# Patient Record
Sex: MALE | Race: ASIAN | NOT HISPANIC OR LATINO | Employment: UNEMPLOYED | URBAN - METROPOLITAN AREA
[De-identification: names, ages, dates, MRNs, and addresses within clinical notes are randomized per-mention and may not be internally consistent; named-entity substitution may affect disease eponyms.]

---

## 2017-01-03 ENCOUNTER — COMMUNICATION - HEALTHEAST (OUTPATIENT)
Dept: PHYSICAL MEDICINE AND REHAB | Facility: CLINIC | Age: 32
End: 2017-01-03

## 2017-01-09 ENCOUNTER — HOSPITAL ENCOUNTER (OUTPATIENT)
Dept: PHYSICAL MEDICINE AND REHAB | Facility: CLINIC | Age: 32
Discharge: HOME | End: 2017-01-09
Attending: SOCIAL WORKER

## 2017-01-09 DIAGNOSIS — F32.1 MAJOR DEPRESSIVE DISORDER, SINGLE EPISODE, MODERATE (H): ICD-10-CM

## 2017-01-09 DIAGNOSIS — F41.1 GENERALIZED ANXIETY DISORDER: ICD-10-CM

## 2017-01-19 ENCOUNTER — COMMUNICATION - HEALTHEAST (OUTPATIENT)
Dept: INTERNAL MEDICINE | Facility: CLINIC | Age: 32
End: 2017-01-19

## 2017-01-20 ENCOUNTER — AMBULATORY - HEALTHEAST (OUTPATIENT)
Dept: LAB | Facility: CLINIC | Age: 32
End: 2017-01-20

## 2017-01-20 DIAGNOSIS — R25.1 TREMOR: ICD-10-CM

## 2017-01-20 DIAGNOSIS — M79.606 LEG PAIN: ICD-10-CM

## 2017-01-20 DIAGNOSIS — M54.16 LUMBAR RADICULOPATHY: ICD-10-CM

## 2017-01-20 LAB — HBA1C MFR BLD: 5.5 % (ref 3.5–6)

## 2017-01-23 ENCOUNTER — OFFICE VISIT - HEALTHEAST (OUTPATIENT)
Dept: INTERNAL MEDICINE | Facility: CLINIC | Age: 32
End: 2017-01-23

## 2017-01-23 DIAGNOSIS — E55.9 HYPOVITAMINOSIS D: ICD-10-CM

## 2017-01-23 DIAGNOSIS — R53.1 WEAKNESS: ICD-10-CM

## 2017-01-23 DIAGNOSIS — F45.9 SOMATOFORM DISORDER: ICD-10-CM

## 2017-01-23 DIAGNOSIS — M62.81 MUSCLE WEAKNESS (GENERALIZED): ICD-10-CM

## 2017-01-23 LAB
C-ANCA - HISTORICAL: NEGATIVE
P-ANCA - HISTORICAL: NEGATIVE

## 2017-01-24 LAB
ANA SER QL: 1.1 U
JO-1 AUTOANTIBODIES - HISTORICAL: 0 EU
PATH ICD:: NORMAL
PROT PATTERN SERPL IFE-IMP: NORMAL
REVIEWING PATHOLOGIST: NORMAL

## 2017-01-25 LAB
MISCELLANEOUS TEST DEPT. - HE HISTORICAL: NORMAL
PERFORMING LAB: NORMAL
SPECIMEN STATUS: NORMAL
TEST NAME: NORMAL

## 2017-01-29 ENCOUNTER — COMMUNICATION - HEALTHEAST (OUTPATIENT)
Dept: INTERNAL MEDICINE | Facility: CLINIC | Age: 32
End: 2017-01-29

## 2017-01-31 ENCOUNTER — HOSPITAL ENCOUNTER (OUTPATIENT)
Dept: PHYSICAL MEDICINE AND REHAB | Facility: CLINIC | Age: 32
Discharge: HOME | End: 2017-01-31
Attending: SOCIAL WORKER

## 2017-01-31 DIAGNOSIS — F41.1 GENERALIZED ANXIETY DISORDER: ICD-10-CM

## 2017-01-31 DIAGNOSIS — F32.1 MAJOR DEPRESSIVE DISORDER, SINGLE EPISODE, MODERATE (H): ICD-10-CM

## 2017-03-10 ENCOUNTER — COMMUNICATION - HEALTHEAST (OUTPATIENT)
Dept: PHYSICAL MEDICINE AND REHAB | Facility: CLINIC | Age: 32
End: 2017-03-10

## 2017-03-19 ENCOUNTER — COMMUNICATION - HEALTHEAST (OUTPATIENT)
Dept: INTERNAL MEDICINE | Facility: CLINIC | Age: 32
End: 2017-03-19

## 2017-03-19 DIAGNOSIS — Z13.220 SCREENING FOR HYPERCHOLESTEROLEMIA: ICD-10-CM

## 2017-05-25 ENCOUNTER — OFFICE VISIT - HEALTHEAST (OUTPATIENT)
Dept: INTERNAL MEDICINE | Facility: CLINIC | Age: 32
End: 2017-05-25

## 2017-05-25 DIAGNOSIS — M62.81 MUSCLE WEAKNESS-GENERAL: ICD-10-CM

## 2017-05-25 DIAGNOSIS — M54.50 LOW BACK PAIN: ICD-10-CM

## 2017-05-25 LAB
CHOLEST SERPL-MCNC: 234 MG/DL
FASTING STATUS PATIENT QL REPORTED: NO
HDLC SERPL-MCNC: 53 MG/DL
LDLC SERPL CALC-MCNC: 137 MG/DL
TRIGL SERPL-MCNC: 222 MG/DL

## 2017-05-26 ENCOUNTER — COMMUNICATION - HEALTHEAST (OUTPATIENT)
Dept: INTERNAL MEDICINE | Facility: CLINIC | Age: 32
End: 2017-05-26

## 2017-06-22 ENCOUNTER — COMMUNICATION - HEALTHEAST (OUTPATIENT)
Dept: INTERNAL MEDICINE | Facility: CLINIC | Age: 32
End: 2017-06-22

## 2017-06-29 ENCOUNTER — COMMUNICATION - HEALTHEAST (OUTPATIENT)
Dept: INTERNAL MEDICINE | Facility: CLINIC | Age: 32
End: 2017-06-29

## 2017-07-12 ENCOUNTER — AMBULATORY - HEALTHEAST (OUTPATIENT)
Dept: INTERNAL MEDICINE | Facility: CLINIC | Age: 32
End: 2017-07-12

## 2017-07-12 DIAGNOSIS — G83.10: ICD-10-CM

## 2017-07-21 ENCOUNTER — AMBULATORY - HEALTHEAST (OUTPATIENT)
Dept: PHYSICAL MEDICINE AND REHAB | Facility: CLINIC | Age: 32
End: 2017-07-21

## 2017-07-24 ENCOUNTER — COMMUNICATION - HEALTHEAST (OUTPATIENT)
Dept: PHYSICAL MEDICINE AND REHAB | Facility: CLINIC | Age: 32
End: 2017-07-24

## 2017-08-03 ENCOUNTER — RECORDS - HEALTHEAST (OUTPATIENT)
Dept: ADMINISTRATIVE | Facility: OTHER | Age: 32
End: 2017-08-03

## 2017-08-04 ENCOUNTER — OFFICE VISIT - HEALTHEAST (OUTPATIENT)
Dept: INTERNAL MEDICINE | Facility: CLINIC | Age: 32
End: 2017-08-04

## 2017-08-04 DIAGNOSIS — M62.838 MUSCLE SPASM OF RIGHT LOWER EXTREMITY: ICD-10-CM

## 2017-08-04 DIAGNOSIS — M54.50 LOW BACK PAIN: ICD-10-CM

## 2017-08-15 ENCOUNTER — COMMUNICATION - HEALTHEAST (OUTPATIENT)
Dept: INTERNAL MEDICINE | Facility: CLINIC | Age: 32
End: 2017-08-15

## 2017-08-17 ENCOUNTER — COMMUNICATION - HEALTHEAST (OUTPATIENT)
Dept: INTERNAL MEDICINE | Facility: CLINIC | Age: 32
End: 2017-08-17

## 2017-08-18 ENCOUNTER — AMBULATORY - HEALTHEAST (OUTPATIENT)
Dept: INTERNAL MEDICINE | Facility: CLINIC | Age: 32
End: 2017-08-18

## 2017-08-18 DIAGNOSIS — M62.838 MUSCLE SPASM OF RIGHT LOWER EXTREMITY: ICD-10-CM

## 2017-09-12 ENCOUNTER — RECORDS - HEALTHEAST (OUTPATIENT)
Dept: ADMINISTRATIVE | Facility: OTHER | Age: 32
End: 2017-09-12

## 2017-09-25 ENCOUNTER — AMBULATORY - HEALTHEAST (OUTPATIENT)
Dept: INTERNAL MEDICINE | Facility: CLINIC | Age: 32
End: 2017-09-25

## 2017-09-25 ENCOUNTER — COMMUNICATION - HEALTHEAST (OUTPATIENT)
Dept: INTERNAL MEDICINE | Facility: CLINIC | Age: 32
End: 2017-09-25

## 2017-09-25 DIAGNOSIS — M54.50 LOW BACK PAIN: ICD-10-CM

## 2017-09-25 DIAGNOSIS — M62.838 MUSCLE SPASM OF RIGHT LOWER EXTREMITY: ICD-10-CM

## 2017-10-13 ENCOUNTER — COMMUNICATION - HEALTHEAST (OUTPATIENT)
Dept: INTERNAL MEDICINE | Facility: CLINIC | Age: 32
End: 2017-10-13

## 2017-10-19 ENCOUNTER — RECORDS - HEALTHEAST (OUTPATIENT)
Dept: ADMINISTRATIVE | Facility: OTHER | Age: 32
End: 2017-10-19

## 2017-10-20 ENCOUNTER — OFFICE VISIT - HEALTHEAST (OUTPATIENT)
Dept: INTERNAL MEDICINE | Facility: CLINIC | Age: 32
End: 2017-10-20

## 2017-10-20 DIAGNOSIS — M21.371 RIGHT FOOT DROP: ICD-10-CM

## 2017-10-20 DIAGNOSIS — E83.52 HYPERCALCEMIA: ICD-10-CM

## 2017-10-20 DIAGNOSIS — I26.99 BILATERAL PULMONARY EMBOLISM (H): ICD-10-CM

## 2017-10-20 DIAGNOSIS — Z09 HOSPITAL DISCHARGE FOLLOW-UP: ICD-10-CM

## 2017-10-23 ENCOUNTER — COMMUNICATION - HEALTHEAST (OUTPATIENT)
Dept: ADMINISTRATIVE | Facility: HOSPITAL | Age: 32
End: 2017-10-23

## 2017-10-25 ENCOUNTER — COMMUNICATION - HEALTHEAST (OUTPATIENT)
Dept: INTERNAL MEDICINE | Facility: CLINIC | Age: 32
End: 2017-10-25

## 2017-11-01 ENCOUNTER — COMMUNICATION - HEALTHEAST (OUTPATIENT)
Dept: INTERNAL MEDICINE | Facility: CLINIC | Age: 32
End: 2017-11-01

## 2017-11-01 DIAGNOSIS — M54.50 LOW BACK PAIN: ICD-10-CM

## 2017-11-01 DIAGNOSIS — M62.838 MUSCLE SPASM OF RIGHT LOWER EXTREMITY: ICD-10-CM

## 2017-11-06 ENCOUNTER — COMMUNICATION - HEALTHEAST (OUTPATIENT)
Dept: INTERNAL MEDICINE | Facility: CLINIC | Age: 32
End: 2017-11-06

## 2017-11-06 DIAGNOSIS — Z23 ENCOUNTER FOR VACCINATION: ICD-10-CM

## 2017-11-08 ENCOUNTER — AMBULATORY - HEALTHEAST (OUTPATIENT)
Dept: NURSING | Facility: CLINIC | Age: 32
End: 2017-11-08

## 2017-11-08 DIAGNOSIS — Z23 ENCOUNTER FOR VACCINATION: ICD-10-CM

## 2017-11-20 ENCOUNTER — COMMUNICATION - HEALTHEAST (OUTPATIENT)
Dept: INTERNAL MEDICINE | Facility: CLINIC | Age: 32
End: 2017-11-20

## 2017-11-25 ENCOUNTER — COMMUNICATION - HEALTHEAST (OUTPATIENT)
Dept: INTERNAL MEDICINE | Facility: CLINIC | Age: 32
End: 2017-11-25

## 2017-11-27 ENCOUNTER — AMBULATORY - HEALTHEAST (OUTPATIENT)
Dept: INTERNAL MEDICINE | Facility: CLINIC | Age: 32
End: 2017-11-27

## 2017-11-27 DIAGNOSIS — G89.29 CHRONIC RIGHT-SIDED LOW BACK PAIN WITH RIGHT-SIDED SCIATICA: ICD-10-CM

## 2017-11-27 DIAGNOSIS — I26.99 BILATERAL PULMONARY EMBOLISM (H): ICD-10-CM

## 2017-11-27 DIAGNOSIS — M54.41 CHRONIC RIGHT-SIDED LOW BACK PAIN WITH RIGHT-SIDED SCIATICA: ICD-10-CM

## 2017-12-04 ENCOUNTER — RECORDS - HEALTHEAST (OUTPATIENT)
Dept: ADMINISTRATIVE | Facility: OTHER | Age: 32
End: 2017-12-04

## 2017-12-05 ENCOUNTER — COMMUNICATION - HEALTHEAST (OUTPATIENT)
Dept: INTERNAL MEDICINE | Facility: CLINIC | Age: 32
End: 2017-12-05

## 2017-12-06 ENCOUNTER — AMBULATORY - HEALTHEAST (OUTPATIENT)
Dept: INTERNAL MEDICINE | Facility: CLINIC | Age: 32
End: 2017-12-06

## 2017-12-06 DIAGNOSIS — M62.838 MUSCLE SPASM OF RIGHT LOWER EXTREMITY: ICD-10-CM

## 2017-12-06 DIAGNOSIS — G89.29 CHRONIC RIGHT-SIDED LOW BACK PAIN WITH RIGHT-SIDED SCIATICA: ICD-10-CM

## 2017-12-06 DIAGNOSIS — M54.41 CHRONIC RIGHT-SIDED LOW BACK PAIN WITH RIGHT-SIDED SCIATICA: ICD-10-CM

## 2017-12-07 ENCOUNTER — AMBULATORY - HEALTHEAST (OUTPATIENT)
Dept: INTERNAL MEDICINE | Facility: CLINIC | Age: 32
End: 2017-12-07

## 2017-12-07 DIAGNOSIS — G89.29 CHRONIC RIGHT-SIDED LOW BACK PAIN WITH RIGHT-SIDED SCIATICA: ICD-10-CM

## 2017-12-07 DIAGNOSIS — M54.41 CHRONIC RIGHT-SIDED LOW BACK PAIN WITH RIGHT-SIDED SCIATICA: ICD-10-CM

## 2017-12-22 ENCOUNTER — COMMUNICATION - HEALTHEAST (OUTPATIENT)
Dept: INTERNAL MEDICINE | Facility: CLINIC | Age: 32
End: 2017-12-22

## 2017-12-27 ENCOUNTER — OFFICE VISIT - HEALTHEAST (OUTPATIENT)
Dept: INTERNAL MEDICINE | Facility: CLINIC | Age: 32
End: 2017-12-27

## 2017-12-27 DIAGNOSIS — R04.2 COUGH WITH HEMOPTYSIS: ICD-10-CM

## 2017-12-27 DIAGNOSIS — J01.00 MAXILLARY SINUSITIS, ACUTE: ICD-10-CM

## 2017-12-31 ENCOUNTER — COMMUNICATION - HEALTHEAST (OUTPATIENT)
Dept: INTERNAL MEDICINE | Facility: CLINIC | Age: 32
End: 2017-12-31

## 2018-01-02 ENCOUNTER — HOSPITAL ENCOUNTER (OUTPATIENT)
Dept: PALLIATIVE MEDICINE | Facility: OTHER | Age: 33
Discharge: HOME | End: 2018-01-02
Attending: PSYCHIATRY & NEUROLOGY

## 2018-01-02 ENCOUNTER — OFFICE VISIT - HEALTHEAST (OUTPATIENT)
Dept: INTERNAL MEDICINE | Facility: CLINIC | Age: 33
End: 2018-01-02

## 2018-01-02 DIAGNOSIS — M62.81 MUSCLE WEAKNESS (GENERALIZED): ICD-10-CM

## 2018-01-02 DIAGNOSIS — J01.90 ACUTE SINUSITIS: ICD-10-CM

## 2018-01-02 DIAGNOSIS — R04.0 FREQUENT EPISTAXIS: ICD-10-CM

## 2018-01-02 DIAGNOSIS — Z09 FOLLOW UP: ICD-10-CM

## 2018-01-02 DIAGNOSIS — M62.838 MUSCLE SPASM: ICD-10-CM

## 2018-01-02 ASSESSMENT — MIFFLIN-ST. JEOR: SCORE: 1487.84

## 2018-01-03 ENCOUNTER — RECORDS - HEALTHEAST (OUTPATIENT)
Dept: ADMINISTRATIVE | Facility: OTHER | Age: 33
End: 2018-01-03

## 2018-01-03 ENCOUNTER — COMMUNICATION - HEALTHEAST (OUTPATIENT)
Dept: INTERNAL MEDICINE | Facility: CLINIC | Age: 33
End: 2018-01-03

## 2018-01-05 ENCOUNTER — AMBULATORY - HEALTHEAST (OUTPATIENT)
Dept: FAMILY MEDICINE | Facility: CLINIC | Age: 33
End: 2018-01-05

## 2018-01-05 ENCOUNTER — COMMUNICATION - HEALTHEAST (OUTPATIENT)
Dept: NURSING | Facility: CLINIC | Age: 33
End: 2018-01-05

## 2018-01-05 DIAGNOSIS — Z63.79 STRESSFUL LIFE EVENTS AFFECTING FAMILY AND HOUSEHOLD: ICD-10-CM

## 2018-01-06 ENCOUNTER — COMMUNICATION - HEALTHEAST (OUTPATIENT)
Dept: FAMILY MEDICINE | Facility: CLINIC | Age: 33
End: 2018-01-06

## 2018-01-06 DIAGNOSIS — G89.29 CHRONIC RIGHT-SIDED LOW BACK PAIN WITH RIGHT-SIDED SCIATICA: ICD-10-CM

## 2018-01-06 DIAGNOSIS — M54.41 CHRONIC RIGHT-SIDED LOW BACK PAIN WITH RIGHT-SIDED SCIATICA: ICD-10-CM

## 2018-01-08 ENCOUNTER — COMMUNICATION - HEALTHEAST (OUTPATIENT)
Dept: INTERNAL MEDICINE | Facility: CLINIC | Age: 33
End: 2018-01-08

## 2018-01-09 ENCOUNTER — AMBULATORY - HEALTHEAST (OUTPATIENT)
Dept: CARE COORDINATION | Facility: CLINIC | Age: 33
End: 2018-01-09

## 2018-01-09 ENCOUNTER — COMMUNICATION - HEALTHEAST (OUTPATIENT)
Dept: NURSING | Facility: CLINIC | Age: 33
End: 2018-01-09

## 2018-01-09 ENCOUNTER — HOSPITAL ENCOUNTER (OUTPATIENT)
Dept: PALLIATIVE MEDICINE | Facility: OTHER | Age: 33
Discharge: HOME | End: 2018-01-09
Attending: COUNSELOR

## 2018-01-09 DIAGNOSIS — F41.1 GENERALIZED ANXIETY DISORDER: ICD-10-CM

## 2018-01-09 DIAGNOSIS — J32.2 CHRONIC ETHMOIDAL SINUSITIS: ICD-10-CM

## 2018-01-09 DIAGNOSIS — F33.2 SEVERE EPISODE OF RECURRENT MAJOR DEPRESSIVE DISORDER, WITHOUT PSYCHOTIC FEATURES (H): ICD-10-CM

## 2018-01-09 DIAGNOSIS — G89.4 CHRONIC PAIN SYNDROME: ICD-10-CM

## 2018-01-12 ENCOUNTER — OFFICE VISIT - HEALTHEAST (OUTPATIENT)
Dept: FAMILY MEDICINE | Facility: CLINIC | Age: 33
End: 2018-01-12

## 2018-01-12 DIAGNOSIS — G89.4 CHRONIC PAIN SYNDROME: ICD-10-CM

## 2018-01-12 DIAGNOSIS — E55.9 VITAMIN D DEFICIENCY: ICD-10-CM

## 2018-01-12 DIAGNOSIS — I26.99 BILATERAL PULMONARY EMBOLISM (H): ICD-10-CM

## 2018-01-12 DIAGNOSIS — J01.90 ACUTE SINUSITIS, RECURRENCE NOT SPECIFIED, UNSPECIFIED LOCATION: ICD-10-CM

## 2018-01-12 DIAGNOSIS — J01.90 ACUTE SINUSITIS: ICD-10-CM

## 2018-01-12 DIAGNOSIS — G83.10 MONOPARESIS OF LEG (H): ICD-10-CM

## 2018-01-12 DIAGNOSIS — M21.372 FOOT DROP, BILATERAL: ICD-10-CM

## 2018-01-12 DIAGNOSIS — J33.9 NASAL POLYPOSIS: ICD-10-CM

## 2018-01-12 DIAGNOSIS — J32.2 CHRONIC ETHMOIDAL SINUSITIS: ICD-10-CM

## 2018-01-12 DIAGNOSIS — M21.371 FOOT DROP, BILATERAL: ICD-10-CM

## 2018-01-12 DIAGNOSIS — R53.1 GENERAL WEAKNESS: ICD-10-CM

## 2018-01-12 DIAGNOSIS — D64.9 NORMOCHROMIC NORMOCYTIC ANEMIA: ICD-10-CM

## 2018-01-12 LAB
ALBUMIN SERPL-MCNC: 3.8 G/DL (ref 3.5–5)
ALP SERPL-CCNC: 70 U/L (ref 45–120)
ALT SERPL W P-5'-P-CCNC: 13 U/L (ref 0–45)
ANION GAP SERPL CALCULATED.3IONS-SCNC: 11 MMOL/L (ref 5–18)
AST SERPL W P-5'-P-CCNC: 16 U/L (ref 0–40)
BILIRUB SERPL-MCNC: 0.6 MG/DL (ref 0–1)
BUN SERPL-MCNC: 15 MG/DL (ref 8–22)
CALCIUM SERPL-MCNC: 9.4 MG/DL (ref 8.5–10.5)
CHLORIDE BLD-SCNC: 102 MMOL/L (ref 98–107)
CO2 SERPL-SCNC: 27 MMOL/L (ref 22–31)
CREAT SERPL-MCNC: 0.82 MG/DL (ref 0.7–1.3)
ERYTHROCYTE [DISTWIDTH] IN BLOOD BY AUTOMATED COUNT: 12.6 % (ref 11–14.5)
GFR SERPL CREATININE-BSD FRML MDRD: >60 ML/MIN/1.73M2
GLUCOSE BLD-MCNC: 85 MG/DL (ref 70–125)
HCT VFR BLD AUTO: 49.1 % (ref 40–54)
HGB BLD-MCNC: 16.2 G/DL (ref 14–18)
MCH RBC QN AUTO: 28.7 PG (ref 27–34)
MCHC RBC AUTO-ENTMCNC: 32.9 G/DL (ref 32–36)
MCV RBC AUTO: 87 FL (ref 80–100)
PLATELET # BLD AUTO: 245 THOU/UL (ref 140–440)
PMV BLD AUTO: 6.8 FL (ref 7–10)
POTASSIUM BLD-SCNC: 3.9 MMOL/L (ref 3.5–5)
PROT SERPL-MCNC: 7.8 G/DL (ref 6–8)
RBC # BLD AUTO: 5.65 MILL/UL (ref 4.4–6.2)
SODIUM SERPL-SCNC: 140 MMOL/L (ref 136–145)
TSH SERPL DL<=0.005 MIU/L-ACNC: 3.1 UIU/ML (ref 0.3–5)
WBC: 6 THOU/UL (ref 4–11)

## 2018-01-15 ENCOUNTER — COMMUNICATION - HEALTHEAST (OUTPATIENT)
Dept: FAMILY MEDICINE | Facility: CLINIC | Age: 33
End: 2018-01-15

## 2018-01-15 LAB
25(OH)D3 SERPL-MCNC: 32.1 NG/ML (ref 30–80)
25(OH)D3 SERPL-MCNC: 32.1 NG/ML (ref 30–80)

## 2018-01-16 ENCOUNTER — COMMUNICATION - HEALTHEAST (OUTPATIENT)
Dept: FAMILY MEDICINE | Facility: CLINIC | Age: 33
End: 2018-01-16

## 2018-01-19 ENCOUNTER — OFFICE VISIT - HEALTHEAST (OUTPATIENT)
Dept: FAMILY MEDICINE | Facility: CLINIC | Age: 33
End: 2018-01-19

## 2018-01-19 ENCOUNTER — COMMUNICATION - HEALTHEAST (OUTPATIENT)
Dept: NURSING | Facility: CLINIC | Age: 33
End: 2018-01-19

## 2018-01-19 DIAGNOSIS — R53.1 GENERAL WEAKNESS: ICD-10-CM

## 2018-01-19 DIAGNOSIS — I26.99 BILATERAL PULMONARY EMBOLISM (H): ICD-10-CM

## 2018-01-19 DIAGNOSIS — R06.09 DYSPNEA ON EXERTION: ICD-10-CM

## 2018-01-19 DIAGNOSIS — M21.372 FOOT DROP, BILATERAL: ICD-10-CM

## 2018-01-19 DIAGNOSIS — M21.371 FOOT DROP, BILATERAL: ICD-10-CM

## 2018-01-19 DIAGNOSIS — J32.2 CHRONIC ETHMOIDAL SINUSITIS: ICD-10-CM

## 2018-01-19 DIAGNOSIS — G89.4 CHRONIC PAIN SYNDROME: ICD-10-CM

## 2018-01-19 DIAGNOSIS — G83.10 MONOPARESIS OF LEG (H): ICD-10-CM

## 2018-01-22 ENCOUNTER — HOSPITAL ENCOUNTER (OUTPATIENT)
Dept: PALLIATIVE MEDICINE | Facility: OTHER | Age: 33
Discharge: HOME | End: 2018-01-22
Attending: COUNSELOR

## 2018-01-22 ENCOUNTER — AMBULATORY - HEALTHEAST (OUTPATIENT)
Dept: PALLIATIVE MEDICINE | Facility: OTHER | Age: 33
End: 2018-01-22

## 2018-01-22 DIAGNOSIS — F41.1 GENERALIZED ANXIETY DISORDER: ICD-10-CM

## 2018-01-22 DIAGNOSIS — F33.2 SEVERE EPISODE OF RECURRENT MAJOR DEPRESSIVE DISORDER, WITHOUT PSYCHOTIC FEATURES (H): ICD-10-CM

## 2018-01-22 DIAGNOSIS — G89.4 CHRONIC PAIN SYNDROME: ICD-10-CM

## 2018-01-23 ENCOUNTER — HOSPITAL ENCOUNTER (OUTPATIENT)
Dept: PALLIATIVE MEDICINE | Facility: OTHER | Age: 33
Discharge: HOME | End: 2018-01-23
Attending: PSYCHIATRY & NEUROLOGY

## 2018-01-23 DIAGNOSIS — M62.838 MUSCLE SPASM: ICD-10-CM

## 2018-01-23 DIAGNOSIS — M62.81 MUSCLE WEAKNESS (GENERALIZED): ICD-10-CM

## 2018-01-23 ASSESSMENT — MIFFLIN-ST. JEOR: SCORE: 1474.24

## 2018-01-24 ENCOUNTER — COMMUNICATION - HEALTHEAST (OUTPATIENT)
Dept: PALLIATIVE MEDICINE | Facility: OTHER | Age: 33
End: 2018-01-24

## 2018-01-24 ENCOUNTER — AMBULATORY - HEALTHEAST (OUTPATIENT)
Dept: PULMONOLOGY | Facility: OTHER | Age: 33
End: 2018-01-24

## 2018-01-24 DIAGNOSIS — R06.09 DYSPNEA ON EXERTION: ICD-10-CM

## 2018-01-26 ENCOUNTER — COMMUNICATION - HEALTHEAST (OUTPATIENT)
Dept: PULMONOLOGY | Facility: OTHER | Age: 33
End: 2018-01-26

## 2018-01-26 ENCOUNTER — COMMUNICATION - HEALTHEAST (OUTPATIENT)
Dept: PALLIATIVE MEDICINE | Facility: OTHER | Age: 33
End: 2018-01-26

## 2018-01-31 ENCOUNTER — COMMUNICATION - HEALTHEAST (OUTPATIENT)
Dept: PALLIATIVE MEDICINE | Facility: OTHER | Age: 33
End: 2018-01-31

## 2018-01-31 ENCOUNTER — COMMUNICATION - HEALTHEAST (OUTPATIENT)
Dept: FAMILY MEDICINE | Facility: CLINIC | Age: 33
End: 2018-01-31

## 2018-02-01 ENCOUNTER — HOSPITAL ENCOUNTER (OUTPATIENT)
Dept: RESPIRATORY THERAPY | Facility: HOSPITAL | Age: 33
Discharge: HOME | End: 2018-02-01
Attending: INTERNAL MEDICINE

## 2018-02-01 DIAGNOSIS — R06.09 DYSPNEA ON EXERTION: ICD-10-CM

## 2018-02-01 DIAGNOSIS — R06.09 OTHER FORMS OF DYSPNEA: ICD-10-CM

## 2018-02-06 ENCOUNTER — OFFICE VISIT - HEALTHEAST (OUTPATIENT)
Dept: FAMILY MEDICINE | Facility: CLINIC | Age: 33
End: 2018-02-06

## 2018-02-06 DIAGNOSIS — K21.9 GERD (GASTROESOPHAGEAL REFLUX DISEASE): ICD-10-CM

## 2018-02-06 DIAGNOSIS — R33.9 URINARY RETENTION: ICD-10-CM

## 2018-02-06 DIAGNOSIS — G89.4 CHRONIC PAIN SYNDROME: ICD-10-CM

## 2018-02-06 DIAGNOSIS — J32.2 CHRONIC ETHMOIDAL SINUSITIS: ICD-10-CM

## 2018-02-07 ENCOUNTER — HOSPITAL ENCOUNTER (OUTPATIENT)
Dept: PALLIATIVE MEDICINE | Facility: OTHER | Age: 33
Discharge: HOME | End: 2018-02-07
Attending: COUNSELOR

## 2018-02-07 DIAGNOSIS — G89.4 CHRONIC PAIN SYNDROME: ICD-10-CM

## 2018-02-07 DIAGNOSIS — F41.1 GENERALIZED ANXIETY DISORDER: ICD-10-CM

## 2018-02-07 DIAGNOSIS — F33.2 SEVERE EPISODE OF RECURRENT MAJOR DEPRESSIVE DISORDER, WITHOUT PSYCHOTIC FEATURES (H): ICD-10-CM

## 2018-02-13 ENCOUNTER — OFFICE VISIT - HEALTHEAST (OUTPATIENT)
Dept: RHEUMATOLOGY | Facility: CLINIC | Age: 33
End: 2018-02-13

## 2018-02-13 DIAGNOSIS — M79.10 MYALGIA: ICD-10-CM

## 2018-02-13 DIAGNOSIS — M25.50 PAIN IN JOINT, MULTIPLE SITES: ICD-10-CM

## 2018-02-13 ASSESSMENT — MIFFLIN-ST. JEOR: SCORE: 1496.92

## 2018-02-19 ENCOUNTER — COMMUNICATION - HEALTHEAST (OUTPATIENT)
Dept: PALLIATIVE MEDICINE | Facility: OTHER | Age: 33
End: 2018-02-19

## 2018-02-19 ENCOUNTER — OFFICE VISIT - HEALTHEAST (OUTPATIENT)
Dept: OTOLARYNGOLOGY | Facility: CLINIC | Age: 33
End: 2018-02-19

## 2018-02-19 ENCOUNTER — COMMUNICATION - HEALTHEAST (OUTPATIENT)
Dept: FAMILY MEDICINE | Facility: CLINIC | Age: 33
End: 2018-02-19

## 2018-02-19 DIAGNOSIS — J33.9 RIGHT NASAL POLYPS: ICD-10-CM

## 2018-02-22 ENCOUNTER — COMMUNICATION - HEALTHEAST (OUTPATIENT)
Dept: NURSING | Facility: CLINIC | Age: 33
End: 2018-02-22

## 2018-02-23 ENCOUNTER — COMMUNICATION - HEALTHEAST (OUTPATIENT)
Dept: PALLIATIVE MEDICINE | Facility: OTHER | Age: 33
End: 2018-02-23

## 2018-02-23 ENCOUNTER — HOSPITAL ENCOUNTER (OUTPATIENT)
Dept: PALLIATIVE MEDICINE | Facility: OTHER | Age: 33
Discharge: HOME | End: 2018-02-23
Attending: COUNSELOR

## 2018-02-23 ENCOUNTER — OFFICE VISIT - HEALTHEAST (OUTPATIENT)
Dept: ONCOLOGY | Facility: HOSPITAL | Age: 33
End: 2018-02-23

## 2018-02-23 DIAGNOSIS — M62.81 MUSCLE WEAKNESS (GENERALIZED): ICD-10-CM

## 2018-02-23 DIAGNOSIS — M62.838 MUSCLE SPASM: ICD-10-CM

## 2018-02-23 DIAGNOSIS — I26.99 BILATERAL PULMONARY EMBOLISM (H): ICD-10-CM

## 2018-02-23 DIAGNOSIS — G89.4 CHRONIC PAIN SYNDROME: ICD-10-CM

## 2018-02-23 DIAGNOSIS — F33.2 SEVERE EPISODE OF RECURRENT MAJOR DEPRESSIVE DISORDER, WITHOUT PSYCHOTIC FEATURES (H): ICD-10-CM

## 2018-02-23 DIAGNOSIS — F41.1 GENERALIZED ANXIETY DISORDER: ICD-10-CM

## 2018-02-26 ENCOUNTER — HOSPITAL ENCOUNTER (OUTPATIENT)
Dept: PALLIATIVE MEDICINE | Facility: OTHER | Age: 33
Discharge: HOME | End: 2018-02-26
Attending: PSYCHIATRY & NEUROLOGY

## 2018-02-26 DIAGNOSIS — M62.81 MUSCLE WEAKNESS (GENERALIZED): ICD-10-CM

## 2018-02-26 DIAGNOSIS — G89.29 CHRONIC RIGHT-SIDED LOW BACK PAIN WITH RIGHT-SIDED SCIATICA: ICD-10-CM

## 2018-02-26 DIAGNOSIS — M54.41 CHRONIC RIGHT-SIDED LOW BACK PAIN WITH RIGHT-SIDED SCIATICA: ICD-10-CM

## 2018-02-26 DIAGNOSIS — M62.838 MUSCLE SPASM: ICD-10-CM

## 2018-02-26 DIAGNOSIS — G89.29 CHRONIC PAIN: ICD-10-CM

## 2018-02-26 ASSESSMENT — MIFFLIN-ST. JEOR: SCORE: 1487.84

## 2018-03-08 ENCOUNTER — OFFICE VISIT - HEALTHEAST (OUTPATIENT)
Dept: PULMONOLOGY | Facility: OTHER | Age: 33
End: 2018-03-08

## 2018-03-08 ENCOUNTER — HOSPITAL ENCOUNTER (OUTPATIENT)
Dept: PALLIATIVE MEDICINE | Facility: OTHER | Age: 33
Discharge: HOME | End: 2018-03-08
Attending: COUNSELOR

## 2018-03-08 DIAGNOSIS — R06.02 SHORTNESS OF BREATH: ICD-10-CM

## 2018-03-08 DIAGNOSIS — G89.4 CHRONIC PAIN SYNDROME: ICD-10-CM

## 2018-03-08 DIAGNOSIS — F41.1 GENERALIZED ANXIETY DISORDER: ICD-10-CM

## 2018-03-08 DIAGNOSIS — J98.4 RESTRICTIVE LUNG DISEASE: ICD-10-CM

## 2018-03-08 DIAGNOSIS — I27.20 PULMONARY HYPERTENSION (H): ICD-10-CM

## 2018-03-08 DIAGNOSIS — F33.2 SEVERE EPISODE OF RECURRENT MAJOR DEPRESSIVE DISORDER, WITHOUT PSYCHOTIC FEATURES (H): ICD-10-CM

## 2018-03-08 ASSESSMENT — MIFFLIN-ST. JEOR: SCORE: 1491.47

## 2018-03-12 ENCOUNTER — AMBULATORY - HEALTHEAST (OUTPATIENT)
Dept: NURSING | Facility: CLINIC | Age: 33
End: 2018-03-12

## 2018-03-15 ENCOUNTER — COMMUNICATION - HEALTHEAST (OUTPATIENT)
Dept: CARE COORDINATION | Facility: CLINIC | Age: 33
End: 2018-03-15

## 2018-03-16 ENCOUNTER — HOSPITAL ENCOUNTER (OUTPATIENT)
Dept: CARDIOLOGY | Facility: HOSPITAL | Age: 33
Discharge: HOME | End: 2018-03-16
Attending: INTERNAL MEDICINE

## 2018-03-16 DIAGNOSIS — I27.20 PULMONARY HYPERTENSION (H): ICD-10-CM

## 2018-03-16 LAB
AORTIC ROOT: 2.8 CM
AORTIC VALVE MEAN VELOCITY: 95.6 CM/S
AV DIMENSIONLESS INDEX VTI: 0.7
AV MEAN GRADIENT: 4 MMHG
AV PEAK GRADIENT: 6 MMHG
AV VALVE AREA: 1.9 CM2
AV VELOCITY RATIO: 0.7
BSA FOR ECHO PROCEDURE: 1.69 M2
CV BLOOD PRESSURE: NORMAL MMHG
CV ECHO HEIGHT: 65 IN
CV ECHO WEIGHT: 138 LBS
DOP CALC AO PEAK VEL: 122 CM/S
DOP CALC AO VTI: 17.8 CM
DOP CALC LVOT AREA: 2.83 CM2
DOP CALC LVOT DIAMETER: 1.9 CM
DOP CALC LVOT PEAK VEL: 80.5 CM/S
DOP CALC LVOT STROKE VOLUME: 34.6 CM3
DOP CALCLVOT PEAK VEL VTI: 12.2 CM
EJECTION FRACTION: 63 % (ref 55–75)
FRACTIONAL SHORTENING: 35.7 % (ref 28–44)
INTERVENTRICULAR SEPTUM IN END DIASTOLE: 0.97 CM (ref 0.6–1)
IVS/PW RATIO: 0.9
LA AREA 2: 10 CM2
LEFT ATRIUM LENGTH: 3.7 CM
LEFT ATRIUM SIZE: 2.7 CM
LEFT VENTRICLE CARDIAC INDEX: 2.6 L/MIN/M2
LEFT VENTRICLE CARDIAC OUTPUT: 4.3 L/MIN
LEFT VENTRICLE DIASTOLIC VOLUME INDEX: 25.6 CM3/M2 (ref 34–74)
LEFT VENTRICLE DIASTOLIC VOLUME: 43.3 CM3 (ref 62–150)
LEFT VENTRICLE HEART RATE: 125 BPM
LEFT VENTRICLE MASS INDEX: 60.8 G/M2
LEFT VENTRICLE SYSTOLIC VOLUME INDEX: 9.5 CM3/M2 (ref 11–31)
LEFT VENTRICLE SYSTOLIC VOLUME: 16.1 CM3 (ref 21–61)
LEFT VENTRICULAR INTERNAL DIMENSION IN DIASTOLE: 3.47 CM (ref 4.2–5.8)
LEFT VENTRICULAR INTERNAL DIMENSION IN SYSTOLE: 2.23 CM (ref 2.5–4)
LEFT VENTRICULAR MASS: 102.7 G
LEFT VENTRICULAR OUTFLOW TRACT MEAN GRADIENT: 2 MMHG
LEFT VENTRICULAR OUTFLOW TRACT MEAN VELOCITY: 66.1 CM/S
LEFT VENTRICULAR OUTFLOW TRACT PEAK GRADIENT: 3 MMHG
LEFT VENTRICULAR POSTERIOR WALL IN END DIASTOLE: 1.04 CM (ref 0.6–1)
LV STROKE VOLUME INDEX: 20.5 ML/M2
MV AVERAGE E/E' RATIO: 0.9 CM/S
MV E'TISSUE VEL-LAT: 12 CM/S
MV E'TISSUE VEL-MED: 9.77 CM/S
MV LATERAL E/E' RATIO: 0.8
MV MEDIAL E/E' RATIO: 1
MV PEAK E VELOCITY: 9.38 CM/S
NUC REST DIASTOLIC VOLUME INDEX: 2208 LBS
NUC REST SYSTOLIC VOLUME INDEX: 65 IN
TRICUSPID VALVE ANULAR PLANE SYSTOLIC EXCURSION: 2.2 CM

## 2018-03-16 ASSESSMENT — MIFFLIN-ST. JEOR: SCORE: 1487.84

## 2018-03-19 ENCOUNTER — HOSPITAL ENCOUNTER (OUTPATIENT)
Dept: RESPIRATORY THERAPY | Facility: HOSPITAL | Age: 33
Discharge: HOME | End: 2018-03-19
Attending: INTERNAL MEDICINE

## 2018-03-19 ENCOUNTER — HOSPITAL ENCOUNTER (OUTPATIENT)
Dept: NUCLEAR MEDICINE | Facility: HOSPITAL | Age: 33
Discharge: HOME | End: 2018-03-19
Attending: INTERNAL MEDICINE

## 2018-03-19 ENCOUNTER — HOSPITAL ENCOUNTER (OUTPATIENT)
Dept: RADIOLOGY | Facility: HOSPITAL | Age: 33
Discharge: HOME | End: 2018-03-19
Attending: INTERNAL MEDICINE

## 2018-03-19 DIAGNOSIS — J98.4 RESTRICTIVE LUNG DISEASE: ICD-10-CM

## 2018-03-19 DIAGNOSIS — I27.20 PULMONARY HYPERTENSION (H): ICD-10-CM

## 2018-03-19 DIAGNOSIS — R06.02 SHORTNESS OF BREATH: ICD-10-CM

## 2018-03-20 ENCOUNTER — AMBULATORY - HEALTHEAST (OUTPATIENT)
Dept: CARE COORDINATION | Facility: CLINIC | Age: 33
End: 2018-03-20

## 2018-03-20 ENCOUNTER — OFFICE VISIT - HEALTHEAST (OUTPATIENT)
Dept: FAMILY MEDICINE | Facility: CLINIC | Age: 33
End: 2018-03-20

## 2018-03-20 DIAGNOSIS — I26.99 BILATERAL PULMONARY EMBOLISM (H): ICD-10-CM

## 2018-03-20 DIAGNOSIS — E78.5 HYPERLIPIDEMIA: ICD-10-CM

## 2018-03-20 DIAGNOSIS — G89.4 CHRONIC PAIN SYNDROME: ICD-10-CM

## 2018-03-20 DIAGNOSIS — K21.9 GASTROESOPHAGEAL REFLUX DISEASE WITHOUT ESOPHAGITIS: ICD-10-CM

## 2018-03-20 DIAGNOSIS — R00.0 TACHYCARDIA: ICD-10-CM

## 2018-03-20 DIAGNOSIS — R33.9 URINARY RETENTION: ICD-10-CM

## 2018-03-22 ENCOUNTER — HOSPITAL ENCOUNTER (OUTPATIENT)
Dept: PALLIATIVE MEDICINE | Facility: OTHER | Age: 33
Discharge: HOME | End: 2018-03-22
Attending: COUNSELOR

## 2018-03-22 ENCOUNTER — AMBULATORY - HEALTHEAST (OUTPATIENT)
Dept: CARE COORDINATION | Facility: CLINIC | Age: 33
End: 2018-03-22

## 2018-03-22 DIAGNOSIS — I26.99 BILATERAL PULMONARY EMBOLISM (H): ICD-10-CM

## 2018-03-22 DIAGNOSIS — F33.2 SEVERE EPISODE OF RECURRENT MAJOR DEPRESSIVE DISORDER, WITHOUT PSYCHOTIC FEATURES (H): ICD-10-CM

## 2018-03-22 DIAGNOSIS — F41.1 GENERALIZED ANXIETY DISORDER: ICD-10-CM

## 2018-03-22 DIAGNOSIS — G89.4 CHRONIC PAIN SYNDROME: ICD-10-CM

## 2018-03-23 ENCOUNTER — COMMUNICATION - HEALTHEAST (OUTPATIENT)
Dept: NURSING | Facility: CLINIC | Age: 33
End: 2018-03-23

## 2018-03-27 ENCOUNTER — COMMUNICATION - HEALTHEAST (OUTPATIENT)
Dept: PALLIATIVE MEDICINE | Facility: OTHER | Age: 33
End: 2018-03-27

## 2018-03-27 DIAGNOSIS — M54.41 CHRONIC RIGHT-SIDED LOW BACK PAIN WITH RIGHT-SIDED SCIATICA: ICD-10-CM

## 2018-03-27 DIAGNOSIS — G89.29 CHRONIC RIGHT-SIDED LOW BACK PAIN WITH RIGHT-SIDED SCIATICA: ICD-10-CM

## 2018-03-27 DIAGNOSIS — M62.838 MUSCLE SPASM: ICD-10-CM

## 2018-03-27 DIAGNOSIS — M62.81 MUSCLE WEAKNESS (GENERALIZED): ICD-10-CM

## 2018-04-24 ENCOUNTER — COMMUNICATION - HEALTHEAST (OUTPATIENT)
Dept: FAMILY MEDICINE | Facility: CLINIC | Age: 33
End: 2018-04-24

## 2018-04-26 ENCOUNTER — COMMUNICATION - HEALTHEAST (OUTPATIENT)
Dept: NURSING | Facility: CLINIC | Age: 33
End: 2018-04-26

## 2018-05-07 ENCOUNTER — AMBULATORY - HEALTHEAST (OUTPATIENT)
Dept: NURSING | Facility: CLINIC | Age: 33
End: 2018-05-07

## 2018-05-08 ENCOUNTER — COMMUNICATION - HEALTHEAST (OUTPATIENT)
Dept: FAMILY MEDICINE | Facility: CLINIC | Age: 33
End: 2018-05-08

## 2018-05-08 ENCOUNTER — RECORDS - HEALTHEAST (OUTPATIENT)
Dept: ADMINISTRATIVE | Facility: OTHER | Age: 33
End: 2018-05-08

## 2018-05-08 ENCOUNTER — OFFICE VISIT - HEALTHEAST (OUTPATIENT)
Dept: PULMONOLOGY | Facility: OTHER | Age: 33
End: 2018-05-08

## 2018-05-08 DIAGNOSIS — R06.02 SHORTNESS OF BREATH: ICD-10-CM

## 2018-05-09 ENCOUNTER — COMMUNICATION - HEALTHEAST (OUTPATIENT)
Dept: PALLIATIVE MEDICINE | Facility: OTHER | Age: 33
End: 2018-05-09

## 2018-05-09 ENCOUNTER — HOSPITAL ENCOUNTER (OUTPATIENT)
Dept: PALLIATIVE MEDICINE | Facility: OTHER | Age: 33
Discharge: HOME | End: 2018-05-09
Attending: PSYCHIATRY & NEUROLOGY

## 2018-05-09 DIAGNOSIS — M62.81 MUSCLE WEAKNESS (GENERALIZED): ICD-10-CM

## 2018-05-09 DIAGNOSIS — G89.29 CHRONIC PAIN: ICD-10-CM

## 2018-05-09 DIAGNOSIS — M62.838 MUSCLE SPASM: ICD-10-CM

## 2018-05-09 DIAGNOSIS — G89.29 CHRONIC RIGHT-SIDED LOW BACK PAIN WITH RIGHT-SIDED SCIATICA: ICD-10-CM

## 2018-05-09 DIAGNOSIS — G89.4 CHRONIC PAIN SYNDROME: ICD-10-CM

## 2018-05-09 DIAGNOSIS — M54.41 CHRONIC RIGHT-SIDED LOW BACK PAIN WITH RIGHT-SIDED SCIATICA: ICD-10-CM

## 2018-05-09 ASSESSMENT — MIFFLIN-ST. JEOR: SCORE: 1492.38

## 2018-05-10 ENCOUNTER — COMMUNICATION - HEALTHEAST (OUTPATIENT)
Dept: FAMILY MEDICINE | Facility: CLINIC | Age: 33
End: 2018-05-10

## 2018-05-15 ENCOUNTER — AMBULATORY - HEALTHEAST (OUTPATIENT)
Dept: PALLIATIVE MEDICINE | Facility: OTHER | Age: 33
End: 2018-05-15

## 2018-05-15 ENCOUNTER — HOSPITAL ENCOUNTER (OUTPATIENT)
Dept: PALLIATIVE MEDICINE | Facility: OTHER | Age: 33
Discharge: HOME | End: 2018-05-15
Attending: COUNSELOR

## 2018-05-15 DIAGNOSIS — G89.4 CHRONIC PAIN SYNDROME: ICD-10-CM

## 2018-05-15 DIAGNOSIS — F41.1 GENERALIZED ANXIETY DISORDER: ICD-10-CM

## 2018-05-15 DIAGNOSIS — F33.2 SEVERE EPISODE OF RECURRENT MAJOR DEPRESSIVE DISORDER, WITHOUT PSYCHOTIC FEATURES (H): ICD-10-CM

## 2018-05-17 ENCOUNTER — OFFICE VISIT - HEALTHEAST (OUTPATIENT)
Dept: CARDIOLOGY | Facility: CLINIC | Age: 33
End: 2018-05-17

## 2018-05-17 ENCOUNTER — COMMUNICATION - HEALTHEAST (OUTPATIENT)
Dept: CARDIOLOGY | Facility: CLINIC | Age: 33
End: 2018-05-17

## 2018-05-17 ENCOUNTER — COMMUNICATION - HEALTHEAST (OUTPATIENT)
Dept: ONCOLOGY | Facility: HOSPITAL | Age: 33
End: 2018-05-17

## 2018-05-17 DIAGNOSIS — R00.0 SINUS TACHYCARDIA: ICD-10-CM

## 2018-05-17 DIAGNOSIS — R07.9 CHEST PAIN: ICD-10-CM

## 2018-05-17 DIAGNOSIS — R06.09 DYSPNEA ON EXERTION: ICD-10-CM

## 2018-05-17 LAB
ATRIAL RATE - MUSE: 133 BPM
DIASTOLIC BLOOD PRESSURE - MUSE: NORMAL MMHG
INTERPRETATION ECG - MUSE: NORMAL
P AXIS - MUSE: 119 DEGREES
PR INTERVAL - MUSE: 146 MS
QRS DURATION - MUSE: 78 MS
QT - MUSE: 272 MS
QTC - MUSE: 404 MS
R AXIS - MUSE: 23 DEGREES
SYSTOLIC BLOOD PRESSURE - MUSE: NORMAL MMHG
T AXIS - MUSE: 117 DEGREES
VENTRICULAR RATE- MUSE: 133 BPM

## 2018-05-17 ASSESSMENT — MIFFLIN-ST. JEOR: SCORE: 1537.74

## 2018-05-21 ENCOUNTER — COMMUNICATION - HEALTHEAST (OUTPATIENT)
Dept: FAMILY MEDICINE | Facility: CLINIC | Age: 33
End: 2018-05-21

## 2018-05-22 ENCOUNTER — COMMUNICATION - HEALTHEAST (OUTPATIENT)
Dept: TELEHEALTH | Facility: CLINIC | Age: 33
End: 2018-05-22

## 2018-05-22 ENCOUNTER — AMBULATORY - HEALTHEAST (OUTPATIENT)
Dept: CARDIOLOGY | Facility: CLINIC | Age: 33
End: 2018-05-22

## 2018-05-22 ENCOUNTER — HOSPITAL ENCOUNTER (OUTPATIENT)
Dept: CARDIOLOGY | Facility: HOSPITAL | Age: 33
Discharge: HOME | End: 2018-05-22
Attending: INTERNAL MEDICINE

## 2018-05-22 DIAGNOSIS — R00.0 SINUS TACHYCARDIA: ICD-10-CM

## 2018-05-22 LAB
ATRIAL RATE - MUSE: 112 BPM
DIASTOLIC BLOOD PRESSURE - MUSE: NORMAL MMHG
INTERPRETATION ECG - MUSE: NORMAL
P AXIS - MUSE: 39 DEGREES
PR INTERVAL - MUSE: 144 MS
QRS DURATION - MUSE: 86 MS
QT - MUSE: 308 MS
QTC - MUSE: 420 MS
R AXIS - MUSE: 120 DEGREES
SYSTOLIC BLOOD PRESSURE - MUSE: NORMAL MMHG
T AXIS - MUSE: 63 DEGREES
VENTRICULAR RATE- MUSE: 112 BPM

## 2018-05-22 ASSESSMENT — MIFFLIN-ST. JEOR: SCORE: 1555.88

## 2018-05-25 ENCOUNTER — COMMUNICATION - HEALTHEAST (OUTPATIENT)
Dept: NURSING | Facility: CLINIC | Age: 33
End: 2018-05-25

## 2018-05-29 ENCOUNTER — COMMUNICATION - HEALTHEAST (OUTPATIENT)
Dept: ANTICOAGULATION | Facility: CLINIC | Age: 33
End: 2018-05-29

## 2018-05-29 ENCOUNTER — OFFICE VISIT - HEALTHEAST (OUTPATIENT)
Dept: FAMILY MEDICINE | Facility: CLINIC | Age: 33
End: 2018-05-29

## 2018-05-29 DIAGNOSIS — I26.99 BILATERAL PULMONARY EMBOLISM (H): ICD-10-CM

## 2018-05-29 DIAGNOSIS — M62.81 MUSCLE WEAKNESS: ICD-10-CM

## 2018-05-29 DIAGNOSIS — G89.4 CHRONIC PAIN SYNDROME: ICD-10-CM

## 2018-05-29 DIAGNOSIS — R00.0 SINUS TACHYCARDIA: ICD-10-CM

## 2018-05-29 LAB
ANION GAP SERPL CALCULATED.3IONS-SCNC: 13 MMOL/L (ref 5–18)
BUN SERPL-MCNC: 18 MG/DL (ref 8–22)
CALCIUM SERPL-MCNC: 9.2 MG/DL (ref 8.5–10.5)
CHLORIDE BLD-SCNC: 104 MMOL/L (ref 98–107)
CO2 SERPL-SCNC: 24 MMOL/L (ref 22–31)
CREAT SERPL-MCNC: 0.89 MG/DL (ref 0.7–1.3)
ERYTHROCYTE [DISTWIDTH] IN BLOOD BY AUTOMATED COUNT: 12.5 % (ref 11–14.5)
GFR SERPL CREATININE-BSD FRML MDRD: >60 ML/MIN/1.73M2
GLUCOSE BLD-MCNC: 98 MG/DL (ref 70–125)
HCT VFR BLD AUTO: 44.5 % (ref 40–54)
HGB BLD-MCNC: 14.9 G/DL (ref 14–18)
INR PPP: 1 (ref 0.9–1.1)
MCH RBC QN AUTO: 27.8 PG (ref 27–34)
MCHC RBC AUTO-ENTMCNC: 33.5 G/DL (ref 32–36)
MCV RBC AUTO: 83 FL (ref 80–100)
PLATELET # BLD AUTO: 250 THOU/UL (ref 140–440)
PMV BLD AUTO: 6.5 FL (ref 7–10)
POTASSIUM BLD-SCNC: 4.1 MMOL/L (ref 3.5–5)
RBC # BLD AUTO: 5.36 MILL/UL (ref 4.4–6.2)
SODIUM SERPL-SCNC: 141 MMOL/L (ref 136–145)
WBC: 6.2 THOU/UL (ref 4–11)

## 2018-05-31 ENCOUNTER — HOSPITAL ENCOUNTER (OUTPATIENT)
Dept: PALLIATIVE MEDICINE | Facility: OTHER | Age: 33
Discharge: HOME | End: 2018-05-31
Attending: COUNSELOR

## 2018-05-31 DIAGNOSIS — F41.1 GENERALIZED ANXIETY DISORDER: ICD-10-CM

## 2018-05-31 DIAGNOSIS — F33.2 SEVERE EPISODE OF RECURRENT MAJOR DEPRESSIVE DISORDER, WITHOUT PSYCHOTIC FEATURES (H): ICD-10-CM

## 2018-05-31 DIAGNOSIS — G89.4 CHRONIC PAIN SYNDROME: ICD-10-CM

## 2018-06-01 ENCOUNTER — AMBULATORY - HEALTHEAST (OUTPATIENT)
Dept: LAB | Facility: CLINIC | Age: 33
End: 2018-06-01

## 2018-06-01 ENCOUNTER — COMMUNICATION - HEALTHEAST (OUTPATIENT)
Dept: ANTICOAGULATION | Facility: CLINIC | Age: 33
End: 2018-06-01

## 2018-06-01 DIAGNOSIS — I26.99 BILATERAL PULMONARY EMBOLISM (H): ICD-10-CM

## 2018-06-01 LAB — INR PPP: 2.3 (ref 0.9–1.1)

## 2018-06-04 ENCOUNTER — COMMUNICATION - HEALTHEAST (OUTPATIENT)
Dept: ANTICOAGULATION | Facility: CLINIC | Age: 33
End: 2018-06-04

## 2018-06-04 ENCOUNTER — AMBULATORY - HEALTHEAST (OUTPATIENT)
Dept: LAB | Facility: CLINIC | Age: 33
End: 2018-06-04

## 2018-06-04 ENCOUNTER — OFFICE VISIT - HEALTHEAST (OUTPATIENT)
Dept: PHYSICAL THERAPY | Facility: REHABILITATION | Age: 33
End: 2018-06-04

## 2018-06-04 DIAGNOSIS — Z74.09 IMPAIRED FUNCTIONAL MOBILITY, BALANCE, GAIT, AND ENDURANCE: ICD-10-CM

## 2018-06-04 DIAGNOSIS — I26.99 BILATERAL PULMONARY EMBOLISM (H): ICD-10-CM

## 2018-06-04 DIAGNOSIS — M62.81 MUSCLE WEAKNESS (GENERALIZED): ICD-10-CM

## 2018-06-04 DIAGNOSIS — F45.9 SOMATOFORM DISORDER: ICD-10-CM

## 2018-06-04 LAB — INR PPP: 2.6 (ref 0.9–1.1)

## 2018-06-05 ENCOUNTER — COMMUNICATION - HEALTHEAST (OUTPATIENT)
Dept: FAMILY MEDICINE | Facility: CLINIC | Age: 33
End: 2018-06-05

## 2018-06-05 ENCOUNTER — COMMUNICATION - HEALTHEAST (OUTPATIENT)
Dept: TELEHEALTH | Facility: CLINIC | Age: 33
End: 2018-06-05

## 2018-06-07 ENCOUNTER — COMMUNICATION - HEALTHEAST (OUTPATIENT)
Dept: ANTICOAGULATION | Facility: CLINIC | Age: 33
End: 2018-06-07

## 2018-06-07 ENCOUNTER — COMMUNICATION - HEALTHEAST (OUTPATIENT)
Dept: FAMILY MEDICINE | Facility: CLINIC | Age: 33
End: 2018-06-07

## 2018-06-07 ENCOUNTER — AMBULATORY - HEALTHEAST (OUTPATIENT)
Dept: LAB | Facility: CLINIC | Age: 33
End: 2018-06-07

## 2018-06-07 ENCOUNTER — HOSPITAL ENCOUNTER (OUTPATIENT)
Dept: PALLIATIVE MEDICINE | Facility: OTHER | Age: 33
Discharge: HOME | End: 2018-06-07
Attending: PSYCHIATRY & NEUROLOGY

## 2018-06-07 DIAGNOSIS — M62.81 MUSCLE WEAKNESS (GENERALIZED): ICD-10-CM

## 2018-06-07 DIAGNOSIS — M79.10 MYALGIA: ICD-10-CM

## 2018-06-07 DIAGNOSIS — M62.838 MUSCLE SPASM: ICD-10-CM

## 2018-06-07 DIAGNOSIS — I26.99 BILATERAL PULMONARY EMBOLISM (H): ICD-10-CM

## 2018-06-07 DIAGNOSIS — G89.29 CHRONIC PAIN: ICD-10-CM

## 2018-06-07 LAB — INR PPP: 2.9 (ref 0.9–1.1)

## 2018-06-07 ASSESSMENT — MIFFLIN-ST. JEOR: SCORE: 1551.35

## 2018-06-08 ENCOUNTER — OFFICE VISIT - HEALTHEAST (OUTPATIENT)
Dept: PHYSICAL THERAPY | Facility: REHABILITATION | Age: 33
End: 2018-06-08

## 2018-06-08 DIAGNOSIS — Z74.09 IMPAIRED FUNCTIONAL MOBILITY, BALANCE, GAIT, AND ENDURANCE: ICD-10-CM

## 2018-06-08 DIAGNOSIS — M62.81 MUSCLE WEAKNESS (GENERALIZED): ICD-10-CM

## 2018-06-08 DIAGNOSIS — F45.9 SOMATOFORM DISORDER: ICD-10-CM

## 2018-06-08 DIAGNOSIS — I26.99 BILATERAL PULMONARY EMBOLISM (H): ICD-10-CM

## 2018-06-11 ENCOUNTER — AMBULATORY - HEALTHEAST (OUTPATIENT)
Dept: LAB | Facility: CLINIC | Age: 33
End: 2018-06-11

## 2018-06-11 ENCOUNTER — AMBULATORY - HEALTHEAST (OUTPATIENT)
Dept: NURSING | Facility: CLINIC | Age: 33
End: 2018-06-11

## 2018-06-11 ENCOUNTER — COMMUNICATION - HEALTHEAST (OUTPATIENT)
Dept: ANTICOAGULATION | Facility: CLINIC | Age: 33
End: 2018-06-11

## 2018-06-11 ENCOUNTER — OFFICE VISIT - HEALTHEAST (OUTPATIENT)
Dept: PHYSICAL THERAPY | Facility: REHABILITATION | Age: 33
End: 2018-06-11

## 2018-06-11 DIAGNOSIS — I26.99 BILATERAL PULMONARY EMBOLISM (H): ICD-10-CM

## 2018-06-11 DIAGNOSIS — Z74.09 IMPAIRED FUNCTIONAL MOBILITY, BALANCE, GAIT, AND ENDURANCE: ICD-10-CM

## 2018-06-11 DIAGNOSIS — F45.9 SOMATOFORM DISORDER: ICD-10-CM

## 2018-06-11 DIAGNOSIS — M62.81 MUSCLE WEAKNESS (GENERALIZED): ICD-10-CM

## 2018-06-11 LAB — INR PPP: 2.7 (ref 0.9–1.1)

## 2018-06-14 ENCOUNTER — HOSPITAL ENCOUNTER (OUTPATIENT)
Dept: PALLIATIVE MEDICINE | Facility: OTHER | Age: 33
Discharge: HOME | End: 2018-06-14
Attending: COUNSELOR

## 2018-06-14 DIAGNOSIS — G89.4 CHRONIC PAIN SYNDROME: ICD-10-CM

## 2018-06-14 DIAGNOSIS — F33.2 SEVERE EPISODE OF RECURRENT MAJOR DEPRESSIVE DISORDER, WITHOUT PSYCHOTIC FEATURES (H): ICD-10-CM

## 2018-06-14 DIAGNOSIS — F41.1 GENERALIZED ANXIETY DISORDER: ICD-10-CM

## 2018-06-15 ENCOUNTER — OFFICE VISIT - HEALTHEAST (OUTPATIENT)
Dept: PHYSICAL THERAPY | Facility: REHABILITATION | Age: 33
End: 2018-06-15

## 2018-06-15 DIAGNOSIS — Z74.09 IMPAIRED FUNCTIONAL MOBILITY, BALANCE, GAIT, AND ENDURANCE: ICD-10-CM

## 2018-06-15 DIAGNOSIS — F45.9 SOMATOFORM DISORDER: ICD-10-CM

## 2018-06-15 DIAGNOSIS — M62.81 MUSCLE WEAKNESS (GENERALIZED): ICD-10-CM

## 2018-06-18 ENCOUNTER — OFFICE VISIT - HEALTHEAST (OUTPATIENT)
Dept: PHYSICAL THERAPY | Facility: REHABILITATION | Age: 33
End: 2018-06-18

## 2018-06-18 ENCOUNTER — AMBULATORY - HEALTHEAST (OUTPATIENT)
Dept: LAB | Facility: CLINIC | Age: 33
End: 2018-06-18

## 2018-06-18 ENCOUNTER — COMMUNICATION - HEALTHEAST (OUTPATIENT)
Dept: ANTICOAGULATION | Facility: CLINIC | Age: 33
End: 2018-06-18

## 2018-06-18 DIAGNOSIS — I26.99 BILATERAL PULMONARY EMBOLISM (H): ICD-10-CM

## 2018-06-18 DIAGNOSIS — Z74.09 IMPAIRED FUNCTIONAL MOBILITY, BALANCE, GAIT, AND ENDURANCE: ICD-10-CM

## 2018-06-18 DIAGNOSIS — M62.81 MUSCLE WEAKNESS (GENERALIZED): ICD-10-CM

## 2018-06-18 DIAGNOSIS — F45.9 SOMATOFORM DISORDER: ICD-10-CM

## 2018-06-18 LAB — INR PPP: 2.2 (ref 0.9–1.1)

## 2018-06-21 ENCOUNTER — COMMUNICATION - HEALTHEAST (OUTPATIENT)
Dept: FAMILY MEDICINE | Facility: CLINIC | Age: 33
End: 2018-06-21

## 2018-06-22 ENCOUNTER — OFFICE VISIT - HEALTHEAST (OUTPATIENT)
Dept: PHYSICAL THERAPY | Facility: REHABILITATION | Age: 33
End: 2018-06-22

## 2018-06-22 DIAGNOSIS — Z74.09 IMPAIRED FUNCTIONAL MOBILITY, BALANCE, GAIT, AND ENDURANCE: ICD-10-CM

## 2018-06-22 DIAGNOSIS — F45.9 SOMATOFORM DISORDER: ICD-10-CM

## 2018-06-22 DIAGNOSIS — M62.81 MUSCLE WEAKNESS (GENERALIZED): ICD-10-CM

## 2018-06-25 ENCOUNTER — OFFICE VISIT - HEALTHEAST (OUTPATIENT)
Dept: PHYSICAL THERAPY | Facility: REHABILITATION | Age: 33
End: 2018-06-25

## 2018-06-25 DIAGNOSIS — F45.9 SOMATOFORM DISORDER: ICD-10-CM

## 2018-06-25 DIAGNOSIS — M62.81 MUSCLE WEAKNESS (GENERALIZED): ICD-10-CM

## 2018-06-25 DIAGNOSIS — Z74.09 IMPAIRED FUNCTIONAL MOBILITY, BALANCE, GAIT, AND ENDURANCE: ICD-10-CM

## 2018-06-26 ENCOUNTER — COMMUNICATION - HEALTHEAST (OUTPATIENT)
Dept: FAMILY MEDICINE | Facility: CLINIC | Age: 33
End: 2018-06-26

## 2018-06-27 ENCOUNTER — COMMUNICATION - HEALTHEAST (OUTPATIENT)
Dept: NURSING | Facility: CLINIC | Age: 33
End: 2018-06-27

## 2018-06-28 ENCOUNTER — HOSPITAL ENCOUNTER (OUTPATIENT)
Dept: PALLIATIVE MEDICINE | Facility: OTHER | Age: 33
Discharge: HOME | End: 2018-06-28
Attending: COUNSELOR

## 2018-06-28 ENCOUNTER — OFFICE VISIT - HEALTHEAST (OUTPATIENT)
Dept: PHYSICAL THERAPY | Facility: REHABILITATION | Age: 33
End: 2018-06-28

## 2018-06-28 DIAGNOSIS — G89.4 CHRONIC PAIN SYNDROME: ICD-10-CM

## 2018-06-28 DIAGNOSIS — F33.2 SEVERE EPISODE OF RECURRENT MAJOR DEPRESSIVE DISORDER, WITHOUT PSYCHOTIC FEATURES (H): ICD-10-CM

## 2018-06-28 DIAGNOSIS — F41.1 GAD (GENERALIZED ANXIETY DISORDER): ICD-10-CM

## 2018-06-28 DIAGNOSIS — I26.99 BILATERAL PULMONARY EMBOLISM (H): ICD-10-CM

## 2018-06-29 ENCOUNTER — COMMUNICATION - HEALTHEAST (OUTPATIENT)
Dept: CARE COORDINATION | Facility: CLINIC | Age: 33
End: 2018-06-29

## 2018-07-02 ENCOUNTER — OFFICE VISIT - HEALTHEAST (OUTPATIENT)
Dept: PHYSICAL THERAPY | Facility: REHABILITATION | Age: 33
End: 2018-07-02

## 2018-07-02 DIAGNOSIS — Z74.09 IMPAIRED FUNCTIONAL MOBILITY, BALANCE, GAIT, AND ENDURANCE: ICD-10-CM

## 2018-07-02 DIAGNOSIS — F45.9 SOMATOFORM DISORDER: ICD-10-CM

## 2018-07-02 DIAGNOSIS — G83.10 MONOPARESIS OF LEG (H): ICD-10-CM

## 2018-07-02 DIAGNOSIS — M62.81 MUSCLE WEAKNESS (GENERALIZED): ICD-10-CM

## 2018-07-03 ENCOUNTER — COMMUNICATION - HEALTHEAST (OUTPATIENT)
Dept: ANTICOAGULATION | Facility: CLINIC | Age: 33
End: 2018-07-03

## 2018-07-03 ENCOUNTER — AMBULATORY - HEALTHEAST (OUTPATIENT)
Dept: LAB | Facility: CLINIC | Age: 33
End: 2018-07-03

## 2018-07-03 DIAGNOSIS — I26.99 BILATERAL PULMONARY EMBOLISM (H): ICD-10-CM

## 2018-07-03 LAB — INR PPP: 1.8 (ref 0.9–1.1)

## 2018-07-05 ENCOUNTER — OFFICE VISIT - HEALTHEAST (OUTPATIENT)
Dept: PHYSICAL THERAPY | Facility: REHABILITATION | Age: 33
End: 2018-07-05

## 2018-07-05 DIAGNOSIS — M62.81 MUSCLE WEAKNESS (GENERALIZED): ICD-10-CM

## 2018-07-05 DIAGNOSIS — Z74.09 IMPAIRED FUNCTIONAL MOBILITY, BALANCE, GAIT, AND ENDURANCE: ICD-10-CM

## 2018-07-05 DIAGNOSIS — I26.99 BILATERAL PULMONARY EMBOLISM (H): ICD-10-CM

## 2018-07-05 DIAGNOSIS — G83.10 MONOPARESIS OF LEG (H): ICD-10-CM

## 2018-07-05 DIAGNOSIS — F45.9 SOMATOFORM DISORDER: ICD-10-CM

## 2018-07-09 ENCOUNTER — COMMUNICATION - HEALTHEAST (OUTPATIENT)
Dept: PALLIATIVE MEDICINE | Facility: OTHER | Age: 33
End: 2018-07-09

## 2018-07-09 DIAGNOSIS — M62.81 MUSCLE WEAKNESS (GENERALIZED): ICD-10-CM

## 2018-07-09 DIAGNOSIS — M62.838 MUSCLE SPASM: ICD-10-CM

## 2018-07-10 ENCOUNTER — COMMUNICATION - HEALTHEAST (OUTPATIENT)
Dept: PALLIATIVE MEDICINE | Facility: CLINIC | Age: 33
End: 2018-07-10

## 2018-07-10 DIAGNOSIS — G89.29 CHRONIC PAIN: ICD-10-CM

## 2018-07-11 ENCOUNTER — HOSPITAL ENCOUNTER (OUTPATIENT)
Dept: PALLIATIVE MEDICINE | Facility: OTHER | Age: 33
Discharge: HOME | End: 2018-07-11
Attending: COUNSELOR

## 2018-07-11 ENCOUNTER — AMBULATORY - HEALTHEAST (OUTPATIENT)
Dept: NURSING | Facility: CLINIC | Age: 33
End: 2018-07-11

## 2018-07-11 DIAGNOSIS — F41.1 GAD (GENERALIZED ANXIETY DISORDER): ICD-10-CM

## 2018-07-11 DIAGNOSIS — G89.4 CHRONIC PAIN SYNDROME: ICD-10-CM

## 2018-07-11 DIAGNOSIS — F33.2 SEVERE EPISODE OF RECURRENT MAJOR DEPRESSIVE DISORDER, WITHOUT PSYCHOTIC FEATURES (H): ICD-10-CM

## 2018-07-12 ENCOUNTER — OFFICE VISIT - HEALTHEAST (OUTPATIENT)
Dept: PHYSICAL THERAPY | Facility: REHABILITATION | Age: 33
End: 2018-07-12

## 2018-07-12 DIAGNOSIS — G83.10 MONOPARESIS OF LEG (H): ICD-10-CM

## 2018-07-12 DIAGNOSIS — F45.9 SOMATOFORM DISORDER: ICD-10-CM

## 2018-07-12 DIAGNOSIS — I26.99 BILATERAL PULMONARY EMBOLISM (H): ICD-10-CM

## 2018-07-12 DIAGNOSIS — M62.81 MUSCLE WEAKNESS (GENERALIZED): ICD-10-CM

## 2018-07-12 DIAGNOSIS — Z74.09 IMPAIRED FUNCTIONAL MOBILITY, BALANCE, GAIT, AND ENDURANCE: ICD-10-CM

## 2018-07-16 ENCOUNTER — AMBULATORY - HEALTHEAST (OUTPATIENT)
Dept: NURSING | Facility: CLINIC | Age: 33
End: 2018-07-16

## 2018-07-16 ENCOUNTER — COMMUNICATION - HEALTHEAST (OUTPATIENT)
Dept: PALLIATIVE MEDICINE | Facility: OTHER | Age: 33
End: 2018-07-16

## 2018-07-16 DIAGNOSIS — G89.29 CHRONIC PAIN: ICD-10-CM

## 2018-07-16 DIAGNOSIS — M62.81 MUSCLE WEAKNESS (GENERALIZED): ICD-10-CM

## 2018-07-16 DIAGNOSIS — M79.10 MYALGIA: ICD-10-CM

## 2018-07-17 ENCOUNTER — AMBULATORY - HEALTHEAST (OUTPATIENT)
Dept: LAB | Facility: CLINIC | Age: 33
End: 2018-07-17

## 2018-07-17 ENCOUNTER — COMMUNICATION - HEALTHEAST (OUTPATIENT)
Dept: ANTICOAGULATION | Facility: CLINIC | Age: 33
End: 2018-07-17

## 2018-07-17 ENCOUNTER — OFFICE VISIT - HEALTHEAST (OUTPATIENT)
Dept: PHYSICAL THERAPY | Facility: REHABILITATION | Age: 33
End: 2018-07-17

## 2018-07-17 DIAGNOSIS — Z74.09 IMPAIRED FUNCTIONAL MOBILITY, BALANCE, GAIT, AND ENDURANCE: ICD-10-CM

## 2018-07-17 DIAGNOSIS — F45.9 SOMATOFORM DISORDER: ICD-10-CM

## 2018-07-17 DIAGNOSIS — I26.99 BILATERAL PULMONARY EMBOLISM (H): ICD-10-CM

## 2018-07-17 DIAGNOSIS — M62.81 MUSCLE WEAKNESS (GENERALIZED): ICD-10-CM

## 2018-07-17 LAB — INR PPP: 1.7 (ref 0.9–1.1)

## 2018-07-27 ENCOUNTER — COMMUNICATION - HEALTHEAST (OUTPATIENT)
Dept: NURSING | Facility: CLINIC | Age: 33
End: 2018-07-27

## 2018-07-31 ENCOUNTER — OFFICE VISIT - HEALTHEAST (OUTPATIENT)
Dept: FAMILY MEDICINE | Facility: CLINIC | Age: 33
End: 2018-07-31

## 2018-07-31 ENCOUNTER — COMMUNICATION - HEALTHEAST (OUTPATIENT)
Dept: ANTICOAGULATION | Facility: CLINIC | Age: 33
End: 2018-07-31

## 2018-07-31 DIAGNOSIS — J32.2 CHRONIC ETHMOIDAL SINUSITIS: ICD-10-CM

## 2018-07-31 DIAGNOSIS — R00.0 SINUS TACHYCARDIA: ICD-10-CM

## 2018-07-31 DIAGNOSIS — M62.81 MUSCLE WEAKNESS: ICD-10-CM

## 2018-07-31 DIAGNOSIS — I26.99 BILATERAL PULMONARY EMBOLISM (H): ICD-10-CM

## 2018-07-31 DIAGNOSIS — J33.9 NASAL POLYPOSIS: ICD-10-CM

## 2018-07-31 DIAGNOSIS — G89.4 CHRONIC PAIN SYNDROME: ICD-10-CM

## 2018-07-31 LAB — INR PPP: 3.7 (ref 0.9–1.1)

## 2018-08-01 ENCOUNTER — COMMUNICATION - HEALTHEAST (OUTPATIENT)
Dept: NURSING | Facility: CLINIC | Age: 33
End: 2018-08-01

## 2018-08-02 ENCOUNTER — HOSPITAL ENCOUNTER (OUTPATIENT)
Dept: PALLIATIVE MEDICINE | Facility: OTHER | Age: 33
Discharge: HOME | End: 2018-08-02
Attending: PSYCHIATRY & NEUROLOGY

## 2018-08-02 ENCOUNTER — OFFICE VISIT - HEALTHEAST (OUTPATIENT)
Dept: PHYSICAL THERAPY | Facility: REHABILITATION | Age: 33
End: 2018-08-02

## 2018-08-02 DIAGNOSIS — M62.81 MUSCLE WEAKNESS (GENERALIZED): ICD-10-CM

## 2018-08-02 DIAGNOSIS — G89.4 CHRONIC PAIN SYNDROME: ICD-10-CM

## 2018-08-02 DIAGNOSIS — M79.10 MYALGIA: ICD-10-CM

## 2018-08-02 DIAGNOSIS — F45.9 SOMATOFORM DISORDER: ICD-10-CM

## 2018-08-02 DIAGNOSIS — Z74.09 IMPAIRED FUNCTIONAL MOBILITY, BALANCE, GAIT, AND ENDURANCE: ICD-10-CM

## 2018-08-02 DIAGNOSIS — G89.29 CHRONIC PAIN: ICD-10-CM

## 2018-08-02 ASSESSMENT — MIFFLIN-ST. JEOR: SCORE: 1569.49

## 2018-08-03 ENCOUNTER — COMMUNICATION - HEALTHEAST (OUTPATIENT)
Dept: FAMILY MEDICINE | Facility: CLINIC | Age: 33
End: 2018-08-03

## 2018-08-03 ENCOUNTER — COMMUNICATION - HEALTHEAST (OUTPATIENT)
Dept: PALLIATIVE MEDICINE | Facility: OTHER | Age: 33
End: 2018-08-03

## 2018-08-03 DIAGNOSIS — G89.29 CHRONIC PAIN: ICD-10-CM

## 2018-08-03 DIAGNOSIS — K21.9 GERD (GASTROESOPHAGEAL REFLUX DISEASE): ICD-10-CM

## 2018-08-06 ENCOUNTER — HOSPITAL ENCOUNTER (OUTPATIENT)
Dept: PALLIATIVE MEDICINE | Facility: OTHER | Age: 33
Discharge: HOME | End: 2018-08-06
Attending: COUNSELOR

## 2018-08-06 DIAGNOSIS — F33.2 SEVERE EPISODE OF RECURRENT MAJOR DEPRESSIVE DISORDER, WITHOUT PSYCHOTIC FEATURES (H): ICD-10-CM

## 2018-08-06 DIAGNOSIS — F41.1 GAD (GENERALIZED ANXIETY DISORDER): ICD-10-CM

## 2018-08-06 DIAGNOSIS — G89.4 CHRONIC PAIN SYNDROME: ICD-10-CM

## 2018-08-09 ENCOUNTER — OFFICE VISIT - HEALTHEAST (OUTPATIENT)
Dept: PHYSICAL THERAPY | Facility: REHABILITATION | Age: 33
End: 2018-08-09

## 2018-08-09 DIAGNOSIS — M62.81 MUSCLE WEAKNESS (GENERALIZED): ICD-10-CM

## 2018-08-09 DIAGNOSIS — F45.9 SOMATOFORM DISORDER: ICD-10-CM

## 2018-08-09 DIAGNOSIS — Z74.09 IMPAIRED FUNCTIONAL MOBILITY, BALANCE, GAIT, AND ENDURANCE: ICD-10-CM

## 2018-08-13 ENCOUNTER — OFFICE VISIT - HEALTHEAST (OUTPATIENT)
Dept: OTOLARYNGOLOGY | Facility: CLINIC | Age: 33
End: 2018-08-13

## 2018-08-13 ENCOUNTER — COMMUNICATION - HEALTHEAST (OUTPATIENT)
Dept: NURSING | Facility: CLINIC | Age: 33
End: 2018-08-13

## 2018-08-13 DIAGNOSIS — J33.9 NASAL POLYPOSIS: ICD-10-CM

## 2018-08-13 DIAGNOSIS — J01.41 ACUTE RECURRENT PANSINUSITIS: ICD-10-CM

## 2018-08-14 ENCOUNTER — COMMUNICATION - HEALTHEAST (OUTPATIENT)
Dept: ANTICOAGULATION | Facility: CLINIC | Age: 33
End: 2018-08-14

## 2018-08-14 ENCOUNTER — AMBULATORY - HEALTHEAST (OUTPATIENT)
Dept: LAB | Facility: CLINIC | Age: 33
End: 2018-08-14

## 2018-08-14 DIAGNOSIS — I26.99 BILATERAL PULMONARY EMBOLISM (H): ICD-10-CM

## 2018-08-14 LAB — INR PPP: 3.8 (ref 0.9–1.1)

## 2018-08-16 ENCOUNTER — OFFICE VISIT - HEALTHEAST (OUTPATIENT)
Dept: PHYSICAL THERAPY | Facility: REHABILITATION | Age: 33
End: 2018-08-16

## 2018-08-16 DIAGNOSIS — Z74.09 IMPAIRED FUNCTIONAL MOBILITY, BALANCE, GAIT, AND ENDURANCE: ICD-10-CM

## 2018-08-16 DIAGNOSIS — M62.81 MUSCLE WEAKNESS (GENERALIZED): ICD-10-CM

## 2018-08-16 DIAGNOSIS — F45.9 SOMATOFORM DISORDER: ICD-10-CM

## 2018-08-17 ENCOUNTER — AMBULATORY - HEALTHEAST (OUTPATIENT)
Dept: LAB | Facility: CLINIC | Age: 33
End: 2018-08-17

## 2018-08-17 ENCOUNTER — COMMUNICATION - HEALTHEAST (OUTPATIENT)
Dept: ANTICOAGULATION | Facility: CLINIC | Age: 33
End: 2018-08-17

## 2018-08-17 DIAGNOSIS — I26.99 BILATERAL PULMONARY EMBOLISM (H): ICD-10-CM

## 2018-08-17 LAB — INR PPP: 2.4 (ref 0.9–1.1)

## 2018-08-21 ENCOUNTER — COMMUNICATION - HEALTHEAST (OUTPATIENT)
Dept: ANTICOAGULATION | Facility: CLINIC | Age: 33
End: 2018-08-21

## 2018-08-21 DIAGNOSIS — I26.99 BILATERAL PULMONARY EMBOLISM (H): ICD-10-CM

## 2018-08-23 ENCOUNTER — OFFICE VISIT - HEALTHEAST (OUTPATIENT)
Dept: PHYSICAL THERAPY | Facility: REHABILITATION | Age: 33
End: 2018-08-23

## 2018-08-23 DIAGNOSIS — M62.81 MUSCLE WEAKNESS (GENERALIZED): ICD-10-CM

## 2018-08-23 DIAGNOSIS — Z74.09 IMPAIRED FUNCTIONAL MOBILITY, BALANCE, GAIT, AND ENDURANCE: ICD-10-CM

## 2018-08-23 DIAGNOSIS — F45.9 SOMATOFORM DISORDER: ICD-10-CM

## 2018-08-24 ENCOUNTER — HOSPITAL ENCOUNTER (OUTPATIENT)
Dept: PALLIATIVE MEDICINE | Facility: OTHER | Age: 33
Discharge: HOME | End: 2018-08-24
Attending: COUNSELOR

## 2018-08-24 DIAGNOSIS — G89.4 CHRONIC PAIN SYNDROME: ICD-10-CM

## 2018-08-24 DIAGNOSIS — F33.2 SEVERE EPISODE OF RECURRENT MAJOR DEPRESSIVE DISORDER, WITHOUT PSYCHOTIC FEATURES (H): ICD-10-CM

## 2018-08-24 DIAGNOSIS — F41.1 GAD (GENERALIZED ANXIETY DISORDER): ICD-10-CM

## 2018-08-25 ENCOUNTER — COMMUNICATION - HEALTHEAST (OUTPATIENT)
Dept: PALLIATIVE MEDICINE | Facility: CLINIC | Age: 33
End: 2018-08-25

## 2018-08-25 DIAGNOSIS — G89.29 CHRONIC PAIN: ICD-10-CM

## 2018-08-27 ENCOUNTER — COMMUNICATION - HEALTHEAST (OUTPATIENT)
Dept: ANTICOAGULATION | Facility: CLINIC | Age: 33
End: 2018-08-27

## 2018-08-27 ENCOUNTER — OFFICE VISIT - HEALTHEAST (OUTPATIENT)
Dept: CARDIOLOGY | Facility: CLINIC | Age: 33
End: 2018-08-27

## 2018-08-27 DIAGNOSIS — R00.0 SINUS TACHYCARDIA: ICD-10-CM

## 2018-08-27 DIAGNOSIS — I26.99 BILATERAL PULMONARY EMBOLISM (H): ICD-10-CM

## 2018-08-27 DIAGNOSIS — I26.99 OTHER PULMONARY EMBOLISM WITHOUT ACUTE COR PULMONALE, UNSPECIFIED CHRONICITY (H): ICD-10-CM

## 2018-08-27 LAB — POC INR - HE - HISTORICAL: 3 (ref 0.9–1.1)

## 2018-08-27 ASSESSMENT — MIFFLIN-ST. JEOR: SCORE: 1555.88

## 2018-08-29 ENCOUNTER — AMBULATORY - HEALTHEAST (OUTPATIENT)
Dept: NURSING | Facility: CLINIC | Age: 33
End: 2018-08-29

## 2018-08-29 ENCOUNTER — COMMUNICATION - HEALTHEAST (OUTPATIENT)
Dept: NURSING | Facility: CLINIC | Age: 33
End: 2018-08-29

## 2018-08-30 ENCOUNTER — HOSPITAL ENCOUNTER (OUTPATIENT)
Dept: PALLIATIVE MEDICINE | Facility: OTHER | Age: 33
Discharge: HOME | End: 2018-08-30
Attending: PSYCHIATRY & NEUROLOGY

## 2018-08-30 ENCOUNTER — OFFICE VISIT - HEALTHEAST (OUTPATIENT)
Dept: PHYSICAL THERAPY | Facility: REHABILITATION | Age: 33
End: 2018-08-30

## 2018-08-30 ENCOUNTER — AMBULATORY - HEALTHEAST (OUTPATIENT)
Dept: NURSING | Facility: CLINIC | Age: 33
End: 2018-08-30

## 2018-08-30 DIAGNOSIS — M25.50 PAIN IN JOINT, MULTIPLE SITES: ICD-10-CM

## 2018-08-30 DIAGNOSIS — G89.4 CHRONIC PAIN SYNDROME: ICD-10-CM

## 2018-08-30 DIAGNOSIS — M79.10 MYALGIA: ICD-10-CM

## 2018-08-30 DIAGNOSIS — M62.81 MUSCLE WEAKNESS (GENERALIZED): ICD-10-CM

## 2018-08-30 DIAGNOSIS — I26.99 BILATERAL PULMONARY EMBOLISM (H): ICD-10-CM

## 2018-08-30 DIAGNOSIS — G89.29 CHRONIC PAIN: ICD-10-CM

## 2018-08-30 ASSESSMENT — MIFFLIN-ST. JEOR: SCORE: 1555.88

## 2018-09-06 ENCOUNTER — OFFICE VISIT - HEALTHEAST (OUTPATIENT)
Dept: PHYSICAL THERAPY | Facility: REHABILITATION | Age: 33
End: 2018-09-06

## 2018-09-06 DIAGNOSIS — M62.81 MUSCLE WEAKNESS (GENERALIZED): ICD-10-CM

## 2018-09-06 DIAGNOSIS — Z74.09 IMPAIRED FUNCTIONAL MOBILITY, BALANCE, GAIT, AND ENDURANCE: ICD-10-CM

## 2018-09-06 DIAGNOSIS — I26.99 BILATERAL PULMONARY EMBOLISM (H): ICD-10-CM

## 2018-09-06 DIAGNOSIS — G83.10 MONOPARESIS OF LEG (H): ICD-10-CM

## 2018-09-06 DIAGNOSIS — F45.9 SOMATOFORM DISORDER: ICD-10-CM

## 2018-09-10 ENCOUNTER — AMBULATORY - HEALTHEAST (OUTPATIENT)
Dept: LAB | Facility: CLINIC | Age: 33
End: 2018-09-10

## 2018-09-10 ENCOUNTER — COMMUNICATION - HEALTHEAST (OUTPATIENT)
Dept: ANTICOAGULATION | Facility: CLINIC | Age: 33
End: 2018-09-10

## 2018-09-10 DIAGNOSIS — I26.99 BILATERAL PULMONARY EMBOLISM (H): ICD-10-CM

## 2018-09-10 LAB — INR PPP: 4 (ref 0.9–1.1)

## 2018-09-11 ENCOUNTER — HOSPITAL ENCOUNTER (OUTPATIENT)
Dept: PALLIATIVE MEDICINE | Facility: OTHER | Age: 33
Discharge: HOME | End: 2018-09-11
Attending: COUNSELOR

## 2018-09-11 ENCOUNTER — OFFICE VISIT - HEALTHEAST (OUTPATIENT)
Dept: PULMONOLOGY | Facility: OTHER | Age: 33
End: 2018-09-11

## 2018-09-11 DIAGNOSIS — F33.2 SEVERE EPISODE OF RECURRENT MAJOR DEPRESSIVE DISORDER, WITHOUT PSYCHOTIC FEATURES (H): ICD-10-CM

## 2018-09-11 DIAGNOSIS — F41.1 GAD (GENERALIZED ANXIETY DISORDER): ICD-10-CM

## 2018-09-11 DIAGNOSIS — R06.89 HYPOVENTILATION: ICD-10-CM

## 2018-09-11 DIAGNOSIS — G89.4 CHRONIC PAIN SYNDROME: ICD-10-CM

## 2018-09-12 ENCOUNTER — AMBULATORY - HEALTHEAST (OUTPATIENT)
Dept: SLEEP MEDICINE | Facility: CLINIC | Age: 33
End: 2018-09-12

## 2018-09-12 ENCOUNTER — COMMUNICATION - HEALTHEAST (OUTPATIENT)
Dept: PALLIATIVE MEDICINE | Facility: OTHER | Age: 33
End: 2018-09-12

## 2018-09-13 ENCOUNTER — OFFICE VISIT - HEALTHEAST (OUTPATIENT)
Dept: PHYSICAL THERAPY | Facility: REHABILITATION | Age: 33
End: 2018-09-13

## 2018-09-13 DIAGNOSIS — G83.10 MONOPARESIS OF LEG (H): ICD-10-CM

## 2018-09-13 DIAGNOSIS — F45.9 SOMATOFORM DISORDER: ICD-10-CM

## 2018-09-13 DIAGNOSIS — Z74.09 IMPAIRED FUNCTIONAL MOBILITY, BALANCE, GAIT, AND ENDURANCE: ICD-10-CM

## 2018-09-13 DIAGNOSIS — I26.99 BILATERAL PULMONARY EMBOLISM (H): ICD-10-CM

## 2018-09-13 DIAGNOSIS — M62.81 MUSCLE WEAKNESS (GENERALIZED): ICD-10-CM

## 2018-09-20 ENCOUNTER — OFFICE VISIT - HEALTHEAST (OUTPATIENT)
Dept: PHYSICAL THERAPY | Facility: REHABILITATION | Age: 33
End: 2018-09-20

## 2018-09-20 ENCOUNTER — COMMUNICATION - HEALTHEAST (OUTPATIENT)
Dept: ANTICOAGULATION | Facility: CLINIC | Age: 33
End: 2018-09-20

## 2018-09-20 ENCOUNTER — AMBULATORY - HEALTHEAST (OUTPATIENT)
Dept: LAB | Facility: CLINIC | Age: 33
End: 2018-09-20

## 2018-09-20 DIAGNOSIS — R00.0 SINUS TACHYCARDIA: ICD-10-CM

## 2018-09-20 DIAGNOSIS — Z74.09 IMPAIRED FUNCTIONAL MOBILITY, BALANCE, GAIT, AND ENDURANCE: ICD-10-CM

## 2018-09-20 DIAGNOSIS — M62.81 MUSCLE WEAKNESS (GENERALIZED): ICD-10-CM

## 2018-09-20 DIAGNOSIS — I26.99 BILATERAL PULMONARY EMBOLISM (H): ICD-10-CM

## 2018-09-20 DIAGNOSIS — F45.9 SOMATOFORM DISORDER: ICD-10-CM

## 2018-09-20 LAB — INR PPP: 1.8 (ref 0.9–1.1)

## 2018-09-23 ENCOUNTER — COMMUNICATION - HEALTHEAST (OUTPATIENT)
Dept: PALLIATIVE MEDICINE | Facility: OTHER | Age: 33
End: 2018-09-23

## 2018-09-23 DIAGNOSIS — G89.29 CHRONIC PAIN: ICD-10-CM

## 2018-09-24 ENCOUNTER — COMMUNICATION - HEALTHEAST (OUTPATIENT)
Dept: FAMILY MEDICINE | Facility: CLINIC | Age: 33
End: 2018-09-24

## 2018-09-24 ENCOUNTER — COMMUNICATION - HEALTHEAST (OUTPATIENT)
Dept: PALLIATIVE MEDICINE | Facility: OTHER | Age: 33
End: 2018-09-24

## 2018-09-25 ENCOUNTER — COMMUNICATION - HEALTHEAST (OUTPATIENT)
Dept: PALLIATIVE MEDICINE | Facility: OTHER | Age: 33
End: 2018-09-25

## 2018-09-25 ENCOUNTER — HOSPITAL ENCOUNTER (OUTPATIENT)
Dept: PALLIATIVE MEDICINE | Facility: OTHER | Age: 33
Discharge: HOME | End: 2018-09-25
Attending: COUNSELOR

## 2018-09-25 DIAGNOSIS — F33.2 SEVERE EPISODE OF RECURRENT MAJOR DEPRESSIVE DISORDER, WITHOUT PSYCHOTIC FEATURES (H): ICD-10-CM

## 2018-09-25 DIAGNOSIS — G89.4 CHRONIC PAIN SYNDROME: ICD-10-CM

## 2018-09-25 DIAGNOSIS — F41.1 GAD (GENERALIZED ANXIETY DISORDER): ICD-10-CM

## 2018-10-08 ENCOUNTER — AMBULATORY - HEALTHEAST (OUTPATIENT)
Dept: LAB | Facility: CLINIC | Age: 33
End: 2018-10-08

## 2018-10-08 ENCOUNTER — COMMUNICATION - HEALTHEAST (OUTPATIENT)
Dept: ANTICOAGULATION | Facility: CLINIC | Age: 33
End: 2018-10-08

## 2018-10-08 DIAGNOSIS — I26.99 BILATERAL PULMONARY EMBOLISM (H): ICD-10-CM

## 2018-10-08 LAB — INR PPP: 3.1 (ref 0.9–1.1)

## 2018-10-10 ENCOUNTER — HOSPITAL ENCOUNTER (OUTPATIENT)
Dept: PALLIATIVE MEDICINE | Facility: OTHER | Age: 33
Discharge: HOME | End: 2018-10-10
Attending: COUNSELOR

## 2018-10-10 ENCOUNTER — AMBULATORY - HEALTHEAST (OUTPATIENT)
Dept: PALLIATIVE MEDICINE | Facility: OTHER | Age: 33
End: 2018-10-10

## 2018-10-10 DIAGNOSIS — F41.1 GAD (GENERALIZED ANXIETY DISORDER): ICD-10-CM

## 2018-10-10 DIAGNOSIS — F32.1 MODERATE MAJOR DEPRESSION (H): ICD-10-CM

## 2018-10-10 DIAGNOSIS — G89.4 CHRONIC PAIN SYNDROME: ICD-10-CM

## 2018-10-12 ENCOUNTER — COMMUNICATION - HEALTHEAST (OUTPATIENT)
Dept: NURSING | Facility: CLINIC | Age: 33
End: 2018-10-12

## 2018-10-15 ENCOUNTER — HOSPITAL ENCOUNTER (OUTPATIENT)
Dept: PALLIATIVE MEDICINE | Facility: OTHER | Age: 33
Discharge: HOME | End: 2018-10-15
Attending: NURSE PRACTITIONER

## 2018-10-15 ENCOUNTER — HOSPITAL ENCOUNTER (OUTPATIENT)
Dept: RADIOLOGY | Facility: HOSPITAL | Age: 33
Discharge: HOME | End: 2018-10-15

## 2018-10-15 DIAGNOSIS — G89.29 CHRONIC PAIN: ICD-10-CM

## 2018-10-15 DIAGNOSIS — M54.50 LUMBAR PAIN DETERMINED BY PALPATION: ICD-10-CM

## 2018-10-15 DIAGNOSIS — M62.81 MUSCLE WEAKNESS (GENERALIZED): ICD-10-CM

## 2018-10-15 DIAGNOSIS — G89.4 CHRONIC PAIN SYNDROME: ICD-10-CM

## 2018-10-15 DIAGNOSIS — M79.10 MYALGIA: ICD-10-CM

## 2018-10-15 DIAGNOSIS — M62.838 MUSCLE SPASM: ICD-10-CM

## 2018-10-15 ASSESSMENT — MIFFLIN-ST. JEOR: SCORE: 1578.56

## 2018-10-18 ENCOUNTER — COMMUNICATION - HEALTHEAST (OUTPATIENT)
Dept: CARE COORDINATION | Facility: CLINIC | Age: 33
End: 2018-10-18

## 2018-10-19 ENCOUNTER — RECORDS - HEALTHEAST (OUTPATIENT)
Dept: ADMINISTRATIVE | Facility: OTHER | Age: 33
End: 2018-10-19

## 2018-10-21 ENCOUNTER — COMMUNICATION - HEALTHEAST (OUTPATIENT)
Dept: CARDIOLOGY | Facility: CLINIC | Age: 33
End: 2018-10-21

## 2018-10-21 DIAGNOSIS — R00.0 SINUS TACHYCARDIA: ICD-10-CM

## 2018-10-21 DIAGNOSIS — R07.9 CHEST PAIN: ICD-10-CM

## 2018-10-22 ENCOUNTER — COMMUNICATION - HEALTHEAST (OUTPATIENT)
Dept: ANTICOAGULATION | Facility: CLINIC | Age: 33
End: 2018-10-22

## 2018-10-22 ENCOUNTER — AMBULATORY - HEALTHEAST (OUTPATIENT)
Dept: LAB | Facility: CLINIC | Age: 33
End: 2018-10-22

## 2018-10-22 DIAGNOSIS — I26.99 BILATERAL PULMONARY EMBOLISM (H): ICD-10-CM

## 2018-10-22 LAB — INR PPP: 2.9 (ref 0.9–1.1)

## 2018-10-26 ENCOUNTER — HOSPITAL ENCOUNTER (OUTPATIENT)
Dept: PALLIATIVE MEDICINE | Facility: OTHER | Age: 33
Discharge: HOME | End: 2018-10-26
Attending: COUNSELOR

## 2018-10-26 ENCOUNTER — COMMUNICATION - HEALTHEAST (OUTPATIENT)
Dept: FAMILY MEDICINE | Facility: CLINIC | Age: 33
End: 2018-10-26

## 2018-10-26 DIAGNOSIS — G89.4 CHRONIC PAIN SYNDROME: ICD-10-CM

## 2018-10-26 DIAGNOSIS — F41.1 GAD (GENERALIZED ANXIETY DISORDER): ICD-10-CM

## 2018-10-26 DIAGNOSIS — J33.9 NASAL POLYPOSIS: ICD-10-CM

## 2018-10-26 DIAGNOSIS — F32.1 MODERATE MAJOR DEPRESSION (H): ICD-10-CM

## 2018-11-08 ENCOUNTER — COMMUNICATION - HEALTHEAST (OUTPATIENT)
Dept: ANTICOAGULATION | Facility: CLINIC | Age: 33
End: 2018-11-08

## 2018-11-09 ENCOUNTER — OFFICE VISIT - HEALTHEAST (OUTPATIENT)
Dept: PULMONOLOGY | Facility: OTHER | Age: 33
End: 2018-11-09

## 2018-11-09 ENCOUNTER — OFFICE VISIT - HEALTHEAST (OUTPATIENT)
Dept: SLEEP MEDICINE | Facility: CLINIC | Age: 33
End: 2018-11-09

## 2018-11-09 DIAGNOSIS — G47.21 CIRCADIAN RHYTHM SLEEP DISORDER, DELAYED SLEEP PHASE TYPE: ICD-10-CM

## 2018-11-09 DIAGNOSIS — R06.83 SNORING: ICD-10-CM

## 2018-11-09 DIAGNOSIS — R06.89 HYPOVENTILATION: ICD-10-CM

## 2018-11-09 DIAGNOSIS — G47.8 SLEEP DYSFUNCTION WITH SLEEP STAGE DISTURBANCE: ICD-10-CM

## 2018-11-09 DIAGNOSIS — R06.02 SHORTNESS OF BREATH: ICD-10-CM

## 2018-11-09 ASSESSMENT — MIFFLIN-ST. JEOR
SCORE: 1589
SCORE: 1587.64

## 2018-11-12 ENCOUNTER — AMBULATORY - HEALTHEAST (OUTPATIENT)
Dept: LAB | Facility: CLINIC | Age: 33
End: 2018-11-12

## 2018-11-12 ENCOUNTER — COMMUNICATION - HEALTHEAST (OUTPATIENT)
Dept: ANTICOAGULATION | Facility: CLINIC | Age: 33
End: 2018-11-12

## 2018-11-12 ENCOUNTER — HOSPITAL ENCOUNTER (OUTPATIENT)
Dept: PALLIATIVE MEDICINE | Facility: OTHER | Age: 33
Discharge: HOME | End: 2018-11-12
Attending: COUNSELOR

## 2018-11-12 ENCOUNTER — HOSPITAL ENCOUNTER (OUTPATIENT)
Dept: PALLIATIVE MEDICINE | Facility: OTHER | Age: 33
Discharge: HOME | End: 2018-11-12
Attending: NURSE PRACTITIONER

## 2018-11-12 DIAGNOSIS — M47.816 FACET HYPERTROPHY OF LUMBAR REGION: ICD-10-CM

## 2018-11-12 DIAGNOSIS — F32.1 MODERATE MAJOR DEPRESSION (H): ICD-10-CM

## 2018-11-12 DIAGNOSIS — M25.50 PAIN IN JOINT, MULTIPLE SITES: ICD-10-CM

## 2018-11-12 DIAGNOSIS — M54.50 LUMBAR PAIN DETERMINED BY PALPATION: ICD-10-CM

## 2018-11-12 DIAGNOSIS — G89.4 CHRONIC PAIN SYNDROME: ICD-10-CM

## 2018-11-12 DIAGNOSIS — G89.29 CHRONIC PAIN: ICD-10-CM

## 2018-11-12 DIAGNOSIS — M79.10 MYALGIA: ICD-10-CM

## 2018-11-12 DIAGNOSIS — F41.1 GENERALIZED ANXIETY DISORDER: ICD-10-CM

## 2018-11-12 DIAGNOSIS — M62.81 MUSCLE WEAKNESS (GENERALIZED): ICD-10-CM

## 2018-11-12 DIAGNOSIS — I26.99 BILATERAL PULMONARY EMBOLISM (H): ICD-10-CM

## 2018-11-12 LAB — INR PPP: 2.8 (ref 0.9–1.1)

## 2018-11-12 ASSESSMENT — MIFFLIN-ST. JEOR: SCORE: 1587.64

## 2018-11-13 ENCOUNTER — COMMUNICATION - HEALTHEAST (OUTPATIENT)
Dept: NURSING | Facility: CLINIC | Age: 33
End: 2018-11-13

## 2018-11-18 ENCOUNTER — COMMUNICATION - HEALTHEAST (OUTPATIENT)
Dept: TELEHEALTH | Facility: CLINIC | Age: 33
End: 2018-11-18

## 2018-11-18 DIAGNOSIS — M62.838 MUSCLE SPASM: ICD-10-CM

## 2018-11-18 DIAGNOSIS — M62.81 MUSCLE WEAKNESS (GENERALIZED): ICD-10-CM

## 2018-11-18 DIAGNOSIS — M54.50 LUMBAR PAIN DETERMINED BY PALPATION: ICD-10-CM

## 2018-11-27 ENCOUNTER — COMMUNICATION - HEALTHEAST (OUTPATIENT)
Dept: PALLIATIVE MEDICINE | Facility: OTHER | Age: 33
End: 2018-11-27

## 2018-11-28 ENCOUNTER — HOSPITAL ENCOUNTER (OUTPATIENT)
Dept: PALLIATIVE MEDICINE | Facility: OTHER | Age: 33
Discharge: HOME | End: 2018-11-28
Attending: PAIN MEDICINE | Admitting: PAIN MEDICINE

## 2018-11-28 DIAGNOSIS — M54.50 LUMBAR PAIN DETERMINED BY PALPATION: ICD-10-CM

## 2018-11-28 DIAGNOSIS — M47.896 OTHER SPONDYLOSIS, LUMBAR REGION: ICD-10-CM

## 2018-11-28 DIAGNOSIS — M47.816 FACET HYPERTROPHY OF LUMBAR REGION: ICD-10-CM

## 2018-11-28 ASSESSMENT — MIFFLIN-ST. JEOR: SCORE: 1587.64

## 2018-11-29 ENCOUNTER — COMMUNICATION - HEALTHEAST (OUTPATIENT)
Dept: PALLIATIVE MEDICINE | Facility: OTHER | Age: 33
End: 2018-11-29

## 2018-11-30 ENCOUNTER — COMMUNICATION - HEALTHEAST (OUTPATIENT)
Dept: ANTICOAGULATION | Facility: CLINIC | Age: 33
End: 2018-11-30

## 2018-11-30 ENCOUNTER — OFFICE VISIT - HEALTHEAST (OUTPATIENT)
Dept: FAMILY MEDICINE | Facility: CLINIC | Age: 33
End: 2018-11-30

## 2018-11-30 ENCOUNTER — HOSPITAL ENCOUNTER (OUTPATIENT)
Dept: PALLIATIVE MEDICINE | Facility: OTHER | Age: 33
Discharge: HOME | End: 2018-11-30
Attending: COUNSELOR

## 2018-11-30 DIAGNOSIS — G89.4 CHRONIC PAIN SYNDROME: ICD-10-CM

## 2018-11-30 DIAGNOSIS — J33.9 NASAL POLYPOSIS: ICD-10-CM

## 2018-11-30 DIAGNOSIS — R00.0 SINUS TACHYCARDIA: ICD-10-CM

## 2018-11-30 DIAGNOSIS — E55.9 VITAMIN D DEFICIENCY: ICD-10-CM

## 2018-11-30 DIAGNOSIS — M62.81 MUSCLE WEAKNESS: ICD-10-CM

## 2018-11-30 DIAGNOSIS — I26.99 BILATERAL PULMONARY EMBOLISM (H): ICD-10-CM

## 2018-11-30 DIAGNOSIS — M21.372 FOOT DROP, BILATERAL: ICD-10-CM

## 2018-11-30 DIAGNOSIS — E66.3 OVERWEIGHT (BMI 25.0-29.9): ICD-10-CM

## 2018-11-30 DIAGNOSIS — E78.5 HYPERLIPIDEMIA, UNSPECIFIED HYPERLIPIDEMIA TYPE: ICD-10-CM

## 2018-11-30 DIAGNOSIS — M21.371 FOOT DROP, BILATERAL: ICD-10-CM

## 2018-11-30 DIAGNOSIS — F41.1 GENERALIZED ANXIETY DISORDER: ICD-10-CM

## 2018-11-30 DIAGNOSIS — K21.9 GASTROESOPHAGEAL REFLUX DISEASE WITHOUT ESOPHAGITIS: ICD-10-CM

## 2018-11-30 DIAGNOSIS — Z00.00 ROUTINE GENERAL MEDICAL EXAMINATION AT A HEALTH CARE FACILITY: ICD-10-CM

## 2018-11-30 DIAGNOSIS — F32.1 MODERATE MAJOR DEPRESSION (H): ICD-10-CM

## 2018-11-30 LAB
ANION GAP SERPL CALCULATED.3IONS-SCNC: 10 MMOL/L (ref 5–18)
BUN SERPL-MCNC: 17 MG/DL (ref 8–22)
CALCIUM SERPL-MCNC: 9.8 MG/DL (ref 8.5–10.5)
CHLORIDE BLD-SCNC: 106 MMOL/L (ref 98–107)
CHOLEST SERPL-MCNC: 210 MG/DL
CO2 SERPL-SCNC: 24 MMOL/L (ref 22–31)
CREAT SERPL-MCNC: 0.76 MG/DL (ref 0.7–1.3)
FASTING STATUS PATIENT QL REPORTED: YES
GFR SERPL CREATININE-BSD FRML MDRD: >60 ML/MIN/1.73M2
GLUCOSE BLD-MCNC: 94 MG/DL (ref 70–125)
HDLC SERPL-MCNC: 52 MG/DL
INR PPP: 2.5 (ref 0.9–1.1)
LDLC SERPL CALC-MCNC: 127 MG/DL
POTASSIUM BLD-SCNC: 4 MMOL/L (ref 3.5–5)
SODIUM SERPL-SCNC: 140 MMOL/L (ref 136–145)
TRIGL SERPL-MCNC: 155 MG/DL

## 2018-11-30 ASSESSMENT — MIFFLIN-ST. JEOR: SCORE: 1596.71

## 2018-12-03 LAB
25(OH)D3 SERPL-MCNC: 43 NG/ML (ref 30–80)
25(OH)D3 SERPL-MCNC: 43 NG/ML (ref 30–80)

## 2018-12-10 ENCOUNTER — HOSPITAL ENCOUNTER (OUTPATIENT)
Dept: PALLIATIVE MEDICINE | Facility: OTHER | Age: 33
Discharge: HOME | End: 2018-12-10
Attending: COUNSELOR

## 2018-12-10 ENCOUNTER — HOSPITAL ENCOUNTER (OUTPATIENT)
Dept: PALLIATIVE MEDICINE | Facility: OTHER | Age: 33
Discharge: HOME | End: 2018-12-10
Attending: NURSE PRACTITIONER

## 2018-12-10 DIAGNOSIS — M62.81 MUSCLE WEAKNESS (GENERALIZED): ICD-10-CM

## 2018-12-10 DIAGNOSIS — G89.4 CHRONIC PAIN SYNDROME: ICD-10-CM

## 2018-12-10 DIAGNOSIS — F41.1 GENERALIZED ANXIETY DISORDER: ICD-10-CM

## 2018-12-10 DIAGNOSIS — M25.50 PAIN IN JOINT, MULTIPLE SITES: ICD-10-CM

## 2018-12-10 DIAGNOSIS — M54.50 LUMBAR PAIN DETERMINED BY PALPATION: ICD-10-CM

## 2018-12-10 DIAGNOSIS — G89.29 CHRONIC PAIN: ICD-10-CM

## 2018-12-10 DIAGNOSIS — F32.1 MODERATE MAJOR DEPRESSION (H): ICD-10-CM

## 2018-12-10 ASSESSMENT — MIFFLIN-ST. JEOR: SCORE: 1596.71

## 2018-12-11 ENCOUNTER — COMMUNICATION - HEALTHEAST (OUTPATIENT)
Dept: PALLIATIVE MEDICINE | Facility: OTHER | Age: 33
End: 2018-12-11

## 2018-12-12 ENCOUNTER — COMMUNICATION - HEALTHEAST (OUTPATIENT)
Dept: PALLIATIVE MEDICINE | Facility: OTHER | Age: 33
End: 2018-12-12

## 2018-12-12 ENCOUNTER — HOSPITAL ENCOUNTER (OUTPATIENT)
Dept: PALLIATIVE MEDICINE | Facility: OTHER | Age: 33
Discharge: HOME | End: 2018-12-12
Attending: PAIN MEDICINE | Admitting: PAIN MEDICINE

## 2018-12-12 DIAGNOSIS — M47.816 FACET HYPERTROPHY OF LUMBAR REGION: ICD-10-CM

## 2018-12-12 DIAGNOSIS — M47.816 LUMBAR FACET ARTHROPATHY: ICD-10-CM

## 2018-12-12 DIAGNOSIS — M47.896 OTHER SPONDYLOSIS, LUMBAR REGION: ICD-10-CM

## 2018-12-12 ASSESSMENT — MIFFLIN-ST. JEOR: SCORE: 1596.71

## 2018-12-13 ENCOUNTER — COMMUNICATION - HEALTHEAST (OUTPATIENT)
Dept: NURSING | Facility: CLINIC | Age: 33
End: 2018-12-13

## 2018-12-13 ENCOUNTER — COMMUNICATION - HEALTHEAST (OUTPATIENT)
Dept: PALLIATIVE MEDICINE | Facility: OTHER | Age: 33
End: 2018-12-13

## 2018-12-17 ENCOUNTER — COMMUNICATION - HEALTHEAST (OUTPATIENT)
Dept: TELEHEALTH | Facility: CLINIC | Age: 33
End: 2018-12-17

## 2018-12-17 DIAGNOSIS — M62.838 MUSCLE SPASM: ICD-10-CM

## 2018-12-17 DIAGNOSIS — M62.81 MUSCLE WEAKNESS (GENERALIZED): ICD-10-CM

## 2018-12-17 DIAGNOSIS — M54.50 LUMBAR PAIN DETERMINED BY PALPATION: ICD-10-CM

## 2018-12-28 ENCOUNTER — AMBULATORY - HEALTHEAST (OUTPATIENT)
Dept: LAB | Facility: CLINIC | Age: 33
End: 2018-12-28

## 2018-12-28 ENCOUNTER — COMMUNICATION - HEALTHEAST (OUTPATIENT)
Dept: ANTICOAGULATION | Facility: CLINIC | Age: 33
End: 2018-12-28

## 2018-12-28 DIAGNOSIS — I26.99 BILATERAL PULMONARY EMBOLISM (H): ICD-10-CM

## 2018-12-28 LAB — INR PPP: 2.5 (ref 0.9–1.1)

## 2019-01-02 ENCOUNTER — COMMUNICATION - HEALTHEAST (OUTPATIENT)
Dept: PALLIATIVE MEDICINE | Facility: OTHER | Age: 34
End: 2019-01-02

## 2019-01-02 ENCOUNTER — HOSPITAL ENCOUNTER (OUTPATIENT)
Dept: PALLIATIVE MEDICINE | Facility: OTHER | Age: 34
Discharge: HOME | End: 2019-01-02
Attending: COUNSELOR

## 2019-01-02 DIAGNOSIS — G89.4 CHRONIC PAIN SYNDROME: ICD-10-CM

## 2019-01-02 DIAGNOSIS — F41.1 GENERALIZED ANXIETY DISORDER: ICD-10-CM

## 2019-01-02 DIAGNOSIS — F32.1 MODERATE MAJOR DEPRESSION (H): ICD-10-CM

## 2019-01-02 DIAGNOSIS — M47.816 LUMBAR FACET ARTHROPATHY: ICD-10-CM

## 2019-01-05 ENCOUNTER — COMMUNICATION - HEALTHEAST (OUTPATIENT)
Dept: TELEHEALTH | Facility: CLINIC | Age: 34
End: 2019-01-05

## 2019-01-05 DIAGNOSIS — G89.29 CHRONIC PAIN: ICD-10-CM

## 2019-01-05 DIAGNOSIS — M54.50 LUMBAR PAIN DETERMINED BY PALPATION: ICD-10-CM

## 2019-01-05 DIAGNOSIS — M62.81 MUSCLE WEAKNESS (GENERALIZED): ICD-10-CM

## 2019-01-11 ENCOUNTER — COMMUNICATION - HEALTHEAST (OUTPATIENT)
Dept: NURSING | Facility: CLINIC | Age: 34
End: 2019-01-11

## 2019-01-14 ENCOUNTER — HOSPITAL ENCOUNTER (OUTPATIENT)
Dept: PALLIATIVE MEDICINE | Facility: OTHER | Age: 34
Discharge: HOME | End: 2019-01-14
Attending: COUNSELOR

## 2019-01-14 DIAGNOSIS — F41.1 GENERALIZED ANXIETY DISORDER: ICD-10-CM

## 2019-01-14 DIAGNOSIS — G89.4 CHRONIC PAIN SYNDROME: ICD-10-CM

## 2019-01-14 DIAGNOSIS — F32.1 MODERATE MAJOR DEPRESSION (H): ICD-10-CM

## 2019-01-15 ENCOUNTER — COMMUNICATION - HEALTHEAST (OUTPATIENT)
Dept: PALLIATIVE MEDICINE | Facility: OTHER | Age: 34
End: 2019-01-15

## 2019-01-15 DIAGNOSIS — G89.4 CHRONIC PAIN SYNDROME: ICD-10-CM

## 2019-01-16 ENCOUNTER — HOSPITAL ENCOUNTER (OUTPATIENT)
Dept: PALLIATIVE MEDICINE | Facility: OTHER | Age: 34
Discharge: HOME | End: 2019-01-16
Attending: PAIN MEDICINE | Admitting: PAIN MEDICINE

## 2019-01-16 DIAGNOSIS — M47.816 LUMBAR FACET ARTHROPATHY: ICD-10-CM

## 2019-01-16 ASSESSMENT — MIFFLIN-ST. JEOR: SCORE: 1596.71

## 2019-01-17 ENCOUNTER — COMMUNICATION - HEALTHEAST (OUTPATIENT)
Dept: PALLIATIVE MEDICINE | Facility: OTHER | Age: 34
End: 2019-01-17

## 2019-01-20 ENCOUNTER — COMMUNICATION - HEALTHEAST (OUTPATIENT)
Dept: TELEHEALTH | Facility: CLINIC | Age: 34
End: 2019-01-20

## 2019-01-20 DIAGNOSIS — G89.4 CHRONIC PAIN SYNDROME: ICD-10-CM

## 2019-01-20 DIAGNOSIS — M54.50 LUMBAR PAIN DETERMINED BY PALPATION: ICD-10-CM

## 2019-01-20 DIAGNOSIS — M62.838 MUSCLE SPASM: ICD-10-CM

## 2019-01-20 DIAGNOSIS — M25.50 PAIN IN JOINT, MULTIPLE SITES: ICD-10-CM

## 2019-01-20 DIAGNOSIS — M62.81 MUSCLE WEAKNESS (GENERALIZED): ICD-10-CM

## 2019-01-21 ENCOUNTER — COMMUNICATION - HEALTHEAST (OUTPATIENT)
Dept: NURSING | Facility: CLINIC | Age: 34
End: 2019-01-21

## 2019-01-23 ENCOUNTER — COMMUNICATION - HEALTHEAST (OUTPATIENT)
Dept: ANTICOAGULATION | Facility: CLINIC | Age: 34
End: 2019-01-23

## 2019-01-23 ENCOUNTER — AMBULATORY - HEALTHEAST (OUTPATIENT)
Dept: LAB | Facility: CLINIC | Age: 34
End: 2019-01-23

## 2019-01-23 DIAGNOSIS — I26.99 BILATERAL PULMONARY EMBOLISM (H): ICD-10-CM

## 2019-01-23 LAB — INR PPP: 2.5 (ref 0.9–1.1)

## 2019-01-24 ENCOUNTER — COMMUNICATION - HEALTHEAST (OUTPATIENT)
Dept: NURSING | Facility: CLINIC | Age: 34
End: 2019-01-24

## 2019-01-24 ENCOUNTER — COMMUNICATION - HEALTHEAST (OUTPATIENT)
Dept: CARE COORDINATION | Facility: CLINIC | Age: 34
End: 2019-01-24

## 2019-02-04 ENCOUNTER — HOSPITAL ENCOUNTER (OUTPATIENT)
Dept: PALLIATIVE MEDICINE | Facility: OTHER | Age: 34
Discharge: HOME | End: 2019-02-04
Attending: NURSE PRACTITIONER

## 2019-02-04 ENCOUNTER — AMBULATORY - HEALTHEAST (OUTPATIENT)
Dept: CARE COORDINATION | Facility: CLINIC | Age: 34
End: 2019-02-04

## 2019-02-04 ENCOUNTER — COMMUNICATION - HEALTHEAST (OUTPATIENT)
Dept: CARE COORDINATION | Facility: CLINIC | Age: 34
End: 2019-02-04

## 2019-02-04 DIAGNOSIS — M62.838 MUSCLE SPASM: ICD-10-CM

## 2019-02-04 DIAGNOSIS — G89.4 CHRONIC PAIN SYNDROME: ICD-10-CM

## 2019-02-04 DIAGNOSIS — M62.81 MUSCLE WEAKNESS (GENERALIZED): ICD-10-CM

## 2019-02-04 DIAGNOSIS — M25.50 PAIN IN JOINT, MULTIPLE SITES: ICD-10-CM

## 2019-02-04 DIAGNOSIS — G89.29 CHRONIC PAIN: ICD-10-CM

## 2019-02-04 DIAGNOSIS — M54.50 LUMBAR PAIN DETERMINED BY PALPATION: ICD-10-CM

## 2019-02-04 DIAGNOSIS — Z71.89 COUNSELING AND COORDINATION OF CARE: ICD-10-CM

## 2019-02-04 ASSESSMENT — MIFFLIN-ST. JEOR: SCORE: 1596.71

## 2019-02-06 ENCOUNTER — AMBULATORY - HEALTHEAST (OUTPATIENT)
Dept: CARE COORDINATION | Facility: CLINIC | Age: 34
End: 2019-02-06

## 2019-02-06 DIAGNOSIS — Z71.89 COUNSELING AND COORDINATION OF CARE: ICD-10-CM

## 2019-02-07 LAB
6MAM UR QL: NOT DETECTED
7AMINOCLONAZEPAM UR QL: NOT DETECTED
A-OH ALPRAZ UR QL: NOT DETECTED
ALPRAZ UR QL: NOT DETECTED
AMPHET UR QL SCN: NOT DETECTED
BARBITURATES UR QL: NOT DETECTED
BUPRENORPHINE UR QL: NOT DETECTED
BZE UR QL: NOT DETECTED
CARBOXYTHC UR QL: PRESENT
CARISOPRODOL UR QL: NOT DETECTED
CLONAZEPAM UR QL: NOT DETECTED
CODEINE UR QL: NOT DETECTED
CREAT UR-MCNC: 193 MG/DL (ref 20–400)
DIAZEPAM UR QL: NOT DETECTED
ETHYL GLUCURONIDE UR QL: NOT DETECTED
FENTANYL UR QL: NOT DETECTED
HYDROCODONE UR QL: NOT DETECTED
HYDROMORPHONE UR QL: PRESENT
LORAZEPAM UR QL: NOT DETECTED
MDA UR QL: NOT DETECTED
MDEA UR QL: NOT DETECTED
MDMA UR QL: NOT DETECTED
ME-PHENIDATE UR QL: NOT DETECTED
MEPERIDINE UR QL: NOT DETECTED
METHADONE UR QL: NOT DETECTED
METHAMPHET UR QL: NOT DETECTED
MIDAZOLAM UR QL SCN: NOT DETECTED
MORPHINE UR QL: PRESENT
NORBUPRENORPHINE UR QL CFM: NOT DETECTED
NORDIAZEPAM UR QL: NOT DETECTED
NORFENTANYL UR QL: NOT DETECTED
NORHYDROCODONE UR QL CFM: NOT DETECTED
NOROXYCODONE UR QL CFM: NOT DETECTED
NOROXYMORPHONE UR QL SCN: NOT DETECTED
OXAZEPAM UR QL: PRESENT
OXYCODONE UR QL: NOT DETECTED
OXYMORPHONE UR QL: NOT DETECTED
PATHOLOGY STUDY: NORMAL
PCP UR QL: NOT DETECTED
PHENTERMINE UR QL: NOT DETECTED
PROPOXYPH UR QL: NOT DETECTED
SERVICE CMNT-IMP: NORMAL
TAPENTADOL UR QL SCN: NOT DETECTED
TAPENTADOL UR QL SCN: NOT DETECTED
TEMAZEPAM UR QL: PRESENT
TRAMADOL UR QL: NOT DETECTED
ZOLPIDEM UR QL: NOT DETECTED

## 2019-02-08 ENCOUNTER — HOSPITAL ENCOUNTER (OUTPATIENT)
Dept: PALLIATIVE MEDICINE | Facility: OTHER | Age: 34
Discharge: HOME | End: 2019-02-08
Attending: COUNSELOR

## 2019-02-08 ENCOUNTER — AMBULATORY - HEALTHEAST (OUTPATIENT)
Dept: PALLIATIVE MEDICINE | Facility: OTHER | Age: 34
End: 2019-02-08

## 2019-02-08 DIAGNOSIS — F32.1 MODERATE MAJOR DEPRESSION (H): ICD-10-CM

## 2019-02-08 DIAGNOSIS — G89.4 CHRONIC PAIN SYNDROME: ICD-10-CM

## 2019-02-08 DIAGNOSIS — F41.1 GENERALIZED ANXIETY DISORDER: ICD-10-CM

## 2019-02-16 ENCOUNTER — COMMUNICATION - HEALTHEAST (OUTPATIENT)
Dept: TELEHEALTH | Facility: CLINIC | Age: 34
End: 2019-02-16

## 2019-02-18 ENCOUNTER — COMMUNICATION - HEALTHEAST (OUTPATIENT)
Dept: PALLIATIVE MEDICINE | Facility: OTHER | Age: 34
End: 2019-02-18

## 2019-02-18 DIAGNOSIS — F11.20 OPIOID TYPE DEPENDENCE, CONTINUOUS (H): ICD-10-CM

## 2019-02-25 ENCOUNTER — COMMUNICATION - HEALTHEAST (OUTPATIENT)
Dept: NURSING | Facility: CLINIC | Age: 34
End: 2019-02-25

## 2019-02-25 ENCOUNTER — COMMUNICATION - HEALTHEAST (OUTPATIENT)
Dept: PALLIATIVE MEDICINE | Facility: OTHER | Age: 34
End: 2019-02-25

## 2019-02-25 DIAGNOSIS — M62.81 MUSCLE WEAKNESS (GENERALIZED): ICD-10-CM

## 2019-02-25 DIAGNOSIS — G89.4 CHRONIC PAIN SYNDROME: ICD-10-CM

## 2019-02-25 DIAGNOSIS — M25.50 PAIN IN JOINT, MULTIPLE SITES: ICD-10-CM

## 2019-02-25 DIAGNOSIS — M54.50 LUMBAR PAIN DETERMINED BY PALPATION: ICD-10-CM

## 2019-02-25 DIAGNOSIS — M62.838 MUSCLE SPASM: ICD-10-CM

## 2019-02-27 ENCOUNTER — HOSPITAL ENCOUNTER (OUTPATIENT)
Dept: PALLIATIVE MEDICINE | Facility: OTHER | Age: 34
Discharge: HOME | End: 2019-02-27
Attending: COUNSELOR

## 2019-02-27 DIAGNOSIS — G89.4 CHRONIC PAIN SYNDROME: ICD-10-CM

## 2019-02-27 DIAGNOSIS — F41.1 GENERALIZED ANXIETY DISORDER: ICD-10-CM

## 2019-02-27 DIAGNOSIS — F32.1 MODERATE MAJOR DEPRESSION (H): ICD-10-CM

## 2019-03-01 ENCOUNTER — AMBULATORY - HEALTHEAST (OUTPATIENT)
Dept: NURSING | Facility: CLINIC | Age: 34
End: 2019-03-01

## 2019-03-04 ENCOUNTER — COMMUNICATION - HEALTHEAST (OUTPATIENT)
Dept: ANTICOAGULATION | Facility: CLINIC | Age: 34
End: 2019-03-04

## 2019-03-04 ENCOUNTER — AMBULATORY - HEALTHEAST (OUTPATIENT)
Dept: LAB | Facility: CLINIC | Age: 34
End: 2019-03-04

## 2019-03-04 ENCOUNTER — HOSPITAL ENCOUNTER (OUTPATIENT)
Dept: PALLIATIVE MEDICINE | Facility: OTHER | Age: 34
Discharge: HOME | End: 2019-03-04
Attending: NURSE PRACTITIONER

## 2019-03-04 DIAGNOSIS — M62.838 MUSCLE SPASM: ICD-10-CM

## 2019-03-04 DIAGNOSIS — F11.20 OPIOID TYPE DEPENDENCE, CONTINUOUS (H): ICD-10-CM

## 2019-03-04 DIAGNOSIS — I26.99 BILATERAL PULMONARY EMBOLISM (H): ICD-10-CM

## 2019-03-04 DIAGNOSIS — G89.4 CHRONIC PAIN SYNDROME: ICD-10-CM

## 2019-03-04 DIAGNOSIS — M54.50 LUMBAR PAIN DETERMINED BY PALPATION: ICD-10-CM

## 2019-03-04 DIAGNOSIS — M25.50 PAIN IN JOINT, MULTIPLE SITES: ICD-10-CM

## 2019-03-04 DIAGNOSIS — M62.81 MUSCLE WEAKNESS (GENERALIZED): ICD-10-CM

## 2019-03-04 LAB — INR PPP: 3.3 (ref 0.9–1.1)

## 2019-03-04 ASSESSMENT — MIFFLIN-ST. JEOR: SCORE: 1596.71

## 2019-03-13 ENCOUNTER — COMMUNICATION - HEALTHEAST (OUTPATIENT)
Dept: OTOLARYNGOLOGY | Facility: CLINIC | Age: 34
End: 2019-03-13

## 2019-03-13 ENCOUNTER — HOSPITAL ENCOUNTER (OUTPATIENT)
Dept: PALLIATIVE MEDICINE | Facility: OTHER | Age: 34
Discharge: HOME | End: 2019-03-13
Attending: COUNSELOR

## 2019-03-13 DIAGNOSIS — G89.4 CHRONIC PAIN SYNDROME: ICD-10-CM

## 2019-03-13 DIAGNOSIS — F33.1 MODERATE RECURRENT MAJOR DEPRESSION (H): ICD-10-CM

## 2019-03-13 DIAGNOSIS — F41.1 GENERALIZED ANXIETY DISORDER: ICD-10-CM

## 2019-03-14 ENCOUNTER — AMBULATORY - HEALTHEAST (OUTPATIENT)
Dept: NURSING | Facility: CLINIC | Age: 34
End: 2019-03-14

## 2019-03-15 ENCOUNTER — COMMUNICATION - HEALTHEAST (OUTPATIENT)
Dept: PALLIATIVE MEDICINE | Facility: OTHER | Age: 34
End: 2019-03-15

## 2019-03-15 ENCOUNTER — COMMUNICATION - HEALTHEAST (OUTPATIENT)
Dept: NURSING | Facility: CLINIC | Age: 34
End: 2019-03-15

## 2019-03-15 DIAGNOSIS — G89.4 CHRONIC PAIN SYNDROME: ICD-10-CM

## 2019-03-15 DIAGNOSIS — F11.20 OPIOID TYPE DEPENDENCE, CONTINUOUS (H): ICD-10-CM

## 2019-03-18 ENCOUNTER — COMMUNICATION - HEALTHEAST (OUTPATIENT)
Dept: OTOLARYNGOLOGY | Facility: CLINIC | Age: 34
End: 2019-03-18

## 2019-03-18 ENCOUNTER — AMBULATORY - HEALTHEAST (OUTPATIENT)
Dept: OTOLARYNGOLOGY | Facility: CLINIC | Age: 34
End: 2019-03-18

## 2019-03-18 ENCOUNTER — COMMUNICATION - HEALTHEAST (OUTPATIENT)
Dept: PALLIATIVE MEDICINE | Facility: OTHER | Age: 34
End: 2019-03-18

## 2019-03-18 ENCOUNTER — AMBULATORY - HEALTHEAST (OUTPATIENT)
Dept: LAB | Facility: CLINIC | Age: 34
End: 2019-03-18

## 2019-03-18 ENCOUNTER — COMMUNICATION - HEALTHEAST (OUTPATIENT)
Dept: ANTICOAGULATION | Facility: CLINIC | Age: 34
End: 2019-03-18

## 2019-03-18 DIAGNOSIS — J32.9 CHRONIC SINUSITIS, UNSPECIFIED LOCATION: ICD-10-CM

## 2019-03-18 DIAGNOSIS — G89.4 CHRONIC PAIN SYNDROME: ICD-10-CM

## 2019-03-18 DIAGNOSIS — I26.99 BILATERAL PULMONARY EMBOLISM (H): ICD-10-CM

## 2019-03-18 LAB — INR PPP: 3.1 (ref 0.9–1.1)

## 2019-03-24 ENCOUNTER — COMMUNICATION - HEALTHEAST (OUTPATIENT)
Dept: PALLIATIVE MEDICINE | Facility: OTHER | Age: 34
End: 2019-03-24

## 2019-03-24 ENCOUNTER — COMMUNICATION - HEALTHEAST (OUTPATIENT)
Dept: FAMILY MEDICINE | Facility: CLINIC | Age: 34
End: 2019-03-24

## 2019-03-24 DIAGNOSIS — I26.99 BILATERAL PULMONARY EMBOLISM (H): ICD-10-CM

## 2019-03-24 DIAGNOSIS — M62.838 MUSCLE SPASM: ICD-10-CM

## 2019-03-24 DIAGNOSIS — M62.81 MUSCLE WEAKNESS (GENERALIZED): ICD-10-CM

## 2019-03-24 DIAGNOSIS — M54.50 LUMBAR PAIN DETERMINED BY PALPATION: ICD-10-CM

## 2019-03-27 ENCOUNTER — HOSPITAL ENCOUNTER (OUTPATIENT)
Dept: PALLIATIVE MEDICINE | Facility: OTHER | Age: 34
Discharge: HOME | End: 2019-03-27
Attending: COUNSELOR

## 2019-03-27 ENCOUNTER — COMMUNICATION - HEALTHEAST (OUTPATIENT)
Dept: PALLIATIVE MEDICINE | Facility: OTHER | Age: 34
End: 2019-03-27

## 2019-03-27 DIAGNOSIS — G89.4 CHRONIC PAIN SYNDROME: ICD-10-CM

## 2019-03-27 DIAGNOSIS — F41.1 GENERALIZED ANXIETY DISORDER: ICD-10-CM

## 2019-03-27 DIAGNOSIS — F33.1 MODERATE RECURRENT MAJOR DEPRESSION (H): ICD-10-CM

## 2019-03-28 ENCOUNTER — COMMUNICATION - HEALTHEAST (OUTPATIENT)
Dept: NURSING | Facility: CLINIC | Age: 34
End: 2019-03-28

## 2019-03-29 ENCOUNTER — COMMUNICATION - HEALTHEAST (OUTPATIENT)
Dept: PALLIATIVE MEDICINE | Facility: OTHER | Age: 34
End: 2019-03-29

## 2019-03-29 DIAGNOSIS — G89.4 CHRONIC PAIN SYNDROME: ICD-10-CM

## 2019-04-04 ENCOUNTER — COMMUNICATION - HEALTHEAST (OUTPATIENT)
Dept: NURSING | Facility: CLINIC | Age: 34
End: 2019-04-04

## 2019-04-05 ENCOUNTER — COMMUNICATION - HEALTHEAST (OUTPATIENT)
Dept: OTOLARYNGOLOGY | Facility: CLINIC | Age: 34
End: 2019-04-05

## 2019-04-08 ENCOUNTER — HOSPITAL ENCOUNTER (OUTPATIENT)
Dept: PALLIATIVE MEDICINE | Facility: OTHER | Age: 34
Discharge: HOME | End: 2019-04-08
Attending: COUNSELOR

## 2019-04-08 ENCOUNTER — OFFICE VISIT - HEALTHEAST (OUTPATIENT)
Dept: OTOLARYNGOLOGY | Facility: CLINIC | Age: 34
End: 2019-04-08

## 2019-04-08 ENCOUNTER — COMMUNICATION - HEALTHEAST (OUTPATIENT)
Dept: ANTICOAGULATION | Facility: CLINIC | Age: 34
End: 2019-04-08

## 2019-04-08 ENCOUNTER — COMMUNICATION - HEALTHEAST (OUTPATIENT)
Dept: PALLIATIVE MEDICINE | Facility: OTHER | Age: 34
End: 2019-04-08

## 2019-04-08 DIAGNOSIS — G89.4 CHRONIC PAIN SYNDROME: ICD-10-CM

## 2019-04-08 DIAGNOSIS — F41.1 GENERALIZED ANXIETY DISORDER: ICD-10-CM

## 2019-04-08 DIAGNOSIS — J01.40 ACUTE NON-RECURRENT PANSINUSITIS: ICD-10-CM

## 2019-04-08 DIAGNOSIS — F33.1 MODERATE RECURRENT MAJOR DEPRESSION (H): ICD-10-CM

## 2019-04-08 DIAGNOSIS — J33.0 ANTROCHOANAL POLYP: ICD-10-CM

## 2019-04-09 ENCOUNTER — COMMUNICATION - HEALTHEAST (OUTPATIENT)
Dept: PALLIATIVE MEDICINE | Facility: OTHER | Age: 34
End: 2019-04-09

## 2019-04-12 ENCOUNTER — AMBULATORY - HEALTHEAST (OUTPATIENT)
Dept: LAB | Facility: CLINIC | Age: 34
End: 2019-04-12

## 2019-04-12 ENCOUNTER — COMMUNICATION - HEALTHEAST (OUTPATIENT)
Dept: ANTICOAGULATION | Facility: CLINIC | Age: 34
End: 2019-04-12

## 2019-04-12 DIAGNOSIS — I26.99 BILATERAL PULMONARY EMBOLISM (H): ICD-10-CM

## 2019-04-12 LAB — INR PPP: 2.2 (ref 0.9–1.1)

## 2019-04-16 ENCOUNTER — COMMUNICATION - HEALTHEAST (OUTPATIENT)
Dept: NURSING | Facility: CLINIC | Age: 34
End: 2019-04-16

## 2019-04-22 ENCOUNTER — HOSPITAL ENCOUNTER (OUTPATIENT)
Dept: PALLIATIVE MEDICINE | Facility: OTHER | Age: 34
Discharge: HOME | End: 2019-04-22
Attending: COUNSELOR

## 2019-04-22 DIAGNOSIS — F41.1 GENERALIZED ANXIETY DISORDER: ICD-10-CM

## 2019-04-22 DIAGNOSIS — G89.4 CHRONIC PAIN SYNDROME: ICD-10-CM

## 2019-04-22 DIAGNOSIS — F33.1 MODERATE RECURRENT MAJOR DEPRESSION (H): ICD-10-CM

## 2019-04-29 ENCOUNTER — HOSPITAL ENCOUNTER (OUTPATIENT)
Dept: PALLIATIVE MEDICINE | Facility: OTHER | Age: 34
Discharge: HOME | End: 2019-04-29
Attending: NURSE PRACTITIONER

## 2019-04-29 DIAGNOSIS — M25.50 PAIN IN JOINT, MULTIPLE SITES: ICD-10-CM

## 2019-04-29 DIAGNOSIS — G89.4 CHRONIC PAIN SYNDROME: ICD-10-CM

## 2019-04-29 DIAGNOSIS — M79.10 MYALGIA: ICD-10-CM

## 2019-04-29 ASSESSMENT — MIFFLIN-ST. JEOR: SCORE: 1596.71

## 2019-05-01 ENCOUNTER — COMMUNICATION - HEALTHEAST (OUTPATIENT)
Dept: OTOLARYNGOLOGY | Facility: CLINIC | Age: 34
End: 2019-05-01

## 2019-05-01 ENCOUNTER — COMMUNICATION - HEALTHEAST (OUTPATIENT)
Dept: ANTICOAGULATION | Facility: CLINIC | Age: 34
End: 2019-05-01

## 2019-05-01 ENCOUNTER — OFFICE VISIT - HEALTHEAST (OUTPATIENT)
Dept: FAMILY MEDICINE | Facility: CLINIC | Age: 34
End: 2019-05-01

## 2019-05-01 DIAGNOSIS — I26.99 BILATERAL PULMONARY EMBOLISM (H): ICD-10-CM

## 2019-05-01 DIAGNOSIS — E55.9 VITAMIN D DEFICIENCY: ICD-10-CM

## 2019-05-01 DIAGNOSIS — M21.371 FOOT DROP, BILATERAL: ICD-10-CM

## 2019-05-01 DIAGNOSIS — J32.2 CHRONIC ETHMOIDAL SINUSITIS: ICD-10-CM

## 2019-05-01 DIAGNOSIS — E78.5 HYPERLIPIDEMIA, UNSPECIFIED HYPERLIPIDEMIA TYPE: ICD-10-CM

## 2019-05-01 DIAGNOSIS — K21.9 GASTROESOPHAGEAL REFLUX DISEASE WITHOUT ESOPHAGITIS: ICD-10-CM

## 2019-05-01 DIAGNOSIS — R00.0 SINUS TACHYCARDIA: ICD-10-CM

## 2019-05-01 DIAGNOSIS — D64.9 NORMOCHROMIC NORMOCYTIC ANEMIA: ICD-10-CM

## 2019-05-01 DIAGNOSIS — G89.4 CHRONIC PAIN SYNDROME: ICD-10-CM

## 2019-05-01 DIAGNOSIS — M21.372 FOOT DROP, BILATERAL: ICD-10-CM

## 2019-05-01 DIAGNOSIS — Z01.810 PRE-OPERATIVE CARDIOVASCULAR EXAMINATION: ICD-10-CM

## 2019-05-01 DIAGNOSIS — M62.81 MUSCLE WEAKNESS: ICD-10-CM

## 2019-05-01 DIAGNOSIS — J33.9 NASAL POLYPOSIS: ICD-10-CM

## 2019-05-01 LAB
ANION GAP SERPL CALCULATED.3IONS-SCNC: 12 MMOL/L (ref 5–18)
BUN SERPL-MCNC: 14 MG/DL (ref 8–22)
CALCIUM SERPL-MCNC: 9.8 MG/DL (ref 8.5–10.5)
CHLORIDE BLD-SCNC: 106 MMOL/L (ref 98–107)
CO2 SERPL-SCNC: 23 MMOL/L (ref 22–31)
CREAT SERPL-MCNC: 0.82 MG/DL (ref 0.7–1.3)
ERYTHROCYTE [DISTWIDTH] IN BLOOD BY AUTOMATED COUNT: 13.2 % (ref 11–14.5)
GFR SERPL CREATININE-BSD FRML MDRD: >60 ML/MIN/1.73M2
GLUCOSE BLD-MCNC: 101 MG/DL (ref 70–125)
HCT VFR BLD AUTO: 45.8 % (ref 40–54)
HGB BLD-MCNC: 14.6 G/DL (ref 14–18)
INR PPP: 2 (ref 0.9–1.1)
MCH RBC QN AUTO: 26.8 PG (ref 27–34)
MCHC RBC AUTO-ENTMCNC: 32 G/DL (ref 32–36)
MCV RBC AUTO: 84 FL (ref 80–100)
PLATELET # BLD AUTO: 265 THOU/UL (ref 140–440)
PMV BLD AUTO: 6.5 FL (ref 7–10)
POTASSIUM BLD-SCNC: 4.3 MMOL/L (ref 3.5–5)
RBC # BLD AUTO: 5.46 MILL/UL (ref 4.4–6.2)
SODIUM SERPL-SCNC: 141 MMOL/L (ref 136–145)
TSH SERPL DL<=0.005 MIU/L-ACNC: 2.4 UIU/ML (ref 0.3–5)
WBC: 7 THOU/UL (ref 4–11)

## 2019-05-02 LAB
ATRIAL RATE - MUSE: 97 BPM
DIASTOLIC BLOOD PRESSURE - MUSE: NORMAL MMHG
INTERPRETATION ECG - MUSE: NORMAL
P AXIS - MUSE: 29 DEGREES
PR INTERVAL - MUSE: 150 MS
QRS DURATION - MUSE: 76 MS
QT - MUSE: 338 MS
QTC - MUSE: 429 MS
R AXIS - MUSE: 78 DEGREES
SYSTOLIC BLOOD PRESSURE - MUSE: NORMAL MMHG
T AXIS - MUSE: 50 DEGREES
VENTRICULAR RATE- MUSE: 97 BPM

## 2019-05-06 ENCOUNTER — HOSPITAL ENCOUNTER (OUTPATIENT)
Dept: PALLIATIVE MEDICINE | Facility: OTHER | Age: 34
Discharge: HOME | End: 2019-05-06
Attending: COUNSELOR

## 2019-05-06 DIAGNOSIS — F33.1 MODERATE RECURRENT MAJOR DEPRESSION (H): ICD-10-CM

## 2019-05-06 DIAGNOSIS — G89.4 CHRONIC PAIN SYNDROME: ICD-10-CM

## 2019-05-06 DIAGNOSIS — F41.1 GAD (GENERALIZED ANXIETY DISORDER): ICD-10-CM

## 2019-05-13 ENCOUNTER — AMBULATORY - HEALTHEAST (OUTPATIENT)
Dept: ANTICOAGULATION | Facility: CLINIC | Age: 34
End: 2019-05-13

## 2019-05-13 ENCOUNTER — HOSPITAL ENCOUNTER (OUTPATIENT)
Dept: NUCLEAR MEDICINE | Facility: HOSPITAL | Age: 34
Discharge: HOME | End: 2019-05-13

## 2019-05-13 ENCOUNTER — OFFICE VISIT - HEALTHEAST (OUTPATIENT)
Dept: FAMILY MEDICINE | Facility: CLINIC | Age: 34
End: 2019-05-13

## 2019-05-13 DIAGNOSIS — R10.11 RUQ ABDOMINAL PAIN: ICD-10-CM

## 2019-05-13 LAB
ALBUMIN SERPL-MCNC: 3.8 G/DL (ref 3.5–5)
ALBUMIN UR-MCNC: NEGATIVE MG/DL
ALP SERPL-CCNC: 69 U/L (ref 45–120)
ALT SERPL W P-5'-P-CCNC: 19 U/L (ref 0–45)
AMYLASE SERPL-CCNC: 82 U/L (ref 5–120)
ANION GAP SERPL CALCULATED.3IONS-SCNC: 13 MMOL/L (ref 5–18)
APPEARANCE UR: CLEAR
AST SERPL W P-5'-P-CCNC: 22 U/L (ref 0–40)
BACTERIA #/AREA URNS HPF: ABNORMAL HPF
BASOPHILS # BLD AUTO: 0.1 THOU/UL (ref 0–0.2)
BASOPHILS NFR BLD AUTO: 1 % (ref 0–2)
BILIRUB SERPL-MCNC: 0.3 MG/DL (ref 0–1)
BILIRUB UR QL STRIP: NEGATIVE
BUN SERPL-MCNC: 10 MG/DL (ref 8–22)
CALCIUM SERPL-MCNC: 9.6 MG/DL (ref 8.5–10.5)
CHLORIDE BLD-SCNC: 108 MMOL/L (ref 98–107)
CO2 SERPL-SCNC: 19 MMOL/L (ref 22–31)
COLOR UR AUTO: YELLOW
CREAT SERPL-MCNC: 0.82 MG/DL (ref 0.7–1.3)
EOSINOPHIL # BLD AUTO: 0.2 THOU/UL (ref 0–0.4)
EOSINOPHIL NFR BLD AUTO: 2 % (ref 0–6)
ERYTHROCYTE [DISTWIDTH] IN BLOOD BY AUTOMATED COUNT: 13.2 % (ref 11–14.5)
GFR SERPL CREATININE-BSD FRML MDRD: >60 ML/MIN/1.73M2
GLUCOSE BLD-MCNC: 101 MG/DL (ref 70–125)
GLUCOSE UR STRIP-MCNC: NEGATIVE MG/DL
HCT VFR BLD AUTO: 45.2 % (ref 40–54)
HGB BLD-MCNC: 14.9 G/DL (ref 14–18)
HGB UR QL STRIP: ABNORMAL
KETONES UR STRIP-MCNC: NEGATIVE MG/DL
LEUKOCYTE ESTERASE UR QL STRIP: NEGATIVE
LIPASE SERPL-CCNC: 50 U/L (ref 0–52)
LYMPHOCYTES # BLD AUTO: 2.6 THOU/UL (ref 0.8–4.4)
LYMPHOCYTES NFR BLD AUTO: 33 % (ref 20–40)
MCH RBC QN AUTO: 27.3 PG (ref 27–34)
MCHC RBC AUTO-ENTMCNC: 33 G/DL (ref 32–36)
MCV RBC AUTO: 83 FL (ref 80–100)
MONOCYTES # BLD AUTO: 0.5 THOU/UL (ref 0–0.9)
MONOCYTES NFR BLD AUTO: 7 % (ref 2–10)
NEUTROPHILS # BLD AUTO: 4.5 THOU/UL (ref 2–7.7)
NEUTROPHILS NFR BLD AUTO: 58 % (ref 50–70)
NITRATE UR QL: NEGATIVE
PH UR STRIP: 5.5 [PH] (ref 5–8)
PLATELET # BLD AUTO: 327 THOU/UL (ref 140–440)
PMV BLD AUTO: 6.8 FL (ref 7–10)
POTASSIUM BLD-SCNC: 3.9 MMOL/L (ref 3.5–5)
PROT SERPL-MCNC: 7.4 G/DL (ref 6–8)
RBC # BLD AUTO: 5.47 MILL/UL (ref 4.4–6.2)
RBC #/AREA URNS AUTO: ABNORMAL HPF
SODIUM SERPL-SCNC: 140 MMOL/L (ref 136–145)
SP GR UR STRIP: 1.01 (ref 1–1.03)
SQUAMOUS #/AREA URNS AUTO: ABNORMAL LPF
UROBILINOGEN UR STRIP-ACNC: ABNORMAL
WBC #/AREA URNS AUTO: ABNORMAL HPF
WBC: 7.9 THOU/UL (ref 4–11)

## 2019-05-14 ENCOUNTER — COMMUNICATION - HEALTHEAST (OUTPATIENT)
Dept: FAMILY MEDICINE | Facility: CLINIC | Age: 34
End: 2019-05-14

## 2019-05-14 ENCOUNTER — COMMUNICATION - HEALTHEAST (OUTPATIENT)
Dept: CARDIOLOGY | Facility: CLINIC | Age: 34
End: 2019-05-14

## 2019-05-14 DIAGNOSIS — K21.9 GERD (GASTROESOPHAGEAL REFLUX DISEASE): ICD-10-CM

## 2019-05-14 DIAGNOSIS — R00.0 SINUS TACHYCARDIA: ICD-10-CM

## 2019-05-14 DIAGNOSIS — R07.9 CHEST PAIN: ICD-10-CM

## 2019-05-15 ENCOUNTER — COMMUNICATION - HEALTHEAST (OUTPATIENT)
Dept: FAMILY MEDICINE | Facility: CLINIC | Age: 34
End: 2019-05-15

## 2019-05-15 ENCOUNTER — RECORDS - HEALTHEAST (OUTPATIENT)
Dept: ADMINISTRATIVE | Facility: OTHER | Age: 34
End: 2019-05-15

## 2019-05-16 ENCOUNTER — COMMUNICATION - HEALTHEAST (OUTPATIENT)
Dept: FAMILY MEDICINE | Facility: CLINIC | Age: 34
End: 2019-05-16

## 2019-05-17 ENCOUNTER — ANESTHESIA - HEALTHEAST (OUTPATIENT)
Dept: SURGERY | Facility: AMBULATORY SURGERY CENTER | Age: 34
End: 2019-05-17

## 2019-05-17 ENCOUNTER — COMMUNICATION - HEALTHEAST (OUTPATIENT)
Dept: OTOLARYNGOLOGY | Facility: CLINIC | Age: 34
End: 2019-05-17

## 2019-05-17 ASSESSMENT — MIFFLIN-ST. JEOR: SCORE: 1564.96

## 2019-05-20 ENCOUNTER — HOSPITAL ENCOUNTER (OUTPATIENT)
Dept: PALLIATIVE MEDICINE | Facility: OTHER | Age: 34
Discharge: HOME | End: 2019-05-20
Attending: COUNSELOR

## 2019-05-20 ENCOUNTER — AMBULATORY - HEALTHEAST (OUTPATIENT)
Dept: ANTICOAGULATION | Facility: CLINIC | Age: 34
End: 2019-05-20

## 2019-05-20 DIAGNOSIS — G89.4 CHRONIC PAIN SYNDROME: ICD-10-CM

## 2019-05-20 DIAGNOSIS — F33.1 MODERATE RECURRENT MAJOR DEPRESSION (H): ICD-10-CM

## 2019-05-20 DIAGNOSIS — F41.1 GENERALIZED ANXIETY DISORDER: ICD-10-CM

## 2019-05-22 ENCOUNTER — SURGERY - HEALTHEAST (OUTPATIENT)
Dept: SURGERY | Facility: AMBULATORY SURGERY CENTER | Age: 34
End: 2019-05-22

## 2019-05-22 ASSESSMENT — MIFFLIN-ST. JEOR: SCORE: 1564.96

## 2019-05-24 ENCOUNTER — OFFICE VISIT - HEALTHEAST (OUTPATIENT)
Dept: FAMILY MEDICINE | Facility: CLINIC | Age: 34
End: 2019-05-24

## 2019-05-24 DIAGNOSIS — G89.4 CHRONIC PAIN SYNDROME: ICD-10-CM

## 2019-05-24 DIAGNOSIS — R10.11 ABDOMINAL PAIN, RIGHT UPPER QUADRANT: ICD-10-CM

## 2019-05-24 DIAGNOSIS — J33.9 NASAL POLYPOSIS: ICD-10-CM

## 2019-05-24 DIAGNOSIS — R51.9 ACUTE NONINTRACTABLE HEADACHE, UNSPECIFIED HEADACHE TYPE: ICD-10-CM

## 2019-05-24 DIAGNOSIS — K21.9 GASTROESOPHAGEAL REFLUX DISEASE WITHOUT ESOPHAGITIS: ICD-10-CM

## 2019-05-31 ENCOUNTER — COMMUNICATION - HEALTHEAST (OUTPATIENT)
Dept: NURSING | Facility: CLINIC | Age: 34
End: 2019-05-31

## 2019-06-03 ENCOUNTER — COMMUNICATION - HEALTHEAST (OUTPATIENT)
Dept: PALLIATIVE MEDICINE | Facility: OTHER | Age: 34
End: 2019-06-03

## 2019-06-03 DIAGNOSIS — G89.4 CHRONIC PAIN SYNDROME: ICD-10-CM

## 2019-06-03 DIAGNOSIS — M25.50 PAIN IN JOINT, MULTIPLE SITES: ICD-10-CM

## 2019-06-03 DIAGNOSIS — M79.10 MYALGIA: ICD-10-CM

## 2019-06-04 ENCOUNTER — COMMUNICATION - HEALTHEAST (OUTPATIENT)
Dept: PALLIATIVE MEDICINE | Facility: OTHER | Age: 34
End: 2019-06-04

## 2019-06-05 ENCOUNTER — COMMUNICATION - HEALTHEAST (OUTPATIENT)
Dept: CARE COORDINATION | Facility: CLINIC | Age: 34
End: 2019-06-05

## 2019-06-05 ENCOUNTER — COMMUNICATION - HEALTHEAST (OUTPATIENT)
Dept: ANTICOAGULATION | Facility: CLINIC | Age: 34
End: 2019-06-05

## 2019-06-05 ENCOUNTER — OFFICE VISIT - HEALTHEAST (OUTPATIENT)
Dept: FAMILY MEDICINE | Facility: CLINIC | Age: 34
End: 2019-06-05

## 2019-06-05 ENCOUNTER — AMBULATORY - HEALTHEAST (OUTPATIENT)
Dept: LAB | Facility: CLINIC | Age: 34
End: 2019-06-05

## 2019-06-05 DIAGNOSIS — G89.4 CHRONIC PAIN SYNDROME: ICD-10-CM

## 2019-06-05 DIAGNOSIS — I26.99 BILATERAL PULMONARY EMBOLISM (H): ICD-10-CM

## 2019-06-05 DIAGNOSIS — R51.9 NONINTRACTABLE EPISODIC HEADACHE, UNSPECIFIED HEADACHE TYPE: ICD-10-CM

## 2019-06-05 LAB — INR PPP: 3.1 (ref 0.9–1.1)

## 2019-06-14 ENCOUNTER — HOSPITAL ENCOUNTER (OUTPATIENT)
Dept: PALLIATIVE MEDICINE | Facility: OTHER | Age: 34
Discharge: HOME | End: 2019-06-14
Attending: NURSE PRACTITIONER

## 2019-06-14 DIAGNOSIS — M25.50 PAIN IN JOINT, MULTIPLE SITES: ICD-10-CM

## 2019-06-14 DIAGNOSIS — M79.10 MYALGIA: ICD-10-CM

## 2019-06-14 DIAGNOSIS — J33.9 NASAL POLYPOSIS: ICD-10-CM

## 2019-06-14 DIAGNOSIS — M54.50 LUMBAR PAIN DETERMINED BY PALPATION: ICD-10-CM

## 2019-06-14 DIAGNOSIS — G89.4 CHRONIC PAIN SYNDROME: ICD-10-CM

## 2019-06-14 ASSESSMENT — MIFFLIN-ST. JEOR: SCORE: 1551.35

## 2019-06-18 ENCOUNTER — COMMUNICATION - HEALTHEAST (OUTPATIENT)
Dept: NURSING | Facility: CLINIC | Age: 34
End: 2019-06-18

## 2019-06-19 ENCOUNTER — AMBULATORY - HEALTHEAST (OUTPATIENT)
Dept: LAB | Facility: CLINIC | Age: 34
End: 2019-06-19

## 2019-06-19 ENCOUNTER — COMMUNICATION - HEALTHEAST (OUTPATIENT)
Dept: ANTICOAGULATION | Facility: CLINIC | Age: 34
End: 2019-06-19

## 2019-06-19 DIAGNOSIS — I26.99 BILATERAL PULMONARY EMBOLISM (H): ICD-10-CM

## 2019-06-19 LAB — INR PPP: 3.8 (ref 0.9–1.1)

## 2019-06-25 ENCOUNTER — COMMUNICATION - HEALTHEAST (OUTPATIENT)
Dept: NURSING | Facility: CLINIC | Age: 34
End: 2019-06-25

## 2019-06-26 ENCOUNTER — HOSPITAL ENCOUNTER (OUTPATIENT)
Dept: PALLIATIVE MEDICINE | Facility: OTHER | Age: 34
Discharge: HOME | End: 2019-06-26
Attending: PAIN MEDICINE | Admitting: PAIN MEDICINE

## 2019-06-26 DIAGNOSIS — M79.18 MYOFASCIAL PAIN: ICD-10-CM

## 2019-06-26 ASSESSMENT — MIFFLIN-ST. JEOR: SCORE: 1551.35

## 2019-06-27 ENCOUNTER — COMMUNICATION - HEALTHEAST (OUTPATIENT)
Dept: FAMILY MEDICINE | Facility: CLINIC | Age: 34
End: 2019-06-27

## 2019-06-27 ENCOUNTER — COMMUNICATION - HEALTHEAST (OUTPATIENT)
Dept: PALLIATIVE MEDICINE | Facility: OTHER | Age: 34
End: 2019-06-27

## 2019-06-27 DIAGNOSIS — R51.9 NONINTRACTABLE EPISODIC HEADACHE, UNSPECIFIED HEADACHE TYPE: ICD-10-CM

## 2019-07-01 ENCOUNTER — COMMUNICATION - HEALTHEAST (OUTPATIENT)
Dept: NURSING | Facility: CLINIC | Age: 34
End: 2019-07-01

## 2019-07-03 ENCOUNTER — AMBULATORY - HEALTHEAST (OUTPATIENT)
Dept: LAB | Facility: CLINIC | Age: 34
End: 2019-07-03

## 2019-07-03 ENCOUNTER — COMMUNICATION - HEALTHEAST (OUTPATIENT)
Dept: ANTICOAGULATION | Facility: CLINIC | Age: 34
End: 2019-07-03

## 2019-07-03 DIAGNOSIS — I26.99 BILATERAL PULMONARY EMBOLISM (H): ICD-10-CM

## 2019-07-03 LAB — INR PPP: 3.1 (ref 0.9–1.1)

## 2019-07-06 ENCOUNTER — COMMUNICATION - HEALTHEAST (OUTPATIENT)
Dept: PALLIATIVE MEDICINE | Facility: OTHER | Age: 34
End: 2019-07-06

## 2019-07-06 DIAGNOSIS — M25.50 PAIN IN JOINT, MULTIPLE SITES: ICD-10-CM

## 2019-07-06 DIAGNOSIS — G89.4 CHRONIC PAIN SYNDROME: ICD-10-CM

## 2019-07-06 DIAGNOSIS — M79.10 MYALGIA: ICD-10-CM

## 2019-07-08 ENCOUNTER — AMBULATORY - HEALTHEAST (OUTPATIENT)
Dept: CARE COORDINATION | Facility: CLINIC | Age: 34
End: 2019-07-08

## 2019-07-12 ENCOUNTER — COMMUNICATION - HEALTHEAST (OUTPATIENT)
Dept: NURSING | Facility: CLINIC | Age: 34
End: 2019-07-12

## 2019-07-12 ENCOUNTER — HOSPITAL ENCOUNTER (OUTPATIENT)
Dept: PALLIATIVE MEDICINE | Facility: OTHER | Age: 34
Discharge: HOME | End: 2019-07-12
Attending: NURSE PRACTITIONER

## 2019-07-12 DIAGNOSIS — G89.4 CHRONIC PAIN SYNDROME: ICD-10-CM

## 2019-07-12 DIAGNOSIS — G89.29 CHRONIC LOW BACK PAIN WITHOUT SCIATICA, UNSPECIFIED BACK PAIN LATERALITY: ICD-10-CM

## 2019-07-12 DIAGNOSIS — M25.50 PAIN IN JOINT, MULTIPLE SITES: ICD-10-CM

## 2019-07-12 DIAGNOSIS — M54.50 CHRONIC LOW BACK PAIN WITHOUT SCIATICA, UNSPECIFIED BACK PAIN LATERALITY: ICD-10-CM

## 2019-07-12 DIAGNOSIS — M54.2 CERVICALGIA: ICD-10-CM

## 2019-07-12 DIAGNOSIS — M79.10 MYALGIA: ICD-10-CM

## 2019-07-12 ASSESSMENT — MIFFLIN-ST. JEOR: SCORE: 1551.35

## 2019-07-15 ENCOUNTER — COMMUNICATION - HEALTHEAST (OUTPATIENT)
Dept: NURSING | Facility: CLINIC | Age: 34
End: 2019-07-15

## 2019-07-15 ENCOUNTER — AMBULATORY - HEALTHEAST (OUTPATIENT)
Dept: PALLIATIVE MEDICINE | Facility: OTHER | Age: 34
End: 2019-07-15

## 2019-07-15 ENCOUNTER — HOSPITAL ENCOUNTER (OUTPATIENT)
Dept: PALLIATIVE MEDICINE | Facility: OTHER | Age: 34
Discharge: HOME | End: 2019-07-15
Attending: COUNSELOR

## 2019-07-15 DIAGNOSIS — F41.1 GENERALIZED ANXIETY DISORDER: ICD-10-CM

## 2019-07-15 DIAGNOSIS — G89.4 CHRONIC PAIN SYNDROME: ICD-10-CM

## 2019-07-15 DIAGNOSIS — F33.2 SEVERE EPISODE OF RECURRENT MAJOR DEPRESSIVE DISORDER, WITHOUT PSYCHOTIC FEATURES (H): ICD-10-CM

## 2019-07-17 ENCOUNTER — COMMUNICATION - HEALTHEAST (OUTPATIENT)
Dept: ANTICOAGULATION | Facility: CLINIC | Age: 34
End: 2019-07-17

## 2019-07-17 ENCOUNTER — AMBULATORY - HEALTHEAST (OUTPATIENT)
Dept: LAB | Facility: CLINIC | Age: 34
End: 2019-07-17

## 2019-07-17 DIAGNOSIS — I26.99 BILATERAL PULMONARY EMBOLISM (H): ICD-10-CM

## 2019-07-17 LAB — INR PPP: 2.1 (ref 0.9–1.1)

## 2019-07-25 ENCOUNTER — COMMUNICATION - HEALTHEAST (OUTPATIENT)
Dept: ANTICOAGULATION | Facility: CLINIC | Age: 34
End: 2019-07-25

## 2019-07-25 DIAGNOSIS — I26.99 BILATERAL PULMONARY EMBOLISM (H): ICD-10-CM

## 2019-08-07 ENCOUNTER — COMMUNICATION - HEALTHEAST (OUTPATIENT)
Dept: ANTICOAGULATION | Facility: CLINIC | Age: 34
End: 2019-08-07

## 2019-08-07 ENCOUNTER — AMBULATORY - HEALTHEAST (OUTPATIENT)
Dept: LAB | Facility: CLINIC | Age: 34
End: 2019-08-07

## 2019-08-07 DIAGNOSIS — I26.99 BILATERAL PULMONARY EMBOLISM (H): ICD-10-CM

## 2019-08-07 LAB — INR PPP: 2.7 (ref 0.9–1.1)

## 2019-08-17 ENCOUNTER — COMMUNICATION - HEALTHEAST (OUTPATIENT)
Dept: PALLIATIVE MEDICINE | Facility: OTHER | Age: 34
End: 2019-08-17

## 2019-08-17 ENCOUNTER — COMMUNICATION - HEALTHEAST (OUTPATIENT)
Dept: FAMILY MEDICINE | Facility: CLINIC | Age: 34
End: 2019-08-17

## 2019-08-17 DIAGNOSIS — M25.50 PAIN IN JOINT, MULTIPLE SITES: ICD-10-CM

## 2019-08-17 DIAGNOSIS — M54.2 CERVICALGIA: ICD-10-CM

## 2019-08-17 DIAGNOSIS — M79.10 MYALGIA: ICD-10-CM

## 2019-08-17 DIAGNOSIS — J33.9 NASAL POLYPOSIS: ICD-10-CM

## 2019-08-17 DIAGNOSIS — G89.4 CHRONIC PAIN SYNDROME: ICD-10-CM

## 2019-08-19 ENCOUNTER — HOSPITAL ENCOUNTER (OUTPATIENT)
Dept: PALLIATIVE MEDICINE | Facility: OTHER | Age: 34
Discharge: HOME | End: 2019-08-19
Attending: COUNSELOR

## 2019-08-19 DIAGNOSIS — F33.2 SEVERE EPISODE OF RECURRENT MAJOR DEPRESSIVE DISORDER, WITHOUT PSYCHOTIC FEATURES (H): ICD-10-CM

## 2019-08-19 DIAGNOSIS — G89.4 CHRONIC PAIN SYNDROME: ICD-10-CM

## 2019-08-19 DIAGNOSIS — F41.1 GENERALIZED ANXIETY DISORDER: ICD-10-CM

## 2019-08-26 ENCOUNTER — HOSPITAL ENCOUNTER (OUTPATIENT)
Dept: PALLIATIVE MEDICINE | Facility: OTHER | Age: 34
Discharge: HOME | End: 2019-08-26
Attending: COUNSELOR

## 2019-08-26 DIAGNOSIS — G89.4 CHRONIC PAIN SYNDROME: ICD-10-CM

## 2019-08-26 DIAGNOSIS — F41.1 GENERALIZED ANXIETY DISORDER: ICD-10-CM

## 2019-08-26 DIAGNOSIS — F33.2 SEVERE EPISODE OF RECURRENT MAJOR DEPRESSIVE DISORDER, WITHOUT PSYCHOTIC FEATURES (H): ICD-10-CM

## 2019-08-29 ENCOUNTER — COMMUNICATION - HEALTHEAST (OUTPATIENT)
Dept: PALLIATIVE MEDICINE | Facility: OTHER | Age: 34
End: 2019-08-29

## 2019-08-29 DIAGNOSIS — G89.4 CHRONIC PAIN SYNDROME: ICD-10-CM

## 2019-08-29 DIAGNOSIS — M25.50 PAIN IN JOINT, MULTIPLE SITES: ICD-10-CM

## 2019-08-29 DIAGNOSIS — M54.2 CERVICALGIA: ICD-10-CM

## 2019-09-04 ENCOUNTER — COMMUNICATION - HEALTHEAST (OUTPATIENT)
Dept: ANTICOAGULATION | Facility: CLINIC | Age: 34
End: 2019-09-04

## 2019-09-04 ENCOUNTER — HOSPITAL ENCOUNTER (OUTPATIENT)
Dept: PALLIATIVE MEDICINE | Facility: OTHER | Age: 34
Discharge: HOME | End: 2019-09-04
Attending: COUNSELOR

## 2019-09-04 ENCOUNTER — COMMUNICATION - HEALTHEAST (OUTPATIENT)
Dept: PALLIATIVE MEDICINE | Facility: OTHER | Age: 34
End: 2019-09-04

## 2019-09-04 ENCOUNTER — OFFICE VISIT - HEALTHEAST (OUTPATIENT)
Dept: FAMILY MEDICINE | Facility: CLINIC | Age: 34
End: 2019-09-04

## 2019-09-04 DIAGNOSIS — M62.81 MUSCLE WEAKNESS: ICD-10-CM

## 2019-09-04 DIAGNOSIS — M79.10 MYALGIA: ICD-10-CM

## 2019-09-04 DIAGNOSIS — I26.99 BILATERAL PULMONARY EMBOLISM (H): ICD-10-CM

## 2019-09-04 DIAGNOSIS — G89.4 CHRONIC PAIN SYNDROME: ICD-10-CM

## 2019-09-04 DIAGNOSIS — F41.1 GENERALIZED ANXIETY DISORDER: ICD-10-CM

## 2019-09-04 DIAGNOSIS — F33.2 SEVERE EPISODE OF RECURRENT MAJOR DEPRESSIVE DISORDER, WITHOUT PSYCHOTIC FEATURES (H): ICD-10-CM

## 2019-09-04 DIAGNOSIS — M25.50 PAIN IN JOINT, MULTIPLE SITES: ICD-10-CM

## 2019-09-04 DIAGNOSIS — R51.9 NONINTRACTABLE EPISODIC HEADACHE, UNSPECIFIED HEADACHE TYPE: ICD-10-CM

## 2019-09-04 DIAGNOSIS — M54.2 CERVICALGIA: ICD-10-CM

## 2019-09-04 DIAGNOSIS — R10.11 ABDOMINAL PAIN, RIGHT UPPER QUADRANT: ICD-10-CM

## 2019-09-04 LAB — INR PPP: 2.2 (ref 0.9–1.1)

## 2019-09-06 ENCOUNTER — HOSPITAL ENCOUNTER (OUTPATIENT)
Dept: PALLIATIVE MEDICINE | Facility: OTHER | Age: 34
Discharge: HOME | End: 2019-09-06
Attending: NURSE PRACTITIONER

## 2019-09-06 DIAGNOSIS — M79.10 MYALGIA: ICD-10-CM

## 2019-09-06 DIAGNOSIS — M25.50 PAIN IN JOINT, MULTIPLE SITES: ICD-10-CM

## 2019-09-06 DIAGNOSIS — I26.99 BILATERAL PULMONARY EMBOLISM (H): ICD-10-CM

## 2019-09-06 DIAGNOSIS — M54.2 CERVICALGIA: ICD-10-CM

## 2019-09-06 DIAGNOSIS — G89.4 CHRONIC PAIN SYNDROME: ICD-10-CM

## 2019-09-06 ASSESSMENT — MIFFLIN-ST. JEOR: SCORE: 1555.88

## 2019-09-13 ENCOUNTER — HOSPITAL ENCOUNTER (OUTPATIENT)
Dept: PALLIATIVE MEDICINE | Facility: OTHER | Age: 34
Discharge: HOME | End: 2019-09-13
Attending: COUNSELOR

## 2019-09-13 DIAGNOSIS — F33.2 SEVERE EPISODE OF RECURRENT MAJOR DEPRESSIVE DISORDER, WITHOUT PSYCHOTIC FEATURES (H): ICD-10-CM

## 2019-09-13 DIAGNOSIS — G89.4 CHRONIC PAIN SYNDROME: ICD-10-CM

## 2019-09-13 DIAGNOSIS — F41.1 GENERALIZED ANXIETY DISORDER: ICD-10-CM

## 2019-09-14 ENCOUNTER — COMMUNICATION - HEALTHEAST (OUTPATIENT)
Dept: FAMILY MEDICINE | Facility: CLINIC | Age: 34
End: 2019-09-14

## 2019-09-17 ENCOUNTER — COMMUNICATION - HEALTHEAST (OUTPATIENT)
Dept: NURSING | Facility: CLINIC | Age: 34
End: 2019-09-17

## 2019-09-18 ENCOUNTER — HOSPITAL ENCOUNTER (OUTPATIENT)
Dept: PALLIATIVE MEDICINE | Facility: OTHER | Age: 34
Discharge: HOME | End: 2019-09-18

## 2019-09-18 DIAGNOSIS — G89.4 CHRONIC PAIN SYNDROME: ICD-10-CM

## 2019-09-23 ENCOUNTER — HOSPITAL ENCOUNTER (OUTPATIENT)
Dept: PALLIATIVE MEDICINE | Facility: OTHER | Age: 34
Discharge: HOME | End: 2019-09-23
Attending: COUNSELOR

## 2019-09-23 DIAGNOSIS — F33.2 SEVERE EPISODE OF RECURRENT MAJOR DEPRESSIVE DISORDER, WITHOUT PSYCHOTIC FEATURES (H): ICD-10-CM

## 2019-09-23 DIAGNOSIS — F41.1 GENERALIZED ANXIETY DISORDER: ICD-10-CM

## 2019-09-23 DIAGNOSIS — G89.4 CHRONIC PAIN SYNDROME: ICD-10-CM

## 2019-09-25 ENCOUNTER — HOSPITAL ENCOUNTER (OUTPATIENT)
Dept: PALLIATIVE MEDICINE | Facility: OTHER | Age: 34
Discharge: HOME | End: 2019-09-25

## 2019-09-25 DIAGNOSIS — G89.4 CHRONIC PAIN SYNDROME: ICD-10-CM

## 2019-09-30 ENCOUNTER — COMMUNICATION - HEALTHEAST (OUTPATIENT)
Dept: PALLIATIVE MEDICINE | Facility: OTHER | Age: 34
End: 2019-09-30

## 2019-09-30 DIAGNOSIS — G89.4 CHRONIC PAIN SYNDROME: ICD-10-CM

## 2019-09-30 DIAGNOSIS — M54.2 CERVICALGIA: ICD-10-CM

## 2019-09-30 DIAGNOSIS — M25.50 PAIN IN JOINT, MULTIPLE SITES: ICD-10-CM

## 2019-09-30 DIAGNOSIS — M79.10 MYALGIA: ICD-10-CM

## 2019-10-01 ENCOUNTER — HOSPITAL ENCOUNTER (OUTPATIENT)
Dept: PALLIATIVE MEDICINE | Facility: OTHER | Age: 34
Discharge: HOME | End: 2019-10-01

## 2019-10-01 ENCOUNTER — COMMUNICATION - HEALTHEAST (OUTPATIENT)
Dept: NURSING | Facility: CLINIC | Age: 34
End: 2019-10-01

## 2019-10-01 DIAGNOSIS — G89.4 CHRONIC PAIN SYNDROME: ICD-10-CM

## 2019-10-02 ENCOUNTER — HOSPITAL ENCOUNTER (OUTPATIENT)
Dept: PALLIATIVE MEDICINE | Facility: OTHER | Age: 34
Discharge: HOME | End: 2019-10-02
Attending: COUNSELOR

## 2019-10-02 ENCOUNTER — COMMUNICATION - HEALTHEAST (OUTPATIENT)
Dept: NURSING | Facility: CLINIC | Age: 34
End: 2019-10-02

## 2019-10-02 ENCOUNTER — COMMUNICATION - HEALTHEAST (OUTPATIENT)
Dept: CARE COORDINATION | Facility: CLINIC | Age: 34
End: 2019-10-02

## 2019-10-02 DIAGNOSIS — F33.2 SEVERE EPISODE OF RECURRENT MAJOR DEPRESSIVE DISORDER, WITHOUT PSYCHOTIC FEATURES (H): ICD-10-CM

## 2019-10-02 DIAGNOSIS — G89.4 CHRONIC PAIN SYNDROME: ICD-10-CM

## 2019-10-02 DIAGNOSIS — F41.1 GENERALIZED ANXIETY DISORDER: ICD-10-CM

## 2019-10-07 ENCOUNTER — COMMUNICATION - HEALTHEAST (OUTPATIENT)
Dept: NURSING | Facility: CLINIC | Age: 34
End: 2019-10-07

## 2019-10-07 ENCOUNTER — COMMUNICATION - HEALTHEAST (OUTPATIENT)
Dept: FAMILY MEDICINE | Facility: CLINIC | Age: 34
End: 2019-10-07

## 2019-10-07 DIAGNOSIS — R51.9 NONINTRACTABLE EPISODIC HEADACHE, UNSPECIFIED HEADACHE TYPE: ICD-10-CM

## 2019-10-09 ENCOUNTER — COMMUNICATION - HEALTHEAST (OUTPATIENT)
Dept: ANTICOAGULATION | Facility: CLINIC | Age: 34
End: 2019-10-09

## 2019-10-11 ENCOUNTER — HOSPITAL ENCOUNTER (OUTPATIENT)
Dept: PALLIATIVE MEDICINE | Facility: OTHER | Age: 34
Discharge: HOME | End: 2019-10-11
Attending: COUNSELOR

## 2019-10-11 DIAGNOSIS — F41.1 GENERALIZED ANXIETY DISORDER: ICD-10-CM

## 2019-10-11 DIAGNOSIS — G89.4 CHRONIC PAIN SYNDROME: ICD-10-CM

## 2019-10-11 DIAGNOSIS — F33.2 SEVERE EPISODE OF RECURRENT MAJOR DEPRESSIVE DISORDER, WITHOUT PSYCHOTIC FEATURES (H): ICD-10-CM

## 2019-10-16 ENCOUNTER — HOSPITAL ENCOUNTER (OUTPATIENT)
Dept: PALLIATIVE MEDICINE | Facility: OTHER | Age: 34
Discharge: HOME | End: 2019-10-16

## 2019-10-16 DIAGNOSIS — M54.41 CHRONIC BILATERAL LOW BACK PAIN WITH RIGHT-SIDED SCIATICA: ICD-10-CM

## 2019-10-16 DIAGNOSIS — G89.4 CHRONIC PAIN SYNDROME: ICD-10-CM

## 2019-10-16 DIAGNOSIS — G89.29 CHRONIC BILATERAL LOW BACK PAIN WITH RIGHT-SIDED SCIATICA: ICD-10-CM

## 2019-10-18 ENCOUNTER — AMBULATORY - HEALTHEAST (OUTPATIENT)
Dept: LAB | Facility: CLINIC | Age: 34
End: 2019-10-18

## 2019-10-18 ENCOUNTER — COMMUNICATION - HEALTHEAST (OUTPATIENT)
Dept: ANTICOAGULATION | Facility: CLINIC | Age: 34
End: 2019-10-18

## 2019-10-18 DIAGNOSIS — I26.99 BILATERAL PULMONARY EMBOLISM (H): ICD-10-CM

## 2019-10-18 LAB — INR PPP: 1.9 (ref 0.9–1.1)

## 2019-10-23 ENCOUNTER — COMMUNICATION - HEALTHEAST (OUTPATIENT)
Dept: CARDIOLOGY | Facility: CLINIC | Age: 34
End: 2019-10-23

## 2019-10-23 DIAGNOSIS — R07.9 CHEST PAIN: ICD-10-CM

## 2019-10-23 DIAGNOSIS — R00.0 SINUS TACHYCARDIA: ICD-10-CM

## 2019-10-24 ENCOUNTER — COMMUNICATION - HEALTHEAST (OUTPATIENT)
Dept: PALLIATIVE MEDICINE | Facility: OTHER | Age: 34
End: 2019-10-24

## 2019-10-24 DIAGNOSIS — M25.50 PAIN IN JOINT, MULTIPLE SITES: ICD-10-CM

## 2019-10-24 DIAGNOSIS — M79.10 MYALGIA: ICD-10-CM

## 2019-10-24 DIAGNOSIS — G89.4 CHRONIC PAIN SYNDROME: ICD-10-CM

## 2019-10-24 DIAGNOSIS — M54.2 CERVICALGIA: ICD-10-CM

## 2019-10-28 ENCOUNTER — HOSPITAL ENCOUNTER (OUTPATIENT)
Dept: PALLIATIVE MEDICINE | Facility: OTHER | Age: 34
Discharge: HOME | End: 2019-10-28
Attending: NURSE PRACTITIONER

## 2019-10-28 DIAGNOSIS — G44.029 CHRONIC CLUSTER HEADACHE, NOT INTRACTABLE: ICD-10-CM

## 2019-10-28 DIAGNOSIS — G89.4 CHRONIC PAIN SYNDROME: ICD-10-CM

## 2019-10-28 DIAGNOSIS — M25.50 PAIN IN JOINT, MULTIPLE SITES: ICD-10-CM

## 2019-10-28 DIAGNOSIS — M54.2 CERVICALGIA: ICD-10-CM

## 2019-10-28 DIAGNOSIS — M79.10 MYALGIA: ICD-10-CM

## 2019-10-28 ASSESSMENT — MIFFLIN-ST. JEOR: SCORE: 1574.03

## 2019-10-30 ENCOUNTER — HOSPITAL ENCOUNTER (OUTPATIENT)
Dept: PALLIATIVE MEDICINE | Facility: OTHER | Age: 34
Discharge: HOME | End: 2019-10-30
Attending: COUNSELOR

## 2019-10-30 ENCOUNTER — COMMUNICATION - HEALTHEAST (OUTPATIENT)
Dept: FAMILY MEDICINE | Facility: CLINIC | Age: 34
End: 2019-10-30

## 2019-10-30 DIAGNOSIS — G89.4 CHRONIC PAIN SYNDROME: ICD-10-CM

## 2019-10-30 DIAGNOSIS — F41.1 GENERALIZED ANXIETY DISORDER: ICD-10-CM

## 2019-10-30 DIAGNOSIS — F33.2 SEVERE EPISODE OF RECURRENT MAJOR DEPRESSIVE DISORDER, WITHOUT PSYCHOTIC FEATURES (H): ICD-10-CM

## 2019-10-30 DIAGNOSIS — R51.9 NONINTRACTABLE EPISODIC HEADACHE, UNSPECIFIED HEADACHE TYPE: ICD-10-CM

## 2019-10-31 ENCOUNTER — HOSPITAL ENCOUNTER (OUTPATIENT)
Dept: PALLIATIVE MEDICINE | Facility: OTHER | Age: 34
Discharge: HOME | End: 2019-10-31

## 2019-10-31 DIAGNOSIS — M54.41 CHRONIC BILATERAL LOW BACK PAIN WITH RIGHT-SIDED SCIATICA: ICD-10-CM

## 2019-10-31 DIAGNOSIS — G89.4 CHRONIC PAIN SYNDROME: ICD-10-CM

## 2019-10-31 DIAGNOSIS — G89.29 CHRONIC BILATERAL LOW BACK PAIN WITH RIGHT-SIDED SCIATICA: ICD-10-CM

## 2019-11-01 ENCOUNTER — COMMUNICATION - HEALTHEAST (OUTPATIENT)
Dept: ANTICOAGULATION | Facility: CLINIC | Age: 34
End: 2019-11-01

## 2019-11-01 ENCOUNTER — AMBULATORY - HEALTHEAST (OUTPATIENT)
Dept: LAB | Facility: CLINIC | Age: 34
End: 2019-11-01

## 2019-11-01 DIAGNOSIS — I26.99 BILATERAL PULMONARY EMBOLISM (H): ICD-10-CM

## 2019-11-01 LAB — INR PPP: 2.3 (ref 0.9–1.1)

## 2019-11-06 ENCOUNTER — HOSPITAL ENCOUNTER (OUTPATIENT)
Dept: PALLIATIVE MEDICINE | Facility: OTHER | Age: 34
Discharge: HOME | End: 2019-11-06

## 2019-11-06 ENCOUNTER — COMMUNICATION - HEALTHEAST (OUTPATIENT)
Dept: PALLIATIVE MEDICINE | Facility: OTHER | Age: 34
End: 2019-11-06

## 2019-11-06 DIAGNOSIS — G89.4 CHRONIC PAIN SYNDROME: ICD-10-CM

## 2019-11-06 DIAGNOSIS — M54.2 CERVICALGIA: ICD-10-CM

## 2019-11-06 DIAGNOSIS — M54.41 CHRONIC BILATERAL LOW BACK PAIN WITH RIGHT-SIDED SCIATICA: ICD-10-CM

## 2019-11-06 DIAGNOSIS — M79.10 MYALGIA: ICD-10-CM

## 2019-11-06 DIAGNOSIS — M25.50 PAIN IN JOINT, MULTIPLE SITES: ICD-10-CM

## 2019-11-06 DIAGNOSIS — G89.29 CHRONIC BILATERAL LOW BACK PAIN WITH RIGHT-SIDED SCIATICA: ICD-10-CM

## 2019-11-13 ENCOUNTER — HOSPITAL ENCOUNTER (OUTPATIENT)
Dept: PALLIATIVE MEDICINE | Facility: OTHER | Age: 34
Discharge: HOME | End: 2019-11-13
Attending: PAIN MEDICINE | Admitting: PAIN MEDICINE

## 2019-11-13 DIAGNOSIS — G43.019 INTRACTABLE MIGRAINE WITHOUT AURA AND WITHOUT STATUS MIGRAINOSUS: ICD-10-CM

## 2019-11-13 ASSESSMENT — MIFFLIN-ST. JEOR: SCORE: 1574.03

## 2019-11-14 ENCOUNTER — COMMUNICATION - HEALTHEAST (OUTPATIENT)
Dept: PALLIATIVE MEDICINE | Facility: OTHER | Age: 34
End: 2019-11-14

## 2019-11-15 ENCOUNTER — AMBULATORY - HEALTHEAST (OUTPATIENT)
Dept: LAB | Facility: CLINIC | Age: 34
End: 2019-11-15

## 2019-11-15 ENCOUNTER — COMMUNICATION - HEALTHEAST (OUTPATIENT)
Dept: ANTICOAGULATION | Facility: CLINIC | Age: 34
End: 2019-11-15

## 2019-11-15 ENCOUNTER — HOSPITAL ENCOUNTER (OUTPATIENT)
Dept: PALLIATIVE MEDICINE | Facility: OTHER | Age: 34
Discharge: HOME | End: 2019-11-15
Attending: COUNSELOR

## 2019-11-15 DIAGNOSIS — G89.4 CHRONIC PAIN SYNDROME: ICD-10-CM

## 2019-11-15 DIAGNOSIS — F33.2 SEVERE EPISODE OF RECURRENT MAJOR DEPRESSIVE DISORDER, WITHOUT PSYCHOTIC FEATURES (H): ICD-10-CM

## 2019-11-15 DIAGNOSIS — I26.99 BILATERAL PULMONARY EMBOLISM (H): ICD-10-CM

## 2019-11-15 DIAGNOSIS — F41.1 GENERALIZED ANXIETY DISORDER: ICD-10-CM

## 2019-11-15 LAB — INR PPP: 1.9 (ref 0.9–1.1)

## 2019-11-18 ENCOUNTER — OFFICE VISIT - HEALTHEAST (OUTPATIENT)
Dept: PULMONOLOGY | Facility: OTHER | Age: 34
End: 2019-11-18

## 2019-11-18 ENCOUNTER — COMMUNICATION - HEALTHEAST (OUTPATIENT)
Dept: PULMONOLOGY | Facility: OTHER | Age: 34
End: 2019-11-18

## 2019-11-18 DIAGNOSIS — I26.92 SADDLE EMBOLUS OF PULMONARY ARTERY, UNSPECIFIED CHRONICITY, UNSPECIFIED WHETHER ACUTE COR PULMONALE PRESENT (H): ICD-10-CM

## 2019-11-18 ASSESSMENT — MIFFLIN-ST. JEOR: SCORE: 1574.03

## 2019-11-20 ENCOUNTER — AMBULATORY - HEALTHEAST (OUTPATIENT)
Dept: PULMONOLOGY | Facility: OTHER | Age: 34
End: 2019-11-20

## 2019-11-20 ENCOUNTER — COMMUNICATION - HEALTHEAST (OUTPATIENT)
Dept: FAMILY MEDICINE | Facility: CLINIC | Age: 34
End: 2019-11-20

## 2019-11-20 ENCOUNTER — AMBULATORY - HEALTHEAST (OUTPATIENT)
Dept: FAMILY MEDICINE | Facility: CLINIC | Age: 34
End: 2019-11-20

## 2019-11-20 DIAGNOSIS — I26.99 PULMONARY EMBOLISM WITHOUT ACUTE COR PULMONALE, UNSPECIFIED CHRONICITY, UNSPECIFIED PULMONARY EMBOLISM TYPE (H): ICD-10-CM

## 2019-11-20 DIAGNOSIS — I26.99 BILATERAL PULMONARY EMBOLISM (H): ICD-10-CM

## 2019-11-26 ENCOUNTER — OFFICE VISIT - HEALTHEAST (OUTPATIENT)
Dept: FAMILY MEDICINE | Facility: CLINIC | Age: 34
End: 2019-11-26

## 2019-11-26 ENCOUNTER — HOSPITAL ENCOUNTER (OUTPATIENT)
Dept: MRI IMAGING | Facility: HOSPITAL | Age: 34
Discharge: HOME | End: 2019-11-26
Attending: FAMILY MEDICINE

## 2019-11-26 DIAGNOSIS — R51.9 ACUTE INTRACTABLE HEADACHE, UNSPECIFIED HEADACHE TYPE: ICD-10-CM

## 2019-11-26 DIAGNOSIS — G89.4 CHRONIC PAIN SYNDROME: ICD-10-CM

## 2019-11-26 DIAGNOSIS — M62.81 GENERALIZED MUSCLE WEAKNESS: ICD-10-CM

## 2019-11-26 DIAGNOSIS — I26.99 BILATERAL PULMONARY EMBOLISM (H): ICD-10-CM

## 2019-12-02 ENCOUNTER — HOSPITAL ENCOUNTER (OUTPATIENT)
Dept: PALLIATIVE MEDICINE | Facility: OTHER | Age: 34
Discharge: HOME | End: 2019-12-02
Attending: NURSE PRACTITIONER

## 2019-12-02 DIAGNOSIS — G89.4 CHRONIC PAIN SYNDROME: ICD-10-CM

## 2019-12-02 DIAGNOSIS — M79.10 MYALGIA: ICD-10-CM

## 2019-12-02 DIAGNOSIS — M54.2 CERVICALGIA: ICD-10-CM

## 2019-12-02 DIAGNOSIS — M25.50 PAIN IN JOINT, MULTIPLE SITES: ICD-10-CM

## 2019-12-02 ASSESSMENT — MIFFLIN-ST. JEOR: SCORE: 1553.62

## 2019-12-04 ENCOUNTER — OFFICE VISIT - HEALTHEAST (OUTPATIENT)
Dept: FAMILY MEDICINE | Facility: CLINIC | Age: 34
End: 2019-12-04

## 2019-12-04 ENCOUNTER — HOSPITAL ENCOUNTER (OUTPATIENT)
Dept: PALLIATIVE MEDICINE | Facility: OTHER | Age: 34
Discharge: HOME | End: 2019-12-04

## 2019-12-04 DIAGNOSIS — M54.41 CHRONIC RIGHT-SIDED LOW BACK PAIN WITH RIGHT-SIDED SCIATICA: ICD-10-CM

## 2019-12-04 DIAGNOSIS — M62.81 GENERALIZED MUSCLE WEAKNESS: ICD-10-CM

## 2019-12-04 DIAGNOSIS — G89.29 CHRONIC RIGHT-SIDED LOW BACK PAIN WITH RIGHT-SIDED SCIATICA: ICD-10-CM

## 2019-12-04 DIAGNOSIS — R61 DIAPHORESIS: ICD-10-CM

## 2019-12-04 DIAGNOSIS — R51.9 NONINTRACTABLE EPISODIC HEADACHE, UNSPECIFIED HEADACHE TYPE: ICD-10-CM

## 2019-12-04 DIAGNOSIS — G89.4 CHRONIC PAIN SYNDROME: ICD-10-CM

## 2019-12-04 DIAGNOSIS — J34.1 MUCOCELE OF MAXILLARY SINUS: ICD-10-CM

## 2019-12-04 DIAGNOSIS — R00.0 TACHYCARDIA: ICD-10-CM

## 2019-12-05 ENCOUNTER — RECORDS - HEALTHEAST (OUTPATIENT)
Dept: ADMINISTRATIVE | Facility: OTHER | Age: 34
End: 2019-12-05

## 2019-12-06 ENCOUNTER — HOSPITAL ENCOUNTER (OUTPATIENT)
Dept: PALLIATIVE MEDICINE | Facility: OTHER | Age: 34
Discharge: HOME | End: 2019-12-06
Attending: COUNSELOR

## 2019-12-06 ENCOUNTER — AMBULATORY - HEALTHEAST (OUTPATIENT)
Dept: PALLIATIVE MEDICINE | Facility: OTHER | Age: 34
End: 2019-12-06

## 2019-12-06 DIAGNOSIS — F33.2 SEVERE EPISODE OF RECURRENT MAJOR DEPRESSIVE DISORDER, WITHOUT PSYCHOTIC FEATURES (H): ICD-10-CM

## 2019-12-06 DIAGNOSIS — F41.1 GENERALIZED ANXIETY DISORDER: ICD-10-CM

## 2019-12-06 DIAGNOSIS — G89.4 CHRONIC PAIN SYNDROME: ICD-10-CM

## 2019-12-07 LAB
SHBG SERPL-SCNC: 29 NMOL/L (ref 11–80)
TESTOST FREE SERPL-MCNC: 8.23 NG/DL (ref 4.7–24.4)
TESTOST SERPL-MCNC: 379 NG/DL (ref 240–950)

## 2019-12-11 ENCOUNTER — OFFICE VISIT - HEALTHEAST (OUTPATIENT)
Dept: OTOLARYNGOLOGY | Facility: CLINIC | Age: 34
End: 2019-12-11

## 2019-12-11 DIAGNOSIS — J34.1 MUCOCELE OF FRONTAL SINUS: ICD-10-CM

## 2019-12-19 ENCOUNTER — COMMUNICATION - HEALTHEAST (OUTPATIENT)
Dept: OTOLARYNGOLOGY | Facility: CLINIC | Age: 34
End: 2019-12-19

## 2020-01-02 ENCOUNTER — COMMUNICATION - HEALTHEAST (OUTPATIENT)
Dept: PALLIATIVE MEDICINE | Facility: OTHER | Age: 35
End: 2020-01-02

## 2020-01-02 DIAGNOSIS — G89.4 CHRONIC PAIN SYNDROME: ICD-10-CM

## 2020-01-02 DIAGNOSIS — M79.10 MYALGIA: ICD-10-CM

## 2020-01-02 DIAGNOSIS — M54.2 CERVICALGIA: ICD-10-CM

## 2020-01-02 DIAGNOSIS — M25.50 PAIN IN JOINT, MULTIPLE SITES: ICD-10-CM

## 2020-01-08 ENCOUNTER — HOSPITAL ENCOUNTER (OUTPATIENT)
Dept: PALLIATIVE MEDICINE | Facility: OTHER | Age: 35
Discharge: HOME | End: 2020-01-08

## 2020-01-08 DIAGNOSIS — G89.29 CHRONIC RIGHT-SIDED LOW BACK PAIN WITH RIGHT-SIDED SCIATICA: ICD-10-CM

## 2020-01-08 DIAGNOSIS — M54.41 CHRONIC RIGHT-SIDED LOW BACK PAIN WITH RIGHT-SIDED SCIATICA: ICD-10-CM

## 2020-01-08 DIAGNOSIS — G89.4 CHRONIC PAIN SYNDROME: ICD-10-CM

## 2020-01-13 ENCOUNTER — HOSPITAL ENCOUNTER (OUTPATIENT)
Dept: PALLIATIVE MEDICINE | Facility: OTHER | Age: 35
Discharge: HOME | End: 2020-01-13
Attending: COUNSELOR

## 2020-01-13 DIAGNOSIS — F33.2 SEVERE EPISODE OF RECURRENT MAJOR DEPRESSIVE DISORDER, WITHOUT PSYCHOTIC FEATURES (H): ICD-10-CM

## 2020-01-13 DIAGNOSIS — G89.4 CHRONIC PAIN SYNDROME: ICD-10-CM

## 2020-01-13 DIAGNOSIS — F41.1 GENERALIZED ANXIETY DISORDER: ICD-10-CM

## 2020-01-15 ENCOUNTER — HOSPITAL ENCOUNTER (OUTPATIENT)
Dept: PALLIATIVE MEDICINE | Facility: OTHER | Age: 35
Discharge: HOME | End: 2020-01-15

## 2020-01-15 DIAGNOSIS — M54.41 CHRONIC RIGHT-SIDED LOW BACK PAIN WITH RIGHT-SIDED SCIATICA: ICD-10-CM

## 2020-01-15 DIAGNOSIS — G89.4 CHRONIC PAIN SYNDROME: ICD-10-CM

## 2020-01-15 DIAGNOSIS — G89.29 CHRONIC RIGHT-SIDED LOW BACK PAIN WITH RIGHT-SIDED SCIATICA: ICD-10-CM

## 2020-01-20 ENCOUNTER — COMMUNICATION - HEALTHEAST (OUTPATIENT)
Dept: PALLIATIVE MEDICINE | Facility: OTHER | Age: 35
End: 2020-01-20

## 2020-01-20 DIAGNOSIS — M25.50 PAIN IN JOINT, MULTIPLE SITES: ICD-10-CM

## 2020-01-20 DIAGNOSIS — M79.10 MYALGIA: ICD-10-CM

## 2020-01-20 DIAGNOSIS — M54.2 CERVICALGIA: ICD-10-CM

## 2020-01-20 DIAGNOSIS — G89.4 CHRONIC PAIN SYNDROME: ICD-10-CM

## 2020-01-27 ENCOUNTER — HOSPITAL ENCOUNTER (OUTPATIENT)
Dept: PALLIATIVE MEDICINE | Facility: OTHER | Age: 35
Discharge: HOME | End: 2020-01-27
Attending: NURSE PRACTITIONER

## 2020-01-27 DIAGNOSIS — G89.4 CHRONIC PAIN SYNDROME: ICD-10-CM

## 2020-01-27 DIAGNOSIS — M79.10 MYALGIA: ICD-10-CM

## 2020-01-27 DIAGNOSIS — M25.50 PAIN IN JOINT, MULTIPLE SITES: ICD-10-CM

## 2020-01-27 DIAGNOSIS — M54.2 CERVICALGIA: ICD-10-CM

## 2020-01-27 ASSESSMENT — MIFFLIN-ST. JEOR: SCORE: 1574.48

## 2020-01-29 ENCOUNTER — HOSPITAL ENCOUNTER (OUTPATIENT)
Dept: PALLIATIVE MEDICINE | Facility: OTHER | Age: 35
Discharge: HOME | End: 2020-01-29

## 2020-01-29 DIAGNOSIS — G89.29 CHRONIC RIGHT-SIDED LOW BACK PAIN WITH RIGHT-SIDED SCIATICA: ICD-10-CM

## 2020-01-29 DIAGNOSIS — G89.4 CHRONIC PAIN SYNDROME: ICD-10-CM

## 2020-01-29 DIAGNOSIS — M54.41 CHRONIC RIGHT-SIDED LOW BACK PAIN WITH RIGHT-SIDED SCIATICA: ICD-10-CM

## 2020-01-29 LAB
6MAM UR QL: NOT DETECTED
7AMINOCLONAZEPAM UR QL: NOT DETECTED
A-OH ALPRAZ UR QL: NOT DETECTED
ALPRAZ UR QL: NOT DETECTED
AMPHET UR QL SCN: NOT DETECTED
BARBITURATES UR QL: NOT DETECTED
BUPRENORPHINE UR QL: NOT DETECTED
BZE UR QL: NOT DETECTED
CARBOXYTHC UR QL: PRESENT
CARISOPRODOL UR QL: NOT DETECTED
CLONAZEPAM UR QL: NOT DETECTED
CODEINE UR QL: NOT DETECTED
CREAT UR-MCNC: 225.1 MG/DL (ref 20–400)
DIAZEPAM UR QL: NOT DETECTED
ETHYL GLUCURONIDE UR QL: NOT DETECTED
FENTANYL UR QL: NOT DETECTED
HYDROCODONE UR QL: NOT DETECTED
HYDROMORPHONE UR QL: NOT DETECTED
LORAZEPAM UR QL: NOT DETECTED
MDA UR QL: NOT DETECTED
MDEA UR QL: NOT DETECTED
MDMA UR QL: NOT DETECTED
ME-PHENIDATE UR QL: NOT DETECTED
MEPERIDINE UR QL: NOT DETECTED
METHADONE UR QL: NOT DETECTED
METHAMPHET UR QL: NOT DETECTED
MIDAZOLAM UR QL SCN: NOT DETECTED
MORPHINE UR QL: NOT DETECTED
NORBUPRENORPHINE UR QL CFM: NOT DETECTED
NORDIAZEPAM UR QL: NOT DETECTED
NORFENTANYL UR QL: NOT DETECTED
NORHYDROCODONE UR QL CFM: NOT DETECTED
NOROXYCODONE UR QL CFM: PRESENT
NOROXYMORPHONE UR QL SCN: NOT DETECTED
OXAZEPAM UR QL: NOT DETECTED
OXYCODONE UR QL: PRESENT
OXYMORPHONE UR QL: NOT DETECTED
PATHOLOGY STUDY: NORMAL
PCP UR QL: NOT DETECTED
PHENTERMINE UR QL: NOT DETECTED
PROPOXYPH UR QL: NOT DETECTED
SERVICE CMNT-IMP: NORMAL
TAPENTADOL UR QL SCN: NOT DETECTED
TAPENTADOL UR QL SCN: NOT DETECTED
TEMAZEPAM UR QL: NOT DETECTED
TRAMADOL UR QL: NOT DETECTED
ZOLPIDEM UR QL: NOT DETECTED

## 2020-02-10 ENCOUNTER — COMMUNICATION - HEALTHEAST (OUTPATIENT)
Dept: PALLIATIVE MEDICINE | Facility: OTHER | Age: 35
End: 2020-02-10

## 2020-02-10 ENCOUNTER — HOSPITAL ENCOUNTER (OUTPATIENT)
Dept: PALLIATIVE MEDICINE | Facility: OTHER | Age: 35
Discharge: HOME | End: 2020-02-10
Attending: COUNSELOR

## 2020-02-10 DIAGNOSIS — G89.4 CHRONIC PAIN SYNDROME: ICD-10-CM

## 2020-02-10 DIAGNOSIS — M79.10 MYALGIA: ICD-10-CM

## 2020-02-10 DIAGNOSIS — F33.2 SEVERE EPISODE OF RECURRENT MAJOR DEPRESSIVE DISORDER, WITHOUT PSYCHOTIC FEATURES (H): ICD-10-CM

## 2020-02-10 DIAGNOSIS — M54.2 CERVICALGIA: ICD-10-CM

## 2020-02-10 DIAGNOSIS — M25.50 PAIN IN JOINT, MULTIPLE SITES: ICD-10-CM

## 2020-02-10 DIAGNOSIS — F41.1 GENERALIZED ANXIETY DISORDER: ICD-10-CM

## 2020-02-16 ENCOUNTER — COMMUNICATION - HEALTHEAST (OUTPATIENT)
Dept: FAMILY MEDICINE | Facility: CLINIC | Age: 35
End: 2020-02-16

## 2020-02-16 DIAGNOSIS — J33.9 NASAL POLYPOSIS: ICD-10-CM

## 2020-02-19 ENCOUNTER — HOSPITAL ENCOUNTER (OUTPATIENT)
Dept: PALLIATIVE MEDICINE | Facility: OTHER | Age: 35
Discharge: HOME | End: 2020-02-19

## 2020-02-19 DIAGNOSIS — G89.4 CHRONIC PAIN SYNDROME: ICD-10-CM

## 2020-02-24 ENCOUNTER — HOSPITAL ENCOUNTER (OUTPATIENT)
Dept: PALLIATIVE MEDICINE | Facility: OTHER | Age: 35
Discharge: HOME | End: 2020-02-24
Attending: COUNSELOR

## 2020-02-24 DIAGNOSIS — F33.2 SEVERE EPISODE OF RECURRENT MAJOR DEPRESSIVE DISORDER, WITHOUT PSYCHOTIC FEATURES (H): ICD-10-CM

## 2020-02-24 DIAGNOSIS — F41.1 GENERALIZED ANXIETY DISORDER: ICD-10-CM

## 2020-02-24 DIAGNOSIS — G89.4 CHRONIC PAIN SYNDROME: ICD-10-CM

## 2020-02-27 ENCOUNTER — COMMUNICATION - HEALTHEAST (OUTPATIENT)
Dept: OTOLARYNGOLOGY | Facility: CLINIC | Age: 35
End: 2020-02-27

## 2020-03-04 ENCOUNTER — OFFICE VISIT - HEALTHEAST (OUTPATIENT)
Dept: FAMILY MEDICINE | Facility: CLINIC | Age: 35
End: 2020-03-04

## 2020-03-04 DIAGNOSIS — J34.1 MUCOUS RETENTION CYST OF MAXILLARY SINUS: ICD-10-CM

## 2020-03-04 DIAGNOSIS — F51.04 CHRONIC INSOMNIA: ICD-10-CM

## 2020-03-04 DIAGNOSIS — J32.1 CHRONIC FRONTAL SINUSITIS: ICD-10-CM

## 2020-03-04 DIAGNOSIS — R51.9 NONINTRACTABLE EPISODIC HEADACHE, UNSPECIFIED HEADACHE TYPE: ICD-10-CM

## 2020-03-13 ENCOUNTER — COMMUNICATION - HEALTHEAST (OUTPATIENT)
Dept: PALLIATIVE MEDICINE | Facility: OTHER | Age: 35
End: 2020-03-13

## 2020-03-13 DIAGNOSIS — M25.50 PAIN IN JOINT, MULTIPLE SITES: ICD-10-CM

## 2020-03-13 DIAGNOSIS — G89.4 CHRONIC PAIN SYNDROME: ICD-10-CM

## 2020-03-13 DIAGNOSIS — M54.2 CERVICALGIA: ICD-10-CM

## 2020-03-13 DIAGNOSIS — M79.10 MYALGIA: ICD-10-CM

## 2020-03-16 ENCOUNTER — COMMUNICATION - HEALTHEAST (OUTPATIENT)
Dept: PALLIATIVE MEDICINE | Facility: OTHER | Age: 35
End: 2020-03-16

## 2020-03-16 DIAGNOSIS — M54.2 CERVICALGIA: ICD-10-CM

## 2020-03-16 DIAGNOSIS — M25.50 PAIN IN JOINT, MULTIPLE SITES: ICD-10-CM

## 2020-03-16 DIAGNOSIS — M79.10 MYALGIA: ICD-10-CM

## 2020-03-16 DIAGNOSIS — G89.4 CHRONIC PAIN SYNDROME: ICD-10-CM

## 2020-03-18 ENCOUNTER — COMMUNICATION - HEALTHEAST (OUTPATIENT)
Dept: PALLIATIVE MEDICINE | Facility: OTHER | Age: 35
End: 2020-03-18

## 2020-03-18 DIAGNOSIS — M79.10 MYALGIA: ICD-10-CM

## 2020-03-18 DIAGNOSIS — M54.2 CERVICALGIA: ICD-10-CM

## 2020-03-18 DIAGNOSIS — M25.50 PAIN IN JOINT, MULTIPLE SITES: ICD-10-CM

## 2020-03-18 DIAGNOSIS — G89.4 CHRONIC PAIN SYNDROME: ICD-10-CM

## 2020-03-19 ENCOUNTER — COMMUNICATION - HEALTHEAST (OUTPATIENT)
Dept: PALLIATIVE MEDICINE | Facility: OTHER | Age: 35
End: 2020-03-19

## 2020-03-20 ENCOUNTER — HOSPITAL ENCOUNTER (OUTPATIENT)
Dept: PALLIATIVE MEDICINE | Facility: OTHER | Age: 35
Discharge: HOME | End: 2020-03-20
Attending: COUNSELOR

## 2020-03-20 DIAGNOSIS — G89.4 CHRONIC PAIN SYNDROME: ICD-10-CM

## 2020-03-20 DIAGNOSIS — F33.2 SEVERE EPISODE OF RECURRENT MAJOR DEPRESSIVE DISORDER, WITHOUT PSYCHOTIC FEATURES (H): ICD-10-CM

## 2020-03-20 DIAGNOSIS — F41.1 GENERALIZED ANXIETY DISORDER: ICD-10-CM

## 2020-03-27 ENCOUNTER — HOSPITAL ENCOUNTER (OUTPATIENT)
Dept: PALLIATIVE MEDICINE | Facility: OTHER | Age: 35
Discharge: HOME | End: 2020-03-27
Attending: NURSE PRACTITIONER

## 2020-03-27 DIAGNOSIS — M25.50 PAIN IN JOINT, MULTIPLE SITES: ICD-10-CM

## 2020-03-27 DIAGNOSIS — G89.4 CHRONIC PAIN SYNDROME: ICD-10-CM

## 2020-03-27 DIAGNOSIS — M79.10 MYALGIA: ICD-10-CM

## 2020-03-27 DIAGNOSIS — M54.2 CERVICALGIA: ICD-10-CM

## 2020-03-30 ENCOUNTER — COMMUNICATION - HEALTHEAST (OUTPATIENT)
Dept: OTOLARYNGOLOGY | Facility: CLINIC | Age: 35
End: 2020-03-30

## 2020-04-03 ENCOUNTER — HOSPITAL ENCOUNTER (OUTPATIENT)
Dept: PALLIATIVE MEDICINE | Facility: OTHER | Age: 35
Discharge: HOME | End: 2020-04-03
Attending: COUNSELOR

## 2020-04-03 DIAGNOSIS — G89.4 CHRONIC PAIN SYNDROME: ICD-10-CM

## 2020-04-03 DIAGNOSIS — F41.1 GENERALIZED ANXIETY DISORDER: ICD-10-CM

## 2020-04-03 DIAGNOSIS — F33.2 SEVERE EPISODE OF RECURRENT MAJOR DEPRESSIVE DISORDER, WITHOUT PSYCHOTIC FEATURES (H): ICD-10-CM

## 2020-04-05 ENCOUNTER — COMMUNICATION - HEALTHEAST (OUTPATIENT)
Dept: PALLIATIVE MEDICINE | Facility: OTHER | Age: 35
End: 2020-04-05

## 2020-04-05 ENCOUNTER — COMMUNICATION - HEALTHEAST (OUTPATIENT)
Dept: FAMILY MEDICINE | Facility: CLINIC | Age: 35
End: 2020-04-05

## 2020-04-05 DIAGNOSIS — M79.10 MYALGIA: ICD-10-CM

## 2020-04-05 DIAGNOSIS — G89.4 CHRONIC PAIN SYNDROME: ICD-10-CM

## 2020-04-05 DIAGNOSIS — M54.2 CERVICALGIA: ICD-10-CM

## 2020-04-05 DIAGNOSIS — M25.50 PAIN IN JOINT, MULTIPLE SITES: ICD-10-CM

## 2020-04-05 DIAGNOSIS — R51.9 NONINTRACTABLE EPISODIC HEADACHE, UNSPECIFIED HEADACHE TYPE: ICD-10-CM

## 2020-05-02 ENCOUNTER — COMMUNICATION - HEALTHEAST (OUTPATIENT)
Dept: FAMILY MEDICINE | Facility: CLINIC | Age: 35
End: 2020-05-02

## 2020-05-02 DIAGNOSIS — K21.9 GERD (GASTROESOPHAGEAL REFLUX DISEASE): ICD-10-CM

## 2020-05-12 ENCOUNTER — COMMUNICATION - HEALTHEAST (OUTPATIENT)
Dept: PALLIATIVE MEDICINE | Facility: OTHER | Age: 35
End: 2020-05-12

## 2020-05-12 DIAGNOSIS — M54.2 CERVICALGIA: ICD-10-CM

## 2020-05-12 DIAGNOSIS — M79.10 MYALGIA: ICD-10-CM

## 2020-05-12 DIAGNOSIS — G89.4 CHRONIC PAIN SYNDROME: ICD-10-CM

## 2020-05-12 DIAGNOSIS — M25.50 PAIN IN JOINT, MULTIPLE SITES: ICD-10-CM

## 2020-05-18 ENCOUNTER — HOSPITAL ENCOUNTER (OUTPATIENT)
Dept: PALLIATIVE MEDICINE | Facility: OTHER | Age: 35
Discharge: HOME | End: 2020-05-18
Attending: NURSE PRACTITIONER

## 2020-05-18 DIAGNOSIS — M79.10 MYALGIA: ICD-10-CM

## 2020-05-18 DIAGNOSIS — G89.4 CHRONIC PAIN SYNDROME: ICD-10-CM

## 2020-05-18 DIAGNOSIS — M25.50 PAIN IN JOINT, MULTIPLE SITES: ICD-10-CM

## 2020-05-18 DIAGNOSIS — M54.2 CERVICALGIA: ICD-10-CM

## 2020-05-27 ENCOUNTER — COMMUNICATION - HEALTHEAST (OUTPATIENT)
Dept: PALLIATIVE MEDICINE | Facility: OTHER | Age: 35
End: 2020-05-27

## 2020-05-27 DIAGNOSIS — M79.10 MYALGIA: ICD-10-CM

## 2020-05-27 DIAGNOSIS — M25.50 PAIN IN JOINT, MULTIPLE SITES: ICD-10-CM

## 2020-05-27 DIAGNOSIS — G89.4 CHRONIC PAIN SYNDROME: ICD-10-CM

## 2020-05-27 DIAGNOSIS — M54.2 CERVICALGIA: ICD-10-CM

## 2020-06-05 ENCOUNTER — COMMUNICATION - HEALTHEAST (OUTPATIENT)
Dept: FAMILY MEDICINE | Facility: CLINIC | Age: 35
End: 2020-06-05

## 2020-06-05 DIAGNOSIS — F51.04 CHRONIC INSOMNIA: ICD-10-CM

## 2020-06-26 ENCOUNTER — COMMUNICATION - HEALTHEAST (OUTPATIENT)
Dept: PALLIATIVE MEDICINE | Facility: OTHER | Age: 35
End: 2020-06-26

## 2020-06-26 DIAGNOSIS — G89.4 CHRONIC PAIN SYNDROME: ICD-10-CM

## 2020-06-26 DIAGNOSIS — M25.50 PAIN IN JOINT, MULTIPLE SITES: ICD-10-CM

## 2020-06-26 DIAGNOSIS — M54.2 CERVICALGIA: ICD-10-CM

## 2020-06-26 DIAGNOSIS — M79.10 MYALGIA: ICD-10-CM

## 2020-07-13 ENCOUNTER — HOSPITAL ENCOUNTER (OUTPATIENT)
Dept: PALLIATIVE MEDICINE | Facility: OTHER | Age: 35
Discharge: HOME | End: 2020-07-13
Attending: NURSE PRACTITIONER

## 2020-07-13 DIAGNOSIS — G89.4 CHRONIC PAIN SYNDROME: ICD-10-CM

## 2020-07-13 DIAGNOSIS — M54.2 CERVICALGIA: ICD-10-CM

## 2020-07-13 DIAGNOSIS — M25.50 PAIN IN JOINT, MULTIPLE SITES: ICD-10-CM

## 2020-07-13 DIAGNOSIS — M79.10 MYALGIA: ICD-10-CM

## 2020-08-28 ENCOUNTER — COMMUNICATION - HEALTHEAST (OUTPATIENT)
Dept: PALLIATIVE MEDICINE | Facility: OTHER | Age: 35
End: 2020-08-28

## 2020-08-28 DIAGNOSIS — M25.50 PAIN IN JOINT, MULTIPLE SITES: ICD-10-CM

## 2020-08-28 DIAGNOSIS — M54.2 CERVICALGIA: ICD-10-CM

## 2020-08-28 DIAGNOSIS — M79.10 MYALGIA: ICD-10-CM

## 2020-08-28 DIAGNOSIS — G89.4 CHRONIC PAIN SYNDROME: ICD-10-CM

## 2020-09-10 ENCOUNTER — COMMUNICATION - HEALTHEAST (OUTPATIENT)
Dept: PALLIATIVE MEDICINE | Facility: OTHER | Age: 35
End: 2020-09-10

## 2020-09-10 DIAGNOSIS — M79.10 MYALGIA: ICD-10-CM

## 2020-09-10 DIAGNOSIS — M25.50 PAIN IN JOINT, MULTIPLE SITES: ICD-10-CM

## 2020-09-10 DIAGNOSIS — G89.4 CHRONIC PAIN SYNDROME: ICD-10-CM

## 2020-09-13 ENCOUNTER — COMMUNICATION - HEALTHEAST (OUTPATIENT)
Dept: FAMILY MEDICINE | Facility: CLINIC | Age: 35
End: 2020-09-13

## 2020-09-13 DIAGNOSIS — F51.04 CHRONIC INSOMNIA: ICD-10-CM

## 2020-11-04 ENCOUNTER — AMBULATORY - HEALTHEAST (OUTPATIENT)
Dept: SURGERY | Facility: AMBULATORY SURGERY CENTER | Age: 35
End: 2020-11-04

## 2020-11-04 ENCOUNTER — HOSPITAL ENCOUNTER (OUTPATIENT)
Dept: PALLIATIVE MEDICINE | Facility: OTHER | Age: 35
Discharge: HOME | End: 2020-11-04
Attending: NURSE PRACTITIONER

## 2020-11-04 ENCOUNTER — COMMUNICATION - HEALTHEAST (OUTPATIENT)
Dept: FAMILY MEDICINE | Facility: CLINIC | Age: 35
End: 2020-11-04

## 2020-11-04 ENCOUNTER — COMMUNICATION - HEALTHEAST (OUTPATIENT)
Dept: OTOLARYNGOLOGY | Facility: CLINIC | Age: 35
End: 2020-11-04

## 2020-11-04 DIAGNOSIS — M25.50 PAIN IN JOINT, MULTIPLE SITES: ICD-10-CM

## 2020-11-04 DIAGNOSIS — M54.2 CERVICALGIA: ICD-10-CM

## 2020-11-04 DIAGNOSIS — Z11.59 ENCOUNTER FOR SCREENING FOR OTHER VIRAL DISEASES: ICD-10-CM

## 2020-11-04 DIAGNOSIS — M79.10 MYALGIA: ICD-10-CM

## 2020-11-04 DIAGNOSIS — G89.4 CHRONIC PAIN SYNDROME: ICD-10-CM

## 2020-11-16 ENCOUNTER — AMBULATORY - HEALTHEAST (OUTPATIENT)
Dept: LAB | Facility: CLINIC | Age: 35
End: 2020-11-16

## 2020-11-16 ENCOUNTER — HOSPITAL ENCOUNTER (OUTPATIENT)
Dept: PALLIATIVE MEDICINE | Facility: OTHER | Age: 35
Discharge: HOME | End: 2020-11-16
Attending: COUNSELOR

## 2020-11-16 DIAGNOSIS — G89.4 CHRONIC PAIN SYNDROME: ICD-10-CM

## 2020-11-16 DIAGNOSIS — F33.2 SEVERE EPISODE OF RECURRENT MAJOR DEPRESSIVE DISORDER, WITHOUT PSYCHOTIC FEATURES (H): ICD-10-CM

## 2020-11-16 DIAGNOSIS — F41.1 GAD (GENERALIZED ANXIETY DISORDER): ICD-10-CM

## 2020-11-16 DIAGNOSIS — Z11.59 ENCOUNTER FOR SCREENING FOR OTHER VIRAL DISEASES: ICD-10-CM

## 2020-11-18 ENCOUNTER — COMMUNICATION - HEALTHEAST (OUTPATIENT)
Dept: SCHEDULING | Facility: CLINIC | Age: 35
End: 2020-11-18

## 2020-11-18 ENCOUNTER — OFFICE VISIT - HEALTHEAST (OUTPATIENT)
Dept: FAMILY MEDICINE | Facility: CLINIC | Age: 35
End: 2020-11-18

## 2020-11-18 DIAGNOSIS — Z01.818 PREOPERATIVE EXAMINATION: ICD-10-CM

## 2020-11-18 DIAGNOSIS — M62.81 MUSCLE WEAKNESS (GENERALIZED): ICD-10-CM

## 2020-11-18 DIAGNOSIS — J33.9 NASAL POLYPOSIS: ICD-10-CM

## 2020-11-18 DIAGNOSIS — J34.1 MUCOCELE OF FRONTAL SINUS: ICD-10-CM

## 2020-11-18 DIAGNOSIS — M25.50 PAIN IN JOINT, MULTIPLE SITES: ICD-10-CM

## 2020-11-18 DIAGNOSIS — G44.209 TENSION HEADACHE: ICD-10-CM

## 2020-11-18 DIAGNOSIS — J32.2 CHRONIC ETHMOIDAL SINUSITIS: ICD-10-CM

## 2020-11-18 LAB
ANION GAP SERPL CALCULATED.3IONS-SCNC: 14 MMOL/L (ref 5–18)
BUN SERPL-MCNC: 19 MG/DL (ref 8–22)
CALCIUM SERPL-MCNC: 10.8 MG/DL (ref 8.5–10.5)
CHLORIDE BLD-SCNC: 104 MMOL/L (ref 98–107)
CO2 SERPL-SCNC: 25 MMOL/L (ref 22–31)
CREAT SERPL-MCNC: 1.12 MG/DL (ref 0.7–1.3)
ERYTHROCYTE [DISTWIDTH] IN BLOOD BY AUTOMATED COUNT: 12.4 % (ref 11–14.5)
GFR SERPL CREATININE-BSD FRML MDRD: >60 ML/MIN/1.73M2
GLUCOSE BLD-MCNC: 120 MG/DL (ref 70–125)
HCT VFR BLD AUTO: 50.8 % (ref 40–54)
HGB BLD-MCNC: 16.6 G/DL (ref 14–18)
MCH RBC QN AUTO: 29.1 PG (ref 27–34)
MCHC RBC AUTO-ENTMCNC: 32.7 G/DL (ref 32–36)
MCV RBC AUTO: 89 FL (ref 80–100)
PLATELET # BLD AUTO: 325 THOU/UL (ref 140–440)
PMV BLD AUTO: 6.8 FL (ref 7–10)
POTASSIUM BLD-SCNC: 4.2 MMOL/L (ref 3.5–5)
RBC # BLD AUTO: 5.71 MILL/UL (ref 4.4–6.2)
SODIUM SERPL-SCNC: 143 MMOL/L (ref 136–145)
WBC: 7.7 THOU/UL (ref 4–11)

## 2020-11-18 ASSESSMENT — MIFFLIN-ST. JEOR: SCORE: 1574.93

## 2020-11-19 ENCOUNTER — ANESTHESIA - HEALTHEAST (OUTPATIENT)
Dept: SURGERY | Facility: AMBULATORY SURGERY CENTER | Age: 35
End: 2020-11-19

## 2020-11-19 ASSESSMENT — MIFFLIN-ST. JEOR: SCORE: 1569.49

## 2020-11-20 ENCOUNTER — SURGERY - HEALTHEAST (OUTPATIENT)
Dept: SURGERY | Facility: AMBULATORY SURGERY CENTER | Age: 35
End: 2020-11-20

## 2020-11-20 ASSESSMENT — MIFFLIN-ST. JEOR: SCORE: 1569.49

## 2020-11-30 ENCOUNTER — HOSPITAL ENCOUNTER (OUTPATIENT)
Dept: PALLIATIVE MEDICINE | Facility: OTHER | Age: 35
Discharge: HOME | End: 2020-11-30
Attending: COUNSELOR

## 2020-11-30 ENCOUNTER — AMBULATORY - HEALTHEAST (OUTPATIENT)
Dept: PALLIATIVE MEDICINE | Facility: OTHER | Age: 35
End: 2020-11-30

## 2020-11-30 DIAGNOSIS — F33.2 SEVERE EPISODE OF RECURRENT MAJOR DEPRESSIVE DISORDER, WITHOUT PSYCHOTIC FEATURES (H): ICD-10-CM

## 2020-11-30 DIAGNOSIS — F41.1 GAD (GENERALIZED ANXIETY DISORDER): ICD-10-CM

## 2020-11-30 DIAGNOSIS — G89.4 CHRONIC PAIN SYNDROME: ICD-10-CM

## 2020-12-07 ENCOUNTER — OFFICE VISIT - HEALTHEAST (OUTPATIENT)
Dept: PULMONOLOGY | Facility: OTHER | Age: 35
End: 2020-12-07

## 2020-12-07 DIAGNOSIS — Z23 ENCOUNTER FOR IMMUNIZATION: ICD-10-CM

## 2020-12-07 ASSESSMENT — MIFFLIN-ST. JEOR: SCORE: 1555.88

## 2020-12-14 ENCOUNTER — OFFICE VISIT - HEALTHEAST (OUTPATIENT)
Dept: OTOLARYNGOLOGY | Facility: CLINIC | Age: 35
End: 2020-12-14

## 2020-12-14 DIAGNOSIS — J34.1 MUCOCELE OF FRONTAL SINUS: ICD-10-CM

## 2020-12-17 ENCOUNTER — HOSPITAL ENCOUNTER (OUTPATIENT)
Dept: PALLIATIVE MEDICINE | Facility: OTHER | Age: 35
Discharge: HOME | End: 2020-12-17
Attending: COUNSELOR

## 2020-12-17 DIAGNOSIS — F33.2 SEVERE EPISODE OF RECURRENT MAJOR DEPRESSIVE DISORDER, WITHOUT PSYCHOTIC FEATURES (H): ICD-10-CM

## 2020-12-17 DIAGNOSIS — G89.4 CHRONIC PAIN SYNDROME: ICD-10-CM

## 2020-12-17 DIAGNOSIS — F41.1 GAD (GENERALIZED ANXIETY DISORDER): ICD-10-CM

## 2020-12-21 ENCOUNTER — COMMUNICATION - HEALTHEAST (OUTPATIENT)
Dept: PALLIATIVE MEDICINE | Facility: OTHER | Age: 35
End: 2020-12-21

## 2020-12-21 ENCOUNTER — COMMUNICATION - HEALTHEAST (OUTPATIENT)
Dept: PULMONOLOGY | Facility: OTHER | Age: 35
End: 2020-12-21

## 2020-12-21 ENCOUNTER — COMMUNICATION - HEALTHEAST (OUTPATIENT)
Dept: FAMILY MEDICINE | Facility: CLINIC | Age: 35
End: 2020-12-21

## 2020-12-21 ENCOUNTER — AMBULATORY - HEALTHEAST (OUTPATIENT)
Dept: PULMONOLOGY | Facility: OTHER | Age: 35
End: 2020-12-21

## 2020-12-21 DIAGNOSIS — M54.2 CERVICALGIA: ICD-10-CM

## 2020-12-21 DIAGNOSIS — M25.50 PAIN IN JOINT, MULTIPLE SITES: ICD-10-CM

## 2020-12-21 DIAGNOSIS — I26.99 PULMONARY EMBOLISM WITHOUT ACUTE COR PULMONALE, UNSPECIFIED CHRONICITY, UNSPECIFIED PULMONARY EMBOLISM TYPE (H): ICD-10-CM

## 2020-12-21 DIAGNOSIS — J33.9 NASAL POLYPOSIS: ICD-10-CM

## 2020-12-21 DIAGNOSIS — G47.00 INSOMNIA, UNSPECIFIED TYPE: ICD-10-CM

## 2020-12-21 DIAGNOSIS — G89.4 CHRONIC PAIN SYNDROME: ICD-10-CM

## 2020-12-21 DIAGNOSIS — M79.10 MYALGIA: ICD-10-CM

## 2020-12-22 ENCOUNTER — HOSPITAL ENCOUNTER (OUTPATIENT)
Dept: PALLIATIVE MEDICINE | Facility: OTHER | Age: 35
Discharge: HOME | End: 2020-12-22
Attending: COUNSELOR

## 2020-12-22 DIAGNOSIS — G89.4 CHRONIC PAIN SYNDROME: ICD-10-CM

## 2020-12-22 DIAGNOSIS — F33.2 SEVERE EPISODE OF RECURRENT MAJOR DEPRESSIVE DISORDER, WITHOUT PSYCHOTIC FEATURES (H): ICD-10-CM

## 2020-12-22 DIAGNOSIS — F41.1 GAD (GENERALIZED ANXIETY DISORDER): ICD-10-CM

## 2020-12-28 ENCOUNTER — HOSPITAL ENCOUNTER (OUTPATIENT)
Dept: PALLIATIVE MEDICINE | Facility: OTHER | Age: 35
Discharge: HOME | End: 2020-12-28
Attending: COUNSELOR

## 2020-12-28 DIAGNOSIS — F41.1 GAD (GENERALIZED ANXIETY DISORDER): ICD-10-CM

## 2020-12-28 DIAGNOSIS — F33.2 SEVERE EPISODE OF RECURRENT MAJOR DEPRESSIVE DISORDER, WITHOUT PSYCHOTIC FEATURES (H): ICD-10-CM

## 2020-12-28 DIAGNOSIS — G89.4 CHRONIC PAIN SYNDROME: ICD-10-CM

## 2021-01-07 ENCOUNTER — HOSPITAL ENCOUNTER (OUTPATIENT)
Dept: PALLIATIVE MEDICINE | Facility: OTHER | Age: 36
Discharge: HOME | End: 2021-01-07
Attending: COUNSELOR

## 2021-01-07 DIAGNOSIS — F33.2 SEVERE EPISODE OF RECURRENT MAJOR DEPRESSIVE DISORDER, WITHOUT PSYCHOTIC FEATURES (H): ICD-10-CM

## 2021-01-07 DIAGNOSIS — G89.4 CHRONIC PAIN SYNDROME: ICD-10-CM

## 2021-01-07 DIAGNOSIS — F41.1 GAD (GENERALIZED ANXIETY DISORDER): ICD-10-CM

## 2021-01-08 ENCOUNTER — HOSPITAL ENCOUNTER (OUTPATIENT)
Dept: PALLIATIVE MEDICINE | Facility: OTHER | Age: 36
Discharge: HOME | End: 2021-01-08
Attending: NURSE PRACTITIONER

## 2021-01-08 DIAGNOSIS — M25.50 PAIN IN JOINT, MULTIPLE SITES: ICD-10-CM

## 2021-01-08 DIAGNOSIS — M79.10 MYALGIA: ICD-10-CM

## 2021-01-08 DIAGNOSIS — M54.2 CERVICALGIA: ICD-10-CM

## 2021-01-08 DIAGNOSIS — G89.4 CHRONIC PAIN SYNDROME: ICD-10-CM

## 2021-01-14 ENCOUNTER — HOSPITAL ENCOUNTER (OUTPATIENT)
Dept: PALLIATIVE MEDICINE | Facility: OTHER | Age: 36
Discharge: HOME | End: 2021-01-14
Attending: COUNSELOR

## 2021-01-14 DIAGNOSIS — F33.2 SEVERE EPISODE OF RECURRENT MAJOR DEPRESSIVE DISORDER, WITHOUT PSYCHOTIC FEATURES (H): ICD-10-CM

## 2021-01-14 DIAGNOSIS — F41.1 GAD (GENERALIZED ANXIETY DISORDER): ICD-10-CM

## 2021-01-14 DIAGNOSIS — G89.4 CHRONIC PAIN SYNDROME: ICD-10-CM

## 2021-01-21 ENCOUNTER — HOSPITAL ENCOUNTER (OUTPATIENT)
Dept: PALLIATIVE MEDICINE | Facility: OTHER | Age: 36
Discharge: HOME | End: 2021-01-21
Attending: COUNSELOR

## 2021-01-21 DIAGNOSIS — F41.1 GAD (GENERALIZED ANXIETY DISORDER): ICD-10-CM

## 2021-01-21 DIAGNOSIS — F33.2 SEVERE EPISODE OF RECURRENT MAJOR DEPRESSIVE DISORDER, WITHOUT PSYCHOTIC FEATURES (H): ICD-10-CM

## 2021-01-21 DIAGNOSIS — G89.4 CHRONIC PAIN SYNDROME: ICD-10-CM

## 2021-01-26 ENCOUNTER — HOSPITAL ENCOUNTER (OUTPATIENT)
Dept: PALLIATIVE MEDICINE | Facility: OTHER | Age: 36
Discharge: HOME | End: 2021-01-26
Attending: COUNSELOR

## 2021-01-26 DIAGNOSIS — F33.2 SEVERE EPISODE OF RECURRENT MAJOR DEPRESSIVE DISORDER, WITHOUT PSYCHOTIC FEATURES (H): ICD-10-CM

## 2021-01-26 DIAGNOSIS — F41.1 GAD (GENERALIZED ANXIETY DISORDER): ICD-10-CM

## 2021-01-26 DIAGNOSIS — G89.4 CHRONIC PAIN SYNDROME: ICD-10-CM

## 2021-02-04 ENCOUNTER — HOSPITAL ENCOUNTER (OUTPATIENT)
Dept: PALLIATIVE MEDICINE | Facility: OTHER | Age: 36
Discharge: HOME | End: 2021-02-04
Attending: COUNSELOR

## 2021-02-04 DIAGNOSIS — F41.1 GAD (GENERALIZED ANXIETY DISORDER): ICD-10-CM

## 2021-02-04 DIAGNOSIS — F33.2 SEVERE EPISODE OF RECURRENT MAJOR DEPRESSIVE DISORDER, WITHOUT PSYCHOTIC FEATURES (H): ICD-10-CM

## 2021-02-04 DIAGNOSIS — G89.4 CHRONIC PAIN SYNDROME: ICD-10-CM

## 2021-02-18 ENCOUNTER — HOSPITAL ENCOUNTER (OUTPATIENT)
Dept: PALLIATIVE MEDICINE | Facility: OTHER | Age: 36
Discharge: HOME | End: 2021-02-18
Attending: COUNSELOR

## 2021-02-18 DIAGNOSIS — F33.2 SEVERE EPISODE OF RECURRENT MAJOR DEPRESSIVE DISORDER, WITHOUT PSYCHOTIC FEATURES (H): ICD-10-CM

## 2021-02-18 DIAGNOSIS — F41.1 GAD (GENERALIZED ANXIETY DISORDER): ICD-10-CM

## 2021-02-18 DIAGNOSIS — G89.4 CHRONIC PAIN SYNDROME: ICD-10-CM

## 2021-02-22 ENCOUNTER — COMMUNICATION - HEALTHEAST (OUTPATIENT)
Dept: PALLIATIVE MEDICINE | Facility: OTHER | Age: 36
End: 2021-02-22

## 2021-02-22 DIAGNOSIS — G89.4 CHRONIC PAIN SYNDROME: ICD-10-CM

## 2021-02-22 DIAGNOSIS — M25.50 PAIN IN JOINT, MULTIPLE SITES: ICD-10-CM

## 2021-02-22 DIAGNOSIS — M79.10 MYALGIA: ICD-10-CM

## 2021-02-25 ENCOUNTER — HOSPITAL ENCOUNTER (OUTPATIENT)
Dept: PALLIATIVE MEDICINE | Facility: OTHER | Age: 36
Discharge: HOME | End: 2021-02-25
Attending: COUNSELOR

## 2021-02-25 DIAGNOSIS — F33.2 SEVERE EPISODE OF RECURRENT MAJOR DEPRESSIVE DISORDER, WITHOUT PSYCHOTIC FEATURES (H): ICD-10-CM

## 2021-02-25 DIAGNOSIS — F41.1 GAD (GENERALIZED ANXIETY DISORDER): ICD-10-CM

## 2021-02-25 DIAGNOSIS — G89.4 CHRONIC PAIN SYNDROME: ICD-10-CM

## 2021-03-08 ENCOUNTER — HOSPITAL ENCOUNTER (OUTPATIENT)
Dept: PALLIATIVE MEDICINE | Facility: OTHER | Age: 36
Discharge: HOME | End: 2021-03-08
Attending: NURSE PRACTITIONER

## 2021-03-08 ENCOUNTER — COMMUNICATION - HEALTHEAST (OUTPATIENT)
Dept: FAMILY MEDICINE | Facility: CLINIC | Age: 36
End: 2021-03-08

## 2021-03-08 DIAGNOSIS — M79.10 MYALGIA: ICD-10-CM

## 2021-03-08 DIAGNOSIS — G89.4 CHRONIC PAIN SYNDROME: ICD-10-CM

## 2021-03-08 DIAGNOSIS — M54.2 CERVICALGIA: ICD-10-CM

## 2021-03-08 DIAGNOSIS — M25.50 PAIN IN JOINT, MULTIPLE SITES: ICD-10-CM

## 2021-03-08 DIAGNOSIS — R51.9 NONINTRACTABLE EPISODIC HEADACHE, UNSPECIFIED HEADACHE TYPE: ICD-10-CM

## 2021-03-09 ENCOUNTER — HOSPITAL ENCOUNTER (OUTPATIENT)
Dept: PALLIATIVE MEDICINE | Facility: OTHER | Age: 36
Discharge: HOME | End: 2021-03-09
Attending: COUNSELOR

## 2021-03-09 DIAGNOSIS — F41.1 GAD (GENERALIZED ANXIETY DISORDER): ICD-10-CM

## 2021-03-09 DIAGNOSIS — F33.2 SEVERE EPISODE OF RECURRENT MAJOR DEPRESSIVE DISORDER, WITHOUT PSYCHOTIC FEATURES (H): ICD-10-CM

## 2021-03-09 DIAGNOSIS — G89.4 CHRONIC PAIN SYNDROME: ICD-10-CM

## 2021-03-16 ENCOUNTER — HOSPITAL ENCOUNTER (OUTPATIENT)
Dept: ULTRASOUND IMAGING | Facility: HOSPITAL | Age: 36
Discharge: HOME | End: 2021-03-16

## 2021-03-16 ENCOUNTER — OFFICE VISIT - HEALTHEAST (OUTPATIENT)
Dept: FAMILY MEDICINE | Facility: CLINIC | Age: 36
End: 2021-03-16

## 2021-03-16 DIAGNOSIS — R10.11 RUQ ABDOMINAL PAIN: ICD-10-CM

## 2021-03-16 DIAGNOSIS — G89.4 CHRONIC PAIN SYNDROME: ICD-10-CM

## 2021-03-16 DIAGNOSIS — R51.9 NONINTRACTABLE EPISODIC HEADACHE, UNSPECIFIED HEADACHE TYPE: ICD-10-CM

## 2021-03-16 LAB
ALBUMIN SERPL-MCNC: 4.4 G/DL (ref 3.5–5)
ALP SERPL-CCNC: 72 U/L (ref 45–120)
ALT SERPL W P-5'-P-CCNC: 18 U/L (ref 0–45)
ANION GAP SERPL CALCULATED.3IONS-SCNC: 12 MMOL/L (ref 5–18)
AST SERPL W P-5'-P-CCNC: 18 U/L (ref 0–40)
BILIRUB SERPL-MCNC: 0.6 MG/DL (ref 0–1)
BUN SERPL-MCNC: 18 MG/DL (ref 8–22)
CALCIUM SERPL-MCNC: 9.8 MG/DL (ref 8.5–10.5)
CHLORIDE BLD-SCNC: 105 MMOL/L (ref 98–107)
CO2 SERPL-SCNC: 26 MMOL/L (ref 22–31)
CREAT SERPL-MCNC: 0.91 MG/DL (ref 0.7–1.3)
ERYTHROCYTE [DISTWIDTH] IN BLOOD BY AUTOMATED COUNT: 12.6 % (ref 11–14.5)
GFR SERPL CREATININE-BSD FRML MDRD: >60 ML/MIN/1.73M2
GLUCOSE BLD-MCNC: 102 MG/DL (ref 70–125)
HCT VFR BLD AUTO: 47.5 % (ref 40–54)
HGB BLD-MCNC: 15.7 G/DL (ref 14–18)
LIPASE SERPL-CCNC: 55 U/L (ref 0–52)
MCH RBC QN AUTO: 28.4 PG (ref 27–34)
MCHC RBC AUTO-ENTMCNC: 33.1 G/DL (ref 32–36)
MCV RBC AUTO: 86 FL (ref 80–100)
PLATELET # BLD AUTO: 280 THOU/UL (ref 140–440)
PMV BLD AUTO: 8.6 FL (ref 7–10)
POTASSIUM BLD-SCNC: 3.9 MMOL/L (ref 3.5–5)
PROT SERPL-MCNC: 8 G/DL (ref 6–8)
RBC # BLD AUTO: 5.53 MILL/UL (ref 4.4–6.2)
SODIUM SERPL-SCNC: 143 MMOL/L (ref 136–145)
WBC: 8 THOU/UL (ref 4–11)

## 2021-03-16 ASSESSMENT — PATIENT HEALTH QUESTIONNAIRE - PHQ9: SUM OF ALL RESPONSES TO PHQ QUESTIONS 1-9: 10

## 2021-03-17 ENCOUNTER — COMMUNICATION - HEALTHEAST (OUTPATIENT)
Dept: FAMILY MEDICINE | Facility: CLINIC | Age: 36
End: 2021-03-17

## 2021-03-18 ENCOUNTER — HOSPITAL ENCOUNTER (OUTPATIENT)
Dept: PALLIATIVE MEDICINE | Facility: OTHER | Age: 36
Discharge: HOME | End: 2021-03-18
Attending: COUNSELOR

## 2021-03-18 DIAGNOSIS — F41.1 GAD (GENERALIZED ANXIETY DISORDER): ICD-10-CM

## 2021-03-18 DIAGNOSIS — G89.4 CHRONIC PAIN SYNDROME: ICD-10-CM

## 2021-03-18 DIAGNOSIS — F33.2 SEVERE EPISODE OF RECURRENT MAJOR DEPRESSIVE DISORDER, WITHOUT PSYCHOTIC FEATURES (H): ICD-10-CM

## 2021-03-19 ENCOUNTER — OFFICE VISIT - HEALTHEAST (OUTPATIENT)
Dept: FAMILY MEDICINE | Facility: CLINIC | Age: 36
End: 2021-03-19

## 2021-03-19 DIAGNOSIS — G89.29 OTHER CHRONIC PAIN: ICD-10-CM

## 2021-03-19 DIAGNOSIS — F33.1 MODERATE EPISODE OF RECURRENT MAJOR DEPRESSIVE DISORDER (H): ICD-10-CM

## 2021-03-19 ASSESSMENT — ANXIETY QUESTIONNAIRES
1. FEELING NERVOUS, ANXIOUS, OR ON EDGE: SEVERAL DAYS
7. FEELING AFRAID AS IF SOMETHING AWFUL MIGHT HAPPEN: SEVERAL DAYS
4. TROUBLE RELAXING: MORE THAN HALF THE DAYS
IF YOU CHECKED OFF ANY PROBLEMS ON THIS QUESTIONNAIRE, HOW DIFFICULT HAVE THESE PROBLEMS MADE IT FOR YOU TO DO YOUR WORK, TAKE CARE OF THINGS AT HOME, OR GET ALONG WITH OTHER PEOPLE: SOMEWHAT DIFFICULT
5. BEING SO RESTLESS THAT IT IS HARD TO SIT STILL: MORE THAN HALF THE DAYS
2. NOT BEING ABLE TO STOP OR CONTROL WORRYING: SEVERAL DAYS
3. WORRYING TOO MUCH ABOUT DIFFERENT THINGS: SEVERAL DAYS
6. BECOMING EASILY ANNOYED OR IRRITABLE: SEVERAL DAYS
GAD7 TOTAL SCORE: 9

## 2021-03-19 ASSESSMENT — PATIENT HEALTH QUESTIONNAIRE - PHQ9: SUM OF ALL RESPONSES TO PHQ QUESTIONS 1-9: 12

## 2021-03-25 ENCOUNTER — COMMUNICATION - HEALTHEAST (OUTPATIENT)
Dept: FAMILY MEDICINE | Facility: CLINIC | Age: 36
End: 2021-03-25

## 2021-03-25 ENCOUNTER — HOSPITAL ENCOUNTER (OUTPATIENT)
Dept: PALLIATIVE MEDICINE | Facility: OTHER | Age: 36
Discharge: HOME | End: 2021-03-25
Attending: COUNSELOR

## 2021-03-25 ENCOUNTER — AMBULATORY - HEALTHEAST (OUTPATIENT)
Dept: PALLIATIVE MEDICINE | Facility: OTHER | Age: 36
End: 2021-03-25

## 2021-03-25 DIAGNOSIS — F33.2 SEVERE EPISODE OF RECURRENT MAJOR DEPRESSIVE DISORDER, WITHOUT PSYCHOTIC FEATURES (H): ICD-10-CM

## 2021-03-25 DIAGNOSIS — G89.4 CHRONIC PAIN SYNDROME: ICD-10-CM

## 2021-03-25 DIAGNOSIS — K21.9 GERD (GASTROESOPHAGEAL REFLUX DISEASE): ICD-10-CM

## 2021-03-25 DIAGNOSIS — F41.1 GAD (GENERALIZED ANXIETY DISORDER): ICD-10-CM

## 2021-03-25 DIAGNOSIS — G47.00 INSOMNIA, UNSPECIFIED TYPE: ICD-10-CM

## 2021-04-06 ENCOUNTER — HOSPITAL ENCOUNTER (OUTPATIENT)
Dept: PALLIATIVE MEDICINE | Facility: OTHER | Age: 36
Discharge: HOME | End: 2021-04-06
Attending: COUNSELOR

## 2021-04-06 DIAGNOSIS — G89.4 CHRONIC PAIN SYNDROME: ICD-10-CM

## 2021-04-06 DIAGNOSIS — F41.1 GAD (GENERALIZED ANXIETY DISORDER): ICD-10-CM

## 2021-04-06 DIAGNOSIS — F33.2 SEVERE EPISODE OF RECURRENT MAJOR DEPRESSIVE DISORDER, WITHOUT PSYCHOTIC FEATURES (H): ICD-10-CM

## 2021-04-13 ENCOUNTER — HOSPITAL ENCOUNTER (OUTPATIENT)
Dept: PALLIATIVE MEDICINE | Facility: OTHER | Age: 36
Discharge: HOME | End: 2021-04-13
Attending: COUNSELOR

## 2021-04-13 DIAGNOSIS — F41.1 GAD (GENERALIZED ANXIETY DISORDER): ICD-10-CM

## 2021-04-13 DIAGNOSIS — G89.4 CHRONIC PAIN SYNDROME: ICD-10-CM

## 2021-04-13 DIAGNOSIS — F33.2 SEVERE EPISODE OF RECURRENT MAJOR DEPRESSIVE DISORDER, WITHOUT PSYCHOTIC FEATURES (H): ICD-10-CM

## 2021-04-20 ENCOUNTER — HOSPITAL ENCOUNTER (OUTPATIENT)
Dept: PALLIATIVE MEDICINE | Facility: OTHER | Age: 36
Discharge: HOME | End: 2021-04-20
Attending: COUNSELOR

## 2021-04-20 DIAGNOSIS — G89.4 CHRONIC PAIN SYNDROME: ICD-10-CM

## 2021-04-20 DIAGNOSIS — F33.2 SEVERE EPISODE OF RECURRENT MAJOR DEPRESSIVE DISORDER, WITHOUT PSYCHOTIC FEATURES (H): ICD-10-CM

## 2021-04-20 DIAGNOSIS — F41.1 GAD (GENERALIZED ANXIETY DISORDER): ICD-10-CM

## 2021-04-23 ENCOUNTER — OFFICE VISIT - HEALTHEAST (OUTPATIENT)
Dept: FAMILY MEDICINE | Facility: CLINIC | Age: 36
End: 2021-04-23

## 2021-04-23 DIAGNOSIS — F33.1 MODERATE EPISODE OF RECURRENT MAJOR DEPRESSIVE DISORDER (H): ICD-10-CM

## 2021-04-23 DIAGNOSIS — G89.29 OTHER CHRONIC PAIN: ICD-10-CM

## 2021-04-23 ASSESSMENT — PATIENT HEALTH QUESTIONNAIRE - PHQ9: SUM OF ALL RESPONSES TO PHQ QUESTIONS 1-9: 9

## 2021-04-30 ENCOUNTER — HOSPITAL ENCOUNTER (OUTPATIENT)
Dept: PALLIATIVE MEDICINE | Facility: OTHER | Age: 36
Discharge: HOME | End: 2021-04-30
Attending: COUNSELOR
Payer: COMMERCIAL

## 2021-05-04 ENCOUNTER — HOSPITAL ENCOUNTER (OUTPATIENT)
Dept: PALLIATIVE MEDICINE | Facility: OTHER | Age: 36
Discharge: HOME | End: 2021-05-04
Attending: COUNSELOR
Payer: COMMERCIAL

## 2021-05-11 ENCOUNTER — HOSPITAL ENCOUNTER (OUTPATIENT)
Dept: PALLIATIVE MEDICINE | Facility: OTHER | Age: 36
Discharge: HOME | End: 2021-05-11
Attending: COUNSELOR
Payer: COMMERCIAL

## 2021-05-11 DIAGNOSIS — F33.2 SEVERE EPISODE OF RECURRENT MAJOR DEPRESSIVE DISORDER, WITHOUT PSYCHOTIC FEATURES (H): ICD-10-CM

## 2021-05-11 DIAGNOSIS — F41.1 GAD (GENERALIZED ANXIETY DISORDER): ICD-10-CM

## 2021-05-11 DIAGNOSIS — G89.4 CHRONIC PAIN SYNDROME: ICD-10-CM

## 2021-05-18 ENCOUNTER — HOSPITAL ENCOUNTER (OUTPATIENT)
Dept: PALLIATIVE MEDICINE | Facility: OTHER | Age: 36
Discharge: HOME | End: 2021-05-18
Attending: COUNSELOR
Payer: COMMERCIAL

## 2021-05-22 ENCOUNTER — COMMUNICATION - HEALTHEAST (OUTPATIENT)
Dept: FAMILY MEDICINE | Facility: CLINIC | Age: 36
End: 2021-05-22

## 2021-05-22 DIAGNOSIS — K21.9 GERD (GASTROESOPHAGEAL REFLUX DISEASE): ICD-10-CM

## 2021-05-24 ENCOUNTER — RECORDS - HEALTHEAST (OUTPATIENT)
Dept: ADMINISTRATIVE | Facility: CLINIC | Age: 36
End: 2021-05-24

## 2021-05-25 ENCOUNTER — HOSPITAL ENCOUNTER (OUTPATIENT)
Dept: PALLIATIVE MEDICINE | Facility: OTHER | Age: 36
Discharge: HOME | End: 2021-05-25
Attending: COUNSELOR
Payer: COMMERCIAL

## 2021-05-25 DIAGNOSIS — G89.4 CHRONIC PAIN SYNDROME: ICD-10-CM

## 2021-05-25 DIAGNOSIS — F41.1 GAD (GENERALIZED ANXIETY DISORDER): ICD-10-CM

## 2021-05-25 DIAGNOSIS — F33.2 SEVERE EPISODE OF RECURRENT MAJOR DEPRESSIVE DISORDER, WITHOUT PSYCHOTIC FEATURES (H): ICD-10-CM

## 2021-05-26 ENCOUNTER — RECORDS - HEALTHEAST (OUTPATIENT)
Dept: ADMINISTRATIVE | Facility: CLINIC | Age: 36
End: 2021-05-26

## 2021-05-27 ASSESSMENT — PATIENT HEALTH QUESTIONNAIRE - PHQ9
SUM OF ALL RESPONSES TO PHQ QUESTIONS 1-9: 9
SUM OF ALL RESPONSES TO PHQ QUESTIONS 1-9: 10
SUM OF ALL RESPONSES TO PHQ QUESTIONS 1-9: 12

## 2021-05-27 NOTE — TELEPHONE ENCOUNTER
Patient has a new health plan through the state as of 4/1/2019. Re-submitting PA through [facility]  Please see new encounter created for tracking and reporting purposes.

## 2021-05-27 NOTE — TELEPHONE ENCOUNTER
PA was done for a certain pharmacy( [facility])  This pharmacy does not have the medication in stocked and we sent to [facility]. They are stating a PA is needed. Please advise if we are able to change to new pharmacy

## 2021-05-27 NOTE — TELEPHONE ENCOUNTER
Patients insurance company does not allow for patient to fill more than a 7 day supply with first fill. Patient currently getting medication at [facility] and they will not break the box for the patient to get one patch. I called [facility] and they are willing to give a 7 day fill to the patient. Please sign prescription to go over there. We may have to do another PA for patient since it is a new pharmacy

## 2021-05-27 NOTE — PROGRESS NOTES
HISTORY OF PRESENT ILLNESS  Patient returns to discuss removal of right nasal polyp. He reports excessive drainage lately that is new. He is obstructed on the right. In the past steroids have helped but he recognizes that the improvement is temporary.    REVIEW OF SYSTEMS  Review of Systems: a 10-system review was performed. Pertinent positives are noted in the HPI and on a separate scanned document in the chart.    PMH, PSH, FH and SH has documented in the EHR.      EXAM    CONSTITUTIONAL  General Appearance:  Normal, well developed, well nourished, no obvious distress  Ability to Communicate:  communicates appropriately.    HEAD AND FACE  Appearance and Symmetry:  Normal, no scalp or facial scarring or suspicious lesions.    NOSE (speculum or scope)  Architecture:  Normal, Grossly normal external nasal architecture with no masses or lesions.  Mucosa:  Right nasal polyp with yellow purulent secretions.  Septum:  Normal, Septum non-obstructing.  Turbinates:  Normal, No turbinate abnormalities    NEUROLOGICAL  Speech pattern:  Normal, Proasaic    RESPIRATORY  Symmetry and Respiratory effort:  Normal, Symmetric chest movement and expansion with no increased intercostal retractions or use of accessory muscles.     CT SINUS  Images reviewed and consistent with clinical findings.     IMPRESSION  Right nasal polyposis with evidence of acute suppurative pansinusitis.     RECOMMENDATION  I discussed medical management of the acute infection. I also discussed surgical management of the polyp. I discussed endoscopic sinus surgery, procedure, goals and risks. I answered the patient's questions. He would like to proceed. We will set this up in the near future.    Leo Wright MD

## 2021-05-27 NOTE — PROGRESS NOTES
Email: [email or web address]  Marital Status: -Miguel Felix  Children: No  Born and Raised:   Occupation: On Disability  Living Situation: Lives in [place] with his wife.  Smoking, Alcohol, other:  Services receiving: SNAP, [place] (Case # [id number]), SMRT (Case #[id number])  SMRT worker: Leo Avendaño: Phone: [phone]  [facility] Financial Worker:  Jose Roberto Green: Phone: [phone]; Fax: [phone]  CCC/[facility] Follow up s: Every six weeks  CCC/HCH Enrollment: January 19th 2018  Next CCC Follow up: 5/20/19

## 2021-05-27 NOTE — TELEPHONE ENCOUNTER
Prior Authorization Request  Who s requesting: Dayna Lundberg CNP/ Tiffanie Alejandro CMA  Pharmacy Name and Location: [facility]  Medication Name: [facility]  Insurance Plan: Medicaid  Insurance Member ID Number:  [id number]  Informed patient that prior authorizations can take up to 10 business days for response:   Yes  Okay to leave a detailed message: Yes

## 2021-05-27 NOTE — TELEPHONE ENCOUNTER
Appeal has been denied? Would you like to proceed with a second level appeal? Its looks they want him to try and fail Butrans first

## 2021-05-27 NOTE — TELEPHONE ENCOUNTER
Central PA team  [phone]  Pool: [facility] PA MED ([id number])          PA has been initiated.       PA form completed and faxed insurance via Cover My Meds     Key:  [id number]      Medication:  Butrans 5MCG/HR TD PTWK    Insurance:  [place] Medicaid         Response will be received via fax and may take up to 5-10 business days depending on plan

## 2021-05-27 NOTE — TELEPHONE ENCOUNTER
Patient's insurance coverage [employer] initially for the patient patient has responded well to an increase was ordered however insurance refuses to cover would prefer the patient try Butrans.  Will order Butrans patch at the same MME equivalent to [employer] and sent to the patient's pharmacy.     Patient will be updated via telephone    Dayna Lundberg CNP

## 2021-05-27 NOTE — PROGRESS NOTES
Email: [email or web address]  Marital Status: -Miguel Felix  Children: No  Born and Raised:   Occupation: On Disability  Living Situation: Lives in [place] with his wife.  Smoking, Alcohol, other:  Services receiving: SNAP, [place] (Case # [id number]), SMRT (Case #[id number])  SMRT worker: Leo Avendaño: Phone: [phone]  [facility] Financial Worker:  Jose Roberto Green: Phone: [phone]; Fax: [phone]  CCC/HCH Follow up s: Every six weeks  CCC/HCH Enrollment: January 19th 2018  Next [facility] Follow up: 5/28/19

## 2021-05-27 NOTE — PROGRESS NOTES
Scheduled F/U Call:  Attempt 1    Attempt 1: Care Guide called patient.  If this patient is returning my call please transfer to Sylvia Conley at ext [phone].     Email: [email or web address]  Marital Status: -Miguel Felix  Children: No  Born and Raised:   Occupation: On Disability  Living Situation: Lives in [place] with his wife.  Smoking, Alcohol, other:  Services receiving: SNAP, [place] (Case # [id number]), [facility] ([facility] #[id number])  [facility] worker: Leo Avendaño: Phone: [phone]  [facility] Financial Worker:  Jose Roberto Green: Phone: [phone]; Fax: [phone]  CCC/HCH Follow up s: Monthly  CCC/HCH Enrollment: January 19th 2018  Next CCC Follow up: 4/4/19

## 2021-05-27 NOTE — TELEPHONE ENCOUNTER
ANTICOAGULATION  MANAGEMENT    Assessment     Today's INR result of 2.2 is Therapeutic (goal INR of 2.0-3.0)        Warfarin taken as previously instructed    No new diet changes affecting INR    Interaction between Prednisone and Ceftin and warfarin may be affecting INR- for sinus infection.     Prednisone started 4/8-4/14.     Ceftin started 4/8-4/17.     Continues to tolerate warfarin with no reported s/s of bleeding or thromboembolism     Previous INR was Supratherapeutic    Plan:     Spoke with Anand regarding INR result and instructed:     Warfarin Dosing Instructions:  Continue current warfarin dose 5 mg daily on Fri; and 2.5 mg daily rest of week  (0 % change)    Instructed patient to follow up no later than: 2 weeks.    Education provided: importance of taking warfarin as instructed    Anand verbalizes understanding and agrees to warfarin dosing plan.    Instructed to call the [facility] for any changes, questions or concerns. (#[phone])   ?   Gus Byrne RN    Subjective/Objective:      Anand Felix, a 34 y.o. male is on warfarin.     Anand reports:     Home warfarin dose: verbally confirmed home dose with pt and updated on anticoagulation calendar     Missed doses: No     Medication changes:  Yes: Ceftin and Prednisone     S/S of bleeding or thromboembolism:  No     New Injury or illness:  Yes: sinus infection     Changes in diet or alcohol consumption:  No     Upcoming surgery, procedure or cardioversion:  Yes: nasal surgery 5/22.     Anticoagulation Episode Summary     Current INR goal:   2.0-3.0   TTR:   65.4 % (10.3 mo)   Next INR check:   4/26/2019   INR from last check:   2.20 (4/12/2019)   Weekly max warfarin dose:      Target end date:   11/29/2018   INR check location:      Preferred lab:      Send INR reminders to:   ANTICOAGULATION POOL B (MPW,HUG,STW,RVL,OAK,RLN)    Indications    Bilateral pulmonary embolism (H) [I26.99]           Comments:            Anticoagulation Care Providers      Provider Role Specialty Phone number    Malachi Choudhary MD Referring Family Medicine [phone]

## 2021-05-27 NOTE — TELEPHONE ENCOUNTER
ANTICOAGULATION  MANAGEMENT PROGRAM    Anand Felix is overdue for INR check.  Reminder call made.    Spoke with pt and scheduled INR appointment on 4/12 .    Gus Byrne RN

## 2021-05-27 NOTE — TELEPHONE ENCOUNTER
Medication being requested: flexeril 10mg  Last visit date: 3/4/19 with Crystal  Next visit date: 4/29/19 with Crystal  Expected follow up: 8 weeks  MTM visit (Pain Center) date: no  Red Flags/Comments identified on call: no  Pertinent between visit information about requested medication (telephone, mychart, prior authorization): per pharmacy med last filled 2/25#60 30 day supply. Med due to be filled 3/27  Script being sent to provider by nurse- dates and quantity:   Requested Prescriptions     Pending Prescriptions Disp Refills     cyclobenzaprine (FLEXERIL) 10 MG tablet [Pharmacy Med Name: CYCLOBENZAPRINE 10 MG TABLET] 90 tablet 0     Sig: TAKE 1 TABLET BY MOUTH THREE TIMES A DAY AS NEEDED FOR MUSCLE SPASMS     Pharmacy cued: [facility] [place]  Standing orders for withdrawal protocol implemented: vinay

## 2021-05-27 NOTE — TELEPHONE ENCOUNTER
Left message for patient with instructions below. Advised patient to call us with any questions or concerns

## 2021-05-27 NOTE — PROGRESS NOTES
4/8/2019    Start time: 1:10 PM    Stop Time: 2:00 PM   Session # 7    Anand Felix is a 34 y.o. male is being seen today for    Chief Complaint   Patient presents with     Mental Health Note   . Major depression, moderate   Generalized Anxiety Disorder  Chronic pain syndrome           New symptoms or complaints: None    Functional Impairment:   Personal: 4  Family: 4  Work: 4  Social:4    Clinical assessment of mental status: Anand Felix presented on time.  He was open and cooperative, and dressed appropriately for this session and weather. His memory was in tact .  His  speech was appropriate.  Language was appropriate.  Concentration and focus is on task. Psychosis is not noted or reported. He reports his mood is depressed .  Affect is congruent with speech.  Fund of knowledge is adequate. Insight is adequate for therapy.     Suicidal/Homicidal Ideation present: Patient reports passive SI that he is noticing with more frequency lately. He denies plan or means. Patient reported protective factors, including family and spiritual beliefs.      Patient's impression of their current status: Patient reported he continues to be working on addressing symptoms of depression, anger and anxiety. He reports pain/numbness/weakness symptoms continue, but he has had some relief with his current plan of care. He reports he will have surgery in the next month due to remove a polyp. He is struggling with some relationship/social changes due to chronic pain/medical conditions. He reports SI with more frequency lately, triggered by stress.         Therapist impression of patients current state: Patient seems to be working on addressing depression, anger, anxiety and chronic medical symptoms. He reports pain symptoms have been more manageable with his current plan of care. He continues the medical cannabis program and is finding this to be helpful with pain symptom reduction. However, he reports increased tiredness with his current  "medication plan. He reports he will have surgery in the next month to remove a polyp. He reports concerns about the recovery time. Patient processed thoughts and feelings about his history of pain symptoms starting at age 11-12. He states in 2015 the symptoms became \"bad\" again. Continued to discuss changes in relationships/social life that have occurred since he has been struggling with chronic pain/medical issues.  He discussed social needs at this time and how he would like to proceed in the future with social interactions. Encouraged patient to continue to increase efforts to reach out to social/family support to connect socially/emotionally. Patient reports he is looking forward to his sister coming to town from [place] this month. He continued to discuss some family history this session. Patient continues to work with case management and is working closely with a . He reports [employer] attendance as he is able. He continues to work on addressing acceptance of his chronic pain and medical concerns, with some progress. He continues to work on sleep improvement, which continues to be challenging.  Patient continues to take walks and/or exercise at home as he is able. Patient reports suicidal ideation that he is noticing with more frequency, but denies plan or means at this time. He states increased stressors are a trigger. Discussed triggers for negative thinking and focusing on his capabilities. Discussed safety plan and ways to cope if symptoms should worsen or he is feeling unsafe. Will continue reviewing suicide prevention worksheet in session next time as time did not permit today. He reports he is consistent with PT exercises in home practice and noticing ongoing positive improvement. Continued to discuss benefits of Mindfulness meditation with patient. He reports regular meditation practice and grounding. Encouraged continued affirmation practice. He preferred to process target/NC another time as " he reported pain symptoms were low today. Presented meditation with focus on the sense of taste exercise today and patient appeared to benefit.         Type of psychotherapeutic technique provided: Client centered, CBT and Mindfulness    Progress toward short term goals: Patient continues to work on increasing coping skills to manage chronic pain and mental health. Patient continues medical care as recommended by his medical providers. He engages in physical activity and social activity as he is able. He is engaging in meditation/grounding in daily living. He is working with a county .         Review of long term goals: Not done at today's visit. Last review: 2/27/19.     Diagnosis: Major depression, moderate   Generalized Anxiety Disorder  Chronic pain syndrome         Plan and Follow up: Follow pain center plan of care. Patient to follow all medical recommendations. Patient to schedule every 2 weeks for psychotherapy to further address depression, anxiety and chronic pain concerns. Continue meditation and other healthy coping strategies. Practice meditations as presented in session. Further address adjustment to role/identity due to chronic pain and health concerns. Continue to work on healthy communication skills/relationship building. Continue EMDR using pain protocol, as desired. Work on identifying things he finds enjoyable in daily living. Continue work on suicide prevention worksheet in session.           Discharge Criteria/Planning: Patient will continue with follow-up until therapy can be discontinued without return of signs and symptoms.    Alize Livingston 4/8/2019

## 2021-05-27 NOTE — TELEPHONE ENCOUNTER
Phone Call: Left message on answering service regarding the findings on the CT sinus. Advised follow up to discuss surgery if he would like to have the polyp removed. Left contact information.

## 2021-05-27 NOTE — PROGRESS NOTES
3/27/2019    Start time: 1:05 PM    Stop Time: 1:55 PM   Session # 6    Anand Felix is a 34 y.o. male is being seen today for    Chief Complaint   Patient presents with     Mental Health Note   . Major depression, moderate   Generalized Anxiety Disorder  Chronic pain syndrome           New symptoms or complaints: None    Functional Impairment:   Personal: 4  Family: 4  Work: 4  Social:4    Clinical assessment of mental status: Anand Felix presented on time.  He was open and cooperative, and dressed appropriately for this session and weather. His memory was in tact .  His  speech was appropriate.  Language was appropriate.  Concentration and focus is on task. Psychosis is not noted or reported. He reports his mood is depressed .  Affect is congruent with speech.  Fund of knowledge is adequate. Insight is adequate for therapy.     Suicidal/Homicidal Ideation present: Patient reports passive SI that is fleeting. He denies plan or means. Patient reported protective factors, including family and spiritual beliefs.         Patient's impression of their current status: Patient reported he continues to be working on addressing symptoms of depression, anger and anxiety. He reports pain/numbness/weakness symptoms continue, but he has had some relief with his current plan of care. Patient feels more tired than usual. He is struggling with some relationship/social changes due to chronic pain/medical conditions. He reports SI comes and goes.         Therapist impression of patients current state: Patient seems to be working on addressing depression, anger, anxiety and chronic medical symptoms. He reports pain symptoms have been more manageable with his current plan of care. He continues the medical cannabis program and is finding this to be helpful with pain symptom reduction. However, he reports increased tiredness with his current medication plan. Continued to discuss changes in relationships/social life that have occurred since  he has been struggling with chronic pain/medical issues.  He discussed social needs at this time and how he would like to proceed in the future with social interactions. Encouraged patient to continue to increase efforts to reach out to social/family support to connect socially/emotionally. He continued to discuss some family history this session, especially focusing on his father. Patient continues to work with case management and is working closely with a . He reports R[employer] attendance as he is able. He continues to work on addressing acceptance of his chronic pain and medical concerns, with some progress. He continues to work on sleep improvement, which continues to be challenging.  Patient continues to take walks and/or exercise at home as he is able. Patient reports suicidal ideation, but denies plan or means at this time. He reported SI comes and goes. Discussed triggers for negative thinking and focusing on his capabilities. Discussed safety plan and ways to cope if symptoms should worsen or he is feeling unsafe. Will continue reviewing suicide prevention worksheet in session next time as time did not permit today. He reports he is consistent with PT exercises in home practice and noticing ongoing positive improvement. Continued to discuss potential benefits of Mindfulness meditation with patient. He reports regular meditation practice and grounding. Encouraged continued affirmation practice. He preferred to process target/NC another time as he reported pain symptoms were low today. Presented meditation with focus on the sense of sight exercise today and patient appeared to benefit.         Type of psychotherapeutic technique provided: Client centered, CBT and Mindfulness     Progress toward short term goals: Patient continues to work on increasing coping skills to manage chronic pain and mental health. Patient continues medical care as recommended by his medical providers. He engages in  physical activity and social activity as he is able. He is engaging in meditation/grounding in daily living. He is working with a county .            Review of long term goals: Not done at today's visit. Last review: 2/27/19.        Diagnosis: Major depression, moderate   Generalized Anxiety Disorder  Chronic pain syndrome         Plan and Follow up: Follow pain center plan of care. Patient to follow all medical recommendations. Patient to schedule every 2 weeks for psychotherapy to further address depression, anxiety and chronic pain concerns. Continue meditation and other healthy coping strategies. Practice meditations as presented in session. Further address adjustment to role/identity due to chronic pain and health concerns. Continue to work on healthy communication skills/relationship building. Continue EMDR using pain protocol, as desired. Will go over homework assignment in session. Work on identifying things he finds enjoyable in daily living. Continue work on suicide prevention worksheet in session.           Discharge Criteria/Planning: Patient will continue with follow-up until therapy can be discontinued without return of signs and symptoms.    Alize Livingston 3/27/2019

## 2021-05-28 ASSESSMENT — ANXIETY QUESTIONNAIRES: GAD7 TOTAL SCORE: 9

## 2021-05-28 NOTE — PROGRESS NOTES
Preoperative Exam    Scheduled Procedure: right endoscopic sinus surgery  Surgery Date:  5/22/19  Surgery Location: [facility], fax [phone]    Surgeon:  Dr. Wright    Assessment/Plan:     1. Pre-operative cardiovascular examination  Preoperative cardiovascular examination completed.  Scheduled right endoscopic sinus surgery for nasal polyposis and history of chronic ethmoid sinusitis noted.  No contraindication to schedule procedure identified.  Patient to clarify with Dr. Wright re: ability to complete procedure while on warfarin perioperatively, otherwise would require bridging with Lovenox with bilateral pulmonary emboli noted (to be managed by anticoagulation nurse).  - Electrocardiogram Perform and Read    2. Nasal polyposis  Nasal polyposis present right nasopharynx.  Scheduled right endoscopic sinus surgery identified.    3. Chronic ethmoidal sinusitis  As above.    4. Chronic pain syndrome  Patient is on cannabis registry.  Using Butrans 7.5 mcg patch weekly.  Patient followed by Dr. Bettie Lundberg who states able to continue patch as noted and may remove at time of procedure if necessary.    5. Muscle weakness  History of muscle weakness with significant evaluation previously.  Continues AFO bracing bilateral lower extremity for right foot drop deformity.    6. Foot drop, bilateral  As above, continue AFO bracing bilateral lower extremity.    7. Hyperlipidemia, unspecified hyperlipidemia type  History of mild hyperlipidemia.  Diet controlled.  Weight goal less than 150 pounds ideally.    8. Sinus tachycardia  Check CBC, basic metabolic panel and TSH today.  Electrocardiogram with ventricular rate 97 bpm otherwise normal.  - Basic Metabolic Panel  - Thyroid Stimulating Hormone (TSH)    9. Gastroesophageal reflux disease without esophagitis  History of reflux continues omeprazole 20 mg daily.  Ensure stable renal function on chronic PPI.    10. Bilateral pulmonary embolism (H)  History  of bilateral pulmonary emboli.  Using warfarin 5 mg on Friday otherwise 2.5 mg daily.  CBC for med monitoring.  - HM2(CBC w/o Differential)  - INR    11. Vitamin D deficiency  Vitamin D deficiency.  Vitamin D replacement 2000 units daily.    12. Normochromic normocytic anemia  History of prior mild normochromic normocytic anemia noted.  Check CBC to ensure stable or resolved.  - HM2(CBC w/o Differential)        Surgical Procedure Risk: Low (reported cardiac risk generally < 1%)  Have you had prior anesthesia?: Yes  Have you or any family members had a previous anesthesia reaction:  No  Do you or any family members have a history of a clotting or bleeding disorder?: Yes:    Cardiac Risk Assessment: no increased risk for major cardiac complications    Patient approved for surgery with general or local anesthesia.    Please Note:  Patient is taking medications for Chronic Pain.    Functional Status: Independent  Patient plans to recover at home with family.     Subjective:      Anand Felix is a 34 y.o. male who presents for a preoperative consultation.  History of nasal polyposis and chronic ethmoid sinusitis.  Followed by otolaryngology.  Was to consider polypectomy however awaiting response to current treatment for chronic pain syndrome with recent bilateral medial branch blocks last Wednesday which have helped a lot.  Repeat treatment in 2 weeks anticipated.  Continues chronic pain management with pain clinic specialist Dr. Bettie Lundberg.  Noted cannabis registry approval.    Using Butrans 7.5 mcg patch weekly.  Atenolol 25 mg for sinus tachycardia.  Omeprazole 20 mg daily for reflux without breakthrough symptoms.  History of bilateral pulmonary emboli.    Continues chronic warfarin anticoagulation 5 mg on Friday otherwise 2.5 mg daily.  Using warfarin anticoagulation and chronic basis.  Comprehensive review of systems as above otherwise all negative.    All other systems reviewed and are negative, other than  those listed in the HPI.     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 Bernal follows  Children - none  Employed - working on disability - monoparesis  Surgeries - nasal surgery; low back surgery (Dr. Jeannine Hernandez, 2015) with residual right leg weakness  Tobacco use - none  ETOH - none  Family hx   Mother - living - high chol. Strokes. MI.   Father - living - unknown  Siblings - living - 4 sisters, 2 brother -    Bilateral AFO braces worn due to muscle weakness and foot drop    Pertinent History  Do you have difficulty breathing or chest pain after walking up a flight of stairs: Yes:   History of obstructive sleep apnea: No  Steroid use in the last 6 months: No  Frequent Aspirin/NSAID use: No  Prior Blood Transfusion: No  Prior Blood Transfusion Reaction: No  If for some reason prior to, during or after the procedure, if it is medically indicated, would you be willing to have a blood transfusion?:  There is no transfusion refusal.    Current Outpatient Medications   Medication Sig Dispense Refill     acetaminophen (TYLENOL) 500 MG tablet Take 1 tablet (500 mg total) by mouth every 6 (six) hours as needed for pain.  0     atenolol (TENORMIN) 25 MG tablet TAKE 1 TABLET BY MOUTH EVERY DAY 90 tablet 1     baclofen (LIORESAL) 10 MG tablet Take 1 tablet (10 mg total) by mouth 3 (three) times a day. 90 tablet 0     buprenorphine (BUTRANS) 7.5 mcg/hour PTWK patch Place 1 patch on the skin every 7 days. 4 patch 0     cholecalciferol, vitamin D3, 2,000 unit cap Take 2,000 Units by mouth daily.       fluticasone (FLONASE) 50 mcg/actuation nasal spray INSTILL 2 SPRAYS INTO EACH NOSTRIL DAILY. 48 g 2     gabapentin (NEURONTIN) 100 MG capsule Take 100 mg by mouth 3 (three) times a day. Ok to reduce to two times a day if too drowsy 90 capsule 0     omeprazole (PRILOSEC) 20 MG capsule TAKE 1 CAPSULE (20 MG TOTAL) BY MOUTH DAILY. 90 capsule 3     oxyCODONE 5 mg TbOr Take 5 mg by mouth 2 (two) times a  "day.       warfarin (COUMADIN/JANTOVEN) 5 MG tablet Take 1/2 to 1 tablet (2.5 to 5 mg) by mouth daily. Adjust dose per INR results as instructed. 60 tablet 1     No current facility-administered medications for this visit.         No Known Allergies    Patient Active Problem List   Diagnosis     Muscle Weakness Generalized - \"Neuromuscular Condition\" with normal EMG.     Tremor     Arthralgias In Multiple Sites     Muscle Aches, Generalized (Myalgias)     Chronic Ethmoidal Sinusitis     Hypovitaminosis D - [place]     Monoparesis of leg (H)     Weakness - without demonstrable CNS or peripheral cause - local neurologist at [facility], and neurologist at [facility], and [facility] consultant.     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     Nasal polyposis - right naris on MRI (2016)     Pulmonary embolism (H)     Bilateral pulmonary embolism (H)     Myalgia     Sinus tachycardia       Past Medical History:   Diagnosis Date     Bulging lumbar disc      Lumbar herniated disc 10/23/2015     Lumbar radiculopathy 11/25/2015       Past Surgical History:   Procedure Laterality Date     MINIMALLY INVASIVE MICRODISCECTOMY LUMBAR SPINE Right 10/2015    L5-S1; Dr. Bettie Hernandez     SINUS SURGERY  2014    Dr. Wright       Social History     Socioeconomic History     Marital status:      Spouse name: Miguel     Number of children: 0     Years of education: GED and some collese     Highest education level: Not on file   Occupational History     Occupation: unemployed   Social Needs     Financial resource strain: Not on file     Food insecurity:     Worry: Not on file     Inability: Not on file     Transportation needs:     Medical: Not on file     Non-medical: Not on file   Tobacco Use     Smoking status: Never Smoker     Smokeless tobacco: Never Used   Substance and Sexual Activity     " Alcohol use: Yes     Comment: Monthly or less often. 1-2 drinks at a time.     Drug use: No     Sexual activity: Yes     Partners: Female   Lifestyle     Physical activity:     Days per week: Not on file     Minutes per session: Not on file     Stress: Not on file   Relationships     Social connections:     Talks on phone: Not on file     Gets together: Not on file     Attends Gnosticism service: Not on file     Active member of club or organization: Not on file     Attends meetings of clubs or organizations: Not on file     Relationship status: Not on file     Intimate partner violence:     Fear of current or ex partner: Not on file     Emotionally abused: Not on file     Physically abused: Not on file     Forced sexual activity: Not on file   Other Topics Concern     Not on file   Social History Narrative     Not on file       Patient Care Team:  Malachi Choudhary MD as PCP - General (Family Medicine)  Estefany Groves MD as Physician (Hematology and Oncology)  Fran Mcgovern MBBS as Physician (Rheumatology)  Ham Churchill MD as Physician (Pain Medicine)  Alize Livingston LPCC as LPCC (Psychology)  Sylvia Conley as Clinic Care Coordination Care Guide (Primary Care - CC)  Bre Jara as  (Primary Care - CC)          Objective:     Vitals:    05/01/19 1259   BP: 100/70   Pulse: (!) 102   SpO2: 96%   Weight: 160 lb (72.6 kg)       Physical    General Appearance:    Alert, cooperative, no distress, appears stated age.     Head:    Normocephalic, without obvious abnormality, atraumatic   Eyes:    PERRL, conjunctiva/corneas clear, EOM's intact, fundi     benign, both eyes        Ears:    Normal TM's and external ear canals, both ears   Nose:   Nares normal, septum midline, mucosa with significant congestion right greater than left nares nasopharynx with near complete obstruction right nasopharynx and right nasal polyposis present, mild purulent drainage.  Mild sinus tenderness   Throat:    Lips, mucosa, and tongue normal; teeth and gums normal   Neck:   Supple, symmetrical, trachea midline, no adenopathy;        thyroid:  No enlargement/tenderness/nodules; no carotid    bruit or JVD   Back:     Symmetric, no curvature, ROM normal, no CVA tenderness   Lungs:     Clear to auscultation bilaterally, respirations unlabored   Chest wall:    No tenderness or deformity   Heart:    Regular rate and rhythm, S1 and S2 normal, no murmur, rub   or gallop   Abdomen:     Soft, non-tender, bowel sounds active all four quadrants,     no masses, no organomegaly.     Genitalia:    deferred   Rectal:    deferred   Extremities:   Extremities normal, atraumatic, no cyanosis or edema   Pulses:   2+ and symmetric all extremities   Skin:   Skin color, texture, turgor normal, no rashes or lesions   Lymph nodes:   Cervical, supraclavicular, and axillary nodes normal   Neurologic:   CNII-XII intact. Normal strength, sensation and reflexes       throughout                This note has been dictated using voice recognition software and as a result may contain minor grammatical errors and unintended word substitutions.     There are no Patient Instructions on file for this visit.    EKG:  NSR.  Normal EKG.    Labs:  Recent Results (from the past 24 hour(s))   Electrocardiogram Perform and Read    Collection Time: 05/01/19  1:07 PM   Result Value Ref Range    SYSTOLIC BLOOD PRESSURE  mmHg    DIASTOLIC BLOOD PRESSURE  mmHg    VENTRICULAR RATE 97 BPM    ATRIAL RATE 97 BPM    P-R INTERVAL 150 ms    QRS DURATION 76 ms    Q-T INTERVAL 338 ms    QTC CALCULATION (BEZET) 429 ms    P Axis 29 degrees    R AXIS 78 degrees    T AXIS 50 degrees    MUSE DIAGNOSIS       Normal sinus rhythm  Normal ECG  No previous ECGs available     INR    Collection Time: 05/01/19  1:29 PM   Result Value Ref Range    INR 2.00 (H) 0.90 - 1.10       Immunization History   Administered Date(s) Administered     Influenza, seasonal,quad inj 36+ mos 11/30/2018      Influenza,seasonal quad, PF, 36+MOS 11/08/2017     Pneumo Polysac 23-V 11/30/2018     Tdap 11/08/2017           Electronically signed by Malachi Choudhary MD 05/01/19 1:00 PM

## 2021-05-28 NOTE — TELEPHONE ENCOUNTER
Patient called stating he was in for his pre-op physical and his PCP is wondering if he needs to be off Warfarin prior to his sinus surgery on 5/22. Per Dr. Wright, patient needs to be off his Warfarin prior to surgery. Patient will contact his PCP for Lovenox instructions and when to stop his Warfarin.    Gianna Mejia RN  [facility]  [phone]

## 2021-05-28 NOTE — PROGRESS NOTES
4/22/2019    Start time: 2:05 PM    Stop Time: 2:55 PM   Session # 8    Anand Felix is a 34 y.o. male is being seen today for    Chief Complaint   Patient presents with     Mental Health Note   . Major depression, moderate   Generalized Anxiety Disorder  Chronic pain syndrome        New symptoms or complaints: None    Functional Impairment:   Personal: 4  Family: 4  Work: 4  Social:4    Clinical assessment of mental status: Anand Felix presented on time.  He was open and cooperative, and dressed appropriately for this session and weather. His memory was in tact .  His  speech was appropriate.  Language was appropriate.  Concentration and focus is on task. Psychosis is not noted or reported. He reports his mood is depressed .  Affect is congruent with speech.  Fund of knowledge is adequate. Insight is adequate for therapy.     Suicidal/Homicidal Ideation present: Patient reports passive suicidal ideation. He denies plan or means. Patient agrees to follow safety plan: Call 911, go to the ER for evaluation and/or call the crisis line if symptoms worsen or if feeling unsafe.     Patient's impression of their current status:  Patient reported he continues to be working on addressing symptoms of depression, anger and anxiety. He reports pain/numbness/weakness symptoms continue, but he has had some relief with his current plan of care. He reports increasing discomfort from a polyp. He is struggling with some relationship/social changes due to chronic pain/medical conditions. He reports continued SI.        Therapist impression of patients current state: Patient seems to be working on addressing depression, anger, anxiety and chronic medical symptoms. He reports pain symptoms have been more manageable with his current plan of care. He continues the medical cannabis program and is finding this to be helpful with pain symptom reduction. However, he reports increased tiredness with his current medication plan. He reports he will  have surgery in the next month to remove a polyp. Continued to discuss changes in relationships/social life that have occurred since he has been struggling with chronic pain/medical issues.  He discussed social needs at this time and how he would like to proceed in the future with social interactions. Patient processed thoughts and feelings related to a recent visit from his sister. Encouraged patient to continue to increase efforts to reach out to social/family support to connect socially/emotionally. He continued to discuss some family history this session. Patient continues to work with case management and is working closely with a . He reports Buddhism attendance as he is able. He continues to work on addressing acceptance of his chronic pain and medical concerns, with some progress. He continues to work on sleep improvement, which continues to be challenging.  Patient continues to take walks and/or exercise at home as he is able. Patient reports suicidal ideation persists,  but denies plan or means at this time. He states increased stressors/medical concerns are a trigger. Discussed triggers for negative thinking and focusing on his capabilities. Discussed safety plan and ways to cope if symptoms should worsen or he is feeling unsafe. Will continue reviewing suicide prevention worksheet in session next time as time did not permit today. He reports he is consistent with PT exercises in home practice and noticing ongoing positive improvement. Continued to discuss benefits of Mindfulness meditation with patient. He reports regular meditation practice and grounding. Encouraged continued affirmation practice. He preferred to process target/NC another time as he reported pain symptoms were low today. Presented grounding exercise today and patient appeared to benefit.            Type of psychotherapeutic technique provided: Client centered, CBT and Mindfulness    Progress toward short term goals:  Patient continues to work on increasing coping skills to manage chronic pain and mental health. Patient continues medical care as recommended by his medical providers. He engages in physical activity and social activity as he is able. He is engaging in meditation/grounding in daily living. He is working with a county .            Review of long term goals: Not done at today's visit. Last review: 2/27/19.    Diagnosis: Major depression, moderate   Generalized Anxiety Disorder  Chronic pain syndrome      Plan and Follow up: Follow pain center plan of care. Patient to follow all medical recommendations. Patient to schedule every 2 weeks for psychotherapy to further address depression, anxiety and chronic pain concerns. Continue meditation and other healthy coping strategies. Practice meditations as presented in session. Further address adjustment to role/identity due to chronic pain and health concerns. Continue to work on healthy communication skills/relationship building. Continue EMDR using pain protocol, as desired. Work on identifying things he finds enjoyable in daily living. Continue work on suicide prevention worksheet in session.              Discharge Criteria/Planning: Patient will continue with follow-up until therapy can be discontinued without return of signs and symptoms.    Alize Livingston 4/22/2019

## 2021-05-28 NOTE — PROGRESS NOTES
5/6/2019    Start time: 1:05 PM    Stop Time: 1:55 PM   Session # 9    Anand Felix is a 34 y.o. male is being seen today for    Chief Complaint   Patient presents with     Mental Health Note   . Major depression, moderate   Generalized Anxiety Disorder  Chronic pain syndrome           New symptoms or complaints: None    Functional Impairment:   Personal: 4  Family: 4  Work: 4  Social:4    Clinical assessment of mental status: Anand Felix presented on time.  He was open and cooperative, and dressed appropriately for this session and weather. His memory was in tact .  His  speech was appropriate.  Language was appropriate.  Concentration and focus is on task. Psychosis is not noted or reported. He reports his mood is depressed .  Affect is congruent with speech.  Fund of knowledge is adequate. Insight is adequate for therapy.     Suicidal/Homicidal Ideation present: Patient reports passive suicidal ideation. He denies plan or means. Patient agrees to follow safety plan: Call 911, go to the ER for evaluation and/or call the crisis line if symptoms worsen or if feeling unsafe.       Patient's impression of their current status: Patient reported he continues to be working on addressing symptoms of depression, anger and anxiety. He reports continued difficulty with physical weakness, fatigue and lack of stamina. He reports increasing discomfort from a sinus polyp and he will have surgery on 5/22/19. He is struggling with some relationship/social changes due to chronic pain/medical conditions. He reports continued SI. He is working on housing needs.     Therapist impression of patients current state: Patient seems to be working on addressing depression, anger, anxiety and chronic medical symptoms. He reports pain symptoms have been manageable with his current plan of care. He continues the medical cannabis program and is finding this to be helpful with pain symptom reduction. However, he reports increased tiredness with his  "current medication plan. He reports he will have surgery this month remove a polyp. Continued to discuss changes in relationships/social life that have occurred since he has been struggling with chronic pain/medical issues.  He discussed social needs at this time and how he feels less engaged and more isolated than in the past. Encouraged patient to continue to increase efforts to reach out to social/family support to connect socially/emotionally as he is able. He continued to discuss some family history this session. Patient continues to work with case management and is working closely with a . He and his wife are working on future housing needs. He reports Caodaism attendance on a regular basis. He continues to work on addressing acceptance of his chronic pain and medical concerns, with some progress. He continues to work on sleep improvement, which continues to be challenging.  Patient continues to take walks and/or exercise at home as he is able. Patient reports suicidal ideation persists,  but denies plan or means at this time. He states increased stressors/medical concerns are a trigger. Discussed triggers for negative thinking and focusing on his capabilities. Will continue to work on decreasing \"shoulds\" that seem to negatively impact his thought process. Discussed safety plan and ways to cope if symptoms should worsen or he is feeling unsafe. Will continue reviewing suicide prevention worksheet in session next time as time did not permit today. He reports he is consistent with PT exercises in home practice and noticing ongoing positive improvement. Continued to discuss benefits of Mindfulness meditation with patient. He reports regular meditation practice and grounding. Encouraged patient to use the container exercise on a regular basis and will review next session. He preferred to process target/NC another time as he reported pain symptoms were low today. Presented grounding exercise today " and patient appeared to benefit.         Type of psychotherapeutic technique provided: Client centered, CBT and Mindfulness    Progress toward short term goals: Patient continues to work on increasing coping skills to manage chronic pain and mental health. Patient continues medical care as recommended by his medical providers. He engages in physical activity and social activity as he is able. He is engaging in meditation/grounding in daily living. He is working with a county .            Review of long term goals: Not done at today's visit. Last review: 2/27/19.     Diagnosis: Major depression, moderate   Generalized Anxiety Disorder  Chronic pain syndrome         Plan and Follow up: Follow pain center plan of care. Patient to follow all medical recommendations. Patient to schedule every 2 weeks for psychotherapy to further address depression, anxiety and chronic pain concerns. Continue meditation and other healthy coping strategies. Practice meditations as presented in session. Further address adjustment to role/identity due to chronic pain and health concerns. Continue to work on healthy communication skills/relationship building. Continue EMDR using pain protocol, as desired. Work on identifying things he finds enjoyable in daily living. Continue work on suicide prevention worksheet in session. Review container exercise next time.                 Discharge Criteria/Planning: Patient will continue with follow-up until therapy can be discontinued without return of signs and symptoms.    Alize Livingston 5/6/2019

## 2021-05-28 NOTE — TELEPHONE ENCOUNTER
INR 2.0 today (goal range 2-3).     Pt verbalized understanding he'll continue current warfarin dose 5 mg every Fri; 2.5 mg all other days.     Pt is having sinus surgery 5/22. Pt is waiting for call back from surgeon regarding whether he can continue warfarin perioperatively. If holding is required then he'll have to bridge with Lovenox per Pre-op notes today from PCP.

## 2021-05-28 NOTE — TELEPHONE ENCOUNTER
Refill Approved    Rx renewed per Medication Renewal Policy. Medication was last renewed on 8/4/18.    Genia Covington, [facility] [place] Connection Triage/Med Refill 5/15/2019     Requested Prescriptions   Pending Prescriptions Disp Refills     omeprazole (PRILOSEC) 20 MG capsule [Pharmacy Med Name: OMEPRAZOLE DR 20 MG CAPSULE] 90 capsule 0     Sig: TAKE 1 CAPSULE (20 MG TOTAL) BY MOUTH DAILY.       GI Medications Refill Protocol Passed - 5/14/2019 12:08 PM        Passed - PCP or prescribing provider visit in last 12 or next 3 months.     Last office visit with prescriber/PCP: 7/31/2018 Malachi Choudhary MD OR same dept: 7/31/2018 Malachi Choudhary MD OR same specialty: 5/13/2019 Bakari Chandler MD  Last physical: 5/1/2019 Last MTM visit: Visit date not found   Next visit within 3 mo: Visit date not found  Next physical within 3 mo: Visit date not found  Prescriber OR PCP: Malachi Choudhary MD  Last diagnosis associated with med order: 1. GERD (gastroesophageal reflux disease)  - omeprazole (PRILOSEC) 20 MG capsule [Pharmacy Med Name: OMEPRAZOLE DR 20 MG CAPSULE]; TAKE 1 CAPSULE (20 MG TOTAL) BY MOUTH DAILY.  Dispense: 90 capsule; Refill: 0    If protocol passes may refill for 12 months if within 3 months of last provider visit (or a total of 15 months).

## 2021-05-28 NOTE — TELEPHONE ENCOUNTER
Patient Result Comments     Viewed by Anand Felix on 5/14/2019 10:47 AM   Written by Bakari Chandler MD on 5/13/2019 11:33 PM   Normal study   Recommend further evaluation and management by Gastroenterology.  You'll be contacted by the schedulers.       Called to pt

## 2021-05-28 NOTE — PROGRESS NOTES
PAIN CLINIC FOLLOW UP PROGRESS NOTE    CC:  Chief Complaint   Patient presents with     Follow-up     back and rt leg pain       HPI  Anand Felix is a 34 y.o. male who presents for evaluation   Chief Complaint   Patient presents with     Follow-up     back and rt leg pain    that is causing continued pain. Since the last visit the patient denies any trips to the urgent care or ED specifically for their pain. The patient denies any new medications, diagnoses since the last visit. Specific questions indicated the patient wanted addressed today include: to follow up on his ongoing pain as well as discuss his refills.     Major issues:  1. Chronic pain syndrome    2. Arthralgias In Multiple Sites    3. Myalgia      Pain level: On a scale of 1-10, the patient rates their pain today 3 previous assessment was a 1/10   Pain is described: Sharp, deep, burning, tearing when active  Aggravating factors: Any activity  Alleviating factors: Salt baths, hot and cold packs, medications, laying down  New pain:  None  Since last visit, pain has improved  Associated Symptoms: Numbness in the right leg and arm, weakness all over his body  Pain interferes with: Sleep, walking, activities of daily living        Previous Medical History  Social History     Substance and Sexual Activity   Alcohol Use Yes    Comment: Monthly or less often. 1-2 drinks at a time.     Social History     Substance and Sexual Activity   Drug Use No     Social History     Tobacco Use   Smoking Status Never Smoker   Smokeless Tobacco Never Used       Pertinent Pain Medications/interventions:  He  currently is taking Belbuca 75 mcg, gabapentin 100 mg three times a day, oxycodone 5 mg up to two times a day as needed, medical cannabis    RFA did provide him with 80 % relief.     Review of Systems:  12 point systems were reviewed with pt as documented on pt health form and the patient denies any new diagnosis or changes in 12 point system review since the last visit.  "    Physical Exam  Vitals:    04/29/19 1329   BP: 124/61   Patient Site: Left Arm   Patient Position: Sitting   Cuff Size: Adult Regular   Pulse: 95   Weight: 162 lb (73.5 kg)   Height: 5' 5\" (1.651 m)        General- patient is alert and oriented, in NAD, well-groomed, well-nourished  Psych- Judgment and insight normal, AOx4, recent and remote memory normal, mood and affect normal  Eyes- pupils are equal and reactive, conjunctiva is clear bilaterally, no ptosis is noted.   Respiratory- breathing is non-labored  Cardiovascular- extremities warm and well perfused, no peripheral edema or varicosities.  Musculoskeletal- gait is normal, extremities with no joint swelling, erythema, or warmth. Low back pain and multi site joint pain throughout.  Neuro- normal strength, no gait abnormalities, normal sensation to pain, temperature, light touch.  Integumentary- no rashes, dermatitis or discolorations noted throughout, no open wounds noted.        Medications    Current Outpatient Medications:      gabapentin (NEURONTIN) 100 MG capsule, Take 100 mg by mouth 3 (three) times a day. Ok to reduce to two times a day if too drowsy, Disp: 90 capsule, Rfl: 0     oxyCODONE 5 mg TbOr, Take 5 mg by mouth 2 (two) times a day., Disp: , Rfl:      acetaminophen (TYLENOL) 500 MG tablet, Take 1 tablet (500 mg total) by mouth every 6 (six) hours as needed for pain., Disp: , Rfl: 0     atenolol (TENORMIN) 25 MG tablet, TAKE 1 TABLET BY MOUTH EVERY DAY, Disp: 90 tablet, Rfl: 1     baclofen (LIORESAL) 10 MG tablet, Take 1 tablet (10 mg total) by mouth 3 (three) times a day., Disp: 90 tablet, Rfl: 0     buprenorphine (BUTRANS) 7.5 mcg/hour PTWK patch, Place 1 patch on the skin every 7 days., Disp: 4 patch, Rfl: 0     cholecalciferol, vitamin D3, 2,000 unit cap, Take 2,000 Units by mouth daily., Disp: , Rfl:      fluticasone (FLONASE) 50 mcg/actuation nasal spray, INSTILL 2 SPRAYS INTO EACH NOSTRIL DAILY., Disp: 48 g, Rfl: 2     omeprazole " (PRILOSEC) 20 MG capsule, TAKE 1 CAPSULE (20 MG TOTAL) BY MOUTH DAILY., Disp: 90 capsule, Rfl: 3     warfarin (COUMADIN/JANTOVEN) 5 MG tablet, Take 1/2 to 1 tablet (2.5 to 5 mg) by mouth daily. Adjust dose per INR results as instructed., Disp: 60 tablet, Rfl: 1    Lab:  Last UDS on 2/19 had expected results. Any abnormal results have been discussed with the patient and may change the plan of care. Please see the scanned document from the outside lab under the media tab. This was reviewed with the patient.      Imaging:  No new imaging.      Recent   Dated 4/29/2019 was reviewed with the patient today.     Paper copy reviewed    Assessment:   Anand Felix is a 34 y.o. male seen in clinic today for   Chief Complaint   Patient presents with     Follow-up     back and rt leg pain   They are here for follow up and continued medical management of their pain. Today we reviewed the lab work of the UDT test and the results are expected because of the prescribed narcotics found in the urine drug screen.     I have also reviewed the information obtained from the last visit encounter after the PATSY was signed and have asked any pertintent questions needed from other healthcare providers/family/patient to continue care and formulate a treatment plan.     Patient reports that he has some local itching but no rashes are noted, discussed placement and rotation as well as application of topical flonase to reduce any itching. Patient also notes that he has more good days then bad days now. Will increase the dose of butrans today for him and continue the short acting for any breakthrough. No abberant behaviors noted, refills sent to pharmacy     Patients current MME is 28.5  Patient to call clinic for next month's prescription 5 days in advance. Based on the patients response to their previous narcotic therapy and quality of life, which includes their participation in ADLs, I recommend that the narcotics therapy continue, however the  changes listed below will be incorporated into their plan of care.     Patient set goals to   1. To reduce his pain so his function can increase     Plan:   Interventions-    Follow up in 4 weeks to evaluate the effectiveness of the treatment interventions ordered today. Scheduling your appointment prior to leaving assists in reduction of running out of medication prior to the next appointment.     If you are receiving medications from the pain clinic, it is your responsibility to call 3-5 days in advance for a refill. The clinic has up to five days to provide a refill for you.     Ok to continue the medical cannabis as you are      Ok to reduce the gabapentin to 100 mg two times a day if you are too tired.     Ok to call when you need oxycodone, you do not need one today     Will stop the flexeril and start the baclofen for muscle spasms     Will increase the Butrans patch- up to 7.5 mcg/ hr apply flonase topically prior to applying the patch. Sent to [facility] pharmacy today      Ok to reduce the use of the belbuca to once per day- if this does nothing ok to stop taking it, if you pain increases ok to continue use and then call for a refill. No refill was sent in for this today. We did talk about an increase to 150 mcg if needed.      Ok to repeat the RFA when needed.     Prescription Drug Management will be continued by the [facility]  A narcotic contract was signed by the patient and  Patient is unable to get narcotics from other providers. They will be subject to random UAs.     As a reminder- chronic pain is generally stable, if you experience a new injury or new different pain it is expected that you present to the ED or urgent care for evaluation. DO NOT use your chronic pain medications to treat any new or different pain other then what it is intended for. We do not replace any lost or stolen prescriptions or provide early refills for overtaking your medications.            Orders placed  today  Medications that were ordered today   Requested Prescriptions     Signed Prescriptions Disp Refills     gabapentin (NEURONTIN) 100 MG capsule 90 capsule 0     Sig: Take 100 mg by mouth 3 (three) times a day. Ok to reduce to two times a day if too drowsy     baclofen (LIORESAL) 10 MG tablet 90 tablet 0     Sig: Take 1 tablet (10 mg total) by mouth 3 (three) times a day.     buprenorphine (BUTRANS) 7.5 mcg/hour PTWK patch 4 patch 0     Sig: Place 1 patch on the skin every 7 days.     No orders of the defined types were placed in this encounter.      UDT/SWAB:  Patient required a random Urine Drug Testing, due to the need to comply with [place] Model Policy Guidelines and CDC Guideline for the use of any controlled substances. This is to ensure that patient is compliant with treatment, and monitor for risks such as diversion, abuse, or any other aberrant behaviors. Patient is either being considered for or taking a controlled substance. Unexpected findings will be discussed and treatment decision may be adjusted. Testing is being implemented across the board randomly w/o bias related to age, race, gender, socioeconomic status or Roman [employer] affiliation.    The patient understand todays plan and has their questions answered in regards to expectations and current treatment plan.     SAFETY REMINDERS  No alcohol while taking controlled substances. Alcohol is not an illegal substance, it is unsafe to use in combination. It is a build up of substances in the body that can be extremely hazardous and may cause respirations to slow to a dangerous rate resulting in hospitalization, brain damage, or death.    Opioid medications have been associated with sharp rise in unintentional overdose and death.  Overdose is a condition characterized by the consumption in excess of a particular drug causing adverse effects. This can happen b/c you are sick, accidentally or intentionally took an extra dose, are on multiple medication  that can interact. Someone took your medication and they are not use to the medication.  Symptoms of overdose include:   !breathing slow and shallow, erratic or not at all  !pinpoint pupils, hallucinations  !confusion  !muscle jerks, slack muscles   !extreme sleepiness or loss of alertness   !awake but not able to talk   !face pale or clammy, vomiting, for lighter skinned people, the skin tone turns bluish purple, for darker skinned people, it turns grayish or ashen   If in a situation where overdose is a concern engage the emergency response system (dial 911).    In one study it was noted that 80% of unintentional overdoses occurred in people who were taking a combination of opioids and benzodiazepines.    Do not sell, loan, borrow or share your opioid medication with anyone. Deaths have occurred as a result of this practice. It is illegal and patients are being prosecuted.     Prevent unexpected access/loss of medication: Keep medication locked. Only carry what you need with you.    Dayna Lundberg, [facility]  [address]  [place] [address]  Ph: [phone]  Fax: [phone]

## 2021-05-28 NOTE — TELEPHONE ENCOUNTER
[facility]-PROCEDURAL ANTICOAGULATION  MANAGEMENT    Assessment/Plan     Warfarin interruption plan for right endoscopic sinus surgery on 5/22/19.    Hx of Pulmonary Embolism      Risk stratification for thromboembolism: low. (2018 YANI/2012 Chest guidelines)    VTE > 12 months ago (PE: 10/2017)      2018 American Society of Hematology guidelines recommend that patient's with low to moderate thromboembolism risk are not bridged.      Note that patient tolerated extended anticoagulation interruption without complication, April 2018 to early June 2018, when Xarelto was taken off patient's formulary and had to be switched to warfarin.      Recommendation:      Check INR 1-2 weeks prior to procedure      Okay to hold warfarin 4 days prior to procedure starting Saturday 5/18, until after procedure.      Resume home warfarin dose if okay with provider doing procedure on night of procedure, Wednesday, 5/22.    May consider a 1.5-2x booster for first 1-2 days      Adina Medeiros, Pharmacy Student      Patient reviewed with pharmacy student and I agree with assessment and plan    Bettie Carey, PharmD, preceptor  [facility]          Subjective/Objective:      Anand Felix, a 34 y.o. male    Reason for Anticoagulation: PE Treatment    Goal INR Range: 2-3    Patient bridged in past: No    Pertinent History: bilateral pulmonary embolism (10/2017), Muscle weakness-generalized    Wt Readings from Last 3 Encounters:   05/01/19 72.6 kg (160 lb)   04/29/19 73.5 kg (162 lb)   03/04/19 73.5 kg (162 lb)      BMI: 26.63 kg/m2  Lab Results   Component Value Date    INR 2.00 (H) 05/01/2019    INR 2.20 (H) 04/12/2019    INR 3.10 (H) 03/18/2019     Lab Results   Component Value Date    HGB 14.6 05/01/2019    HCT 45.8 05/01/2019     05/01/2019     Lab Results   Component Value Date    CREATININE 0.82 05/01/2019    CREATININE 0.76 11/30/2018    CREATININE 0.89 05/29/2018      Estimated Creatinine Clearance: 131.6 mL/min (by  C-G formula based on SCr of 0.82 mg/dL).

## 2021-05-28 NOTE — PATIENT INSTRUCTIONS - HE
Plan:   Interventions-    Follow up in 4 weeks to evaluate the effectiveness of the treatment interventions ordered today. Scheduling your appointment prior to leaving assists in reduction of running out of medication prior to the next appointment.     If you are receiving medications from the pain clinic, it is your responsibility to call 3-5 days in advance for a refill. The clinic has up to five days to provide a refill for you.     Ok to continue the medical cannabis as you are      Ok to reduce the gabapentin to 100 mg two times a day if you are too tired.     Ok to call when you need oxycodone, you do not need one today     Will stop the flexeril and start the baclofen for muscle spasms     Will increase the Butrans patch- up to 7.5 mcg/ hr apply flonase topically prior to applying the patch. Sent to [facility] pharmacy today      Ok to reduce the use of the belbuca to once per day- if this does nothing ok to stop taking it, if you pain increases ok to continue use and then call for a refill. No refill was sent in for this today. We did talk about an increase to 150 mcg if needed.      Ok to repeat the RFA when needed.     Prescription Drug Management will be continued by the [facility]  A narcotic contract was signed by the patient and  Patient is unable to get narcotics from other providers. They will be subject to random UAs.     As a reminder- chronic pain is generally stable, if you experience a new injury or new different pain it is expected that you present to the ED or urgent care for evaluation. DO NOT use your chronic pain medications to treat any new or different pain other then what it is intended for. We do not replace any lost or stolen prescriptions or provide early refills for overtaking your medications.            Orders placed today  Medications that were ordered today   Requested Prescriptions     Signed Prescriptions Disp Refills     gabapentin (NEURONTIN) 100 MG capsule 90  capsule 0     Sig: Take 100 mg by mouth 3 (three) times a day. Ok to reduce to two times a day if too drowsy     baclofen (LIORESAL) 10 MG tablet 90 tablet 0     Sig: Take 1 tablet (10 mg total) by mouth 3 (three) times a day.     buprenorphine (BUTRANS) 7.5 mcg/hour PTWK patch 4 patch 0     Sig: Place 1 patch on the skin every 7 days.     No orders of the defined types were placed in this encounter.      UDT/SWAB:  Patient required a random Urine Drug Testing, due to the need to comply with [place] Model Policy Guidelines and CDC Guideline for the use of any controlled substances. This is to ensure that patient is compliant with treatment, and monitor for risks such as diversion, abuse, or any other aberrant behaviors. Patient is either being considered for or taking a controlled substance. Unexpected findings will be discussed and treatment decision may be adjusted. Testing is being implemented across the board randomly w/o bias related to age, race, gender, socioeconomic status or Evangelical affiliation.    The patient understand todays plan and has their questions answered in regards to expectations and current treatment plan.     SAFETY REMINDERS  No alcohol while taking controlled substances. Alcohol is not an illegal substance, it is unsafe to use in combination. It is a build up of substances in the body that can be extremely hazardous and may cause respirations to slow to a dangerous rate resulting in hospitalization, brain damage, or death.    Opioid medications have been associated with sharp rise in unintentional overdose and death.  Overdose is a condition characterized by the consumption in excess of a particular drug causing adverse effects. This can happen b/c you are sick, accidentally or intentionally took an extra dose, are on multiple medication that can interact. Someone took your medication and they are not use to the medication.  Symptoms of overdose include:   !breathing slow and shallow,  erratic or not at all  !pinpoint pupils, hallucinations  !confusion  !muscle jerks, slack muscles   !extreme sleepiness or loss of alertness   !awake but not able to talk   !face pale or clammy, vomiting, for lighter skinned people, the skin tone turns bluish purple, for darker skinned people, it turns grayish or ashen   If in a situation where overdose is a concern engage the emergency response system (dial 911).    In one study it was noted that 80% of unintentional overdoses occurred in people who were taking a combination of opioids and benzodiazepines.    Do not sell, loan, borrow or share your opioid medication with anyone. Deaths have occurred as a result of this practice. It is illegal and patients are being prosecuted.     Prevent unexpected access/loss of medication: Keep medication locked. Only carry what you need with you.    Dayna Lundberg, [facility]  [address]  [place] [address]  Ph: [phone]  Fax: [phone]

## 2021-05-28 NOTE — TELEPHONE ENCOUNTER
I called patient to discuss his mychart message. He states he is still having intermittent severe abdominal pain. He has been to the ER and [facility] and all tests are negative. He is asking if he should cancel his sinus surgery that is scheduled for next Wednesday. I told him his sinus surgery is not urgent and he can cancel it if he wishes or see if his symptoms have resolved by Monday and decide. I gave him Teresa, surgery schedulers' number to call her on Monday if he is going to cancel. He understands.    Gianna Mejia RN  [facility]  [phone]

## 2021-05-28 NOTE — TELEPHONE ENCOUNTER
Surgeon Dr. Wright would like pt to hold prior to sinus surgery 5/22.     Dr. Choudhary, how many days should pt hold warfarin prior?     (Then our AC Pharmacist Bettie can assist with Lovenox bridge as you recommended)

## 2021-05-28 NOTE — TELEPHONE ENCOUNTER
ACN spoke with patient. Instructed him to hold warfarin 4 days prior to surgery. Surgery is 5/22, so pt will start holding 5/18-5/21. Resume warfarin the mayra of procedure if ok with surgeon. Recheck INR 1-2 weeks after resuming warfarin.

## 2021-05-28 NOTE — PROGRESS NOTES
ANTICOAGULATION  MANAGEMENT: Discharge Continuity of Care Review    Emergency room visit on  5/12 for abdominal pain.    Discharge disposition: Home    INR Results:     No results for input(s): INR in the last 168 hours.    Warfarin inpatient management: Home regimen continued    Warfarin discharge instructions: home regimen continued     Medication Changes Affecting Anticoagulation: No    Additional Factors Affecting Anticoagulation: No    Plan     No adjustment to anticoagulation plan needed    Gus Byrne RN

## 2021-05-28 NOTE — PROGRESS NOTES
Patient presents to the clinic today for a follow up with Dayna Lundberg CNP regarding back and rt leg. Patient describes their pain as 3-constant, burning, tight, sharp, throbbing. Her/His function score is 6.

## 2021-05-28 NOTE — PROGRESS NOTES
5/20/2019    Start time: 2:00 PM    Stop Time: 2:50 PM   Session # 10    Anand Felix is a 34 y.o. male is being seen today for    Chief Complaint   Patient presents with     Mental Health Note   . Major depression, moderate   Generalized Anxiety Disorder  Chronic pain syndrome           New symptoms or complaints: None    Functional Impairment:   Personal: 4  Family: 4  Work: 4  Social:4    Clinical assessment of mental status: Anand Felix presented on time.  He was open and cooperative, and dressed appropriately for this session and weather. His memory was in tact .  His  speech was appropriate.  Language was appropriate.  Concentration and focus is on task. Psychosis is not noted or reported. He reports his mood is depressed .  Affect is congruent with speech.  Fund of knowledge is adequate. Insight is adequate for therapy.     Suicidal/Homicidal Ideation present: Patient reports passive suicidal ideation. He denies plan or means. Patient agrees to follow safety plan: Call 911, go to the ER for evaluation and/or call the crisis line if symptoms worsen or if feeling unsafe.      Patient's impression of their current status: Patient reported he continues to be working on addressing symptoms of depression, anger and anxiety. He reports continued difficulty with physical weakness, fatigue and lack of stamina. He reports increasing discomfort from a sinus polyp and he will have surgery on 5/22/19. He is struggling with some relationship/social changes due to chronic pain/medical conditions. He would like to be more active in his social life this summer. He reports continued SI. He is working on housing needs. Patient reports he has been dealing with gastrointestinal issues the past several days and was not feeling well today.            Therapist impression of patients current state: Patient seems to be working on addressing depression, anger, anxiety and chronic medical symptoms. He reports pain symptoms have been  "manageable with his current plan of care. He continues the medical cannabis program and is finding this to be helpful with pain symptom reduction. Weakness and fatigue continue to be an issue and this has increased with his current illness. He reports he will have surgery this month remove a polyp. Patient was not feeling well today, but was able to participate in the session. Due to surgery/recovery period, he will return when he is feeling able. He is aware that he can reach out before his next session, if needed. Continued to discuss changes in relationships/social life that have occurred since he has been struggling with chronic pain/medical issues.  He discussed social needs at this time and how he feels less engaged and more isolated than in the past. Encouraged patient to continue to increase efforts to reach out to social/family support to connect socially/emotionally as he is able. He would like to be more active this summer, per patient report. He continued to discuss some family history this session. Patient continues to work with case management and is working closely with a . He and his wife are working on future housing needs. He reports Jew attendance on a regular basis. He continues to work on addressing acceptance of his chronic pain and medical concerns, with some progress. He continues to work on sleep improvement, which continues to be challenging.  Patient continues to take walks and/or exercise at home as he is able. Patient reports suicidal ideation persists,  but denies plan or means at this time. He states increased stressors/medical concerns are a trigger. Discussed triggers for negative thinking and focusing on his capabilities. Patient is continuing  to work on decreasing \"shoulds\" that seem to negatively impact his thought process. Discussed safety plan and ways to cope if symptoms should worsen or he is feeling unsafe. Will continue reviewing suicide prevention " worksheet in session next time as time did not permit today. He reports he is consistent with PT exercises in home practice and noticing ongoing positive improvement. Continued to discuss benefits of Mindfulness meditation with patient. He reports regular meditation practice and grounding. Encouraged patient to use the container exercise on a regular basis and will review next session. He preferred to process target/NC another time as he reported pain symptoms were low today. Presented spiral technique exercise today and patient appeared to benefit.         Type of psychotherapeutic technique provided: Client centered, CBT and Mindfulness    Progress toward short term goals: Patient continues to work on increasing coping skills to manage chronic pain and mental health. Patient continues medical care as recommended by his medical providers. He engages in physical activity and social activity as he is able. Increase activity this summer, as he is able. He is engaging in meditation/grounding in daily living. He is working with a county .         Review of long term goals: Not done at today's visit. Last review: 2/27/19.    Diagnosis: Major depression, moderate   Generalized Anxiety Disorder  Chronic pain syndrome         Plan and Follow up: Follow pain center plan of care. Patient to follow all medical recommendations. Patient to schedule every 2 weeks for psychotherapy to further address depression, anxiety and chronic pain concerns. It is understood that patient will be having surgery and will schedule after his recovery period. Continue meditation and other healthy coping strategies. Practice meditations as presented in session. Further address adjustment to role/identity due to chronic pain and health concerns. Continue to work on healthy communication skills/relationship building. Continue EMDR using pain protocol, as desired. Work on identifying things he finds enjoyable in daily living. Continue work  on suicide prevention worksheet in session. Review container exercise next time.              Discharge Criteria/Planning: Patient will continue with follow-up until therapy can be discontinued without return of signs and symptoms.    Alize Livingston 5/20/2019

## 2021-05-28 NOTE — PROGRESS NOTES
ANTICOAGULATION  MANAGEMENT: Discharge Continuity of Care Review    Emergency room visit on  5/19 for abdominal pain.    Discharge disposition: Home    INR Results:       Recent labs: (last 7 days)     05/19/19  1258   INR 3.07*       Warfarin inpatient management: Warfarin held    Warfarin discharge instructions: holding warfarin for sinus surgery 5/22     Medication Changes Affecting Anticoagulation: No    Additional Factors Affecting Anticoagulation: Yes, abdominal pain.     Plan     No adjustment to anticoagulation plan needed      uGs Byrne RN

## 2021-05-28 NOTE — TELEPHONE ENCOUNTER
Who is calling:  Patient  Reason for Call:  Returning phone call from ACN .  Date of last appointment with primary care: none  Okay to leave a detailed message: Yes

## 2021-05-28 NOTE — TELEPHONE ENCOUNTER
Test Results  Who is calling?:   patient  Who ordered the test:   Kenrick  Type of test: Imaging  Date of test:   5/13/2019  Where was the test performed:   [facility]  What are your questions/concerns?:  Patient would like to know the results of the CT scan from 5/13/2019  Okay to leave a detailed message?:  Yes

## 2021-05-28 NOTE — PROGRESS NOTES
Assessment/Plan:        1. RUQ abdominal pain  Exam and recent ER evaluation / notes were reviewed.     Plan:   Recheck :   - HM1(CBC and Differential)  - Lipase  - Amylase  - Comprehensive Metabolic Panel  - Urinalysis-UC if Indicated  - HM1 (CBC with Diff)      Order:     - NM Hepatobiliary W EF or Pharm; Future  We will follow-up to the results of the study and manage accordingly.       At the conclusion of the encounter the plan of care, disposition and all questions were answered and reviewed, and the patient acknowledged understanding and was involved in the decision making regarding the overall care plan.           Subjective:    Patient ID:   Anand Felix is a 34 y.o. male with a history of PE, comes in follow-up to right-sided abdominal pain seen initially by the emergency department on 5/12/2019 with a negative CT for appendicitis, bowel obstruction, mesentery ischemia, volvulus, diverticulitis or pancreatitis.  The ultrasound showed no evidence of cholecystitis or pancreatitis.  Lab work including CMP, lipase, UA, and CBC were all within normal limits.  He was discharged on Zofran for nausea to follow up in primary.     He reports to having intermittent pain, and sometimes unbearable pain at times,  Starting out 3 days ago, and progressively worsening.  He states that today is not as bad as it has been, but it's worse first thing in the morning waking up; with the pain radiating up into the neck and back of his neck.  He has no chest pain or pain radiating into the left arm.  Denies Constipation or diarrhea or having bloody stools.         Review of Systems  Allergy: reviewed  General : negative  Ophthalmic : negative  ENT : negative  Respiratory : no cough, shortness of breath, or wheezing  Cardiovascular : no chest pain or dyspnea on exertion  Gastrointestinal : See HPI,   Genito-Urinary :  no dysuria, trouble voiding, or hematuria  Dermatological : negative    Musculoskeletal : negative  Neurological :  negative  Hematological and Lymphatic : negative  Endocrine : negative     The following patient's history were reviewed and updated as appropriate:   He  has a past medical history of Bulging lumbar disc, Lumbar herniated disc (10/23/2015), and Lumbar radiculopathy (11/25/2015).  He  has a past surgical history that includes Sinus surgery (2014) and Minimally invasive microdiscectomy lumbar spine (Right, 10/2015).  His family history includes Cancer in his father; Depression in his mother; Hyperlipidemia in his mother; No Medical Problems in his brother, brother, sister, sister, sister, and sister; Other in his mother; Thyroid disease in his cousin and maternal grandmother.  He  reports that he has never smoked. He has never used smokeless tobacco. He reports that he drinks alcohol. He reports that he does not use drugs..      Outpatient Encounter Medications as of 5/13/2019   Medication Sig Dispense Refill     acetaminophen (TYLENOL) 500 MG tablet Take 1 tablet (500 mg total) by mouth every 6 (six) hours as needed for pain.  0     atenolol (TENORMIN) 25 MG tablet TAKE 1 TABLET BY MOUTH EVERY DAY 90 tablet 1     baclofen (LIORESAL) 10 MG tablet Take 1 tablet (10 mg total) by mouth 3 (three) times a day. 90 tablet 0     buprenorphine (BUTRANS) 7.5 mcg/hour PTWK patch Place 1 patch on the skin every 7 days. 4 patch 0     cholecalciferol, vitamin D3, 2,000 unit cap Take 2,000 Units by mouth daily.       fluticasone (FLONASE) 50 mcg/actuation nasal spray INSTILL 2 SPRAYS INTO EACH NOSTRIL DAILY. 48 g 2     gabapentin (NEURONTIN) 100 MG capsule Take 100 mg by mouth 3 (three) times a day. Ok to reduce to two times a day if too drowsy 90 capsule 0     HYDROcodone-acetaminophen 5-325 mg per tablet Take 1 tablet by mouth every 4 (four) hours as needed for pain. 5 tablet 0     omeprazole (PRILOSEC) 20 MG capsule TAKE 1 CAPSULE (20 MG TOTAL) BY MOUTH DAILY. 90 capsule 3     ondansetron (ZOFRAN ODT) 4 MG disintegrating tablet  Take 1 tablet (4 mg total) by mouth every 8 (eight) hours as needed for nausea. 15 tablet 0     oxyCODONE 5 mg TbOr Take 5 mg by mouth 2 (two) times a day.       warfarin (COUMADIN/JANTOVEN) 5 MG tablet Take 1/2 to 1 tablet (2.5 to 5 mg) by mouth daily. Adjust dose per INR results as instructed. 60 tablet 1     Facility-Administered Encounter Medications as of 5/13/2019   Medication Dose Route Frequency Provider Last Rate Last Dose     [COMPLETED] acetaminophen tablet 1,000 mg (TYLENOL)  1,000 mg Oral Once Edwardo Evans DO   1,000 mg at 05/12/19 2042     [COMPLETED] dicyclomine injection 10 mg (BENTYL)  10 mg Intramuscular Once Edwardo Evans DO   10 mg at 05/12/19 2243     [COMPLETED] iohexol 350 mg iodine/mL injection 100 mL (OMNIPAQUE)  100 mL Intravenous Once in imaging Edwardo Evans DO   100 mL at 05/12/19 1949     [COMPLETED] ondansetron injection 4 mg (ZOFRAN)  4 mg Intravenous Once Edwardo Evans DO   4 mg at 05/12/19 2042     [COMPLETED] promethazine 12.5 mg in dextrose 5% 50 mL (PHENERGAN)  12.5 mg Intravenous Once Edwardo Evans DO   Stopped at 05/12/19 1914     [COMPLETED] sodium chloride 0.9% 1,000 mL  1,000 mL Intravenous Once Edwardo Evans DO   Stopped at 05/12/19 1909     [COMPLETED] sodium chloride 0.9% 1,000 mL  1,000 mL Intravenous Once Edwardo Evans DO   Stopped at 05/12/19 2242         Objective:   /80 (Patient Site: Right Arm, Patient Position: Sitting, Cuff Size: Adult Regular)   Pulse 96   Wt 156 lb (70.8 kg)   SpO2 97%   BMI 25.96 kg/m        Physical Exam  General Appearance:    Alert,  no acute distress, well hydrated    Eyes:    PERRL, conjunctiva/corneas clear, non icterus    Throat:   Lips, mucosa, and tongue normal;  gums normal   Neck:   Supple, symmetrical, trachea midline, no adenopathy;        thyroid:  No enlargement/tenderness/nodules; no carotid    bruit or JVD   Lungs:     Clear to auscultation bilaterally, respirations  unlabored   Heart:    Regular rate and rhythm, S1 and S2 normal, no murmur, rub   or gallop   Abdomen:      Soft, non distended, palpable tenderness to right upper quadrant  , normal bowel sounds, no rebound or guarding, no masses, no organomegaly   Extremities:   Extremities normal, atraumatic, no cyanosis or edema   Skin:   Skin color, texture, turgor normal, no rashes or lesions

## 2021-05-29 NOTE — TELEPHONE ENCOUNTER
Prior Authorization Request  Who s requesting: Dayna Lundberg CNP/ Tiffanie Alejandro CMA  Pharmacy Name and Location: [facility]  Medication Name: [facility] [place]  Ins[place] Plan: [employer]  Insurance Member ID Number:  [id number]  Informed patient that prior authorizations can take up to 10 business days for response:   Yes  Okay to leave a detailed message: Yes        Received a letter from [facility] stating that starting July 1st this medication will need a formulary exception as it is no longer on preferred list

## 2021-05-29 NOTE — PROGRESS NOTES
Patient presents to the clinic today for a follow up with Dayna Lundberg CNP regarding back rt leg and rt arm pain. Patient describes their pain as 5-constant, sharp, burning. Her/His function score is 7.

## 2021-05-29 NOTE — PROGRESS NOTES
Scheduled F/U Call:  Attempt 1    Attempt 1: Care Guide called patient.  If this patient is returning my call please transfer to Sylvia Conley at ext [phone].    Email: [email or web address]  Marital Status: -Miguel Felix  Children: No  Born and Raised:   Occupation: On Disability  Living Situation: Lives in [place] with his wife.  Smoking, Alcohol, other:  Services receiving: SNAP, MA (Case # [id number]), SMRT (Case #[id number])  [facility] worker: Leo Avendaño: Phone: [phone]  [facility] Financial Worker:  Jose Roberto Green: Phone: [phone]; Fax: [phone]  CCC/HCH Follow up s: Every six weeks  CCC/HCH Enrollment: January 19th 2018  Next CCC Follow up: 6/27/19    Astrid Molina, DYAN          [facility] SW received voicemail message from Anand with [facility].  This program reserves a number of public housing units for people with complex medical diagnoses and provided support/services.  Anand states he spoke to pt and spouse and they declined housing as they are looking for subsidized rent but no services.  Anand canceled housing application.

## 2021-05-29 NOTE — ANESTHESIA POSTPROCEDURE EVALUATION
Patient: Anand Felix  RIGHT ENDOSCOPIC SINUS SURGERY  Anesthesia type: general    Patient location: Phase II Recovery  Last vitals:   Vitals Value Taken Time   /76 5/22/2019 12:30 PM   Temp 36.1  C (97  F) 5/22/2019 12:20 PM   Pulse 61 5/22/2019 12:43 PM   Resp 16 5/22/2019 12:20 PM   SpO2 94 % 5/22/2019 12:43 PM   Vitals shown include unvalidated device data.  Post vital signs: stable  Level of consciousness: awake and responds to simple questions  Post-anesthesia pain: pain controlled  Post-anesthesia nausea and vomiting: no  Pulmonary: unassisted, return to baseline  Cardiovascular: stable and blood pressure at baseline  Hydration: adequate  Anesthetic events: no    QCDR Measures:  ASA# 11 - Mayuri-op Cardiac Arrest: ASA11B - Patient did NOT experience unanticipated cardiac arrest  ASA# 12 - Mayuri-op Mortality Rate: ASA12B - Patient did NOT die  ASA# 13 - PACU Re-Intubation Rate: ASA13B - Patient did NOT require a new airway mgmt  ASA# 10 - Composite Anes Safety: ASA10A - No serious adverse event    Additional Notes:

## 2021-05-29 NOTE — TELEPHONE ENCOUNTER
Central PA team  [phone]  Pool: HE PA MED ([id number])          PA has been initiated.       PA form completed and faxed insurance via Cover My Meds     Key:  Manually faxed     Medication:  Butrans    Insurance:  [employer]        Response will be received via fax and may take up to 5-10 business days depending on plan

## 2021-05-29 NOTE — PROGRESS NOTES
Email: [email or web address]  Marital Status: -Miguel Felix  Children: No  Born and Raised:   Occupation: On Disability  Living Situation: Lives in [place] with his wife.  Smoking, Alcohol, other:  Services receiving: SNAP, MA (Case # [id number]), SMRT (Case #[id number])  SMRT worker: Leo Avendaño: Phone: [phone]  [facility] Financial Worker:  Jose Roberto Green: Phone: [phone]; Fax: [phone]  CCC/HCH Follow up s: Every six weeks  CCC/HCH Enrollment: January 19th 2018  Next CCC Follow up: 7/12/19

## 2021-05-29 NOTE — TELEPHONE ENCOUNTER
ANTICOAGULATION  MANAGEMENT    Assessment     Today's INR result of 3.8 is Supratherapeutic (goal INR of 2.0-3.0)        Warfarin taken as previously instructed    No new diet changes affecting INR    No new medication/supplements affecting INR    Continues to tolerate warfarin with no reported s/s of bleeding or thromboembolism     Previous INR was Supratherapeutic     Steroid injection in the shoulder on 6/26. No need to hold warfarin.     Plan:     Spoke with Anand regarding INR result and instructed:     Warfarin Dosing Instructions:  Hold today then change warfarin dose to 2.5 mg daily  (12.5 % change)    Instructed patient to follow up no later than: 2 weeks. Appt is made for 7/3.     Education provided: importance of taking warfarin as instructed    Anand verbalizes understanding and agrees to warfarin dosing plan.    Instructed to call the [facility] for any changes, questions or concerns. (#[phone])   ?   Gus Byrne RN    Subjective/Objective:      Anand Felix, a 34 y.o. male is on warfarin.     Anand reports:     Home warfarin dose: as updated on anticoagulation calendar per template     Missed doses: No     Medication changes:  No     S/S of bleeding or thromboembolism:  No     New Injury or illness:  No     Changes in diet or alcohol consumption:  No     Upcoming surgery, procedure or cardioversion:  Yes: steroid shoulder injection 6/26.     Anticoagulation Episode Summary     Current INR goal:   2.0-3.0   TTR:   65.9 % (1 y)   Next INR check:   7/3/2019   INR from last check:   3.80! (6/19/2019)   Weekly max warfarin dose:      Target end date:   11/29/2018   INR check location:      Preferred lab:      Send INR reminders to:   CHARLENE ART    Indications    Bilateral pulmonary embolism (H) [I26.99]           Comments:            Anticoagulation Care Providers     Provider Role Specialty Phone number    Malachi Choudhary MD Referring Family Medicine [phone]

## 2021-05-29 NOTE — TELEPHONE ENCOUNTER
ANTICOAGULATION  MANAGEMENT PROGRAM    Anand Felix is overdue for INR check.  Reminder call made.    Spoke with pt and scheduled INR appointment on 6/5 .    Gus Byrne RN

## 2021-05-29 NOTE — PATIENT INSTRUCTIONS - HE
Plan:   Interventions-    Follow up in 4 weeks to evaluate the effectiveness of the treatment interventions ordered today. Scheduling your appointment prior to leaving assists in reduction of running out of medication prior to the next appointment.     If you are receiving medications from the pain clinic, it is your responsibility to call 3-5 days in advance for a refill. The clinic has up to five days to provide a refill for you.     Please bring any opioids or controlled substances in that are prescribed by the clinic for pill counts for every visit.     Bee pollen granules for any type of seasonal allergies or zyrtec( generic Cetazine)    Behavioral Health continue this as you need    Acupuncture- you will need to check with your insurance for coverage, get an intake packet from the  to schedule an appointment.    Injections- Dr. Welsh schedule trigger point injections for the right occiput and right trapezius     Stop the baclofen today Ok to start tizanidine     Call for any of your other refills as you need them, you still have approx. 3 weeks left.     Due to insurance changes in July for the next prescription will have to transition to belbuca from the butrans, will send in the script today so it can start July 1st will order a week early in the event there needs to be a PA.     Prescription Drug Management will be continued by the [facility]  A narcotic contract was signed by the patient and  Patient is unable to get narcotics from other providers. They will be subject to random UAs.     As a reminder- chronic pain is generally stable, if you experience a new injury or new different pain it is expected that you present to the ED or urgent care for evaluation. DO NOT use your chronic pain medications to treat any new or different pain other then what it is intended for. We do not replace any lost or stolen prescriptions or provide early refills for overtaking your medications.         Orders placed today  Medications that were ordered today   Requested Prescriptions     Signed Prescriptions Disp Refills     tiZANidine (ZANAFLEX) 2 MG tablet 90 tablet 0     Sig: Take 1 tablet (2 mg total) by mouth every 6 (six) hours as needed.     Orders Placed This Encounter   Procedures     Ambulatory referral to Acupuncture     Referral Priority:   Routine     Referral Type:   Acupuncture     Referral Reason:   Evaluation and Treatment     Number of Visits Requested:   1       UDT/SWAB:  Patient required a random Urine Drug Testing, due to the need to comply with [employer] Policy Guidelines and CDC Guideline for the use of any controlled substances. This is to ensure that patient is compliant with treatment, and monitor for risks such as diversion, abuse, or any other aberrant behaviors. Patient is either being considered for or taking a controlled substance. Unexpected findings will be discussed and treatment decision may be adjusted. Testing is being implemented across the board randomly w/o bias related to age, race, gender, socioeconomic status or [employer] affiliation.    The patient understand todays plan and has their questions answered in regards to expectations and current treatment plan.     SAFETY REMINDERS  No alcohol while taking controlled substances. Alcohol is not an illegal substance, it is unsafe to use in combination. It is a build up of substances in the body that can be extremely hazardous and may cause respirations to slow to a dangerous rate resulting in hospitalization, brain damage, or death.    Opioid medications have been associated with sharp rise in unintentional overdose and death.  Overdose is a condition characterized by the consumption in excess of a particular drug causing adverse effects. This can happen b/c you are sick, accidentally or intentionally took an extra dose, are on multiple medication that can interact. Someone took your medication and they are not  use to the medication.  Symptoms of overdose include:   !breathing slow and shallow, erratic or not at all  !pinpoint pupils, hallucinations  !confusion  !muscle jerks, slack muscles   !extreme sleepiness or loss of alertness   !awake but not able to talk   !face pale or clammy, vomiting, for lighter skinned people, the skin tone turns bluish purple, for darker skinned people, it turns grayish or ashen   If in a situation where overdose is a concern engage the emergency response system (dial 911).    In one study it was noted that 80% of unintentional overdoses occurred in people who were taking a combination of opioids and benzodiazepines.    Do not sell, loan, borrow or share your opioid medication with anyone. Deaths have occurred as a result of this practice. It is illegal and patients are being prosecuted.     Prevent unexpected access/loss of medication: Keep medication locked. Only carry what you need with you.    Dayna Lundberg, [facility]  [address]  [place] [address]  Ph: [phone]  Fax: [phone]

## 2021-05-29 NOTE — PROGRESS NOTES
Assessment/Plan:    1. Nonintractable episodic headache, unspecified headache type  Episodic headaches reviewed.  More right temporal and right posterior head.  No trauma.  Previously took Aleve and Excedrin however no longer utilizing with complicated past history noted.  Continues warfarin anticoagulation for history of pulmonary emboli.  Stomach discomfort nonspecific often worse in a.m. upon awakening.  Magnesium citrate is helped bowel movements without significant change however and stomach discomfort.  Describes history of migraine headaches following prior nasal surgery.  Uses Butrans 7.5 mcg/h patch once weekly.  Gabapentin as well.  No longer utilizing hydrocodone acetaminophen.  Prescribed Maxalt MLT 10 mg at onset may repeat in 2 hours as needed.  Patient will keep headache diary and reassess at follow-up in 3 months.  - rizatriptan (MAXALT-MLT) 10 MG disintegrating tablet; Take 1 tablet (10 mg total) by mouth as needed for migraine. May repeat in 2 hours if needed  Dispense: 12 tablet; Refill: 2    2. Chronic pain syndrome  Uses Butrans 7.5 mcg/h patch once weekly.  Gabapentin as well.  No longer utilizing hydrocodone acetaminophen.         Subjective:    Anand Felix is seen today for headache.  Significant discomfort recently.  No trauma.  Has had intermittent headaches like this in the past.  Describe migraine headaches following prior nasal surgery.  Historically had used Aleve or Excedrin however no longer utilizing while on warfarin anticoagulation with history of pulmonary emboli.  Stomach discomfort with described history of constipation.  Magnesium citrate utilized OTC with benefits and stool regularity otherwise still has some discomfort in stomach worse in the morning, nonspecific.  No vision change.  Chronic medical concerns remains stable.  No nausea or vomiting however does get somewhat queasy and decreased appetite with headaches.  Comprehensive review of systems as above otherwise all  "negative.     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 Bernal follows  Children - none  Employed - working on disability - monoparesis  Surgeries - nasal surgery; low back surgery (Dr. Jeannine Hernandez, 2015) with residual right leg weakness  Tobacco use - none  ETOH - none  Family hx   Mother - living - high chol. Strokes. MI.   Father - living - unknown  Siblings - living - 4 sisters, 2 brother -    Bilateral AFO braces worn due to muscle weakness and foot drop    Past Surgical History:   Procedure Laterality Date     BACK SURGERY       MINIMALLY INVASIVE MICRODISCECTOMY LUMBAR SPINE Right 10/2015    L5-S1; Dr. Bettie Hernandez     NE NASAL/SINUS ENDOSCOPY,BX/RMV POLYP/DEBRID Right 5/22/2019    Procedure: RIGHT ENDOSCOPIC SINUS SURGERY;  Surgeon: Leo Wright MD;  Location: [facility];  Service: ENT     SINUS SURGERY  2014    Dr. Wright        Family History   Problem Relation Age of Onset     Depression Mother      Hyperlipidemia Mother      Other Mother         \"Ulcers\" - Aches and pains throughout her body.     Cancer Father      No Medical Problems Sister      No Medical Problems Brother      No Medical Problems Sister      No Medical Problems Sister      No Medical Problems Sister      No Medical Problems Brother      Thyroid disease Maternal Grandmother      Thyroid disease Cousin         Past Medical History:   Diagnosis Date     Anxiety      Bulging lumbar disc      Depression      History of anesthesia complications     Was hospitalized in October 2015 for weakness     History of blood clots     PE in 2017     Lumbar herniated disc 10/23/2015     Lumbar radiculopathy 11/25/2015     Shortness of breath      Tachycardia     After PE in         Social History     Tobacco Use     Smoking status: Never Smoker     Smokeless tobacco: Never Used   Substance Use Topics     Alcohol use: Yes     Comment: Monthly or less often. 1-2 drinks at a time.     Drug use: Yes     " Types: Marijuana        Current Outpatient Medications   Medication Sig Dispense Refill     acetaminophen (TYLENOL) 500 MG tablet Take 1 tablet (500 mg total) by mouth every 6 (six) hours as needed for pain.  0     atenolol (TENORMIN) 25 MG tablet TAKE 1 TABLET BY MOUTH EVERY DAY 90 tablet 1     buprenorphine (BUTRANS) 7.5 mcg/hour PTWK patch Place 1 patch on the skin every 7 days. 4 patch 0     cholecalciferol, vitamin D3, 2,000 unit cap Take 2,000 Units by mouth daily.       fluticasone (FLONASE) 50 mcg/actuation nasal spray INSTILL 2 SPRAYS INTO EACH NOSTRIL DAILY. 48 g 2     gabapentin (NEURONTIN) 100 MG capsule Take 100 mg by mouth 3 (three) times a day. Ok to reduce to two times a day if too drowsy 90 capsule 0     lidocaine (LIDODERM) 5 % Remove & Discard patch within 12 hours or as directed by MD 15 patch 0     omeprazole (PRILOSEC) 20 MG capsule TAKE 1 CAPSULE (20 MG TOTAL) BY MOUTH DAILY. 90 capsule 3     warfarin (COUMADIN/JANTOVEN) 5 MG tablet Take 1/2 to 1 tablet (2.5 to 5 mg) by mouth daily. Adjust dose per INR results as instructed. 60 tablet 1     baclofen (LIORESAL) 10 MG tablet Take 1 tablet (10 mg total) by mouth 3 (three) times a day. 90 tablet 0     HYDROcodone-acetaminophen 5-325 mg per tablet Take 1 tablet by mouth every 4 (four) hours as needed for pain. 8 tablet 0     HYDROcodone-acetaminophen 5-325 mg per tablet Take 1-2 tablets by mouth every 6 (six) hours as needed for pain. 18 tablet 0     ondansetron (ZOFRAN ODT) 4 MG disintegrating tablet Take 1 tablet (4 mg total) by mouth every 8 (eight) hours as needed for nausea. 15 tablet 0     rizatriptan (MAXALT-MLT) 10 MG disintegrating tablet Take 1 tablet (10 mg total) by mouth as needed for migraine. May repeat in 2 hours if needed 12 tablet 2     No current facility-administered medications for this visit.           Objective:    Vitals:    06/05/19 1058   BP: 120/80   Pulse: 89   SpO2: 98%   Weight: 152 lb (68.9 kg)      Body mass index is  25.29 kg/m .    Alert.  No apparent distress.  HEENT exam with conjunctiva clear.  Pupils equal round and reactive to light.  Cranial nerve exam intact.  Neck supple.  Tremors warm and dry.  AFO braces bilateral lower extremities.  Ambulates with assistance of cane.      HEAD MRI WITHOUT IV CONTRAST  9/9/2016 6:51 PM     INDICATION: Right monoparesis  TECHNIQUE: Head MRI without intravenous contrast.  COMPARISON: CT 09/04/2016     FINDINGS: On the diffusion-weighted images there is no evidence of acute ischemia. The ventricular system, basal cisterns and the cortical sulci are fairly symmetric and within normal limits for the patient's age. There is no discrete mass lesion or   midline shift. There is no extra-axial fluid collection or intracranial hemorrhage noted. Appropriate flow voids are seen within the cavernous portions of the internal carotid arteries and the basilar artery. There is no abnormal signal noted within the   brain parenchyma. There is no evidence of cerebellar tonsillar ectopia. Corpus callosum and the sella region appropriate configuration and signal intensity for the patient's age. The orbit regions are unremarkable. The mastoid air cells and the middle   ear regions are clear. There is almost complete opacification of the frontal sinuses and the maxillary sinuses bilaterally along with almost complete opacification of the right sphenoid sinus. There appears to be lobulated soft tissue extending from the   right nasal cavity into the right nasopharynx which may represent a polyp.     IMPRESSION:   CONCLUSION:  1.  Ventricular system within normal limits for age.     2.  No discrete mass lesion, hemorrhage or focal area of acute ischemia.     3.  No abnormal signal noted within the brain parenchyma.     4.  Prominent sinus disease as described above along with possible polyp involving the posterior right nasal cavity and the right nasopharynx. Recommend direct visualization.      This note has  been dictated using voice recognition software and as a result may contain minor grammatical errors and unintended word substitutions.

## 2021-05-29 NOTE — ANESTHESIA CARE TRANSFER NOTE
Last vitals:   Vitals:    05/22/19 1147   BP: 129/86   Pulse: 87   Resp: 14   Temp: 36.2  C (97.1  F)   SpO2: 100%     Patient's level of consciousness is drowsy  Spontaneous respirations: yes  Maintains airway independently: yes  Dentition unchanged: yes  Oropharynx: oropharynx clear of all foreign objects    QCDR Measures:  ASA# 20 - Surgical Safety Checklist: WHO surgical safety checklist completed prior to induction    PQRS# 430 - Adult PONV Prevention: 4558F - Pt received => 2 anti-emetic agents (different classes) preop & intraop  ASA# 8 - Peds PONV Prevention: NA - Not pediatric patient, not GA or 2 or more risk factors NOT present  PQRS# 424 - Mayuri-op Temp Management: 4559F - At least one body temp DOCUMENTED => 35.5C or 95.9F within required timeframe  PQRS# 426 - PACU Transfer Protocol: - Transfer of care checklist used  ASA# 14 - Acute Post-op Pain: ASA14B - Patient did NOT experience pain >= 7 out of 10

## 2021-05-29 NOTE — TELEPHONE ENCOUNTER
ANIVAL ZAIDI received voicemail message from Anand with [facility].  This program reserves a number of public housing units for people with complex medical diagnoses and provided support/services.  Anand states he spoke to pt and spouse and they declined housing as they are looking for subsidized rent but no services.  Anand canceled housing application.

## 2021-05-29 NOTE — PROGRESS NOTES
PAIN CLINIC FOLLOW UP PROGRESS NOTE    CC:  Chief Complaint   Patient presents with     Follow-up     back, rt leg and rt arm pain       HPI  Anand Felix is a 34 y.o. male who presents for evaluation   Chief Complaint   Patient presents with     Follow-up     back, rt leg and rt arm pain    that is causing continued pain.The patient denies any new medications, diagnoses since the last visit. 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       Major issues:  1. Chronic pain syndrome    2. Arthralgias In Multiple Sites    3. Myalgia    4. Nasal polyposis    5. Lumbar pain determined by palpation and/or percussion at L1 (5/4/16)        Pain level: On a scale of 1-10, the patient rates their pain today 5 previous assessment was a 1/10, he reports that he does feel that it is because he has not felt well and is not taking his breakthrough medicine for this.   Pain is described: Sharp, deep, burning, tearing when active  Aggravating factors: Any activity  Alleviating factors: Salt baths, hot and cold packs, medications, laying down  New pain:  None  Since last visit, pain has improved  Associated Symptoms: Numbness in the right leg and arm, weakness all over his body  Pain interferes with: Sleep, walking, activities of daily living        Previous Medical History  Social History     Substance and Sexual Activity   Alcohol Use Yes    Comment: Monthly or less often. 1-2 drinks at a time.     Social History     Substance and Sexual Activity   Drug Use Yes     Types: Marijuana     Social History     Tobacco Use   Smoking Status Never Smoker   Smokeless Tobacco Never Used       Pertinent Pain Medications/interventions:  He  currently is taking Belbuca 75 mcg, gabapentin 100 mg  "three times a day, oxycodone 5 mg up to two times a day as needed, medical cannabis     RFA did provide him with 80 % relief.     Medical cannabis     Review of Systems:  12 point systems were reviewed with pt as documented on pt health form and the patient denies any new diagnosis or changes in 12 point system review since the last visit. Headache and nausea now for the past 1 month    Physical Exam  Vitals:    06/14/19 1404   BP: 122/76   Patient Site: Left Arm   Patient Position: Sitting   Cuff Size: Adult Regular   Pulse: 82   Weight: 152 lb (68.9 kg)   Height: 5' 5\" (1.651 m)     General- patient is alert and oriented, in NAD, well-groomed, well-nourished  Psych- Judgment and insight normal, AOx4, recent and remote memory normal, mood and affect normal  Eyes- pupils are equal and reactive, conjunctiva is clear bilaterally, no ptosis is noted.   Respiratory- breathing is non-labored  Cardiovascular- extremities warm and well perfused, no peripheral edema or varicosities.  Musculoskeletal- gait is normal, extremities with no joint swelling, erythema, or warmth. Low back pain and multi site joint pain throughout.  Neuro- normal strength, no gait abnormalities, normal sensation to pain, temperature, light touch. Occiput headache  Integumentary- no rashes, dermatitis or discolorations noted throughout, no open wounds noted.        Medications    Current Outpatient Medications:      acetaminophen (TYLENOL) 500 MG tablet, Take 1 tablet (500 mg total) by mouth every 6 (six) hours as needed for pain., Disp: , Rfl: 0     atenolol (TENORMIN) 25 MG tablet, TAKE 1 TABLET BY MOUTH EVERY DAY, Disp: 90 tablet, Rfl: 1     buprenorphine (BUTRANS) 7.5 mcg/hour PTWK patch, Place 1 patch on the skin every 7 days., Disp: 4 patch, Rfl: 0     cholecalciferol, vitamin D3, 2,000 unit cap, Take 2,000 Units by mouth daily., Disp: , Rfl:      fluticasone (FLONASE) 50 mcg/actuation nasal spray, INSTILL 2 SPRAYS INTO EACH NOSTRIL DAILY., " Disp: 48 g, Rfl: 2     gabapentin (NEURONTIN) 100 MG capsule, Take 100 mg by mouth 3 (three) times a day. Ok to reduce to two times a day if too drowsy, Disp: 90 capsule, Rfl: 0     NON FORMULARY,    , Disp: , Rfl:      omeprazole (PRILOSEC) 20 MG capsule, TAKE 1 CAPSULE (20 MG TOTAL) BY MOUTH DAILY., Disp: 90 capsule, Rfl: 3     oxyCODONE (ROXICODONE) 5 MG immediate release tablet, Take 5 mg by mouth 2 (two) times a day., Disp: , Rfl:      rizatriptan (MAXALT-MLT) 10 MG disintegrating tablet, Take 1 tablet (10 mg total) by mouth as needed for migraine. May repeat in 2 hours if needed, Disp: 12 tablet, Rfl: 2     warfarin (COUMADIN/JANTOVEN) 5 MG tablet, Take 1/2 to 1 tablet (2.5 to 5 mg) by mouth daily. Adjust dose per INR results as instructed., Disp: 60 tablet, Rfl: 1     buprenorphine HCl (BELBUCA) 300 mcg Film, Apply 300 mcg to cheek 2 (two) times a day., Disp: 60 each, Rfl: 0     lidocaine (LIDODERM) 5 %, Remove & Discard patch within 12 hours or as directed by MD, Disp: 15 patch, Rfl: 0     tiZANidine (ZANAFLEX) 2 MG tablet, Take 1 tablet (2 mg total) by mouth every 6 (six) hours as needed., Disp: 90 tablet, Rfl: 0    Lab:  Last UDS on 2/19 had expected results. Any abnormal results have been discussed with the patient and may change the plan of care. Please see the scanned document from the outside lab under the media tab. This was reviewed with the patient.      Imaging:  No new imaging.      Recent   Dated 6/14/2019 was reviewed with the patient today.   Paper copy reviewed    Assessment:   Anand Felix is a 34 y.o. male seen in clinic today for   Chief Complaint   Patient presents with     Follow-up     back, rt leg and rt arm pain   . They are here for follow up and continued medical management of their pain. Today we reviewed the lab work of the UDT test and the results are expected because of the prescribed narcotics found in the urine drug screen.     I have also reviewed the information obtained from  the last visit encounter after the PATSY was signed and have asked any pertintent questions needed from other healthcare providers/family/patient to continue care and formulate a treatment plan.     Patient follows up today with his spouse and notes that he has not felt good for the past 1 month and notes that he has a terrible headaches and a stomach act. He has presented to the ED for which they diagnosed him with costocondritis. Patient reports his chest wall no longer hurts but his stomach does. Discussed seasonal allergies with him today, he notes that he is not able to take his medication because of the nausea that comes with it. From a pain perspective will not change the plan of care in regards to chronic pain treatment.     Patients insurance is changing their coverage and is going to cover the belbuca film but not the patch on 7/1- changes made on the prescription. Patient notes that he does not need any other refills for about 3 weeks. He knows to call for refills or mychart.     He notes the baclofen does not really seem to help will transition him to tizanidine for now. Trigger points may help his occipital headaches, ok to schedule with Dr. Welsh    Patients current MME is 46  Patient to call clinic for next month's prescription 5 days in advance. Based on the patients response to their previous narcotic therapy and quality of life, which includes their participation in ADLs, I recommend that the narcotics therapy continue, however the changes listed below will be incorporated into their plan of care.     Patient set goals to   1. To reduce his ongoing pain so that he can continue to function     Plan:   Interventions-    Follow up in 4 weeks to evaluate the effectiveness of the treatment interventions ordered today. Scheduling your appointment prior to leaving assists in reduction of running out of medication prior to the next appointment.     If you are receiving medications from the pain clinic, it  is your responsibility to call 3-5 days in advance for a refill. The clinic has up to five days to provide a refill for you.     Please bring any opioids or controlled substances in that are prescribed by the clinic for pill counts for every visit.     Bee pollen granules for any type of seasonal allergies or zyrtec( generic Cetazine)    Behavioral Health continue this as you need    Acupuncture- you will need to check with your insurance for coverage, get an intake packet from the  to schedule an appointment.    Injections- Dr. Welsh schedule trigger point injections for the right occiput and right trapezius     Stop the baclofen today Ok to start tizanidine     Call for any of your other refills as you need them, you still have approx. 3 weeks left.     Due to insurance changes in July for the next prescription will have to transition to belbuca from the butrans, will send in the script today so it can start July 1st will order a week early in the event there needs to be a PA.     Prescription Drug Management will be continued by the [facility] center  A narcotic contract was signed by the patient and  Patient is unable to get narcotics from other providers. They will be subject to random UAs.     As a reminder- chronic pain is generally stable, if you experience a new injury or new different pain it is expected that you present to the ED or urgent care for evaluation. DO NOT use your chronic pain medications to treat any new or different pain other then what it is intended for. We do not replace any lost or stolen prescriptions or provide early refills for overtaking your medications.        Orders placed today  Medications that were ordered today   Requested Prescriptions     Signed Prescriptions Disp Refills     tiZANidine (ZANAFLEX) 2 MG tablet 90 tablet 0     Sig: Take 1 tablet (2 mg total) by mouth every 6 (six) hours as needed.     buprenorphine HCl (BELBUCA) 300 mcg Film 60 each 0      Sig: Apply 300 mcg to cheek 2 (two) times a day.     Orders Placed This Encounter   Procedures     Ambulatory referral to Acupuncture     Referral Priority:   Routine     Referral Type:   Acupuncture     Referral Reason:   Evaluation and Treatment     Number of Visits Requested:   1       UDT/SWAB:  Patient required a random Urine Drug Testing, due to the need to comply with [place] [employer] Policy Guidelines and CDC Guideline for the use of any controlled substances. This is to ensure that patient is compliant with treatment, and monitor for risks such as diversion, abuse, or any other aberrant behaviors. Patient is either being considered for or taking a controlled substance. Unexpected findings will be discussed and treatment decision may be adjusted. Testing is being implemented across the board randomly w/o bias related to age, race, gender, socioeconomic status or [employer] affiliation.    The patient understand todays plan and has their questions answered in regards to expectations and current treatment plan.     SAFETY REMINDERS  No alcohol while taking controlled substances. Alcohol is not an illegal substance, it is unsafe to use in combination. It is a build up of substances in the body that can be extremely hazardous and may cause respirations to slow to a dangerous rate resulting in hospitalization, brain damage, or death.    Opioid medications have been associated with sharp rise in unintentional overdose and death.  Overdose is a condition characterized by the consumption in excess of a particular drug causing adverse effects. This can happen b/c you are sick, accidentally or intentionally took an extra dose, are on multiple medication that can interact. Someone took your medication and they are not use to the medication.  Symptoms of overdose include:   !breathing slow and shallow, erratic or not at all  !pinpoint pupils, hallucinations  !confusion  !muscle jerks, slack muscles   !extreme sleepiness  or loss of alertness   !awake but not able to talk   !face pale or clammy, vomiting, for lighter skinned people, the skin tone turns bluish purple, for darker skinned people, it turns grayish or ashen   If in a situation where overdose is a concern engage the emergency response system (dial 911).    In one study it was noted that 80% of unintentional overdoses occurred in people who were taking a combination of opioids and benzodiazepines.    Do not sell, loan, borrow or share your opioid medication with anyone. Deaths have occurred as a result of this practice. It is illegal and patients are being prosecuted.     Prevent unexpected access/loss of medication: Keep medication locked. Only carry what you need with you.    Dayna Lundberg, [facility], [facility]  [address]  [place] [address]  Ph: [phone]  Fax: [phone]

## 2021-05-29 NOTE — PROGRESS NOTES
"Assessment/Plan:    1. Abdominal pain, right upper quadrant  Right-sided abdominal pain more upper quadrant perhaps.  Lidocaine patch with some benefit.  Seen through ER x2 as well as recent gastroenterology referral.  Told to use ibuprofen while increasing omeprazole to twice daily dosing.  Patient may have constipation associated with chronic pain management.  Magnesium citrate half bottle to 1 bottle once may repeat in 24 hours if no significant benefit.  Will notify if persistent concerns or if worsening.  Continued use of MiraLAX plus Mayuri-Colace.    2. Acute nonintractable headache, unspecified headache type  Headache, improved following recent sinus surgery with atrochoanal polyp and inferior meatal antrostomy.  Some benefit described and will continue to monitor for ongoing improvement.    3. Nasal polyposis  Nasal polyposis status post polypectomy as noted above May 22, 2019.    4. Chronic pain syndrome  Chronic pain syndrome.  Use of lidocaine patch recently prescribed with some benefit for current abdominal complaints.    5. Gastroesophageal reflux disease without esophagitis  Patient on PPI and may increase to twice daily dosing if NSAID use.  Avoid significant NSAID duration.          Subjective:    Anand Felix is seen today for abdominal pain.  More right-sided.  2 ER visits most recently [facility], May 19, 2019.  Has had significant work-up done as noted below.  CT angiogram negative.  Nuclear medicine hepatobiliary scan completed.  CT abdomen and pelvis as well as abdominal ultrasound without obvious etiology.  Is on chronic pain management.  Has had some issues with constipation in the past.  Did have several episodes of vomiting last week and was not certain if he was exposed to a \"bug\" otherwise his significant other has not had any issues with GI distress.  Was seen by gastroenterology who said that he could use ibuprofen for abdominal complaints while increasing PPI from once daily up to twice daily. " " Underwent nasal polypectomy procedure May 22, 2019 with inferior meatal antrostomy procedure.  Some improvement in headache.  No vision change.  Comprehensive review of systems as above otherwise all negative.     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 Bernal follows  Children - none  Employed - working on disability - monoparesis  Surgeries - nasal surgery; low back surgery (Dr. Jeannine Hernandez, 2015) with residual right leg weakness  Tobacco use - none  ETOH - none  Family hx   Mother - living - high chol. Strokes. MI.   Father - living - unknown  Siblings - living - 4 sisters, 2 brother -    Bilateral AFO braces worn due to muscle weakness and foot drop    Past Surgical History:   Procedure Laterality Date     BACK SURGERY       MINIMALLY INVASIVE MICRODISCECTOMY LUMBAR SPINE Right 10/2015    L5-S1; Dr. Bettie Hernandez     AK NASAL/SINUS ENDOSCOPY,BX/RMV POLYP/DEBRID Right 5/22/2019    Procedure: RIGHT ENDOSCOPIC SINUS SURGERY;  Surgeon: Leo Wright MD;  Location: [facility];  Service: ENT     SINUS SURGERY  2014    Dr. Wright        Family History   Problem Relation Age of Onset     Depression Mother      Hyperlipidemia Mother      Other Mother         \"Ulcers\" - Aches and pains throughout her body.     Cancer Father      No Medical Problems Sister      No Medical Problems Brother      No Medical Problems Sister      No Medical Problems Sister      No Medical Problems Sister      No Medical Problems Brother      Thyroid disease Maternal Grandmother      Thyroid disease Cousin         Past Medical History:   Diagnosis Date     Anxiety      Bulging lumbar disc      Depression      History of anesthesia complications     Was hospitalized in October 2015 for weakness     History of blood clots     PE in 2017     Lumbar herniated disc 10/23/2015     Lumbar radiculopathy 11/25/2015     Shortness of breath      Tachycardia     After PE in         Social History "     Tobacco Use     Smoking status: Never Smoker     Smokeless tobacco: Never Used   Substance Use Topics     Alcohol use: Yes     Comment: Monthly or less often. 1-2 drinks at a time.     Drug use: Yes     Types: Marijuana        Current Outpatient Medications   Medication Sig Dispense Refill     acetaminophen (TYLENOL) 500 MG tablet Take 1 tablet (500 mg total) by mouth every 6 (six) hours as needed for pain.  0     atenolol (TENORMIN) 25 MG tablet TAKE 1 TABLET BY MOUTH EVERY DAY 90 tablet 1     baclofen (LIORESAL) 10 MG tablet Take 1 tablet (10 mg total) by mouth 3 (three) times a day. 90 tablet 0     buprenorphine (BUTRANS) 7.5 mcg/hour PTWK patch Place 1 patch on the skin every 7 days. 4 patch 0     cholecalciferol, vitamin D3, 2,000 unit cap Take 2,000 Units by mouth daily.       fluticasone (FLONASE) 50 mcg/actuation nasal spray INSTILL 2 SPRAYS INTO EACH NOSTRIL DAILY. 48 g 2     gabapentin (NEURONTIN) 100 MG capsule Take 100 mg by mouth 3 (three) times a day. Ok to reduce to two times a day if too drowsy 90 capsule 0     HYDROcodone-acetaminophen 5-325 mg per tablet Take 1 tablet by mouth every 4 (four) hours as needed for pain. 8 tablet 0     HYDROcodone-acetaminophen 5-325 mg per tablet Take 1-2 tablets by mouth every 6 (six) hours as needed for pain. 18 tablet 0     lidocaine (LIDODERM) 5 % Remove & Discard patch within 12 hours or as directed by MD 15 patch 0     omeprazole (PRILOSEC) 20 MG capsule TAKE 1 CAPSULE (20 MG TOTAL) BY MOUTH DAILY. 90 capsule 3     ondansetron (ZOFRAN ODT) 4 MG disintegrating tablet Take 1 tablet (4 mg total) by mouth every 8 (eight) hours as needed for nausea. 15 tablet 0     warfarin (COUMADIN/JANTOVEN) 5 MG tablet Take 1/2 to 1 tablet (2.5 to 5 mg) by mouth daily. Adjust dose per INR results as instructed. 60 tablet 1     No current facility-administered medications for this visit.           Objective:    Vitals:    05/24/19 1438   BP: 124/84   Patient Site: Left Arm    Patient Position: Sitting   Cuff Size: Adult Regular   Pulse: 70   SpO2: 94%   Weight: 157 lb 9.6 oz (71.5 kg)      Body mass index is 26.23 kg/m .    Alert.  Transfers slowly.  Uses cane to assist with ambulation.  General weakness.  Chest clear however diminished breath sounds.  Cardiac exam regular.  Abdomen without guarding or rebound.  Lidocaine patch right upper quadrant noted.  Positive bowel sounds however somewhat reduced however not high-pitched.  No bloating.  Extremities warm and dry.        EXAM: CT ABDOMEN PELVIS WO ORAL W IV CONTRAST  LOCATION: [facility]  DATE/TIME: 5/12/2019 6:49 PM     INDICATION: RLQ abdominal pain. Concern for appendicitis  COMPARISON: None.  TECHNIQUE: Helical enhanced thin-section CT scan of the abdomen and pelvis was performed following injection of IV contrast. Multiplanar reformats were obtained. Dose reduction techniques were used.  CONTRAST: Iohexol (Omni) 100 mL     FINDINGS:   LUNG BASES: Negative.     ABDOMEN: Mild fatty infiltration of the liver. Spleen, pancreas, adrenal glands, gallbladder, and kidneys are normal. No adenopathy.     PELVIS: Normal appendix. No evidence for bowel obstruction. No ascites or adenopathy.     MUSCULOSKELETAL: Negative.     IMPRESSION:   CONCLUSION:   1.  Normal appendix. No findings to account for the patient's right lower quadrant pain.        EXAM: US ABDOMEN LIMITED  LOCATION: [facility]  DATE/TIME: 5/12/2019 10:22 PM     INDICATION: abdominal pain  COMPARISON: None.     FINDINGS:  GALLBLADDER: No visible cholelithiasis. Wall thickness upper normal. Sonographic Ricci's sign negative.  BILE DUCTS: No visible bile duct dilation. Common duct obscured by bowel gas.  LIVER: Grossly within normal limits where seen but partially obscured.  RIGHT KIDNEY: No hydronephrosis.  PANCREAS: Obscured by bowel gas.     No visible ascites in the right upper quadrant.     IMPRESSION:   CONCLUSION:  1.  No visible cholelithiasis or  biliary dilatation. Sonographic Ricci's sign negative.  2.  Gallbladder wall thickness upper normal.        EXAM: NM HEPATOBILIARY W EF OR PHARM  LOCATION: [facility]  DATE/TIME: 5/13/2019 3:50 PM     INDICATION: ruq pain  COMPARISON: CT 05/12/2019 and ultrasound 05/12/2019  TECHNIQUE: Following the administration of 7.83 mCi of Tc-99m mebrofenin intravenously, anterior planar imaging of the abdomen was obtained for 60 minutes. 1.4 mcg of cholecystokinin analogue were then administered intravenously, and the gallbladder was   imaged for an additional 30 minutes to assess ejection fraction.     FINDINGS: Prompt uptake of agent the liver and prompt excretion into the biliary system. Gallbladder normally fills. Excretion into the small bowel identified. Ejection fraction 89%.     IMPRESSION:   CONCLUSION:  Normal hepatobiliary study.        EXAM: CTA CHEST PE RUN  LOCATION: [facility]  DATE/TIME: 5/19/2019 1:52 PM     INDICATION: R lower chest pain, h/o PE   COMPARISON: Abdomen CT 05/12/2019  TECHNIQUE: Helical acquisition through the chest was performed during the arterial phase of contrast enhancement using IV contrast. 2D and 3D reconstructions were performed by the CT technologist. Dose reduction techniques were used.   IV CONTRAST: Iohexol (Omni) 100 mL     FINDINGS:  ANGIOGRAM CHEST: Excellent opacification of pulmonary arteries and branches. No evidence of pulmonary emboli.     RV/LV RATIO: N/A     LUNGS AND PLEURA: Unremarkable.     MEDIASTINUM: No adenopathy. No coronary artery calcifications.     LIMITED UPPER ABDOMEN: Unremarkable.     MUSCULOSKELETAL: Unremarkable.     IMPRESSION:   CONCLUSION:  1.  No pulmonary emboli.        This note has been dictated using voice recognition software and as a result may contain minor grammatical errors and unintended word substitutions.

## 2021-05-29 NOTE — TELEPHONE ENCOUNTER
ANTICOAGULATION  MANAGEMENT    Assessment     Today's INR result of 3.1 is Supratherapeutic (goal INR of 2.0-3.0)        Warfarin taken as previously instructed    Decreased greens/vitamin K intake may be affecting INR     Has been having stomach ache and headache. Saw PCP at  today.    Interaction between Ibuprofen and warfarin may be affecting INR. Pt is discontinuing Ibuprofen today.    Continues to tolerate warfarin with no reported s/s of bleeding or thromboembolism     Previous INR was Subtherapeutic    Plan:     Spoke with Anand regarding INR result and instructed:     Warfarin Dosing Instructions:  Continue current warfarin dose 5 mg daily on Fri; and 2.5 mg daily rest of week  (0 % change)    Pt will try to eat a little more greens.   Instructed patient to follow up no later than: 2 weeks.     Education provided: importance of taking warfarin as instructed and no interaction anticipated between warfarin and rizatriptan    Anand verbalizes understanding and agrees to warfarin dosing plan.    Instructed to call the [facility] for any changes, questions or concerns. (#[phone])   ?   Gus Byrne RN    Subjective/Objective:      Anand Felix, a 34 y.o. male is on warfarin.     Anand reports:     Home warfarin dose: verbally confirmed home dose with pt and updated on anticoagulation calendar     Missed doses: No     Medication changes:  Yes: has been taking Ibuprofen, but stopping today. Starting PRN Rizatriptan.      S/S of bleeding or thromboembolism:  No     New Injury or illness:  Yes: headache and stomach ache     Changes in diet or alcohol consumption:  Yes: decreased greens.      Upcoming surgery, procedure or cardioversion:  No    Anticoagulation Episode Summary     Current INR goal:   2.0-3.0   TTR:   68.5 % (1 y)   Next INR check:   6/19/2019   INR from last check:   3.10! (6/5/2019)   Weekly max warfarin dose:      Target end date:   11/29/2018   INR check location:      Preferred lab:       Send INR reminders to:   CHARLENE ART    Indications    Bilateral pulmonary embolism (H) [I26.99]           Comments:            Anticoagulation Care Providers     Provider Role Specialty Phone number    Malachi Choudhary MD Referring Family Medicine [phone]

## 2021-05-30 VITALS — WEIGHT: 156.4 LBS | BODY MASS INDEX: 26.03 KG/M2

## 2021-05-30 NOTE — PROGRESS NOTES
Injection - Other  Date/Time: 6/26/2019 1:25 PM  Performed by: Edwardo Welsh MD  Authorized by: Edwardo Welsh MD   Comments:     TRIGGER POINT INJECTION  Performed on: 6/26/2019    Mr. Felix is a 34-year-old man with neck and shoulder pain.  He recently had a lumbar radiofrequency ablation procedure and felt this has given him some relief.  Today the neck and shoulders are bothering him.  He has some tenderness and this is felt to be myofascial type pain.  He is here for trigger point injections.    Pre Procedure Diagnosis:  Myofascial pain  Vital Signs:  As per intra-procedure documentation    The procedure was discussed with Anand Felix in detail along with the attendant risks, including but not limited to: nerve injury, infection, bleeding, allergic reaction, or worsening of pain.  Informed consent was obtained and patient elected to proceed.    After informed consent was obtained the patient was seated in the examination chair.  The neck and shoulder areas were prepped sterilely.  A one 1/4 inch number 27-gauge needle was used.  There were injections made in the mid and lower cervical paraspinal muscles bilaterally.  Injections made in the mid and upper portion of the upper trapezius muscles on both sides.  There also was a trigger point injected in the right levator scapula muscle.  A total of 10 mL of 0.25% Marcaine with 10 mg of Decadron was used in divided doses.    The patient tolerated the procedure well.  There were no complications.      Pre Procedure Pain Scale: 7  Pain Score:   6    If Anand Felix has any questions or concerns after discharge, he was instructed to call us.

## 2021-05-30 NOTE — PROGRESS NOTES
"    S= Situation- Received a request from the [facility] for patient to transition to a Wellness Plan.  B= Background- Patient was able to apply for Disability,  Applied for housing however, \"declined housing as they are looking for subsidized rent but no services.\"   PHM: bilat PE, chronic pain syndrome, non-intractable headaches, weakness, sinus tachy  A= Action Step- Chart Review completed.  Will notify PCP  R= Recommendation- All goals completed, no new goals identified.  Patient will transition to a Wellness Plan.    Pulmonary Embolism (PE)  A pulmonary embolus is most often due to a blood clot that develops in a deep vein of the leg (deep vein thrombosis). If that clot, breaks loose and travels to the lung, it is called a pulmonary embolism (PE). This can cut off the flow of blood in the lungs.  A blood clot in the lungs is a medical emergency and may cause death.   Healthcare providers use the term venous thromboembolism (VTE) to describe these 2 conditions: deep vein thrombosis and pulmonary embolism. They use the term VTE because the 2 conditions are very closely related. And, because their prevention and treatment are also closely related.    How is pulmonary embolism diagnosed?  Your healthcare provider examines you and asks about your symptoms and health history. You may also have one or more of the following:    Blood tests to check for blood clotting or other problems    Imaging tests to look for clots in the veins or lung    Electrocardiography (ECG) to test how well the heart is working  How is pulmonary embolism treated?    Blood-thinning medicines (anticoagulants). These medicines thin the blood. They may be given as a pill, as an injection, or through a tube into a vein (intravenous or IV). Blood thinners help prevent more blood clots from forming. They also help to prevent an existing clot from getting larger.    Thrombolysis. Thrombolytic medicines are used to quickly dissolve a blood clot. A long, " narrow tube (catheter) is used to deliver medicine directly to the clot. Thrombolytic medicines increase the risk of bleeding so they are used very carefully.    Inferior vena cava (IVC) filter surgery. The vena cava is the body s largest vein. It carries blood from the body to the heart. A small filter traps blood clots in the lower body and prevents them from traveling to the lungs. The filter is inserted into the vein through a catheter. The filter may be used if blood thinners cannot be taken or if they don't work.     Pulmonary embolectomy. This is a procedure to remove a blood clot in the lungs. It may be done with surgery or with a catheter inserted in the body. It may be done when other treatments aren't safe or don't work.  What are the long-term concerns?  With treatment, blood clots are usually dissolved or removed. Some treatments can even help prevent future clots. But having a PE can put you at risk for another life-threatening blood clot. So, you will likely need to take anticoagulants to help keep blood clots from forming again. You may need to take this medicine for months or years.  You may also need to make lifestyle changes. This may include getting more active and eating healthier. You may need to wear elastic (compression) stockings and and take breaks on long trips.     Call 911  Call 911 or get emergency help if you have symptoms of a blood clot that has traveled to the lungs. The symptoms include:    Chest pain    Trouble breathing    Coughing (may cough up blood)    Fainting    Fast heartbeat    Sweating  Call 911 if you have heavy or uncontrolled bleeding.  When to call your healthcare provider  Call your healthcare provider if you have swelling or pain in your leg, arm, or other area. These are symptoms of a blood clot.  You may have bleeding if you take medicine to help prevent blood clots.  Call your healthcare provider if you have signs or symptoms of bleeding. This includes:    Blood  in the urine    Bleeding with bowel movements    Bleeding from the nose, gums, a cut, or vagina      Depression  Everyone feels down at times. The blues are a natural part of life. But an unhappy period that s intense or lasts for more than a couple of weeks can be a sign of depression. Depression is a serious illness. It can disrupt the lives of family and friends. If you know someone you think may be depressed, find out what you can do to help.    Know the serious signals  Warning signals for suicide include:    Threats or talk of suicide    Statements such as  I won t be a problem much longer  or  Nothing matters     Giving away possessions or making a will or  arrangements    Buying a gun or other weapon    Sudden, unexplained cheerfulness or calm after a period of depression  If you notice any of these signs, get help right away. Call a health care professional, mental health clinic, or suicide hotline and ask what action to take. In an emergency, don t hesitate to call the police.    [facility] Mental Health Crisis Lines:  [facility] [phone]  [facility] [phone]  [facility] [phone]  [facility] [phone]  [facility], Adults [phone]  [facility], Children [phone]  [facility], Adults [phone]  [facility], Children [phone]  Managing Chronic Pain: Medicines  Medicines can help you live better with chronic pain. You may use over-the-counter or prescription medicines. It can take some time and trial and error to work out the best treatment plan for you. Work with your health care provider to find the best medicines for you, and to use them safely and effectively.  Tell your health care provider about all medicines you`re taking, including herbs and vitamins.   A part of your treatment plan  Depending on your situation and the type of pain, you may take medicines:    At times when pain is more intense than usual.    For pain relief  throughout the day.    Before activities that tend to trigger pain, like going shopping or doing physical therapy.     To decrease sensitivity to pain and help you sleep.  There are 4 major groupings of medicines for the treatment of chronic pain:  Non-opioids. These include the commonly used medicine acetaminophen as well as the non-steroidal anti-inflammatory drugs (NSAIDs), like aspirin and ibuprofen, naproxen sodium, and ketoprofen. These all help control pain but NSAIDs also help relieve inflammation. These drugs are available over-the-counter. Some NSAIDS are available by prescription only.  Acetaminophen can cause liver damage if taken above the recommended dose. NSAIDs may cause stomach problems like bleeding ulcers. Using them over the long-term can cause heart problems and stroke in a very small number of people. None of these drugs is addictive.  Opioids. This includes drugs, like morphine, oxycodone, codeine, fentanyl, and methadone. Opioids may be used to treat breakthrough pain or severe chronic pain. Opioids are available only by prescription. These medicines may be effective for managing chronic pain. But they are controversial because of their side effects and because they can be addictive.    Adjuvants. This group includes medicines that were originally made to treat other conditions, but were also found to relieve pain. Examples of adjuvant drugs include antidepressants and anticonvulsants.  Antidepressants. These help pain by working on the same brain chemicals that play a role in depression. They also help improve sleep. Tricyclic antidepressants are 1 group of antidepressants used for treating chronic pain caused by nerve injury (neuropathic pain). Examples include amitriptyline, nortriptyline, and desipramine. Serotonin-norepinephrine reuptake inhibitors (SNRIs), like duloxetine and milnacipran, are also used.  Some types of antidepressants are used in low doses for sleep problems. They may  also be prescribed if you are very sensitive to pain or some kinds of nerve pain.  Anticonvulsants, developed to prevent seizures, can help certain pain conditions, particularly nerve (neuropathic) pain. Examples include gabapentin and pregabalin.  Other pain medicines    Topical. These medicines, like lidocaine or capsaicin, are applied to the skin to treat pain in one location.    Muscle relaxants. These may be used to stop painful muscle spasms. Drugs like cyclobenzaprine can be sedating.  Taking medicine safely    Take your medicine on time and in the right dose as prescribed.    Tell your health care professional if your medicine doesn't relieve your pain or work for a long enough time, or if you have side effects.    Don't take other people's medicines. They may not be safe for you.  Avoid alcohol, tobacco, and illegal drugs. These may interact with your medicines causing you harm.

## 2021-05-30 NOTE — PROGRESS NOTES
Scheduled F/U Call:  Attempt 2    Attempt 1: Care Guide called patient.  If this patient is returning my call please transfer to Sylvia Conley at ext [phone].    Email: [email or web address]  Marital Status: -Miguel Felix  Children: No  Born and Raised:   Occupation: On Disability  Living Situation: Lives in [place] with his wife.  Smoking, Alcohol, other:  Services receiving: SNAP, [place] (Case # [id number]), SMRT (Case #[id number])  [facility] worker: Leo Avendaño: Phone: [phone]  [facility] Worker:  Jose Roberto Green: Phone: [phone]; Fax: [phone]  [facility] Follow up s: Every six weeks  CCC/[facility] Enrollment: January 19th 2018  Next [facility] Follow up: 7/2/19    Astrid Molina, DYAN          [facility] SW received voicemail message from Anand with [facility].  This program reserves a number of public housing units for people with complex medical diagnoses and provided support/services.  Anand states he spoke to pt and spouse and they declined housing as they are looking for subsidized rent but no services.  Anand canceled housing application.

## 2021-05-30 NOTE — PROGRESS NOTES
PAIN CLINIC FOLLOW UP PROGRESS NOTE    CC:  Chief Complaint   Patient presents with     Neck Pain     Shoulder Pain       HPI  Anand Felix is a 34 y.o. male who presents for evaluation   Chief Complaint   Patient presents with     Neck Pain     Shoulder Pain    that is causing continued pain. Since the last visit the patient denies any trips to the urgent care or ED specifically for their pain. The patient denies any new medications, diagnoses since the last visit. Specific questions indicated the patient wanted addressed today include: to follow up on his ongoing chronic pain as well as discuss his response to the belbuca he has noted headaches with the use of the belbuca     Major issues:  1. Cervicalgia    2. Chronic pain syndrome    3. Myalgia    4. Arthralgias In Multiple Sites    5. Chronic low back pain without sciatica, unspecified back pain laterality      Today the pain is located in their neck and right shoulder, leg and back and is described as sharp burning achy and tight when it is aggravated it lasts for constant, and is rated at a 5 on a scale of 1-10  Associated symptoms: Denies any loss of bladder control, fevers/chills, unintentional weight loss, weakness, numbness or pain that interferes with sleep.   Aggravating factors include: currently transitioning from butrans patch to oral film. Patient reports this Insurance required change has worsened his symptoms.   Alleviating factors: laying down, massage  Adverse effects of medications: NONE   Functional symptoms: affects his ability to care for himself as well as socialize and perform duties around the house  Current subjective treatment efficacy: Poor      Previous Medical History  Social History     Substance and Sexual Activity   Alcohol Use Yes    Comment: Monthly or less often. 1-2 drinks at a time.     Social History     Substance and Sexual Activity   Drug Use Yes     Types: Marijuana     Social History     Tobacco Use   Smoking Status Never  "Smoker   Smokeless Tobacco Current User       Pertinent Pain Medications/interventions:  He currently belbuca 300 mcg two times a day, gabapentin 100 mg three times a day, oxycodone 5 mg two times a day prn and medical cannabis, tizanidine    Previously taking Belbuca 75 mcg, gabapentin 100 mg three times a day, oxycodone 5 mg up to two times a day as needed, medical cannabis, baclofen and flexeril      RFA did provide him with 80 % relief.      Medical cannabis    Review of Systems:  12 point systems were reviewed with pt as documented on pt health form and the patient denies any new diagnosis or changes in 12 point system review since the last visit.     Physical Exam  Vitals:    07/12/19 1320   BP: 119/82   Patient Site: Right Arm   Patient Position: Sitting   Pulse: 85   Resp: 16   Weight: 152 lb (68.9 kg)   Height: 5' 5\" (1.651 m)     General- patient is alert and oriented, in NAD, well-groomed, well-nourished  Psych- Judgment and insight normal, AOx4, recent and remote memory normal, mood and affect normal  Eyes- pupils are equal and reactive, conjunctiva is clear bilaterally, no ptosis is noted.   Respiratory- breathing is non-labored  Cardiovascular- extremities warm and well perfused, no peripheral edema or varicosities.  Musculoskeletal- gait is normal, extremities with no joint swelling, erythema, or warmth. Low back pain and multi site joint pain throughout. Neck pain and right leg and arm pain   Neuro- normal strength, no gait abnormalities, normal sensation to pain, temperature, light touch. Occiput headache still   Integumentary- no rashes, dermatitis or discolorations noted throughout, no open wounds noted.  Medications    Current Outpatient Medications:      acetaminophen (TYLENOL) 500 MG tablet, Take 1 tablet (500 mg total) by mouth every 6 (six) hours as needed for pain., Disp: , Rfl: 0     atenolol (TENORMIN) 25 MG tablet, TAKE 1 TABLET BY MOUTH EVERY DAY, Disp: 90 tablet, Rfl: 1     " buprenorphine (BUTRANS) 7.5 mcg/hour PTWK patch, Place 1 patch on the skin every 7 days., Disp: 4 patch, Rfl: 0     [START ON 7/17/2019] buprenorphine HCl (BELBUCA) 300 mcg Film, Apply 300 mcg to cheek 2 (two) times a day., Disp: 60 each, Rfl: 0     cholecalciferol, vitamin D3, 2,000 unit cap, Take 2,000 Units by mouth daily., Disp: , Rfl:      fluticasone (FLONASE) 50 mcg/actuation nasal spray, INSTILL 2 SPRAYS INTO EACH NOSTRIL DAILY., Disp: 48 g, Rfl: 2     [START ON 8/5/2019] gabapentin (NEURONTIN) 100 MG capsule, TAKE 1 CAPSULE BY MOUTH THREE TIMES DAILY. OKAY TO REDUCE TO 2 TIMES A DAY IF TOO DROWSY, Disp: 84 capsule, Rfl: 0     lidocaine (LIDODERM) 5 %, Remove & Discard patch within 12 hours or as directed by MD, Disp: 15 patch, Rfl: 0     NON FORMULARY,    , Disp: , Rfl:      omeprazole (PRILOSEC) 20 MG capsule, TAKE 1 CAPSULE (20 MG TOTAL) BY MOUTH DAILY., Disp: 90 capsule, Rfl: 3     oxyCODONE (ROXICODONE) 5 MG immediate release tablet, Take 1 tablet (5 mg total) by mouth 2 (two) times a day., Disp: 56 tablet, Rfl: 0     rizatriptan (MAXALT-MLT) 10 MG disintegrating tablet, TAKE 1 TABLET BY MOUTH AS NEEDED FOR MIGRAINE. MAY REPEAT IN 2HRS IF NEEDED, Disp: 12 tablet, Rfl: 10     tiZANidine (ZANAFLEX) 2 MG tablet, Take 2 tablets (4 mg total) by mouth every 6 (six) hours as needed., Disp: 90 tablet, Rfl: 0     warfarin (COUMADIN/JANTOVEN) 5 MG tablet, Take 1/2 to 1 tablet (2.5 to 5 mg) by mouth daily. Adjust dose per INR results as instructed., Disp: 60 tablet, Rfl: 1    Lab:  Last UDS on 2/19 had expected results. Any abnormal results have been discussed with the patient and may change the plan of care. Please see the scanned document from the outside lab under the media tab. This was reviewed with the patient.      Imaging:  No new imaging.      Recent   Dated 7/12/2019 was reviewed with the patient today.   Paper copy reviewed    Assessment:   Anand Felix is a 34 y.o. male seen in clinic today for   Chief  Complaint   Patient presents with     Neck Pain     Shoulder Pain     They are here for follow up and continued medical management of their pain. Today we reviewed the lab work of the UDT test and the results are expected because of the prescribed narcotics found in the urine drug screen.     I have also reviewed the information obtained from the last visit encounter after the PATSY was signed and have asked any pertintent questions needed from other healthcare providers/family/patient to continue care and formulate a treatment plan.     Patient notes that he has had a headache with changing to the belbuca from the butrans patch. He just started this higher dose and knows it may take a week for the transition to occur. Patient is using short acting oxycodone 1-2 times per day for pain. He did have family provide a massage for him which helped some of his discomfort. Will order PT with myofascial release and cranial sacral therapy .     Patient is taking tizanidine and finds this beneficial. Medical cannabis is available for him based on finances when able.     Will continue to trial the belbuca and allow for an adjustment period. Legislative changes discussed. Trigger point injections did provide temporary relief.     Patients current MME is 33  Patient to call clinic for next month's prescription 5 days in advance. Based on the patients response to their previous narcotic therapy and quality of life, which includes their participation in ADLs, I recommend that the narcotics therapy continue, however the changes listed below will be incorporated into their plan of care.     Patient set goals to   1.treat his ongoing chronic pain     Plan:   Interventions-    Follow up in 8 weeks to evaluate the effectiveness of the treatment interventions ordered today. Scheduling your appointment prior to leaving assists in reduction of running out of medication prior to the next appointment.     If you are receiving medications from  the pain clinic, it is your responsibility to call 3-5 days in advance for a refill. The clinic has up to five days to provide a refill for you.     Please call the [facility] for refills.    Please bring any opioids or controlled substances in that are prescribed by the clinic for pill counts for every visit.     Therapy- PT/OT will order myofascial release as well as cranial sacral release at [facility] they will call you.     Trigger points, ok to repeat if needed     Will continue the Belbuca as insurance is preferring this, you not a headache with this change. Will trial 1-2 months longer for acclimation to the medications. If it is not tolerable can try to convert back to Butrans.     Will refill the short acting oxycodone 5 mg tabs today     Ok to continue the use of the tizanidine and the gabapentin.     Prescription Drug Management will be continued by the [facility]  A narcotic contract was signed by the patient and  Patient is unable to get narcotics from other providers. They will be subject to random UAs.     As a reminder- chronic pain is generally stable, if you experience a new injury or new different pain it is expected that you present to the ED or urgent care for evaluation. DO NOT use your chronic pain medications to treat any new or different pain other then what it is intended for. We do not replace any lost or stolen prescriptions or provide early refills for overtaking your medications.        Orders placed today  Medications that were ordered today   Requested Prescriptions     Signed Prescriptions Disp Refills     oxyCODONE (ROXICODONE) 5 MG immediate release tablet 56 tablet 0     Sig: Take 1 tablet (5 mg total) by mouth 2 (two) times a day.     buprenorphine HCl (BELBUCA) 300 mcg Film 60 each 0     Sig: Apply 300 mcg to cheek 2 (two) times a day.     tiZANidine (ZANAFLEX) 2 MG tablet 90 tablet 0     Sig: Take 2 tablets (4 mg total) by mouth every 6 (six) hours as  needed.     gabapentin (NEURONTIN) 100 MG capsule 84 capsule 0     Sig: TAKE 1 CAPSULE BY MOUTH THREE TIMES DAILY. OKAY TO REDUCE TO 2 TIMES A DAY IF TOO DROWSY     Orders Placed This Encounter   Procedures     Ambulatory referral to Physical Therapy     Referral Priority:   Routine     Referral Type:   Physical Therapy     Referral Reason:   Evaluation and Treatment     Requested Specialty:   Physical Therapy     Number of Visits Requested:   1       UDT/SWAB:  Patient required a random Urine Drug Testing, due to the need to comply with [facility] Model Policy Guidelines and CDC Guideline for the use of any controlled substances. This is to ensure that patient is compliant with treatment, and monitor for risks such as diversion, abuse, or any other aberrant behaviors. Patient is either being considered for or taking a controlled substance. Unexpected findings will be discussed and treatment decision may be adjusted. Testing is being implemented across the board randomly w/o bias related to age, race, gender, socioeconomic status or [facility] affiliation.    The patient understand todays plan and has their questions answered in regards to expectations and current treatment plan.     SAFETY REMINDERS  No alcohol while taking controlled substances. Alcohol is not an illegal substance, it is unsafe to use in combination. It is a build up of substances in the body that can be extremely hazardous and may cause respirations to slow to a dangerous rate resulting in hospitalization, brain damage, or death.    Opioid medications have been associated with sharp rise in unintentional overdose and death.  Overdose is a condition characterized by the consumption in excess of a particular drug causing adverse effects. This can happen b/c you are sick, accidentally or intentionally took an extra dose, are on multiple medication that can interact. Someone took your medication and they are not use to the medication.  Symptoms of  overdose include:   !breathing slow and shallow, erratic or not at all  !pinpoint pupils, hallucinations  !confusion  !muscle jerks, slack muscles   !extreme sleepiness or loss of alertness   !awake but not able to talk   !face pale or clammy, vomiting, for lighter skinned people, the skin tone turns bluish purple, for darker skinned people, it turns grayish or ashen   If in a situation where overdose is a concern engage the emergency response system (dial 911).    In one study it was noted that 80% of unintentional overdoses occurred in people who were taking a combination of opioids and benzodiazepines.    Do not sell, loan, borrow or share your opioid medication with anyone. Deaths have occurred as a result of this practice. It is illegal and patients are being prosecuted.     Prevent unexpected access/loss of medication: Keep medication locked. Only carry what you need with you.    Dayna Lundberg, [facility]  [address]  [place] [address]  Ph: [phone]  Fax: [phone]

## 2021-05-30 NOTE — PROGRESS NOTES
My Clinic Care Coordination Wellness Plan  This Maintenance Wellness Plan provides private information in regard to the work I have done with my Care Team at my Primary Care Clinic.  This document provides insight on the goals I have worked hard to achieve.  My Care Team congratulates me on my journey to become well.  With the assistance of the Clinic Care Coordination Team and my Primary Care Provider, I have succeeded in improving areas of my health that I identified as barriers to becoming well.  I will continue to seek wellness and use the skills I have obtained to further my journey.  My Community Health Worker will follow up with me in 2 months.  In the meantime, if I should have any questions or concerns I will contact my Community Health Worker.    [facility]  [address], [address] [place] [address]  [phone]    My Preferred Method of Contact:  Phone: [phone]    My Primary/Preferred Language:  English    Preferred Learning Style:  Reading information, Face to face discussion, Pictures/Diagrams and Hands on teaching    Emergency Contact: Extended Emergency Contact Information  Primary Emergency Contact: Miguel Felix   [facility]  Home Phone: [phone]  Relation: Spouse     PCP:  Malachi Choudhary MD  Specialists:    Care Team            Malachi Choudhary MD   [phone] PCP - General, Family Medicine    Estefany Groves MD   [phone] Physician, Hematology and Oncology    Fran Mcgovern MBBS   [phone] Physician, Rheumatology    Ham Churchill MD Physician, Pain Medicine    Alize Livingston, [facility]   [phone] [facility], Psychology    Sylvia Conley [facility]   [phone] Community Health Worker, Primary Care - [facility]. Phone: [phone]; Fax: [phone]    Bre Jara   [phone] , Primary Care -     Jazmin Hazel RN   [phone] Lead Care Coordinator, Primary Care -            Accomplishments:  Goals        Patient Stated      COMPLETED: I have accomlished applying for [employer] DIsability and finding a  to go through the process with. 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        COMPLETED: I have accomplished applying for and obtaining Medical Assistance. (pt-stated)      Personal Plan  If I am having trouble with my Medical Assistance I will call [employer] @ [phone].        COMPLETED: I have accomplished being approved for [employer]. (pt-stated)      Personal Plan  If I am having trouble with [employer] I will call them at [phone]        COMPLETED: I have accomplished finding assistance for my medical bills with the help of the [facility] and my Care Guide. (pt-stated)      Personal Plan  If I am having trouble with  Medical Assistance I will call [employer] @ [phone].        COMPLETED: I have accomplished getting established with doctors to help me get my pain to be at a manageable level. (pt-stated)      Personal Plan  If I am struggling with increased pain I will talk with the Pain clinic and my PCP.        COMPLETED: I have accomplished getting PCA services in my home.  (pt-stated)      Personal Plan  If I start to have trouble with my PCA I will contact the PCA agency and my waiver worker.             Advanced Directive/Living Will: The patient was given information regarding Adanced Directives/Living Will    Clinical Emergency Plan    Pulmonary Embolism (PE)  A pulmonary embolus is most often due to a blood clot that develops in a deep vein of the leg (deep vein thrombosis). If that clot, breaks loose and travels to the lung, it is called a pulmonary embolism (PE). This can cut off the flow of blood in the lungs.  A blood clot in the lungs is a medical emergency and may cause death.   Healthcare providers use the term venous thromboembolism  (VTE) to describe these 2 conditions: deep vein thrombosis and pulmonary embolism. They use the term VTE because the 2 conditions are very closely related. And, because their prevention and treatment are also closely related.    How is pulmonary embolism diagnosed?  Your healthcare provider examines you and asks about your symptoms and health history. You may also have one or more of the following:    Blood tests to check for blood clotting or other problems    Imaging tests to look for clots in the veins or lung    Electrocardiography (ECG) to test how well the heart is working  How is pulmonary embolism treated?    Blood-thinning medicines (anticoagulants). These medicines thin the blood. They may be given as a pill, as an injection, or through a tube into a vein (intravenous or IV). Blood thinners help prevent more blood clots from forming. They also help to prevent an existing clot from getting larger.    Thrombolysis. Thrombolytic medicines are used to quickly dissolve a blood clot. A long, narrow tube (catheter) is used to deliver medicine directly to the clot. Thrombolytic medicines increase the risk of bleeding so they are used very carefully.    Inferior vena cava (IVC) filter surgery. The vena cava is the body s largest vein. It carries blood from the body to the heart. A small filter traps blood clots in the lower body and prevents them from traveling to the lungs. The filter is inserted into the vein through a catheter. The filter may be used if blood thinners cannot be taken or if they don't work.     Pulmonary embolectomy. This is a procedure to remove a blood clot in the lungs. It may be done with surgery or with a catheter inserted in the body. It may be done when other treatments aren't safe or don't work.  What are the long-term concerns?  With treatment, blood clots are usually dissolved or removed. Some treatments can even help prevent future clots. But having a PE can put you at risk for another  life-threatening blood clot. So, you will likely need to take anticoagulants to help keep blood clots from forming again. You may need to take this medicine for months or years.  You may also need to make lifestyle changes. This may include getting more active and eating healthier. You may need to wear elastic (compression) stockings and and take breaks on long trips.      Call 911  Call 911 or get emergency help if you have symptoms of a blood clot that has traveled to the lungs. The symptoms include:    Chest pain    Trouble breathing    Coughing (may cough up blood)    Fainting    Fast heartbeat    Sweating  Call 911 if you have heavy or uncontrolled bleeding.  When to call your healthcare provider  Call your healthcare provider if you have swelling or pain in your leg, arm, or other area. These are symptoms of a blood clot.  You may have bleeding if you take medicine to help prevent blood clots.  Call your healthcare provider if you have signs or symptoms of bleeding. This includes:    Blood in the urine    Bleeding with bowel movements    Bleeding from the nose, gums, a cut, or vagina      Depression  Everyone feels down at times. The blues are a natural part of life. But an unhappy period that s intense or lasts for more than a couple of weeks can be a sign of depression. Depression is a serious illness. It can disrupt the lives of family and friends. If you know someone you think may be depressed, find out what you can do to help.     Know the serious signals  Warning signals for suicide include:    Threats or talk of suicide    Statements such as  I won t be a problem much longer  or  Nothing matters     Giving away possessions or making a will or  arrangements    Buying a gun or other weapon    Sudden, unexplained cheerfulness or calm after a period of depression  If you notice any of these signs, get help right away. Call a health care professional, mental health clinic, or suicide hotline and ask  what action to take. In an emergency, don t hesitate to call the police.     [facility] Mental Health Crisis Lines:  [facility] [phone]  [facility] [phone]  [facility] [phone]  [facility] [phone]  [facility], Adults [phone]  [facility], Children [phone]  [facility], Adults [phone]  [facility], Children [phone]  Managing Chronic Pain: Medicines  Medicines can help you live better with chronic pain. You may use over-the-counter or prescription medicines. It can take some time and trial and error to work out the best treatment plan for you. Work with your health care provider to find the best medicines for you, and to use them safely and effectively.  Tell your health care provider about all medicines you`re taking, including herbs and vitamins.   A part of your treatment plan  Depending on your situation and the type of pain, you may take medicines:    At times when pain is more intense than usual.    For pain relief throughout the day.    Before activities that tend to trigger pain, like going shopping or doing physical therapy.     To decrease sensitivity to pain and help you sleep.  There are 4 major groupings of medicines for the treatment of chronic pain:  Non-opioids. These include the commonly used medicine acetaminophen as well as the non-steroidal anti-inflammatory drugs (NSAIDs), like aspirin and ibuprofen, naproxen sodium, and ketoprofen. These all help control pain but NSAIDs also help relieve inflammation. These drugs are available over-the-counter. Some NSAIDS are available by prescription only.  Acetaminophen can cause liver damage if taken above the recommended dose. NSAIDs may cause stomach problems like bleeding ulcers. Using them over the long-term can cause heart problems and stroke in a very small number of people. None of these drugs is addictive.  Opioids. This includes drugs, like morphine, oxycodone,  codeine, fentanyl, and methadone. Opioids may be used to treat breakthrough pain or severe chronic pain. Opioids are available only by prescription. These medicines may be effective for managing chronic pain. But they are controversial because of their side effects and because they can be addictive.    Adjuvants. This group includes medicines that were originally made to treat other conditions, but were also found to relieve pain. Examples of adjuvant drugs include antidepressants and anticonvulsants.  Antidepressants. These help pain by working on the same brain chemicals that play a role in depression. They also help improve sleep. Tricyclic antidepressants are 1 group of antidepressants used for treating chronic pain caused by nerve injury (neuropathic pain). Examples include amitriptyline, nortriptyline, and desipramine. Serotonin-norepinephrine reuptake inhibitors (SNRIs), like duloxetine and milnacipran, are also used.  Some types of antidepressants are used in low doses for sleep problems. They may also be prescribed if you are very sensitive to pain or some kinds of nerve pain.  Anticonvulsants, developed to prevent seizures, can help certain pain conditions, particularly nerve (neuropathic) pain. Examples include gabapentin and pregabalin.  Other pain medicines    Topical. These medicines, like lidocaine or capsaicin, are applied to the skin to treat pain in one location.    Muscle relaxants. These may be used to stop painful muscle spasms. Drugs like cyclobenzaprine can be sedating.  Taking medicine safely    Take your medicine on time and in the right dose as prescribed.    Tell your health care professional if your medicine doesn't relieve your pain or work for a long enough time, or if you have side effects.    Don't take other people's medicines. They may not be safe for you.  Avoid alcohol, tobacco, and illegal drugs. These may interact with your medicines causing you harm.      All [facility] clinic  patients have access to a Nurse 24 hours a day, 7 days a week.  If you have questions or want advice from a Nurse, please know [facility] is here for you.  You can call your clinic and they will connect you or you can call [facility] at [phone].  [facility] also has Walk In Care clinics in multiple locations.  Call the number listed above for more information about our Walk In Care clinics or visit the [facility] website at [email or web address].

## 2021-05-30 NOTE — PATIENT INSTRUCTIONS - HE
Plan:   Interventions-    Follow up in 8 weeks to evaluate the effectiveness of the treatment interventions ordered today. Scheduling your appointment prior to leaving assists in reduction of running out of medication prior to the next appointment.     If you are receiving medications from the pain clinic, it is your responsibility to call 3-5 days in advance for a refill. The clinic has up to five days to provide a refill for you.     Please call the [facility] for refills.    Please bring any opioids or controlled substances in that are prescribed by the clinic for pill counts for every visit.     Therapy- PT/OT will order myofascial release as well as cranial sacral release at [facility] they will call you.     Trigger points, ok to repeat if needed     Will continue the Belbuca as insurance is preferring this, you not a headache with this change. Will trial 1-2 months longer for acclimation to the medications. If it is not tolerable can try to convert back to Butrans.     Will refill the short acting oxycodone 5 mg tabs today     Ok to continue the use of the tizanidine and the gabapentin.     Prescription Drug Management will be continued by the [facility]  A narcotic contract was signed by the patient and  Patient is unable to get narcotics from other providers. They will be subject to random UAs.     As a reminder- chronic pain is generally stable, if you experience a new injury or new different pain it is expected that you present to the ED or urgent care for evaluation. DO NOT use your chronic pain medications to treat any new or different pain other then what it is intended for. We do not replace any lost or stolen prescriptions or provide early refills for overtaking your medications.        Orders placed today  Medications that were ordered today   Requested Prescriptions     Signed Prescriptions Disp Refills     oxyCODONE (ROXICODONE) 5 MG immediate release tablet 56 tablet 0      Sig: Take 1 tablet (5 mg total) by mouth 2 (two) times a day.     buprenorphine HCl (BELBUCA) 300 mcg Film 60 each 0     Sig: Apply 300 mcg to cheek 2 (two) times a day.     tiZANidine (ZANAFLEX) 2 MG tablet 90 tablet 0     Sig: Take 2 tablets (4 mg total) by mouth every 6 (six) hours as needed.     gabapentin (NEURONTIN) 100 MG capsule 84 capsule 0     Sig: TAKE 1 CAPSULE BY MOUTH THREE TIMES DAILY. OKAY TO REDUCE TO 2 TIMES A DAY IF TOO DROWSY     Orders Placed This Encounter   Procedures     Ambulatory referral to Physical Therapy     Referral Priority:   Routine     Referral Type:   Physical Therapy     Referral Reason:   Evaluation and Treatment     Requested Specialty:   Physical Therapy     Number of Visits Requested:   1       UDT/SWAB:  Patient required a random Urine Drug Testing, due to the need to comply with [place] Model Policy Guidelines and CDC Guideline for the use of any controlled substances. This is to ensure that patient is compliant with treatment, and monitor for risks such as diversion, abuse, or any other aberrant behaviors. Patient is either being considered for or taking a controlled substance. Unexpected findings will be discussed and treatment decision may be adjusted. Testing is being implemented across the board randomly w/o bias related to age, race, gender, socioeconomic status or Hoahaoism affiliation.    The patient understand todays plan and has their questions answered in regards to expectations and current treatment plan.     SAFETY REMINDERS  No alcohol while taking controlled substances. Alcohol is not an illegal substance, it is unsafe to use in combination. It is a build up of substances in the body that can be extremely hazardous and may cause respirations to slow to a dangerous rate resulting in hospitalization, brain damage, or death.    Opioid medications have been associated with sharp rise in unintentional overdose and death.  Overdose is a condition characterized  by the consumption in excess of a particular drug causing adverse effects. This can happen b/c you are sick, accidentally or intentionally took an extra dose, are on multiple medication that can interact. Someone took your medication and they are not use to the medication.  Symptoms of overdose include:   !breathing slow and shallow, erratic or not at all  !pinpoint pupils, hallucinations  !confusion  !muscle jerks, slack muscles   !extreme sleepiness or loss of alertness   !awake but not able to talk   !face pale or clammy, vomiting, for lighter skinned people, the skin tone turns bluish purple, for darker skinned people, it turns grayish or ashen   If in a situation where overdose is a concern engage the emergency response system (dial 911).    In one study it was noted that 80% of unintentional overdoses occurred in people who were taking a combination of opioids and benzodiazepines.    Do not sell, loan, borrow or share your opioid medication with anyone. Deaths have occurred as a result of this practice. It is illegal and patients are being prosecuted.     Prevent unexpected access/loss of medication: Keep medication locked. Only carry what you need with you.    Dayna Lundberg, [facility]  [address]  [place] [address]  Ph: [phone]  Fax: [phone]

## 2021-05-30 NOTE — TELEPHONE ENCOUNTER
Anticoagulation Annual Referral Renewal Review    Anand Felix's chart reviewed for annual renewal of referral to anticoagulation monitoring.        Criteria for anticoagulation nurse and/or pharmacist renewal met   Warfarin indication: PE Yes , DVT/PE with previous provider documentation patient to be on extended anticoagulation   Current with INR monitoring/compliant Yes Yes   Date of last office visit 6/5/19 Yes, had office visit within last year   Time in Therapeutic Range (TTR) 81.31 % Yes, TTR > 60%       Anand Felix met all criteria for anticoagulation management program initiated renewal.  New INR standing orders and anticoagulation referral renewal placed.      Amanda Hamm RN  1:39 PM

## 2021-05-30 NOTE — TELEPHONE ENCOUNTER
ANTICOAGULATION  MANAGEMENT    Assessment     Today's INR result of 2.1 is Therapeutic (goal INR of 2.0-3.0)        Warfarin taken as previously instructed    No new diet changes affecting INR    No new medication/supplements affecting INR    Continues to tolerate warfarin with no reported s/s of bleeding or thromboembolism     Previous INR was Supratherapeutic    Plan:     Left a detailed message for Anand regarding INR result and instructed:     Warfarin Dosing Instructions:  Continue current warfarin dose 2.5 mg daily.  Instructed patient to follow up no later than: 2 weeks.    Education provided: importance of taking warfarin as instructed      Instructed to call the [facility] Clinic for any changes, questions or concerns. (#[phone])   ?   Gus Byrne RN    Subjective/Objective:      Anand Felix, a 34 y.o. male is on warfarin.     Anand reports:     Home warfarin dose: as updated on anticoagulation calendar per template     Missed doses: No     Medication changes:  Yes: buprenorphine (no interaction with warfarin noted)     S/S of bleeding or thromboembolism:  No     New Injury or illness:  No     Changes in diet or alcohol consumption:  No     Upcoming surgery, procedure or cardioversion:  No    Anticoagulation Episode Summary     Current INR goal:   2.0-3.0   TTR:   64.5 % (1.1 y)   Next INR check:   7/31/2019   INR from last check:   2.10 (7/17/2019)   Weekly max warfarin dose:      Target end date:   11/29/2018   INR check location:      Preferred lab:      Send INR reminders to:   CHARLENE ART    Indications    Bilateral pulmonary embolism (H) [I26.99]           Comments:            Anticoagulation Care Providers     Provider Role Specialty Phone number    Malachi Choudhary MD Referring Family Medicine [phone]

## 2021-05-30 NOTE — PATIENT INSTRUCTIONS - HE
POST PROCEDURE INSTRUCTIONS  PROCEDURE DONE TODAY:  Trigger Point Injections    Rest today. Resume activity tomorrow as able.  Patient demonstrates the ability to ambulate independently with a steady gait at the time of discharge.      It is not unusual to have a temporary increase in your pain after a procedure. You can ice the area 24-48 hrs. 15 min at a time.      You received a steroid injection. This medication can take 2-7 days to take effect. Some temporary side effects include:    Heartburn    Flushed-red face    Insomnia-trouble sleeping-jitters    Diabetics may see a rise in blood sugar for a few days    Low back or SI joint (sacroiliac) injection: you may experience numbness in one or both legs. Please walk with help. This is temporary and will wear off in a few hours. Do not drive if you are tingling, numbness or weakness in your hands/arms or legs/feet.    Headache:  If you experience a headache after a lumbar epidural injection, lie down and drink fluids (especially caffeine). The headache will go away gradually. If it persists notify our clinic.    Fever: call if fever 100 or over, redness/warmth/swelling over the injection site.    No public hot tubs/pools for a couple days    Physical therapy: check with pain center provider regarding resuming therapy.    Monitor your response to the injection and report this at your next visit.    If you have been referred for a procedure only, please call your referring provider for a follow up.    IF YOU PLAN TO GET A FLU SHOT YOU MUST WAIT 2 WEEKS AFTER THIS INJECTION.      CALL IF ANY QUESTIONS [phone]

## 2021-05-30 NOTE — TELEPHONE ENCOUNTER
Refill Approved    Rx renewed per Medication Renewal Policy. Medication was last renewed on 6/5/19.    Genia Covington, [facility] Triage/Med Refill 6/27/2019     Requested Prescriptions   Pending Prescriptions Disp Refills     rizatriptan (MAXALT-MLT) 10 MG disintegrating tablet [Pharmacy Med Name: RIZATRIPTAN 10 MG ODT] 12 tablet 2     Sig: TAKE 1 TABLET BY MOUTH AS NEEDED FOR MIGRAINE. MAY REPEAT IN 2HRS IF NEEDED       Triptans Refill Protocol Passed - 6/27/2019  9:22 AM        Passed - PCP or prescribing provider visit in past 12 months       Last office visit with prescriber/PCP: 6/5/2019 Malachi Choudhary MD OR same dept: 6/5/2019 Malachi Choudhary MD OR same specialty: 6/5/2019 Malachi Choudhary MD  Last physical: 5/1/2019 Last MTM visit: Visit date not found   Next visit within 3 mo: Visit date not found  Next physical within 3 mo: Visit date not found  Prescriber OR PCP: Malachi Choudhary MD  Last diagnosis associated with med order: 1. Nonintractable episodic headache, unspecified headache type  - rizatriptan (MAXALT-MLT) 10 MG disintegrating tablet [Pharmacy Med Name: RIZATRIPTAN 10 MG ODT]; TAKE 1 TABLET BY MOUTH AS NEEDED FOR MIGRAINE. MAY REPEAT IN 2HRS IF NEEDED  Dispense: 12 tablet; Refill: 2    If protocol passes may refill for 12 months if within 3 months of last provider visit (or a total of 15 months).

## 2021-05-30 NOTE — PROGRESS NOTES
7/15/2019    Start time: 4:05 PM    Stop Time: 4:55 PM   Session # 11    Anand Felix is a 34 y.o. male is being seen today for    Chief Complaint   Patient presents with     Mental Health Note   . Major depression, severe   Generalized Anxiety Disorder  Chronic pain syndrome    New symptoms or complaints: None    Functional Impairment:   Personal: 4  Family: 4  Work: 4  Social:4    Clinical assessment of mental status: Anand Felix presented on time.  He was open and cooperative, and dressed appropriately for this session and weather. His memory was in tact .  His  speech was appropriate. Language was appropriate.  Concentration and focus is on task. Psychosis is not noted or reported. He reports his mood is  depressed.  Affect is congruent with speech.  Fund of knowledge is adequate. Insight is adequate for therapy.     Patient updated paperwork this session: PHQ-9 depression score is 22 and indicates severe symptoms of depression, CAROLYN-7 anxiety score is 13 and  indicates moderately severe symptoms of anxiety, WHODAS score is 66.67%, H1= 24, H2=18, H3=12, PANSI score indicates risk for suicide.     Suicidal/Homicidal Ideation present: Patient reports suicidal ideation. He denies immediate plan or means. Discussed safety plan to call 911, go to the ER for evaluation and/or call the crisis connection if symptoms worsen or he is feeling unsafe. He was able to identify protective factors.      Patient's impression of their current status: Patient reported he continues to be working on addressing symptoms of depression, anger and anxiety. He reports continued difficulty with physical weakness, fatigue and lack of stamina. He reports he had surgery in May and had a long recovery period. He reports he is struggling with a headache today and that he has increased tension. He is struggling with some relationship/social changes due to chronic pain/medical conditions. He reports continued SI. He is working on housing needs.  "    Therapist impression of patients current state: Patient seems to be working on addressing depression, anger, anxiety and chronic medical symptoms. He reports pain symptoms have been difficult to manage, especially neck, shoulder and head pain. He continues the medical cannabis program and is finding this to be helpful with pain symptom reduction. Weakness and fatigue continue to be an issue. He reports he had sinus surgery in May and had a long recovery. He reported he had to cancel some sessions due to medical issues. Patient was not feeling well today, but was able to participate in the session. Continued to discuss changes in relationships/social life that have occurred since he has been struggling with chronic pain/medical issues.  He discussed social needs at this time and how it has been difficult to get engaged more recently. Encouraged patient to continue to increase efforts to reach out to social/family support to connect socially/emotionally as he is able. Patient continues to work with case management and is working closely with a . He and his wife are working on future housing needs. He reports Jew attendance as he is able. He continues to work on addressing acceptance of his chronic pain and medical concerns, with some progress. He continues to work on sleep improvement, which continues to be challenging.  He reports he is less active at this time, due to medical concerns. Patient reports suicidal ideation persists,  but denies immediate plan or means at this time. He states increased stressors/medical concerns are a trigger. Discussed triggers for negative thinking and focusing on his capabilities. Patient is continuing  to work on decreasing \"shoulds\" that seem to negatively impact his thought process. Discussed safety plan and ways to cope if symptoms should worsen or he is feeling unsafe. Will continue reviewing suicide prevention worksheet in session next time as time did " not permit today. He reports he is consistent with PT exercises in home practice and noticing ongoing positive improvement. Continued to discuss benefits of Mindfulness meditation with patient. He reports regular meditation practice and grounding. Encouraged patient to use the container exercise on a regular basis and will review next session. Presented grounding exercise and patient appeared to benefit.     Type of psychotherapeutic technique provided: Client centered, CBT and Mindfulness    Progress toward short term goals: Patient continues to work on increasing coping skills to manage chronic pain and mental health. Patient continues medical care as recommended by his medical providers. He engages in physical activity and social activity as he is able. He is engaging in meditation/grounding in daily living. He is working with a county . Discussed patient's goals and treatment plan will be developed.        Review of long term goals: Discussed patient's goals and treatment plan will be developed.     Diagnosis:Major depression, severe   Generalized Anxiety Disorder  Chronic pain syndrome     Plan and Follow up: Follow pain center plan of care. Patient to follow all medical recommendations. Patient to schedule every 2 weeks for psychotherapy to further address depression, anxiety and chronic pain concerns. Continue meditation and other healthy coping strategies. Practice meditations as presented in session. Further address adjustment to role/identity due to chronic pain and health concerns. Continue to work on healthy communication skills/relationship building. Continue EMDR using pain protocol, as desired. Work on identifying things he finds enjoyable in daily living. Continue work on suicide prevention worksheet in session. Review container exercise next time.          Discharge Criteria/Planning: Patient will continue with follow-up until therapy can be discontinued without return of signs and  symptoms.    Alize Livingston 7/15/2019

## 2021-05-30 NOTE — TELEPHONE ENCOUNTER
ANTICOAGULATION  MANAGEMENT    Assessment     Today's INR result of 3.1 is Supratherapeutic (goal INR of 2.0-3.0)        Warfarin taken as previously instructed    No new diet changes affecting INR    No new medication/supplements affecting INR    Continues to tolerate warfarin with no reported s/s of bleeding or thromboembolism     Previous INR was Supratherapeutic    Plan:     Spoke with wife Miguel (she will relay message to pt) regarding INR result and instructed:     Warfarin Dosing Instructions:  Continue current warfarin dose 2.5 mg daily.    Instructed patient to follow up no later than: 2 weeks.    Education provided: importance of taking warfarin as instructed    Miguel verbalizes understanding and agrees to warfarin dosing plan.    Instructed to call the [facility] for any changes, questions or concerns. (#[phone])   ?   Gus Byrne RN    Subjective/Objective:      Anand Felix, a 34 y.o. male is on warfarin.     Anand reports:     Home warfarin dose: as updated on anticoagulation calendar per template     Missed doses: No     Medication changes:  No     S/S of bleeding or thromboembolism:  No     New Injury or illness:  No     Changes in diet or alcohol consumption:  No     Upcoming surgery, procedure or cardioversion:  No    Anticoagulation Episode Summary     Current INR goal:   2.0-3.0   TTR:   63.6 % (1.1 y)   Next INR check:   7/17/2019   INR from last check:   3.10! (7/3/2019)   Weekly max warfarin dose:      Target end date:   11/29/2018   INR check location:      Preferred lab:      Send INR reminders to:   CHARLENE ART    Indications    Bilateral pulmonary embolism (H) [I26.99]           Comments:            Anticoagulation Care Providers     Provider Role Specialty Phone number    Malachi Choudhary MD Referring Family Medicine [phone]

## 2021-05-30 NOTE — PROGRESS NOTES
Patient has accomplished goals and has no other goals that this patient would like to work with Clinic Care Coordination/Health Care Home. Care Guide sent request to [facility] RN pool to review for Maintenance and update emergency plan.

## 2021-05-31 VITALS — WEIGHT: 138 LBS | HEIGHT: 65 IN | WEIGHT: 139.8 LBS | BODY MASS INDEX: 22.99 KG/M2 | BODY MASS INDEX: 23.26 KG/M2

## 2021-05-31 VITALS — WEIGHT: 157.6 LBS | BODY MASS INDEX: 26.23 KG/M2

## 2021-05-31 VITALS — BODY MASS INDEX: 23.3 KG/M2 | WEIGHT: 140 LBS

## 2021-05-31 VITALS — BODY MASS INDEX: 22.49 KG/M2 | HEIGHT: 65 IN | WEIGHT: 135 LBS

## 2021-05-31 VITALS — WEIGHT: 141 LBS | BODY MASS INDEX: 23.46 KG/M2

## 2021-05-31 VITALS — BODY MASS INDEX: 23 KG/M2 | WEIGHT: 142.5 LBS

## 2021-05-31 VITALS — WEIGHT: 138 LBS | BODY MASS INDEX: 22.27 KG/M2

## 2021-05-31 VITALS — WEIGHT: 134 LBS | BODY MASS INDEX: 22.3 KG/M2

## 2021-05-31 VITALS — BODY MASS INDEX: 25.09 KG/M2 | WEIGHT: 150.8 LBS

## 2021-05-31 NOTE — TELEPHONE ENCOUNTER
ANTICOAGULATION  MANAGEMENT    Assessment     Today's INR result of 2.7 is Therapeutic (goal INR of 2.0-3.0)        Warfarin taken as previously instructed    No new diet changes affecting INR    No new medication/supplements affecting INR    Continues to tolerate warfarin with no reported s/s of bleeding or thromboembolism     Previous INR was Therapeutic    Plan:     Left a detailed message for Anand regarding INR result and instructed:     Warfarin Dosing Instructions:  Continue current warfarin dose 2.5 mg daily.    Instructed patient to follow up no later than: 4 weeks (OV 9/4).     Education provided: importance of taking warfarin as instructed      Instructed to call the [facility] for any changes, questions or concerns. (#[phone])   ?   Gus Byrne RN    Subjective/Objective:      Anand Felix, a 34 y.o. male is on warfarin.     Anand reports:     Home warfarin dose: as updated on anticoagulation calendar per template     Missed doses: No     Medication changes:  No     S/S of bleeding or thromboembolism:  No     New Injury or illness:  No     Changes in diet or alcohol consumption:  No     Upcoming surgery, procedure or cardioversion:  No    Anticoagulation Episode Summary     Current INR goal:   2.0-3.0   TTR:   66.2 % (1.2 y)   Next INR check:   9/4/2019   INR from last check:   2.70 (8/7/2019)   Weekly max warfarin dose:      Target end date:   11/29/2018   INR check location:      Preferred lab:      Send INR reminders to:   CHARLENE ART    Indications    Bilateral pulmonary embolism (H) [I26.99]           Comments:            Anticoagulation Care Providers     Provider Role Specialty Phone number    Malachi Choudhary MD Referring Family Medicine [phone]

## 2021-05-31 NOTE — PROGRESS NOTES
8/26/2019    Start time: 3:00 PM    Stop Time: 3:50 PM   Session # 13    Anand Felix is a 34 y.o. male is being seen today for    Chief Complaint   Patient presents with     Mental Health Note   . Major depression, severe   Generalized Anxiety Disorder  Chronic pain syndrome       New symptoms or complaints: None    Functional Impairment:   Personal: 4  Family: 4  Work: 4  Social:4    Clinical assessment of mental status: Anand Felix presented on time.  He was open and cooperative, and dressed appropriately for this session and weather. His memory was in tact .  His  speech was appropriate.  Language was appropriate.  Concentration and focus is on task. Psychosis is not noted or reported. He reports his mood is depressed .  Affect is congruent with speech.  Fund of knowledge is adequate. Insight is adequate for therapy.     Suicidal/Homicidal Ideation present: Patient reports suicidal ideation. He denies immediate plan or means. Patient agrees to follow safety plan to call 911, go to the ER for evaluation and/or call the crisis line if symptoms worsen or he is feeling unsafe. He was able to identify protective factors.      Patient's impression of their current status: Patient reported he continues to be working on addressing symptoms of depression and anxiety. He reports continued difficulty with physical weakness, fatigue and lack of stamina. Further, he reports having difficulty with headaches as well as a sore back.  He reports continued SI. He reports he is adjusting to living at his sister's house.     Therapist impression of patients current state: Patient seems to be working on addressing depression, anger, anxiety and chronic medical symptoms. He reports pain symptoms and weakness have been difficult to manage. He seems to struggle with headache pain and back soreness at this time. He continues the medical cannabis program and is finding this to be helpful with pain symptom reduction. Continued to  discuss changes in relationships/social life that have occurred since he has been struggling with chronic pain/medical issues.  He discussed social needs at this time and how it has been difficult to get engaged more recently. Encouraged patient to continue to increase efforts to reach out to social/family support to connect socially/emotionally as he is able. Patient continues to work with case management and is working closely with a . He and his wife are working on future housing needs, but are staying with his sister at this time. He reports [employer] attendance as he is able. He continues to work on addressing acceptance of his chronic pain and medical concerns, with some progress and challenges. He continues to work on sleep improvement, which continues to be challenging.  Patient reports suicidal ideation persists,  but denies immediate plan or means at this time. Discussed triggers for negative thinking and focusing on his capabilities. Discussed safety plan and ways to cope if symptoms should worsen or he is feeling unsafe. Created a written safety plan as well in session. Copy given to patient at the end of session. He agrees to follow safety plan. Reviewed suicide prevention worksheet in session and will continue next time. Patient seemed to gain insight. Continued to discuss benefits of Mindfulness meditation with patient. He reports regular meditation practice and grounding. Presented container exercise in session and patient appeared to benefit.      Type of psychotherapeutic technique provided: Client centered, CBT and Mindfulness    Progress toward short term goals: Patient continues to work on increasing coping skills to manage chronic pain and mental health. Patient continues medical care as recommended by his medical providers. He engages in physical activity and social activity as he is able. He is engaging in meditation/grounding in daily living. [facility].     Review of long term goals: Not done at today's visit. Last review: 8/19/19    Diagnosis: Major depression, severe   Generalized Anxiety Disorder  Chronic pain syndrome     Plan and Follow up: Follow pain center plan of care. Patient to follow all medical recommendations. Patient to schedule weekly for psychotherapy to further address depression, anxiety and chronic pain concerns. Continue meditation and other healthy coping strategies. Practice meditations as presented in session. Further address adjustment to role/identity due to chronic pain and health concerns. Continue to work on healthy communication skills/relationship building. Continue EMDR  as desired. Work on identifying things he finds enjoyable in daily living. Continue work on suicide prevention worksheet in session. Follow safety plan.          Discharge Criteria/Planning: Patient will continue with follow-up until therapy can be discontinued without return of signs and symptoms.    Alize Livingston 8/26/2019

## 2021-05-31 NOTE — PROGRESS NOTES
Outpatient Mental Health Treatment Plan    Name:  Anand Felix  :  1985  MRN:  [id number]    Treatment Plan:  Updated Treatment Plan  Intake/initial treatment plan date:  18  Benefit and risks and alternatives have been discussed: Yes  Is this treatment appropriate with minimal intrusion/restrictions: Yes  Estimated duration of treatment:  Approximately 20 sessions.  Anticipated frequency of services:  Every 2 weeks  Necessity for frequency: This frequency is needed to establish therapeutic goals and for continuity of care in order to monitor progress.  Necessity for treatment: To address cognitive, behavioral, and/or emotional barriers in order to work toward goals and to improve quality of life.    Session Type: Patient is presenting for an Individual session.    Plan:      ? Depression    Goal:  Decrease average depression level from 8 to 5.   Strategies:    ?[x] Decrease social isolation     [x] Increase involvement in meaningful activities     ?[x] Discuss sleep hygiene     ?[x] Explore thoughts and expectations about self and others     ?[x] Process grief (loss of significant person, independence, role,        etc.)     ?[x] Assess for suicide risk     ?[x] Implement physical activity routine (with physician approval)     [x] Consider introduction of bibliotherapy and/or videos     [x] Continue compliance with medical treatment plan (or explore         barriers)       ?  ?Degree to which this is a problem: 4  Degree to which goal is met: 1  Date of Review: 7/15/19          ?   ? Anxiety    Goal:  Decrease average anxiety level from 6 to 4.   Strategies: ? [x]Learn and practice relaxation techniques and other coping        strategies (e.g., thought stopping, reframing, meditation)     ? [x] Increase involvement in meaningful activities     ? [x] Discuss sleep hygiene     ? [x] Explore thoughts and expectations about self and others     ? [x] Identify and monitor triggers for panic/anxiety symptoms     ?  [x] Implement physical activity routine (with physician approval)     ? [x] Consider introduction of bibliotherapy and/or videos     ? [x] Continue compliance with medical treatment plan (or explore          barriers)                                       [x]     Degree to which this is a problem: 4  Degree to which goal is met: 1  Date of Review: 7/15/19  Chronic pain  Goal:  ? Decrease average pain level from 8 to 5.   ? Increase % acceptance of pain from 50 to 80.   Strategies: ? [x] Explore thoughts and expectations about self and others    [x] Explore emotional reactions to illness/injury      ? [x] Learn and practice relaxation techniques and other coping          strategies            [x] Implement physical activity routine (with physician approval)     ? [x] Engage in values clarification and goal-setting     ? [x] Consider introduction of bibliotherapy and/or videos     ? [x] Increase involvement in meaningful activities     ? [x] Discuss sleep hygiene     ? [x] Process grief (loss of significant person, independence, role,          etc.)     ? [x] Assess for suicide risk     ?  Degree to which this is a problem: 4  Degree to which goal is met: 1  Date of Review: 7/15/19       Functional Impairment:  1=Not at all/Rarely  2=Some days  3=Most Days  4=Every Day    Personal : 4  Family : 4  Social : 4   Work/school : 4    Diagnosis:  Major depression, severe   Generalized Anxiety Disorder  Chronic pain syndrome       WHODAS 2.0 12-item version 66.67%      Scores presented in qualifiers to represent level of disability.  SEVERE Problem (high, extreme, ...) 50-95%    H1= 24  H2= 18  H3= 12        Clinical assessments and measures completed:. CAROLYN-7, PHQ-9 and PANSI     Strengths:Patience, fast learner, hard working  Limitations:  lacks assertiveness, wants to improve his health, health seems to be getting worse in the last year.   Cultural Considerations: Patient is a 34 year old,  American,  male who  has a history of depression, anxiety and chronic pain.     Persons responsible for this plan: Patient and Provider            Psychotherapist Signature           Patient Signature:              Guardian Signature             Provider: Performed and documented by Alize Livingston [facility]   Date:  7/15/19

## 2021-05-31 NOTE — TELEPHONE ENCOUNTER
Medication being requested: tizanidine 2mg prn #90  Last visit date: 7/12/19 with Crystal  Next visit date: 9/6/19 with Crystal  Expected follow up: 8 weeks  Pertinent between visit information about requested medication (telephone, mychart, prior authorization, concerns, comments): vinay  Script being sent to provider by nurse- dates and quantity:   Requested Prescriptions     Pending Prescriptions Disp Refills     tiZANidine (ZANAFLEX) 2 MG tablet [Pharmacy Med Name: TIZANIDINE HCL 2 MG TABLET] 90 tablet 0     Sig: TAKE 2 TABLETS (4 MG TOTAL) BY MOUTH EVERY 6 (SIX) HOURS AS NEEDED.     Pharm[facility] cued: [facility]  Standing orders for withdrawal protocol implemented: vinay

## 2021-05-31 NOTE — PROGRESS NOTES
"8/19/2019    Start time: 2:10 PM    Stop Time: 3:00 PM   Session # 12    Anand Felix is a 34 y.o. male is being seen today for    Chief Complaint   Patient presents with     Mental Health Note   .  Major depression, severe   Generalized Anxiety Disorder  Chronic pain syndrome       New symptoms or complaints: None    Functional Impairment:   Personal: 4  Family: 4  Work: 4  Social:4    Clinical assessment of mental status: Anand Felix presented on time.  He was open and cooperative, and dressed appropriately for this session and weather. His memory was in tact .  His  speech was appropriate.  Language was appropriate.  Concentration and focus is on task. Psychosis is not noted or reported. He reports his mood is depressed .  Affect is congruent with speech.  Fund of knowledge is adequate. Insight is adequate for therapy.     Suicidal/Homicidal Ideation present: Patient reports suicidal ideation. He denies immediate plan or means. Patient agrees to follow safety plan to call 911, go to the ER for evaluation and/or call the crisis line if symptoms worsen or he is feeling unsafe. He was able to identify protective factors.      Patient's impression of their current status: Patient reported he continues to be working on addressing symptoms of depression, irritability, anger and anxiety. He reports continued difficulty with physical weakness, fatigue and lack of stamina. He states \"my body doesn't keep up with my mind\".  He reports continued SI. He reports he recently moved to his sister's house.     Therapist impression of patients current state:  Patient seems to be working on addressing depression, anger, anxiety and chronic medical symptoms. He reports pain symptoms and weakness have been difficult to manage. He seems to be struggling with not being able to physically carry out what he would like. This was especially triggered during a recent move to his sister's house. He continues the medical cannabis program and is " "finding this to be helpful with pain symptom reduction. Continued to discuss changes in relationships/social life that have occurred since he has been struggling with chronic pain/medical issues.  He discussed social needs at this time and how it has been difficult to get engaged more recently. Encouraged patient to continue to increase efforts to reach out to social/family support to connect socially/emotionally as he is able. Patient has a social security meeting on 9/11/19. Patient continues to work with case management and is working closely with a . He and his wife are working on future housing needs, but are staying with his sister at this time. He reports [employer] attendance as he is able. He continues to work on addressing acceptance of his chronic pain and medical concerns, with some progress and challenges. He continues to work on sleep improvement, which continues to be challenging.  Patient reports suicidal ideation persists,  but denies immediate plan or means at this time. He states increased stressors/medical concerns are a trigger. Discussed triggers for negative thinking and focusing on his capabilities. Patient is continuing  to work on decreasing \"shoulds\" that seem to negatively impact his thought process. Discussed safety plan and ways to cope if symptoms should worsen or he is feeling unsafe. Will continue reviewing suicide prevention worksheet in session next time as time did not permit today. Continued to discuss benefits of Mindfulness meditation with patient. He reports regular meditation practice and grounding. Encouraged patient to use the container exercise on a regular basis and will review next session. Presented grounding exercise and patient appeared to benefit. Discussed resuming EMDR and areas to target. Will discuss this further next time.      Type of psychotherapeutic technique provided: Client centered, CBT and Mindfulness    Progress toward short term goals: " Patient continues to work on increasing coping skills to manage chronic pain and mental health. Patient continues medical care as recommended by his medical providers. He engages in physical activity and social activity as he is able. He is engaging in meditation/grounding in daily living. He is working with a county . Patient signed/agreed to treatment plan this date.     Review of long term goals: Patient signed/agreed to treatment plan this date.     Diagnosis:  Major depression, severe   Generalized Anxiety Disorder  Chronic pain syndrome     Plan and Follow up: Follow pain center plan of care. Patient to follow all medical recommendations. Patient to schedule weekly for psychotherapy to further address depression, anxiety and chronic pain concerns. Continue meditation and other healthy coping strategies. Practice meditations as presented in session. Further address adjustment to role/identity due to chronic pain and health concerns. Continue to work on healthy communication skills/relationship building. Continue EMDR using pain protocol, as desired. Work on identifying things he finds enjoyable in daily living. Continue work on suicide prevention worksheet in session. Review container exercise next time.       Discharge Criteria/Planning: Patient will continue with follow-up until therapy can be discontinued without return of signs and symptoms.    Alize Livingston 8/19/2019

## 2021-05-31 NOTE — TELEPHONE ENCOUNTER
Refill Approved    Rx renewed per Medication Renewal Policy. Medication was last renewed on 10/28/18    Mariana Etienne, [facility] Triage/Med Refill 8/17/2019 sent to pharmacy on record    Requested Prescriptions   Pending Prescriptions Disp Refills     fluticasone propionate (FLONASE) 50 mcg/actuation nasal spray [Pharmacy Med Name: FLUTICASONE PROP 50 MCG SPRAY]  5     Sig: SPRAY 2 SPRAYS INTO EACH NOSTRIL EVERY DAY       Nasal Steroid Refill Protocol Passed - 8/17/2019 12:29 PM        Passed - Patient has had office visit/physical in last 2 years     Last office visit with prescriber/PCP: 6/5/2019 OR same dept: 6/5/2019 Malachi Choudhary MD OR same specialty: 6/5/2019 Malachi Choudhary MD Last physical: 5/1/2019 Last MTM visit: Visit date not found    Next appt within 3 mo: Visit date not found  Next physical within 3 mo: Visit date not found  Prescriber OR PCP: Malachi Choudhary MD  Last diagnosis associated with med order: 1. Nasal polyposis  - fluticasone propionate (FLONASE) 50 mcg/actuation nasal spray [Pharmacy Med Name: FLUTICASONE PROP 50 MCG SPRAY]; SPRAY 2 SPRAYS INTO EACH NOSTRIL EVERY DAY; Refill: 5     If protocol passes may refill for 12 months if within 3 months of last provider visit (or a total of 15 months).

## 2021-06-01 VITALS — BODY MASS INDEX: 23.32 KG/M2 | WEIGHT: 140 LBS | HEIGHT: 65 IN

## 2021-06-01 VITALS — BODY MASS INDEX: 24.71 KG/M2 | WEIGHT: 148.5 LBS

## 2021-06-01 VITALS — HEIGHT: 65 IN | BODY MASS INDEX: 25.49 KG/M2 | WEIGHT: 153 LBS

## 2021-06-01 VITALS — BODY MASS INDEX: 25.33 KG/M2 | WEIGHT: 152 LBS | HEIGHT: 65 IN

## 2021-06-01 VITALS — WEIGHT: 142 LBS | BODY MASS INDEX: 23.63 KG/M2

## 2021-06-01 VITALS — WEIGHT: 156 LBS | BODY MASS INDEX: 25.96 KG/M2

## 2021-06-01 VITALS — WEIGHT: 153 LBS | HEIGHT: 65 IN | BODY MASS INDEX: 25.49 KG/M2

## 2021-06-01 VITALS — WEIGHT: 138 LBS | HEIGHT: 65 IN | BODY MASS INDEX: 22.99 KG/M2

## 2021-06-01 VITALS — HEIGHT: 65 IN | WEIGHT: 138.8 LBS | BODY MASS INDEX: 23.13 KG/M2

## 2021-06-01 VITALS — BODY MASS INDEX: 24.83 KG/M2 | WEIGHT: 149 LBS | HEIGHT: 65 IN

## 2021-06-01 VITALS — HEIGHT: 65 IN | BODY MASS INDEX: 22.99 KG/M2 | WEIGHT: 138 LBS

## 2021-06-01 VITALS — WEIGHT: 139 LBS | BODY MASS INDEX: 23.16 KG/M2 | HEIGHT: 65 IN

## 2021-06-01 VITALS — BODY MASS INDEX: 25.49 KG/M2 | HEIGHT: 65 IN | WEIGHT: 153 LBS

## 2021-06-01 VITALS — BODY MASS INDEX: 25.99 KG/M2 | WEIGHT: 156 LBS | HEIGHT: 65 IN

## 2021-06-01 VITALS — BODY MASS INDEX: 23.13 KG/M2 | WEIGHT: 139 LBS

## 2021-06-01 VITALS — WEIGHT: 152 LBS | BODY MASS INDEX: 25.29 KG/M2

## 2021-06-01 NOTE — PATIENT INSTRUCTIONS - HE
Plan:   Interventions-    Follow up in 8 weeks to evaluate the effectiveness of the treatment interventions ordered today. Scheduling your appointment prior to leaving assists in reduction of running out of medication prior to the next appointment.     If you are receiving medications from the pain clinic, it is your responsibility to call 3-5 days in advance for a refill. The clinic has up to five days to provide a refill for you.     Please call the [facility] for refills.    Please bring any opioids or controlled substances in that are prescribed by the clinic for pill counts for every visit.     D/c the belbuca as this was contributing to his migraines. continue with plans for acupuncture. May need botox.     Ok to increase the use of the oxycodone up to 7.5 mg 9/13 ext fill will last until 10/11/2019 call for a refill in between     Continue to work with the dispensary and the medical cannabis     Discuss sweating episodes with PCP, unable to correlate with opioid use    Ok to continue use of tizanidine and gabapentin as you are. Call for refills.     As a reminder- chronic pain is generally stable, if you experience a new injury or new different pain it is expected that you present to the ED or urgent care for evaluation. DO NOT use your chronic pain medications to treat any new or different pain other then what it is intended for. We do not replace any lost or stolen prescriptions or provide early refills for overtaking your medications.     Orders placed today  Medications that were ordered today   Requested Prescriptions     Signed Prescriptions Disp Refills     tiZANidine (ZANAFLEX) 2 MG tablet 90 tablet 0     Sig: Take 2 tablets (4 mg total) by mouth every 6 (six) hours as needed.     gabapentin (NEURONTIN) 100 MG capsule 84 capsule 0     Sig: TAKE 1 CAPSULE BY MOUTH THREE TIMES DAILY. OKAY TO REDUCE TO 2 TIMES A DAY IF TOO DROWSY     oxyCODONE-acetaminophen (PERCOCET/ENDOCET) 7.5-325 mg per  tablet 84 tablet 0     Sig: Take 1 tablet by mouth 3 (three) times a day as needed for pain.     No orders of the defined types were placed in this encounter.      UDT/SWAB:  Patient required a random Urine Drug Testing, due to the need to comply with [place] Model Policy Guidelines and CDC Guideline for the use of any controlled substances. This is to ensure that patient is compliant with treatment, and monitor for risks such as diversion, abuse, or any other aberrant behaviors. Patient is either being considered for or taking a controlled substance. Unexpected findings will be discussed and treatment decision may be adjusted. Testing is being implemented across the board randomly w/o bias related to age, race, gender, socioeconomic status or Quaker affiliation.    The patient understand todays plan and has their questions answered in regards to expectations and current treatment plan.     SAFETY REMINDERS  No alcohol while taking controlled substances. Alcohol is not an illegal substance, it is unsafe to use in combination. It is a build up of substances in the body that can be extremely hazardous and may cause respirations to slow to a dangerous rate resulting in hospitalization, brain damage, or death.    Opioid medications have been associated with sharp rise in unintentional overdose and death.  Overdose is a condition characterized by the consumption in excess of a particular drug causing adverse effects. This can happen b/c you are sick, accidentally or intentionally took an extra dose, are on multiple medication that can interact. Someone took your medication and they are not use to the medication.  Symptoms of overdose include:   !breathing slow and shallow, erratic or not at all  !pinpoint pupils, hallucinations  !confusion  !muscle jerks, slack muscles   !extreme sleepiness or loss of alertness   !awake but not able to talk   !face pale or clammy, vomiting, for lighter skinned people, the skin tone  turns bluish purple, for darker skinned people, it turns grayish or ashen   If in a situation where overdose is a concern engage the emergency response system (dial 911).    In one study it was noted that 80% of unintentional overdoses occurred in people who were taking a combination of opioids and benzodiazepines.    Do not sell, loan, borrow or share your opioid medication with anyone. Deaths have occurred as a result of this practice. It is illegal and patients are being prosecuted.     Prevent unexpected access/loss of medication: Keep medication locked. Only carry what you need with you.    Dayna Lundberg, [facility]  [address]  [place] [address]  Ph: [phone]  Fax: [phone]

## 2021-06-01 NOTE — PROGRESS NOTES
10/2/2019    Start time: 2:05 PM    Stop Time: 2:55 PM   Session # 17    Anand Felix is a 34 y.o. male is being seen today for    Chief Complaint   Patient presents with     Mental Health Note   . Major depression, severe   Generalized Anxiety Disorder  Chronic pain syndrome    New symptoms or complaints: None    Functional Impairment:   Personal: 4  Family: 4  Work: 4  Social:4    Clinical assessment of mental status: Anand Felix presented on time.  He was open and cooperative, and dressed appropriately for this session and weather. His memory was in tact .  His  speech was appropriate.  Language was appropriate.  Concentration and focus is on task. Psychosis is not noted or reported. He reports his mood is depressed .  Affect is congruent with speech.  Fund of knowledge is adequate. Insight is adequate for therapy.     Suicidal/Homicidal Ideation present: Patient reports SI. He denies plan or means.  He agrees to follow safety plan to call 911, go to the ER for evaluation and call the crisis connection if he is feeling unsafe or symptoms worsen. He identifies protective factors.     Patient's impression of their current status: Patient reported he continues to be working on addressing symptoms of depression and anxiety. He did report a small improvement in mood and motivation this week. He reports continued difficulty with physical weakness, fatigue and lack of stamina. He reports continued SI.      Therapist impression of patients current state: Patient seems to be working on addressing depression, anger, anxiety and chronic medical symptoms. Continued to educate on depression symptoms. He reports pain symptoms and weakness have been difficult to manage. Continued to discuss changes in relationships/social life that have occurred since he has been struggling with chronic pain/medical issues.  He discussed social needs at this time and how he has been making some changes to be more engaged recently. He reports it is  difficult to enjoy activities he used to, but that he has some increased motivation this week. Encouraged patient to continue to increase efforts to reach out to social/family support to connect socially/emotionally as he is able. He struggles with identity since he has had significant health changes in recent years. Engaged in values clarification. He and his wife are working on future housing needs, but are staying with his sister at this time. He reports [employer] attendance as he is able. He continues to work on addressing acceptance of his chronic pain and medical concerns, with some progress and challenges. Discussed negative cognitions related to physical abilities and will further address with EMDR in a future session. Further, discussed positives and abilities he has at this time. Further discussed family history and dynamics. He continues to work on sleep improvement, which continues to be challenging.  Patient reports suicidal ideation persists,  but denies immediate plan or means at this time. He reported ideation is more fleeting this week. Discussed ways to cope. Reviewed safety plan and ways to cope if symptoms should worsen or he is feeling unsafe. Continued to discuss benefits of Mindfulness meditation with patient. He reports regular meditation practice and grounding. Presented meditation exercise focused on the sense of smell in session and patient appeared to benefit.      Type of psychotherapeutic technique provided: Client centered, CBT and Mindfulness    Progress toward short term goals: Patient continues to work on increasing coping skills to manage chronic pain and mental health. Patient continues medical care as recommended by his medical providers. He engages in physical activity and social activity as he is able. He is working on values clarification. He is engaging in meditation/grounding in daily living. He is working with a county .        Review of long term goals: Not done at  today's visit. Last review: 8/19/19    Diagnosis: Major depression, severe   Generalized Anxiety Disorder  Chronic pain syndrome    Plan and Follow up: Follow pain center plan of care. Patient to follow all medical recommendations. Patient to schedule weekly for psychotherapy to further address depression, anxiety and chronic pain concerns. Continue meditation and other healthy coping strategies. Practice meditations as presented in session. Further address adjustment to role/identity due to chronic pain and health concerns. Continue to work on healthy communication skills/relationship building. Continue EMDR as discussed. Work on identifying things he finds enjoyable in daily living. Continue work on suicide prevention worksheet in session. Follow safety plan.       Discharge Criteria/Planning: Patient will continue with follow-up until therapy can be discontinued without return of signs and symptoms.    Alize Livingston 10/2/2019

## 2021-06-01 NOTE — PROGRESS NOTES
"Assessment/Plan:    1. Nonintractable episodic headache, unspecified headache type  History of chronic headaches described.  Currently utilizing Maxalt 10 mg up to 4 times daily.  Max 30 mg/day reviewed with patient.  Quality of life improvement described.  No side effects described.  Declines further intervention at this time.    2. Chronic pain syndrome  Patient follows up with Dr. Lundberg on Friday, September 6, 2019 otherwise switched from Butrans to Belbuca recently and will review with Dr. Lundberg regarding potential side effects.    3. Abdominal pain, right upper quadrant  Improvement noted.  Continues omeprazole 20 mg daily.    4. Muscle weakness  Describes benefits of gabapentin 100 mg 3 times daily with tizanidine 2 mg using 2 tablets between 2 and 3 times daily to help \"relax muscles\".    5. Bilateral pulmonary embolism (H)  Check INR today with history of chronic warfarin anticoagulation with history of bilateral pulmonary emboli.  - INR    Anticipate recheck in office in 3 months, sooner if concerns.        Subjective:    Anand Felix is seen today for follow-up evaluation.  Headache history.  Question migraine component.  History of right temporal or right posterior head.  Utilizing Maxalt MLT 10 mg up to 4 times in the day (despite prior recommendation for 30 mg/day max).  Also potential benefits with gabapentin 100 mg 3 times daily.  Follows with Dr. Lundberg regarding chronic pain management.  Switch from Butrans to Belbuca 300 mcg to cheek twice daily.  Potential side effects and will review with Dr. Lundberg on Friday.  See acupuncturist September 18, 2019 as well for chronic headaches.  Atenolol 25 mg daily due to tachycardia history.  Omeprazole 20 mg daily with decreased GI upset.  Needs INR checked today on warfarin 2.5 mg daily.  Comprehensive review of systems as above otherwise all negative.     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 Bernal " "follows  Children - none  Employed - working on disability - monoparesis  Surgeries - nasal surgery; low back surgery (Dr. Jeannine Hernandez, 2015) with residual right leg weakness  Tobacco use - none  ETOH - none  Family hx   Mother - living - high chol. Strokes. MI.   Father - living - unknown  Siblings - living - 4 sisters, 2 brother -    Bilateral AFO braces worn due to muscle weakness and foot drop    Past Surgical History:   Procedure Laterality Date     BACK SURGERY       MINIMALLY INVASIVE MICRODISCECTOMY LUMBAR SPINE Right 10/2015    L5-S1; Dr. Bettie Hernandez     ND NASAL/SINUS ENDOSCOPY,BX/RMV POLYP/DEBRID Right 5/22/2019    Procedure: RIGHT ENDOSCOPIC SINUS SURGERY;  Surgeon: Leo Wright MD;  [facility]: [facility];  Service: ENT     SINUS SURGERY  2014    Dr. Wright        Family History   Problem Relation Age of Onset     Depression Mother      Hyperlipidemia Mother      Other Mother         \"Ulcers\" - Aches and pains throughout her body.     Cancer Father      No Medical Problems Sister      No Medical Problems Brother      No Medical Problems Sister      No Medical Problems Sister      No Medical Problems Sister      No Medical Problems Brother      Thyroid disease Maternal Grandmother      Thyroid disease Cousin         Past Medical History:   Diagnosis Date     Anxiety      Bulging lumbar disc      Depression      History of anesthesia complications     Was hospitalized in October 2015 for weakness     History of blood clots     PE in 2017     Lumbar herniated disc 10/23/2015     Lumbar radiculopathy 11/25/2015     Shortness of breath      Tachycardia     After PE in         Social History     Tobacco Use     Smoking status: Never Smoker     Smokeless tobacco: Current User   Substance Use Topics     Alcohol use: Yes     Comment: Monthly or less often. 1-2 drinks at a time.     Drug use: Yes     Types: Marijuana        Current Outpatient Medications   Medication Sig Dispense Refill     " acetaminophen (TYLENOL) 500 MG tablet Take 1 tablet (500 mg total) by mouth every 6 (six) hours as needed for pain.  0     atenolol (TENORMIN) 25 MG tablet TAKE 1 TABLET BY MOUTH EVERY DAY 90 tablet 1     buprenorphine (BUTRANS) 7.5 mcg/hour PTWK patch Place 1 patch on the skin every 7 days. 4 patch 0     buprenorphine HCl (BELBUCA) 300 mcg Film Apply 300 mcg to cheek 2 (two) times a day. 60 each 0     cholecalciferol, vitamin D3, 2,000 unit cap Take 2,000 Units by mouth daily.       fluticasone propionate (FLONASE) 50 mcg/actuation nasal spray SPRAY 2 SPRAYS INTO EACH NOSTRIL EVERY DAY 16 g 5     gabapentin (NEURONTIN) 100 MG capsule TAKE 1 CAPSULE BY MOUTH THREE TIMES DAILY. OKAY TO REDUCE TO 2 TIMES A DAY IF TOO DROWSY 84 capsule 0     lidocaine (LIDODERM) 5 % Remove & Discard patch within 12 hours or as directed by MD 15 patch 0     NON FORMULARY              omeprazole (PRILOSEC) 20 MG capsule TAKE 1 CAPSULE (20 MG TOTAL) BY MOUTH DAILY. 90 capsule 3     oxyCODONE (ROXICODONE) 5 MG immediate release tablet Take 1 tablet (5 mg total) by mouth 2 (two) times a day. 56 tablet 0     rizatriptan (MAXALT-MLT) 10 MG disintegrating tablet TAKE 1 TABLET BY MOUTH AS NEEDED FOR MIGRAINE. MAY REPEAT IN 2HRS IF NEEDED 12 tablet 10     tiZANidine (ZANAFLEX) 2 MG tablet TAKE 2 TABLETS (4 MG TOTAL) BY MOUTH EVERY 6 (SIX) HOURS AS NEEDED. 90 tablet 0     warfarin (COUMADIN/JANTOVEN) 5 MG tablet Take 1/2 to 1 tablet (2.5 to 5 mg) by mouth daily. Adjust dose per INR results as instructed. 60 tablet 1     No current facility-administered medications for this visit.           Objective:    Vitals:    09/04/19 1259   BP: 120/70   Pulse: (!) 109   SpO2: 97%   Weight: 153 lb (69.4 kg)      Body mass index is 25.46 kg/m .    Alert.  Transfers slowly.  Uses cane to assist with ambulation.  Chest clear.  Cardiac exam regular.  No significant tachycardia at rest.      This note has been dictated using voice recognition software and as a  result may contain minor grammatical errors and unintended word substitutions.

## 2021-06-01 NOTE — TELEPHONE ENCOUNTER
ANTICOAGULATION  MANAGEMENT    Assessment     Today's INR result of 2.2 is Therapeutic (goal INR of 2.0-3.0)        Warfarin taken as previously instructed    No new diet changes affecting INR    No new medication/supplements affecting INR    Continues to tolerate warfarin with no reported s/s of bleeding or thromboembolism     Previous INR was Therapeutic    Plan:     Left a detailed message for Anand regarding INR result and instructed:     Warfarin Dosing Instructions:  Continue current warfarin dose 2.5 mg daily.    Instructed patient to follow up no later than: 4 weeks.    Education provided: importance of taking warfarin as instructed      Instructed to call the [facility] for any changes, questions or concerns. (#[phone])   ?   Gus Byrne RN    Subjective/Objective:      Anand Felix, a 34 y.o. male is on warfarin.     Anand reports:     Home warfarin dose: as updated on anticoagulation calendar per template     Missed doses: No     Medication changes:  No     S/S of bleeding or thromboembolism:  No     New Injury or illness:  No     Changes in diet or alcohol consumption:  No     Upcoming surgery, procedure or cardioversion:  No    Anticoagulation Episode Summary     Current INR goal:   2.0-3.0   TTR:   74.9 %   Next INR check:   10/2/2019   INR from last check:   2.20 (9/4/2019)   Weekly max warfarin dose:      Target end date:   11/29/2018   INR check location:      Preferred lab:      Send INR reminders to:   CHARLENE ART    Indications    Bilateral pulmonary embolism (H) [I26.99]           Comments:            Anticoagulation Care Providers     Provider Role Specialty Phone number    Malachi Choudhary MD Referring Family Medicine [phone]

## 2021-06-01 NOTE — PROGRESS NOTES
Scheduled Follow Up Call: Attempt 1   Community Health Worker called and left a message for the patient. If the patient is returning my call, please transfer the patient to Sylvia Conley at ext. [phone].  Next follow up: 10/16/19

## 2021-06-01 NOTE — PROGRESS NOTES
"9/4/2019    Start time: 4:05 PM    Stop Time: 4:55 PM   Session # 14    Anand Felix is a 34 y.o. male is being seen today for    Chief Complaint   Patient presents with     Mental Health Note   . Major depression, severe   Generalized Anxiety Disorder  Chronic pain syndrome    New symptoms or complaints: None    Functional Impairment:   Personal: 4  Family: 4  Work: 4  Social:4    Clinical assessment of mental status: Anand Felix presented on time.  He was open and cooperative, and dressed appropriately for this session and weather. His memory was in tact .  His  speech was appropriate.  Language was appropriate.  Concentration and focus is on task. Psychosis is not noted or reported. He reports his mood is depressed .  Affect is congruent with speech.  Fund of knowledge is adequate. Insight is adequate for therapy.     Suicidal/Homicidal Ideation present: Patient reports SI. He denies plan or means. He reports SI is \"less strong\" this week. He agrees to follow safety plan to call 911, go to the ER for evaluation and call the crisis connection if he is feeling unsafe or symptoms worsen. He identifies protective factors.     Patient's impression of their current status:  Patient reported he continues to be working on addressing symptoms of depression and anxiety. He reports continued difficulty with physical weakness, fatigue and lack of stamina.  He reports continued SI. He reports he is adjusting to living at his sister's house.     Therapist impression of patients current state: Patient seems to be working on addressing depression, anger, anxiety and chronic medical symptoms. He reports pain symptoms and weakness have been difficult to manage. He continues the medical cannabis program and is finding this to be helpful with pain symptom reduction. Continued to discuss changes in relationships/social life that have occurred since he has been struggling with chronic pain/medical issues.  He discussed social needs at this " time and how it has been difficult to get engaged more recently. Encouraged patient to continue to increase efforts to reach out to social/family support to connect socially/emotionally as he is able. Patient continues to work with case management and is working closely with a . He and his wife are working on future housing needs, but are staying with his sister at this time. He processed thoughts and feelings related to moving now and in his past. He reports [employer] attendance as he is able. He continues to work on addressing acceptance of his chronic pain and medical concerns, with some progress and challenges. Discussed negative cognitions related to physical abilities and suggested re-visiting EMDR work in a future session. He continues to work on sleep improvement, which continues to be challenging.  Patient reports suicidal ideation persists,  but denies immediate plan or means at this time. Discussed ways to cope. Reviewed safety plan and ways to cope if symptoms should worsen or he is feeling unsafe. Continued to discuss benefits of Mindfulness meditation with patient. He reports regular meditation practice and grounding. Presented grounding exercise in session and patient appeared to benefit.      Type of psychotherapeutic technique provided: Client centered, CBT and Mindfulness    Progress toward short term goals:  Patient continues to work on increasing coping skills to manage chronic pain and mental health. Patient continues medical care as recommended by his medical providers. He engages in physical activity and social activity as he is able. He is engaging in meditation/grounding in daily living. He is working with a county .     Review of long term goals: Not done at today's visit. Last review: 8/19/19    Diagnosis: Major depression, severe   Generalized Anxiety Disorder  Chronic pain syndrome    Plan and Follow up: Follow pain center plan of care. Patient to follow all  medical recommendations. Patient to schedule weekly for psychotherapy to further address depression, anxiety and chronic pain concerns. Continue meditation and other healthy coping strategies. Practice meditations as presented in session. Further address adjustment to role/identity due to chronic pain and health concerns. Continue to work on healthy communication skills/relationship building. Continue EMDR as discussed. Work on identifying things he finds enjoyable in daily living. Continue work on suicide prevention worksheet in session. Follow safety plan.       Discharge Criteria/Planning: Patient will continue with follow-up until therapy can be discontinued without return of signs and symptoms.    Alize Livingston 9/4/2019

## 2021-06-01 NOTE — PROGRESS NOTES
"9/13/2019    Start time: 1:00 Pm    Stop Time: 1:50 PM   Session # 15    Anand Felix is a 34 y.o. male is being seen today for    Chief Complaint   Patient presents with     Mental Health Note   . Major depression, severe   Generalized Anxiety Disorder  Chronic pain syndrome    New symptoms or complaints: None    Functional Impairment:   Personal: 4  Family: 4  Work: 4  Social:4    Clinical assessment of mental status: Anand Felix presented on time.  He was open and cooperative, and dressed appropriately for this session and weather. His memory was in tact .  His  speech was appropriate.  Language was appropriate.  Concentration and focus is on task. Psychosis is not noted or reported. He reports his mood is depressed .  Affect is congruent with speech.  Fund of knowledge is adequate. Insight is adequate for therapy.     Suicidal/Homicidal Ideation present: Patient reports SI. He denies plan or means. He reports SI is \"less strong\" this week. He agrees to follow safety plan to call 911, go to the ER for evaluation and call the crisis connection if he is feeling unsafe or symptoms worsen. He identifies protective factors.     Patient's impression of their current status:  Patient reported he continues to be working on addressing symptoms of depression and anxiety. He reports continued difficulty with physical weakness, fatigue and lack of stamina. He states \"I feel like an old man\".  He reports continued SI.      Therapist impression of patients current state: Patient seems to be working on addressing depression, anger, anxiety and chronic medical symptoms. He reports pain symptoms and weakness have been difficult to manage. He continues the medical cannabis program and is finding this to be helpful with pain symptom reduction. Continued to discuss changes in relationships/social life that have occurred since he has been struggling with chronic pain/medical issues.  He discussed social needs at this time and how it has " "been difficult to get engaged more recently. Encouraged patient to continue to increase efforts to reach out to social/family support to connect socially/emotionally as he is able. Patient continues to work with case management and is working closely with a . He and his wife are working on future housing needs, but are staying with his sister at this time. He reports [employer] attendance as he is able. He continues to work on addressing acceptance of his chronic pain and medical concerns, with some progress and challenges. He reports \"I feel like an old man\" and he reports he does not like who he is at this time, due to physical limitations. Discussed negative cognitions related to physical abilities and further discussed re-visiting EMDR work in a future session. Further, discussed positives and abilities he has at this time. Further discussed family history and dynamics. He continues to work on sleep improvement, which continues to be challenging.  Patient reports suicidal ideation persists,  but denies immediate plan or means at this time. Discussed ways to cope. Reviewed safety plan and ways to cope if symptoms should worsen or he is feeling unsafe. Continued to discuss benefits of Mindfulness meditation with patient. He reports regular meditation practice and grounding. Presented grounding exercise in session and patient appeared to benefit.      Type of psychotherapeutic technique provided: Client centered, CBT and Mindfulness    Progress toward short term goals: Patient continues to work on increasing coping skills to manage chronic pain and mental health. Patient continues medical care as recommended by his medical providers. He engages in physical activity and social activity as he is able. He is engaging in meditation/grounding in daily living. He is working with a county .     Review of long term goals: Not done at today's visit. Last review: 8/19/19    Diagnosis: Major " depression, severe   Generalized Anxiety Disorder  Chronic pain syndrome    Plan and Follow up: Follow pain center plan of care. Patient to follow all medical recommendations. Patient to schedule weekly for psychotherapy to further address depression, anxiety and chronic pain concerns. Continue meditation and other healthy coping strategies. Practice meditations as presented in session. Further address adjustment to role/identity due to chronic pain and health concerns. Continue to work on healthy communication skills/relationship building. Continue EMDR as discussed. Work on identifying things he finds enjoyable in daily living. Continue work on suicide prevention worksheet in session. Follow safety plan.             Discharge Criteria/Planning: Patient will continue with follow-up until therapy can be discontinued without return of signs and symptoms.    Alize Livingston 9/13/2019

## 2021-06-01 NOTE — PROGRESS NOTES
"ACUPUNCTURIST TREATMENT NOTE    Name: Anand Felix  :  1985  MRN:  [id number]      Acupuncture Treatment  Patient Type: JN Pain  Intervention Reason: Pain;Headache  Pre-session Pain Ratin  Post-session Pain Ratin  Pre-session Headache Ratin  Post-session Headache Rating: 3  Patient complaint:: Patient is seen for third visit today. He reports after last treatment he felt better and did not have as much of an increase in tingling in RLE. Pain and headache both rated 5/10 today.  Acupuncture (Points):: Liv3, Ub62, Ki7, Sp6, Gb34, Ub57, Ub40, Si3, Ht6, [place], [place], Gb8, Gb20, Ub10, Anmian, HTJJ L2-L5, Ub23, Du4, [place]oy[place], Shiqizhuixia. 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 Infrared heat lamp over low back. Cupping to low back and RLE, tuina with posumon.  Checklist: Progress Note Completed;Consent Reveiwed  Follow up comments: Patient relaxed well during treatment.    \"Risks and benefits of acupuncture were discussed with patient. Consent for treatment was given. We thank you for the referral.\"     Nu Nieves L.Ac.    Date:  10/1/2019  Time:  11:34 AM    "

## 2021-06-01 NOTE — TELEPHONE ENCOUNTER
Left vm msg asking pt if he can wait until his appt for Gabapentin and Tizanidine, otherwise a bridge will be sent.

## 2021-06-01 NOTE — PROGRESS NOTES
Patient presents to the clinic today for a follow up with Dayna Lundberg CNP regarding back, leg, neck and arm pain. Patient describes their pain as burning, throbbing, numb, tight, sharp and ripping. Her/His function score is 8.

## 2021-06-01 NOTE — PROGRESS NOTES
Scheduled Follow Up Call: Attempt 1   Community Health Worker called and left a message for the patient. If the patient is returning my call, please transfer the patient to Sylvia Conley at ext. [phone].  Next Outreach Date: 10/1/19  Planned Outreach Frequency: Pt on Maintenance   Preferred Number: [phone]     RN Assessment Date: December 12th 2018  Pt put on Maintenance 7/15/19     RN Only Goals: None   SW Only Goals: None

## 2021-06-01 NOTE — PROGRESS NOTES
Patient has been on [facility] Maintenance since 7/15/19 and has no other goals that this patient would like to work with Clinic Care Coordination/Health Care Home. Care Guide sent request to [facility] RN pool to review for Graduation and update emergency plan.

## 2021-06-01 NOTE — PROGRESS NOTES
9/23/2019    Start time: 2:10 PM    Stop Time: 3:00 PM   Session # 16    Anand Felix is a 34 y.o. male is being seen today for    Chief Complaint   Patient presents with     Mental Health Note   .  Major depression, severe   Generalized Anxiety Disorder  Chronic pain syndrome       New symptoms or complaints: None    Functional Impairment:   Personal: 4  Family: 4  Work: 4  Social:4    Clinical assessment of mental status: Anand Felix presented on time.  He was open and cooperative, and dressed appropriately for this session and weather. His memory was in tact .  His  speech was appropriate.  Language was appropriate.  Concentration and focus is on task. Psychosis is not noted or reported. He reports his mood is depressed .  Affect is congruent with speech.  Fund of knowledge is adequate. Insight is adequate for therapy.     Suicidal/Homicidal Ideation present: Patient reports SI. He denies plan or means.  He agrees to follow safety plan to call 911, go to the ER for evaluation and call the crisis connection if he is feeling unsafe or symptoms worsen. He identifies protective factors.        Patient's impression of their current status: Patient reported he continues to be working on addressing symptoms of depression and anxiety. He reports continued difficulty with physical weakness, fatigue and lack of stamina. Patient reports lack of interest in activities he used to enjoy. He reports continued SI.      Therapist impression of patients current state: Patient seems to be working on addressing depression, anger, anxiety and chronic medical symptoms. He reports pain symptoms and weakness have been difficult to manage. He continues the medical cannabis program and is finding this to be helpful with pain symptom reduction. Continued to discuss changes in relationships/social life that have occurred since he has been struggling with chronic pain/medical issues.  He discussed social needs at this time and how it has been  difficult to get engaged more recently. He reports it is difficult to enjoy activities he used to. Encouraged patient to continue to increase efforts to reach out to social/family support to connect socially/emotionally as he is able. He struggles with identity since he has had significant health changes in recent years. Patient continues to work with case management and is working closely with a . He and his wife are working on future housing needs, but are staying with his sister at this time. He reports [employer] attendance as he is able. He continues to work on addressing acceptance of his chronic pain and medical concerns, with some progress and challenges. Discussed negative cognitions related to physical abilities and further discussed re-visiting EMDR work in a future session. Further, discussed positives and abilities he has at this time. Further discussed family history and dynamics. He continues to work on sleep improvement, which continues to be challenging.  Patient reports suicidal ideation persists,  but denies immediate plan or means at this time. Discussed ways to cope. Reviewed safety plan and ways to cope if symptoms should worsen or he is feeling unsafe. Continued to discuss benefits of Mindfulness meditation with patient. He reports regular meditation practice and grounding. Presented grounding exercise in session and patient appeared to benefit.      Type of psychotherapeutic technique provided: Client centered, CBT and Mindfulness    Progress toward short term goals: Patient continues to work on increasing coping skills to manage chronic pain and mental health. Patient continues medical care as recommended by his medical providers. He engages in physical activity and social activity as he is able. He is engaging in meditation/grounding in daily living. He is working with a county .        Review of long term goals: Not done at today's visit. Last review:  8/19/19    Diagnosis:  Major depression, severe   Generalized Anxiety Disorder  Chronic pain syndrome     Plan and Follow up:  Follow pain center plan of care. Patient to follow all medical recommendations. Patient to schedule weekly for psychotherapy to further address depression, anxiety and chronic pain concerns. Continue meditation and other healthy coping strategies. Practice meditations as presented in session. Further address adjustment to role/identity due to chronic pain and health concerns. Continue to work on healthy communication skills/relationship building. Continue EMDR as discussed. Work on identifying things he finds enjoyable in daily living. Continue work on suicide prevention worksheet in session. Follow safety plan.       Discharge Criteria/Planning: Patient will continue with follow-up until therapy can be discontinued without return of signs and symptoms.    Alize Livingston 9/23/2019

## 2021-06-01 NOTE — PROGRESS NOTES
PAIN CLINIC FOLLOW UP PROGRESS NOTE    CC:  Chief Complaint   Patient presents with     Neck Pain     Back Pain     Leg Pain     Arm Pain     Headache       HPI  Anand Felix is a 34 y.o. male who presents for evaluation   Chief Complaint   Patient presents with     Neck Pain     Back Pain     Leg Pain     Arm Pain     Headache    that is causing continued pain. Since the last visit the patient denies any trips to the urgent care or ED specifically for their pain. The patient denies any new medications, diagnoses since the last visit. Specific questions indicated the patient wanted addressed today include: to treat his ongoing chronic pain as well as discuss recent right thigh pain. He has an increase in activity due to moving.     Major issues:  1. Chronic pain syndrome    2. Myalgia    3. Arthralgias In Multiple Sites    4. Cervicalgia    5. Bilateral pulmonary embolism (H)      Today the pain is located in their neck and right shoulder, leg and back and is described as sharp burning achy and tight when it is aggravated it lasts for constant, and is rated at a 5 on a scale of 1-10  Associated symptoms: Denies any loss of bladder control, fevers/chills, unintentional weight loss, weakness, numbness or pain that interferes with sleep.   Aggravating factors include: currently transitioning from butrans patch to oral film. Patient reports this Insurance required change has worsened his symptoms.   Alleviating factors: laying down, massage  Adverse effects of medications: NONE   Functional symptoms: affects his ability to care for himself as well as socialize and perform duties around the house  Current subjective treatment efficacy: Poor      Previous Medical History  Social History     Substance and Sexual Activity   Alcohol Use Yes    Comment: Monthly or less often. 1-2 drinks at a time.     Social History     Substance and Sexual Activity   Drug Use Yes     Types: Marijuana     Social History     Tobacco Use   Smoking  "Status Never Smoker   Smokeless Tobacco Current User       Pertinent Pain Medications/interventions:  He currently belbuca 300 mcg two times a day, gabapentin 100 mg three times a day, oxycodone 5 mg two times a day prn and medical cannabis, tizanidine    Belbuca- headaches are associated so he stopped taking this. 9/6     Previously taking Belbuca 75 mcg, gabapentin 100 mg three times a day, oxycodone 5 mg up to two times a day as needed, medical cannabis, baclofen and flexeril      RFA did provide him with 80 % relief.      Medical cannabis    Review of Systems:  12 point systems were reviewed with pt as documented on pt health form and the patient denies any new diagnosis or changes in 12 point system review since the last visit.     Physical Exam  Vitals:    09/06/19 1334   BP: 107/74   Patient Site: Right Arm   Patient Position: Sitting   Pulse: 78   Resp: 16   Weight: 153 lb (69.4 kg)   Height: 5' 5\" (1.651 m)     General- patient is alert and oriented, in NAD, well-groomed, well-nourished  Psych- Judgment and insight normal, AOx4, recent and remote memory normal, mood and affect normal  Eyes- pupils are equal and reactive, conjunctiva is clear bilaterally, no ptosis is noted.   Respiratory- breathing is non-labored  Cardiovascular- extremities warm and well perfused, no peripheral edema or varicosities.  Musculoskeletal- gait is normal, extremities with no joint swelling, erythema, or warmth. Low back pain and multi site joint pain throughout. Neck pain and right leg and arm pain   Neuro- normal strength, no gait abnormalities, normal sensation to pain, temperature, light touch. Occiput headache still present  Integumentary- no rashes, dermatitis or discolorations noted throughout, no open wounds noted.    Medications    Current Outpatient Medications:      acetaminophen (TYLENOL) 500 MG tablet, Take 1 tablet (500 mg total) by mouth every 6 (six) hours as needed for pain., Disp: , Rfl: 0     atenolol " (TENORMIN) 25 MG tablet, TAKE 1 TABLET BY MOUTH EVERY DAY, Disp: 90 tablet, Rfl: 1     cholecalciferol, vitamin D3, 2,000 unit cap, Take 2,000 Units by mouth daily., Disp: , Rfl:      fluticasone propionate (FLONASE) 50 mcg/actuation nasal spray, SPRAY 2 SPRAYS INTO EACH NOSTRIL EVERY DAY, Disp: 16 g, Rfl: 5     gabapentin (NEURONTIN) 100 MG capsule, TAKE 1 CAPSULE BY MOUTH THREE TIMES DAILY. OKAY TO REDUCE TO 2 TIMES A DAY IF TOO DROWSY, Disp: 84 capsule, Rfl: 0     lidocaine (LIDODERM) 5 %, Remove & Discard patch within 12 hours or as directed by MD, Disp: 15 patch, Rfl: 0     NON FORMULARY,    , Disp: , Rfl:      omeprazole (PRILOSEC) 20 MG capsule, TAKE 1 CAPSULE (20 MG TOTAL) BY MOUTH DAILY., Disp: 90 capsule, Rfl: 3     rizatriptan (MAXALT-MLT) 10 MG disintegrating tablet, TAKE 1 TABLET BY MOUTH AS NEEDED FOR MIGRAINE. MAY REPEAT IN 2HRS IF NEEDED, Disp: 12 tablet, Rfl: 10     tiZANidine (ZANAFLEX) 2 MG tablet, Take 2 tablets (4 mg total) by mouth every 6 (six) hours as needed., Disp: 90 tablet, Rfl: 0     warfarin (COUMADIN/JANTOVEN) 5 MG tablet, Take 1/2 to 1 tablet (2.5 to 5 mg) by mouth daily. Adjust dose per INR results as instructed., Disp: 60 tablet, Rfl: 1     [START ON 9/13/2019] oxyCODONE-acetaminophen (PERCOCET/ENDOCET) 7.5-325 mg per tablet, Take 1 tablet by mouth 3 (three) times a day as needed for pain., Disp: 84 tablet, Rfl: 0    Lab:  Last UDS on 2/2019 had expected results. Any abnormal results have been discussed with the patient and may change the plan of care. Please see the scanned document from the outside lab under the media tab. This was reviewed with the patient.      Imaging:  No new imaging.      Recent   Dated 9/6/2019 was reviewed with the patient today.   Paper copy reviewed     Assessment:   Anand Felix is a 34 y.o. male seen in clinic today for   Chief Complaint   Patient presents with     Neck Pain     Back Pain     Leg Pain     Arm Pain     Headache     They are here for  follow up and continued medical management of their pain. Today we reviewed the lab work of the UDT test and the results are expected because of the prescribed narcotics found in the urine drug screen.     I have also reviewed the information obtained from the last visit encounter after the PATSY was signed and have asked any pertintent questions needed from other healthcare providers/family/patient to continue care and formulate a treatment plan.     Ongoing chronic pain in the back, leg , head and neck, left arm- this has been an ongoing issues and he is now noticing sweating throughout the day which was present prior to stopping the belbuca which was giving him a headache.  Patient also reports that for his ongoing migraine type headaches he is been taking Maxalt which seems to help some he still has breakthrough headache pain at times.  Patient is going to try acupuncture which is scheduled for next week.    Patient also notes the sweating episodes he has experienced started prior to his pulmonary embolus diagnosis this still continues despite initiation and then later discontinuation of long-acting opioid.  Patient continues to take the short acting opioid as needed we will increase the dose today as patient feels that he is not getting enough relief when he does take this.  Patient is going to continue to work with a medical dispensary in regards to the use of his cannabis for better control as well.    Patient to follow-up his primary care provider and for ongoing sweating episodes which may be hormone related.  Patient also continues to take gabapentin as well as tizanidine he finds these helpful.  Patient is also taking atenolol which does not decrease his headache pain, or he is taking this for tachycardia.  His response to the acupuncture patient may be candidate for Botox.     Patients current MME is 33.75  Patient to call clinic for next month's prescription 5 days in advance. Based on the patients  response to their previous narcotic therapy and quality of life, which includes their participation in ADLs, I recommend that the narcotics therapy continue, however the changes listed below will be incorporated into their plan of care.     Patient set goals to   1. To treat his ongoing chronic pain     Plan:   Interventions-    Follow up in 8 weeks to evaluate the effectiveness of the treatment interventions ordered today. Scheduling your appointment prior to leaving assists in reduction of running out of medication prior to the next appointment.     If you are receiving medications from the pain clinic, it is your responsibility to call 3-5 days in advance for a refill. The clinic has up to five days to provide a refill for you.     Please call the [facility] for refills.    Please bring any opioids or controlled substances in that are prescribed by the clinic for pill counts for every visit.     D/c the belbuca as this was contributing to his migraines. continue with plans for acupuncture. May need botox.     Ok to increase the use of the oxycodone up to 7.5 mg 9/13 ext fill will last until 10/11/2019 call for a refill in between     Continue to work with the dispensary and the medical cannabis     Discuss sweating episodes with PCP, unable to correlate with opioid use    Ok to continue use of tizanidine and gabapentin as you are. Call for refills.     As a reminder- chronic pain is generally stable, if you experience a new injury or new different pain it is expected that you present to the ED or urgent care for evaluation. DO NOT use your chronic pain medications to treat any new or different pain other then what it is intended for. We do not replace any lost or stolen prescriptions or provide early refills for overtaking your medications.     Orders placed today  Medications that were ordered today   Requested Prescriptions     Signed Prescriptions Disp Refills     tiZANidine (ZANAFLEX) 2 MG tablet  90 tablet 0     Sig: Take 2 tablets (4 mg total) by mouth every 6 (six) hours as needed.     gabapentin (NEURONTIN) 100 MG capsule 84 capsule 0     Sig: TAKE 1 CAPSULE BY MOUTH THREE TIMES DAILY. OKAY TO REDUCE TO 2 TIMES A DAY IF TOO DROWSY     oxyCODONE-acetaminophen (PERCOCET/ENDOCET) 7.5-325 mg per tablet 84 tablet 0     Sig: Take 1 tablet by mouth 3 (three) times a day as needed for pain.     No orders of the defined types were placed in this encounter.      UDT/SWAB:  Patient required a random Urine Drug Testing, due to the need to comply with [place] [employer] Policy Guidelines and CDC Guideline for the use of any controlled substances. This is to ensure that patient is compliant with treatment, and monitor for risks such as diversion, abuse, or any other aberrant behaviors. Patient is either being considered for or taking a controlled substance. Unexpected findings will be discussed and treatment decision may be adjusted. Testing is being implemented across the board randomly w/o bias related to age, race, gender, socioeconomic status or Jewish affiliation.    The patient understand todays plan and has their questions answered in regards to expectations and current treatment plan.     SAFETY REMINDERS  No alcohol while taking controlled substances. Alcohol is not an illegal substance, it is unsafe to use in combination. It is a build up of substances in the body that can be extremely hazardous and may cause respirations to slow to a dangerous rate resulting in hospitalization, brain damage, or death.    Opioid medications have been associated with sharp rise in unintentional overdose and death.  Overdose is a condition characterized by the consumption in excess of a particular drug causing adverse effects. This can happen b/c you are sick, accidentally or intentionally took an extra dose, are on multiple medication that can interact. Someone took your medication and they are not use to the  medication.  Symptoms of overdose include:   !breathing slow and shallow, erratic or not at all  !pinpoint pupils, hallucinations  !confusion  !muscle jerks, slack muscles   !extreme sleepiness or loss of alertness   !awake but not able to talk   !face pale or clammy, vomiting, for lighter skinned people, the skin tone turns bluish purple, for darker skinned people, it turns grayish or ashen   If in a situation where overdose is a concern engage the emergency response system (dial 911).    In one study it was noted that 80% of unintentional overdoses occurred in people who were taking a combination of opioids and benzodiazepines.    Do not sell, loan, borrow or share your opioid medication with anyone. Deaths have occurred as a result of this practice. It is illegal and patients are being prosecuted.     Prevent unexpected access/loss of medication: Keep medication locked. Only carry what you need with you.    Dayna Lundberg, [facility]  [address]  [place] [address]  Ph: [phone]  Fax: [phone]

## 2021-06-01 NOTE — PROGRESS NOTES
"ACUPUNCTURIST TREATMENT NOTE    Name: Anand Felix  :  1985  MRN:  [id number]      Acupuncture Treatment  Patient Type: JN Pain  Intervention Reason: Pain;Headache  Pre-session Pain Ratin  Post-session Pain Ratin  Pre-session Headache Ratin  Post-session Headache Ratin  Patient complaint:: Patient is seen for 2nd visit today. He reports after first treatment he felt an increase in tingling in is RLE for about 2 days. Today he rates pain levels 6/10, and headache is occipital/left sided temporal 6/10.   Acupuncture (Points):: Liv3, Ub62, Ki7, Sp6, Gb34, Ub57, Ub40, Si3, Ht6, [place], [place], Gb8, Gb20, Ub10, Anmian, HTJJ L2-L5, Ub23, Du4, [place]oyan, Sh[place]izhuixia. 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 Infrared heat lamp over low back. Cupping to low back and (R) calf, tuina with posumon to neck/back.  Checklist: Progress Note Completed;Consent Reveiwed  Follow up comments: Patient fell asleep and relaxed well during treatment.     \"Risks and benefits of acupuncture were discussed with patient. Consent for treatment was given. We thank you for the referral.\"     Nu Nieves L.Ac.    Date:  2019  Time:  2:13 PM    "

## 2021-06-01 NOTE — PROGRESS NOTES
"Clinic Care Coordination Contact    Assessment: Care Coordinator CHW contacted patient for 2 month follow up.  Patient has continued to follow the plan of care and assessment is negative for any new needs or concerns.    Enrollment status: Graduated.      Plan: No further outreaches at this time.  Patient will continue to follow the plan of care.  If new needs arise a new Care Coordination referral may be placed.  FYI to PCP      Self-Care for Headaches (.HCHEPheadaches)  Most headaches aren't serious and can be relieved with self-care. But some headaches may be a sign of another health problem like eye trouble or high blood pressure. To find the best treatment, learn what kind of headaches you get. For tension headaches, self-care will usually help. To treat migraines, ask your healthcare provider for advice. It is also possible to get both tension and migraine headaches. Self-care involves relieving the pain and avoiding headache  triggers  if you can.    Ways to reduce pain and tension  Try these steps:    Apply a cold compress or ice pack to the pain site.    Drink fluids. If nausea makes it hard to drink, try sucking on ice.    Rest. Protect yourself from bright light and loud noises.    Calm your emotions by imagining a peaceful scene.    Massage tight neck, shoulder, and head muscles.    To relax muscles, soak in a hot bath or use a hot shower.  Use medicines  Aspirin or aspirin substitutes, such as ibuprofen and acetaminophen, can relieve headache. Remember: Never give aspirin to anyone 18 years old or younger because of the risk of developing Reye syndrome. Use pain medicines only when necessary.  Track your headaches  Keeping a headache diary can help you and your healthcare provider identify what's causing your headaches:    Note when each headache happens.    Identify your activities and the foods you've eaten 6 to 8 hours before the headache began.    Look for any trends or \"triggers.\"  Signs of tension " "headache  Any of the following can be signs:    Dull pain or feeling of pressure in a tight band around your head    Pain in your neck or shoulders    Headache without a definite beginning or end    Headache after an activity such as driving or working on a computer  Signs of migraine  Any of the following can be signs:    Throbbing pain on one or both sides of your head    Nausea or vomiting    Extreme sensitivity to light, sound, and smells    Bright spots, flashes, or other visual changes    Pain or nausea so severe that you can't continue your daily activities  Call your healthcare provider    If you have any of the following symptoms, contact your healthcare provider:    A headache that lingers after a recent injury or bump to the head.    A fever with a stiff neck or pain when you bend your head toward your chest.    A headache along with slurred speech, changes in your vision, or numbness or weakness in your arms or legs.    A headache for longer than 3 days.    Frequent headaches, especially in the morning.    Headaches with seizures     Seek immediate medical attention if you have a headache that you would call \"the worst headache you have ever had.\"         Pulmonary Embolism (PE)  A pulmonary embolus is most often due to a blood clot that develops in a deep vein of the leg (deep vein thrombosis). If that clot, breaks loose and travels to the lung, it is called a pulmonary embolism (PE). This can cut off the flow of blood in the lungs.  A blood clot in the lungs is a medical emergency and may cause death.   Healthcare providers use the term venous thromboembolism (VTE) to describe these 2 conditions: deep vein thrombosis and pulmonary embolism. They use the term VTE because the 2 conditions are very closely related. And, because their prevention and treatment are also closely related.    How is pulmonary embolism diagnosed?  Your healthcare provider examines you and asks about your symptoms and health " history. You may also have one or more of the following:    Blood tests to check for blood clotting or other problems    Imaging tests to look for clots in the veins or lung    Electrocardiography (ECG) to test how well the heart is working  How is pulmonary embolism treated?    Blood-thinning medicines (anticoagulants). These medicines thin the blood. They may be given as a pill, as an injection, or through a tube into a vein (intravenous or IV). Blood thinners help prevent more blood clots from forming. They also help to prevent an existing clot from getting larger.    Thrombolysis. Thrombolytic medicines are used to quickly dissolve a blood clot. A long, narrow tube (catheter) is used to deliver medicine directly to the clot. Thrombolytic medicines increase the risk of bleeding so they are used very carefully.    Inferior vena cava (IVC) filter surgery. The vena cava is the body s largest vein. It carries blood from the body to the heart. A small filter traps blood clots in the lower body and prevents them from traveling to the lungs. The filter is inserted into the vein through a catheter. The filter may be used if blood thinners cannot be taken or if they don't work.     Pulmonary embolectomy. This is a procedure to remove a blood clot in the lungs. It may be done with surgery or with a catheter inserted in the body. It may be done when other treatments aren't safe or don't work.  What are the long-term concerns?  With treatment, blood clots are usually dissolved or removed. Some treatments can even help prevent future clots. But having a PE can put you at risk for another life-threatening blood clot. So, you will likely need to take anticoagulants to help keep blood clots from forming again. You may need to take this medicine for months or years.  You may also need to make lifestyle changes. This may include getting more active and eating healthier. You may need to wear elastic (compression) stockings and and  take breaks on long trips.     Call 911  Call 911 or get emergency help if you have symptoms of a blood clot that has traveled to the lungs. The symptoms include:    Chest pain    Trouble breathing    Coughing (may cough up blood)    Fainting    Fast heartbeat    Sweating  Call 911 if you have heavy or uncontrolled bleeding.  When to call your healthcare provider  Call your healthcare provider if you have swelling or pain in your leg, arm, or other area. These are symptoms of a blood clot.  You may have bleeding if you take medicine to help prevent blood clots.  Call your healthcare provider if you have signs or symptoms of bleeding. This includes:    Blood in the urine    Bleeding with bowel movements    Bleeding from the nose, gums, a cut, or vagina      What to Know When Taking Warfarin  Warfarin is a medicine that controls how your blood clots. It is used to help prevent blood clots that may cause serious health problems. These problems include heart attack (myocardial infarction), stroke, a blockage in an artery or vein (thrombus), or a blood clot that travels to the lungs (pulmonary embolism  Before you start taking this medicine, tell your healthcare provider if you have any of these conditions:  Stomach ulcer now or in the past  Vomited blood or had bloody stools (black or red color)  Aneurysm, pericarditis, or pericardial effusion  Blood disorder  Recent surgery, stroke, mini-stroke, or spinal puncture  Kidney or liver disease, uncontrolled high blood pressure, diabetes, vasculitis, heart failure, lupus or other collagen-vascular disease, or high cholesterol  You are pregnant or breastfeeding  You are younger than 18 years old  Recent or planned dental procedure  Although warfarin reduces the risk for blood clots, it increases your risk of bleeding. Because of this, you must take the medicine exactly as you are told by your healthcare provider.   You will have blood tests often to check the level of  warfarin in your blood. It is important to have just the right amount to balance preventing blood clots and causing excessive bleeding. If the level is too low, you are at risk for developing a blood clot. If it's too high, you are at risk for bleeding. Make sure you keep all scheduled appointments for blood testing. If you don t, you will be at risk for bleeding problems. You also need to protect yourself from injury that may cause bleeding such as a fall or hitting your head.  Follow these tips  Take this medicine at the same time each day. Take it with a full glass of water, with or without food.  Make sure you know what to do if you miss a dose. If you are not sure what to do, call your healthcare provider or pharmacist. Don't take more warfarin than you were told to.  Tell all of your providers including your dentist that you take warfarin. If you will be taking warfarin for quite a while, carry an ID card or get a Medic-Alert bracelet. This will alert medical staff in case you aren t able to do so yourself. Also carry with you the name and number of the person to contact in case of an emergency.  Keep your appointments for regular blood tests to measure the effects of warfarin. Your healthcare provider may need to change your dose based on the results of your blood test. Follow your provider s advice exactly about how to take this medicine.  Don't stop taking the medicine without talking with your provider.  Keep your diet steady  Keep your diet pretty much the same each day. That s because many foods contain vitamin K. Vitamin K helps your blood clot. So eating overly high amounts of foods that contain vitamin K can affect the way warfarin works. You don t need to stay away from foods that have vitamin K. But keep the amount of them you eat steady (about the same day to day). If you change your diet for any reason, such as for illness or to lose weight, tell your healthcare provider.  Foods that have vitamin  K include asparagus, avocado, broccoli, cabbage, kale, spinach, and some other leafy green vegetables. Oils such as soybean, canola, and olive oils also contain vitamin K.  Other food products can affect the way warfarin works in your body:  Food products that may affect blood clotting include cranberries and cranberry juice, fish oil supplements, garlic, nery, licorice, and turmeric.  Herbs used in herbal teas or supplements can also affect blood clotting. Keep the amount of herbal teas and supplements you use steady.  Alcohol can increase the effect of warfarin in your body.  Talk with your healthcare provider if you have concerns about these or other food products and their effects on warfarin.  When to call your healthcare provider  Warfarin increases your risk of bleeding. Call your healthcare provider right away before you take your next dose of warfarin if you have any of these problems:  Bleeding that doesn t stop in 10 minutes. This might be from a bloody nose or a cut, for example.  A heavier-than-normal period or bleeding between periods  Coughing or throwing up blood or something that looks like coffee grounds  Nausea, bloating, or diarrhea  Bleeding hemorrhoids  Dark red or brown urine  Red or black tarry stools  Red or black-and-blue marks on the skin that get larger  A fever or an illness that gets worse  Dizziness, headache, weakness, or fatigue  Chest pain or trouble breathing  A serious fall or a blow to the head  Swelling or pain after an injury or at an injection site  Bleeding gums after brushing your teeth  What to watch for  If you have any of the following allergic reactions, call your doctor right away or go to the hospital.  Rash  Itching  Swelling  Trouble swallowing or breathing

## 2021-06-01 NOTE — PROGRESS NOTES
"ACUPUNCTURIST TREATMENT NOTE    Name: Anand Felix  :  1985  MRN:  [id number]      Acupuncture Treatment  Patient Type: JN Pain  Intervention Reason: Headache;Pain;Wellness  Pre-session Pain Ratin  Post-session Pain Rating: 3  Pre-session Headache Ratin  Post-session Headache Ratin  Patient complaint:: Patient is seen for first acupuncture treatment today. He is accompanied by his wife. Patient would like to address chronic pain that has been present since . Low back pain began to worsen around this time with sciatica symptoms, and patient had surgery to correct this. He states since that time, his symptoms have gotten much worse throughout his entire body. He began with weakness in his right leg as well as arm. He has joint and muscular pain that travels around his body. Numbness and tingling that comes and goes in hands and feet. Pain worsens in cold weather. Patient also began having headaches \"about one year ago\" and these are daily with individual flares throughout each day. Headaches tend to be occipital and temporal. In general the patient feels cooler, but sweats both daytime and night time. Dry mouth and often thirsty. He also tells me he has a long history of stomach problems, which are now also exacerbated after spine surgery. He has poor appetite, and notices abdominal pain after eating. He has acid refulx that is well controlled with  medications. Tends to constipation since starting pain medications, manages fairly well with magnesium. He has poor sleep due to pain and low energy. Today he rates body pain 5/10 and headache 7/10. Worst pain is in neck, low back, and down right leg.  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 Infrared heat lamp over low back. Tuina with zhenggushui to neck/back.  Treatment Plan/Follow up: Patient to schedule weekly follow ups for 4-6 weeks as schedule allows, then re-evaluate.  Checklist: Progress Note Completed;Consent Reveiwed  Follow up comments: Patient relaxed well during treatment.    \"Risks and benefits of acupuncture were discussed with patient. Consent for treatment was given. We thank you for the referral.\"     Nu Nieves L.Ac.    Date:  9/18/2019  Time:  2:09 PM    "

## 2021-06-02 VITALS — WEIGHT: 160.3 LBS | BODY MASS INDEX: 26.71 KG/M2 | HEIGHT: 65 IN

## 2021-06-02 VITALS — WEIGHT: 158 LBS | HEIGHT: 65 IN | BODY MASS INDEX: 26.33 KG/M2

## 2021-06-02 VITALS — BODY MASS INDEX: 26.66 KG/M2 | WEIGHT: 160 LBS | HEIGHT: 65 IN

## 2021-06-02 VITALS — BODY MASS INDEX: 26.66 KG/M2 | HEIGHT: 65 IN | WEIGHT: 160 LBS

## 2021-06-02 VITALS — HEIGHT: 65 IN | BODY MASS INDEX: 26.66 KG/M2 | WEIGHT: 160 LBS

## 2021-06-02 VITALS — HEIGHT: 65 IN | BODY MASS INDEX: 26.99 KG/M2 | WEIGHT: 162 LBS

## 2021-06-02 VITALS — HEIGHT: 65 IN | WEIGHT: 162 LBS | BODY MASS INDEX: 26.99 KG/M2

## 2021-06-02 VITALS — BODY MASS INDEX: 26.99 KG/M2 | HEIGHT: 65 IN | WEIGHT: 162 LBS

## 2021-06-02 VITALS — WEIGHT: 158.2 LBS | BODY MASS INDEX: 26.33 KG/M2

## 2021-06-02 VITALS — BODY MASS INDEX: 26.99 KG/M2 | WEIGHT: 162 LBS | HEIGHT: 65 IN

## 2021-06-02 VITALS — WEIGHT: 162 LBS | BODY MASS INDEX: 26.99 KG/M2 | HEIGHT: 65 IN

## 2021-06-02 NOTE — TELEPHONE ENCOUNTER
Refill Approved    Rx renewed per Medication Renewal Policy. Medication was last renewed on 10/8/19.    Genia Covington, [facility] Triage/Med Refill 10/31/2019     Requested Prescriptions   Pending Prescriptions Disp Refills     rizatriptan (MAXALT-MLT) 10 MG disintegrating tablet [Pharmacy Med Name: RIZATRIPTAN 10 MG ODT] 12 tablet 2     Sig: DISSOLVE 1 TABLET ON TONGUE AS NEEDED FOR MIGRAINE. MAY REPEAT IN 2HRS IF NEEDED       Triptans Refill Protocol Passed - 10/30/2019  5:52 PM        Passed - PCP or prescribing provider visit in past 12 months       Last office visit with prescriber/PCP: 9/4/2019 Malachi Choudhary MD OR same dept: 9/4/2019 Malachi Choudhary MD OR same specialty: 9/4/2019 Malachi Choudhary MD  Last physical: 5/1/2019 Last MTM visit: Visit date not found   Next visit within 3 mo: Visit date not found  Next physical within 3 mo: Visit date not found  Prescriber OR PCP: Malachi Choudhary MD  Last diagnosis associated with med order: 1. Nonintractable episodic headache, unspecified headache type  - rizatriptan (MAXALT-MLT) 10 MG disintegrating tablet [Pharmacy Med Name: RIZATRIPTAN 10 MG ODT]; DISSOLVE 1 TABLET ON TONGUE AS NEEDED FOR MIGRAINE. MAY REPEAT IN 2HRS IF NEEDED  Dispense: 12 tablet; Refill: 2    If protocol passes may refill for 12 months if within 3 months of last provider visit (or a total of 15 months).

## 2021-06-02 NOTE — PROGRESS NOTES
Patient presents to the clinic today for a follow up with Dayna Lundberg CNP regarding back, neck and leg. Patient describes their pain as sharp, throbbing, burning and tingling. Her/His function score is 8.

## 2021-06-02 NOTE — TELEPHONE ENCOUNTER
ANTICOAGULATION  MANAGEMENT    Assessment     Today's INR result of 2.3 is Therapeutic (goal INR of 2.0-3.0)        Warfarin taken as previously instructed    No new diet changes affecting INR    No new medication/supplements affecting INR    Continues to tolerate warfarin with no reported s/s of bleeding or thromboembolism     Previous INR was Subtherapeutic    Plan:     Spoke with wife Miguel regarding INR result and instructed:     Warfarin Dosing Instructions:  Continue current warfarin dose 5 mg daily on fri; and 2.5 mg daily rest of week     Instructed patient to follow up no later than: two weeks      Miguel verbalizes understanding and agrees to warfarin dosing plan.    Instructed to call the [facility] for any changes, questions or concerns. (#[phone])   ?   Sharifa Mchugh RN    Subjective/Objective:      Anand Felix, a 34 y.o. male is on warfarin.     Anand reports:     Home warfarin dose: as updated on anticoagulation calendar per template     Missed doses: No     Medication changes:  No     S/S of bleeding or thromboembolism:  No     New Injury or illness:  No     Changes in diet or alcohol consumption:  No     Upcoming surgery, procedure or cardioversion:  No    Anticoagulation Episode Summary     Current INR goal:   2.0-3.0   TTR:   76.2 %   Next INR check:   11/15/2019   INR from last check:   2.30 (11/1/2019)   Weekly max warfarin dose:      Target end date:   11/29/2018   INR check location:      Preferred lab:      Send INR reminders to:   CHARLENE ART    Indications    Bilateral pulmonary embolism (H) [I26.99]           Comments:            Anticoagulation Care Providers     Provider Role Specialty Phone number    Malachi Choudhary MD Referring Family Medicine [phone]

## 2021-06-02 NOTE — PROGRESS NOTES
10/11/2019    Start time: 2:05 PM    Stop Time: 2:55 PM   Session # 18    Anand Felix is a 34 y.o. male is being seen today for    Chief Complaint   Patient presents with     Mental Health Note   . Major depression, severe   Generalized Anxiety Disorder  Chronic pain syndrome       New symptoms or complaints: None    Functional Impairment:   Personal: 4  Family: 4  Work: 4  Social:4    Clinical assessment of mental status: Anand Felix presented on time.  He was open and cooperative, and dressed appropriately for this session and weather. His memory was in tact .  His  speech was  appropriate.  Language was appropriate.  Concentration and focus is on task. Psychosis is not noted or reported. He reports his mood is depressed .  Affect is congruent with speech.  Fund of knowledge is adequate. Insight is adequate for therapy.     Suicidal/Homicidal Ideation present: Patient reports SI. He denies plan or means.  He agrees to follow safety plan to call 911, go to the ER for evaluation and call the crisis connection if he is feeling unsafe or symptoms worsen. He identifies protective factors.      Patient's impression of their current status: Patient reported he continues to be working on addressing symptoms of depression and anxiety. He did report some improvement with mood this week. He reports continued difficulty with physical weakness, fatigue and lack of stamina. He reports continued SI.      Therapist impression of patients current state: Patient seems to be working on addressing depression, anger, anxiety and chronic medical symptoms. Continued to educate on depression symptoms. He reports pain symptoms and weakness have been difficult to manage. Continued to discuss changes in relationships/social life that have occurred since he has been struggling with chronic pain/medical issues.  He discussed social needs at this time and how he has been making some changes to be more socially engaged. He reports it is difficult  to enjoy activities he used to, but that he has some continued motivation this week. Encouraged patient to continue to increase efforts to reach out to social/family support to connect socially/emotionally as he is able. He struggles with identity since he has had significant health changes in recent years. Engaged in values clarification. He and his wife are working on future housing needs, but are staying with his sister at this time. He reports [employer] attendance as he is able. He continues to work on addressing acceptance of his chronic pain and medical concerns, with some progress and challenges. Discussed negative cognitions related to physical abilities and will further address with EMDR in a future session. Further, discussed positives and abilities he has at this time. Further discussed family history and dynamics. He continues to work on sleep improvement, which continues to be challenging.  Patient reports suicidal ideation persists,  but denies immediate plan or means at this time. He reported SI has decreased this week as he had been making efforts to engage in activities. Discussed ways to cope. Reviewed safety plan and ways to cope if symptoms should worsen or he is feeling unsafe. Continued to work on identifying positives in session. Continued to discuss benefits of Mindfulness meditation with patient. He reports regular meditation practice and grounding. Presented meditation exercise in session and patient appeared to benefit.      Type of psychotherapeutic technique provided: Client centered, CBT and Mindfulness    Progress toward short term goals: Patient continues to work on increasing coping skills to manage chronic pain and mental health. Patient continues medical care as recommended by his medical providers. He engages in physical activity and social activity as he is able. He is working on values clarification. He is engaging in meditation/grounding in daily living. He is working with a  [facility] .        Review of long term goals: Not done at today's visit. Last review: 8/19/19    Diagnosis:Major depression, severe   Generalized Anxiety Disorder  Chronic pain syndrome      Plan and Follow up: Follow pain center plan of care. Patient to follow all medical recommendations. Patient to schedule weekly for psychotherapy to further address depression, anxiety and chronic pain concerns. Continue meditation and other healthy coping strategies. Practice meditations as presented in session. Further address adjustment to role/identity due to chronic pain and health concerns. Continue to work on healthy communication skills/relationship building. Continue EMDR as discussed. Work on identifying things he finds enjoyable in daily living. Continue work on suicide prevention worksheet in session. Follow safety plan.       Discharge Criteria/Planning: Patient will continue with follow-up until therapy can be discontinued without return of signs and symptoms.    Alize Livingston 10/11/2019

## 2021-06-02 NOTE — TELEPHONE ENCOUNTER
Refill Given    Refill given per Policy, patient informed they are overdue for Labs and Physical     Lori Ham, [facility] [place] [facility] Triage/Med Refill 10/8/2019    Requested Prescriptions   Pending Prescriptions Disp Refills     rizatriptan (MAXALT-MLT) 10 MG disintegrating tablet [Pharmacy Med Name: RIZATRIPTAN 10 MG ODT] 12 tablet 2     Sig: TAKE 1 TABLET BY MOUTH AS NEEDED FOR MIGRAINE. MAY REPEAT IN 2HRS IF NEEDED       Triptans Refill Protocol Passed - 10/7/2019  9:53 AM        Passed - PCP or prescribing provider visit in past 12 months       Last office visit with prescriber/PCP: 9/4/2019 Malachi Choudhary MD OR same dept: 9/4/2019 Malachi Choudhary MD OR same specialty: 9/4/2019 Malachi Choudhary MD  Last physical: 5/1/2019 Last MTM visit: Visit date not found   Next visit within 3 mo: Visit date not found  Next physical within 3 mo: Visit date not found  Prescriber OR PCP: Malachi Choudhray MD  Last diagnosis associated with med order: 1. Nonintractable episodic headache, unspecified headache type  - rizatriptan (MAXALT-MLT) 10 MG disintegrating tablet [Pharmacy Med Name: RIZATRIPTAN 10 MG ODT]; TAKE 1 TABLET BY MOUTH AS NEEDED FOR MIGRAINE. MAY REPEAT IN 2HRS IF NEEDED  Dispense: 12 tablet; Refill: 2    If protocol passes may refill for 12 months if within 3 months of last provider visit (or a total of 15 months).

## 2021-06-02 NOTE — TELEPHONE ENCOUNTER
ANTICOAGULATION  MANAGEMENT PROGRAM    Anand Felix is overdue for INR check.  Reminder call made.    Spoke with Anand and scheduled INR appointment on 10/18 .    Amanda Hamm RN

## 2021-06-02 NOTE — TELEPHONE ENCOUNTER
ANTICOAGULATION  MANAGEMENT PROGRAM    Anand Felix is overdue for INR check.  Reminder call made.    Left message for Anand. If returning call, please schedule INR check as soon as possible.    Gus Byrne RN

## 2021-06-02 NOTE — PROGRESS NOTES
"ACUPUNCTURIST TREATMENT NOTE    Name: Anand Felix  :  1985  MRN:  [id number]      Acupuncture Treatment  Patient Type: JN Pain  Intervention Reason: Pain;Headache  Pre-session Pain Ratin  Post-session Pain Ratin  Pre-session Headache Ratin  Post-session Headache Ratin  Patient complaint:: Patient is seen for 4th treatment today. He has not been in for two weeks. He reports he feels his symptoms have slightly improved over all, but that after not being in for two weeks he can tell things are starting to get worse again. He was feeling less severe headaches, but today right side occipital headache radiating to temporal and frontal 7/10. Low back pain radiating down LLE 5/10. Continues with night sweats and \"Cold sweats\" throughout the day. He says he felt he was sleeping better with regular acupuncture, although he also feels this is getting worse again. He reports his digestion has improved and he is having BM every two days.  Acupuncture (Points):: Liv3, Ub62, Ki7, Sp6, Gb34, Ub57, Ub40, Si3, Ht6, Baihui, Sishencong, Gb8, Gb20, Anmian, HTJJ L2-L5, Ub20, Ub23, Du4, Yaoyan, Shiqizhuixia. 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 Infrared heat lamp over low back. Tuina and cupping with zhenggushui to low back.  Checklist: Progress Note Completed;Consent Reveiwed  Follow up comments: Patient relaxed well during treatment.    \"Risks and benefits of acupuncture were discussed with patient. Consent for treatment was given. We thank you for the referral.\"     Nu Nieves L.Ac.    Date:  10/16/2019  Time:  1:46 PM    "

## 2021-06-02 NOTE — PROGRESS NOTES
"ACUPUNCTURIST TREATMENT NOTE    Name: Anand Felix  :  1985  MRN:  [id number]      Acupuncture Treatment  Patient Type: JN Pain  Intervention Reason: Pain;Headache  Pre-session Pain Ratin  Post-session Pain Ratin  Pre-session Headache Ratin  Post-session Headache Ratin  Patient complaint:: Patient is seen for 5th treatment today. He states pain levels have been up and down over the past two weeks, but he is feeling pretty good today with pain rating 4/10 and headache 4/10. He has recently started taking magnesium and Singulair. He is waking every 2 hours due to headache and neck pain, although feels the magnesium may be helping a little. He has noticed better BMs since starting magnesium as well.  Acupuncture (Points):: Liv3, Ub62, Ub60, Ki7, Sp6, Gb34, Ub57, Ub40, Si3, Ht6, Baihui, Sishencong, Gb8, Gb20, Anmian, HTJJ L1-L5, Ub23, Du4, Yaoyan, Shiqizhuixia. 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 Infrared heat lamp over low back. Tuina with posumon to neck, low back and RLE.  Checklist: Progress Note Completed;Consent Reveiwed  Follow up comments: Patient relaxed well during treatment.    \"Risks and benefits of acupuncture were discussed with patient. Consent for treatment was given. We thank you for the referral.\"     Nu Nieves L.Ac.    Date:  10/31/2019  Time:  2:45 PM    "

## 2021-06-02 NOTE — TELEPHONE ENCOUNTER
Medication being requested: tizanidine  Last visit date: 9/6 Provider: JOSUE  Next visit date: 10/28 Provider: JOSUE  Expected follow up: 8 weeks  MTM visit (Pain Center) date: no  Pertinent between visit information about requested medication (telephone, mychart, prior authorization, concerns): acupuncture and psych visits  Last date prescribed: 10/2  Provider responsible: JOSUE  Spoke with patient: attempted to reach  Script being sent to provider - dates and quantity:   Requested Prescriptions     Pending Prescriptions Disp Refills     tiZANidine (ZANAFLEX) 2 MG tablet [Pharmacy Med Name: TIZANIDINE HCL 2 MG TABLET] 90 tablet 0     Sig: TAKE 2 TABLETS (4 MG TOTAL) BY MOUTH EVERY 6 (SIX) HOURS AS NEEDED.     Pharmacy cued: [facility]

## 2021-06-02 NOTE — PROGRESS NOTES
PAIN CLINIC FOLLOW UP PROGRESS NOTE    CC:  Chief Complaint   Patient presents with     Back Pain     Neck Pain     Leg Pain       HPI  Anand Felix is a 34 y.o. male who presents for evaluation   Chief Complaint   Patient presents with     Back Pain     Neck Pain     Leg Pain    that is causing continued pain. Since the last visit the patient denies any trips to the urgent care or ED specifically for their pain. The patient denies any new medications, diagnoses since the last visit. Specific questions indicated the patient wanted addressed today include: to treat his ongoing chronic pain as well as discuss his medication management.     Major issues:  1. Chronic cluster headache, not intractable    2. Chronic pain syndrome    3. Arthralgias In Multiple Sites    4. Myalgia    5. Cervicalgia           Today the pain is located in their neck and right shoulder, leg and back and is described as sharp burning achy and tight when it is aggravated it lasts for constant, and is rated at a 5 on a scale of 1-10.    Associated symptoms: Denies any loss of bladder control, fevers/chills, unintentional weight loss, weakness, numbness or pain that interferes with sleep.   Aggravating factors include: currently transitioning from butrans patch to oral film. Patient reports this Insurance required change has worsened his symptoms.   Alleviating factors: laying down, massage  Adverse effects of medications: NONE   Functional symptoms: affects his ability to care for himself as well as socialize and perform duties around the house  Current subjective treatment efficacy: Poor    Previous Medical History  Social History     Substance and Sexual Activity   Alcohol Use Yes    Comment: Monthly or less often. 1-2 drinks at a time.     Social History     Substance and Sexual Activity   Drug Use Yes     Types: Marijuana     Social History     Tobacco Use   Smoking Status Never Smoker   Smokeless Tobacco Current User       Pertinent Pain  "Medications/interventions:  He currently belbuca 300 mcg two times a day, gabapentin 100 mg three times a day, oxycodone 5 mg two times a day prn and medical cannabis, tizanidine     Belbuca- headaches are associated so he stopped taking this. 9/6     Previously taking Belbuca 75 mcg, gabapentin 100 mg three times a day, oxycodone 5 mg up to two times a day as needed, medical cannabis, baclofen and flexeril      RFA did provide him with 80 % relief.      Medical cannabis    Review of Systems:  12 point systems were reviewed with pt as documented on pt health form and the patient denies any new diagnosis or changes in 12 point system review since the last visit.     Physical Exam  Vitals:    10/28/19 1417   BP: 107/71   Patient Site: Left Arm   Patient Position: Sitting   Pulse: 80   Resp: 16   Weight: 157 lb (71.2 kg)   Height: 5' 5\" (1.651 m)     General- patient is alert and oriented, in NAD, well-groomed, well-nourished  Psych- Judgment and insight normal, AOx4, recent and remote memory normal, mood and affect normal  Eyes- pupils are equal and reactive, conjunctiva is clear bilaterally, no ptosis is noted.   Respiratory- breathing is non-labored  Cardiovascular- extremities warm and well perfused, no peripheral edema or varicosities.  Musculoskeletal- gait is normal, extremities with no joint swelling, erythema, or warmth. Low back pain and multi site joint pain throughout. Neck pain and right leg and arm pain   Neuro- normal strength, no gait abnormalities, normal sensation to pain, temperature, light touch. Occiput headache still present  Integumentary- no rashes, dermatitis or discolorations noted throughout, no open wounds noted.    Medications    Current Outpatient Medications:      acetaminophen (TYLENOL) 500 MG tablet, Take 1 tablet (500 mg total) by mouth every 6 (six) hours as needed for pain., Disp: , Rfl: 0     atenolol (TENORMIN) 25 MG tablet, TAKE 1 TABLET BY MOUTH EVERY DAY, Disp: 30 tablet, Rfl: " 5     cholecalciferol, vitamin D3, 2,000 unit cap, Take 2,000 Units by mouth daily., Disp: , Rfl:      fluticasone propionate (FLONASE) 50 mcg/actuation nasal spray, SPRAY 2 SPRAYS INTO EACH NOSTRIL EVERY DAY, Disp: 16 g, Rfl: 5     lidocaine (LIDODERM) 5 %, Remove & Discard patch within 12 hours or as directed by MD, Disp: 15 patch, Rfl: 0     NON FORMULARY,    , Disp: , Rfl:      omeprazole (PRILOSEC) 20 MG capsule, TAKE 1 CAPSULE (20 MG TOTAL) BY MOUTH DAILY., Disp: 90 capsule, Rfl: 3     rizatriptan (MAXALT-MLT) 10 MG disintegrating tablet, TAKE 1 TABLET BY MOUTH AS NEEDED FOR MIGRAINE. MAY REPEAT IN 2HRS IF NEEDED, Disp: 12 tablet, Rfl: 2     [START ON 11/24/2019] tiZANidine (ZANAFLEX) 4 MG tablet, Take 1 tablet (4 mg total) by mouth every 6 (six) hours as needed., Disp: 90 tablet, Rfl: 0     warfarin (COUMADIN/JANTOVEN) 5 MG tablet, Take 1/2 to 1 tablet (2.5 to 5 mg) by mouth daily. Adjust dose per INR results as instructed., Disp: 60 tablet, Rfl: 1     [START ON 11/4/2019] gabapentin (NEURONTIN) 100 MG capsule, TAKE 1 CAPSULE BY MOUTH THREE TIMES DAILY. OKAY TO REDUCE TO 2 TIMES A DAY IF TOO DROWSY, Disp: 84 capsule, Rfl: 0     montelukast (SINGULAIR) 10 mg tablet, Take 1 tablet (10 mg total) by mouth at bedtime., Disp: 30 tablet, Rfl: 0     [START ON 11/8/2019] oxyCODONE-acetaminophen (PERCOCET/ENDOCET) 7.5-325 mg per tablet, Take 1 tablet by mouth 3 (three) times a day as needed for pain., Disp: 84 tablet, Rfl: 0    Lab:  Last UDS on 2/2019 had expected results. Any abnormal results have been discussed with the patient and may change the plan of care. Please see the scanned document from the outside lab under the media tab. This was reviewed with the patient.      Imaging:  No new imaging.      Recent   Dated 10/28/2019 was reviewed with the patient today.   Paper copy review    Assessment:   Anand Felix is a 34 y.o. male seen in clinic today for   Chief Complaint   Patient presents with     Back Pain      Neck Pain     Leg Pain   . They are here for follow up and continued medical management of their pain. Today we reviewed the lab work of the UDT test and the results are expected because of the prescribed narcotics found in the urine drug screen.     I have also reviewed the information obtained from the last visit encounter after the PATSY was signed and have asked any pertintent questions needed from other healthcare providers/family/patient to continue care and formulate a treatment plan.     Migraine pain- tried and failed- zyrtec, allegra, sinus surgery, maxalt- helps some but dose not take care of his headache pain, advil, Excedrin, aleve, acupuncture.     Will start singulair for the migraines and order botox to assist with this if insurance is unable to improve the Botox injections for his occipital type migraines okay to proceed with trigger points.    In regards to his ongoing pain throughout in regards to his joints which is currently getting worse with the cold weather change, continue the current plan of care which includes Percocet 3 times daily and gabapentin.  Did discuss avoidance of gluten which can increase her inflammatory cycle.     Patients current MME is 33.75  Patient to call clinic for next month's prescription 5 days in advance. Based on the patients response to their previous narcotic therapy and quality of life, which includes their participation in ADLs, I recommend that the narcotics therapy continue, however the changes listed below will be incorporated into their plan of care.     Patient set goals to   1.  To treat his ongoing chronic pain and assist with independence.    Plan:   Interventions-    Follow up in 4 weeks to evaluate the effectiveness of the treatment interventions ordered today. Scheduling your appointment prior to leaving assists in reduction of running out of medication prior to the next appointment.     Start Singulair 10 mg daily for a trial   Will order botox  injections with Dr. Welsh for the migraine pain, if insurance will not cover the botox will substitute trigger point injections.     Continue the use of the percocet 7.5/325 g up to 3 per day     Continue the gabapentin 100 mg two times a day- three times a day    Eddie diet provided- start by avoiding gluten    As a reminder- chronic pain is generally stable, if you experience a new injury or new different pain it is expected that you present to the ED or urgent care for evaluation. DO NOT use your chronic pain medications to treat any new or different pain other then what it is intended for. We do not replace any lost or stolen prescriptions or provide early refills for overtaking your medications.      Orders placed today  Medications that were ordered today   Requested Prescriptions     Signed Prescriptions Disp Refills     gabapentin (NEURONTIN) 100 MG capsule 84 capsule 0     Sig: TAKE 1 CAPSULE BY MOUTH THREE TIMES DAILY. OKAY TO REDUCE TO 2 TIMES A DAY IF TOO DROWSY     oxyCODONE-acetaminophen (PERCOCET/ENDOCET) 7.5-325 mg per tablet 84 tablet 0     Sig: Take 1 tablet by mouth 3 (three) times a day as needed for pain.     tiZANidine (ZANAFLEX) 4 MG tablet 90 tablet 0     Sig: Take 1 tablet (4 mg total) by mouth every 6 (six) hours as needed.     montelukast (SINGULAIR) 10 mg tablet 30 tablet 0     Sig: Take 1 tablet (10 mg total) by mouth at bedtime.     No orders of the defined types were placed in this encounter.      UDT/SWAB:  Patient required a random Urine Drug Testing, due to the need to comply with [place] [employer] Policy Guidelines and CDC Guideline for the use of any controlled substances. This is to ensure that patient is compliant with treatment, and monitor for risks such as diversion, abuse, or any other aberrant behaviors. Patient is either being considered for or taking a controlled substance. Unexpected findings will be discussed and treatment decision may be adjusted. Testing is being  implemented across the board randomly w/o bias related to age, race, gender, socioeconomic status or Oriental orthodox affiliation.    The patient understand todays plan and has their questions answered in regards to expectations and current treatment plan.     SAFETY REMINDERS  No alcohol while taking controlled substances. Alcohol is not an illegal substance, it is unsafe to use in combination. It is a build up of substances in the body that can be extremely hazardous and may cause respirations to slow to a dangerous rate resulting in hospitalization, brain damage, or death.    Opioid medications have been associated with sharp rise in unintentional overdose and death.  Overdose is a condition characterized by the consumption in excess of a particular drug causing adverse effects. This can happen b/c you are sick, accidentally or intentionally took an extra dose, are on multiple medication that can interact. Someone took your medication and they are not use to the medication.  Symptoms of overdose include:   !breathing slow and shallow, erratic or not at all  !pinpoint pupils, hallucinations  !confusion  !muscle jerks, slack muscles   !extreme sleepiness or loss of alertness   !awake but not able to talk   !face pale or clammy, vomiting, for lighter skinned people, the skin tone turns bluish purple, for darker skinned people, it turns grayish or ashen   If in a situation where overdose is a concern engage the emergency response system (dial 911).    In one study it was noted that 80% of unintentional overdoses occurred in people who were taking a combination of opioids and benzodiazepines.    Do not sell, loan, borrow or share your opioid medication with anyone. Deaths have occurred as a result of this practice. It is illegal and patients are being prosecuted.     Prevent unexpected access/loss of medication: Keep medication locked. Only carry what you need with you.    Dayna Lundberg, [facility]  [address]  [place] [address]  Ph: [phone]  Fax: [phone]

## 2021-06-02 NOTE — PROGRESS NOTES
My Clinic Care Coordination Wellness Plan  This Graduation Wellness Plan provides private information in regard to the work I have done with my Care Team at my Primary Care Clinic.  This document provides insight on the goals I have accomplished.  My Care Team congratulates me on my journey to maintain wellness.  This document will help guide me on my journey to maintain a healthy lifestyle.  I will use this to help me overcome any barriers I may encounter.  If I should have any questions or concerns, I will contact the members of my Care Team or contact my Primary Care Clinic for assistance.     [facility]  [address], [address] [place] [address]  [phone]    My Preferred Method of Contact:  Phone: [phone]    My Primary/Preferred Language:  English    Preferred Learning Style:  Reading information, Face to face discussion, Pictures/Diagrams and Hands on teaching    Emergency Contact: Extended Emergency Contact Information  Primary Emergency Contact: Miguel Felix   [facility]  Home Phone: [phone]  Relation: Spouse     PCP:  Malachi Choudhary MD  Specialists:    Care Team            Malachi Choudhary MD   [phone] PCP - General, Family Medicine    Estefany Groves MD   [phone] Physician, Hematology and Oncology    Fran Mcgovern MBBS   [phone] Physician, Rheumatology    Ham Churchill MD Physician, Pain Medicine    Alize Livingston, [facility]   [phone] [facility], Psychology    Malachi Choudhary MD   [phone] Assigned PCP          Accomplishments:  Goals        Patient Stated      COMPLETED: I have accomlished applying for [employer] DIsability and finding a  to go through the process with. 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        COMPLETED: I have accomplished applying for and obtaining Medical Assistance. (pt-stated)       Personal Plan  If I am having trouble with my Medical Assistance I will call [employer] @ [phone].        COMPLETED: I have accomplished being approved for [employer]. (pt-stated)      Personal Plan  If I am having trouble with [employer] I will call them at [phone]        COMPLETED: I have accomplished finding assistance for my medical bills with the help of the [facility] and my Care Guide. (pt-stated)      Personal Plan  If I am having trouble with  Medical Assistance I will call [employer] @ [phone].        COMPLETED: I have accomplished getting established with doctors to help me get my pain to be at a manageable level. (pt-stated)      Personal Plan  If I am struggling with increased pain I will talk with the Pain clinic and my PCP.        COMPLETED: I have accomplished getting PCA services in my home.  (pt-stated)      Personal Plan  If I start to have trouble with my PCA I will contact the PCA agency and my waiver worker.             Advanced Directive/Living Will: The patient was given information regarding Adanced Directives/Living Will    Clinical Emergency Plan    Self-Care for Headaches (.HCHEPheadaches)    Most headaches aren't serious and can be relieved with self-care. But some headaches may be a sign of another health problem like eye trouble or high blood pressure. To find the best treatment, learn what kind of headaches you get. For tension headaches, self-care will usually help. To treat migraines, ask your healthcare provider for advice. It is also possible to get both tension and migraine headaches. Self-care involves relieving the pain and avoiding headache  triggers  if you can.     Ways to reduce pain and tension  Try these steps:    Apply a cold compress or ice pack to the pain site.    Drink fluids. If nausea makes it hard to drink, try sucking on ice.    Rest. Protect yourself from bright light and loud noises.    Calm your emotions by imagining a peaceful  "scene.    Massage tight neck, shoulder, and head muscles.    To relax muscles, soak in a hot bath or use a hot shower.  Use medicines  Aspirin or aspirin substitutes, such as ibuprofen and acetaminophen, can relieve headache. Remember: Never give aspirin to anyone 18 years old or younger because of the risk of developing Reye syndrome. Use pain medicines only when necessary.  Track your headaches  Keeping a headache diary can help you and your healthcare provider identify what's causing your headaches:    Note when each headache happens.    Identify your activities and the foods you've eaten 6 to 8 hours before the headache began.    Look for any trends or \"triggers.\"  Signs of tension headache  Any of the following can be signs:    Dull pain or feeling of pressure in a tight band around your head    Pain in your neck or shoulders    Headache without a definite beginning or end    Headache after an activity such as driving or working on a computer  Signs of migraine  Any of the following can be signs:    Throbbing pain on one or both sides of your head    Nausea or vomiting    Extreme sensitivity to light, sound, and smells    Bright spots, flashes, or other visual changes    Pain or nausea so severe that you can't continue your daily activities  Call your healthcare provider    If you have any of the following symptoms, contact your healthcare provider:    A headache that lingers after a recent injury or bump to the head.    A fever with a stiff neck or pain when you bend your head toward your chest.    A headache along with slurred speech, changes in your vision, or numbness or weakness in your arms or legs.    A headache for longer than 3 days.    Frequent headaches, especially in the morning.    Headaches with seizures     Seek immediate medical attention if you have a headache that you would call \"the worst headache you have ever had.\"           Pulmonary Embolism (PE)  A pulmonary embolus is most often due to " a blood clot that develops in a deep vein of the leg (deep vein thrombosis). If that clot, breaks loose and travels to the lung, it is called a pulmonary embolism (PE). This can cut off the flow of blood in the lungs.  A blood clot in the lungs is a medical emergency and may cause death.   Healthcare providers use the term venous thromboembolism (VTE) to describe these 2 conditions: deep vein thrombosis and pulmonary embolism. They use the term VTE because the 2 conditions are very closely related. And, because their prevention and treatment are also closely related.    How is pulmonary embolism diagnosed?  Your healthcare provider examines you and asks about your symptoms and health history. You may also have one or more of the following:    Blood tests to check for blood clotting or other problems    Imaging tests to look for clots in the veins or lung    Electrocardiography (ECG) to test how well the heart is working  How is pulmonary embolism treated?    Blood-thinning medicines (anticoagulants). These medicines thin the blood. They may be given as a pill, as an injection, or through a tube into a vein (intravenous or IV). Blood thinners help prevent more blood clots from forming. They also help to prevent an existing clot from getting larger.    Thrombolysis. Thrombolytic medicines are used to quickly dissolve a blood clot. A long, narrow tube (catheter) is used to deliver medicine directly to the clot. Thrombolytic medicines increase the risk of bleeding so they are used very carefully.    Inferior vena cava (IVC) filter surgery. The vena cava is the body s largest vein. It carries blood from the body to the heart. A small filter traps blood clots in the lower body and prevents them from traveling to the lungs. The filter is inserted into the vein through a catheter. The filter may be used if blood thinners cannot be taken or if they don't work.     Pulmonary embolectomy. This is a procedure to remove a blood  clot in the lungs. It may be done with surgery or with a catheter inserted in the body. It may be done when other treatments aren't safe or don't work.  What are the long-term concerns?  With treatment, blood clots are usually dissolved or removed. Some treatments can even help prevent future clots. But having a PE can put you at risk for another life-threatening blood clot. So, you will likely need to take anticoagulants to help keep blood clots from forming again. You may need to take this medicine for months or years.  You may also need to make lifestyle changes. This may include getting more active and eating healthier. You may need to wear elastic (compression) stockings and and take breaks on long trips.      Call 911  Call 911 or get emergency help if you have symptoms of a blood clot that has traveled to the lungs. The symptoms include:    Chest pain    Trouble breathing    Coughing (may cough up blood)    Fainting    Fast heartbeat    Sweating  Call 911 if you have heavy or uncontrolled bleeding.  When to call your healthcare provider  Call your healthcare provider if you have swelling or pain in your leg, arm, or other area. These are symptoms of a blood clot.  You may have bleeding if you take medicine to help prevent blood clots.  Call your healthcare provider if you have signs or symptoms of bleeding. This includes:    Blood in the urine    Bleeding with bowel movements    Bleeding from the nose, gums, a cut, or vagina      What to Know When Taking Warfarin  Warfarin is a medicine that controls how your blood clots. It is used to help prevent blood clots that may cause serious health problems. These problems include heart attack (myocardial infarction), stroke, a blockage in an artery or vein (thrombus), or a blood clot that travels to the lungs (pulmonary embolism  Before you start taking this medicine, tell your healthcare provider if you have any of these conditions:    Stomach ulcer now or in the  past    Vomited blood or had bloody stools (black or red color)    Aneurysm, pericarditis, or pericardial effusion    Blood disorder    Recent surgery, stroke, mini-stroke, or spinal puncture    Kidney or liver disease, uncontrolled high blood pressure, diabetes, vasculitis, heart failure, lupus or other collagen-vascular disease, or high cholesterol    You are pregnant or breastfeeding    You are younger than 18 years old    Recent or planned dental procedure  Although warfarin reduces the risk for blood clots, it increases your risk of bleeding. Because of this, you must take the medicine exactly as you are told by your healthcare provider.   You will have blood tests often to check the level of warfarin in your blood. It is important to have just the right amount to balance preventing blood clots and causing excessive bleeding. If the level is too low, you are at risk for developing a blood clot. If it's too high, you are at risk for bleeding. Make sure you keep all scheduled appointments for blood testing. If you don t, you will be at risk for bleeding problems. You also need to protect yourself from injury that may cause bleeding such as a fall or hitting your head.  Follow these tips    Take this medicine at the same time each day. Take it with a full glass of water, with or without food.    Make sure you know what to do if you miss a dose. If you are not sure what to do, call your healthcare provider or pharmacist. Don't take more warfarin than you were told to.    Tell all of your providers including your dentist that you take warfarin. If you will be taking warfarin for quite a while, carry an ID card or get a Medic-Alert bracelet. This will alert medical staff in case you aren t able to do so yourself. Also carry with you the name and number of the person to contact in case of an emergency.    Keep your appointments for regular blood tests to measure the effects of warfarin. Your healthcare provider may  need to change your dose based on the results of your blood test. Follow your provider s advice exactly about how to take this medicine.    Don't stop taking the medicine without talking with your provider.  Keep your diet steady  Keep your diet pretty much the same each day. That s because many foods contain vitamin K. Vitamin K helps your blood clot. So eating overly high amounts of foods that contain vitamin K can affect the way warfarin works. You don t need to stay away from foods that have vitamin K. But keep the amount of them you eat steady (about the same day to day). If you change your diet for any reason, such as for illness or to lose weight, tell your healthcare provider.    Foods that have vitamin K include asparagus, avocado, broccoli, cabbage, kale, spinach, and some other leafy green vegetables. Oils such as soybean, canola, and olive oils also contain vitamin K.    Other food products can affect the way warfarin works in your body:    Food products that may affect blood clotting include cranberries and cranberry juice, fish oil supplements, garlic, nery, licorice, and turmeric.    Herbs used in herbal teas or supplements can also affect blood clotting. Keep the amount of herbal teas and supplements you use steady.    Alcohol can increase the effect of warfarin in your body.  Talk with your healthcare provider if you have concerns about these or other food products and their effects on warfarin.  When to call your healthcare provider  Warfarin increases your risk of bleeding. Call your healthcare provider right away before you take your next dose of warfarin if you have any of these problems:    Bleeding that doesn t stop in 10 minutes. This might be from a bloody nose or a cut, for example.    A heavier-than-normal period or bleeding between periods    Coughing or throwing up blood or something that looks like coffee grounds    Nausea, bloating, or diarrhea    Bleeding hemorrhoids    Dark red or  brown urine    Red or black tarry stools    Red or black-and-blue marks on the skin that get larger    A fever or an illness that gets worse    Dizziness, headache, weakness, or fatigue    Chest pain or trouble breathing    A serious fall or a blow to the head    Swelling or pain after an injury or at an injection site    Bleeding gums after brushing your teeth  What to watch for  If you have any of the following allergic reactions, call your doctor right away or go to the hospital.    Rash    Itching    Swelling    Trouble swallowing or breathing      All [facility] clinic patients have access to a Nurse 24 hours a day, 7 days a week.  If you have questions or want advice from a Nurse, please know [facility] is here for you.  You can call your clinic and they will connect you or you can call Care Connection at [phone].  [facility] also has Walk In Care clinics in multiple locations.  Call the number listed above for more information about our Walk In Care clinics or visit the [facility] website at [email or web address].

## 2021-06-02 NOTE — TELEPHONE ENCOUNTER
ANTICOAGULATION  MANAGEMENT    Assessment     Today's INR result of 1.9 is Subtherapeutic (goal INR of 2.0-3.0)        Warfarin taken as previously instructed    No new diet changes affecting INR    No new medication/supplements affecting INR    Continues to tolerate warfarin with no reported s/s of bleeding or thromboembolism     Previous INR was Therapeutic    Plan:     Spoke with wife Miguel regarding INR result and instructed:     Warfarin Dosing Instructions:  Change warfarin dose to 5 mg daily on Fri; and 2.5 mg daily rest of week  (14 % change)    Instructed patient to follow up no later than: 2 weeks.    Education provided: importance of taking warfarin as instructed    Miguel verbalizes understanding and agrees to warfarin dosing plan.    Instructed to call the [facility] for any changes, questions or concerns. (#[phone])   ?   Gus Byrne RN    Subjective/Objective:      Anand Felix, a 34 y.o. male is on warfarin.     Anand reports:     Home warfarin dose: as updated on anticoagulation calendar per template     Missed doses: No     Medication changes:  No     S/S of bleeding or thromboembolism:  No     New Injury or illness:  No     Changes in diet or alcohol consumption:  No     Upcoming surgery, procedure or cardioversion:  No    Anticoagulation Episode Summary     Current INR goal:   2.0-3.0   TTR:   77.2 %   Next INR check:   11/1/2019   INR from last check:   1.90! (10/18/2019)   Weekly max warfarin dose:      Target end date:   11/29/2018   INR check location:      Preferred lab:      Send INR reminders to:   CHARLENE ART    Indications    Bilateral pulmonary embolism (H) [I26.99]           Comments:            Anticoagulation Care Providers     Provider Role Specialty Phone number    Malachi Choudhary MD Referring Family Medicine [phone]

## 2021-06-03 VITALS — WEIGHT: 156 LBS | BODY MASS INDEX: 25.96 KG/M2

## 2021-06-03 VITALS
SYSTOLIC BLOOD PRESSURE: 112 MMHG | HEART RATE: 80 BPM | HEIGHT: 65 IN | OXYGEN SATURATION: 98 % | BODY MASS INDEX: 26.16 KG/M2 | DIASTOLIC BLOOD PRESSURE: 70 MMHG | WEIGHT: 157 LBS

## 2021-06-03 VITALS
DIASTOLIC BLOOD PRESSURE: 70 MMHG | SYSTOLIC BLOOD PRESSURE: 120 MMHG | OXYGEN SATURATION: 97 % | HEART RATE: 109 BPM | BODY MASS INDEX: 25.46 KG/M2 | WEIGHT: 153 LBS

## 2021-06-03 VITALS — BODY MASS INDEX: 25.29 KG/M2 | WEIGHT: 152 LBS

## 2021-06-03 VITALS — WEIGHT: 160 LBS | BODY MASS INDEX: 26.63 KG/M2

## 2021-06-03 VITALS — BODY MASS INDEX: 26.99 KG/M2 | HEIGHT: 65 IN | WEIGHT: 162 LBS

## 2021-06-03 VITALS — WEIGHT: 157 LBS | HEIGHT: 65 IN | BODY MASS INDEX: 26.16 KG/M2

## 2021-06-03 VITALS — WEIGHT: 152 LBS | BODY MASS INDEX: 25.33 KG/M2 | HEIGHT: 65 IN

## 2021-06-03 VITALS — BODY MASS INDEX: 26.16 KG/M2 | HEIGHT: 65 IN | WEIGHT: 157 LBS

## 2021-06-03 VITALS — BODY MASS INDEX: 26.23 KG/M2 | WEIGHT: 157.6 LBS

## 2021-06-03 VITALS — HEIGHT: 65 IN | WEIGHT: 152 LBS | BODY MASS INDEX: 25.33 KG/M2

## 2021-06-03 VITALS — BODY MASS INDEX: 25.49 KG/M2 | HEIGHT: 65 IN | WEIGHT: 153 LBS

## 2021-06-03 VITALS — WEIGHT: 155 LBS | BODY MASS INDEX: 25.83 KG/M2 | HEIGHT: 65 IN

## 2021-06-03 NOTE — PROGRESS NOTES
11/15/2019    Start time: 3:05 PM    Stop Time: 3:55 PM   Session # 20    Anand Felix is a 34 y.o. male is being seen today for    Chief Complaint   Patient presents with     Mental Health Note   . Major depression, severe   Generalized Anxiety Disorder  Chronic pain syndrome    New symptoms or complaints: None    Functional Impairment:   Personal: 4  Family: 4  Work: 4  Social:4    Clinical assessment of mental status: Anand Felix presented on time.  He was open and cooperative, and dressed appropriately for this session and weather. His memory was in tact .  His  speech was appropriate.  Language was appropriate.  Concentration and focus is on task. Psychosis is not noted or reported. He reports his mood is depressed .  Affect is congruent with speech.  Fund of knowledge is adequate. Insight is adequate for therapy.     Suicidal/Homicidal Ideation present: Patient reports SI. He denies plan or means. Patient reports symptoms are not as intrusive and pass quickly  He agrees to follow safety plan to call 911, go to the ER for evaluation and call the crisis connection if he is feeling unsafe or symptoms worsen. He identifies protective factors.        Patient's impression of their current status: Patient reported he continues to be working on addressing symptoms of depression and anxiety. He reports continued difficulty with physical weakness, fatigue and lack of stamina. He reports continued SI, and reports these thoughts are less intrusive at this time.     Therapist impression of patients current state: Patient seems to be working on addressing depression, anger, anxiety and chronic medical symptoms. Continued to educate on depression symptoms. He reports pain symptoms and weakness have been difficult to manage. Continued to discuss changes in relationships/social life that have occurred since he has been struggling with chronic pain/medical issues.  He discussed social needs at this time and how he has been making  some more effort to be engaged with family. He reports it is difficult to enjoy activities he used to, but that he continues to work on motivation. Encouraged patient to continue to increase efforts to reach out to social/family support to connect socially/emotionally as he is able. He struggles with identity since he has had significant health changes in recent years. Engaged in values clarification. He and his wife are working on future housing needs, but are staying with his sister at this time. He reports [employer] attendance as he is able. He continues to work on addressing acceptance of his chronic pain and medical concerns, with some progress and challenges. Discussed negative cognitions related to physical abilities and will further address with EMDR in a future session. Further, discussed positives and abilities he has at this time. Further discussed family history and dynamics. He continues to work on sleep improvement, which continues to be challenging.  Patient reports suicidal ideation persists,  but denies immediate plan or means at this time. He reported SI is less intrusive and passes quickly at this time. Discussed ways to cope. Continued to review worksheet on suicide prevention. He seemed to gain some insight. Reviewed safety plan and ways to cope if symptoms should worsen or he is feeling unsafe. Continued to work on identifying positives in session. Continued to discuss benefits of Mindfulness meditation with patient. He reports regular meditation practice and grounding. Presented meditation exercise focused on the senses in session and patient appeared to benefit.         Type of psychotherapeutic technique provided: Client centered, CBT and Mindfulness    Progress toward short term goals: Patient continues to work on increasing coping skills to manage chronic pain and mental health. Patient continues medical care as recommended by his medical providers. He engages in physical activity and social  activity as he is able. He is working on values clarification. He is engaging in meditation/grounding in daily living.        Review of long term goals: Not done at today's visit. Last review: 8/19/19    Diagnosis: Major depression, severe   Generalized Anxiety Disorder  Chronic pain syndrome    Plan and Follow up: Follow pain center plan of care. Patient to follow all medical recommendations. Patient to schedule every 1-2 weeks for psychotherapy to further address depression, anxiety and chronic pain concerns. Continue meditation and other healthy coping strategies. Practice meditations as presented in session. Further address adjustment to role/identity due to chronic pain and health concerns. Continue to work on healthy communication skills/relationship building. Continue EMDR as discussed. Work on identifying things he finds enjoyable in daily living. Continue work on suicide prevention worksheet in session. Follow safety plan.       Discharge Criteria/Planning: Patient will continue with follow-up until therapy can be discontinued without return of signs and symptoms.    Alize Livingston 11/15/2019

## 2021-06-03 NOTE — TELEPHONE ENCOUNTER
ANTICOAGULATION  MANAGEMENT    Assessment     Today's INR result of 1.9 is Subtherapeutic (goal INR of 2.0-3.0)        Warfarin taken as previously instructed    Increased greens/vitamin K intake may be affecting INR- will cut down to normal greens.    No new medication/supplements affecting INR    Continues to tolerate warfarin with no reported s/s of bleeding or thromboembolism     Previous INR was Therapeutic    Plan:     Spoke with Anand regarding INR result and instructed:     Warfarin Dosing Instructions:  Take booster dose of 7.5 mg today only then continue current warfarin dose 5 mg daily on Fri; and 2.5 mg daily rest of week  (0 % change)    Instructed patient to follow up no later than: 2 weeks.    Education provided: importance of consistent vitamin K intake and importance of taking warfarin as instructed    Anand verbalizes understanding and agrees to warfarin dosing plan.    Instructed to call the [facility] for any changes, questions or concerns. (#[phone])   ?   Gus Byrne RN    Subjective/Objective:      Anand Felix, a 34 y.o. male is on warfarin.     Anand reports:     Home warfarin dose: verbally confirmed home dose with pt and updated on anticoagulation calendar     Missed doses: No     Medication changes:  No     S/S of bleeding or thromboembolism:  No     New Injury or illness:  No     Changes in diet or alcohol consumption:  Yes: had more greens.     Upcoming surgery, procedure or cardioversion:  No    Anticoagulation Episode Summary     Current INR goal:   2.0-3.0   TTR:   75.3 %   Next INR check:   11/29/2019   INR from last check:   1.90! (11/15/2019)   Weekly max warfarin dose:      Target end date:   11/29/2018   INR check location:      Preferred lab:      Send INR reminders to:   CHARLENE ART    Indications    Bilateral pulmonary embolism (H) [I26.99]           Comments:            Anticoagulation Care Providers     Provider Role Specialty Phone number    Malachi Choudhary,  MD [facility] [phone]

## 2021-06-03 NOTE — TELEPHONE ENCOUNTER
Prior Authorization Request  Who s requesting:  Pharmacy  Pharmacy Name and Location: [facility] pharmacy [place], [place]  Medication Name: Xarelto  Insurance Plan: [employer]  Insurance Member ID Number:  [id number]  Informed patient that prior authorizations can take up to 10 business days for response:   No  Okay to leave a detailed message: No      34 year old patient, not well controlled on warfarin.  Asking to start Xarelto.    Rx for starter pack: take 1 tablet (15mg) by mouth twice daily for 21 days, then take 1 tablet (20mg) daily

## 2021-06-03 NOTE — TELEPHONE ENCOUNTER
Who is calling:  Patient  Reason for Call:  Patient was seen by his pulmonologist, Dr Winn, on 11/18/19. He instructed him to switch to Xarelto. Patient is in need of instructions.  Date of last appointment with primary care: 9/4/19  Okay to leave a detailed message: Yes

## 2021-06-03 NOTE — PROGRESS NOTES
Injection - Other  Date/Time: 11/13/2019 2:10 PM  Performed by: Edwrado Welsh MD  Authorized by: Edwardo Welsh MD   Comments:     BOTOX INJECTION FOR CHRONIC INTRACTABLE MIGRAINE HEADACHES  Performed on: 11/13/2019    Mr. Felix is a 34-year-old man with intractable migraine headaches.  He has tried numerous treatments and has failed conservative therapy.  He is referred here today to give a trial to Botox injections.    Pre Procedure Diagnosis:  Chronic Intractable Migraine Headaches  Vital Signs:  As per intra-procedure documentation    The procedure was discussed with Anand Felix in detail along with the attendant risks, including but not limited to: nerve injury, infection, bleeding, allergic reaction, or worsening of pain.  Informed consent was obtained and patient elected to proceed.    After informed consent was obtained, the patient was seated in the examination chair.  The forehead, temples occipital and cervical areas were prepped sterilely with alcohol.  A one inch #30 gauge needle was used.  Botox, 160 units, was diluted with 3 ml of normal saline.    Injections were made in:    Upper frontalis muscle at hairline - 30 units in 6 sites  Procerus muscle in midline - 10 units in 1 site  Occipitalis muscle bilateral - 20 units in 2 sites  Temporalis muscle bilateral - 60 units in 6 sites  Posterior auricular muscles bilateral - 20 units in 2 sites  Upper cervical paraspinal muscles - 10 units in 2 sites  Upper trapezius muscle bilaterally - 10 units in 2 asites    The 160 units total were injected in divided doses.     The patient tolerated the procedure well.  There were no complications.      If Anand Felix has any questions or concerns after discharge, he was instructed to call us.

## 2021-06-03 NOTE — PROGRESS NOTES
PAIN CLINIC FOLLOW UP PROGRESS NOTE    CC:  Chief Complaint   Patient presents with     Leg Pain     Back Pain     Neck Pain     Headache       HPI  Anand Felix is a 34 y.o. male who presents for evaluation   Chief Complaint   Patient presents with     Leg Pain     Back Pain     Neck Pain     Headache    that is causing continued pain. Since the last visit the patient denies any trips to the urgent care or ED specifically for their pain. The patient denies any new medications, diagnoses since the last visit. Specific questions indicated the patient wanted addressed today include: to follow up on his ongoing chronic pain       Major issues:  1. Chronic pain syndrome    2. Myalgia    3. Cervicalgia    4. Arthralgias In Multiple Sites        Today the pain is located in their neck and right shoulder, leg and back and is described as sharp burning achy and tight when it is aggravated it lasts for constant, and is rated at a 5 on a scale of 1-10.     Associated symptoms: Denies any loss of bladder control, fevers/chills, unintentional weight loss, weakness, numbness or pain that interferes with sleep.   Aggravating factors include: currently transitioning from butrans patch to oral film. Patient reports this Insurance required change has worsened his symptoms.   Alleviating factors: laying down, massage  Adverse effects of medications: NONE   Functional symptoms: affects his ability to care for himself as well as socialize and perform duties around the house  Current subjective treatment efficacy: Poor      Previous Medical History  Social History     Substance and Sexual Activity   Alcohol Use Yes    Comment: Monthly or less often. 1-2 drinks at a time.     Social History     Substance and Sexual Activity   Drug Use Yes     Types: Marijuana     Social History     Tobacco Use   Smoking Status Never Smoker   Smokeless Tobacco Current User       Pertinent Pain Medications/interventions:  He currently belbuca 300 mcg two  "times a day, gabapentin 100 mg three times a day, oxycodone 5 mg two times a day prn and medical cannabis, tizanidine and Norco.      Belbuca- headaches are associated so he stopped taking this. 9/6     Previously taking Belbuca 75 mcg, gabapentin 100 mg three times a day, oxycodone 5 mg up to two times a day as needed, medical cannabis, baclofen and flexeril      RFA did provide him with 80 % relief.      Medical cannabis    Review of Systems:  12 point systems were reviewed with pt as documented on pt health form and the patient denies any new diagnosis or changes in 12 point system review since the last visit.     Physical Exam  Vitals:    12/02/19 1510   BP: 113/74   Patient Site: Left Arm   Patient Position: Sitting   Pulse: 80   Resp: 18   Weight: 152 lb 8 oz (69.2 kg)   Height: 5' 5\" (1.651 m)     General- patient is alert and oriented, in NAD, well-groomed, well-nourished  Psych- Judgment and insight normal, AOx4, recent and remote memory normal, mood and affect normal  Eyes- pupils are equal and reactive, conjunctiva is clear bilaterally, no ptosis is noted.   Respiratory- breathing is non-labored  Cardiovascular- extremities warm and well perfused, no peripheral edema or varicosities.  Musculoskeletal- gait is normal, extremities with no joint swelling, erythema, or warmth. Low back pain and multi site joint pain throughout. Neck pain and right leg and arm pain   Neuro- normal strength, no gait abnormalities, normal sensation to pain, temperature, light touch. Occiput headache still present as well as total body pain   Integumentary- no rashes, dermatitis or discolorations noted throughout, no open wounds noted.    Medications    Current Outpatient Medications:      acetaminophen (TYLENOL) 500 MG tablet, Take 1 tablet (500 mg total) by mouth every 6 (six) hours as needed for pain., Disp: , Rfl: 0     atenolol (TENORMIN) 25 MG tablet, TAKE 1 TABLET BY MOUTH EVERY DAY, Disp: 30 tablet, Rfl: 5     " cholecalciferol, vitamin D3, 2,000 unit cap, Take 2,000 Units by mouth daily., Disp: , Rfl:      fluticasone propionate (FLONASE) 50 mcg/actuation nasal spray, SPRAY 2 SPRAYS INTO EACH NOSTRIL EVERY DAY, Disp: 16 g, Rfl: 5     lidocaine (LIDODERM) 5 %, Remove & Discard patch within 12 hours or as directed by MD, Disp: 15 patch, Rfl: 0     NON FORMULARY,    , Disp: , Rfl:      omeprazole (PRILOSEC) 20 MG capsule, TAKE 1 CAPSULE (20 MG TOTAL) BY MOUTH DAILY., Disp: 90 capsule, Rfl: 3     rivaroxaban (XARELTO) 15 mg tablet, Take 1 tablet (15 mg total) by mouth 2 (two) times a day with meals. For 21 days. Then switch to the 20 mg tablet daily., Disp: 42 tablet, Rfl: 0     rizatriptan (MAXALT-MLT) 10 MG disintegrating tablet, DISSOLVE 1 TABLET ON TONGUE AS NEEDED FOR MIGRAINE. MAY REPEAT IN 2HRS IF NEEDED, Disp: 12 tablet, Rfl: 6     gabapentin (NEURONTIN) 100 MG capsule, TAKE 1 CAPSULE BY MOUTH THREE TIMES DAILY. OKAY TO REDUCE TO 2 TIMES A DAY IF TOO DROWSY, Disp: 84 capsule, Rfl: 1     montelukast (SINGULAIR) 10 mg tablet, Take 1 tablet (10 mg total) by mouth at bedtime., Disp: 30 tablet, Rfl: 0     [START ON 12/16/2019] oxyCODONE-acetaminophen (PERCOCET/ENDOCET) 7.5-325 mg per tablet, Take 1 tablet by mouth 3 (three) times a day as needed for pain., Disp: 84 tablet, Rfl: 0     rivaroxaban (XARELTO) 20 mg tablet, Take 1 tablet (20 mg total) by mouth daily. Start 20 mg daily after the 15 mg daily for 21 days., Disp: 30 tablet, Rfl: 11     tiZANidine (ZANAFLEX) 4 MG tablet, Take 1 tablet (4 mg total) by mouth every 6 (six) hours as needed., Disp: 90 tablet, Rfl: 1    Lab:  Last UDS on 2/2019 had expected results. Any abnormal results have been discussed with the patient and may change the plan of care. Please see the scanned document from the outside lab under the media tab. This was reviewed with the patient.      Imaging:  Any imaging viewed today was discussed with the patient on 12/2/2019  IMPRESSION:   1.  Stable  appearing signal changes in the left parietal cortex. Comparison head CT demonstrates that these are cortically based calcifications. Brain MR dated 9/9/2016 demonstrates similar signal changes which are stable suggesting indolent,   nonaggressive process. Old trauma or infectious process may have created this appearance.     2.  Expansile change left frontal sinus extending into the left anterior ethmoid air cells, somewhat expansile and most consistent with frontoethmoidal mucocele as detailed above      Recent   Dated 12/2/2019 was reviewed with the patient today.     Paper copy reviewed       Assessment:   Anand Felix is a 34 y.o. male seen in clinic today for   Chief Complaint   Patient presents with     Leg Pain     Back Pain     Neck Pain     Headache   . They are here for follow up and continued medical management of their pain. Today we reviewed the lab work of the UDT test and the results are expected because of the prescribed narcotics found in the urine drug screen.     I have also reviewed the information obtained from the last visit encounter after the PATSY was signed and have asked any pertintent questions needed from other healthcare providers/family/patient to continue care and formulate a treatment plan.     Patient notes that he had to go to the ED due to a really bad headache they also did a MRI, this will be discussed with Dr. Choudhary this week. Patient is concerned about calcium in his brain. He is still on blood thinners, education provided vitamin K2. Patient also needs refills as well as medical cannabis re-certification, this was completed today. Patient notes that he is unsure if the Singulair was providing any relief.     Patients current MME is 33.75  Patient to call clinic for next month's prescription 5 days in advance. Based on the patients response to their previous narcotic therapy and quality of life, which includes their participation in ADLs, I recommend that the narcotics therapy  continue, however the changes listed below will be incorporated into their plan of care.     Patient set goals to   1. To follow up on his ongoing chronic pain as well as maintain independence.     Plan:   Interventions-    Follow up in 4-8 weeks to evaluate the effectiveness of the treatment interventions ordered today. Scheduling your appointment prior to leaving assists in reduction of running out of medication prior to the next appointment.     Will continue the use of the Singulair for now, Dr. Choudhary is welcome to change this, it was initially prescribed for mast cell activation syndrome concerns     Continue the medical cannabis at this time- re-certification has been sent in today to the state.     Ok to continue the use of the percocet for now- refill next is on 12/16-1/13    Injections- Dr. Welsh botox injections helped a lot at first but then a migraine came on that caused you to go in to the ED and have a MRI of the brain- currently following along with Dr. Choudhary.     Continue the use of the gabapentin     Prescription Drug Management will be continued by the [facility]  A narcotic contract was signed by the patient and  Patient is unable to get narcotics from other providers. They will be subject to random UAs.     Discuss with your doctor any concerns about the use of vitamin K 2 MKU 4,7 for calcium due to the xarelto and vitamin K1 avoidance.     As a reminder- chronic pain is generally stable, if you experience a new injury or new different pain it is expected that you present to the ED or urgent care for evaluation. DO NOT use your chronic pain medications to treat any new or different pain other then what it is intended for. We do not replace any lost or stolen prescriptions or provide early refills for overtaking your medications.      Orders placed today  Medications that were ordered today   Requested Prescriptions     Signed Prescriptions Disp Refills      oxyCODONE-acetaminophen (PERCOCET/ENDOCET) 7.5-325 mg per tablet 84 tablet 0     Sig: Take 1 tablet by mouth 3 (three) times a day as needed for pain.     montelukast (SINGULAIR) 10 mg tablet 30 tablet 0     Sig: Take 1 tablet (10 mg total) by mouth at bedtime.     gabapentin (NEURONTIN) 100 MG capsule 84 capsule 1     Sig: TAKE 1 CAPSULE BY MOUTH THREE TIMES DAILY. OKAY TO REDUCE TO 2 TIMES A DAY IF TOO DROWSY     tiZANidine (ZANAFLEX) 4 MG tablet 90 tablet 1     Sig: Take 1 tablet (4 mg total) by mouth every 6 (six) hours as needed.     No orders of the defined types were placed in this encounter.      UDT/SWAB:  Patient required a random Urine Drug Testing, due to the need to comply with [place] Model Policy Guidelines and CDC Guideline for the use of any controlled substances. This is to ensure that patient is compliant with treatment, and monitor for risks such as diversion, abuse, or any other aberrant behaviors. Patient is either being considered for or taking a controlled substance. Unexpected findings will be discussed and treatment decision may be adjusted. Testing is being implemented across the board randomly w/o bias related to age, race, gender, socioeconomic status or [employer] affiliation.    The patient understand todays plan and has their questions answered in regards to expectations and current treatment plan.     SAFETY REMINDERS  No alcohol while taking controlled substances. Alcohol is not an illegal substance, it is unsafe to use in combination. It is a build up of substances in the body that can be extremely hazardous and may cause respirations to slow to a dangerous rate resulting in hospitalization, brain damage, or death.    Opioid medications have been associated with sharp rise in unintentional overdose and death.  Overdose is a condition characterized by the consumption in excess of a particular drug causing adverse effects. This can happen b/c you are sick, accidentally or  intentionally took an extra dose, are on multiple medication that can interact. Someone took your medication and they are not use to the medication.  Symptoms of overdose include:   !breathing slow and shallow, erratic or not at all  !pinpoint pupils, hallucinations  !confusion  !muscle jerks, slack muscles   !extreme sleepiness or loss of alertness   !awake but not able to talk   !face pale or clammy, vomiting, for lighter skinned people, the skin tone turns bluish purple, for darker skinned people, it turns grayish or ashen   If in a situation where overdose is a concern engage the emergency response system (dial 911).    In one study it was noted that 80% of unintentional overdoses occurred in people who were taking a combination of opioids and benzodiazepines.    Do not sell, loan, borrow or share your opioid medication with anyone. Deaths have occurred as a result of this practice. It is illegal and patients are being prosecuted.     Prevent unexpected access/loss of medication: Keep medication locked. Only carry what you need with you.    Dayna Lundberg, [facility]  [address]  [place] [address]  Ph: [phone]  Fax: [phone]

## 2021-06-03 NOTE — PATIENT INSTRUCTIONS - HE
Plan:   Interventions-    Follow up in 4-8 weeks to evaluate the effectiveness of the treatment interventions ordered today. Scheduling your appointment prior to leaving assists in reduction of running out of medication prior to the next appointment.     Will continue the use of the Singulair for now, Dr. Choudhary is welcome to change this, it was initially prescribed for mast cell activation syndrome concerns     Continue the medical cannabis at this time- re-certification has been sent in today to the state.     Ok to continue the use of the percocet for now- refill next is on 12/16-1/13    Injections- Dr. Welsh botox injections helped a lot at first but then a migraine came on that caused you to go in to the ED and have a MRI of the brain- currently following along with Dr. Choudhary.     Continue the use of the gabapentin     Prescription Drug Management will be continued by the [facility]  A narcotic contract was signed by the patient and  Patient is unable to get narcotics from other providers. They will be subject to random UAs.     Discuss with your doctor any concerns about the use of vitamin K 2 MKU 4,7 for calcium due to the xarelto and vitamin K1 avoidance.     As a reminder- chronic pain is generally stable, if you experience a new injury or new different pain it is expected that you present to the ED or urgent care for evaluation. DO NOT use your chronic pain medications to treat any new or different pain other then what it is intended for. We do not replace any lost or stolen prescriptions or provide early refills for overtaking your medications.      Orders placed today  Medications that were ordered today   Requested Prescriptions     Signed Prescriptions Disp Refills     oxyCODONE-acetaminophen (PERCOCET/ENDOCET) 7.5-325 mg per tablet 84 tablet 0     Sig: Take 1 tablet by mouth 3 (three) times a day as needed for pain.     montelukast (SINGULAIR) 10 mg tablet 30 tablet 0     Sig: Take  1 tablet (10 mg total) by mouth at bedtime.     gabapentin (NEURONTIN) 100 MG capsule 84 capsule 1     Sig: TAKE 1 CAPSULE BY MOUTH THREE TIMES DAILY. OKAY TO REDUCE TO 2 TIMES A DAY IF TOO DROWSY     tiZANidine (ZANAFLEX) 4 MG tablet 90 tablet 1     Sig: Take 1 tablet (4 mg total) by mouth every 6 (six) hours as needed.     No orders of the defined types were placed in this encounter.      UDT/SWAB:  Patient required a random Urine Drug Testing, due to the need to comply with [place] Model Policy Guidelines and CDC Guideline for the use of any controlled substances. This is to ensure that patient is compliant with treatment, and monitor for risks such as diversion, abuse, or any other aberrant behaviors. Patient is either being considered for or taking a controlled substance. Unexpected findings will be discussed and treatment decision may be adjusted. Testing is being implemented across the board randomly w/o bias related to age, race, gender, socioeconomic status or Yazidism affiliation.    The patient understand todays plan and has their questions answered in regards to expectations and current treatment plan.     SAFETY REMINDERS  No alcohol while taking controlled substances. Alcohol is not an illegal substance, it is unsafe to use in combination. It is a build up of substances in the body that can be extremely hazardous and may cause respirations to slow to a dangerous rate resulting in hospitalization, brain damage, or death.    Opioid medications have been associated with sharp rise in unintentional overdose and death.  Overdose is a condition characterized by the consumption in excess of a particular drug causing adverse effects. This can happen b/c you are sick, accidentally or intentionally took an extra dose, are on multiple medication that can interact. Someone took your medication and they are not use to the medication.  Symptoms of overdose include:   !breathing slow and shallow, erratic or not  at all  !pinpoint pupils, hallucinations  !confusion  !muscle jerks, slack muscles   !extreme sleepiness or loss of alertness   !awake but not able to talk   !face pale or clammy, vomiting, for lighter skinned people, the skin tone turns bluish purple, for darker skinned people, it turns grayish or ashen   If in a situation where overdose is a concern engage the emergency response system (dial 911).    In one study it was noted that 80% of unintentional overdoses occurred in people who were taking a combination of opioids and benzodiazepines.    Do not sell, loan, borrow or share your opioid medication with anyone. Deaths have occurred as a result of this practice. It is illegal and patients are being prosecuted.     Prevent unexpected access/loss of medication: Keep medication locked. Only carry what you need with you.    Dayna Lundberg, [facility]  [address]  [place] [address]  Ph: [phone]  Fax: [phone]

## 2021-06-03 NOTE — PROGRESS NOTES
"Pulmonary Follow Up Note  11/18/2019      Reason for Follow Up: Shortness of Breath, ? neuromuscular disease, history of bilateral PE      Problem List:     Patient Active Problem List   Diagnosis     Muscle Weakness Generalized - \"Neuromuscular Condition\" with normal EMG.     Tremor     Arthralgias In Multiple Sites     Muscle Aches, Generalized (Myalgias)     Chronic Ethmoidal Sinusitis     Hypovitaminosis D - [place]     Monoparesis of leg (H)     Weakness - without demonstrable CNS or peripheral cause - local neurologist at [facility], and neurologist at [facility], and [facility] consultant.     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     Nasal polyposis - right naris on MRI (2016)     Pulmonary embolism (H)     Bilateral pulmonary embolism (H)     Myalgia     Sinus tachycardia       History:     Mr. Anand Felix is a 33 yo male with PMH significant for generalized weakness, PE on Xarelto previously, and chronic pain, who presented to pulmonary clinic for evaluation for shortness of breath.  This has been worse especially since his PE diagnosis last year.  He states that he has had shortness of breath really for some time.  He has a history of generalized weakness and has been evaluated by several neurologist who have not found an answer.  He has a history of herniated disc at L1 causing leg weakness.  Since then he has had some mild dyspnea.  It seems that Dr. Donis has proposed that some of his symptoms are due to a somatoform disorder.  He also notes that he can walk on a treadmill, but this is at a pace of 0.5 to 0.6 mph and never really faster.  He uses a cane to walk as well.  He did have a PE and has been on xarelto.  He states he does have early satiety, but no orthopnea or LE weakness.  No cough or sputum production.  No wheeze.      Of note, his wife is present as well " and had asked about KOJO as he is noted to stop breathing in his sleep.  His ESS is 14 and his STOP BANG is 4.    At follow-up on his additional pulmonary function tests which he was noted to have extreme fatigue after completing.  His MIP and MEP were both reduced.  He continued to have intermittent cough and shortness of breath.  He continues to have a lot of weakness, falls, myalgias, and chest discomfort.  He does complain of some pain under his right lower rib cage.  On examination he also continues to have significant weakness manifested through shaking just simply taking a large breath.  I did review with him results of previous neurology consultations.  It does sound as though there was some talk of a muscle biopsy at some time but it is it was not pursued.  There was some concern apparently of his anticoagulation.    He did have his PSG with ala nasi testing as well, and his AHI is 0.6.  He did not have any evidence of muscular dysfunction.    He is being seen in the chronic pain clinic and they are now using medical THC for chronic pain to minimize opiates.  He otherwise has no change in symptoms.    Today on follow up things have been pretty much the same.   His breathing is stable.  He does feel that he can take a bigger breath, and his abdominal muscles have less tremor.  He has been vaping medical THC and asks if this is safe, he has no symptoms from it.  He also notes that he has been switched to warfarin, and does not feel it works well for him, as his INR fluctuates    Past Medical History:   Diagnosis Date     Anxiety      Bulging lumbar disc      Depression      History of anesthesia complications     Was hospitalized in October 2015 for weakness     History of blood clots     PE in 2017     Lumbar herniated disc 10/23/2015     Lumbar radiculopathy 11/25/2015     Shortness of breath      Tachycardia     After PE in      Past Surgical History:   Procedure Laterality Date     BACK SURGERY    "    MINIMALLY INVASIVE MICRODISCECTOMY LUMBAR SPINE Right 10/2015    L5-S1; Dr. Bettie Hernandez     NE NASAL/SINUS ENDOSCOPY,BX/RMV POLYP/DEBRID Right 5/22/2019    Procedure: RIGHT ENDOSCOPIC SINUS SURGERY;  Surgeon: Leo Wright MD;  Location: [facility];  Service: ENT     SINUS SURGERY  2014    Dr. Wright     Social History     Socioeconomic History     Marital status:      Spouse name: Miguel     Number of children: 0     Years of education: GED and some collese     Highest education level: Not on file   Occupational History     Occupation: unemployed   Social Needs     Financial resource strain: Not on file     Food insecurity:     Worry: Not on file     Inability: Not on file     Transportation needs:     Medical: Not on file     Non-medical: Not on file   Tobacco Use     Smoking status: Never Smoker     Smokeless tobacco: Current User   Substance and Sexual Activity     Alcohol use: Yes     Comment: Monthly or less often. 1-2 drinks at a time.     Drug use: Yes     Types: Marijuana     Sexual activity: Yes     Partners: Female   Lifestyle     Physical activity:     Days per week: Not on file     Minutes per session: Not on file     Stress: Not on file   Relationships     Social connections:     Talks on phone: Not on file     Gets together: Not on file     Attends Restorationism service: Not on file     Active member of club or organization: Not on file     Attends meetings of clubs or organizations: Not on file     Relationship status: Not on file     Intimate partner violence:     Fear of current or ex partner: Not on file     Emotionally abused: Not on file     Physically abused: Not on file     Forced sexual activity: Not on file   Other Topics Concern     Not on file   Social History Narrative     Not on file     Family History   Problem Relation Age of Onset     Depression Mother      Hyperlipidemia Mother      Other Mother         \"Ulcers\" - Aches and pains throughout her body.     Cancer Father  "     No Medical Problems Sister      No Medical Problems Brother      No Medical Problems Sister      No Medical Problems Sister      No Medical Problems Sister      No Medical Problems Brother      Thyroid disease Maternal Grandmother      Thyroid disease Cousin      No Known Allergies    Review of Systems - 10 point review of systems negative except what is mentioned in the HPI.  Also with positive excessive sweating, fatigue, sinus congestion, sinus pressure, epistaxis, PND, tinnitus, abdominal pain, heartburn, difficulty urinating, heat intolerance, rash, arthralgias/myalgias, allergies, headaches, tremor and these are unchanged.        Medications:     Current Outpatient Medications on File Prior to Visit   Medication Sig Dispense Refill     acetaminophen (TYLENOL) 500 MG tablet Take 1 tablet (500 mg total) by mouth every 6 (six) hours as needed for pain.  0     atenolol (TENORMIN) 25 MG tablet TAKE 1 TABLET BY MOUTH EVERY DAY 30 tablet 5     cholecalciferol, vitamin D3, 2,000 unit cap Take 2,000 Units by mouth daily.       fluticasone propionate (FLONASE) 50 mcg/actuation nasal spray SPRAY 2 SPRAYS INTO EACH NOSTRIL EVERY DAY 16 g 5     gabapentin (NEURONTIN) 100 MG capsule TAKE 1 CAPSULE BY MOUTH THREE TIMES DAILY. OKAY TO REDUCE TO 2 TIMES A DAY IF TOO DROWSY 84 capsule 0     lidocaine (LIDODERM) 5 % Remove & Discard patch within 12 hours or as directed by MD 15 patch 0     montelukast (SINGULAIR) 10 mg tablet Take 1 tablet (10 mg total) by mouth at bedtime. 30 tablet 0     NON FORMULARY              omeprazole (PRILOSEC) 20 MG capsule TAKE 1 CAPSULE (20 MG TOTAL) BY MOUTH DAILY. 90 capsule 3     oxyCODONE-acetaminophen (PERCOCET/ENDOCET) 7.5-325 mg per tablet Take 1 tablet by mouth 3 (three) times a day as needed for pain. 84 tablet 0     rizatriptan (MAXALT-MLT) 10 MG disintegrating tablet DISSOLVE 1 TABLET ON TONGUE AS NEEDED FOR MIGRAINE. MAY REPEAT IN 2HRS IF NEEDED 12 tablet 6     [START ON 11/24/2019]  tiZANidine (ZANAFLEX) 4 MG tablet Take 1 tablet (4 mg total) by mouth every 6 (six) hours as needed. 90 tablet 0     warfarin (COUMADIN/JANTOVEN) 5 MG tablet Take 1/2 to 1 tablet (2.5 to 5 mg) by mouth daily. Adjust dose per INR results as instructed. 60 tablet 1     No current facility-administered medications on file prior to visit.        Exam/Data:   Vitals  Vitals:    11/18/19 1516   BP: 112/70   Pulse: 80   SpO2: 98%       EXAM:  GEN: Alert and oriented x3, NAD  HEENT: Nares patent, posterior oropharynx clear.   RESPIRATORY: CTAB.  No wheeze or rales, noted shaking with deep inspiration, but less so  CARDIOVASCULAR: tachycardic but regular, no m/r/g  GASTROINTESTINAL: Round, soft, NT/ND  HEM/ONC: no adenopathy, no ecchymosis  MSK: no clubbing.  Ambulates slowly with a cane  NEURO: cranial nerves appear grossly intact, muscle strength equal  SKIN: no rash or ulcerations      DATA    PFT DATA:  FEV1/FVC is 106 and is normal.  FEV1 is 96% predicted and is normal.  FVC is 95% predicted and normal.  There was no improvement in spirometry after a single inhaled dose of bronchodilator.  TLC is 76% predicted and is reduced.  RV is 68% predicted and is reduced.  DLCO is 88% predicted and is normal when it   is corrected for hemoglobin.     Impression:  Full Pulmonary Function Test is abnormal.    PFTs are consistent with mild restrictive disease with TLC of 76%  Spirometry is not consistent with reversibility.  There is no hyperinflation.  There is no air-trapping.  Diffusion capacity when corrected for hemoglobin is normal.    MIP and MEP were reviewed and are reduced.    IMAGING:   Personally reviewed CTA in 10/17.  Noted PE.  No parenchymal lung disease.  Small right pleural effusion.     Modified walk test with reduced exertional capacity without evidence of desaturation or hypoxia.    PSG 10/23/18  Primary Findings:     1. Respiratory monitoring showed overall no significant obstructive sleep apnea/hypopnea  sufficient to disturb sleep (AHI=0.6).  2. Intermittent snoring was recorded during the test and was related to a period of fully-supine sleep that was recorded between 3:00 AM and 4:30 AM.   3. There was no evidence of decreased respiratory effort during sleep. Respiratory rate was normal.     Assessment/Plan:   Anand Felix is a 34 y.o. male being seen for shortness of breath, likely multifactorial, with PMH significant for acute PE, chronic weakness that is unexplained, and chronic pain.    1. Shortness of Breath:  This is likely multifactorial.  On exam he shook when he took a deep breath that would indicate extremely weak core/abdominal musculature, and not true general muscle weakness which would produce shallow steady breaths.  With that, his TLC is mildly reduced with a normal diffusing capacity, suggesting neuromuscular weakness.  His MEP/MIP are also reduced.  He also has had a PE and been on therapy, but he is without evidence of chronic thromboembolic pulmonary hypertension/disease.  His PSG would suggest that there is no evidence of respiratory muscle weakness.      Finally, there was question about somatoform/conversion disorder.  This was mentioned by Dr. Donis.      2. Unprovoked bilateral PE: he does not like using warfarin, and feels he has been gaining weight as he cannot eat the same diet he was eating previously. We will try to get him a prior authorization for Xarelto again.    Recommend:  -PA for Xarelto and transition back to this from warfarin  -RTC in 1 year    I spent more than 15 minutes in review and assessment of the patient, as well as formulating plan of care, with > 50% time spent face-to-face.      Anand Winn,

## 2021-06-03 NOTE — TELEPHONE ENCOUNTER
ANTICOAGULATION  MANAGEMENT PROGRAM    Anand Felix is being discharged from the [facility] (AC).    Reason for discharge: warfarin replaced by alternate therapy, Xarelto    ACM referral closed, anticoagulation episode resolved and INR standing order discontinued.     If Anand needs warfarin management in the future, please send a new referral.    Gus Byrne RN

## 2021-06-03 NOTE — PROGRESS NOTES
Patient presents to the clinic today for a follow up with Dayna Lundberg CNP regarding back, leg, neck and head pain. Patient describes their pain as sharp, burning, twisting, and tight. Her/His function score is 8.

## 2021-06-03 NOTE — PROGRESS NOTES
Assessment/Plan:    1. Acute intractable headache, unspecified headache type  Acute intractable headache with slight improvement in the last 24 hours.  Seems different than baseline recurrent headaches.  Recent head CT with abnormal calcification identified with dystrophic appearance and left parietal region outlining cortex.  Recommendation for dedicated MRI with and without contrast of brain.  Order placed.  Was referred back to see neurologist as well regarding chronic headaches as well as current findings along with chronic weakness issues.  Maxalt MLT available for history of migraine headache.  - MR Brain With Without Contrast; Future  - Ambulatory referral to Neurology    2. Generalized muscle weakness  As above, referral placed to see [facility] in follow-up stating that it has been years since prior eval.  - Ambulatory referral to Neurology    3. Bilateral pulmonary embolism (H)  Recently switch back to Xarelto by pulmonologist and currently completing 15 mg twice a day x3 weeks then 20 mg daily following.    4. Chronic pain syndrome  Chronic pain syndrome reviewed.  Does utilize oxycodone acetaminophen on as-needed basis along with gabapentin 100 mg 3 times daily, lidocaine patch as directed, tizanidine as needed.  Etc.          Subjective:    Anand Felix is seen today for ER follow-up.  Was seen recently through [facility] emergency department.  Work-up for headache, atypical chest pain, abdominal pain, etc.  Chest CT angiogram negative for recurrent PE.  CT abdomen pelvis without obvious etiology of abdominal complaints.  Does have headaches.  CT showing nonspecific dystrophic appearing calcifications left parietal region outlining cortex with recommendation for dedicated MRI with and without contrast.  Has had some episodes of vomiting.  Feels that vision has changed some and not able to wears glasses currently.  Has chronic pain syndrome.  Pulmonologist recommended switching back from warfarin  "to Xarelto for PE prevention with history of bilateral pulmonary emboli noted.  General weakness persists.  No rash.  Comprehensive review of systems as above otherwise all negative.     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 Bernal follows  Children - none  Employed - working on disability - monoparesis  Surgeries - nasal surgery; low back surgery (Dr. Jeannine Hernandez, 2015) with residual right leg weakness  Tobacco use - none  ETOH - none  Family hx   Mother - living - high chol. Strokes. MI.   Father - living - unknown  Siblings - living - 4 sisters, 2 brother -    Bilateral AFO braces worn due to muscle weakness and foot drop    Past Surgical History:   Procedure Laterality Date     BACK SURGERY       MINIMALLY INVASIVE MICRODISCECTOMY LUMBAR SPINE Right 10/2015    L5-S1; Dr. Bettie Hernandez     DC NASAL/SINUS ENDOSCOPY,BX/RMV POLYP/DEBRID Right 5/22/2019    Procedure: RIGHT ENDOSCOPIC SINUS SURGERY;  Surgeon: Leo Wright MD;  Location: [facility];  Service: ENT     SINUS SURGERY  2014    Dr. Wright        Family History   Problem Relation Age of Onset     Depression Mother      Hyperlipidemia Mother      Other Mother         \"Ulcers\" - Aches and pains throughout her body.     Cancer Father      No Medical Problems Sister      No Medical Problems Brother      No Medical Problems Sister      No Medical Problems Sister      No Medical Problems Sister      No Medical Problems Brother      Thyroid disease Maternal Grandmother      Thyroid disease Cousin         Past Medical History:   Diagnosis Date     Anxiety      Bulging lumbar disc      Depression      History of anesthesia complications     Was hospitalized in October 2015 for weakness     History of blood clots     PE in 2017     Lumbar herniated disc 10/23/2015     Lumbar radiculopathy 11/25/2015     Shortness of breath      Tachycardia     After PE in         Social History     Tobacco Use     Smoking status: " Never Smoker     Smokeless tobacco: Current User   Substance Use Topics     Alcohol use: Yes     Comment: Monthly or less often. 1-2 drinks at a time.     Drug use: Yes     Types: Marijuana        Current Outpatient Medications   Medication Sig Dispense Refill     acetaminophen (TYLENOL) 500 MG tablet Take 1 tablet (500 mg total) by mouth every 6 (six) hours as needed for pain.  0     atenolol (TENORMIN) 25 MG tablet TAKE 1 TABLET BY MOUTH EVERY DAY 30 tablet 5     cholecalciferol, vitamin D3, 2,000 unit cap Take 2,000 Units by mouth daily.       fluticasone propionate (FLONASE) 50 mcg/actuation nasal spray SPRAY 2 SPRAYS INTO EACH NOSTRIL EVERY DAY 16 g 5     gabapentin (NEURONTIN) 100 MG capsule TAKE 1 CAPSULE BY MOUTH THREE TIMES DAILY. OKAY TO REDUCE TO 2 TIMES A DAY IF TOO DROWSY 84 capsule 0     lidocaine (LIDODERM) 5 % Remove & Discard patch within 12 hours or as directed by MD 15 patch 0     montelukast (SINGULAIR) 10 mg tablet Take 1 tablet (10 mg total) by mouth at bedtime. 30 tablet 0     NON FORMULARY              omeprazole (PRILOSEC) 20 MG capsule TAKE 1 CAPSULE (20 MG TOTAL) BY MOUTH DAILY. 90 capsule 3     oxyCODONE-acetaminophen (PERCOCET/ENDOCET) 7.5-325 mg per tablet Take 1 tablet by mouth 3 (three) times a day as needed for pain. 84 tablet 0     rivaroxaban (XARELTO) 15 mg tablet Take 1 tablet (15 mg total) by mouth 2 (two) times a day with meals. For 21 days. Then switch to the 20 mg tablet daily. 42 tablet 0     rivaroxaban (XARELTO) 20 mg tablet Take 1 tablet (20 mg total) by mouth daily. Start 20 mg daily after the 15 mg daily for 21 days. 30 tablet 11     rizatriptan (MAXALT-MLT) 10 MG disintegrating tablet DISSOLVE 1 TABLET ON TONGUE AS NEEDED FOR MIGRAINE. MAY REPEAT IN 2HRS IF NEEDED 12 tablet 6     tiZANidine (ZANAFLEX) 4 MG tablet Take 1 tablet (4 mg total) by mouth every 6 (six) hours as needed. 90 tablet 0     No current facility-administered medications for this visit.            Objective:    Vitals:    11/26/19 1059   BP: 90/60   Pulse: 76   SpO2: 95%   Weight: 149 lb (67.6 kg)      Body mass index is 24.79 kg/m .    Alert.  Sitting in wheelchair.  Quiet affect.  Was sleeping initially upon entering the room.  Chest appears clear.  Cardiac exam regular.  Abdomen benign.  Extremities warm and dry with general weakness described.  No rash.  No peripheral edema.        EXAM: ULTRASOUND ABDOMEN LIMITED - RIGHT UPPER QUADRANT  LOCATION: [facility]  DATE/TIME: 11/24/2019 2:42 AM     INDICATION: Abdominal pain.  COMPARISON: 05/19/2019 - CT chest.     TECHNIQUE: Limited abdominal ultrasound.     FINDINGS:     GALLBLADDER: Unremarkable without gallstones, wall thickening or pericholecystic fluid. No focal tenderness over the gallbladder.     BILE DUCTS: No intra- or extrahepatic biliary dilatation. The common duct measures 0.4 cm in diameter.     LIVER: Normal echogenicity. No masses.     OTHER: No free fluid in the upper right hemiabdomen.     IMPRESSION:   1. Unremarkable appearance of the gallbladder and liver.  2. No biliary dilatation.         EXAM: CT HEAD WO CONTRAST  LOCATION: [facility]  DATE/TIME: 11/24/2019 3:13 AM     INDICATION: Indication not listed - see reason for exam. Headache.  COMPARISON: None.  TECHNIQUE: Routine without IV contrast. Multiplanar reformats. Dose reduction techniques were used.     FINDINGS:  INTRACRANIAL CONTENTS: No intracranial hemorrhage, extra-axial collection, or mass effect.  No CT evidence of acute infarct. Normal parenchymal attenuation. Normal ventricles and sulci. There are chunky calcifications in the lateral left parietal lobe   which appear to outline the cortex. No obvious associated mass lesion.     VISUALIZED ORBITS/SINUSES/MASTOIDS: No intraorbital abnormality. Complete opacification of the left frontal sinus and anterior ethmoid air cells. No middle ear or mastoid effusion.     BONES/SOFT TISSUES: No acute  abnormality.     IMPRESSION:   1.  No CT evidence of acute intracranial hemorrhage, mass or recent infarct.  2.  There are nonspecific dystrophic-appearing calcifications in the left parietal region which appear to be outlining the cortex. Although no underlying mass lesion is identified, given relatively young patient age, would recommend a dedicated MRI of   the brain without and with contrast on a nonemergent basis.   3.  There is complete opacification of the left frontal sinus and anterior ethmoid air cells without aggressive features.     NOTE: ABNORMAL REPORT     THE DICTATION ABOVE DESCRIBES AN ABNORMALITY FOR WHICH FOLLOW-UP IS NEEDED.         EXAM: CT ABDOMEN PELVIS WO ORAL W IV CONTRAST  LOCATION: [facility]  DATE/TIME: 11/24/2019 3:19 AM     INDICATION: Leukocytosis. Chest pain. Abdominal pain. Headache.  COMPARISON: 05/12/2019.  TECHNIQUE: CT scan of the abdomen and pelvis was performed following injection of IV contrast. Multiplanar reformats were obtained. Dose reduction techniques were used.  CONTRAST: Iohexol (Omni) 100 mL     FINDINGS:   LOWER CHEST: Very minimal amount of bilateral lower lung dependent atelectasis. Minimal elevation right hemidiaphragm.     HEPATOBILIARY: No focal hepatic lesions. Gallbladder unremarkable.     PANCREAS: Unremarkable.     SPLEEN: Not enlarged.     ADRENAL GLANDS: No adrenal nodule.     KIDNEYS/BLADDER: No hydronephrosis. Bladder partially filled.     BOWEL: Stomach partially filled with gastric contents. The descending duodenum mildly distended with fluid. Duodenal diverticulum. Appendix normal in caliber.     LYMPH NODES: No adrenal nodule.     VASCULATURE: Unremarkable.     PELVIC ORGANS: Normal.     OTHER: None.     MUSCULOSKELETAL: Normal.     IMPRESSION:   1.  No CT evidence of appendicitis.  2.  No focal inflammatory change.  3.  No evidence of obstruction.        EXAM: CTA CHEST PE RUN  LOCATION: [facility]  DATE/TIME: 11/24/2019 3:16  AM     INDICATION: Indication Not Listed - See Reason for Exam pleurisy, hx of PE  COMPARISON: None.  TECHNIQUE: CT angiogram chest during arterial phase injection IV contrast. 2D and 3D MIP reconstructions were performed by the CT technologist. Dose reduction techniques were used.   CONTRAST: Iohexol (Omni) 100 mL     FINDINGS:  ANGIOGRAM CHEST: Pulmonary arteries are normal caliber and negative for pulmonary emboli. Thoracic aorta is negative for dissection. No CT evidence of right heart strain.     LUNGS AND PLEURA: Normal.     MEDIASTINUM/AXILLAE: Normal.     UPPER ABDOMEN: Normal.     MUSCULOSKELETAL: Normal.     IMPRESSION:   No pulmonary embolus, aortic dissection, or aneurysm.      This note has been dictated using voice recognition software and as a result may contain minor grammatical errors and unintended word substitutions.

## 2021-06-03 NOTE — TELEPHONE ENCOUNTER
ACN called and spoke with pt. Patient has picked up his Xarelto from pharmacy.     INR was 1.9 five days ago on 11/15. Discussed with PharmLA Alexandre that pt can discontinue warfarin and start Xarelto this evening.     Instructed pt to discontinue warfarin now. It's important to take Xarelto with food. Start first dose of 15 mg Xarelto this evening with dinner. Tomorrow he'll start taking Xarelto 15 mg two times a day (every 12 hours). After 21 days, he'll take 20 mg Xarelto daily (with largest meal of the day).     Instructed that it's important not to miss any Xarelto doses.     Pt verbalized understanding.

## 2021-06-03 NOTE — TELEPHONE ENCOUNTER
Refilled refused, recently ordered.  tiZANidine (ZANAFLEX  Please do not fill until 11/24/19 for use 11/14 through 12/24    Last visit 10/28

## 2021-06-04 VITALS
SYSTOLIC BLOOD PRESSURE: 100 MMHG | HEART RATE: 70 BPM | BODY MASS INDEX: 26.13 KG/M2 | OXYGEN SATURATION: 98 % | WEIGHT: 157 LBS | DIASTOLIC BLOOD PRESSURE: 70 MMHG

## 2021-06-04 VITALS — WEIGHT: 152.5 LBS | BODY MASS INDEX: 25.41 KG/M2 | HEIGHT: 65 IN

## 2021-06-04 VITALS
SYSTOLIC BLOOD PRESSURE: 110 MMHG | BODY MASS INDEX: 25.63 KG/M2 | OXYGEN SATURATION: 96 % | WEIGHT: 154 LBS | HEART RATE: 88 BPM | DIASTOLIC BLOOD PRESSURE: 78 MMHG

## 2021-06-04 VITALS
HEART RATE: 76 BPM | OXYGEN SATURATION: 95 % | SYSTOLIC BLOOD PRESSURE: 90 MMHG | BODY MASS INDEX: 24.79 KG/M2 | WEIGHT: 149 LBS | DIASTOLIC BLOOD PRESSURE: 60 MMHG

## 2021-06-04 VITALS — WEIGHT: 157.1 LBS | HEIGHT: 65 IN | BODY MASS INDEX: 26.17 KG/M2

## 2021-06-04 NOTE — PROGRESS NOTES
12/6/2019    Start time: 2:05 PM    Stop Time: 2:55 PM   Session # 21    Anand Felix is a 34 y.o. male is being seen today for    Chief Complaint   Patient presents with     Mental Health Note   . Major depression, severe   Generalized Anxiety Disorder  Chronic pain syndrome    New symptoms or complaints: None    Functional Impairment:   Personal: 4  Family: 4  Work: 4  Social:4    Clinical assessment of mental status: Anand Felix presented on time.  He was open and cooperative, and dressed appropriately for this session and weather. His memory was in tact .  His  speech was appropriate.  Language was appropriate.  Concentration and focus is on task. Psychosis is not noted or reported. He reports his mood is depressed .  Affect is congruent with speech.  Fund of knowledge is adequate. Insight is adequate for therapy.     Patient updated paperwork this session: PHQ-9 depression score is 15 and indicates severe symptoms of depression, CAROLYN-7 anxiety score is 7 and  indicates moderate symptoms of anxiety, WHODAS score is 33.33%, H1= 20, H2=6, H3=10, PANSI score indicates risk for suicide. Patient reports SI, but denies plan or means at this time.     Suicidal/Homicidal Ideation present: Patient reports SI. He denies plan or means. Patient reports symptoms are not as intrusive and pass quickly  He agrees to follow safety plan to call 911, go to the ER for evaluation and call the crisis connection if he is feeling unsafe or symptoms worsen. He identifies protective factors.     Patient's impression of their current status: Patient reported he continues to be working on addressing symptoms of depression and anxiety. He reports continued difficulty with physical weakness, fatigue and lack of stamina. He was recently in the ER due to severe head pain. He reports continued SI, and reports these thoughts are less intrusive at this time.     Therapist impression of patients current state: Patient seems to be working on addressing  depression, anger, anxiety and chronic medical symptoms. He laid down on the couch in session due to pain today. Continued to educate on depression symptoms. He reports pain symptoms and weakness have been difficult to manage. He processed thoughts and feelings about a recent ER visit due to extreme head pain. He is following up with medical providers to further address this. Continued to discuss changes in relationships/social life that have occurred since he has been struggling with chronic pain/medical issues.  He discussed social needs at this time and how he has been making some more effort to be engaged with family. He reports it is difficult to enjoy activities he used to, but that he continues to work on motivation. Encouraged patient to continue to increase efforts to reach out to social/family support to connect socially/emotionally as he is able. He struggles with identity since he has had significant health changes in recent years. Engaged in values clarification. Discussed desire to return to school, and he processed thoughts about fears related to ability at this time. He and his wife are working on future housing needs, but are staying with his sister at this time. He reports Christian attendance as he is able. He continues to work on addressing acceptance of his chronic pain and medical concerns, with some progress and challenges. Discussed negative cognitions related to physical abilities and will further address with EMDR in a future session. Further, discussed positives and abilities he has at this time. He continues to work on sleep improvement, which continues to be challenging.  Patient reports suicidal ideation persists,  but denies immediate plan or means at this time. He reported SI is less intrusive and passes quickly at this time. Discussed ways to cope. Continued to work on identifying positives in session. Continued to discuss benefits of Mindfulness meditation with patient. He reports  regular meditation practice and grounding.         Type of psychotherapeutic technique provided: Client centered, CBT and Mindfulness    Progress toward short term goals: Patient continues to work on increasing coping skills to manage chronic pain and mental health. Patient continues medical care as recommended by his medical providers. He engages in physical activity and social activity as he is able. He is working on values clarification. He is engaging in meditation/grounding in daily living.  Discussed patient's goals and treatment plan will be developed.     Review of long term goals: Discussed patient's goals and treatment plan will be developed.     Diagnosis: Major depression, severe   Generalized Anxiety Disorder  Chronic pain syndrome    Plan and Follow up: Follow pain center plan of care. Patient to follow all medical recommendations. Patient to schedule every 1-2 weeks for psychotherapy to further address depression, anxiety and chronic pain concerns. Continue meditation and other healthy coping strategies. Practice meditations as presented in session. Further address adjustment to role/identity due to chronic pain and health concerns. Continue to work on healthy communication skills/relationship building. Continue EMDR as discussed. Continue working on future goals. Continue work on suicide prevention worksheet in session. Follow safety plan.     Discharge Criteria/Planning: Patient will continue with follow-up until therapy can be discontinued without return of signs and symptoms.    Alize Livingston 12/6/2019

## 2021-06-04 NOTE — PROGRESS NOTES
Outpatient Mental Health Treatment Plan    Name:  Annad Felix  :  1985  MRN:  [id number]    Treatment Plan:  Updated Treatment Plan  Intake/initial treatment plan date:  18  Benefit and risks and alternatives have been discussed: Yes  Is this treatment appropriate with minimal intrusion/restrictions: Yes  Estimated duration of treatment:  Approximately 20 sessions.  Anticipated frequency of services:  Every 1-2 weeks  Necessity for frequency: This frequency is needed to establish therapeutic goals and for continuity of care in order to monitor progress.  Necessity for treatment: To address cognitive, behavioral, and/or emotional barriers in order to work toward goals and to improve quality of life.    Session Type: Patient is presenting for an Individual session.    Plan:      ? Depression    Goal:  Decrease average depression level from 8 to 5.   Strategies:    ?[x] Decrease social isolation     [x] Increase involvement in meaningful activities     ?[x] Discuss sleep hygiene     ?[x] Explore thoughts and expectations about self and others     ?[x] Process grief (loss of significant person, independence, role,        etc.)     ?[x] Assess for suicide risk     ?[x] Implement physical activity routine (with physician approval)     [x] Consider introduction of bibliotherapy and/or videos     [x] Continue compliance with medical treatment plan (or explore         barriers)       ?  ?Degree to which this is a problem: 4  Degree to which goal is met: 1  Date of Review: 19          ?   ? Anxiety    Goal:  Decrease average anxiety level from 6 to 4.   Strategies: ? [x]Learn and practice relaxation techniques and other coping        strategies (e.g., thought stopping, reframing, meditation)     ? [x] Increase involvement in meaningful activities     ? [x] Discuss sleep hygiene     ? [x] Explore thoughts and expectations about self and others     ? [x] Identify and monitor triggers for panic/anxiety  symptoms     ? [x] Implement physical activity routine (with physician approval)     ? [x] Consider introduction of bibliotherapy and/or videos     ? [x] Continue compliance with medical treatment plan (or explore          barriers)                                       [x]     Degree to which this is a problem: 4  Degree to which goal is met: 1  Date of Review: 12/6/19  Chronic pain  Goal:  ? Decrease average pain level from 8 to 5.   ? Increase % acceptance of pain from 50 to 80.   Strategies: ? [x] Explore thoughts and expectations about self and others    [x] Explore emotional reactions to illness/injury      ? [x] Learn and practice relaxation techniques and other coping          strategies            [x] Implement physical activity routine (with physician approval)     ? [x] Engage in values clarification and goal-setting     ? [x] Consider introduction of bibliotherapy and/or videos     ? [x] Increase involvement in meaningful activities     ? [x] Discuss sleep hygiene     ? [x] Process grief (loss of significant person, independence, role,          etc.)     ? [x] Assess for suicide risk     ?  Degree to which this is a problem: 4  Degree to which goal is met: 1  Date of Review: 12/6/19       Functional Impairment:  1=Not at all/Rarely  2=Some days  3=Most Days  4=Every Day    Personal : 4  Family : 4  Social : 4   Work/school : 4    Diagnosis:  Major depression, severe   Generalized Anxiety Disorder  Chronic pain syndrome    WHODAS 2.0 12-item version 33.33%      Scores presented in qualifiers to represent level of disability.  MODERATE Problem (medium, fair, ...) 25-49%    H1= 20  H2= 6  H3= 10        Clinical assessments and measures completed:. CAROLYN-7, PHQ-9 and PANSI     Strengths:Patience, fast learner, hard working  Limitations:  lacks assertiveness, wants to improve his health, health seems to be getting worse in the last year.   Cultural Considerations: Patient is a 34 year old,  American,   male who has a history of depression, anxiety and chronic pain.     Persons responsible for this plan: Patient and Provider            Psychotherapist Signature           Patient Signature:              Guardian Signature             Provider: Performed and documented by ROBERT Cope   Date:  12/6/2019

## 2021-06-04 NOTE — PROGRESS NOTES
"ACUPUNCTURIST TREATMENT NOTE    Name: Anand Felix  :  1985  MRN:  [id number]      Acupuncture Treatment  Patient Type: JN Pain  Intervention Reason: Pain;Headache  Pre-session Pain Ratin  Post-session Pain Rating: 3  Pre-session Headache Ratin  Post-session Headache Ratin  Patient complaint:: Patient is seen for 7th treatment today after one month. He reports about two weeks ago he started to have a \"really bad\" headache that is worse than usual. This headache lasted four days. His pain felt worse during this time, but he reports now he is feeling more back to his baseline. Reporting low back pain/upper back pain /10 with RLE tingling/burning. Occipital headache /10. Denies any complaints with digestion. He is having interrupted and difficult sleep still due to pain, but he reports he is getting a good amount of sleep, but not consecutively. He also continues to have night sweats every night.  Acupuncture (Points):: Liv3, Gb41, Ub62, Ub60, Ki7, Sp6, Gb34, Ub57, Ub40, Si3, Ht6, Baihui, [place], Gb8, Gb20, Anmian, Ub10, HTJJ L1-L5, Ub23, Shiqizhuixia, Du4, Yaoyan. Right: Ub24-26, Ub31-34, Gb29, Gb30, Ub53, Ub54, Jiankua  E-Stim: mirian current 2 x 25Hz (R) [id number], (R) [id number]  TCM Diagnosis: wind cold bi syndrome, spleen qi defiency, liver/kidney yin deficiency with liver yang rising  Practitioner Observed: 30 minute treatment. Infrared heat lamp over low back. Cupping and tuina with posumon to upper/lower back.  Treatment Plan/Follow up: Rx placed for moxa rolls. Written instructions provided to patient on at home use- Patient to moxa over Ren8 and Du4 5-10 minutes daily.  Checklist: Progress Note Completed;Consent Reveiwed  Follow up comments: Patient relaxed well during treatment.    \"Risks and benefits of acupuncture were discussed with patient. Consent for treatment was given. We thank you for the referral.\"     Nu Nieves L.Ac.    Date:  2019  Time:  11:25 AM    "

## 2021-06-04 NOTE — PROGRESS NOTES
"Assessment/Plan:    1. Nonintractable episodic headache, unspecified headache type  Non-intractable episodic headaches, chronic.  Trial of propranolol 80 mg use half tablet twice daily and will titrate each week up to goal 160 mg divided between 2-4 times daily.  Anticipate reassessment at follow-up visit in 3 months.  - propranolol (INDERAL) 80 MG tablet; Take 1 tablet (80 mg total) by mouth 3 (three) times a day.  Dispense: 60 tablet; Refill: 5    2. Tachycardia  Stop atenolol 25 mg daily.  Anticipate chronotropic benefit with propranolol use as noted above.    3. Generalized muscle weakness  Generalized muscle weakness, stable.    4. Diaphoresis  Diaphoresis described with night sweats and daytime sweating episodes.  Hands and feet often feel cold.  Request for testosterone levels to be obtained.  - Testosterone, Free and Total    5. Mucocele of maxillary sinus  Noted mucocele on recent head MRI.  Referred to ENT for further treatment options.  - Ambulatory referral to ENT          Subjective:    Anand Felix is seen today for several concerns.  Ongoing headaches.  Chronic.  Describes headaches as being \"little more tolerable\".  Maxalt 10 mg with recommendation to restricted no more than 30 mg daily however does admit to using up to 40 mg/day if intolerable.  Has medical cannabis etc. available for chronic pain management.  Followed by Dr. Lundberg with pain clinic.  Has received Botox injections for migraine with some short-term benefits x2 to 3 days then subsequent crushing headache described for which she had been seen through ER as previously noted.  Has seen an acupuncturist which seems to have helped however would like to be seen weekly however schedule does not allow.  Atenolol 25 mg daily for tachycardia.  Omeprazole 20 mg daily for reflux.  Is on Xarelto currently for history of bilateral pulmonary emboli.  Comprehensive review of systems as above otherwise all negative.     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 Bernal follows  Children - none  Employed - working on disability - monoparesis  Surgeries - nasal surgery; low back surgery (Dr. Jeannine Hernandez, 2015) with residual right leg weakness  Tobacco use - none  ETOH - none  Family hx   Mother - living - high chol. Strokes. MI.   Father - living - unknown  Siblings - living - 4 sisters, 2 brother -    Bilateral AFO braces worn due to muscle weakness and foot drop    Past Surgical History:   Procedure Laterality Date     BACK SURGERY       MINIMALLY INVASIVE MICRODISCECTOMY LUMBAR SPINE Right 10/2015    L5-S1; Dr. Bettie Hernandez     ID NASAL/SINUS ENDOSCOPY,BX/RMV POLYP/DEBRID Right 5/22/2019    Procedure: RIGHT ENDOSCOPIC SINUS SURGERY;  Surgeon: Leo Wright MD;  Location: [facility];  Service: ENT     SINUS SURGERY  2014    Dr. Wright        Family History   Problem Relation Age of Onset     Depression Mother      Hyperlipidemia Mother      Other Mother         \"Ulcers\" - Aches and pains throughout her body.     Cancer Father      No Medical Problems Sister      No Medical Problems Brother      No Medical Problems Sister      No Medical Problems Sister      No Medical Problems Sister      No Medical Problems Brother      Thyroid disease Maternal Grandmother      Thyroid disease Cousin         Past Medical History:   Diagnosis Date     Anxiety      Bulging lumbar disc      Depression      History of anesthesia complications     Was hospitalized in October 2015 for weakness     History of blood clots     PE in 2017     Lumbar herniated disc 10/23/2015     Lumbar radiculopathy 11/25/2015     Shortness of breath      Tachycardia     After PE in         Social History     Tobacco Use     Smoking status: Never Smoker     Smokeless tobacco: Current User   Substance Use Topics     Alcohol use: Yes     Comment: Monthly or less often. 1-2 drinks at a time.     Drug use: Yes     Types: Marijuana        Current Outpatient " Medications   Medication Sig Dispense Refill     acetaminophen (TYLENOL) 500 MG tablet Take 1 tablet (500 mg total) by mouth every 6 (six) hours as needed for pain.  0     cholecalciferol, vitamin D3, 2,000 unit cap Take 2,000 Units by mouth daily.       fluticasone propionate (FLONASE) 50 mcg/actuation nasal spray SPRAY 2 SPRAYS INTO EACH NOSTRIL EVERY DAY 16 g 5     gabapentin (NEURONTIN) 100 MG capsule TAKE 1 CAPSULE BY MOUTH THREE TIMES DAILY. OKAY TO REDUCE TO 2 TIMES A DAY IF TOO DROWSY 84 capsule 1     lidocaine (LIDODERM) 5 % Remove & Discard patch within 12 hours or as directed by MD 15 patch 0     montelukast (SINGULAIR) 10 mg tablet Take 1 tablet (10 mg total) by mouth at bedtime. 30 tablet 0     NON FORMULARY              omeprazole (PRILOSEC) 20 MG capsule TAKE 1 CAPSULE (20 MG TOTAL) BY MOUTH DAILY. 90 capsule 3     [START ON 12/16/2019] oxyCODONE-acetaminophen (PERCOCET/ENDOCET) 7.5-325 mg per tablet Take 1 tablet by mouth 3 (three) times a day as needed for pain. 84 tablet 0     rivaroxaban (XARELTO) 15 mg tablet Take 1 tablet (15 mg total) by mouth 2 (two) times a day with meals. For 21 days. Then switch to the 20 mg tablet daily. 42 tablet 0     rivaroxaban (XARELTO) 20 mg tablet Take 1 tablet (20 mg total) by mouth daily. Start 20 mg daily after the 15 mg daily for 21 days. 30 tablet 11     rizatriptan (MAXALT-MLT) 10 MG disintegrating tablet DISSOLVE 1 TABLET ON TONGUE AS NEEDED FOR MIGRAINE. MAY REPEAT IN 2HRS IF NEEDED 12 tablet 6     tiZANidine (ZANAFLEX) 4 MG tablet Take 1 tablet (4 mg total) by mouth every 6 (six) hours as needed. 90 tablet 1     propranolol (INDERAL) 80 MG tablet Take 1 tablet (80 mg total) by mouth 3 (three) times a day. 60 tablet 5     No current facility-administered medications for this visit.           Objective:    Vitals:    12/04/19 1245   BP: 100/70   Pulse: 70   SpO2: 98%   Weight: 157 lb (71.2 kg)      Body mass index is 26.13 kg/m .    Alert.  No apparent  distress.  Transfers slowly.  Chest clear.  Cardiac exam regular.  Pulse 70 bpm.      EXAM: MR BRAIN W WO CONTRAST  LOCATION: [facility]  DATE/TIME: 11/26/2019 8:26 PM     INDICATION: Headache, chronic, with new features.  COMPARISON: Head CT 11/24/2019. Brain MR 9/9/2016.  CONTRAST: Gadavist 7 mL.  TECHNIQUE: Routine multiplanar multisequence head MRI without and with intravenous contrast.     FINDINGS:  INTRACRANIAL CONTENTS: No acute or subacute infarct. No mass, acute hemorrhage, or extra-axial fluid collections. There is susceptibility from known cortically based calcification which is dystrophic in appearance involving the left parietal lobe. Normal   ventricles and sulci. Normal position of the cerebellar tonsils. No pathologic contrast enhancement.     SELLA: No abnormality accounting for technique.     OSSEOUS STRUCTURES/SOFT TISSUES: Normal marrow signal. The major intracranial vascular flow voids are maintained.      ORBITS: No abnormality accounting for technique.      SINUSES/MASTOIDS: There is expansile change involving the left frontal sinus with heterogeneous enhancement of the periphery and some internal septations. This process extends into several left anterior ethmoid air cells through the left frontal   ethmoidal recess and slightly expands these air cells. Process is most consistent with left frontoethmoidal mucocele. No abnormal calvarial invasion or parenchymal invasion. Left maxillary mucus retention cyst with a smaller right maxillary mucus   retention cyst. No middle ear or mastoid effusion.      IMPRESSION:   1.  Stable appearing signal changes in the left parietal cortex. Comparison head CT demonstrates that these are cortically based calcifications. Brain MR dated 9/9/2016 demonstrates similar signal changes which are stable suggesting indolent,   nonaggressive process. Old trauma or infectious process may have created this appearance.     2.  Expansile change left frontal  sinus extending into the left anterior ethmoid air cells, somewhat expansile and most consistent with frontoethmoidal mucocele as detailed above.        This note has been dictated using voice recognition software and as a result may contain minor grammatical errors and unintended word substitutions.

## 2021-06-04 NOTE — PROGRESS NOTES
HISTORY OF PRESENT ILLNESS  Patient with history of right antrochoanal polyp. Recently he has had frontal headaches and occipital headaches that have been quite severe. He was seen in the ED and MRI scan was ordered that showed progression of a left frontal mucocele. He reports that the pressure that he has been feeling in the frontal area is more left sided.    REVIEW OF SYSTEMS  Review of Systems: a 10-system review was performed. Pertinent positives are noted in the HPI and on a separate scanned document in the chart.    PMH, PSH, FH and SH has documented in the EHR.      EXAM    CONSTITUTIONAL  General Appearance:  Normal, well developed, well nourished, no obvious distress  Ability to Communicate:  communicates appropriately.    HEAD AND FACE  Appearance and Symmetry:  Normal, no scalp or facial scarring or suspicious lesions.  Paranasal sinuses tenderness:  Normal, Paranasal sinuses non tender    EARS  Clinical speech reception threshold:  Normal, able to hear normal speech.  Auricle:  Normal, Auricles without scars, lesions, masses.  External auditory canal:  Normal, External auditory canal normal.  Tympanic membrane:  Normal, Tympanic membranes normal without swelling or erythema.    NOSE (speculum or scope)  Architecture:  Normal, Grossly normal external nasal architecture with no masses or lesions.  Mucosa:  Normal mucosa, No polyps or masses.  Septum:  Normal, Septum non-obstructing.  Turbinates:  Normal, No turbinate abnormalities    ORAL CAVITY AND OROPHARYNX  Lips:  Normal.  Dental and gingiva:  Normal, No obvious dental or gingival disease.  Mucosa:  Normal, Moist mucous membranes.  Tongue:  Normal, Tongue mobile with no mucosal abnormalities  Hard and soft palate:  Normal, Hard and soft palate without cleft or mucosal lesions.  Oral pharynx:  Normal, Posterior pharynx without lesions or remarkable asymmetry.  Saliva:  Normal, Clear saliva.  Masses:  Normal, No palpable masses or pathologically  enlarged lymph nodes.    NECK  Masses/lymph nodes:  Normal, No worrisome neck masses or lymph nodes.  Salivary glands:  Normal, Parotid and submandibular glands.  Trachea and larynx position:  Normal, Trachea and larynx midline.  Thyroid:  Normal, No thyroid abnormality.  Tenderness:  Normal, No cervical tenderness.  Suppleness:  Normal, Neck supple    NEUROLOGICAL  Speech pattern:  Normal, Proasaic    RESPIRATORY  Symmetry and Respiratory effort:  Normal, Symmetric chest movement and expansion with no increased intercostal retractions or use of accessory muscles.     MRI HEAD  Images reviewed.     IMPRESSION  Left frontal mucocele. It appears to have progressed since it was first noted on the CT from 3/19.    RECOMMENDATION   I advised obtaining a CT sinus. I discussed the procedure, goals and risks of removing the mucocele endoscopically. He would like to proceed.       Leo Wright MD

## 2021-06-05 VITALS
SYSTOLIC BLOOD PRESSURE: 122 MMHG | OXYGEN SATURATION: 95 % | HEART RATE: 84 BPM | HEIGHT: 65 IN | DIASTOLIC BLOOD PRESSURE: 84 MMHG | WEIGHT: 153 LBS | BODY MASS INDEX: 25.49 KG/M2

## 2021-06-05 VITALS — BODY MASS INDEX: 25.99 KG/M2 | WEIGHT: 156 LBS | HEIGHT: 65 IN

## 2021-06-05 VITALS
DIASTOLIC BLOOD PRESSURE: 62 MMHG | HEIGHT: 65 IN | BODY MASS INDEX: 26.19 KG/M2 | WEIGHT: 157.2 LBS | TEMPERATURE: 97.7 F | HEART RATE: 60 BPM | SYSTOLIC BLOOD PRESSURE: 108 MMHG

## 2021-06-05 VITALS
DIASTOLIC BLOOD PRESSURE: 86 MMHG | HEART RATE: 68 BPM | WEIGHT: 143.8 LBS | BODY MASS INDEX: 23.93 KG/M2 | SYSTOLIC BLOOD PRESSURE: 138 MMHG | TEMPERATURE: 99.5 F

## 2021-06-05 NOTE — PROGRESS NOTES
"ACUPUNCTURIST TREATMENT NOTE    Name: Anand Felix  :  1985  MRN:  [id number]      Acupuncture Treatment  Patient Type: JN Pain  Intervention Reason: Pain;Headache;Wellness  Pre-session Pain Ratin  Post-session Pain Rating: 3  Pre-session Headache Rating: 3  Post-session Headache Rating: 3  Patient complaint:: Patient is seen after 5 weeks since last treatment. He has started propranolol for headache and feels this has helped. He continues to have daily headaches, however, he reports the intensity is much less than before. Headaches are more focused in frontal/sinus area as opposed to occipital area now. He continues to feel tight in neck and upper back. Over all back pain has improved as well, rating 4/10 in low back and down RLE. Continues with tingling down RLE with aching that is constant. He is having regular, daily or every other day BMs. No concerns with digestion. He reports he is not sweating as much daytime or night time, but he does feel very cold hands and feet. He also notes since starting new medication he has been more fatigued and is not sleeping as well through the night, despite pain levels being lower.  Acupuncture (Points):: Ub62, Ub60, Ki7, Sp6, Ub57, Gb34, Lu7, Si3, HTJJ L1-L5, Ub23, Shiqizhuixia, Baihui, Sishencong, Gb8, Gb20, Anmian. (L) Li4. Right: Liv3, Gb41, Ub40, Sp9, Ub22, Ub24-26, Ub31-34, Yaoyan, Ub53, Ub54, Gb29, Jiankua  TCM Pulse: soggy  E-Stim: micro current 2 x 100hz (R) Ev72-Fb27, (R) [id number]  TCM Diagnosis:  wind cold bi syndrome, spleen qi defiency w/damp, liver/kidney yin deficiency  Practitioner Observed: 30 minute treatment. Infrared heat lamp over low back. Cupping with posumon and tuina to low back and RLE.  Checklist: Progress Note Completed;Consent Reveiwed  Follow up comments: Patient relaxed well during treatment. Stated post-treatment \"I feel really good.\"    \"Risks and benefits of acupuncture were discussed with patient. Consent for treatment was given. We " "thank you for the referral.\"     Nu Nieves L.Ac.    Date:  1/8/2020  Time:  12:30 PM    "

## 2021-06-05 NOTE — PROGRESS NOTES
"ACUPUNCTURIST TREATMENT NOTE    Name: Anand Felix  :  1985  MRN:  [id number]      Acupuncture Treatment  Patient Type: JN Pain  Intervention Reason: Pain;Headache  Pre-session Pain Rating: 3  Pre-session Headache Rating: 3  Patient complaint:: Patient is seen for 9th treatment today. He reports lower pain levels in low back and RLE 3/10. Continues to have daily headaches, but they are less intense than in the past. Mostly in frontal and vertex area. Continues with some tightness in neck/shoulders. Tingling remains in RLE. He continues to have some sweating, both day and night time, although he reports this is improved. He is unable to sleep more than about 2 hours in a row, and he does not feel pain is waking him. He is restless and is unable to fall back to sleep after waking. He is taking naps through the day but feels more exhausted.   Acupuncture (Points):: Ub62, Ub60, Ki7, Sp6, Gb34, Si3, Ht6, Baihui, Sishen[place], Gb8, Gb20, Anmian, HTJJ L2-L5, Shiqizhuixia, Ub23. Right: Ub24-26, Yaoyan, Ub31-34, Ub53, Ub54, Gb29, Ub40, Ub57, Sp9, Liv3, Gb41  E-Stim: micro current 2 x 100hz (R) Ue23-Er08, (R) HTJJ L2-Ub34  TCM Diagnosis: wind cold bi syndrome, spleen qi defiency w/damp, liver/kidney yin deficiency  Practitioner Observed: 30 minute treatment. Infrared heat lamp over low back. Cupping and tuina with posumon to low back and RLE.  Treatment Plan/Follow up: Discussed a few things regarding sleep- less napping during day, bed time routine, meditation reina and breathing exercises.  Checklist: Progress Note Completed;Consent Reveiwed  Follow up comments: Patient relaxed well during treatment. Reported he felt \"really good\" post-treatment. No follow up scores provided.    \"Risks and benefits of acupuncture were discussed with patient. Consent for treatment was given. We thank you for the referral.\"     Nu Nieves L.Ac.    Date:  1/15/2020  Time:  1:52 PM    "

## 2021-06-05 NOTE — PROGRESS NOTES
1/13/2020    Start time: 11:10 AM    Stop Time: 12:00 PM   Session # 1    Anand Felix is a 34 y.o. male is being seen today for    Chief Complaint   Patient presents with     Mental Health Note   .  Major depression, severe   Generalized Anxiety Disorder  Chronic pain syndrome    New symptoms or complaints: None    Functional Impairment:   Personal: 4  Family: 4  Work: 4  Social:4    Clinical assessment of mental status: Anand Felix presented on time.  He was open and cooperative, and dressed appropriately for this session and weather. His memory was in tact .  His  speech was appropriate.  Language was appropriate.  Concentration and focus is on task. Psychosis is not noted or reported. He reports his mood is depressed .  Affect is congruent with speech.  Fund of knowledge is adequate. Insight is adequate for therapy.     Suicidal/Homicidal Ideation present: Patient reports SI. He denies plan or means. He agrees to follow safety plan to call 911, go to the ER for evaluation and call the crisis connection if he is feeling unsafe or symptoms worsen. He identifies protective factors.     Patient's impression of their current status: Patient reported he continues to be working on addressing symptoms of depression and anxiety. He reports continued difficulty with physical weakness and fatigue. He reports other medical issues he is addressing. He reports continued SI, and reports these thoughts are less intrusive at this time.     Therapist impression of patients current state: Patient seems to be working on addressing depression, anger, anxiety and chronic medical symptoms. He reports pain symptoms and weakness have been difficult to manage and interferes with being as active as he would like. He processed thoughts and feelings about additional medical concerns he is addressing. He will have surgery, but this has not been scheduled yet. He is following up with medical providers to further address this. Continued to  discuss changes in relationships/social life that have occurred since he has been struggling with chronic pain/medical issues.  He discussed social needs at this time and how he has been making some more effort to be engaged with family. He reports it is difficult to enjoy activities he used to, but that he continues to work on motivation. Encouraged patient to continue to increase efforts to reach out to social/family support to connect socially/emotionally as he is able. He struggles with identity since he has had significant health changes in recent years. Engaged in values clarification.  He and his wife are working on future housing needs, but are staying with his sister at this time. He reports Christian attendance as he is able. He continues to work on addressing acceptance of his chronic pain and medical concerns, with some progress and challenges. Discussed negative cognitions related to physical abilities and will further address with EMDR in a future session. Further, discussed positives and abilities he has at this time. He continues to work on sleep improvement, which continues to be challenging.  Patient reports suicidal ideation persists,  but denies immediate plan or means at this time. Discussed ways to cope. Continued to work on identifying positives in session. Continued to discuss benefits of Mindfulness meditation with patient. He reports regular meditation practice and grounding. Presented meditation focused on the sense of sound in session and he seemed to benefit.      Type of psychotherapeutic technique provided: Client centered, CBT and Mindfulness    Progress toward short term goals: Patient continues to work on increasing coping skills to manage chronic pain and mental health. Patient continues medical care as recommended by his medical providers. He engages in physical activity and social activity as he is able. He is working on values clarification. He is engaging in  meditation/grounding in daily living.     Review of long term goals: Not done at today's visit. Last review: 12/6/19. Time did not permit to review updated treatment plan. Will review next session.     Diagnosis:  Major depression, severe   Generalized Anxiety Disorder  Chronic pain syndrome    Plan and Follow up: Follow pain center plan of care. Patient to follow all medical recommendations. Patient to schedule every 1-2 weeks for psychotherapy to further address depression, anxiety and chronic pain concerns. Continue meditation and other healthy coping strategies. Practice meditations as presented in session. Further address adjustment to role/identity due to chronic pain and health concerns. Continue to work on healthy communication skills/relationship building. Continue EMDR as discussed. Continue working on future goals. Continue work on suicide prevention worksheet in session. Follow safety plan.          Discharge Criteria/Planning: Patient will continue with follow-up until therapy can be discontinued without return of signs and symptoms.    Alize Livingston 1/13/2020

## 2021-06-05 NOTE — PROGRESS NOTES
PAIN CLINIC FOLLOW UP PROGRESS NOTE    CC:  Chief Complaint   Patient presents with     Leg Pain     Back Pain     Neck Pain       HPI  Anand Felix is a 34 y.o. male who presents for evaluation   Chief Complaint   Patient presents with     Leg Pain     Back Pain     Neck Pain    that is causing continued pain. Since the last visit the patient denies any trips to the urgent care or ED specifically for their pain. The patient denies any new medications, diagnoses since the last visit. Specific questions indicated the patient wanted addressed today include: to follow up and treat his ongoing chronic pain and medication management       Major issues:  1. Arthralgias In Multiple Sites    2. Myalgia    3. Chronic pain syndrome    4. Cervicalgia      Today the pain is located in their neck and right shoulder, leg and back and is described as sharp burning achy and tight when it is aggravated it lasts for constant, and is rated at a 3 on a scale of 1-10.     Associated symptoms: Denies any loss of bladder control, fevers/chills, unintentional weight loss, weakness, numbness or pain that interferes with sleep.   Aggravating factors include: currently transitioning from butrans patch to oral film. Patient reports this Insurance required change has worsened his symptoms.   Alleviating factors: laying down, massage  Adverse effects of medications: NONE   Functional symptoms: affects his ability to care for himself as well as socialize and perform duties around the house  Current subjective treatment efficacy: Poor      Previous Medical History  Social History     Substance and Sexual Activity   Alcohol Use Yes    Comment: Monthly or less often. 1-2 drinks at a time.     Social History     Substance and Sexual Activity   Drug Use Yes     Types: Marijuana     Social History     Tobacco Use   Smoking Status Never Smoker   Smokeless Tobacco Never Used       Pertinent Pain Medications/interventions:  He currently belbuca 300 mcg two  "times a day, gabapentin 100 mg three times a day, oxycodone 5 mg two times a day prn and medical cannabis, tizanidine and Norco.      Belbuca- headaches are associated so he stopped taking this. 9/6     Previously taking Belbuca 75 mcg, gabapentin 100 mg three times a day, oxycodone 5 mg up to two times a day as needed, medical cannabis, baclofen and flexeril      RFA did provide him with 80 % relief.      Medical cannabis    Review of Systems:  12 point systems were reviewed with pt as documented on pt health form and the patient denies any new diagnosis or changes in 12 point system review since the last visit.     Physical Exam  Vitals:    01/27/20 1358   BP: 102/54   Patient Site: Right Arm   Patient Position: Sitting   Pulse: 87   Resp: 16   Weight: 157 lb 1.6 oz (71.3 kg)   Height: 5' 5\" (1.651 m)     General- patient is alert and oriented, in NAD, well-groomed, well-nourished  Psych- Judgment and insight normal, AOx4, recent and remote memory normal, mood and affect normal  Eyes- pupils are equal and reactive, conjunctiva is clear bilaterally, no ptosis is noted.   Respiratory- breathing is non-labored  Cardiovascular- extremities warm and well perfused, no peripheral edema or varicosities.  Musculoskeletal- gait is normal, extremities with no joint swelling, erythema, or warmth. Low back pain and multi site joint pain throughout. Neck pain and right leg and arm pain   Neuro- normal strength, no gait abnormalities, normal sensation to pain, temperature, light touch. Occiput headache still present as well as total body pain   Integumentary- no rashes, dermatitis or discolorations noted throughout, no open wounds noted.    Medications    Current Outpatient Medications:      acetaminophen (TYLENOL) 500 MG tablet, Take 1 tablet (500 mg total) by mouth every 6 (six) hours as needed for pain., Disp: , Rfl: 0     cholecalciferol, vitamin D3, 2,000 unit cap, Take 2,000 Units by mouth daily., Disp: , Rfl:      " divalproex (DEPAKOTE ER) 500 MG 24 hour tablet, , Disp: , Rfl:      fluticasone propionate (FLONASE) 50 mcg/actuation nasal spray, SPRAY 2 SPRAYS INTO EACH NOSTRIL EVERY DAY, Disp: 16 g, Rfl: 5     lidocaine (LIDODERM) 5 %, Remove & Discard patch within 12 hours or as directed by MD, Disp: 15 patch, Rfl: 0     NON FORMULARY,    , Disp: , Rfl:      omeprazole (PRILOSEC) 20 MG capsule, TAKE 1 CAPSULE (20 MG TOTAL) BY MOUTH DAILY., Disp: 90 capsule, Rfl: 3     propranolol (INDERAL) 80 MG tablet, Take 1 tablet (80 mg total) by mouth 3 (three) times a day., Disp: 60 tablet, Rfl: 5     rivaroxaban (XARELTO) 15 mg tablet, Take 1 tablet (15 mg total) by mouth 2 (two) times a day with meals. For 21 days. Then switch to the 20 mg tablet daily., Disp: 42 tablet, Rfl: 0     rivaroxaban (XARELTO) 20 mg tablet, Take 1 tablet (20 mg total) by mouth daily. Start 20 mg daily after the 15 mg daily for 21 days., Disp: 30 tablet, Rfl: 11     rizatriptan (MAXALT-MLT) 10 MG disintegrating tablet, DISSOLVE 1 TABLET ON TONGUE AS NEEDED FOR MIGRAINE. MAY REPEAT IN 2HRS IF NEEDED, Disp: 12 tablet, Rfl: 6     gabapentin (NEURONTIN) 100 MG capsule, Take 100 mg by mouth 2 (two) times a day as needed., Disp: 60 capsule, Rfl: 1     [START ON 2/2/2020] montelukast (SINGULAIR) 10 mg tablet, Take 2 tablets (20 mg total) by mouth at bedtime., Disp: 30 tablet, Rfl: 1     [START ON 1/31/2020] oxyCODONE-acetaminophen (PERCOCET/ENDOCET) 7.5-325 mg per tablet, Take 1 tablet by mouth 3 (three) times a day as needed for pain., Disp: 84 tablet, Rfl: 0     tiZANidine (ZANAFLEX) 4 MG tablet, Take 1 tablet (4 mg total) by mouth every 6 (six) hours as needed., Disp: 90 tablet, Rfl: 1    Lab:  Last UDS on 02/2019 had expected results. Any abnormal results have been discussed with the patient and may change the plan of care. Please see the scanned document from the outside lab under the media tab. This was reviewed with the patient.      Imaging:  No new  imaging.      Recent   Dated 1/27/2020 was reviewed with the patient today.   Paper copy reviewed     Assessment:   Anand Felix is a 34 y.o. male seen in clinic today for   Chief Complaint   Patient presents with     Leg Pain     Back Pain     Neck Pain     They are here for follow up and continued medical management of their pain. Today we reviewed the lab work of the UDT test and the results are expected because of the prescribed narcotics found in the urine drug screen.     I have also reviewed the information obtained from the last visit encounter after the PATSY was signed and have asked any pertintent questions needed from other healthcare providers/family/patient to continue care and formulate a treatment plan.     Ongoing chronic pain- currently using medical cannabis, percocet and gabapentin, he reports a reduction in his pain and notes that the plan of care is helpful    Headaches- he is noting that he is following along with ENT for a mucocele, currently contemplating a surgical intervention. And following along with Dr. Ching, he is taking Singulair, will increase to 20 mg per day.     Spasms- discussed taking in enough water as well as magnesium- he notes he will take in more water and struggles with this.     Patients current MME is 33.75  Patient to call clinic for next month's prescription 5 days in advance. Based on the patients response to their previous narcotic therapy and quality of life, which includes their participation in ADLs, I recommend that the narcotics therapy continue, however the changes listed below will be incorporated into their plan of care.     Patient set goals to   1. To treat his ongoing chronic pain     Plan:   Interventions-    Follow up in 8 weeks to evaluate the effectiveness of the treatment interventions ordered today. Scheduling your appointment prior to leaving assists in reduction of running out of medication prior to the next appointment.     Ok to continue the use  of the percocet- 7.5/325 mg up to 3 per day     Ok to continue the use of the medical cannabis     Continue the use of magnesium at bedtime     Continue the use of the Singulair will increase the dose to 20 mg at nighttime.     Water intake increase to 80 mg per day     As a reminder- chronic pain is generally stable, if you experience a new injury or new different pain it is expected that you present to the ED or urgent care for evaluation. DO NOT use your chronic pain medications to treat any new or different pain other then what it is intended for. We do not replace any lost or stolen prescriptions or provide early refills for overtaking your medications.      Orders placed today  Medications that were ordered today   Requested Prescriptions     Signed Prescriptions Disp Refills     montelukast (SINGULAIR) 10 mg tablet 30 tablet 1     Sig: Take 2 tablets (20 mg total) by mouth at bedtime.     gabapentin (NEURONTIN) 100 MG capsule 60 capsule 1     Sig: Take 100 mg by mouth 2 (two) times a day as needed.     oxyCODONE-acetaminophen (PERCOCET/ENDOCET) 7.5-325 mg per tablet 84 tablet 0     Sig: Take 1 tablet by mouth 3 (three) times a day as needed for pain.     Orders Placed This Encounter   Procedures     Pain Management Drug Panel, Urine     Standing Status:   Standing     Number of Occurrences:   1       UDT/SWAB:  Patient required a random Urine Drug Testing, due to the need to comply with [place] Model Policy Guidelines and CDC Guideline for the use of any controlled substances. This is to ensure that patient is compliant with treatment, and monitor for risks such as diversion, abuse, or any other aberrant behaviors. Patient is either being considered for or taking a controlled substance. Unexpected findings will be discussed and treatment decision may be adjusted. Testing is being implemented across the board randomly w/o bias related to age, race, gender, socioeconomic status or [employer]  affiliation.    The patient understand todays plan and has their questions answered in regards to expectations and current treatment plan.     SAFETY REMINDERS  No alcohol while taking controlled substances. Alcohol is not an illegal substance, it is unsafe to use in combination. It is a build up of substances in the body that can be extremely hazardous and may cause respirations to slow to a dangerous rate resulting in hospitalization, brain damage, or death.    Opioid medications have been associated with sharp rise in unintentional overdose and death.  Overdose is a condition characterized by the consumption in excess of a particular drug causing adverse effects. This can happen b/c you are sick, accidentally or intentionally took an extra dose, are on multiple medication that can interact. Someone took your medication and they are not use to the medication.  Symptoms of overdose include:   !breathing slow and shallow, erratic or not at all  !pinpoint pupils, hallucinations  !confusion  !muscle jerks, slack muscles   !extreme sleepiness or loss of alertness   !awake but not able to talk   !face pale or clammy, vomiting, for lighter skinned people, the skin tone turns bluish purple, for darker skinned people, it turns grayish or ashen   If in a situation where overdose is a concern engage the emergency response system (dial 911).    In one study it was noted that 80% of unintentional overdoses occurred in people who were taking a combination of opioids and benzodiazepines.    Do not sell, loan, borrow or share your opioid medication with anyone. Deaths have occurred as a result of this practice. It is illegal and patients are being prosecuted.     Prevent unexpected access/loss of medication: Keep medication locked. Only carry what you need with you.    Dayna Lundberg CNP  [facility]  [address]  [place] [address]  Ph: [phone]  Fax: [phone]

## 2021-06-05 NOTE — PROGRESS NOTES
"ACUPUNCTURIST TREATMENT NOTE    Name: Anand Felix  :  1985  MRN:  [id number]      Acupuncture Treatment  Patient Type: JN Pain  Intervention Reason: Pain;Headache  Pre-session Pain Ratin  Post-session Pain Ratin  Pre-session Headache Rating: 3  Post-session Headache Rating: 3  Patient complaint:: Patient is seen for 10th treatment today. He reports continued lower level of pain in general in low back adn down RLE. Rating 4/10 today. States he has a constant, dull headache in occiput radiating up to vertex. The level is generally low, rating 3/10, although can range up to 5/10 at times. Reports his headache level is \"much better\" than it used to be. Continues with poor sleep, waking 2-3 times per night. He has been napping during the daytime less. He thinks he gets about 5-6 hours of sleep each night. Reports night sweating is much improved, but continues with daytime \"cold sweats\"  Acupuncture (Points):: Ub62, Ub60, Ki7, Sp6, Ub57, Gb34, Sp9, Li4, Si3, Ht6, Lu7, Baihui, [place], Gb8, Gb20, Anmian, Ub10, HTJJ L2-L5, Shiqizhuixia, Ub20, Ub23. Right: Liv3, Ub40, Ub31-34, Lc33-M08, Yaoyan, Gb30, Gb29  TCM Pulse: soggy  TCM Tongue: dull, pale lavender, scallops, thicker white coating in center  E-Stim: micro current 2 x 100hz (R) HTJJ L2-Ub34, (R) Sn05-Sv02  TCM Diagnosis:  wind cold bi syndrome, spleen qi defiency w/damp, liver/kidney yin deficiency  Practitioner Observed: 30 minute treatment. Infrared heat lamp over low back. Cupping and tuina with posumon to low back/RLE.  Checklist: Progress Note Completed;Consent Reveiwed  Follow up comments: Patient relaxed well during treatment. Reported he felt tired post-treatment.    \"Risks and benefits of acupuncture were discussed with patient. Consent for treatment was given. We thank you for the referral.\"     Nu Nieves L.Ac.    Date:  2020  Time:  2:14 PM    "

## 2021-06-05 NOTE — PROGRESS NOTES
Patient presents to the clinic today for a follow up with Dayna Lundberg CNP regarding back, legs, neck, and head pain. Patient describes their pain as sharp, tight, piercing, and burning. Her/His function score is 8.

## 2021-06-05 NOTE — PATIENT INSTRUCTIONS - HE
Plan:   Interventions-    Follow up in 8 weeks to evaluate the effectiveness of the treatment interventions ordered today. Scheduling your appointment prior to leaving assists in reduction of running out of medication prior to the next appointment.     Ok to continue the use of the percocet- 7.5/325 mg up to 3 per day     Ok to continue the use of the medical cannabis     Continue the use of magnesium at bedtime     Continue the use of the Singulair will increase the dose to 20 mg at nighttime.     Water intake increase to 80 mg per day     As a reminder- chronic pain is generally stable, if you experience a new injury or new different pain it is expected that you present to the ED or urgent care for evaluation. DO NOT use your chronic pain medications to treat any new or different pain other then what it is intended for. We do not replace any lost or stolen prescriptions or provide early refills for overtaking your medications.            Orders placed today  Medications that were ordered today   Requested Prescriptions     Signed Prescriptions Disp Refills     montelukast (SINGULAIR) 10 mg tablet 30 tablet 1     Sig: Take 2 tablets (20 mg total) by mouth at bedtime.     gabapentin (NEURONTIN) 100 MG capsule 60 capsule 1     Sig: Take 100 mg by mouth 2 (two) times a day as needed.     oxyCODONE-acetaminophen (PERCOCET/ENDOCET) 7.5-325 mg per tablet 84 tablet 0     Sig: Take 1 tablet by mouth 3 (three) times a day as needed for pain.     Orders Placed This Encounter   Procedures     Pain Management Drug Panel, Urine     Standing Status:   Standing     Number of Occurrences:   1       UDT/SWAB:  Patient required a random Urine Drug Testing, due to the need to comply with [place] Model Policy Guidelines and CDC Guideline for the use of any controlled substances. This is to ensure that patient is compliant with treatment, and monitor for risks such as diversion, abuse, or any other aberrant behaviors. Patient is  either being considered for or taking a controlled substance. Unexpected findings will be discussed and treatment decision may be adjusted. Testing is being implemented across the board randomly w/o bias related to age, race, gender, socioeconomic status or Anab[employer]ism affiliation.    The patient understand todays plan and has their questions answered in regards to expectations and current treatment plan.     SAFETY REMINDERS  No alcohol while taking controlled substances. Alcohol is not an illegal substance, it is unsafe to use in combination. It is a build up of substances in the body that can be extremely hazardous and may cause respirations to slow to a dangerous rate resulting in hospitalization, brain damage, or death.    Opioid medications have been associated with sharp rise in unintentional overdose and death.  Overdose is a condition characterized by the consumption in excess of a particular drug causing adverse effects. This can happen b/c you are sick, accidentally or intentionally took an extra dose, are on multiple medication that can interact. Someone took your medication and they are not use to the medication.  Symptoms of overdose include:   !breathing slow and shallow, erratic or not at all  !pinpoint pupils, hallucinations  !confusion  !muscle jerks, slack muscles   !extreme sleepiness or loss of alertness   !awake but not able to talk   !face pale or clammy, vomiting, for lighter skinned people, the skin tone turns bluish purple, for darker skinned people, it turns grayish or ashen   If in a situation where overdose is a concern engage the emergency response system (dial 911).    In one study it was noted that 80% of unintentional overdoses occurred in people who were taking a combination of opioids and benzodiazepines.    Do not sell, loan, borrow or share your opioid medication with anyone. Deaths have occurred as a result of this practice. It is illegal and patients are being prosecuted.      Prevent unexpected access/loss of medication: Keep medication locked. Only carry what you need with you.    Dayna Lundberg CNP  [facility]  [address]  [place] [address]  Ph: [phone]  Fax: [phone]

## 2021-06-06 NOTE — TELEPHONE ENCOUNTER
Medication being requested: Tizanidine  Last visit date: 01/27 Provider: JOSUE  Next visit date: 03/27 Provider: JOSUE  Expected follow up: 2 months  MTM visit (Pain Center) date: N/A  Pertinent between visit information about requested medication (telephone, mychart, prior authorization, concerns): None noted  Last date prescribed: 12/02 with 1 RF  Provider responsible: JOSUE  Spoke with patient: No  Script being sent to provider - dates and quantity:   Requested Prescriptions     Pending Prescriptions Disp Refills     tiZANidine (ZANAFLEX) 4 MG tablet [Pharmacy Med Name: TIZANIDINE HCL 4 MG TABLET] 90 tablet 1     Sig: TAKE 1 TABLET BY MOUTH EVERY 6 HOURS AS NEEDED         Pharmacy cued: [facility]

## 2021-06-06 NOTE — TELEPHONE ENCOUNTER
Patient was contacted to schedule surgery.    Provider: Kyle  Location: [facility]   Date: 4/13/2020  Time of arrival: 1000    Pre-op instructions and confirmation sheet mailed

## 2021-06-06 NOTE — TELEPHONE ENCOUNTER
Refill Approved    Rx renewed per Medication Renewal Policy. Medication was last renewed on 8/17/19.    María Tran, [facility] Triage/Med Refill 2/17/2020     Requested Prescriptions   Pending Prescriptions Disp Refills     fluticasone propionate (FLONASE) 50 mcg/actuation nasal spray [Pharmacy Med Name: FLUTICASONE PROP 50 MCG SPRAY] 16 g 3     Sig: SPRAY 2 SPRAYS INTO EACH NOSTRIL EVERY DAY       Nasal Steroid Refill Protocol Passed - 2/16/2020  5:47 PM        Passed - Patient has had office visit/physical in last 2 years     Last office visit with prescriber/PCP: 12/4/2019 OR same dept: 12/4/2019 Malachi Choudhary MD OR same specialty: 12/4/2019 Malachi Choudhary MD Last physical: 5/1/2019 Last MTM visit: Visit date not found    Next appt within 3 mo: Visit date not found  Next physical within 3 mo: Visit date not found  Prescriber OR PCP: Malachi Choudhary MD  Last diagnosis associated with med order: 1. Nasal polyposis  - fluticasone propionate (FLONASE) 50 mcg/actuation nasal spray [Pharmacy Med Name: FLUTICASONE PROP 50 MCG SPRAY]; SPRAY 2 SPRAYS INTO EACH NOSTRIL EVERY DAY  Dispense: 16 g; Refill: 3     If protocol passes may refill for 12 months if within 3 months of last provider visit (or a total of 15 months).

## 2021-06-06 NOTE — PROGRESS NOTES
2/10/2020    Start time: 1:00 PM    Stop Time: 1:50 PM   Session # 2    Anand Felix is a 34 y.o. male is being seen today for    Chief Complaint   Patient presents with     Mental Health Note   . Major depression, severe   Generalized Anxiety Disorder  Chronic pain syndrome    New symptoms or complaints: None    Functional Impairment:   Personal: 4  Family: 4  Work: 4  Social:4    Clinical assessment of mental status: Anand Felix presented on time.  He was open and cooperative, and dressed appropriately for this session and weather. His memory was in tact .  His  speech was appropriate.  Language was appropriate.  Concentration and focus is on task. Psychosis is not noted or reported. He reports his mood is depressed .  Affect is congruent with speech.  Fund of knowledge is adequate. Insight is adequate for therapy.     Suicidal/Homicidal Ideation present: Patient reports SI. He denies plan or means. He agrees to follow safety plan to call 911, go to the ER for evaluation and call the crisis connection if he is feeling unsafe or symptoms worsen. He identifies protective factors.     Patient's impression of their current status: Patient reported he continues to be working on addressing symptoms of depression and anxiety. He reports continued difficulty with physical weakness and fatigue. He reports continued SI, and reports these thoughts have increased lately. He reports engaging in hobbies/activities with more regularity.     Therapist impression of patients current state: Patient seems to be working on addressing depression, anger, anxiety and chronic medical symptoms. He reports pain symptoms and weakness have been difficult to manage and interferes with being as active as he would like. However, he reports he is making an effort to be engaged in hobbies/activities on more of a regular basis. Further, he reports engaging in exercise as he is able. He is following up with medical providers to further address other  medical concerns. Continued to discuss changes in relationships/social life that have occurred since he has been struggling with chronic pain/medical issues.  He discussed social needs at this time and how he has been making some more effort to be engaged with family. Encouraged patient to continue to increase efforts to reach out to social/family support to connect socially/emotionally as he is able. He struggles with identity/adjustment since he has had significant health changes in recent years. Engaged in values clarification.  He and his wife are working on future housing needs, but are staying with his sister at this time. He reports [employer] attendance as he is able. He continues to work on addressing acceptance of his chronic pain and medical concerns, with some progress and challenges. Discussed negative cognitions related to physical abilities and will further address with EMDR in a future session. Further, discussed positives and abilities he has at this time. He continues to work on sleep improvement, which continues to be challenging.  Patient reports suicidal ideation persists,  but denies immediate plan or means at this time. Discussed ways to cope. Continued to work on identifying positives in session. Continued to discuss benefits of Mindfulness meditation with patient. He reports regular meditation practice and grounding. Presented grounding exercise in session and he seemed to benefit.      Type of psychotherapeutic technique provided: Client centered, CBT and Mindfulness    Progress toward short term goals: Patient continues to work on increasing coping skills to manage chronic pain and mental health. Patient continues medical care as recommended by his medical providers. He engages in physical activity and social activity as he is able. He is working on values clarification. He is engaging in meditation/grounding in daily living.  Patient signed/agreed to treatment plan this date.     Review of  long term goals: Patient signed/agreed to treatment plan this date.     Diagnosis: Major depression, severe   Generalized Anxiety Disorder  Chronic pain syndrome    Plan and Follow up: Follow pain center plan of care. Patient to follow all medical recommendations. Patient to schedule every 1-2 weeks for psychotherapy to further address depression, anxiety and chronic pain concerns. Continue meditation, affirmations and other healthy coping strategies. Practice meditations as presented in session. Further address adjustment to role/identity due to chronic pain and health concerns. Continue to work on healthy communication skills/relationship building. Continue EMDR as discussed. Continue working on future goals. Continue work on suicide prevention worksheet in session. Follow safety plan.       Discharge Criteria/Planning: Patient will continue with follow-up until therapy can be discontinued without return of signs and symptoms.    Alize Livingston 2/10/2020

## 2021-06-06 NOTE — PROGRESS NOTES
"ACUPUNCTURIST TREATMENT NOTE    Name: Anand Felix  :  1985  MRN:  [id number]      Acupuncture Treatment  Patient Type: JN Pain  Intervention Reason: Pain;Headache  Pre-session Pain Rating: 3  Post-session Pain Ratin  Pre-session Headache Ratin  Post-session Headache Ratin  Patient complaint:: Patient is seen for 11th treatment today. He reports low back pain levels have been generally pretty mild, although they can fluctuate if he does not stay consistent with his medication. Low back and LLE pain 3/10 today. He reports having some worse headaches this week, today rating 5/10 in frontal and occipital area. Sleep is \"okay\" with four solid hours in a row per night. He is still feeling very fatigued during the daytime. He plans to discuss this with his PCP at next appointment to see if any medications may be contributing.  Acupuncture (Points):: Liv3, Ub62, Ub60, Ki3, Sp6, Gb34, Li4, Si3, Baihui, [place], Gb20, Ub10, Anmian, Gb21, Ub17, Ub18, Ub19, Ub20, Ub23, Du4, Shiqizhuixia, HTJJ L1-L5 chasing the dragon, Shahidan. Right: Ub31-34, Ub53, Ub54, Gb30, Jiankua, Gb29, Ub40, Sp9, Ub57  E-Stim: micro current 2 x 100hz (R) HTJJ L1-Ub34, (R) Yaoyan-Gb30  TCM Diagnosis:  wind cold bi syndrome, spleen qi defiency w/damp, liver/kidney yin deficiency  Practitioner Observed: 30 minute treatment. Infrared heat lamp over low back. Cupping and tuina to back with posumon.  Checklist: Progress Note Completed;Consent Reveiwed  Follow up comments: Patient relaxed well during treatment.    \"Risks and benefits of acupuncture were discussed with patient. Consent for treatment was given. We thank you for the referral.\"     Nu Nieves L.Ac.    Date:  2020  Time:  1:46 PM    "

## 2021-06-06 NOTE — PROGRESS NOTES
2/24/2020    Start time: 1:00 PM    Stop Time: 1:50 PM   Session # 3    Anand Felix is a 34 y.o. male is being seen today for    Chief Complaint   Patient presents with     Mental Health Note   . Major depression, severe   Generalized Anxiety Disorder  Chronic pain syndrome    New symptoms or complaints: None    Functional Impairment:   Personal: 4  Family: 4  Work: 4  Social:4    Clinical assessment of mental status: Anand Felix presented on time.  He was open and cooperative, and dressed appropriately for this session and weather. His memory was in tact .  His  speech was appropriate.  Language was appropriate.  Concentration and focus is on task. Psychosis is not noted or reported. He reports his mood is depressed.  Affect is congruent with speech.  Fund of knowledge is adequate. Insight is adequate for therapy.     Suicidal/Homicidal Ideation present: Patient reports SI. He denies plan or means. He agrees to follow safety plan to call 911, go to the ER for evaluation and call the crisis connection if he is feeling unsafe or symptoms worsen. He identifies protective factors.     Patient's impression of their current status: Patient reported he continues to be working on addressing symptoms of depression and anxiety. He reports continued difficulty with physical weakness and fatigue. He reports continued SI. He reports engaging in hobbies/activities with more regularity.      Therapist impression of patients current state: Patient seems to be working on addressing depression, anger, anxiety and chronic medical symptoms. He reports pain symptoms and weakness have been difficult to manage and interferes with being as active as he would like. However, he reports he is making an effort to be engaged in hobbies/activities on a regular basis. Further, he reports engaging in exercise as he is able. He is following up with medical providers to further address other medical concerns. Continued to discuss changes in  relationships/social life that have occurred since he has been struggling with chronic pain/medical issues.  He discussed social needs at this time and how he has been making some more effort to be engaged with family. Encouraged patient to continue to increase efforts to reach out to social/family support to connect socially/emotionally as he is able. He struggles with identity/adjustment since he has had significant health changes in recent years. He also discussed some history regarding physical health issues earlier in life. Engaged in values clarification and discussion on finding purpose.  He and his wife are working on future housing needs, but are staying with his sister at this time. He reports [employer] attendance as he is able. He continues to work on addressing acceptance of his chronic pain and medical concerns, with some progress and challenges. Discussed negative cognitions related to physical abilities and will further address with EMDR in a future session. Further, discussed positives and abilities he has at this time. He continues to work on sleep improvement, which continues to be challenging.  Patient reports suicidal ideation persists,  but denies immediate plan or means at this time. Discussed ways to cope. Continued to work on identifying positives in session. Continued to discuss benefits of Mindfulness meditation with patient. He reports regular meditation practice and grounding. Presented meditation exercise in session and he seemed to benefit.      Type of psychotherapeutic technique provided: Client centered, CBT and Mindfulness    Progress toward short term goals: Patient continues to work on increasing coping skills to manage chronic pain and mental health. Patient continues medical care as recommended by his medical providers. He engages in physical activity and social activity as he is able. He is working on values clarification. He is engaging in meditation/grounding in daily living.          Review of long term goals: Not done at today's visit. Last review: 2/10/20    Diagnosis: Major depression, severe   Generalized Anxiety Disorder  Chronic pain syndrome    Plan and Follow up: Follow pain center plan of care. Patient to follow all medical recommendations. Patient to schedule every 1-2 weeks for psychotherapy to further address depression, anxiety and chronic pain concerns. Continue meditation, affirmations and other healthy coping strategies. Practice meditations as presented in session. Further address adjustment to role/identity due to chronic pain and health concerns. Continue to work on healthy communication skills/relationship building. Continue EMDR as discussed. Continue working on future goals. Continue work on suicide prevention worksheet in session. Follow safety plan.       Discharge Criteria/Planning: Patient will continue with follow-up until therapy can be discontinued without return of signs and symptoms.    Alize Livingston 2/24/2020

## 2021-06-06 NOTE — PROGRESS NOTES
Assessment/Plan:    1. Nonintractable episodic headache, unspecified headache type  Ongoing management of headaches, chronic.  Trying to use Maxalt sparingly.  Propranolol 80 mg 3 times daily and Depakote extended release 500 mg daily continues.  Correlation with frontal sinusitis likely.  Anticipate improvement following upcoming sinus surgery April 13, 2020.  Patient will schedule for preoperative clearance April 8, 2020 as noted.    2. Chronic frontal sinusitis  As above.  Followed by Dr. Wright.    3. Mucous retention cyst of maxillary sinus  Retention cyst in the left maxillary sinus versus polyp.    4. Chronic insomnia  Describes chronic insomnia.  Requested trial of sleep aid other than melatonin or Tylenol PM etc.  Zolpidem 5 mg at bedtime as needed for sleep was provided and will reassess at upcoming preop exam April 8, 2020 to ensure desired benefits.  - zolpidem (AMBIEN) 5 MG tablet; Take 1 tablet (5 mg total) by mouth at bedtime as needed for sleep.  Dispense: 30 tablet; Refill: 2          Subjective:    Anand Felix is seen today for follow-up evaluation.  Chronic headaches.  Recurrent concerns.  Better with propranolol 80 mg 3 times daily after switching from atenolol.  Continues Depakote extended release 500 mg daily typically in the morning.  Told to use Maxalt sparingly.  Does have chronic frontal sinusitis and question of left maxillary retention cyst versus polyp.  Sinus surgery scheduled for April 13, 2020 and was instructed that this should help headache frequency and severity etc.  Does have chronic insomnia.  Tried melatonin.  Would like prescription sleep aid to have available as needed.  Denies recent illness.  Comprehensive review of systems as above otherwise all negative.     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 Bernal follows  Children - none  Employed - working on disability - monoparesis  Surgeries - nasal surgery; low back surgery (Dr. Jeannine Hernandez,  "2015) with residual right leg weakness  Tobacco use - none  ETOH - none  Family hx   Mother - living - high chol. Strokes. MI.   Father - living - unknown  Siblings - living - 4 sisters, 2 brother -    Bilateral AFO braces worn due to muscle weakness and foot drop    Past Surgical History:   Procedure Laterality Date     BACK SURGERY       MINIMALLY INVASIVE MICRODISCECTOMY LUMBAR SPINE Right 10/2015    L5-S1; Dr. Bettie Hernandez     [place] NASAL/SINUS ENDOSCOPY,BX/RMV POLYP/DEBRID Right 5/22/2019    Procedure: RIGHT ENDOSCOPIC SINUS SURGERY;  Surgeon: Leo Wright MD;  Location: [facility];  Service: ENT     SINUS SURGERY  2014    Dr. Wright        Family History   Problem Relation Age of Onset     Depression Mother      Hyperlipidemia Mother      Other Mother         \"Ulcers\" - Aches and pains throughout her body.     Cancer Father      No Medical Problems Sister      No Medical Problems Brother      No Medical Problems Sister      No Medical Problems Sister      No Medical Problems Sister      No Medical Problems Brother      Thyroid disease Maternal Grandmother      Thyroid disease Cousin         Past Medical History:   Diagnosis Date     Anxiety      Bulging lumbar disc      Depression      History of anesthesia complications     Was hospitalized in October 2015 for weakness     History of blood clots     PE in 2017     Lumbar herniated disc 10/23/2015     Lumbar radiculopathy 11/25/2015     Shortness of breath      Tachycardia     After PE in         Social History     Tobacco Use     Smoking status: Never Smoker     Smokeless tobacco: Never Used   Substance Use Topics     Alcohol use: Yes     Comment: Monthly or less often. 1-2 drinks at a time.     Drug use: Yes     Types: Marijuana        Current Outpatient Medications   Medication Sig Dispense Refill     acetaminophen (TYLENOL) 500 MG tablet Take 1 tablet (500 mg total) by mouth every 6 (six) hours as needed for pain.  0     cholecalciferol, " vitamin D3, 2,000 unit cap Take 2,000 Units by mouth daily.       divalproex (DEPAKOTE ER) 500 MG 24 hour tablet        fluticasone propionate (FLONASE) 50 mcg/actuation nasal spray SPRAY 2 SPRAYS INTO EACH NOSTRIL EVERY DAY 16 g 3     lidocaine (LIDODERM) 5 % Remove & Discard patch within 12 hours or as directed by MD 15 patch 0     NON FORMULARY              omeprazole (PRILOSEC) 20 MG capsule TAKE 1 CAPSULE (20 MG TOTAL) BY MOUTH DAILY. 90 capsule 3     propranolol (INDERAL) 80 MG tablet Take 1 tablet (80 mg total) by mouth 3 (three) times a day. 60 tablet 5     rivaroxaban (XARELTO) 15 mg tablet Take 1 tablet (15 mg total) by mouth 2 (two) times a day with meals. For 21 days. Then switch to the 20 mg tablet daily. 42 tablet 0     rivaroxaban (XARELTO) 20 mg tablet Take 1 tablet (20 mg total) by mouth daily. Start 20 mg daily after the 15 mg daily for 21 days. 30 tablet 11     rizatriptan (MAXALT-MLT) 10 MG disintegrating tablet DISSOLVE 1 TABLET ON TONGUE AS NEEDED FOR MIGRAINE. MAY REPEAT IN 2HRS IF NEEDED 12 tablet 6     gabapentin (NEURONTIN) 100 MG capsule Take 100 mg by mouth 2 (two) times a day as needed. 60 capsule 1     montelukast (SINGULAIR) 10 mg tablet Take 2 tablets (20 mg total) by mouth at bedtime. 30 tablet 1     oxyCODONE-acetaminophen (PERCOCET/ENDOCET) 7.5-325 mg per tablet Take 1 tablet by mouth 3 (three) times a day as needed for pain. 84 tablet 0     tiZANidine (ZANAFLEX) 4 MG tablet TAKE 1 TABLET BY MOUTH EVERY 6 HOURS AS NEEDED 90 tablet 1     zolpidem (AMBIEN) 5 MG tablet Take 1 tablet (5 mg total) by mouth at bedtime as needed for sleep. 30 tablet 2     No current facility-administered medications for this visit.           Objective:    Vitals:    03/04/20 1259   BP: 110/78   Pulse: 88   SpO2: 96%   Weight: 154 lb (69.9 kg)      Body mass index is 25.63 kg/m .    Alert.  No apparent distress.  Nasopharynx and oropharynx moist mucous membranes.  Neck supple.  Chest clear.  Cardiac exam  regular.      EXAM DATE:         12/11/2019  EXAM: CT SINUSES WITHOUT CONTRAST  LOCATION: [facility]  DATE/TIME: 12/11/2019, 12:00 PM     INDICATION: Frontal sinus mucocele, headache.  COMPARISON: 3/22/19  TECHNIQUE: Routine without contrast. Multiplanar reformats. Dose reduction  techniques were used.     FINDINGS:     FRONTAL SINUSES:  The entire left frontal sinus is now again completely  opacified. This has progressed from CT 03/22/2019 and has a more similar  appearance to sinus CT 01/03/2018. The peripherally marginated supraorbital left  ethmoid locule or mucocele remains opacified and is stable from prior. No  definite intraorbital or intracranial extension of these changes. Right frontal  sinus remains clear.     ETHMOID SINUSES:  Again noted are partial prior ethmoidectomies. Some clearing  of the mucosal thickening in the remaining right ethmoid air cells in the  interval.     SPHENOID SINUSES: Stable opacification right sphenoid sinus. Clearing along the  floor of the left sphenoid sinus.     MAXILLARY SINUSES: Significant clearing of the right maxillary sinus. Small  amount of polypoid mucosal thickening remains along the floor of the right  maxillary sinus. Relatively stable polypoid mucosal thickening inferior left  maxillary sinus. The floors of the maxillary sinuses are intact.     NASAL CAVITY/SKULL BASE: Stable leftward bowing of the intact nasal septum.  Clearing of the polypoid soft tissue lesion previously filling the right nasal  cavity back to the right nasal choana in the interval. The cribriform plate is  intact and symmetric. The anterior clinoid processes are not pneumatized.     PARANASAL SINUS DRAINAGE PATHWAYS:  Clearing of the right ostiomeatal unit since  prior study. Mild mucosal thickening left ostiomeatal unit unchanged. Stable  narrowed bilateral frontoethmoidal recesses. Sphenoethmoidal recesses patent.     NON-SINUS STRUCTURES: No abnormality of the visualized  orbits or intracranial  compartment. Grossly normal temporomandibular joints.     IMPRESSION:  1.  The entire left frontal sinus is now again completely opacified, progressed  from CT 03/22/2019. Stable opacification of the associated peripherally  marginated supraorbital left ethmoid locule or mucocele. No intraorbital or  intracranial extension of these findings. Right frontal sinus clear.  2.  Significant clearing of the right maxillary sinus and adjacent right  ostiomeatal unit in the interval. Small amount of residual polypoid mucosal  thickening inferior right maxillary sinus noted with stable retention cyst or  polyp inferior left maxillary sinus.  3.  Partial clearing of the right ethmoid air cells since prior study with  stable changes from prior partial ethmoidectomies.  4.  Significant clearing of the polypoid soft tissue filling the right nasal  cavity back to the nasal choana in the interval.     DICOM format image data is available to non-affiliated external healthcare  facilities or entities on a secure, media-free, reciprocally searchable basis  with patient authorization for at least a 12-month period after the study.      This note has been dictated using voice recognition software and as a result may contain minor grammatical errors and unintended word substitutions.

## 2021-06-07 NOTE — PATIENT INSTRUCTIONS - HE
Plan:   Interventions-    Follow up in 8 weeks     Ok to continue the use of the percocet- 7.5/325 mg up to 3 per day      Ok to continue the use of the medical cannabis      Continue the use of magnesium at bedtime    Continue seeing providers as you are.       Ok to stop the use of the Singulair at this time as you are not noticing a difference in your pain.    This limited telehealth encounter is due to the Covid 19,  as required by the governmental agencies at this time due to risk of transmission of the pandemic, therefore testing availability is limited as well as physical exams.      Prescription Drug Management will be continued by the [facility]    Orders placed today  Medications that were ordered today   Requested Prescriptions     Signed Prescriptions Disp Refills     gabapentin (NEURONTIN) 100 MG capsule 56 capsule 1     Sig: Take 100 mg by mouth 2 (two) times a day as needed.     oxyCODONE-acetaminophen (PERCOCET/ENDOCET) 7.5-325 mg per tablet 84 tablet 0     Sig: Take 1 tablet by mouth 3 (three) times a day as needed for pain.     No orders of the defined types were placed in this encounter.    The patient understand todays plan and has their questions answered in regards to expectations and current treatment plan.     Prevent unexpected access/loss of medication: Keep medication locked. Only carry what you need with you    The plan of care will be adjusted to accommodate the issues discussed, discussing management of their care and follow up that is recommended. 3/27/2020         Dayna Lundberg CNP  [facility]  [address]  [place] [address]  Ph: [phone]  Fax: [phone]

## 2021-06-07 NOTE — PROGRESS NOTES
"4/3/2020    Start time: 2:05 PM    Stop Time: 2:55 PM   Session # 5      Anand Felix is a 35 y.o. male who is being evaluated via a billable telephone visit.      Major depression, severe   Generalized Anxiety Disorder  Chronic pain syndrome    The patient has been notified of following:     \"This telephone visit will be conducted via a call between you and your provider. This service lets us provide the care you need with a phone conversation.      If during the course of the call the provider feels a telephone visit is not appropriate, you will not be charged for this service.\"     New symptoms or complaints: None    Functional Impairment:   Personal: 4  Family: 4  Work: 4  Social:4    Clinical assessment of mental status: Anand Felix presented on time.  He was open and cooperative. His memory was in tact .  His  speech was appropriate.  Language was appropriate.  Concentration and focus is on task. Psychosis is not noted or reported. He reports his mood is depressed .  Affect is congruent with speech.  Fund of knowledge is adequate. Insight is adequate for therapy.     Suicidal/Homicidal Ideation present: Patient reports SI. He denies plan or means. He agrees to follow safety plan to call 911, go to the ER for evaluation and call the crisis connection if he is feeling unsafe or symptoms worsen. He identifies protective factors.     Patient's impression of their current status: Patient reported he continues to be working on addressing symptoms of depression and anxiety. He reports continued difficulty with physical weakness and fatigue. He reports continued SI, but that it has been less intense lately. He reports engaging in hobbies/activities regularly. He reports he is getting better sleep since a recent medication change.     Therapist impression of patients current state: Patient seems to be working on addressing depression, anger, anxiety and chronic medical symptoms. He reports pain symptoms and weakness have " been difficult to manage and interferes with being as active as he would like. However, he reports he is making an effort to be engaged in hobbies/activities on a regular basis. Further, he reports engaging in exercise as he is able.He states this has helped with mood improvement. He is following up with medical providers to further address other medical concerns. He was scheduled for sinus surgery this month, but that has been cancelled due to COVID-19 precautions. Continued to discuss changes in relationships/social life that have occurred since he has been struggling with chronic pain/medical issues.  He discussed social needs at this time and how he has been making some more effort to be engaged with family. Yet he is concerned with how his role has changed in the family. Encouraged patient to continue to increase efforts to reach out to social/family support to connect socially/emotionally as he is able. Engaged in values clarification and discussion on finding purpose.  He and his wife are working on future housing needs, but are staying with his sister at this time. He reports he is unable to attend [employer], due to COVID-19 precautions, however, he is engaging in prayer. Suggested connecting online with [employer], if interested. He continues to work on addressing acceptance of his chronic pain and medical concerns, with some progress and challenges. Discussed negative cognitions related to physical abilities and will further address with EMDR in a future session. Further, discussed positives and abilities he has at this time. He continues to work on sleep improvement and he reports a recent medication change has helped. Patient reports suicidal ideation persists, but is less intense compared to last visit. He denies immediate plan or means at this time. Discussed ways to cope. Continued to work on identifying positives in session. Continued to discuss benefits of Mindfulness meditation with patient. He reports  regular meditation practice and grounding.     Type of psychotherapeutic technique provided: Client centered, CBT and Mindfulness    Progress toward short term goals: Patient continues to work on increasing coping skills to manage chronic pain and mental health. Patient continues medical care as recommended by his medical providers. He engages in physical activity and social activity as he is able. He is working on values clarification. He is engaging in meditation/grounding/prayer in daily living.       Review of long term goals: Not done at today's visit. Last review: 2/10/20    Diagnosis: Major depression, severe   Generalized Anxiety Disorder  Chronic pain syndrome    Plan and Follow up: Follow pain center plan of care. Patient to follow all medical recommendations. Patient to schedule every 1-2 weeks for psychotherapy to further address depression, anxiety and chronic pain concerns. He understands that future visits will be by telephone, due to COVID-19 precautions, until further notice. Continue meditation, affirmations and other healthy coping strategies. Practice meditations as presented in session. Further address adjustment to role/identity due to chronic pain and health concerns. Continue to work on healthy communication skills/relationship building. Continue EMDR as discussed, as appropriate. Continue working on future goals. Continue work on suicide prevention worksheet in session. Follow safety plan.      Discharge Criteria/Planning: Patient will continue with follow-up until therapy can be discontinued without return of signs and symptoms.    Alize Livingston 4/3/2020

## 2021-06-07 NOTE — PROGRESS NOTES
"Anand Felix is a 35 y.o. male who is being evaluated via a billable telephone visit.      Start time- 1:13 pm  End time- 1:24 pm    The patient has been notified of following:     \"This telephone visit will be conducted via a call between you and your physician/provider. We have found that certain health care needs can be provided without the need for a physical exam.  This service lets us provide the care you need with a short phone conversation.  If a prescription is necessary we can send it directly to your pharmacy.  If lab work is needed we can place an order for that and you can then stop by our lab to have the test done at a later time.    If during the course of the call the physician/provider feels a telephone visit is not appropriate, you will not be charged for this service.\"     Anand Felix complains of    Chief Complaint   Patient presents with     Back Pain     Neck Pain     Leg Pain       I have reviewed and updated the patient's Past Medical History, Social History, Family History and Medication List as well as the Precharting workup by the [facility] [address] [facility].       PAIN CLINIC FOLLOW UP PROGRESS NOTE    CC:  Chief Complaint   Patient presents with     Back Pain     Neck Pain     Leg Pain       HPI  Anand Felix is a 35 y.o. male who presents for evaluation   Chief Complaint   Patient presents with     Back Pain     Neck Pain     Leg Pain    that is causing continued pain. Specific questions indicated the patient wanted addressed today include: needs medication refills. He notes that he is struggling with a headaches and is following along with ENT.       Major issues:  1. Chronic pain syndrome    2. Arthralgias In Multiple Sites    3. Myalgia    4. Cervicalgia      Pain score is 4/10  Constant or intermittent ----constant  What does your pain like feel during a flare? Burning, tightness, sharp, weakness  Does the pain interfere with:  Work ----- yes  Walking ------ yes  Sleep ------- yes  Daily activities " ------yes  Relationships -------yes    ALLERGIES  Patient has no known allergies.    Previous Medical History  Social History     Substance and Sexual Activity   Alcohol Use Yes    Comment: Monthly or less often. 1-2 drinks at a time.     Social History     Substance and Sexual Activity   Drug Use Yes     Types: Marijuana     Social History     Tobacco Use   Smoking Status Never Smoker   Smokeless Tobacco Never Used       Pertinent Pain Medications/interventions:  He currently belbuca 300 mcg two times a day, gabapentin 100 mg three times a day, oxycodone 5 mg two times a day prn and medical cannabis, tizanidine and Norco.      Belbuca- headaches are associated so he stopped taking this. 9/6     Previously taking Belbuca 75 mcg, gabapentin 100 mg three times a day, oxycodone 5 mg up to two times a day as needed, medical cannabis, baclofen and flexeril      RFA did provide him with 80 % relief.      Medical cannabis    Review of Systems:  12 point systems were reviewed with pt as documented on on previous exams. Any new diagnosis or new medication is reviewed today.     Medications    Current Outpatient Medications:      acetaminophen (TYLENOL) 500 MG tablet, Take 1 tablet (500 mg total) by mouth every 6 (six) hours as needed for pain., Disp: , Rfl: 0     cholecalciferol, vitamin D3, 2,000 unit cap, Take 2,000 Units by mouth daily., Disp: , Rfl:      divalproex (DEPAKOTE ER) 500 MG 24 hour tablet, , Disp: , Rfl:      fluticasone propionate (FLONASE) 50 mcg/actuation nasal spray, SPRAY 2 SPRAYS INTO EACH NOSTRIL EVERY DAY, Disp: 16 g, Rfl: 3     montelukast (SINGULAIR) 10 mg tablet, Take 2 tablets (20 mg total) by mouth at bedtime., Disp: 30 tablet, Rfl: 1     NON FORMULARY,    , Disp: , Rfl:      omeprazole (PRILOSEC) 20 MG capsule, TAKE 1 CAPSULE (20 MG TOTAL) BY MOUTH DAILY., Disp: 90 capsule, Rfl: 3     propranolol (INDERAL) 80 MG tablet, Take 1 tablet (80 mg total) by mouth 3 (three) times a day., Disp: 60  tablet, Rfl: 5     rivaroxaban (XARELTO) 20 mg tablet, Take 1 tablet (20 mg total) by mouth daily. Start 20 mg daily after the 15 mg daily for 21 days., Disp: 30 tablet, Rfl: 11     rizatriptan (MAXALT-MLT) 10 MG disintegrating tablet, DISSOLVE 1 TABLET ON TONGUE AS NEEDED FOR MIGRAINE. MAY REPEAT IN 2HRS IF NEEDED, Disp: 12 tablet, Rfl: 6     zolpidem (AMBIEN) 5 MG tablet, Take 1 tablet (5 mg total) by mouth at bedtime as needed for sleep., Disp: 30 tablet, Rfl: 2     [START ON 4/8/2020] gabapentin (NEURONTIN) 100 MG capsule, Take 100 mg by mouth 2 (two) times a day as needed., Disp: 56 capsule, Rfl: 1     [START ON 4/15/2020] oxyCODONE-acetaminophen (PERCOCET/ENDOCET) 7.5-325 mg per tablet, Take 1 tablet by mouth 3 (three) times a day as needed for pain., Disp: 84 tablet, Rfl: 0    Lab:  Last UDS on 2/2019 had expected results. Last UDT on file was reviewed.    Imaging:  No new imaging reviewed with patient over the phone, no new orders placed       Recent   Dated 3/27/2020 was reviewed with the patient today.   Paper copy reviewed     Assessment:     Anand Felix is a 35 y.o. male seen in clinic today for   Chief Complaint   Patient presents with     Back Pain     Neck Pain     Leg Pain     They are here for follow up and continued medical management of their pain. Today we reviewed the plan of care for their chronic pain and determined that the patient will need a refill of the current prescription.      Due to the Covid 19 precautions all visits are converted to telephone encounters until the government restrictions are lifted and the precautions have been lifted.     Any new diagnosis since the last visit- denies just recent flare ups     Any new prescriptions since the last visit- reports takes a new sleeping medications. Which seems to be helping- sleeps about 6-7 hours a night.      Patient denies side effects from the current regimen- nothing new.     Plan of care going forward for ongoing pain control-  medical cannabis, gabapentin 100 mg two times a day, percocet three times a day prn.     Patients current MME is 33.75    New concerns today- non, pain is manageable but notes the headaches need to be resolved, seeing ENT.     Plan:   Interventions-    Follow up in 8 weeks     Ok to continue the use of the percocet- 7.5/325 mg up to 3 per day      Ok to continue the use of the medical cannabis      Continue the use of magnesium at bedtime    Continue seeing providers as you are.  Possible surgery planned with ENT     Ok to stop the use of the Singulair at this time as you are not noticing a difference in your pain.    This limited telehealth encounter is due to the Covid 19,  as required by the governmental agencies at this time due to risk of transmission of the pandemic, therefore testing availability is limited as well as physical exams.      Prescription Drug Management will be continued by the [facility]    Orders placed today  Medications that were ordered today   Requested Prescriptions     Signed Prescriptions Disp Refills     gabapentin (NEURONTIN) 100 MG capsule 56 capsule 1     Sig: Take 100 mg by mouth 2 (two) times a day as needed.     oxyCODONE-acetaminophen (PERCOCET/ENDOCET) 7.5-325 mg per tablet 84 tablet 0     Sig: Take 1 tablet by mouth 3 (three) times a day as needed for pain.     No orders of the defined types were placed in this encounter.      The patient understand todays plan and has their questions answered in regards to expectations and current treatment plan.     Prevent unexpected access/loss of medication: Keep medication locked. Only carry what you need with you    The plan of care will be adjusted to accommodate the issues discussed, discussing management of their care and follow up that is recommended. 3/27/2020       Dayna Lundberg CNP  [facility]  [address]  [place] [address]  Ph: [phone]  Fax:  [phone]

## 2021-06-07 NOTE — PROGRESS NOTES
D) Called patient to offer a telephone visit during their next scheduled appointment time on 3/20/20 at 11 AM, due to the precautions related to COVID-19. Left phone number to the clinic to call back if they would like to have a telephone visit.

## 2021-06-07 NOTE — PROGRESS NOTES
3/20/2020    Start time: 11:00 AM    Stop Time: 11:50 AM   Session # 4    Anand Felix is a 35 y.o. male is being seen today for    Chief Complaint   Patient presents with     Mental Health Note   . Major depression, severe   Generalized Anxiety Disorder  Chronic pain syndrome    New symptoms or complaints: None    Functional Impairment:   Personal: 4  Family: 4  Work: 4  Social:4    Clinical assessment of mental status: Anand Felix presented on time.  He was open and cooperative, and dressed appropriately for this session and weather. His memory was in tact .  His  speech was  appropriate.  Language was appropriate.  Concentration and focus is on task. Psychosis is not noted or reported. He reports his mood is depressed .  Affect is congruent with speech.  Fund of knowledge is adequate. Insight is adequate for therapy.     Suicidal/Homicidal Ideation present: Patient reports SI. He denies plan or means. He agrees to follow safety plan to call 911, go to the ER for evaluation and call the crisis connection if he is feeling unsafe or symptoms worsen. He identifies protective factors.     Patient's impression of their current status: Patient reported he continues to be working on addressing symptoms of depression and anxiety. He reports continued difficulty with physical weakness and fatigue. He reports continued SI that has been more difficulty to manage lately. He reports engaging in hobbies/activities regularly. He reports he is scheduled for surgery next month.       Therapist impression of patients current state: Patient seems to be working on addressing depression, anger, anxiety and chronic medical symptoms. He reports pain symptoms and weakness have been difficult to manage and interferes with being as active as he would like. However, he reports he is making an effort to be engaged in hobbies/activities on a regular basis. Further, he reports engaging in exercise as he is able. He is following up with medical  providers to further address other medical concerns. He is schedule for sinus surgery on 4/13/2020. Continued to discuss changes in relationships/social life that have occurred since he has been struggling with chronic pain/medical issues.  He discussed social needs at this time and how he has been making some more effort to be engaged with family. Yet he is concerned with how his role has changed in the family. Encouraged patient to continue to increase efforts to reach out to social/family support to connect socially/emotionally as he is able. Engaged in values clarification and discussion on finding purpose.  He and his wife are working on future housing needs, but are staying with his sister at this time. He reports Gnosticist attendance as he is able. He continues to work on addressing acceptance of his chronic pain and medical concerns, with some progress and challenges. Discussed negative cognitions related to physical abilities and will further address with EMDR in a future session. Further, discussed positives and abilities he has at this time. He continues to work on sleep improvement, which continues to be challenging.  Patient reports suicidal ideation persists and symptoms have worsened,  but denies immediate plan or means at this time. Discussed ways to cope. Continued to work on identifying positives in session. Continued to discuss benefits of Mindfulness meditation with patient. He reports regular meditation practice and grounding. Presented grounding exercise in session and he seemed to benefit.      Type of psychotherapeutic technique provided: Client centered, CBT and Mindfulness    Progress toward short term goals:Patient continues to work on increasing coping skills to manage chronic pain and mental health. Patient continues medical care as recommended by his medical providers. He engages in physical activity and social activity as he is able. He is working on values clarification. He is engaging  in meditation/grounding in daily living.      Review of long term goals: Not done at today's visit. Last review: 2/10/20    Diagnosis: Major depression, severe   Generalized Anxiety Disorder  Chronic pain syndrome    Plan and Follow up: Follow pain center plan of care. Patient to follow all medical recommendations. Patient to schedule every 1-2 weeks for psychotherapy to further address depression, anxiety and chronic pain concerns. He understands that future visits will be by telephone, due to COVID-19 precautions, until further notice. Continue meditation, affirmations and other healthy coping strategies. Practice meditations as presented in session. Further address adjustment to role/identity due to chronic pain and health concerns. Continue to work on healthy communication skills/relationship building. Continue EMDR as discussed. Continue working on future goals. Continue work on suicide prevention worksheet in session. Follow safety plan.      Discharge Criteria/Planning: Patient will continue with follow-up until therapy can be discontinued without return of signs and symptoms.    Alize Livingston 3/20/2020

## 2021-06-08 ENCOUNTER — HOSPITAL ENCOUNTER (OUTPATIENT)
Dept: PALLIATIVE MEDICINE | Facility: OTHER | Age: 36
Discharge: HOME | End: 2021-06-08
Attending: COUNSELOR
Payer: COMMERCIAL

## 2021-06-08 NOTE — TELEPHONE ENCOUNTER
Medication being requested: singular  Last visit date: 3/27 Provider: CAM  Next visit date: 5/18 Provider: CAM  Expected follow up: 8  MTM visit (Pain Center) date: no  Pertinent between visit information about requested medication (telephone, mychart, prior authorization, concerns): cx ENT appt  Last date prescribed: 3/18  Provider responsible: CAM  Spoke with patient: no  Script being sent to provider - dates and quantity: always quantity of 30 can take 2/d with one refill  Requested Prescriptions     Pending Prescriptions Disp Refills     montelukast (SINGULAIR) 10 mg tablet [Pharmacy Med Name: MONTELUKAST SOD 10 MG TABLET] 30 tablet 1     Sig: TAKE 2 TABLETS (20 MG TOTAL) BY MOUTH AT BEDTIME.       Pharmacy cued: [facility]

## 2021-06-08 NOTE — PROGRESS NOTES
MENTAL HEALTH VISIT NOTE    Date of Service: 1/9/2017    Start time:3:00 pm  Stop time:4:07 pm  This is session number 1 this calendar year.  The patient was seen for individual psychotherapy    No  was required because the patient speaks and understands English.  Anand Felix is a 31 y.o. male is being seen today for individual psychotherapy to address the following:    Chief Complaint   Patient presents with     Depression     Anxiety     Pain   .     NECESSITY: This individual session was necessary for the care of the patient to address difficulties in psychosocial functioning that are affected by and affect the patient's ability to cope with and heal from spinal conditions and are affected by his mental health diagnosis listed in the diagnosis section below.    New symptoms or complaints: None    Functional Impairment (0-4):   Personal: 3  Family: 1  Work: 4  Social:2    Clinical Assessment of Mental Status  Mood: Sad    Affect:   Congruent with content of speech    Appearance:  well groomed,, casually-dressed,  and engaged,    Speech:  Soft    Orientation:   Oriented to person, time, and place    Memory:  No evidence of impairment.    Concentration:  Within normal limits    Focus:   Within normal limits    Fund of knowledge:  adequate    Suicidal Ideation present:   Absent  Suicidal Risk Assessment:  No specific plan to harm self  No intention or plan.  Patient is aware of resources available if he experiences suicidal ideation.      Homicidal Risk Assessment:   None reported  No crisis plan required due to absence of risk.    Patient's impression of their current status:  Patient believes symptoms are improving overall.  Patient reports improvements in functioning in the following area: , social, sleep and Patient believes psychotherapy sessions continue to be helpful in maintaining skills and insight acquired over past sessions.     Therapist impression of patient's current status:  Symptoms  are  improving overall.  Patient has experienced improvements in functioning in the following area:  vocational - he is considering vocational rehab [facility]     Type of psychotherapeutic technique provided:  Cognitive Behavioral Therapy  Client-centered therapy  Techniques used:  clinical hypnosis for decreasing attention to sensation , motivational interviewing    Response to psychotherapeutic interventions:  Patient responded to interventions in the following manner: Accepting    Progress toward short term goals:  Progress as expected: Patient is practicing skills taught in psychotherapy and seeing benefits.    Review of long-term goals:  Not done at today's visit. Long-term goals set 10/18/16 in cooperation with the client and read as follows:    1. I want to be able to sleep for at least 4 hours at a stretch.  2. Develop acceptance of my physical condition and acceptance of uncertainty about my medical status.    Diagnosis:   1. Major depressive disorder, single episode, moderate    2. Generalized anxiety disorder      Plan and Follow-Up:  Patient will return for follow-up psychotherapy session in one week    Patient will complete the following homework before our next session:  Patient indicated that the patient  would continue to use pacing and clinical hypnosis for homework.     Discharge Criteria/ Planning:  Patient will continue with follow-up until therapy can be discontinued without return of symptoms.      Basilia Kaur 1/9/2017      This note was created with help of Dragon dictation software. Grammatical / typing errors are not intentional and inherent to the software.

## 2021-06-08 NOTE — PROGRESS NOTES
"1/23/2017     Visit Vitals     /84 (Patient Site: Right Arm, Patient Position: Sitting, Cuff Size: Adult Regular)     Pulse (!) 114     Wt 156 lb 6.4 oz (70.9 kg)     SpO2 96%     BMI 26.03 kg/m2       Past Medical History   Diagnosis Date     Bulging lumbar disc      Lumbar herniated disc 10/23/2015     Lumbar radiculopathy 11/25/2015       Social History     Social History     Marital status: Single     Spouse name: Miguel     Number of children: 0     Years of education: GED and some collese     Occupational History     unemployed      Social History Main Topics     Smoking status: Never Smoker     Smokeless tobacco: Never Used     Alcohol use Yes      Comment: Monthly or less often. 1-2 drinks at a time.     Drug use: No     Sexual activity: Yes     Partners: Female     Other Topics Concern     Not on file     Social History Narrative       Family History   Problem Relation Age of Onset     Depression Mother      Hyperlipidemia Mother      Other Mother      \"Ulcers\" - Aches and pains throughout her body.     Cancer Father      No Medical Problems Sister      No Medical Problems Brother      No Medical Problems Sister      No Medical Problems Sister      No Medical Problems Sister      No Medical Problems Brother      Thyroid disease Maternal Grandmother      Thyroid disease Cousin        As part of this visit I have today personally reviewed past medical history, family medical history, social history (specifically including tobacco use), current medications and intolerances/allergies.  I have updated and/or or corrected these areas of history as may have been appropriate.    No Known Allergies    Current Outpatient Prescriptions   Medication Sig     acetaminophen (TYLENOL) 500 MG tablet Take 1 tablet (500 mg total) by mouth every 6 (six) hours as needed for pain.     cholecalciferol, vitamin D3, 2,000 unit cap Take 2,000 Units by mouth daily.     ibuprofen (ADVIL,MOTRIN) 800 MG tablet Take 800 mg by mouth 4 " "(four) times a day.     traMADol (ULTRAM) 50 mg tablet Take 1 tid, prn for low back, right thigh and calf pain.       Patient Active Problem List   Diagnosis     Muscle Weakness Generalized - \"Neuromuscular Condition\" with normal EMG.     Tremor     Arthralgias In Multiple Sites     Muscle Aches, Generalized (Myalgias)     Chronic Ethmoidal Sinusitis     Hypovitaminosis D - [place]     Monoparesis of leg     Weakness - without demonstrable CNS or peripheral cause - local neurologist and [facility] consultant.     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     Nasal polyposis - right naris on MRI (2016)       Diabetes - he is not diabetic.  Lab Results   Component Value Date    HGBA1C 5.5 01/20/2017       Hypovitaminosis D - his vitamin D needs to be increased.  See AVS.  VITAMIN D, TOTAL (25-HYDROXY)   Date Value Ref Range Status   01/20/2017 27.3 (L) 30.0 - 80.0 ng/mL Final       Thyroid status - normal thyroid status.  Lab Results   Component Value Date    TSH 2.63 10/10/2016       CBC monitoring - normal CMP.  Lab Results   Component Value Date    WBC 6.6 05/04/2016    HGB 15.9 05/04/2016    HCT 47.5 05/04/2016    MCV 88 05/04/2016     05/04/2016     CMP - reviewed.  Normal.  Results for orders placed or performed in visit on 05/04/16   Comprehensive Metabolic Panel   Result Value Ref Range    Sodium 139 136 - 145 mmol/L    Potassium 4.0 3.5 - 5.0 mmol/L    Chloride 103 98 - 107 mmol/L    CO2 25 22 - 31 mmol/L    Anion Gap, Calculation 11 5 - 18 mmol/L    Glucose 88 70 - 125 mg/dL    BUN 13 8 - 22 mg/dL    Creatinine 0.78 0.70 - 1.30 mg/dL    GFR MDRD Af Amer >60 >60 mL/min/1.73m2    GFR MDRD Non Af Amer >60 >60 mL/min/1.73m2    Bilirubin, Total 0.5 0.0 - 1.0 mg/dL    Calcium 10.1 8.5 - 10.5 mg/dL    Protein, Total 7.9 6.0 - 8.0 g/dL    Albumin 4.5 3.5 - 5.0 g/dL    Alkaline " "Phosphatase 74 45 - 120 U/L    AST 28 0 - 40 U/L    ALT 35 0 - 45 U/L     Employment - he has not worked since before his surgery in September, 2015.  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      What would keep him from working now?  He says that sitting or standing long periods.  He feels fatigued and has an overall general weakness, especially in the right leg.  His hands feel \"unsteady\" to him.  Whenever I use or do anything, typing, writing, eating, his hands both have tremor, he says.  People see his tremor and ask him if he is OK.  This is discouraging for him.    He has had lots of blood tests ordered by Dr. Bishop at [facility].  We still have the SPEP, copper and one other obscure test to report.  The others do not show anything diagnostic or abnormal.    Anand's first visit with Dr. Bishop was introductory.  He saw him again recently.  He had more tests done.  These are the tests I reference above.    Today's visit lasted longer than 15 minutes.  Greater than 50% of this visit was spent in counseling and coordination of care.  I reviewed his recent labs and the results currently available. There is nothing diagnostic.  I discussed that he might spontaneously improve.    We remain in a phase of looking diligently for organic disease, but not finding any.  There certainly could be a significant psychogenic component.  Dr. Donis, his original neurologist, proposed somatoform disorder.    Impression    1.  Unexplained neurological symptoms  2.  Weakness/tiredness/fatigue  3.  Hypovitaminosis D.    Plan    1.  Follow-up with Dr. Bishop, his new neurologist.  2.  RV here.  3.  Increase vitamin D to 2000/4000 units on alternate days.      Much or all of the text in this note was generated through the use of Dragon Dictate voice-to-text software.  Errors in spelling or words which seem out of context are unintentional.  Alaina has a " "significant issue with pronouns and, of course, homonyms.  Errors with words of this sort may escape editing.        Patient Instructions     1.  Increase Vitamin D from 2000 units daily to 2000/4000 units on alternate days.    VITAMIN D, TOTAL (25-HYDROXY)   Date Value Ref Range Status   01/20/2017 27.3 (L) 30.0 - 80.0 ng/mL Final      I would like your value a little higher.    2.  Thus far, all of Dr. Bishop's usual tests and \"zebra tests\" are normal.  There are few still waiting to show up, however, and I will let you know when everything is back.    3.  I do not have anything further that I would recommend.  Sometimes, for reasons totally unknown, people just get better and we wonder what it was that they had.  Feel free to do this.  It would not surprise if that happened.    4.  Make an appointment to see me in three months.  Make that appointment as you leave today, please.       "

## 2021-06-08 NOTE — TELEPHONE ENCOUNTER
Controlled Substance Refill Request  Medication Name:   Requested Prescriptions     Pending Prescriptions Disp Refills     zolpidem (AMBIEN) 5 MG tablet [Pharmacy Med Name: ZOLPIDEM TARTRATE 5 MG TABLET] 30 tablet 2     Sig: TAKE 1 TABLET (5 MG TOTAL) BY MOUTH AT BEDTIME AS NEEDED FOR SLEEP.     Date Last Fill: not on med list  [facility]  Submit electronically to pharmacy  Controlled Substance Agreement on file:   Encounter-Level CSA Scan Date - 01/27/2020:    Scan on 1/28/2020  8:02 AM by María Ndiaye: [facility] NARCOTIC AGREEMENT           Encounter-Level CSA Scan Date - 01/02/2018:    Scan on 1/4/2018  4:09 PM by María Ndiaye: [facility] NARCOTIC AGREEMENT        Last office visit:  3/4/20

## 2021-06-08 NOTE — PROGRESS NOTES
MENTAL HEALTH VISIT NOTE    Date of Service: 1/31/2017    Start time:2:05 pm  Stop time:3:00 pm  This is session number 2 this calendar year.  The patient was seen for individual psychotherapy    No  was required because the patient speaks and understands English.  Anand Felix is a 31 y.o. male is being seen today for individual psychotherapy to address the following:    Chief Complaint   Patient presents with     Depression     Pain   .     NECESSITY: This individual session was necessary for the care of the patient to address difficulties in psychosocial functioning that are affected by and affect the patient's ability to cope with and heal from spinal conditions and are affected by his mental health diagnosis listed in the diagnosis section below.    New symptoms or complaints: None    Functional Impairment (0-4):   Personal: 2  Family: 2  Work: 3  Social:2    Clinical Assessment of Mental Status  Mood: Euthymic    Affect:   Blunted    Appearance:  well groomed,, casually-dressed,  and engaged,    Speech:  Soft    Orientation:   Oriented to person, time, and place    Memory:  No evidence of impairment.    Concentration:  Within normal limits    Focus:   Within normal limits    Fund of knowledge:  adequate    Suicidal Ideation present:   Absent  Suicidal Risk Assessment:  No specific plan to harm self  No intention or plan.  Patient is aware of resources available if he experiences suicidal ideation.      Homicidal Risk Assessment:   None reported  No crisis plan required due to absence of risk.    Patient's impression of their current status:  Patient believes symptoms are improving overall.  Patient reports improvements in functioning in the following area: , mood is better, he states he's more accepting of himself and the limitations he has and Patient believes psychotherapy sessions continue to be helpful in maintaining skills and insight acquired over past sessions.     Therapist impression of  "patient's current status:  Symptoms  are improving overall.  Patient is effectively using the following skill:  adding meaningful activities into his life     Type of psychotherapeutic technique provided:  Cognitive Behavioral Therapy  Client-centered therapy  Techniques used:  Identification of emotions.  Behavioral activation  , discussion of roles in caretaker/patient couples and subconscious resistance to healing , motivational interviewing    Response to psychotherapeutic interventions:  Patient responded to interventions in the following manner: Enthusiastic    Progress toward short term goals:  Progress as expected: Patient is practicing skills taught in psychotherapy and seeing benefits.    Review of long-term goals:  Not done at today's visit. Long-term goals set 10/18/16 in cooperation with the client and read as follows:    1. I want to be able to sleep for at least 4 hours at a stretch.  2. Develop acceptance of my physical condition and acceptance of uncertainty about my medical status.    Diagnosis:   1. Major depressive disorder, single episode, moderate    2. Generalized anxiety disorder      Plan and Follow-Up:  Patient will return for follow-up psychotherapy session in one week    Patient will complete the following homework before our next session:  looking into book, \"Living Beyond Your Pain\"    Discharge Criteria/ Planning:  Patient will continue with follow-up until therapy can be discontinued without return of symptoms.      Basilia Kaur 1/31/2017      This note was created with help of Dragon dictation software. Grammatical / typing errors are not intentional and inherent to the software.  "

## 2021-06-08 NOTE — PATIENT INSTRUCTIONS - HE
Plan:   Interventions-    Follow up in 8 weeks     Increase in the gabapentin at this time to 300 mg two times a day    Ok to continue the use of the percocet at this time 5/20-6/17    Ok to continue the use of the medical cannabis.     Refer to PT for myofascial release.     This limited telehealth encounter is due to the Covid 19,  as required by the governmental agencies at this time due to risk of transmission of the pandemic, therefore testing availability is limited as well as physical exams.      Prescription Drug Management will be continued by the [facility]    Orders placed today  Medications that were ordered today   Requested Prescriptions     Signed Prescriptions Disp Refills     oxyCODONE-acetaminophen (PERCOCET/ENDOCET) 7.5-325 mg per tablet 84 tablet 0     Sig: Take 1 tablet by mouth 3 (three) times a day as needed for pain.     gabapentin (NEURONTIN) 100 MG capsule 56 capsule 1     Sig: Take 300 mg by mouth 2 (two) times a day as needed.     Orders Placed This Encounter   Procedures     Ambulatory referral to Physical Therapy     Referral Priority:   Routine     Referral Type:   Physical Therapy     Referral Reason:   Evaluation and Treatment     Requested Specialty:   Physical Therapy     Number of Visits Requested:   1         The patient understand todays plan and has their questions answered in regards to expectations and current treatment plan.     Prevent unexpected access/loss of medication: Keep medication locked. Only carry what you need with you    The plan of care will be adjusted to accommodate the issues discussed, discussing management of their care and follow up that is recommended. 5/18/2020         Dayna Lundberg CNP  [facility]  [address]  [place] [address]  Ph: [phone]  Fax: [phone]

## 2021-06-09 NOTE — PATIENT INSTRUCTIONS - HE
Plan:   Interventions-    Follow up in 8 weeks     Provide patient with the phone number for [facility] to begin PT, order is in the system and active.     Refill for the percocet- no changes today- refill dates are 7/24-8/21, 8/21-9/18    Continue the medical cannabis at this time     Increase the use of the gabapentin 100 mg to three times a day for the burning pain .     This limited telehealth/televideo encounter is due to the Covid 19,  as required by the governmental agencies at this time due to risk of transmission of the pandemic, therefore testing availability is limited as well as physical exams.      Prescription Drug Management will be continued by the [facility]    Orders placed today  Medications that were ordered today   Requested Prescriptions     Signed Prescriptions Disp Refills     oxyCODONE-acetaminophen (PERCOCET/ENDOCET) 7.5-325 mg per tablet 84 tablet 0     Sig: Take 1 tablet by mouth 3 (three) times a day as needed for pain.     gabapentin (NEURONTIN) 100 MG capsule 90 capsule 1     Sig: Take 100 mg by mouth 3 (three) times a day.     oxyCODONE-acetaminophen (PERCOCET/ENDOCET) 7.5-325 mg per tablet 84 tablet 0     Sig: Take 1 tablet by mouth 3 (three) times a day as needed for pain.     No orders of the defined types were placed in this encounter.        The patient understand todays plan and has their questions answered in regards to expectations and current treatment plan.     Prevent unexpected access/loss of medication: Keep medication locked. Only carry what you need with you    The plan of care will be adjusted to accommodate the issues discussed, discussing management of their care and follow up that is recommended. 7/13/2020         Dayna Lundberg CNP  [facility]  [address]  [place] [address]  Ph: [phone]  Fax: [phone]

## 2021-06-11 NOTE — TELEPHONE ENCOUNTER
Medication being requested: gabapentin 100mg  Last visit date: 7/13.  Provider: [facility]  Next visit date: not sched.  Provider: [facility]  Expected follow up: 8wk  MTM visit (Pain Center) date: no  UDT date: /agreement date: 1/27/20     (Last fill date; name; strength; provider; MME; quantity):  Last filled 8/8/20, gabapentin 100mg 3xd, Cam qu 90 for 30 day   Pertinent between visit information about requested medication (telephone, mychart, prior authorization, concerns, comments): no  Script being sent to provider by nurse- dates and quantity:   Requested Prescriptions     Pending Prescriptions Disp Refills     gabapentin (NEURONTIN) 100 MG capsule 90 capsule 1     Sig: Take 100 mg by mouth 3 (three) times a day.       [facility]

## 2021-06-11 NOTE — TELEPHONE ENCOUNTER
Controlled Substance Refill Request  Medication Name:   Requested Prescriptions     Pending Prescriptions Disp Refills     zolpidem (AMBIEN) 5 MG tablet [Pharmacy Med Name: ZOLPIDEM TARTRATE 5 MG TABLET] 30 tablet 2     Sig: TAKE 1 TABLET (5 MG TOTAL) BY MOUTH AT BEDTIME AS NEEDED FOR SLEEP.     Date Last Fill: not on med list  [facility]  [facility] electronically to [facility]  Controlled Substance Agreement on file:   Encounter-Level CSA Scan Date - 01/27/2020:    Scan on 1/28/2020  8:02 AM by María Ndiaye: [facility] NARCOTIC AGREEMENT           Encounter-Level CSA Scan Date - 01/02/2018:    Scan on 1/4/2018  4:09 PM by María Ndiaye: [facility] NARCOTIC AGREEMENT        Last office visit:  3/4/20

## 2021-06-12 NOTE — PATIENT INSTRUCTIONS - HE
Plan:   Interventions-    Follow up in 8 weeks     Refill for the percocet- no changes today- refill dates are 11/4-12/2, 12/2-12/30     Continue the medical cannabis at this time      Continue use of the gabapentin 100 mg to three times a day for the burning pain .      This limited telehealth/televideo encounter is due to the Covid 19,  as required by the governmental agencies at this time due to risk of transmission of the pandemic, therefore testing availability is limited as well as physical exams.      Prescription Drug Management will be continued by the [facility] center    Orders placed today  Medications that were ordered today   Requested Prescriptions     Signed Prescriptions Disp Refills     gabapentin (NEURONTIN) 100 MG capsule 90 capsule 1     Sig: Take 100 mg by mouth 3 (three) times a day.     oxyCODONE-acetaminophen (PERCOCET/ENDOCET) 7.5-325 mg per tablet 84 tablet 0     Sig: Take 1 tablet by mouth 3 (three) times a day as needed for pain.     oxyCODONE-acetaminophen (PERCOCET/ENDOCET) 7.5-325 mg per tablet 84 tablet 0     Sig: Take 1 tablet by mouth 3 (three) times a day as needed for pain.     No orders of the defined types were placed in this encounter.        The patient understand todays plan and has their questions answered in regards to expectations and current treatment plan.     Prevent unexpected access/loss of medication: Keep medication locked. Only carry what you need with you    The plan of care will be adjusted to accommodate the issues discussed, discussing management of their care and follow up that is recommended. 11/4/2020         Dayna Lundberg CNP  [facility]  [address]  [place] [address]  Ph: [phone]  Fax: [phone]

## 2021-06-12 NOTE — TELEPHONE ENCOUNTER
New Appointment Needed  What is the reason for the visit:    Pre-Op Appt Request  When is the surgery? :  11/20   Where is the surgery?:   [facility]  Who is the surgeon? :  Dr. Wright   What type of surgery is being done?: Nasal  Provider Preference: Any available  How soon do you need to be seen?: before 11/20  Waitlist offered?: No  Okay to leave a detailed message:  Yes

## 2021-06-12 NOTE — PROGRESS NOTES
I have requested that the [facility]  staff send the patient a letter stating we will be closing the patient's psychotherapy file due to inactivity. If the patient does not respond to the letter within two weeks, I will consider the patient's psychotherapy file closed due to lack of follow-up.  At close of treatment, the patient's condition was unchanged due to lack of engagement in therapeutic services.    The patient was informed in the letter that I am available to re-open the patient's case at any time and I can provide a list of resources of other psychotherapists for the patient if needed.

## 2021-06-12 NOTE — PROGRESS NOTES
"Anand Felix is a 35 y.o. male who is being evaluated with [facility] or [employer] services- via video     \"This telephone/video visit will be conducted via a call between you and your physician/provider. We have found that certain health care needs can be provided without the need for a physical exam.  This service lets us provide the care you need with a short phone conversation.  If a prescription is necessary we can send it directly to your pharmacy.  If lab work is needed we can place an order for that and you can then stop by our lab to have the test done at a later time.    If during the course of the call the physician/provider feels a telephone/video visit is not appropriate, you will not be charged for this service.\"     Start time- 2:13 pm   End time- 2:19 pm     Anand Felix complains of    Chief Complaint   Patient presents with     Follow-up     multisite pain       I have reviewed and updated the patient's Past Medical History, Social History, Family History and Medication List as well as the Precharting workup by the [facility].       PAIN CLINIC FOLLOW UP PROGRESS NOTE    CC:  Chief Complaint   Patient presents with     Follow-up     multisite pain       HPI  Anand Felix is a 35 y.o. male who presents for evaluation   Chief Complaint   Patient presents with     Follow-up     multisite pain    that is causing continued pain. Specific questions indicated the patient wanted addressed today include: management of chronic pain       Major issues:  1. Chronic pain syndrome    2. Myalgia    3. Arthralgias In Multiple Sites    4. Cervicalgia        Pain score 5  Constant   What does your pain like feel during a flare? Sharp, burning  Does the pain interfere with:  Work ----- yes  Walking ------ yes  Sleep ------- yes  Daily activities ------yes  Relationships -------yes  F=8    ALLERGIES  Patient has no known allergies.    Previous Medical History  Social History     Substance and Sexual Activity   Alcohol Use Yes    Comment: " Monthly or less often. 1-2 drinks at a time.     Social History     Substance and Sexual Activity   Drug Use Yes     Types: Marijuana     Social History     Tobacco Use   Smoking Status Never Smoker   Smokeless Tobacco Never Used       Pertinent Pain Medications/interventions:    D/c belbuca at this time, will increase the use of the gabapentin to 300 mg two times a day and continue the use of the medical cannabis and percocet up to 3 per day as needed- 5/18/2020     He currently belbuca 300 mcg two times a day, gabapentin 100 mg three times a day, oxycodone 5 mg two times a day prn and medical cannabis, tizanidine and Norco.      Belbuca- headaches are associated so he stopped taking this. 9/6     Previously taking Belbuca 75 mcg, gabapentin 100 mg three times a day, oxycodone 5 mg up to two times a day as needed, medical cannabis, baclofen and flexeril      RFA did provide him with 80 % relief.      Medical cannabis    Review of Systems:  12 point systems were reviewed with pt as documented on on previous exams. Any new diagnosis or new medication is reviewed today.     Medications    Current Outpatient Medications:      acetaminophen (TYLENOL) 500 MG tablet, Take 1 tablet (500 mg total) by mouth every 6 (six) hours as needed for pain., Disp: , Rfl: 0     cholecalciferol, vitamin D3, 2,000 unit cap, Take 2,000 Units by mouth daily., Disp: , Rfl:      divalproex (DEPAKOTE ER) 500 MG 24 hour tablet, , Disp: , Rfl:      fluticasone propionate (FLONASE) 50 mcg/actuation nasal spray, SPRAY 2 SPRAYS INTO EACH NOSTRIL EVERY DAY, Disp: 16 g, Rfl: 3     [START ON 11/14/2020] gabapentin (NEURONTIN) 100 MG capsule, Take 100 mg by mouth 3 (three) times a day., Disp: 90 capsule, Rfl: 1     NON FORMULARY, MEDICAL CANNABIS, Disp: , Rfl:      omeprazole (PRILOSEC) 20 MG capsule, TAKE 1 CAPSULE BY MOUTH DAILY, Disp: 90 capsule, Rfl: 3     oxyCODONE-acetaminophen (PERCOCET/ENDOCET) 7.5-325 mg per tablet, Take 1 tablet by mouth 3  (three) times a day as needed for pain., Disp: 84 tablet, Rfl: 0     [START ON 12/2/2020] oxyCODONE-acetaminophen (PERCOCET/ENDOCET) 7.5-325 mg per tablet, Take 1 tablet by mouth 3 (three) times a day as needed for pain., Disp: 84 tablet, Rfl: 0     propranoloL (INDERAL) 80 MG tablet, TAKE 1 TAB BY MOUTH THREE TIMES DAILY, Disp: 270 tablet, Rfl: 3     rivaroxaban (XARELTO) 20 mg tablet, Take 1 tablet (20 mg total) by mouth daily. Start 20 mg daily after the 15 mg daily for 21 days., Disp: 30 tablet, Rfl: 11     rizatriptan (MAXALT-MLT) 10 MG disintegrating tablet, DISSOLVE 1 TABLET ON TONGUE AS NEEDED FOR MIGRAINE. MAY REPEAT IN 2HRS IF NEEDED, Disp: 12 tablet, Rfl: 6     zolpidem (AMBIEN) 5 MG tablet, TAKE 1 TABLET (5 MG TOTAL) BY MOUTH AT BEDTIME AS NEEDED FOR SLEEP., Disp: , Rfl:      oxyCODONE-acetaminophen (PERCOCET/ENDOCET) 7.5-325 mg per tablet, Take 1 tablet by mouth 3 (three) times a day as needed for pain., Disp: 84 tablet, Rfl: 0      Physical Exam- limited exam   General- patient is alert and oriented, in NAD, well-groomed, well-nourished  Psych- Judgment and insight normal, AOx4, recent and remote memory normal, mood and affect normal  Eyes- pupils are symmetrical, conjunctiva is clear bilaterally, no ptosis is noted.   Respiratory- Breathing appears non-labored  Integumentary- coloring of skin appears normal.   Musculoskeletal- patient is moving all extremities that are visible.     Lab:  Last UDS on 1/27/2020 had expected results. Last UDT on file was reviewed.    Imaging:  No new imaging reviewed with patient over the phone, no new orders placed       Recent   Dated 11/4/2020 was reviewed with the patient today.       Assessment:     Anand Felix is a 35 y.o. male seen in clinic today for   Chief Complaint   Patient presents with     Follow-up     multisite pain       They are here for follow up and continued medical management of their pain. Today we reviewed the plan of care for their chronic pain  "and determined that the patient will need a refill of the current prescription.      Due to the Covid 19 precautions all visits are converted to telephone encounters until the government restrictions are lifted and the precautions have been lifted.     New concerns today- none today he feels that he is stable    Any new diagnosis since the last visit- none   Patient denies side effects from the current regimen  Plan of care going forward for ongoing pain control- no changes today, the patient reports he is \"stable\" and notes that he only needs refills. He is currently using medical cannabis as needed as well as percocet up to 4 times a day and gabapentin at this time three times a day.   Patients current MME is 34    Plan:   Interventions-    Follow up in 8 weeks     Refill for the percocet- no changes today- refill dates are 11/4-12/2, 12/2-12/30     Continue the medical cannabis at this time      Continue use of the gabapentin 100 mg to three times a day for the burning pain .      This limited telehealth/televideo encounter is due to the Covid 19,  as required by the governmental agencies at this time due to risk of transmission of the pandemic, therefore testing availability is limited as well as physical exams.      Prescription Drug Management will be continued by the [facility]    Orders placed today  Medications that were ordered today   Requested Prescriptions     Signed Prescriptions Disp Refills     gabapentin (NEURONTIN) 100 MG capsule 90 capsule 1     Sig: Take 100 mg by mouth 3 (three) times a day.     oxyCODONE-acetaminophen (PERCOCET/ENDOCET) 7.5-325 mg per tablet 84 tablet 0     Sig: Take 1 tablet by mouth 3 (three) times a day as needed for pain.     oxyCODONE-acetaminophen (PERCOCET/ENDOCET) 7.5-325 mg per tablet 84 tablet 0     Sig: Take 1 tablet by mouth 3 (three) times a day as needed for pain.     No orders of the defined types were placed in this encounter.        The patient " understand todays plan and has their questions answered in regards to expectations and current treatment plan.     Prevent unexpected access/loss of medication: Keep medication locked. Only carry what you need with you    The plan of care will be adjusted to accommodate the issues discussed, discussing management of their care and follow up that is recommended. 11/4/2020         Dayna Lundberg CNP  [facility]  [address]  [place] [address]  Ph: [phone]  Fax: [phone]

## 2021-06-12 NOTE — PROGRESS NOTES
"Pt is being treated today via video visit with Dayna Lundberg CNP, for refill and f/u of multisite pain.    Anand Felix is a 35 y.o. male who is being evaluated via a billable video visit.      The patient has been notified of following:     \"This video visit will be conducted via a call between you and your physician/provider. We have found that certain health care needs can be provided without the need for an in-person physical exam.  This service lets us provide the care you need with a video conversation.  If a prescription is necessary we can send it directly to your pharmacy.  If lab work is needed we can place an order for that and you can then stop by our lab to have the test done at a later time.    Video visits are billed at different rates depending on your insurance coverage. Please reach out to your insurance provider with any questions.    If during the course of the call the physician/provider feels a video visit is not appropriate, you will not be charged for this service.\"    Patient has given verbal consent to a Video visit? Yes  How would you like to obtain your AVS? AVS Preference: [employer].  If dropped by the video visit, the video invitation should be sent to: Text to cell phone: [phone]  Will anyone else be joining your video visit? No    Platform used for Video Visit: Doximity    Pain score 5  Constant   What does your pain like feel during a flare? Sharp, burning  Does the pain interfere with:  Work ----- yes  Walking ------ yes  Sleep ------- yes  Daily activities ------yes  Relationships -------yes  F=8    UDT/CSA - 01/27/2020     "

## 2021-06-12 NOTE — TELEPHONE ENCOUNTER
Spoke with patient over the phone today to r/s his surgery that had been postponed in May due to the Covid Pandemic.     He has been schedule with Dr. Wright at [facility] on 11/20.  Covid Test scheduled  For  11/16 at [facility] at 2:30pm.    Letter sent via [employer]

## 2021-06-13 NOTE — PROGRESS NOTES
Assessment and Plan:Anand Felix is a 35 y.o. male with a past medical history significant for saddle embolism, chronic sinusitis, and presumed systemic muscular weakness who presents to clinic today for follow up.  Overall he thinks his dyspnea and weakness are slowly improving with no specific therapy.  He was briefly on warfarin for his prior history of PE but is now back on Xarelto which he prefers.  He is due for a flu shot.  He has no other issues that need to be addressed today.    1) PE - likely provoked by his muscular weakness issue, and should likely remain on Xarelto at least until this process is considered recovered, or he is able to ambulate freely and frequently again.  2) Dyspnea - likely secondary to the muscular weakness issue followed by neurology.  Seems his strength and thus his dyspnea is improved.  3) Encounter for immunization - will provide a flu shot today as he has not been in to see his PCP in some time.  4) RTC in 1 year..       CCx: Hx of PE, dyspnea    HPI: Mr. Felix is a 35 year old with a complex medical history including a large pulmonary embolism in 2017 for which he remains anticoagulated, and an unsual muscular process that has left him weak and short of breath.  Dr. Winn had been following these processes for a couple of years and he was showing slow improvement of his weakness and dyspnea.  Today Mr. Felix feels his breathing issue is a little better still from last year.  He didn't struggle as much to get across our parking lot.  He feels a little stronger still.  He has not had any new pulmonary issues over the last year.  He has not had any bleeding on Xarelto, and feels he is not falling as much as he once did.  He did have a sinus surgery this fall for chronic sinusitis and a retention cyst.    ROS:  Review of Systems - History obtained from the patient  General ROS: negative  Psychological ROS: negative  ENT ROS: negative  Allergy and Immunology ROS: negative  Endocrine  ROS: negative  Respiratory ROS: positive for - shortness of breath  negative for - hemoptysis, orthopnea, pleuritic pain, stridor, tachypnea or wheezingd  Cardiovascular ROS: no chest pain or palpitations  Gastrointestinal ROS: no abdominal pain, change in bowel habits, or black or bloody stools  Genito-Urinary ROS: no dysuria, trouble voiding, or hematuria  Musculoskeletal ROS: negative  Neurological ROS: no TIA or stroke symptoms  Dermatological ROS: negative      Current Meds:  Current Outpatient Medications   Medication Sig     acetaminophen (TYLENOL) 500 MG tablet Take 1 tablet (500 mg total) by mouth every 6 (six) hours as needed for pain.     cholecalciferol, vitamin D3, 2,000 unit cap Take 2,000 Units by mouth daily.     fluticasone propionate (FLONASE) 50 mcg/actuation nasal spray SPRAY 2 SPRAYS INTO EACH NOSTRIL EVERY DAY     gabapentin (NEURONTIN) 100 MG capsule Take 100 mg by mouth 3 (three) times a day.     HYDROcodone-acetaminophen 5-325 mg per tablet Take 1 tablet by mouth every 4 (four) hours as needed for pain.     NON FORMULARY MEDICAL CANNABIS     omeprazole (PRILOSEC) 20 MG capsule TAKE 1 CAPSULE BY MOUTH DAILY     propranoloL (INDERAL) 80 MG tablet TAKE 1 TAB BY MOUTH THREE TIMES DAILY     rivaroxaban (XARELTO) 20 mg tablet Take 1 tablet (20 mg total) by mouth daily. Start 20 mg daily after the 15 mg daily for 21 days.     rizatriptan (MAXALT-MLT) 10 MG disintegrating tablet DISSOLVE 1 TABLET ON TONGUE AS NEEDED FOR MIGRAINE. MAY REPEAT IN 2HRS IF NEEDED     zolpidem (AMBIEN) 5 MG tablet TAKE 1 TABLET (5 MG TOTAL) BY MOUTH AT BEDTIME AS NEEDED FOR SLEEP.     oxyCODONE-acetaminophen (PERCOCET/ENDOCET) 7.5-325 mg per tablet Take 1 tablet by mouth 3 (three) times a day as needed for pain.     oxyCODONE-acetaminophen (PERCOCET/ENDOCET) 7.5-325 mg per tablet Take 1 tablet by mouth 3 (three) times a day as needed for pain.       Labs:  No results found for this or any previous visit (from the past 72  "hour(s)).    I have personally reviewed all pertinent imaging studies and PFT results unless otherwise noted.    Imaging studies:  No results found.      Physical Exam:  /84   Pulse 84   Ht 5' 5\" (1.651 m)   Wt 153 lb (69.4 kg)   SpO2 95% Comment: RA  BMI 25.46 kg/m    General - Well nourished  Ears/Mouth -  Deferred given mask use during pandemic  Neck - no cervical lymphadenopathy  Lungs - Clear to ausculation bilaterally but with muscular shaking at end inspiration  CVS - regular rhythm with no murmurs, rubs or gallups  Abdomen - soft, NT, ND, NABS  Ext - no cyanosis, clubbing or edema  Skin - no rash  Psychology - alert and oriented, answers appropriate        Electronically signed by:    Jules To MD PhD  [facility]  "

## 2021-06-13 NOTE — PROGRESS NOTES
11/30/2020    Start time: 3:00 PM    Stop Time: 3:50 PM   Session # 1    Anand Felix is a 35 y.o. male is being seen today for    Chief Complaint   Patient presents with     Mental Health Note   . Major depression, severe   Generalized Anxiety Disorder  Chronic pain syndrome    New symptoms or complaints: None    Functional Impairment:   Personal: 3  Family: 3  Work: 3  Social:3    Clinical assessment of mental status: Anand Felix presented on time.  He was open and cooperative, and dressed appropriately for this session and weather. His memory was in tact .  His  speech was appropriate/quiet.  Language was appropriate/quiet.  Concentration and focus is on task. Psychosis is not noted or reported. He reports his mood is depressed and worried.  Affect is congruent with speech.  Fund of knowledge is adequate. Insight is adequate for therapy.     Suicidal/Homicidal Ideation present: Patient reports passive SI. He denies plan or means. He agrees to follow safety plan to call 911, go to the ER for evaluation and/or call the crisis line if symptoms worsen or he is feeling unsafe. He describes protective factors, including his wife/family and spiritual belief.     Patient's impression of their current status: Patient reports continued difficulty with depression and anxiety symptoms. Patient struggles with stopping negative thoughts. He also reports increased pain, especially since a recent surgery.     Therapist impression of patients current state: Patient seems to continue to struggle with depression and anxiety/worry. Patient reports SI, but denies plan or means. He states SI is fleeting.  He reports he is dwelling less often as he is trying to stay busy. He is engaging in some self study re: computer programming and he reports engaging in hobbies as he is able. Patient states it is difficult to sleep, as he has negative thoughts. He states medications help somewhat. Patient is working on exercising for short periods of  time, as he is able. Fatigue and physical weakness persist, per his report.  He reports focus/concentration has improved to a small degree. Patient is reaching out to his support system, as he is able. However, he reports it is difficult to talk about his concerns. Presented grounding exercise and patient seemed to benefit. Discussed plan to change to video visits going forward, due to COVID-19 guidelines and in-person visits will not be offered until further notice after today. He agreed to this.     Type of psychotherapeutic technique provided: Client centered, CBT and Mindfulness    Progress toward short term goals: Discussed patient's goals and treatment plan will be developed.     Review of long term goals: Discussed patient's goals and treatment plan will be developed.     Diagnosis: Major depression, severe   Generalized Anxiety Disorder  Chronic pain syndrome    Plan and Follow up: Follow pain center plan of care and overall medical plan. Schedule and attend psychotherapy every 1-2 weeks to further address mental health and chronic pain concerns. Patient understands that sessions are by telephone or video at this time, due to COVID-19 guidelines, until further notice. Practice meditations/coping resources as discussed.       Discharge Criteria/Planning: Patient will continue with follow-up until therapy can be discontinued without return of signs and symptoms.    Alize Livingston 11/30/2020

## 2021-06-13 NOTE — ANESTHESIA CARE TRANSFER NOTE
Last vitals:   Vitals:    11/20/20 1024   BP: 118/69   Pulse: 81   Resp: 14   Temp: 36.1  C (96.9  F)   SpO2: 100%     Patient's level of consciousness is drowsy  Spontaneous respirations: yes  Maintains airway independently: yes  Dentition unchanged: yes  Oropharynx: oropharynx clear of all foreign objects    QCDR Measures:  ASA# 20 - Surgical No data recorded  PQRS# 430 - Adult PONV Prevention: 4558F - Pt received => 2 anti-emetic agents (different classes) preop & intraop  ASA# 8 - Peds PONV Prevention: NA - Not pediatric patient, not GA or 2 or more risk factors NOT present  PQRS# 424 - Mayuri-op Temp Management: 4559F - At least one body temp DOCUMENTED => 35.5C or 95.9F within required timeframe  PQRS# 426 - PACU Transfer Protocol: - Transfer of care checklist used  ASA# 14 - Acute Post-op Pain: ASA14B - Patient did NOT experience pain >= 7 out of 10

## 2021-06-13 NOTE — PROGRESS NOTES
[facility]  [address]  [facility] [address]  Dept: [phone]  Dept Fax: [phone]  Primary Provider: Malachi Choudhary MD  Pre-op Performing Provider: ROBIN SOTO    PREOPERATIVE EVALUATION:  Today's date: 11/18/2020    Anand Felix is a 35 y.o. male who presents for a preoperative evaluation.    Surgical Information:  Surgery/Procedure: nasal surgery   Surgery Location: [facility]   Surgeon: Dr. Anand Wright   Surgery Date: 11/20/2020  Time of Surgery: unknown   Where patient plans to recover: At home with family  Fax number for surgical facility:     Type of Anesthesia Anticipated: to be determined    Subjective     HPI related to upcoming procedure: Patient is been following with ENT with history of right antrochoanal polyp.  This has been thought to be contributing to recent frontal headaches and occipital headaches.  The symptoms have been severe enough to prompt patient to be evaluated in the emergency department.  MRI that was completed indicated progression of left frontal mucocele.  He has decided to undergo removal of it to see if this helps improve symptoms.  Medical history includes muscle weakness from neuromuscular condition, arthralgia, monoparesis of the leg, chronic pain, history of pulmonary embolism.  He continues to follow with pain clinic for management of his chronic pain.  Currently using oxycodone-acetaminophen, gabapentin, acetaminophen.  He has been using rizatriptan and propranolol for management of headaches.  He is on Depakote, Ambien for sleep.  Has history of bilateral pulmonary emboli following back surgery about 2 years ago.  Remains on Xarelto 20 mg.  Denies any bleeding or bruising concerns.  He reports tolerating anesthesia well in the past without any adverse reactions.      IMPRESSION CT Sinuses 12/19:  1.  The entire left frontal sinus is now again completely opacified, progressed  from CT 03/22/2019. Stable opacification of  the associated peripherally  marginated supraorbital left ethmoid locule or mucocele. No intraorbital or  intracranial extension of these findings. Right frontal sinus clear.  2.  Significant clearing of the right maxillary sinus and adjacent right  ostiomeatal unit in the interval. Small amount of residual polypoid mucosal  thickening inferior right maxillary sinus noted with stable retention cyst or  polyp inferior left maxillary sinus.  3.  Partial clearing of the right ethmoid air cells since prior study with  stable changes from prior partial ethmoidectomies.  4.  Significant clearing of the polypoid soft tissue filling the right nasal  cavity back to the nasal choana in the interval.      Preop Questions 11/18/2020   Have you ever had a heart attack or stroke? No   Have you ever had surgery on your heart or blood vessels, such as a stent placement, a coronary artery bypass, or surgery on an artery in your head, neck, heart, or legs? No   Do you have chest pain with activity? No   Do you have a history of  heart failure? No   Do you currently have a cold, bronchitis or symptoms of other infection? No   Do you have a cough, shortness of breath, or wheezing? No   Do you or anyone in your family have previous history of blood clots? No   Do you or does anyone in your family have a serious bleeding problem such as prolonged bleeding following surgeries or cuts? YES - sister has low platelets, patient has hx of PE    Have you ever had problems with anemia or been told to take iron pills? No   Have you had any abnormal blood loss such as black, tarry or bloody stools? No   Have you ever had a blood transfusion? No   Are you willing to have a blood transfusion if it is medically needed before, during, or after your surgery? Yes   Have you or any of your relatives ever had problems with anesthesia? UNKNOWN    Do you have sleep apnea, excessive snoring or daytime drowsiness? No   Do you have any artifical heart valves  or other implanted medical devices like a pacemaker, defibrillator, or continuous glucose monitor? No   Do you have artificial joints? No   Are you allergic to latex? No     Health Care Directive:  Patient does not have a Health Care Directive or Living Will: Discussed advance care planning with patient; information given to patient to review.    Preoperative Review of :    reviewed - controlled substances reflected in medication list.  [id number]}  See problem list for active medical problems.  Problems all longstanding and stable, except as noted/documented.  See ROS for pertinent symptoms related to these conditions.      Review of Systems  CONSTITUTIONAL: NEGATIVE for fever, chills, change in weight  INTEGUMENTARY/SKIN: NEGATIVE for worrisome rashes, moles or lesions  EYES: NEGATIVE for vision changes or irritation  ENT/MOUTH: NEGATIVE for ear, mouth and throat problems  RESP: NEGATIVE for significant cough or SOB  BREAST: NEGATIVE for masses, tenderness or discharge  CV: NEGATIVE for chest pain, palpitations or peripheral edema  GI: NEGATIVE for nausea, abdominal pain, heartburn, or change in bowel habits  : NEGATIVE for frequency, dysuria, or hematuria  MUSCULOSKELETAL: NEGATIVE for significant arthralgias or myalgia  NEURO: NEGATIVE for weakness, dizziness or paresthesias  ENDOCRINE: NEGATIVE for temperature intolerance, skin/hair changes  HEME: NEGATIVE for bleeding problems  PSYCHIATRIC: NEGATIVE for changes in mood or affect    Patient Active Problem List    Diagnosis Date Noted     Mucocele of frontal sinus 02/28/2020     Sinus tachycardia 08/27/2018     Myalgia 02/13/2018     Pulmonary embolism (H) 10/15/2017     Bilateral pulmonary embolism (H) 10/15/2017     Nasal polyposis - right naris on MRI (2016) 09/29/2016     Somatoform disorder - diagnosis proposed by Dr. Donis, his neurologist. 06/18/2016     Lumbar pain determined by palpation and/or percussion at L1 (5/4/16) 05/04/2016     Monoparesis of  "leg (H) 10/23/2015     Weakness - without demonstrable CNS or peripheral cause - local neurologist at [facility], and neurologist at [facility], and [facility] consultant. 10/23/2015     Muscle Weakness Generalized - \"Neuromuscular Condition\" with normal EMG.      Tremor      Arthralgias In Multiple Sites      Muscle Aches, Generalized (Myalgias)      Chronic Ethmoidal Sinusitis      [place]ovitaminosis D [place]      Past Medical History:   Diagnosis Date     Anxiety      Bulging lumbar disc      Depression      History of anesthesia complications     Was hospitalized in October 2015 for weakness     History of blood clots     PE in 2017     Lumbar herniated disc 10/23/2015     Lumbar radiculopathy 11/25/2015     Shortness of breath      Tachycardia     After PE in      Past Surgical History:   Procedure Laterality Date     BACK SURGERY       MINIMALLY INVASIVE MICRODISCECTOMY LUMBAR SPINE Right 10/2015    L5-S1; Dr. Bettie Hernandez     SD NASAL/SINUS ENDOSCOPY,BX/RMV POLYP/DEBRID Right 5/22/2019    Procedure: RIGHT ENDOSCOPIC SINUS SURGERY;  Surgeon: Leo Wright MD;  Location: [facility];  Service: ENT     SINUS SURGERY  2014    Dr. Wright     Current Outpatient Medications   Medication Sig Dispense Refill     cholecalciferol, vitamin D3, 2,000 unit cap Take 2,000 Units by mouth daily.       divalproex (DEPAKOTE ER) 500 MG 24 hour tablet        fluticasone propionate (FLONASE) 50 mcg/actuation nasal spray SPRAY 2 SPRAYS INTO EACH NOSTRIL EVERY DAY 16 g 3     gabapentin (NEURONTIN) 100 MG capsule Take 100 mg by mouth 3 (three) times a day. 90 capsule 1     NON FORMULARY MEDICAL CANNABIS       omeprazole (PRILOSEC) 20 MG capsule TAKE 1 CAPSULE BY MOUTH DAILY 90 capsule 3     oxyCODONE-acetaminophen (PERCOCET/ENDOCET) 7.5-325 mg per tablet Take 1 tablet by mouth 3 (three) times a day as needed for pain. 84 tablet 0     [START ON 12/2/2020] oxyCODONE-acetaminophen " "(PERCOCET/ENDOCET) 7.5-325 mg per tablet Take 1 tablet by mouth 3 (three) times a day as needed for pain. 84 tablet 0     propranoloL (INDERAL) 80 MG tablet TAKE 1 TAB BY MOUTH THREE TIMES DAILY 270 tablet 3     rivaroxaban (XARELTO) 20 mg tablet Take 1 tablet (20 mg total) by mouth daily. Start 20 mg daily after the 15 mg daily for 21 days. 30 tablet 11     rizatriptan (MAXALT-MLT) 10 MG disintegrating tablet DISSOLVE 1 TABLET ON TONGUE AS NEEDED FOR MIGRAINE. MAY REPEAT IN 2HRS IF NEEDED 12 tablet 6     zolpidem (AMBIEN) 5 MG tablet TAKE 1 TABLET (5 MG TOTAL) BY MOUTH AT BEDTIME AS NEEDED FOR SLEEP.       acetaminophen (TYLENOL) 500 MG tablet Take 1 tablet (500 mg total) by mouth every 6 (six) hours as needed for pain.  0     oxyCODONE-acetaminophen (PERCOCET/ENDOCET) 7.5-325 mg per tablet Take 1 tablet by mouth 3 (three) times a day as needed for pain. 84 tablet 0     No current facility-administered medications for this visit.        No Known Allergies    Social History     Tobacco Use     Smoking status: Never Smoker     Smokeless tobacco: Never Used   Substance Use Topics     Alcohol use: Yes     Comment: Monthly or less often. 1-2 drinks at a time.      Family History   Problem Relation Age of Onset     Depression Mother      Hyperlipidemia Mother      Other Mother         \"Ulcers\" - Aches and pains throughout her body.     Cancer Father      No Medical Problems Sister      No Medical Problems Brother      No Medical Problems Sister      No Medical Problems Sister      No Medical Problems Sister      No Medical Problems Brother      Thyroid disease Maternal Grandmother      Thyroid disease Cousin      Social History     Substance and Sexual Activity   Drug Use Yes     Types: Marijuana        Objective     /62 (Patient Site: Right Arm, Patient Position: Sitting, Cuff Size: Adult Regular)   Pulse 60   Temp 97.7  F (36.5  C)   Ht 5' 5\" (1.651 m) Comment: per pt  Wt 157 lb 3.2 oz (71.3 kg)   BMI 26.16 " kg/m    Physical Exam    GENERAL APPEARANCE: healthy, alert and no distress     EYES: EOMI,  PERRL     HENT: ear canals and TM's normal and nose and mouth without ulcers or lesions     NECK: no adenopathy, no asymmetry, masses, or scars and thyroid normal to palpation     RESP: lungs clear to auscultation - no rales, rhonchi or wheezes     CV: regular rates and rhythm, normal S1 S2, no S3 or S4 and no murmur, click or rub     ABDOMEN:  soft, nontender, no HSM or masses and bowel sounds normal     MS: Weakness of extremities at baseline.  Uses cane to ambulate.  No swelling or deformities otherwise.       SKIN: no suspicious lesions or rashes     NEURO: Normal strength and tone, sensory exam grossly normal, mentation intact and speech normal     PSYCH: mentation appears normal. and affect normal/bright     LYMPHATICS: No cervical adenopathy    Recent Labs   Lab Test 11/18/20  1349 11/24/19  0106 11/24/19  0055 11/15/19  1258 05/19/19  1258 05/19/19  1258   HGB 16.6 16.0  --   --   --  16.1    289  --   --   --  350   INR  --   --  1.52* 1.90*   < > 3.07*   NA  --  142  --   --   --  137   K  --  3.3*  --   --   --  4.1   CREATININE  --  0.97  --   --   --  0.84    < > = values in this interval not displayed.        PRE-OP Diagnostics:   Labs pending at this time. Results will be reviewed when available.  No EKG required, no history of coronary heart disease, significant arrhythmia, peripheral arterial disease or other structural heart disease.    REVISED CARDIAC RISK INDEX (RCRI)   The patient has the following serious cardiovascular risks for perioperative complications:   - No serious cardiac risks = 0 points    RCRI INTERPRETATION: 0 points: Class I (very low risk - 0.4% complication rate)       Assessment & Plan      The proposed surgical procedure is considered LOW risk.    Preoperative examination  Mucocele of frontal sinus  Nasal polyposis - right naris on MRI (2016)  Chronic ethmoidal  "sinusitis  Reviewed diagnosis of right antrochoanal polyp likely contributing to his symptoms.  Examination today is unremarkable.  No significant findings.  Will obtain complete blood count and metabolic panel to ensure stability prior to surgery.  Recommend holding Xarelto for 2 days prior to procedure.  Reviewed his medications and allergies no contraindications to the procedure identified today.  He may proceed with scheduled procedure of choice of anesthesia.  - HM2(CBC w/o Differential)  - Basic Metabolic Panel    Muscle Weakness Generalized - \"Neuromuscular Condition\" with normal EMG.  We discussed his neuromuscular condition with generalized muscle weakness.  Stable overall, no new concerns in this regard today.    Arthralgias In Multiple Sites  Chronic and stable.  Continue gabapentin.  Continue to follow with pain clinic for management.    Headaches  Continue propranolol and Maxalt.       Risks and Recommendations:  The patient has the following additional risks and recommendations for perioperative complications:  Anemia/Bleeding/Clotting:    - Hx of PE following back surgery in 2018.     Medication Instructions:  Patient is to take all scheduled medications on the day of surgery EXCEPT for modifications listed below:  Hold eliquis 2 days prior to surgery.     RECOMMENDATION:  APPROVAL GIVEN to proceed with proposed procedure, without further diagnostic evaluation.    Signed Electronically by: Melissa Acevedo CNP    Copy of this evaluation report is provided to requesting physician.    [facility] Preop Guidelines    Revised Cardiac Risk Index  "

## 2021-06-13 NOTE — PROGRESS NOTES
Outpatient Mental Health Treatment Plan    Name:  Anand Felix  :  1985  MRN:  [id number]    Treatment Plan:  Initial Treatment Plan  Intake/initial treatment plan date:  2020  Benefit and risks and alternatives have been discussed: Yes  Is this treatment appropriate with minimal intrusion/restrictions: Yes  Estimated duration of treatment:  Approximately 20 sessions.  Anticipated frequency of services:  Every 1-2 weeks  Necessity for frequency: This frequency is needed to establish therapeutic goals and for continuity of care in order to monitor progress.  Necessity for treatment: To address cognitive, behavioral, and/or emotional barriers in order to work toward goals and to improve quality of life.    Session Type: Patient is presenting for an Individual session.    Plan:      ? Depression    Goal:  Decrease average depression level from 8 to 5.   Strategies:    ?[x] Decrease social isolation     [x] Increase involvement in meaningful activities     ?[x] Discuss sleep hygiene     ?[x] Explore thoughts and expectations about self and others     ?[x] Process grief (loss of significant person, independence, role,        etc.)     ?[x] Assess for suicide risk     ?[x] Implement physical activity routine (with physician approval)     [x] Consider introduction of bibliotherapy and/or videos     [x] Continue compliance with medical treatment plan (or explore         barriers)       ?  ?Degree to which this is a problem: 4  Degree to which goal is met: 0  Date of Review: 2020          ?   ? Anxiety    Goal:  Decrease average anxiety level from 8 to 5.   Strategies: ? [x]Learn and practice relaxation techniques and other coping        strategies (e.g., thought stopping, reframing, meditation)     ? [x] Increase involvement in meaningful activities     ? [x] Discuss sleep hygiene     ? [x] Explore thoughts and expectations about self and others     ? [x] Identify and monitor triggers for panic/anxiety  symptoms     ? [x] Implement physical activity routine (with physician approval)     ? [x] Consider introduction of bibliotherapy and/or videos     ? [x] Continue compliance with medical treatment plan (or explore          barriers)                                       [x]     Degree to which this is a problem: 4  Degree to which goal is met: 0  Date of Review: 11/30/2020  Chronic pain  Goal:  ? Decrease average pain level from 6 to 4.   ? Increase % acceptance of pain from 50 to 80.   Strategies: ? [x] Explore thoughts and expectations about self and others    [] Explore emotional reactions to illness/injury      ? [x] Learn and practice relaxation techniques and other coping          strategies            [x] Implement physical activity routine (with physician approval)     ? [x] Engage in values clarification and goal-setting     ? [x] Consider introduction of bibliotherapy and/or videos     ? [x] Increase involvement in meaningful activities     ? [x] Discuss sleep hygiene     ? [x] Process grief (loss of significant person, independence, role,          etc.)     ? [x] Assess for suicide risk     ?  Degree to which this is a problem: 4  Degree to which goal is met: 0  Date of Review: 11/30/2020       Functional Impairment:  1=Not at all/Rarely  2=Some days  3=Most Days  4=Every Day    Personal : 3  Family : 3  Social : 3   Work/school : 3    Diagnosis:  Major depression, severe   Generalized Anxiety Disorder  Chronic pain syndrome    WHODAS 2.0 12-item version 47.92%     Scores presented in qualifiers to represent level of disability.  MODERATE Problem (medium, fair, ...) 25-49%    H1= 15  H2= 5  H3= 10        Clinical assessments and measures completed:. CAROLYN-7, PHQ-9 and PANSI     Strengths:Patience, fast learner, hard working  Limitations:  lacks assertiveness, wants to improve his health.  Cultural Considerations: Patient is a 35 year old,  American,  male who has a history of depression, anxiety  and chronic pain.     Persons responsible for this plan: Patient and Provider            Psychotherapist Signature           Patient Signature:              Guardian Signature             Provider: Performed and documented by Alize Livingston [facility]   Date:  11/30/2020

## 2021-06-13 NOTE — PATIENT INSTRUCTIONS - HE
1) I am satisfied with the plan you are on with Xarelto  2) I am happy your breathing has slowly improved  3) I would not recommend any big changes  4) Lets get your flu shot done today.

## 2021-06-13 NOTE — PATIENT INSTRUCTIONS - HE
"   Before Your Surgery       Call your surgeon if there is any change in your health. This includes signs of a cold or flu (such as a sore throat, runny nose, cough, rash or fever).       Do not smoke, drink alcohol or take over the counter medicine (unless your surgeon or primary care doctor tells you to) for the 24 hours before and after surgery.       If you take prescribed drugs: Follow your doctor s orders about which medicines to take and which to stop until after surgery.      Eating and drinking prior to surgery: follow the instructions from your surgeon.      Take a shower or bath the night before surgery. Use the soap your surgeon gave you to gently clean your skin. If you do not have soap from your surgeon, use your regular soap. Do not shave or scrub the surgery site. Wear clean pajamas and have clean sheets on your bed.         Preparing for Your Surgery  Getting started  A surgery nurse will call you to review your health history and instructions. They will give you an arrival time based on your scheduled surgery time.  Please be ready to share the following:    Your doctor's clinic name and phone number    Your medical, surgical and anesthesia history    A list of allergies and sensitivities    A list of medicines, including herbal treatments and over-the-counter drugs    Whether the patient has a legal guardian (ask how to send us the papers in advance)  If your child is having surgery, please ask for a copy of Preparing for Your Child's Surgery.    Preparing for surgery    Within 30 days of surgery: Have an exam at your family clinic (primary care clinic), or go to a pre-operative clinic. This exam is called a \"History and Physical,\" or H&P.    At your H&P exam, talk to your care team about all medicines you take. If you need to stop any medicines before surgery, ask when to start taking them again.  ? We do this for your safety. Many medicines can make you bleed too much during surgery. Some change " how well surgery (anesthesia) drugs work.    Call your insurance company to see what it will and won't pay for. Ask if they need to pre-approve the surgery. (If no insurance, call [phone].)    Call your surgeon's clinic if there's any change in your health. This includes signs of a cold or flu (sore throat, runny nose, cough, rash, fever). It also includes a scrape or scratch near the surgery site.    If you have questions on the day of surgery, call your surgery center.  Eating and drinking guidelines  For your safety: Unless your surgeon tells you otherwise, follow the guidelines below.    Eat and drink as usual until 8 hours before surgery. After that, no food or milk.    Drink clear liquids until 2 hours before surgery. These are liquids you can see through, like water, Gatorade and Propel Water. You may also have black coffee and tea (no cream or milk).    Nothing by mouth within 2 hours of surgery. This includes gum, candy and breath mints.    Stop alcohol the midnight before surgery.    If your family clinic tells you to take medicine on the morning of surgery, it's okay to take it with a sip of water.  Preventing infection    Shower or bathe the night before and morning of your surgery. Follow the instructions your clinic gave you. (If no instructions, use regular soap.)    Don't shave or clip hair near your surgery site. This can lead to skin infection.    Don't smoke the morning of surgery. Smoking increases the risk of infection. You may chew nicotine gum up to 2 hours before surgery. A nicotine patch is okay.  ? Note: Some surgeries require you to completely quit smoking and nicotine. Check with your surgeon.    Your care team will make every effort to keep you safe from infection. We will:  ? Clean our hands often with soap and water (or an alcohol-based hand rub).  ? Clean the skin at your surgery site with a special soap that kills germs. We'll also remove hair from the site as needed.  ? Wear  special hair covers, masks, gowns and gloves during surgery.  ? Give antibiotic medicine, if prescribed. Not all surgeries need antibiotics.  What to bring on the day of surgery    Photo ID and insurance card    Copy of your health care directive, if you have one    Glasses and hearing aides (bring cases)  ? You can't wear contacts during surgery    Inhaler and eye drops, if you use them (tell us about these when you arrive)    CPAP machine or breathing device, if you use them    A few personal items, if spending the night    If you have . . .  ? A pacemaker or ICD (cardiac defibrillator): Bring the ID card.  ? An implanted stimulator: Bring the remote control.  ? A legal guardian: Bring a copy of the certified (court-stamped) guardianship papers.  Please remove any jewelry, including body piercings. Leave jewelry and other valuables at home.  If you're going home the day of surgery  Important: If you don't follow the rules below, we must cancel your surgery.     Arrange for someone to drive you home after surgery. You may not drive, take a taxi or take public transportation by yourself (unless you'll have local anesthesia only).    Arrange for a responsible adult to stay with you overnight. If you don't, we may keep you in the hospital overnight, and you may need to pay the costs yourself.  Questions?   If you have any questions for your care team, list them here: _________________________________________________________________________________________________________________________________________________________________________________________________________________________________________________________________________________________________________________________  For informational purposes only. Not to replace the advice of your health care provider. Copyright   9073-8896 [facility]. All rights reserved. Clinically reviewed by Hetal Clement MD. SMARTworks [id number] - REV 07/19.       Before Your  "Surgery       Call your surgeon if there is any change in your health. This includes signs of a cold or flu (such as a sore throat, runny nose, cough, rash or fever).       Do not smoke, drink alcohol or take over the counter medicine (unless your surgeon or primary care doctor tells you to) for the 24 hours before and after surgery.       If you take prescribed drugs: Follow your doctor s orders about which medicines to take and which to stop until after surgery.      Eating and drinking prior to surgery: follow the instructions from your surgeon.      Take a shower or bath the night before surgery. Use the soap your surgeon gave you to gently clean your skin. If you do not have soap from your surgeon, use your regular soap. Do not shave or scrub the surgery site. Wear clean pajamas and have clean sheets on your bed.             Follow your surgeon's direction on when to stop eating and drinking prior to surgery.  Preparing for Your Surgery  Getting started  A surgery nurse will call you to review your health history and instructions. They will give you an arrival time based on your scheduled surgery time.  Please be ready to share the following:  Your doctor's clinic name and phone number  Your medical, surgical and anesthesia history  A list of allergies and sensitivities  A list of medicines, including herbal treatments and over-the-counter drugs  Whether the patient has a legal guardian (ask how to send us the papers in advance)  If your child is having surgery, please ask for a copy of Preparing for Your Child's Surgery.    Preparing for surgery  Within 30 days of surgery: Have an exam at your family clinic (primary care clinic), or go to a pre-operative clinic. This exam is called a \"History and Physical,\" or H&P.  At your H&P exam, talk to your care team about all medicines you take. If you need to stop any medicines before surgery, ask when to start taking them again.  We do this for your safety. Many " medicines can make you bleed too much during surgery. Some change how well surgery (anesthesia) drugs work.  Call your insurance company to see what it will and won't pay for. Ask if they need to pre-approve the surgery. (If no insurance, call [phone].)  Call your surgeon's clinic if there's any change in your health. This includes signs of a cold or flu (sore throat, runny nose, cough, rash, fever). It also includes a scrape or scratch near the surgery site.  If you have questions on the day of surgery, call your surgery center.  Eating and drinking guidelines  For your safety: Unless your surgeon tells you otherwise, follow the guidelines below.  Eat and drink as usual until 8 hours before surgery. After that, no food or milk.  Drink clear liquids until 2 hours before surgery. These are liquids you can see through, like water, Gatorade and Propel Water. You may also have black coffee and tea (no cream or milk).  Nothing by mouth within 2 hours of surgery. This includes gum, candy and breath mints.  Stop alcohol the midnight before surgery.  If your family clinic tells you to take medicine on the morning of surgery, it's okay to take it with a sip of water.  Preventing infection  Shower or bathe the night before and morning of your surgery. Follow the instructions your clinic gave you. (If no instructions, use regular soap.)  Don't shave or clip hair near your surgery site. This can lead to skin infection.  Don't smoke the morning of surgery. Smoking increases the risk of infection. You may chew nicotine gum up to 2 hours before surgery. A nicotine patch is okay.  Note: Some surgeries require you to completely quit smoking and nicotine. Check with your surgeon.  Your care team will make every effort to keep you safe from infection. We will:  Clean our hands often with soap and water (or an alcohol-based hand rub).  Clean the skin at your surgery site with a special soap that kills germs. We'll also remove hair  from the site as needed.  Wear special hair covers, masks, gowns and gloves during surgery.  Give antibiotic medicine, if prescribed. Not all surgeries need antibiotics.  What to bring on the day of surgery  Photo ID and insurance card  Copy of your health care directive, if you have one  Glasses and hearing aides (bring cases)  You can't wear contacts during surgery  Inhaler and eye drops, if you use them (tell us about these when you arrive)  CPAP machine or breathing device, if you use them  A few personal items, if spending the night  If you have . . .  A pacemaker or ICD (cardiac defibrillator): Bring the ID card.  An implanted stimulator: Bring the remote control.  A legal guardian: Bring a copy of the certified (court-stamped) guardianship papers.  Please remove any jewelry, including body piercings. Leave jewelry and other valuables at home.  If you're going home the day of surgery  Important: If you don't follow the rules below, we must cancel your surgery.   Arrange for someone to drive you home after surgery. You may not drive, take a taxi or take public transportation by yourself (unless you'll have local anesthesia only).  Arrange for a responsible adult to stay with you overnight. If you don't, we may keep you in the hospital overnight, and you may need to pay the costs yourself.  Questions?   If you have any questions for your care team, list them here: _________________________________________________________________________________________________________________________________________________________________________________________________________________________________________________________________________________________________________________________  For informational purposes only. Not to replace the advice of your health care provider. Copyright   9235-3949 [facility]. All rights reserved. Clinically reviewed by Hetal Clement MD. SMARTworks [id number] - REV 07/19.

## 2021-06-13 NOTE — PROGRESS NOTES
"12/17/2020    Start time: 11:01 AM    Stop Time: 11:50 AM   Session # 2    Anand Felix is a 35 y.o. male who is being evaluated via a billable video visit.    Chief Complaint:    Major depression, severe   Generalized Anxiety Disorder  Chronic pain syndrome    The patient has been notified of the following:    \"This video visit will be conducted via a call between you and your provider. We have found that certain health care needs can be provided without the need for an in-person visit. This service lets us provide the care you need with a video conversation\".     \"Video visits are billed at different rates depending on your insurance coverage. Please reach out to your insurance provider with any questions\".     \"If during the course of the call the provider feels a video visit is not appropriate, you will not be charged for this service\".     Patient has been given verbal consent to a video visit: Yes    Patient would like the video invitation sent by: My Chart    Will anyone else be joining your video visit?: No    Video visit details    Type of service: Video visit    Originating Location (pt. Location): Home    Distant Location (provider location): [facility]    Platform used for Video Visit: Esphion    Functional Impairment:   Personal: 3  Family: 3  Work: 3  Social:3    Clinical assessment of mental status: Anand Felix presented on time.  He was open and cooperative, and dressed appropriately for this session and weather. His memory was in tact .  His  speech was appropriate.  Language was appropriate.  Concentration and focus is on task. Psychosis is not noted or reported. He reports his mood is depressed .  Affect is congruent with speech.  Fund of knowledge is adequate. Insight is adequate for therapy.     Suicidal/Homicidal Ideation present: Patient reports passive SI. He denies plan or means. He agrees to follow safety plan to call 911, go to the ER for evaluation and/or call the crisis line " if symptoms worsen or he is feeling unsafe. He describes protective factors, including his wife/family and spiritual belief.     Patient's impression of their current status: Patient reports continued difficulty with depression and anxiety symptoms. Patient struggles with stopping negative thoughts. He reports some improvement with sinus concerns since his surgery.        Therapist impression of patients current state: Patient seems to continue to struggle with depression and anxiety/worry. Patient reports SI, but denies plan or means. He states SI is fleeting.  He reports he is dwelling less often as he is trying to stay busy. Patient reports he is starting to feel some hope lately. He is engaging in some self study re: computer programming and he reports engaging in hobbies as he is able. Patient states it is difficult to sleep, as he has negative thoughts. He states medications help somewhat. Patient is working on exercising for short periods of time, as he is able. Discussed how getting outside has been positive for patient, as he is able, given the decrease in temperatures. Fatigue and physical weakness persist, per his report.  He reports focus/concentration has improved to a small degree. Patient is reaching out to his support system, as he is able. However, he reports it is difficult to talk about his concerns.      Type of psychotherapeutic technique provided: Client centered, CBT and Mindfulness    Progress toward short term goals: Patient is working on increasing coping skills to manage symptoms related to mental health and medical concerns.     Review of long term goals: Not done at today's visit. Last review: 11/16/2020 at evaluation. Time did not permit to review treatment plan. Will review next time.     Diagnosis: Major depression, severe   Generalized Anxiety Disorder  Chronic pain syndrome    Plan and Follow up: Follow pain center plan of care and overall medical plan. Schedule and attend  psychotherapy every 1-2 weeks to further address mental health and chronic pain concerns. Patient understands that sessions are by telephone or video at this time, due to COVID-19 guidelines, until further notice. Practice meditations/coping resources as discussed. Continue to engage in hobbies and go outside daily, as appropriate.       Discharge Criteria/Planning: Patient will continue with follow-up until therapy can be discontinued without return of signs and symptoms.    Alize Livingston 12/17/2020

## 2021-06-13 NOTE — ANESTHESIA POSTPROCEDURE EVALUATION
Patient: Anand Felix  Procedure(s):  Endoscpic sinus surgery, LEFT (Bilateral)  Anesthesia type: general    Patient location: Phase II Recovery  Last vitals:   Vitals Value Taken Time   /78 11/20/20 1205   Temp 36.3  C (97.3  F) 11/20/20 1101   Pulse 76 11/20/20 1205   Resp 16 11/20/20 1205   SpO2 97 % 11/20/20 1205     Post vital signs: stable  Level of consciousness: awake and responds to simple questions  Post-anesthesia pain: pain controlled  Post-anesthesia nausea and vomiting: no  Pulmonary: unassisted, return to baseline  Cardiovascular: stable and blood pressure at baseline  Hydration: adequate  Anesthetic events: no    QCDR Measures:  ASA# 11 - Mayuri-op Cardiac Arrest: ASA11B - Patient did NOT experience unanticipated cardiac arrest  ASA# 12 - Mayuri-op Mortality Rate: ASA12B - Patient did NOT die  ASA# 13 - PACU Re-Intubation Rate: ASA13B - Patient did NOT require a new airway mgmt  ASA# 10 - Composite Anes Safety: ASA10A - No serious adverse event    Additional Notes:

## 2021-06-13 NOTE — PROGRESS NOTES
HISTORY OF PRESENT ILLNESS  Patient comes in for recheck after endoscopic sinus surgery left ethmoid and frontal sinus. He reports that overall he is better, however he still has pressure in the left eye and nose area.       EXAM    CONSTITUTIONAL  General Appearance:  Normal, well developed, well nourished, no obvious distress  Ability to Communicate:  communicates appropriately.    HEAD AND FACE  Appearance and Symmetry:  Normal, no scalp or facial scarring or suspicious lesions.    EYE  Normal external eye, conjunctiva, lids, cornea.     NOSE (speculum or scope)  Left ethmoid cavity suctioned to remove nasapore packing material  Architecture:  Normal, Grossly normal external nasal architecture with no masses or lesions.  Mucosa:  Normal mucosa, No polyps or masses.  Septum:  Normal, Septum non-obstructing.  Turbinates:  Normal, No turbinate abnormalities    NEUROLOGICAL  Speech pattern:  Normal, Proasaic    RESPIRATORY  Symmetry and Respiratory effort:  Normal, Symmetric chest movement and expansion with no increased intercostal retractions or use of accessory muscles.     IMPRESSION  Patient reports improvement after suctioning.    RECOMMENDATION  Start fluticasone and neti pot. If no improvement then follow up in 2-4 weeks.    Leo Wright MD

## 2021-06-14 NOTE — PROGRESS NOTES
"1/21/2021    Start time: 11:03 AM    Stop Time: 11:53 AM   Session # 3    Anand Felix is a 35 y.o. male who is being evaluated via a billable video visit.    Chief Complaint:    Major depression, severe   Generalized Anxiety Disorder  Chronic pain syndrome    The patient has been notified of the following:    \"This video visit will be conducted via a call between you and your provider. We have found that certain health care needs can be provided without the need for an in-person visit. This service lets us provide the care you need with a video conversation\".     \"Video visits are billed at different rates depending on your insurance coverage. Please reach out to your insurance provider with any questions\".     \"If during the course of the call the provider feels a video visit is not appropriate, you will not be charged for this service\".     Patient has been given verbal consent to a video visit: Yes    Patient would like the video invitation sent by: My Chart    Will anyone else be joining your video visit?: No    Video visit details    Type of service: Video visit    Originating Location (pt. Location): Home    Distant Location (provider location): [facility]    Platform used for Video Visit: LeCab    Functional Impairment:   Personal: 3  Family: 3  Work: 3  Social:3    Clinical assessment of mental status: Anand Felix presented on time.  He was open and cooperative, and dressed appropriately for this session and weather. His memory was in tact .  His  speech was appropriate.  Language was appropriate.  Concentration and focus is on task. Psychosis is not noted or reported. He reports his mood is depressed .  Affect is congruent with speech.  Fund of knowledge is adequate. Insight is adequate for therapy.     Suicidal/Homicidal Ideation present: Patient reports passive SI. He denies plan or means. He agrees to follow safety plan to call 911, go to the ER for evaluation and/or call the crisis line " "if symptoms worsen or he is feeling unsafe. He describes protective factors, including his wife/family and spiritual belief.     Patient's impression of their current status: Patient reports continued difficulty with depression and anxiety symptoms. Patient struggles with stopping negative thoughts. Patient reports stressors related to social situations. Patient is working on acceptance of his chronic medical concerns. He is working on identifying future goals.     Therapist impression of patients current state: Patient seems to continue to struggle with depression and anxiety/worry. Patient reports SI, but denies plan or means. He states SI is fleeting.  He reports negative thoughts come and go.  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 Further discussed external and internal pressures to go back to work. Continued to discuss values clarification. Further discussed thoughts and feelings he has related to a recent talk with his cousin. Patient states it is difficult to sleep. He states medications help somewhat. Patient is working on exercising for short periods of time, as he is able. Fatigue and physical weakness persist, per his report. Patient is reaching out to his support system, as he is able. However, he reports he struggles in social situations as he has changed due to his medical issues. Patient reports feeling \"exhausted\" after social interactions and that his pain and weakness increases. Patient seems to be making more effort towards using Mindfulness techniques in daily living, as well as in social situations. Gave patient praise for his efforts. Discussed challenges with acceptance and ways to re-evaluate what is important to him in his life now. Discussed grief/loss as related to physical changes in the past few years.    Type of psychotherapeutic technique provided: Client centered, CBT and Mindfulness    Progress toward " short term goals: Patient is working on increasing coping skills to manage symptoms related to mental health and medical concerns.    Review of long term goals: Not done at today's visit. Last review: 12/22/2020    Diagnosis: Major depression, severe   Generalized Anxiety Disorder  Chronic pain syndrome    Plan and Follow up: Follow pain center plan of care and overall medical plan. Schedule and attend psychotherapy every 1-2 weeks to further address mental health and chronic pain concerns. Patient understands that sessions are by telephone or video at this time, due to COVID-19 guidelines, until further notice. Practice meditations/coping resources as discussed. Continue to engage in hobbies and go outside daily, as appropriate. Continue to discuss future planning.       Discharge Criteria/Planning: Patient will continue with follow-up until therapy can be discontinued without return of signs and symptoms.    Alize Livingston 1/21/2021

## 2021-06-14 NOTE — PROGRESS NOTES
"12/22/2020    Start time: 1:04 PM    Stop Time: 1:54 PM   Session # 3    Anand Felix is a 35 y.o. male who is being evaluated via a billable video visit.    Chief Complaint:    Major depression, severe   Generalized Anxiety Disorder  Chronic pain syndrome    The patient has been notified of the following:    \"This video visit will be conducted via a call between you and your provider. We have found that certain health care needs can be provided without the need for an in-person visit. This service lets us provide the care you need with a video conversation\".     \"Video visits are billed at different rates depending on your insurance coverage. Please reach out to your insurance provider with any questions\".     \"If during the course of the call the provider feels a video visit is not appropriate, you will not be charged for this service\".     Patient has been given verbal consent to a video visit: Yes    Patient would like the video invitation sent by: My Chart    Will anyone else be joining your video visit?: No    Video visit details    Type of service: Video visit    Originating Location (pt. Location): Home    Distant Location (provider location): [facility]    Platform used for Video Visit: BrainRush    Functional Impairment:   Personal: 3  Family: 3  Work: 3  Social:3    Clinical assessment of mental status: Anand Felix presented on time.  He was open and cooperative, and dressed appropriately for this session and weather. His memory was in tact .  His  speech was appropriate.  Language was appropriate.  Concentration and focus is on task. Psychosis is not noted or reported. He reports his mood is depressed  .  Affect is congruent with speech.  Fund of knowledge is adequate. Insight is adequate for therapy.     Suicidal/Homicidal Ideation present: Patient reports passive SI. He denies plan or means. He agrees to follow safety plan to call 911, go to the ER for evaluation and/or call the crisis line " if symptoms worsen or he is feeling unsafe. He describes protective factors, including his wife/family and spiritual belief.     Patient's impression of their current status: Patient reports continued difficulty with depression and anxiety symptoms. Patient struggles with stopping negative thoughts. Patient reports stressors related to social situations.        Therapist impression of patients current state:  Patient seems to continue to struggle with depression and anxiety/worry. Patient reports SI, but denies plan or means. He states SI is fleeting.  He reports negative thoughts decrease when he stays busy.  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 Patient states it is difficult to sleep. He states medications help somewhat. Patient is working on exercising for short periods of time, as he is able. Discussed how getting outside has been positive for patient, as he is able. Fatigue and physical weakness persist, per his report.  He reports focus/concentration has improved to a small degree. Patient is reaching out to his support system, as he is able. However, he reports he struggles in social situations as he has changed due to his medical issues. Discussed difficult feelings related to these changes. Presented grounding exercise and patient seemed to benefit.     Type of psychotherapeutic technique provided: Client centered, CBT and Mindfulness    Progress toward short term goals: Patient is working on increasing coping skills to manage symptoms related to mental health and medical concerns.   Review of long term goals: Patient verbally agreed to treatment plan this date. Could not sign, due to video visit.     Diagnosis: Major depression, severe   Generalized Anxiety Disorder  Chronic pain syndrome    Plan and Follow up: Follow pain center plan of care and overall medical plan. Schedule and attend psychotherapy every 1-2 weeks to further  address mental health and chronic pain concerns. Patient understands that sessions are by telephone or video at this time, due to COVID-19 guidelines, until further notice. Practice meditations/coping resources as discussed. Continue to engage in hobbies and go outside daily, as appropriate. Continue to discuss future planning.       Discharge Criteria/Planning: Patient will continue with follow-up until therapy can be discontinued without return of signs and symptoms.    Alize Livingston 12/22/2020

## 2021-06-14 NOTE — PROGRESS NOTES
Anand Felix is a 35 y.o. male who is being evaluated via a billable video visit.      How would you like to obtain your AVS? AVS Preference: KupiVIPhart.  If dropped by the video visit, the video invitation should be sent to: Text to cell phone: [phone] DOXIMITY  Will anyone else be joining your video visit? No     Pain score: 3  Constant   What does your pain feel like: Sharp, burning  Does the pain interfere with:  Work: yes  Walking: yes  Sleep: yes  Daily activities: yes  Relationships: yes  F=8

## 2021-06-14 NOTE — PATIENT INSTRUCTIONS - HE
Plan:   Interventions-    Follow up in 4-8 weeks     Try changing your oils at home to coconut and advocado for frying and olive oil for salads    Stop gluten, whey and sugar.     Drink water with fresh claire squeezed into it daily to help with digestion    Continue the use of the magnesium at bedtime with water     Continue the use of the medical cannabis as you can    Ok to continue the use of the percocet at this time    Pay attention to stools       This limited telehealth/televideo encounter is due to the Covid 19,  as required by the governmental agencies at this time due to risk of transmission of the pandemic, therefore testing availability is limited as well as physical exams.      Prescription Drug Management will be continued by the [facility]    Orders placed today  Medications that were ordered today   Requested Prescriptions     Signed Prescriptions Disp Refills     oxyCODONE-acetaminophen (PERCOCET/ENDOCET) 7.5-325 mg per tablet 84 tablet 0     Sig: Take 1 tablet by mouth 3 (three) times a day as needed for pain.     oxyCODONE-acetaminophen (PERCOCET/ENDOCET) 7.5-325 mg per tablet 84 tablet 0     Sig: Take 1 tablet by mouth 3 (three) times a day as needed for pain.     No orders of the defined types were placed in this encounter.        The patient understand todays plan and has their questions answered in regards to expectations and current treatment plan.     Prevent unexpected access/loss of medication: Keep medication locked. Only carry what you need with you    The plan of care will be adjusted to accommodate the issues discussed, discussing management of their care and follow up that is recommended. 1/8/2021         Dayna Lundberg CNP  [facility]  [address]  [place] [address]  Ph: [phone]  Fax: [phone]

## 2021-06-14 NOTE — PROGRESS NOTES
"1/7/2021    Start time: 11:05 AM    Stop Time: 11:55 AM   Session # 1    Anand Felix is a 35 y.o. male who is being evaluated via a billable video visit.    Chief Complaint:    Major depression, severe   Generalized Anxiety Disorder  Chronic pain syndrome    The patient has been notified of the following:    \"This video visit will be conducted via a call between you and your provider. We have found that certain health care needs can be provided without the need for an in-person visit. This service lets us provide the care you need with a video conversation\".     \"Video visits are billed at different rates depending on your insurance coverage. Please reach out to your insurance provider with any questions\".     \"If during the course of the call the provider feels a video visit is not appropriate, you will not be charged for this service\".     Patient has been given verbal consent to a video visit: Yes    Patient would like the video invitation sent by: My Chart    Will anyone else be joining your video visit?: No    Video visit details    Type of service: Video visit    Originating Location (pt. Location): Home    Distant Location (provider location): [facility]    Platform used for Video Visit: Sticky    Functional Impairment:   Personal: 3  Family: 3  Work: 3  Social:3    Clinical assessment of mental status: Anand Felix presented on time.  He was open and cooperative, and dressed appropriately for this session and weather. His memory was in tact .  His  speech was appropriate.  Language was appropriate.  Concentration and focus is on task. Psychosis is not noted or reported. He reports his mood is depressed .  Affect is congruent with speech.  Fund of knowledge is adequate. Insight is adequate for therapy.     Suicidal/Homicidal Ideation present:  Patient reports passive SI. He denies plan or means. He agrees to follow safety plan to call 911, go to the ER for evaluation and/or call the crisis line " if symptoms worsen or he is feeling unsafe. He describes protective factors, including his wife/family and spiritual belief.     Patient's impression of their current status: Patient reports continued difficulty with depression and anxiety symptoms. Patient struggles with stopping negative thoughts. Patient reports stressors related to social situations. Patient is working on acceptance of his chronic medical concerns.    Therapist impression of patients current state: Patient seems to continue to struggle with depression and anxiety/worry. Patient reports SI, but denies plan or means. He states SI is fleeting.  He reports negative thoughts decrease when he stays busy.  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 Patient states it is difficult to sleep. He states medications help somewhat. Patient is working on exercising for short periods of time, as he is able. Fatigue and physical weakness persist, per his report. Patient is reaching out to his support system, as he is able. However, he reports he struggles in social situations as he has changed due to his medical issues. He cried as he discussed what triggers difficult feelings related to these changes. Patient seems to be making more effort towards using Mindfulness techniques in daily living, as well as in social situations. Gave patient praise for his efforts. Discussed challenges with acceptance and ways to re-evaluate what is important to him in his life now. Discussed grief/loss as related to physical changes in the past few years.      Type of psychotherapeutic technique provided: Client centered, CBT and Mindfulness    Progress toward short term goals: Patient is working on increasing coping skills to manage symptoms related to mental health and medical concerns.    Review of long term goals: Not done at today's visit. Last review: 12/22/2020    Diagnosis: Major  depression, severe   Generalized Anxiety Disorder  Chronic pain syndrome    Plan and Follow up: Follow pain center plan of care and overall medical plan. Schedule and attend psychotherapy every 1-2 weeks to further address mental health and chronic pain concerns. Patient understands that sessions are by telephone or video at this time, due to COVID-19 guidelines, until further notice. Practice meditations/coping resources as discussed. Continue to engage in hobbies and go outside daily, as appropriate. Continue to discuss future planning.       Discharge Criteria/Planning: Patient will continue with follow-up until therapy can be discontinued without return of signs and symptoms.    Alize Livingston 1/7/2021

## 2021-06-14 NOTE — PROGRESS NOTES
"12/28/2020    Start time: 1:05 PM  Stop Time: 1:55 PM   Session # 4    Anand Felix is a 35 y.o. male who is being evaluated via a billable video visit.    Chief Complaint:    Major depression, severe   Generalized Anxiety Disorder  Chronic pain syndrome    The patient has been notified of the following:    \"This video visit will be conducted via a call between you and your provider. We have found that certain health care needs can be provided without the need for an in-person visit. This service lets us provide the care you need with a video conversation\".     \"Video visits are billed at different rates depending on your insurance coverage. Please reach out to your insurance provider with any questions\".     \"If during the course of the call the provider feels a video visit is not appropriate, you will not be charged for this service\".     Patient has been given verbal consent to a video visit: Yes    Patient would like the video invitation sent by: My Chart    Will anyone else be joining your video visit?: No    Video visit details    Type of service: Video visit    Originating Location (pt. Location): Home    Distant Location (provider location): [facility]    Platform used for Video Visit: Mobypark    Functional Impairment:   Personal: 3  Family: 3  Work: 3  Social:3    Clinical assessment of mental status: Anand Felix presented on time.  He was open and cooperative, and dressed appropriately for this session and weather. His memory was in tact .  His  speech was appropriate.  Language was appropriate.  Concentration and focus is on task. Psychosis is not noted or reported. He reports his mood is depressed .  Affect is congruent with speech.  Fund of knowledge is adequate. Insight is adequate for therapy.     Suicidal/Homicidal Ideation present: Patient reports passive SI. He denies plan or means. He agrees to follow safety plan to call 911, go to the ER for evaluation and/or call the crisis line if " symptoms worsen or he is feeling unsafe. He describes protective factors, including his wife/family and spiritual belief.     Patient's impression of their current status: Patient reports continued difficulty with depression and anxiety symptoms. Patient struggles with stopping negative thoughts. Patient reports stressors related to social situations.        Therapist impression of patients current state: Patient seems to continue to struggle with depression and anxiety/worry. Patient reports SI, but denies plan or means. He states SI is fleeting.  He reports negative thoughts decrease when he stays busy.  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 Patient states it is difficult to sleep. He states medications help somewhat. Patient is working on exercising for short periods of time, as he is able. Discussed how getting outside has been positive for patient, as he is able. Fatigue and physical weakness persist, per his report. Patient is reaching out to his support system, as he is able. However, he reports he struggles in social situations as he has changed due to his medical issues. Discussed difficult feelings related to these changes. It seems these interactions are overwhelming and raise symptoms of anxiety. Discussed feelings expression and how to do this in healthy ways. Encouraged patient to engage in resoucing after the session, as time did not permit today.     Type of psychotherapeutic technique provided: Client centered, CBT and Mindfulness    Progress toward short term goals:Patient is working on increasing coping skills to manage symptoms related to mental health and medical concerns.     Review of long term goals: Not done at today's visit. Last review: 12/22/2020    Diagnosis: Major depression, severe   Generalized Anxiety Disorder  Chronic pain syndrome    Plan and Follow up: Follow pain center plan of care and overall medical  plan. Schedule and attend psychotherapy every 1-2 weeks to further address mental health and chronic pain concerns. Patient understands that sessions are by telephone or video at this time, due to COVID-19 guidelines, until further notice. Practice meditations/coping resources as discussed. Continue to engage in hobbies and go outside daily, as appropriate. Continue to discuss future planning.       Discharge Criteria/Planning: Patient will continue with follow-up until therapy can be discontinued without return of signs and symptoms.    Alize Livingston 12/28/2020

## 2021-06-14 NOTE — PROGRESS NOTES
"Anand Felix is a 35 y.o. male who is being evaluated with [facility] or [facility] services- via video     \"This telephone/video visit will be conducted via a call between you and your physician/provider. We have found that certain health care needs can be provided without the need for a physical exam.  This service lets us provide the care you need with a short phone conversation.  If a prescription is necessary we can send it directly to your pharmacy.  If lab work is needed we can place an order for that and you can then stop by our lab to have the test done at a later time.    If during the course of the call the physician/provider feels a telephone/video visit is not appropriate, you will not be charged for this service.\"     Start time- 2:09 pm   End time- 2:28 pm     Anand Felix complains of    Chief Complaint   Patient presents with     Pain     multi site       I have reviewed and updated the patient's Past Medical History, Social History, Family History and Medication List as well as the Precharting workup by the [facility].       PAIN CLINIC FOLLOW UP PROGRESS NOTE    CC:  Chief Complaint   Patient presents with     Pain     multi site       HPI  Anand Felix is a 35 y.o. male who presents for evaluation   Chief Complaint   Patient presents with     Pain     multi site    that is causing continued pain. Specific questions indicated the patient wanted addressed today include: chronic pain managment      Major issues:  1. Chronic pain syndrome    2. Myalgia    3. Cervicalgia    4. Arthralgias In Multiple Sites        Pain score: 3  Constant   What does your pain feel like: Sharp, burning  Does the pain interfere with:  Work: yes  Walking: yes  Sleep: yes  Daily activities: yes  Relationships: yes  F=8    ALLERGIES  Patient has no known allergies.    Previous Medical History  Social History     Substance and Sexual Activity   Alcohol Use Yes    Comment: Monthly or less often. 1-2 drinks at a time.     Social History "     Substance and Sexual Activity   Drug Use Yes     Types: Marijuana     Social History     Tobacco Use   Smoking Status Never Smoker   Smokeless Tobacco Never Used       Pertinent Pain Medications/interventions:    D/c belbuca at this time, will increase the use of the gabapentin to 300 mg two times a day and continue the use of the medical cannabis and percocet up to 3 per day as needed- 5/18/2020     He currently belbuca 300 mcg two times a day, gabapentin 100 mg three times a day, oxycodone 5 mg two times a day prn and medical cannabis, tizanidine and Norco.      Belbuca- headaches are associated so he stopped taking this. 9/6     Previously taking Belbuca 75 mcg, gabapentin 100 mg three times a day, oxycodone 5 mg up to two times a day as needed, medical cannabis, baclofen and flexeril      RFA did provide him with 80 % relief.      Medical cannabis    Review of Systems:  12 point systems were reviewed with pt as documented on on previous exams. Any new diagnosis or new medication is reviewed today.     Medications    Current Outpatient Medications:      acetaminophen (TYLENOL) 500 MG tablet, Take 1 tablet (500 mg total) by mouth every 6 (six) hours as needed for pain., Disp: , Rfl: 0     cholecalciferol, vitamin D3, 2,000 unit cap, Take 2,000 Units by mouth daily., Disp: , Rfl:      fluticasone propionate (FLONASE) 50 mcg/actuation nasal spray, SPRAY 2 SPRAYS INTO EACH NOSTRIL EVERY DAY, Disp: 16 g, Rfl: 3     gabapentin (NEURONTIN) 100 MG capsule, Take 100 mg by mouth 3 (three) times a day., Disp: 90 capsule, Rfl: 1     NON FORMULARY, MEDICAL CANNABIS, Disp: , Rfl:      omeprazole (PRILOSEC) 20 MG capsule, TAKE 1 CAPSULE BY MOUTH DAILY, Disp: 90 capsule, Rfl: 3     propranoloL (INDERAL) 80 MG tablet, TAKE 1 TAB BY MOUTH THREE TIMES DAILY, Disp: 270 tablet, Rfl: 3     rivaroxaban ANTICOAGULANT (XARELTO) 20 mg tablet, Take 1 tablet (20 mg total) by mouth daily. Start 20 mg daily after the 15 mg daily for 21  days., Disp: 30 tablet, Rfl: 11     rizatriptan (MAXALT-MLT) 10 MG disintegrating tablet, DISSOLVE 1 TABLET ON TONGUE AS NEEDED FOR MIGRAINE. MAY REPEAT IN 2HRS IF NEEDED, Disp: 12 tablet, Rfl: 6     zolpidem (AMBIEN) 10 mg tablet, Take 1 tablet (10 mg total) by mouth at bedtime as needed for sleep., Disp: 30 tablet, Rfl: 2     [START ON 1/20/2021] oxyCODONE-acetaminophen (PERCOCET/ENDOCET) 7.5-325 mg per tablet, Take 1 tablet by mouth 3 (three) times a day as needed for pain., Disp: 84 tablet, Rfl: 0     [START ON 2/17/2021] oxyCODONE-acetaminophen (PERCOCET/ENDOCET) 7.5-325 mg per tablet, Take 1 tablet by mouth 3 (three) times a day as needed for pain., Disp: 84 tablet, Rfl: 0      Physical Exam- limited exam   General- patient is alert and oriented, in NAD, well-groomed, well-nourished  Psych- Judgment and insight normal, AOx4, recent and remote memory normal, mood and affect normal  Eyes- pupils are symmetrical, conjunctiva is clear bilaterally, no ptosis is noted.   Respiratory- Breathing appears non-labored  Integumentary- coloring of skin appears normal.   Musculoskeletal- patient is moving all extremities that are visible. Burning sensation around his right thigh and his shoulders- R>L    Lab:  Last UDS on 1/27/2020 had expected results. Last UDT on file was reviewed.    Imaging:  No new imaging reviewed with patient over the phone, no new orders placed       Recent   Dated 1/8/2021 was reviewed with the patient today.       Assessment:     Anand Felix is a 35 y.o. male seen in clinic today for   Chief Complaint   Patient presents with     Pain     multi site       They are here for follow up and continued medical management of their pain. Today we reviewed the plan of care for their chronic pain and determined that the patient will need a refill of the current prescription.      Due to the Covid 19 precautions all visits are converted to telephone encounters until the government restrictions are lifted and  the precautions have been lifted.     New concerns today-he has noted pain around eating fried food gives him an upset stomach  Patient denies side effects from the current regimen  Plan of care going forward for ongoing pain control- the GI upset with certain foods indicates maldigestion to some degree, education provided around the digestion track and eliminating foods that trigger pain, likely he has fibromyalgia with his symptoms. Will work on a more cellular level prior to making any changes to his opioid regimen. If the patient suspects a gallbladder issue that is persistent he knows to go to the ED.   Patients current MME is 34    Plan:   Interventions-    Follow up in 4-8 weeks     Try changing your oils at home to coconut and advocado for frying and olive oil for salads    Stop gluten, whey and sugar.     Drink water with fresh claire squeezed into it daily to help with digestion    Continue the use of the magnesium at bedtime with water     Continue the use of the medical cannabis as you can    Ok to continue the use of the percocet at this time    Pay attention to stools       This limited telehealth/televideo encounter is due to the Covid 19,  as required by the governmental agencies at this time due to risk of transmission of the pandemic, therefore testing availability is limited as well as physical exams.      Prescription Drug Management will be continued by the [facility]    Orders placed today  Medications that were ordered today   Requested Prescriptions     Signed Prescriptions Disp Refills     oxyCODONE-acetaminophen (PERCOCET/ENDOCET) 7.5-325 mg per tablet 84 tablet 0     Sig: Take 1 tablet by mouth 3 (three) times a day as needed for pain.     oxyCODONE-acetaminophen (PERCOCET/ENDOCET) 7.5-325 mg per tablet 84 tablet 0     Sig: Take 1 tablet by mouth 3 (three) times a day as needed for pain.     No orders of the defined types were placed in this encounter.        The patient  understand todays plan and has their questions answered in regards to expectations and current treatment plan.     Prevent unexpected access/loss of medication: Keep medication locked. Only carry what you need with you    The plan of care will be adjusted to accommodate the issues discussed, discussing management of their care and follow up that is recommended. 1/8/2021         [facility]  [address] [address]  [place] [address]  Ph: [phone]  Fax: [phone]

## 2021-06-14 NOTE — PROGRESS NOTES
"1/14/2021    Start time: 1:04 PM    Stop Time: 1:31 PM   Session # 2    Anand Felix is a 35 y.o. male who is being evaluated via a billable video visit.    Chief Complaint:    Major depression, severe   Generalized Anxiety Disorder  Chronic pain syndrome    The patient has been notified of the following:    \"This video visit will be conducted via a call between you and your provider. We have found that certain health care needs can be provided without the need for an in-person visit. This service lets us provide the care you need with a video conversation\".     \"Video visits are billed at different rates depending on your insurance coverage. Please reach out to your insurance provider with any questions\".     \"If during the course of the call the provider feels a video visit is not appropriate, you will not be charged for this service\".     Patient has been given verbal consent to a video visit: Yes    Patient would like the video invitation sent by: My Chart    Will anyone else be joining your video visit?: No    Video visit details    Type of service: Video visit    Originating Location (pt. Location): Home    Distant Location (provider location): [facility]    Platform used for Video Visit: dscout    Functional Impairment:   Personal: 3  Family: 3  Work: 3  Social:3    Clinical assessment of mental status: Anand Felix presented on time.  He was open and cooperative, and dressed appropriately for this session and weather. His memory was in tact .  His  speech was appropriate.  Language was appropriate.  Concentration and focus is on task. Psychosis is not noted or reported. He reports his mood is depressed .  Affect is congruent with speech.  Fund of knowledge is adequate. Insight is adequate for therapy.     Suicidal/Homicidal Ideation present: Patient reports passive SI. He denies plan or means. He agrees to follow safety plan to call 911, go to the ER for evaluation and/or call the crisis line if " "symptoms worsen or he is feeling unsafe. He describes protective factors, including his wife/family and spiritual belief.     Patient's impression of their current status: Patient reports continued difficulty with depression and anxiety symptoms. Patient struggles with stopping negative thoughts. Patient reports stressors related to social situations. Patient is working on acceptance of his chronic medical concerns. He states \"I feel pressure to work\".     Therapist impression of patients current state:  Patient seems to continue to struggle with depression and anxiety/worry. Patient reports SI, but denies plan or means. He states SI is fleeting.  He reports negative thoughts come and go.  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 He does report some pressure from extended family to work right now, despite his health concerns. Discussed difficult feelings, including shame, related to this.  Patient states it is difficult to sleep. He states medications help somewhat. Patient is working on exercising for short periods of time, as he is able. Fatigue and physical weakness persist, per his report. Patient is reaching out to his support system, as he is able. However, he reports he struggles in social situations as he has changed due to his medical issues. He processed thoughts and feelings related to a recent talk with a cousin. Patient seems to be making more effort towards using Mindfulness techniques in daily living, as well as in social situations. Gave patient praise for his efforts. Discussed challenges with acceptance and ways to re-evaluate what is important to him in his life now. Discussed grief/loss as related to physical changes in the past few years. Patient asked to end the session early, as there was an interruption from family in the house, and he no longer had a private place to talk. Will follow up next session.     Type of " psychotherapeutic technique provided: Client centered, CBT and Mindfulness    Progress toward short term goals: Patient is working on increasing coping skills to manage symptoms related to mental health and medical concerns.    Review of long term goals: Not done at today's visit. Last review: 12/22/2020    Diagnosis: Major depression, severe   Generalized Anxiety Disorder  Chronic pain syndrome    Plan and Follow up: Follow pain center plan of care and overall medical plan. Schedule and attend psychotherapy every 1-2 weeks to further address mental health and chronic pain concerns. Patient understands that sessions are by telephone or video at this time, due to COVID-19 guidelines, until further notice. Practice meditations/coping resources as discussed. Continue to engage in hobbies and go outside daily, as appropriate. Continue to discuss future planning.          Discharge Criteria/Planning: Patient will continue with follow-up until therapy can be discontinued without return of signs and symptoms.    Alize Livingston 1/14/2021

## 2021-06-14 NOTE — TELEPHONE ENCOUNTER
Refill Approved    Rx renewed per Medication Renewal Policy. Medication was last renewed on 2/17/2020.    Kanika Waddell, [facility] Triage/Med Refill 12/23/2020     Requested Prescriptions   Pending Prescriptions Disp Refills     fluticasone propionate (FLONASE) 50 mcg/actuation nasal spray [Pharmacy Med Name: FLUTICASONE PROP 50 MCG SPRAY] 16 g 3     Sig: SPRAY 2 SPRAYS INTO EACH NOSTRIL EVERY DAY       Nasal Steroid Refill Protocol Passed - 12/21/2020 12:40 PM        Passed - Patient has had office visit/physical in last 2 years     Last office visit with prescriber/PCP: 3/4/2020 OR same dept: 3/4/2020 Malachi Choudhary MD OR same specialty: 3/4/2020 Malachi Choudhary MD Last physical: 5/1/2019 Last MTM visit: Visit date not found    Next appt within 3 mo: Visit date not found  Next physical within 3 mo: Visit date not found  Prescriber OR PCP: Malachi Choudhary MD  Last diagnosis associated with med order: 1. Nasal polyposis  - fluticasone propionate (FLONASE) 50 mcg/actuation nasal spray [Pharmacy Med Name: FLUTICASONE PROP 50 MCG SPRAY]; SPRAY 2 SPRAYS INTO EACH NOSTRIL EVERY DAY  Dispense: 16 g; Refill: 3     If protocol passes may refill for 12 months if within 3 months of last provider visit (or a total of 15 months).

## 2021-06-15 ENCOUNTER — COMMUNICATION - HEALTHEAST (OUTPATIENT)
Dept: FAMILY MEDICINE | Facility: CLINIC | Age: 36
End: 2021-06-15

## 2021-06-15 DIAGNOSIS — F33.1 MODERATE EPISODE OF RECURRENT MAJOR DEPRESSIVE DISORDER (H): ICD-10-CM

## 2021-06-15 NOTE — PROGRESS NOTES
2/7/2018    Start time: 1:00 PM    Stop Time: 1:50 PM   Session # 2    Anand Felix is a 32 y.o. male is being seen today for    Chief Complaint   Patient presents with     Mental Health Note   .  Major depression, severe  History of Generalized Anxiety Disorder  Chronic pain syndrome    New symptoms or complaints: None    Functional Impairment:   Personal: 4  Family: 4  Work: 4  Social:4    Clinical assessment of mental status: Anand Felix presented on time.  He was open and cooperative, and dressed appropriately for this session and weather. His memory was in tact .  His  speech was appropriate/soft spoken.  Language was appropriate/soft spoken.  Concentration and focus is on task. Psychosis is not noted or reported. He reports his mood is depressed and anxious .  Affect is congruent with speech.  Fund of knowledge is adequate. Insight is adequate for therapy.     Suicidal/Homicidal Ideation present: Patient reports passive suicidal ideation. He denies plan or means. Discussed safety plan to go to ER, call 911 and/or call the crisis connection if symptoms worsen or you are feeling unsafe. Gave patient a list of mental health crisis numbers, if needed.     Patient's impression of their current status: Patient reported he continues to be working on addressing symptoms of depression (feeling sad/angry). He reports sleep has been difficult, but medication change has been helpful. He also reports his pain has been better managed with current medications. Patient reports he continues to have negative thinking and this has been challenging. Patient reported he has a daily routine that has been helpful. He described some coping skills that he uses in daily living.  Patient reports he continues to have weakness and pain, and that the weakness/fatigue is most difficult to manage. He reports some increased pain with engaging in more household chores and he has experienced some falling. Patient discussed recent ER visit to address  chest pain/GERD. He does report reaching out to his support group, at times. Patient reports conflict regarding the changes in his life due to chronic pain/medical concerns. He cried as he discussed this. He continues to get help from the care guide at his primary care clinic. Patient participated in a light stream meditation and expressed some benefit.      Therapist impression of patients current state: Patient seems to be working on addressing depression and chronic medical symptoms. He appears to be working on improving mood and attitude, but this appears to be a challenge. Patient appears to be finding some benefit with his current medications, especially in regards to sleep and pain management. Patient seems to have experienced some losses with changes in his medical health, especially in regards to physical activity. It would likely be worthwhile to continue work on addressing role/identity now, given patient's health concerns. It seems the care guide at his primary care clinic has been helpful and this has reduced stress for patient. Patient appears to have some challenges with household activities, but appears to be reaching out to his support system. Discussed potential benefits of Mindfulness meditation with patient and also continued to educate on EMDR. Presented a light stream meditation and patient appeared to benefit. Encouraged home practice.     Type of psychotherapeutic technique provided: Client centered, CBT and Mindfulness    Progress toward short term goals: Patient is following pain center plan of care. Patient continues to follow medical recommendations. Patient reports continuing to work on building coping skills to manage symptoms. Time did not permit to review treatment plan. Will review next session.     Review of long term goals: Not done at today's visit    Diagnosis:  Major depression, severe  History of Generalized Anxiety Disorder  Chronic pain syndrome    Plan and Follow up: Patient  to follow pain center plan of care. Patient to follow all medical recommendations. Patient to schedule every 2 weeks for psychotherapy to further address depression, anxiety and chronic pain concerns. Will continue to educate on EMDR and Mindfulness meditation. Practice meditation as presented in session. Further address adjustment to role/identity due to chronic pain and health concerns.       Discharge Criteria/Planning: Patient will continue with follow-up until therapy can be discontinued without return of signs and symptoms.    Alize Livingston 2/7/2018

## 2021-06-15 NOTE — PROGRESS NOTES
"Assessment:    1. GERD (gastroesophageal reflux disease)     2. Chronic pain syndrome     3. Chronic ethmoidal sinusitis     4. Urinary retention           Plan:    Patient continues omeprazole 20 mg daily to complete minimum 14 day course otherwise famotidine 20 mg twice daily as needed for breakthrough symptoms.  Will notify persistent concerns or worsening.  May be associated with initiation of OxyContin 10 mg currently using every morning +12 hours later typically with use oxycodone 5 mg tablet in the evening for chronic pain management.  May discontinue omeprazole after 2 week course if asymptomatic otherwise can continue and will reassess at scheduled follow-up March 20, 2018.  Patient does feel better after recent antibiotic treatment for chronic ethmoidal sinusitis.  Mild urinary retention likely associate with OxyContin and will continue to monitor currently without significant associated concerns.        Subjective:    Anand Felix is seen today for emergency room follow-up.      Reviewed ER note:  \"32-year-old male presented to the ED for evaluation of chest pain.  Patient stated that the chest pain was a \"burning\" sensation that travels from his epigastric region up to his neck.  Here in the ED the patient was noted to be tachycardic but he was otherwise hemodynamically stable.  Of note, patient states that he is persistently tachycardic which is his baseline.  The patient's physical exam was unremarkable.  EKG reveals sinus tachycardia without any new or concerning ST or T-wave changes.  The patient's INR was therapeutic at 2.24.  The chest x-ray, troponin, CBC, and BMP were unremarkable.  The patient was given a GI cocktail and IV famotidine to treat suspected GERD.  The patient was reevaluated and informed of the workup results.  At the time of reevaluation the patient reports noticeable improvement with his substernal discomfort after receiving the GI cocktail.  However, he states that the pain returned " "a short while after the effects of the GI cocktail seemed to wear off.  However, he states that overall his pain is still better than when he initially arrived to the ED. The patient's symptoms likely represent GERD.  He was sent home with omeprazole to take daily for the next 2 weeks as well as oral famotidine to take as needed.  The patient was instructed to follow-up with his primary care physician for reevaluation or to return ED sooner for any worsening pain or any other new or concerning symptoms.\"      Patient does feel some improvement with omeprazole 20 mg daily and still using famotidine 20 mg twice daily on as-needed basis for breakthrough symptoms.  Does feel that there is some correlation with use of OxyContin 10 mg every 12 hours and told to use at least once a day in the morning and then use oxycodone 5 mg later in the day for chronic pain syndrome management per pain clinic.  No other change in medications prior to reflux symptom development.  Some urinary retention issues associated with OxyContin perhaps needs to drink 2 glasses of water in order to empty bladder completely.  Mild symptoms otherwise.  No back pain.  No fevers or chills.     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 Bernal follows  Children - none  Employed - working on disability - monoparesis  Surgeries - nasal surgery; low back surgery (Dr. Jeannine Hernandez, 2015) with residual right leg weakness  Tobacco use - none  ETOH - none  Family hx   Mother - living - high chol. Strokes. MI.   Father - living - unknown  Siblings - living - 4 sisters, 2 brother -    Bilateral AFO braces worn due to muscle weakness and foot drop    Past Surgical History:   Procedure Laterality Date     MINIMALLY INVASIVE MICRODISCECTOMY LUMBAR SPINE Right 10/2015    L5-S1; Dr. Bettie Hernandez     SINUS SURGERY  2014    Dr. Wright        Family History   Problem Relation Age of Onset     Depression Mother      Hyperlipidemia Mother  " "    Other Mother      \"Ulcers\" - Aches and pains throughout her body.     Cancer Father      No Medical Problems Sister      No Medical Problems Brother      No Medical Problems Sister      No Medical Problems Sister      No Medical Problems Sister      No Medical Problems Brother      Thyroid disease Maternal Grandmother      Thyroid disease Cousin         Past Medical History:   Diagnosis Date     Bulging lumbar disc      Lumbar herniated disc 10/23/2015     Lumbar radiculopathy 11/25/2015        Social History   Substance Use Topics     Smoking status: Never Smoker     Smokeless tobacco: Never Used     Alcohol use Yes      Comment: Monthly or less often. 1-2 drinks at a time.        Current Outpatient Prescriptions   Medication Sig Dispense Refill     acetaminophen (TYLENOL) 500 MG tablet Take 1 tablet (500 mg total) by mouth every 6 (six) hours as needed for pain.  0     cholecalciferol, vitamin D3, 2,000 unit cap Take 2,000 Units by mouth daily.       cyclobenzaprine (FLEXERIL) 10 MG tablet 1/2 to 1 tab twice a day as needed 60 tablet 0     famotidine (PEPCID) 20 MG tablet Take 1 tablet (20 mg total) by mouth 2 (two) times a day as needed for heartburn. 30 tablet 0     fluticasone (FLONASE) 50 mcg/actuation nasal spray 2 sprays into each nostril daily. 16 g 6     nortriptyline (PAMELOR) 50 MG capsule Take 1 capsule (50 mg total) by mouth at bedtime. 30 capsule 0     omeprazole (PRILOSEC) 20 MG capsule Take 1 capsule (20 mg total) by mouth daily for 14 days. 14 capsule 0     oxyCODONE (OXYCONTIN) 10 mg 12 hr tablet Take 1 tablet (10 mg total) by mouth every 12 (twelve) hours. 60 each 0     oxyCODONE (ROXICODONE) 5 MG immediate release tablet TAKE 1 TABLET BY MOUTH UP TO 4 TIMES DAILY AS NEEDED FOR PAIN 90 tablet 0     rivaroxaban 20 mg Tab Take 1 tablet (20 mg total) by mouth daily. 30 tablet 3     No current facility-administered medications for this visit.           Objective:    Vitals:    02/06/18 1110   BP: " 110/78   Pulse: 100   Weight: 140 lb (63.5 kg)      Body mass index is 23.3 kg/(m^2).    Alert.  No apparent distress at rest.  Smiling.  Does transfer slowly and uses cane to assist with ambulation with bilateral AFO brace lower extremity noted.  Chest clear.  Cardiac exam regular.  Pulse 100 noted on presentation.

## 2021-06-15 NOTE — PROGRESS NOTES
"RN Recommendations and Referrals  See below for action plan    Action Plan    RN Will  Will add the patient to [facility] RN tracking list  Be available to the patient as nursing needs arise    Care Guide Will  Within 2 weeks from the RN assessment visit, by 1/23/18: Help the patient coordinate goal setting visit if not already coordinated. Flynn Ward Guide, scheduled goal setting appointment for 1/19/18   At goal setting visit : Review goals set at PCAM visit and create or add to action plan.   At or before, goal setting visit : Schedule appointment with [facility] SW or [facility] to review medical insurance options.    At goal setting visit : Schedule appointment with someone in [facility] billing department, to assist with developing a payment plan for pt's medical bills.   At goal setting visit : Check if pt needs to apply for [employer]    Goals       \"I I want my pain to be at a manageable  level most of the time.\" (pt-stated)            Action steps to achieve this goal  1.  I will attend appointments at [facility].  2.  I will use medications ordered for pain management as directed. I will abide by Opioid agreement and random urine drug testing requests.  3.      Date goal set:  1/9/18        \"I need help managing my medical bills.\" (pt-stated)            Action steps to achieve this goal  1.  I will discuss, what steps I need to take to manage my medical bills, with my Care Guide.\"  2.      Date goal set:  1/9/18        \"I need transportation to medical appointments.\" (pt-stated)            Action steps to achieve this goal  1.    2.        Date goal set:  1/9/18        \"I want assistance, in reapplying for disability, if my current appeal is denied.\" (pt-stated)            Action steps to achieve this goal  1.  I will contact my Care Guide, if I learn that my disability appeal wasn't successful, to discuss possible next steps.  2.        Date goal set:  1/9/18        \"My wife and I will need different " "medical insurance, after she is laid off, because her company is closing on 3/5/2018.\" (pt-stated)            Action steps to achieve this goal  1.  My spouse and I will meet with Clinic Care Coordination (CCC) Financial Resource Guide (FRG), to discuss options for medical insurance.  2.        Date goal set:  1/9/18          Clinic Care Coordination RN Assessed Needs  Patient Centered Assessment Method-PCAM TOTAL SCORE: 32 (1/9/2018  5:27 PM)  Level 2:  A score of 25-36 indicates that the patient has a moderate initial need for RN or SW intervention at the discretion of the .  The RN will add this patient to her panel and follow closely in partnership with the care guide until stable.  She will reach out to the care guide for support in care coordination needs and graduate the patient to standard care guide outreach when appropriate.        PCAM (Patient Centered Assessment Method) HEALTH AND WELL-BEING  Other Physical Health Concerns:: Treated for sinus infection. Finished course of augmentin. Mucus decreased. Still has tenderness under eyes. Sleeps 4-5 hours a night. Pain wakes him. Chronic back pain, right-sided pain and all-over body pain. Wears bilateral AFO ankle braces r/t leg weakness. Seen at [facility] pain clinic, for initial consult, on 1/2/18. Dr Churchill ordered flexeril 5 mg, 1-2 times/day, nortriptyline 25 mg every night, stopped valium. Also taking oxycodone 5 mg, 4 times a day. Pain decreases for about 3 hours. Takes acetaminophen in between taking oxycodone. Pt stopped tramadol (didn't help). Pt will  new prescriptions today. Also has an appointment for psychotherpy evaluation with Alize Livingston, at the Pain clinic today. Dr Churchill thought pt may have autoimmune disease. Made referral to rheumatologist. Pt has appointment with Dr Mcgovern, in mid-February. Had bilateral PE's in 10/17. Now followed by Dr Groves, Hematologist.  RN Assessment: Physical Health Needs: Severe symptoms or " problems that cause significant impact on daily life  RN Assessment: Physical Health Problems: Severe imact upon mental well-being and preventing engagment with usual activities  Other Mental Health Concerns:: Stated he was frustrated with lack of diagnosis.  RN Assessment:Other Mental Well-Being Concern: Mild problems- don't interfere with function  Lifestyle/Habit Concerns: Exercise/Activity  Other Lifestyle/Habit Concerns:: Has tried using treamill, but bilateral leg weakness makes walking difficult. Stated he doesn't use tobacco products, drink alcohol or use illegal drugs.  RN Assessment: Lifestyle Behaviors: Mod to severe impact on client's well-being, preventing enjoyment of usual activities  SOCIAL ENVIRONMENT  Other Home Environment Concerns:: He and spouse, Miguel, live in a mobile home. They pay rent on the land. He likes neighborhood.  RN Assessment: Home Environment: Consistently safe, supportive, stable, no identified problems  Other Daily Activities Concerns:: Spouse recently diagnosed with stage 5 kidney failure and has dialysis 3 times a week, Monday, Wednesday and Friday. Pt goes with his spouse. Does light housekeeping tasks at home. Usually doesn't drive.  RN Assessment: Daily Activites: Adequate participation with social networks  Other Social Network Concerns:: Has mother, 4 sisters and 2 brothers. Mother and siblings live in [facility]. Pt is  to Miguel.  RN Assessment: Social Network: Adequate participation with social networks  Other Financial Status and Service Concerns:: Spouse, Miguel, works, as much as she can. Her company is closing and she will be without a job March 5 or earlier. Currently they are both on [employer] medical insurance. Miguel is applying for [employer]. [facility] SW checking for other possible transportation resources. Becuase of Miguel's income, they aren't eligible for county cash assistance, SNAP or energy assistance. Pt's disability application was recently denied. Pt works with law  firm, [employer], who are appealing disability denial. [facility], where Miguel goes for dialysis, may be able to help pay for [employer].    RN Assessment: Financial Resources: Financially insecure, very few resources, immediate challenges  HEALTH LITERACY AND COMMUNICATION  RN Assessment: Health Literacy: Reasonable to good understanding and already engages in managing health or is willing to undertake better management  Engagement Concerns: Adequate communication, with or without minor barriers  RN Assessment: Engagement: Clear and open communication, no identified barriers  Barriers to Compliance with Medical Recommendations: Transportation  SERVICE COORDINATION  Other Services: Other care/services missing and/or fragmented  Coordination of Services: Required care/services missing and/or fragmented  PCAM TOTAL SCORE: 32      Emergency Plan  Managing Chronic Pain: Medicines  Medicines can help you live better with chronic pain. You may use over-the-counter or prescription medicines. It can take some time and trial and error to work out the best treatment plan for you. Work with your health care provider to find the best medicines for you, and to use them safely and effectively.  Tell your health care provider about all medicines you`re taking, including herbs and vitamins.   A part of your treatment plan  Depending on your situation and the type of pain, you may take medicines:    At times when pain is more intense than usual.    For pain relief throughout the day.    Before activities that tend to trigger pain, like going shopping or doing physical therapy.     To decrease sensitivity to pain and help you sleep.  There are 4 major groupings of medicines for the treatment of chronic pain:  Non-opioids. These include the commonly used medicine acetaminophen as well as the non-steroidal anti-inflammatory drugs (NSAIDs), like aspirin and ibuprofen, naproxen sodium, and ketoprofen. These all help control pain  but NSAIDs also help relieve inflammation. These drugs are available over-the-counter. Some NSAIDS are available by prescription only.  Acetaminophen can cause liver damage if taken above the recommended dose. NSAIDs may cause stomach problems like bleeding ulcers. Using them over the long-term can cause heart problems and stroke in a very small number of people. None of these drugs is addictive.  Opioids. This includes drugs, like morphine, oxycodone, codeine, fentanyl, and methadone. Opioids may be used to treat breakthrough pain or severe chronic pain. Opioids are available only by prescription. These medicines may be effective for managing chronic pain. But they are controversial because of their side effects and because they can be addictive.    Adjuvants. This group includes medicines that were originally made to treat other conditions, but were also found to relieve pain. Examples of adjuvant drugs include antidepressants and anticonvulsants.  Antidepressants. These help pain by working on the same brain chemicals that play a role in depression. They also help improve sleep. Tricyclic antidepressants are 1 group of antidepressants used for treating chronic pain caused by nerve injury (neuropathic pain). Examples include amitriptyline, nortriptyline, and desipramine. Serotonin-norepinephrine reuptake inhibitors (SNRIs), like duloxetine and milnacipran, are also used.  Some types of antidepressants are used in low doses for sleep problems. They may also be prescribed if you are very sensitive to pain or some kinds of nerve pain.  Anticonvulsants, developed to prevent seizures, can help certain pain conditions, particularly nerve (neuropathic) pain. Examples include gabapentin and pregabalin.  Other pain medicines    Topical. These medicines, like lidocaine or capsaicin, are applied to the skin to treat pain in one location.    Muscle relaxants. These may be used to stop painful muscle spasms. Drugs like  cyclobenzaprine can be sedating.  Taking medicine safely    Take your medicine on time and in the right dose as prescribed.    Tell your health care professional if your medicine doesn't relieve your pain or work for a long enough time, or if you have side effects.    Don't take other people's medicines. They may not be safe for you.  Avoid alcohol, tobacco, and illegal drugs. These may interact with your medicines causing you harm.

## 2021-06-15 NOTE — PROGRESS NOTES
"Brief Diagnostic Assessment    Anand Felix  1/9/18  Start time: 3:00 PM Stop time: 4:00 PM  1985  32 y.o.    Referring Provider: Dr. Churchill    Provider Present: Edmundo Livingston, [facility], [facility]    Patient expectation for services: Follow up with referral from Dr. Churchill.    Recipient's description of symptoms (including reason for referral):  \"I know I am depressed\". \"My mind wanders a lot\". States he \"constantly\" worries about things out of his control. Tries to meditate using white noise. Has pain all over his body. He states his right leg and lower back is the worst since age 12-13 when he had a herniated disc. \" I can't remember how it is to be pain free anymore\". Sept 2015 pain increased significantly and patient reported he could no longer work. Oct.  2015 had surgery. Complications after that led to hospitalization. Will see rheumatologist Dr. Li Feb. 13. Dr. Vásquez impacts day to day activities. Has adjusted how he does things and works at his own pace. Oct 2017 patient was hospitalized. Wife has kidney disease and he worries about her health.    Mental Status Exam:   Grooming: Well groomed  Attire: Appropriate  Age: Appears Stated  Behavior Towards Examiner: Cooperative  Motor Activity: Within normal   Eye Contact: Appropriate  Mood: Depressed  Affect: Congruent w/content of speech  Speech/Language: Within normal  Attention: Within normal  Concentration: Within normal  Thought Process: Within normal  Thought Content: Within normal   Within normal   Orientation: X 3No Evidence of Impairment  Memory: Impairment More forgetful over the past year.   Judgement: No Evidence of Impairment  Estimated Intelligence: Average  Demonstrated Insight: Adequate  Fund of Knowledge: adequate    Current living situation (including household membership and housing status): Lives in a mobile home. Lives with wife. Reports this is a safe environment.    Basic needs status including economic status: Some financial concerns. " "Wife will be laid off Feb. or March. May move in with brother, depending on finances.    Education level: Was in college for accounting. Has a year left but did not finish. Started working 2 jobs and could not continue school. Does not plan to resume schooling at this time.    Employment status: Unemployed. Has not worked for over 2 years. Did computer assembly work in the past.     Significant personal relationships (including recipient's evaluation of relationship quality: Family, but usually does not reach out. Does not like to share problems or concerns, per report. This bothers his wife, per report. Has a few friends outside of the family. Gets along with others well. Says he is \"quiet\".     Strengths and resources (including extent and quality of social networks): Strengths: Patience, fast learner, hard working  Weakness: lacks assertiveness, wants to improve his health, health seems to be getting worse in the last year.     Belief system: Amish. \"This has been helpful\". Goes to Judaism as he is able. Looking for a Judaism he can attend regularly.     Contextual non-personal factors contributing to the recipient's presenting concerns: Patient denies.    General physical health and relationship to recipient's culture: He states his family has tried \" remedies\", but that they have not helped him.  He has had Cupping done in the past, herbal bath, Asian massage. None of these have helped. He mostly uses a Western model of medicine. He states he takes medication-\"gets me to a point where it is bearable\". He denies cultural concern related to health or healthcare.       Current medications:  Current Outpatient Prescriptions on File Prior to Encounter   Medication Sig Dispense Refill     acetaminophen (TYLENOL) 500 MG tablet Take 1 tablet (500 mg total) by mouth every 6 (six) hours as needed for pain.  0     cholecalciferol, vitamin D3, 2,000 unit cap Take 2,000 Units by mouth daily.       cyclobenzaprine " (FLEXERIL) 5 MG tablet Take 1 tablet (5 mg total) by mouth 2 (two) times a day as needed for muscle spasms. 60 tablet 0     nortriptyline (PAMELOR) 25 MG capsule Take 1 capsule (25 mg total) by mouth at bedtime. 30 capsule 0     oxyCODONE (ROXICODONE) 5 MG immediate release tablet TAKE 1 TABLET BY MOUTH UP TO 4 TIMES DAILY AS NEEDED FOR PAIN 90 tablet 0     rivaroxaban 20 mg Tab Take 1 tablet (20 mg total) by mouth daily. 30 tablet 3     traMADol (ULTRAM) 50 mg tablet Take 1 tab up to four times daily, prn for low back, right thigh and calf pain. 120 tablet 0     No current facility-administered medications on file prior to encounter.        Current Outpatient Prescriptions:      acetaminophen (TYLENOL) 500 MG tablet, Take 1 tablet (500 mg total) by mouth every 6 (six) hours as needed for pain., Disp: , Rfl: 0     cholecalciferol, vitamin D3, 2,000 unit cap, Take 2,000 Units by mouth daily., Disp: , Rfl:      cyclobenzaprine (FLEXERIL) 5 MG tablet, Take 1 tablet (5 mg total) by mouth 2 (two) times a day as needed for muscle spasms., Disp: 60 tablet, Rfl: 0     nortriptyline (PAMELOR) 25 MG capsule, Take 1 capsule (25 mg total) by mouth at bedtime., Disp: 30 capsule, Rfl: 0     oxyCODONE (ROXICODONE) 5 MG immediate release tablet, TAKE 1 TABLET BY MOUTH UP TO 4 TIMES DAILY AS NEEDED FOR PAIN, Disp: 90 tablet, Rfl: 0     rivaroxaban 20 mg Tab, Take 1 tablet (20 mg total) by mouth daily., Disp: 30 tablet, Rfl: 3     traMADol (ULTRAM) 50 mg tablet, Take 1 tab up to four times daily, prn for low back, right thigh and calf pain., Disp: 120 tablet, Rfl: 0    Substance use, abuse, or dependency: Alcohol:states he has not used since summer 2016, Illegal drug use: denies, Prescription medications: denies mis-use, Tobacco: denies, Caffeine: none. No chemical dependency history, hospitalization or detox.      Mental Health: Denies hospitalization for mental health reasons. No history of medication for mental health  "reasons.Suicidal ideation \"pops in my head once in awhile\". States he would never hurt himself. Cheondoism belief and family are protective factors. Denies SIB. \"I know I am depressed\". He states since 2016 has had depression after multiple medical visits and doctors not knowing how to help him.  Mental health treatment-saw a counselor as a child when his parents . [facility]. He states it was hard to find a rides to get to appointments and he dropped out.     Medical History  Past Medical History:   Diagnosis Date     Bulging lumbar disc      Lumbar herniated disc 10/23/2015     Lumbar radiculopathy 11/25/2015       Other standardized screening instruments: PHQ-9, CAROLYN-7, PANSI, WHODAS    Clinical summary that explains the provisional diagnosis: Patient is a 32 year old,  American,  male, who was referred for a diagnostic assessment by his pain center provider, due to ongoing concerns with depression, anxiety and chronic pain. Patient reports he has full body pain all day, everyday. He reports his right leg and lower back pain is the most difficult to manage. He reports he has had pain concerns since he was a teenager. He had surgery in 2015 and he states this increased his pain and he had complications. He reports body weakness as well. Patient acknowledges depression symptoms. Patient denies cultural concerns regarding health or how he receives health care.     Patient reports he lives in a mobile home with his wife. He states it is a safe atmosphere. He reports there are some financial concerns at this time, as he is not working. He reports his wife may be laid off her job in 1-2 months. He states they will assess their finances and are considering moving in with his brother. Patient has a history of some college, but he did not finish. He states he needed to work to help out with family expenses. He reports no plan to return to school " "currently. Patient reported he stopped working due to pain/health concerns. 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 Patient reports his family, friends and wife are his support system. However, he reported it is difficult to reach out, which has impacted his relationships. He describes himself as \"quiet\". He reports his belief system is Religion. He reports attending Evangelical as he is able, but that he would like to find a Evangelical that he can attend regularly.     Patient denies a history of substance abuse, treatment or hospitalization. He denies a history of hospitalization due to mental health reasons. He states he had counseling as a youth, when his parents . [facility]. He stated he dropped out as he could not get rides to attend. Patient acknowledges that he has depression and that life changes due to pain has impacted his mood. He reports he has seen many doctors over time, but he states they don't know how to help him. He reports passive suicidal ideation. He denies plan or means. It was recommended to patient to consider returning to psychotherapy services to further address his concerns. He stated he would be open to this and prefers to continue all his care at this pain center.       Patient's PHQ-9 score is 16, indicating severe depression symptoms, CAROLYN-7 anxiety score is 11, indicating moderately severe anxiety symptoms, PANSI score indicates risk for suicide. Upon further evaluation, patient reports he has passive suicidal ideation, but denies plan or means. CAGE score is 0/4, SOAPP-R score is 11, indicating moderate risk for Opioid mis-use. WHODAS score is 47.92%, H1=30, H2=10, H3=7. Patient reports the following symptoms in the adult intake questionnaire: nervous, irritable, depressed mood, emptiness, mood swings, feelings of shame and guilt, racing thoughts, worries, poor attention and forgetfulness.     Recommendations: " Follow pain center plan of care. Schedule psychotherapy services at the pain center to further address chronic pain, depression and anxiety symptoms. Consider EMDR.     Diagnosis: Major depression, severe  History of Generalized Anxiety Disorder  Chronic pain syndrome

## 2021-06-15 NOTE — PROGRESS NOTES
Pain Clinic Consultation  ENCOUNTER DATE: 2018    Anand Felix    1985  MRN # [id number]  PCP: Kyle Velez CNP    CC: Anand Felix 32 y.o. is here today, sent to me by  to discuss   Chief Complaint   Patient presents with     Consult     Right side and back pain         HPI:     Pain started: In his teenager all over body pain along with muscle weakness and tremor.  Pain level: On a scale of 1-10, the patient rates their pain on average at a 6  Pain is described: Constant, moderate, sharp, burning, aching, tingling, numbness, stabbing, deep, throbbing, heavy  Pain interferes with: Daily activities, sleep, work, recreational activities  Aggravating factors: Any movement  Alleviating factors: Medications, sleep, massage, heat  Associated Symptoms: Rash and legs      Past Medical History:   Diagnosis Date     Bulging lumbar disc      Lumbar herniated disc 10/23/2015     Lumbar radiculopathy 2015       Past Surgical History:   Procedure Laterality Date     MINIMALLY INVASIVE MICRODISCECTOMY LUMBAR SPINE Right 10/2015    L5-S1; Dr. Bettie Hernandez     SINUS SURGERY      Dr. Wright       SOCIAL HISTORY:   reports that he has never smoked. He has never used smokeless tobacco. He reports that he drinks alcohol. He reports that he does not use illicit drugs.    FAMILY HISTORY  family history includes Cancer in his father; Depression in his mother; Hyperlipidemia in his mother; No Medical Problems in his brother, brother, sister, sister, sister, and sister; Other in his mother; Thyroid disease in his cousin and maternal grandmother.    No Known Allergies    Current Outpatient Prescriptions   Medication Sig Dispense Refill     acetaminophen (TYLENOL) 500 MG tablet Take 1 tablet (500 mg total) by mouth every 6 (six) hours as needed for pain.  0     amoxicillin-clavulanate (AUGMENTIN) 875-125 mg per tablet Take 1 tablet by mouth 2 (two) times a day for 10 days. 20 tablet 0     cholecalciferol, vitamin  D3, 2,000 unit cap Take 2,000 Units by mouth daily.       diazePAM (VALIUM) 5 MG tablet Take 1 tablet (5 mg total) by mouth 2 (two) times a day as needed for muscle spasms. 60 tablet 0     oxyCODONE (ROXICODONE) 5 MG immediate release tablet TAKE 1 TABLET BY MOUTH UP TO 4 TIMES DAILY AS NEEDED FOR PAIN 90 tablet 0     rivaroxaban 20 mg Tab Take 1 tablet (20 mg total) by mouth daily. 30 tablet 3     traMADol (ULTRAM) 50 mg tablet Take 1 tab up to four times daily, prn for low back, right thigh and calf pain. 120 tablet 0     No current facility-administered medications for this encounter.        Chemical Dependency History: Denies      Mental Health History: Depression and anxiety.  Previous psychotherapy      REVIEW OF SYSTEMS:  12 point systems were reviewed with pt as documented on pt health form of 1/2/2018. ROS was positive for fatigue, weakness, trouble sleeping, itching, ringing in ears, glasses, flashing lights, nose stuffiness, nausea, vomiting, change in bowel habits, urinary frequency, calf pain, muscle pain, stiffness, back pain, dizziness, weakness, numbness, tingling, tremor, memory difficulties, easy bruising, easy bleeding, allergies, stress  The rest of systems were pertinent negative.       PHYSICAL EXAM:    Constitutional: 32 y.o.  male in NAD; alert and oriented x4/4; appears stated age.  Normal body habitus.  Patient is cooperative, polite, communicates well, makes eye contact, and expresses appropriate concern throughout history. Inspection of the neck demonstrates no skin abnormalities or deformities.  Posture is appropriate with no obvious listing, scoliosis, or rigidity.  HEENT:   Head: Normocephalic and atraumatic.   Right Ear: External ear normal.   Left Ear: External ear normal.   Nose: Nose normal.   Mouth/Throat: Oropharynx is clear and moist.   Eyes: Conjunctivae and EOM are normal. Right eye exhibits no discharge. Left eye exhibits no discharge.   Neck: Normal range of motion.  "Neck supple.   Cardiovascular: Normal rate, regular rhythm and normal heart sounds.    Pulmonary/Chest: Effort normal and breath sounds normal. No respiratory distress. No wheezes. Noo rales.  Integumentary: no rashes or breaks in the skin, no open wounds.   Psychiatry:  The patient described anxious mood. Affect is congruent. No abnormal speech. The patient denies any suicidal ideation. No hallucination. Judgement and insight are normal.     Musculoskeletal exam:      There was all over body tenderness and muscle and soft tissues.  Gait: Patient walks with antalgic gait.  Patient had tremor while he was standing and walking.  He uses a cane.  He has difficulty standing on tiptoes and heels.  Patient rises from a seated position with difficulty.        Neurological exam:     Motor Examination:  Muscles are symmetrical, no deformities or atrophy.  Motor examination in the upper and lower extremities demonstrates grade 3-4/5 strength in all major motor groups.  Patient has AFO braces for his feet.  Sensory Examination:  Light touch sensation in the upper and lower extremities is subjectively normal throughout all major dermatomes.   Deep Tendon Reflexes:  Reflexes at the biceps, brachioradialis, and triceps are symmetric and grade 1 bilaterally. Reflexes at the Patellar and Achilles are symmetric and grade 1 bilaterally.          Images:     Diagnosis  1. Muscle Weakness Generalized - \"Neuromuscular Condition\" with normal EMG.  Ambulatory referral to Rheumatology    nortriptyline (PAMELOR) 25 MG capsule   2. Muscle spasm  cyclobenzaprine (FLEXERIL) 5 MG tablet         Assessment:    This is a 32-year-old male patient who has complicated medical history.  He presented today with chronic all over body pain, muscle spasm, muscle weakness, tremor.  She has been seen by multiple neurologists.  Recently he developed PE.  He was trialed on patient has no specific diagnosis for his condition.  He has had EMG which was normal. "  He has done some physical therapy and occupational therapy with no relief.  He was trialed on gabapentin which was not helpful.  He was tried on Tylenol, Lidoderm, compounded cream, oxycodone, morphine, Dilaudid which they were helpful.  He was trialed on tramadol which was not helpful.  He reports some nausea sometimes with oxycodone and Dilaudid.    Based on patient's history, presentation physical examination, I suspect some autoimmune condition that causes his muscle weakness and tremor.  No evaluation by a rheumatologist to rule out any autoimmune condition that could cause his symptoms.      PLAN:    Available medical records including diagnostic studies were reviewed today with the patient. Plan of care was discussed with the patient. Education about patient pain condition, pathology, and strategies to manage the pain was provided.     Diagnotic Studies/Lab orders: None    Interventions: None    Non Opioid Management: We discussed few options to manage the patient. After discussion of potential adverse effects versus benefits. The patient agreed to proceed with trial of Flexeril 5 mg 1-2 tablets twice a day for muscle spasm.  Nortriptyline 25 mg nightly for pain and sleep.  Discontinue Valium at bedtime.    :  Dated 1/2/2018  Reviewed and consistent.    Opioid Management:    Discontinue tramadol.  Consider stronger opioids next visit if his urine screen is consistent.  Patient was given opiate agreement to review.    The patient understands that pain medication is not prescribed during the initial patient consultation at the pain center. If the patient is on pain medications for chronic pain, the PCP or prescribing provider will continue to prescribe until the patient presents for follow up at the pain center. Medication prescriptions provided by the pain center, will depend on the UA results, safe prescribing practices established by the CDC and patient response to their current treatment regimen.      Patient required a random Urine Drug Testing, due to the need to comply with [facility] Model Policy Guidelines and CDC Guideline for the use of any controlled substances. This is to ensure that patient is compliant with treatment, and monitor for risks such as diversion, abuse, or any other aberrant behaviors. Patient is either being considered for or taking a controlled substance. Unexpected findings will be discussed and treatment decision may be adjusted. Testing is being implemented across the board randomly w/o bias related to age, race, gender, socioeconomic status or Evangelical affiliation. Risks versus benefits of short and long term opioid management were discussed. Compliance, potential adverse effects Opioid agreement was provided to the patient for review.  report was reviewed and discussed with the patient today.     SAFETY REMINDERS  No alcohol while taking controlled substances. Alcohol is not an illegal substance, it is unsafe to use in combination. It is a build up of substances in the body that can be extremely hazardous and may cause respirations to slow to a dangerous rate resulting in hospitalization, brain damage, or death.    Opioid medications have been associated with sharp rise in unintentional overdose and death.  Overdose is a condition characterized by the consumption in excess of a particular drug causing adverse effects. This can happen b/c you are sick, accidentally or intentionally took an extra dose, are on multiple medication that can interact. Someone took your medication and they are not use to the medication.  Symptoms of overdose include:   !breathing slow and shallow, erratic or not at all  !pinpoint pupils, hallucinations  !confusion  !muscle jerks, slack muscles   !extreme sleepiness or loss of alertness   !awake but not able to talk   !face pale or clammy, vomiting, for lighter skinned people, the skin tone turns bluish purple, for darker skinned people, it turns  grayish or ashen   If in a situation where overdose is a concern engage the emergency response system (dial 911).    In one study it was noted that 80% of unintentional overdoses occurred in people who were taking a combination of opioids and benzodiazepines.    Do not sell, loan, borrow or share your opioid medication with anyone. Deaths have occurred as a result of this practice. It is illegal and patients are being prosecuted.     Prevent unexpected access/loss of medication: Keep medication locked. Only carry what you need with you.    The patient agrees to the plan and has no further questions, if questions arise the patient knows to call [phone].     Behavioral Health: We discussed body mind process of pain. We discussed importance of cognitive behavioral therapy to help patient to control pain, address any psychiatric condition that may contribute to patient's experience of pain. The patient agreed to be referred for a behavioral evaluation and treatment.    Referral to rheumatologist for further evaluation pain, muscle weakness of his all over body, tremor    Please see your current provider for any continued prescription. They will continue to manage your general health and have requested you see the pain center for pain management. Please discuss any health concerns with your PCP     Follow up: 4 weeks.       Orders placed today:    Orders Placed This Encounter   Procedures     Ambulatory referral to Rheumatology                Ham Churchill MD  [employer] Board Certified [facility]  [address]  [place] [address]  Ph: [phone]  Fax: [phone]

## 2021-06-15 NOTE — PROGRESS NOTES
Assessment:    1. Acute sinusitis  HM2(CBC w/o Differential)    DISCONTINUED: azithromycin (ZITHROMAX) 250 MG tablet   2. Bilateral pulmonary embolism     3. Chronic ethmoidal sinusitis  Ambulatory referral to ENT   4. Monoparesis of leg     5. Foot drop, bilateral     6. Chronic pain syndrome     7. Vitamin D deficiency  Vitamin D, Total (25-Hydroxy)   8. General weakness  Comprehensive Metabolic Panel    Thyroid Stimulating Hormone (TSH)   9. Normochromic normocytic anemia  HM2(CBC w/o Differential)   10. Acute sinusitis, recurrence not specified, unspecified location  azithromycin (ZITHROMAX) 250 MG tablet    Ambulatory referral to ENT   11. Nasal polyposis  Ambulatory referral to ENT    fluticasone (FLONASE) 50 mcg/actuation nasal spray         Plan:    40 minutes total time with patient, > 50% with counseling and coordination of cares.  Reviewed CT sinuses January 3, 2018 through [facility] showing extensive opacification of paranasal sinuses with complete opacification of left frontal sinus, bilateral maxillary sinuses and inferior right sphenoid sinus.  Osseous stigmata of chronic right sphenoid sinusitis.  Polypoid soft tissues subtotally opacifying the middle and inferior right nasal cavity including right middle meatus.  Possible nasal polyposis or other mucosal mass suspected with mild leftward nasal septal deviation.  Z-David prescribed following recent Augmentin use December 27, 2017.  Fluticasone nasal spray 2 sprays per nostril daily.  Should follow-up with Dr. Leo Wright who is seen as otolaryngologist previously.  Patient's recent CT angiogram pulmonary without evidence for pulmonary emboli and normal-appearing lungs currently.  In regard to underlying generalized weakness and right greater than left lower extremity weakness did complete lab evaluation including comprehensive metabolic panel, CBC and TSH.  Repeat vitamin D with history of vitamin D deficiency currently using 2000 units once  daily.  Continues follow-up through [facility] pain clinic with chronic pain management and transitioning from tramadol to cyclobenzaprine once insurance covers otherwise uses oxycodone 5 mg typically 1 tablet 3-4 times daily.  Continues Xarelto 20 mg daily for management of bilateral pulmonary emboli and requires chronic anticoagulation.  Will see hematologist upcoming.  Has clinic care coordination appointment scheduled for next Friday, January 19, 2018.  Continue AFO bracing bilateral lower extremity for described foot drop with noted leg weakness.        Subjective:    Anand Felix is seen today for establishment of cares.  Also concerns regarding possible ongoing sinus infection.  Had been seen previously through [place] office and prescribed Augmentin December 27, 2017 with purulent drainage, halitosis, facial discomfort, postnasal drainage etc.  Described evaluation and management with Dr. Anand Wright otolaryngologist previously.  Has had nasal surgery in the past.  Was admitted to [facility] transferred to [facility] October 15, 2017 for noted bilateral pulmonary emboli.  Chronic warfarin anticoagulation recommended with right greater than left lower extremity weakness unclear etiology with prior lumbar back surgery with Dr. Jeannine Hernandez in 2015.  Using bilateral AFO braces due to noted weakness and footdrop deformity.  Followed by neurologist.  Generalized pain nonspecific some tingling as well as muscle pain primarily with recent CK levels normal.  Awaiting authorization for cyclobenzaprine in order to discontinue tramadol otherwise continues oxycodone 5 mg 3-4 times daily.  Using vitamin D supplement 2000 units daily with prior vitamin D level as low as 20.0 October 23, 2015.  Scant blood can be noted in sinus drainage.  No history of TB.  No night sweats.  Denies chronic cough.  Comprehensive review of systems as above otherwise all negative.    Reviewed hospital d/c summary from 10/17/17:  This is  32-year-old male with history significant for right lower extremity monoparesis, chronic pain and tremors presented to [facility] on October 15, 2017 on account of right-sided chest pain of 1 day.  Pain got worse with deep inspiration and coughing.  Patient was found to be tachycardic and CT scan was done as D-dimers were elevated, and showed bilateral subsegmental pulmonary embolism with a right-sided small pulmonary infarction.  Patient was started on heparin and transferred to [facility].  Patient was seen by hematology, pulmonary embolism was thought to be due to immobilization due to monoparesis and recommended indefinite anticoagulation.  Patient was continued with heparin drip started on warfarin, checked with pharmacy and insurance.  Insurance was willing to cover for Xarelto, switch was made on October 17 and patient was discharged on Xarelto 15 mg twice a day for 21 days and then 20 mg once a day.  Patient was recommended to follow-up with the hematology as outpatient in 3-6 months.  As for patient's right lower extremity monoparesis is concerned, he had followed up with Dr. Donis as outpatient and per records no obvious source of weakness was found.  Patient was recommended to continue to follow-up as outpatient.  Initially patient was needing oxygen, respiratory therapy assessed patient for home oxygen and patient did not qualify for it.  There was mild drop in hemoglobin likely due to hemodilution as repeat hemoglobin was stable.  There was no obvious bleeding.  Patient continued to have mild tachycardia especially with activity likely due to underlying pulmonary embolism and severe deconditioning.  No symptoms including chest pain or shortness of breath on the day of discharge.  Had a detailed discussion with patient and patient's wife at bedside agreed with the plan.     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 Bernal follows  Children - none  Employed -  "working on disability - monoparesis  Surgeries - nasal surgery; low back surgery (Dr. Jeannine Hernandez, 2015) with residual right leg weakness  Tobacco use - none  ETOH - none  Family hx   Mother - living - high chol. Strokes. MI.   Father - living - unknown  Siblings - living - 4 sisters, 2 brother -        Past Surgical History:   Procedure Laterality Date     MINIMALLY INVASIVE MICRODISCECTOMY LUMBAR SPINE Right 10/2015    L5-S1; Dr. Bettie Hernandez     SINUS SURGERY  2014    Dr. Wright        Family History   Problem Relation Age of Onset     Depression Mother      Hyperlipidemia Mother      Other Mother      \"Ulcers\" - Aches and pains throughout her body.     Cancer Father      No Medical Problems Sister      No Medical Problems Brother      No Medical Problems Sister      No Medical Problems Sister      No Medical Problems Sister      No Medical Problems Brother      Thyroid disease Maternal Grandmother      Thyroid disease Cousin         Past Medical History:   Diagnosis Date     Bulging lumbar disc      Lumbar herniated disc 10/23/2015     Lumbar radiculopathy 11/25/2015        Social History   Substance Use Topics     Smoking status: Never Smoker     Smokeless tobacco: Never Used     Alcohol use Yes      Comment: Monthly or less often. 1-2 drinks at a time.        Current Outpatient Prescriptions   Medication Sig Dispense Refill     acetaminophen (TYLENOL) 500 MG tablet Take 1 tablet (500 mg total) by mouth every 6 (six) hours as needed for pain.  0     cholecalciferol, vitamin D3, 2,000 unit cap Take 2,000 Units by mouth daily.       nortriptyline (PAMELOR) 25 MG capsule Take 1 capsule (25 mg total) by mouth at bedtime. 30 capsule 0     oxyCODONE (ROXICODONE) 5 MG immediate release tablet TAKE 1 TABLET BY MOUTH UP TO 4 TIMES DAILY AS NEEDED FOR PAIN 90 tablet 0     rivaroxaban 20 mg Tab Take 1 tablet (20 mg total) by mouth daily. 30 tablet 3     traMADol (ULTRAM) 50 mg tablet Take 1 tab up to four times daily, " prn for low back, right thigh and calf pain. 120 tablet 0     azithromycin (ZITHROMAX) 250 MG tablet Take 2 tabs on day one, and then 1 tab on days 2-5. 6 tablet 0     cyclobenzaprine (FLEXERIL) 5 MG tablet Take 1 tablet (5 mg total) by mouth 2 (two) times a day as needed for muscle spasms. 60 tablet 0     fluticasone (FLONASE) 50 mcg/actuation nasal spray 2 sprays into each nostril daily. 16 g 6     No current facility-administered medications for this visit.           Objective:    Vitals:    01/12/18 0943   BP: 100/60   Pulse: (!) 120   Weight: 141 lb (64 kg)      Body mass index is 23.46 kg/(m^2).    Alert.  Quiet.  Smiling and pleasant.  Cooperative.  Does have cane to help with ambulation otherwise able to transfer slowly but independently.  Gets up onto exam table using left leg for assistance primarily more so than right leg due to weakness.  Bilateral AFO brace in place lower extremity.  DTRs symmetric bilateral lower extremity at patella.  Chest clear.  Cardiac exam regular.  Right greater than left nasal pharynx congestion and question nasal polyposis on right side.  TMs normal.  Oropharynx without postnasal drainage.  Right greater than left maxillary sinus tenderness currently.  Lower back exam with well-healed incision site from prior lumbar surgery.  Skin exam otherwise without rash.        CTA CHEST PE RUN  10/15/2017 3:33 AM     INDICATION: right sided chest pain and SOB  TECHNIQUE: Helical acquisition through the chest was performed during the arterial phase of contrast enhancement using IV contrast. 2D and 3D reconstructions were performed by the CT technologist. Dose reduction techniques were used.   IV CONTRAST: Iohexol (Omni) 100 mL  COMPARISON: Radiographs from same date.     FINDINGS:  ANGIOGRAM CHEST: Motion artifact compromises multiple images, and the contrast bolus is suboptimal. In spite of this, there are subsegmental pulmonary emboli in both lower lobes. No definite upper or middle  lobe clot. No central pulmonary embolism. No   saddle pulmonary embolism. No CT evidence of right heart strain. Normal thoracic aorta.     LUNGS AND PLEURA: Small amount of airspace infiltrate in the posterior sulcus on the right with adjacent small pleural effusion. The left lung is clear. No pneumothorax.     MEDIASTINUM: Heart size normal. No pericardial effusion. No adenopathy.     LIMITED UPPER ABDOMEN: Negative.     MUSCULOSKELETAL: Negative.     IMPRESSION:   CONCLUSION:  1.  Although the exam is somewhat motion degraded, there are subsegmental pulmonary emboli in both lower lobes. Airspace infiltrate in the posterior basal segment of the right lower lobe is consistent with a small pulmonary infarction, with an adjacent   small pleural effusion. Findings discussed by telephone with Dr. Gonzalez at 0351 on 10/15/2017.          US VENOUS LEGS BILATERAL  10/15/2017 1:10 PM     INDICATION: edema. Pulmonary emboli.  TECHNIQUE: Routine exam with compression, augmentation, and duplex utilizing 2D gray-scale imaging, Doppler interrogation with color-flow and spectral waveform analysis.  COMPARISON: None.     FINDINGS: The common femoral, femoral, popliteal, and segmentally visualized calf veins were evaluated.     Right leg veins are negative for deep venous thrombosis.  Left leg veins are negative for deep venous thrombosis.     No popliteal cysts.     IMPRESSION:   CONCLUSION:  1.  Bilateral leg veins are negative for DVT.

## 2021-06-15 NOTE — PROGRESS NOTES
"3/9/2021    Start time: 10:04 AM    Stop Time: 10:54 AM   Session # 8    Anand Felix is a 36 y.o. male who is being evaluated via a billable video visit.    Chief Complaint:    Major depression, severe   Generalized Anxiety Disorder  Chronic pain syndrome    The patient has been notified of the following:    \"This video visit will be conducted via a call between you and your provider. We have found that certain health care needs can be provided without the need for an in-person visit. This service lets us provide the care you need with a video conversation\".     \"Video visits are billed at different rates depending on your insurance coverage. Please reach out to your insurance provider with any questions\".     \"If during the course of the call the provider feels a video visit is not appropriate, you will not be charged for this service\".     Patient has been given verbal consent to a video visit: Yes    Patient would like the video invitation sent by: My Chart    Will anyone else be joining your video visit?: No    Video visit details    Type of service: Video visit    Originating Location (pt. Location): Home    Distant Location (provider location): [facility]    Platform used for Video Visit: [employer]    Functional Impairment:   Personal: 3  Family: 3  Work: 3  Social:3    Clinical assessment of mental status: Anand Felix presented on time.  He was open and cooperative, and dressed appropriately for this session and weather. His memory was in tact .  His  speech was appropriate.  Language was appropriate.  Concentration and focus is on task. Psychosis is not noted or reported. He reports his mood is depressed .  Affect is congruent with speech.  Fund of knowledge is adequate. Insight is adequate for therapy.     Suicidal/Homicidal Ideation present: None Reported This Session. However, he reports passive SI this week. He denies plan or means    Patient's impression of their current status: Patient " reports continued difficulty with depression and anxiety symptoms. Patient struggles with stopping negative thoughts. Patient reports stressors related to social situations and challenges with feeling connected in some family relationships. Patient is working on acceptance of his chronic medical concerns. He is working on identifying future goals, including housing goals.           Therapist impression of patients current state: Patient seems to continue to struggle with depression and anxiety/worry. Discussed specifically what seems to trigger symptoms. Patient reports SI, but denies plan or means. He states SI is fleeting and discussed related feelings of shame.  He continues to work on identifying future goals, but continues to struggle with  what he wants versus what others want for him. Discussed ways to find meaning and connection to future plans. Also discussed family pressures that he feels about his role in the family. Discussed how this impacts daily living. Continued to discuss values clarification. Patient reports he is working on dietary changes and discussed some related challenges and progress. Fatigue and physical weakness persist, per his report. Patient is reaching out to his support system, as he is able. However, he reports he struggles in social situations as he has changed due to his medical issues. He struggles with feeling connected with some family relationships and discussed a recent interaction that was uncomfortable. He reports staying connected to his spiritual program and discussed ways to further connect that may be meaningful to him. Patient seems to be making more effort towards using Mindfulness techniques in daily living. Gave patient praise for his efforts. Discussed challenges with acceptance and ways to re-evaluate what is important to him in his life now. Discussed grief/loss as related to physical changes in the past few years. Presented grounding exercise and  patient seemed to benefit.     Type of psychotherapeutic technique provided: Client centered, CBT and Mindfulness    Progress toward short term goals:Patient is working on increasing coping skills to manage symptoms related to mental health and medical concerns.       Review of long term goals: Not done at today's visit. Last review: 12/22/2020    Diagnosis: Major depression, severe   Generalized Anxiety Disorder  Chronic pain syndrome    Plan and Follow up: Follow pain center plan of care and overall medical plan. Schedule and attend psychotherapy every 1-2 weeks to further address mental health and chronic pain concerns. Patient understands that sessions are by telephone or video at this time, due to COVID-19 guidelines, until further notice. Practice meditations/coping resources as discussed. Continue to engage in hobbies and go outside daily, as appropriate. Continue to discuss future planning. Follow up on personal goal to reach out to spiritual leader at [employer].      Discharge Criteria/Planning: Patient will continue with follow-up until therapy can be discontinued without return of signs and symptoms.    Alize Livingston 3/9/2021

## 2021-06-15 NOTE — PROGRESS NOTES
"1/22/2018    Start time: 11:05 AM    Stop Time: 11:55 AM   Session # 1    Anand Felix is a 32 y.o. male is being seen today for    Chief Complaint   Patient presents with     Mental Health Note   . Major depression, severe  History of Generalized Anxiety Disorder  Chronic pain syndrome    New symptoms or complaints: None    Functional Impairment:   Personal: 4  Family: 4  Work: 4  Social:4    Clinical assessment of mental status: Anand Felix presented on time.  He was open and cooperative, and dressed appropriately for this session and weather. His memory was in tact .  His  speech was appropriate.  Language was appropriate.  Concentration and focus is on task. Psychosis is not noted or reported. He reports his mood is depressed .  Affect is congruent with speech.  Fund of knowledge is adequate. Insight is adequate for therapy.     Suicidal/Homicidal Ideation present: None Reported This Session    Patient's impression of their current status: Patient reported he continues to be working on addressing symptoms of depression. He reports sleep has been difficulty, but medication has helped improve sleep \"slightly\". Patient reports he continues to have negative thinking, especially when things are \"too quiet\". Patient reported he has a daily routine that has been helpful and he engages exercise on the treadmill daily. Patient reports he continues to have weakness and pain, and that the weakness is most difficult to manage. He reports some increased pain with engaging in more household chores. He does report reaching out to his support group. Patient discussed thoughts and feelings about his medical concerns and having several appointments in the coming month with specialists. He is getting help from the care guide at his primary care clinic.     Therapist impression of patients current state: Patient seems to be working on addressing depression and chronic medical symptoms. He appears to be working on improving mood and " attitude. Patient appears to be finding some benefit with his current medications, especially in regards to sleep. Patient seems to have experienced some losses with changes in his medical health, especially in regards to physical activity. It seems the care guide at his primary care clinic has been helpful and this has reduced stress for patient. Patient appears to have some challenges with household activities, but appears to be reaching out to his support system. Discussed potential benefits of Mindfulness meditation with patient and also continued to educate on EMDR.     Type of psychotherapeutic technique provided: Client centered, CBT and Mindfulness    Progress toward short term goals: Discussed patient's goals and treatment plan will be developed.     Review of long term goals: Discussed patient's goals and treatment plan will be developed.     Diagnosis: Major depression, severe  History of Generalized Anxiety Disorder  Chronic pain syndrome    Plan and Follow up: Patient to follow pain center plan of care. Patient to follow all medical recommendations. Patient to schedule every 2 weeks for psychotherapy to further address depression, anxiety and chronic pain concerns. Will continue to educate on EMDR and Mindfulness meditation. Present meditation exercise next session.     Discharge Criteria/Planning: Patient will continue with follow-up until therapy can be discontinued without return of signs and symptoms.    Alize Livingston 1/22/2018

## 2021-06-15 NOTE — PROGRESS NOTES
I met with Anand today for Clinic Care Coordination/Health Care Home PCAM meet and greet. Clinic Care Coordination/Health Care Home goal setting visit is scheduled for 1/19/18 @ 10:40.

## 2021-06-15 NOTE — PROGRESS NOTES
Anand Felix is a 36 y.o. male who is being evaluated with [facility]- [facility]       Start time- 1:45 pm   End time- 2:07 pm     Subjective-    I have reviewed and updated the patient's Past Medical History, Social History, Family History and Medication List as well as the Precharting workup by the [facility].     PAIN CLINIC FOLLOW UP PROGRESS NOTE    CC:  Chief Complaint   Patient presents with     Follow-up     multisite pain       HPI  Anand Felix is a 36 y.o. male who presents for evaluation   Chief Complaint   Patient presents with     Follow-up     multisite pain    that is causing continued pain. Specific questions indicated the patient wanted addressed today include: at address his ongoing chronic pain and medication managment         Objective-  Major issues:  1. Chronic pain syndrome    2. Myalgia    3. Arthralgias In Multiple Sites    4. Cervicalgia        Pain score 5  Constant   What does your pain like feel during a flare? Sharp, tight  Does the pain interfere with:  Work ----- yes  Walking ------ yes  Sleep ------- no  Daily activities ------no  Relationships -------yes  F=6     UDT/CSA - 01/27/2020    ALLERGIES  Patient has no known allergies.    Previous Medical History  Social History     Substance and Sexual Activity   Alcohol Use Yes    Comment: Monthly or less often. 1-2 drinks at a time.     Social History     Substance and Sexual Activity   Drug Use Yes     Types: Marijuana     Social History     Tobacco Use   Smoking Status Never Smoker   Smokeless Tobacco Never Used       Pertinent Pain Medications/interventions:    D/c belbuca at this time, will increase the use of the gabapentin to 300 mg two times a day and continue the use of the medical cannabis and percocet up to 3 per day as needed- 5/18/2020     He currently belbuca 300 mcg two times a day, gabapentin 100 mg three times a day, oxycodone 5 mg two times a day prn and medical cannabis, tizanidine and Norco.      Belbuca-  headaches are associated so he stopped taking this. 9/6     Previously taking Belbuca 75 mcg, gabapentin 100 mg three times a day, oxycodone 5 mg up to two times a day as needed, medical cannabis, baclofen and flexeril      RFA did provide him with 80 % relief.      Medical cannabis      Medications    Current Outpatient Medications:      acetaminophen (TYLENOL) 500 MG tablet, Take 1 tablet (500 mg total) by mouth every 6 (six) hours as needed for pain., Disp: , Rfl: 0     cholecalciferol, vitamin D3, 2,000 unit cap, Take 2,000 Units by mouth daily., Disp: , Rfl:      fluticasone propionate (FLONASE) 50 mcg/actuation nasal spray, SPRAY 2 SPRAYS INTO EACH NOSTRIL EVERY DAY, Disp: 16 g, Rfl: 3     gabapentin (NEURONTIN) 100 MG capsule, Take 100 mg by mouth 3 (three) times a day., Disp: 90 capsule, Rfl: 1     NON FORMULARY, MEDICAL CANNABIS, Disp: , Rfl:      omeprazole (PRILOSEC) 20 MG capsule, TAKE 1 CAPSULE BY MOUTH DAILY, Disp: 90 capsule, Rfl: 3     oxyCODONE-acetaminophen (PERCOCET/ENDOCET) 7.5-325 mg per tablet, Take 1 tablet by mouth 3 (three) times a day as needed for pain., Disp: 84 tablet, Rfl: 0     propranoloL (INDERAL) 80 MG tablet, TAKE 1 TAB BY MOUTH THREE TIMES DAILY, Disp: 270 tablet, Rfl: 3     rivaroxaban ANTICOAGULANT (XARELTO) 20 mg tablet, Take 1 tablet (20 mg total) by mouth daily. Start 20 mg daily after the 15 mg daily for 21 days., Disp: 30 tablet, Rfl: 11     rizatriptan (MAXALT-MLT) 10 MG disintegrating tablet, DISSOLVE 1 TABLET ON TONGUE AS NEEDED FOR MIGRAINE. MAY REPEAT IN 2HRS IF NEEDED, Disp: 12 tablet, Rfl: 6     zolpidem (AMBIEN) 10 mg tablet, Take 1 tablet (10 mg total) by mouth at bedtime as needed for sleep., Disp: 30 tablet, Rfl: 2     oxyCODONE-acetaminophen (PERCOCET/ENDOCET) 7.5-325 mg per tablet, Take 1 tablet by mouth 3 (three) times a day as needed for pain., Disp: 84 tablet, Rfl: 0      Physical Exam- limited exam   General- patient is alert and oriented, in NAD,  well-groomed, well-nourished  Psych- Judgment and insight normal, AOx4, recent and remote memory normal, mood and affect normal  Eyes- pupils are symmetrical, conjunctiva is clear bilaterally, no ptosis is noted.   Respiratory- Breathing appears non-labored  Integumentary- coloring of skin appears normal.   Musculoskeletal- patient is moving all extremities that are visible.     Lab:  UDT/CSA - 01/27/2020had expected results. Last UDT on file was reviewed.    Imaging:  No new imaging reviewed with patient over the phone, no new orders placed       Recent   Dated 3/8/2021 was reviewed.       Assessment:     Anand Felix is a 36 y.o. male seen in clinic today for   Chief Complaint   Patient presents with     Follow-up     multisite pain       New concerns today- GI concerns after eating, otherwise his daytime pain is reduced with elimination of wheat and sugar.     Plan of care going forward for ongoing pain control- continue the elimination diet as it is helpful. This may take some time- months to reduce the pain. Patient also finds benefit in the use of the opioids as well as medical cannabis, no benefit was found with the use of the gabapentin. Will discontinue this. Education regarding gallbladder and function discussed with patient, have him f/u with PCP.  As his pain is in the right upper quadrant.     Patients current MME is 34    Plan:   Interventions-    Follow up in 8 weeks     Continue avoiding gluten and sugar, as well as different oils that are fruit.     Stop gluten, whey and sugar.      Drink water with fresh claire squeezed into it daily to help with digestion     Continue the use of the magnesium at bedtime with water      Continue the use of the medical cannabis it is helpful when used for the chronic pain.      Ok to continue the use of the percocet at this time. Refills sent to the pharmacy 3/10-4/7, 4/7-5/5    Ok to stop the use of the gabapentin at this time.       Orders placed  today  Medications that were ordered today   Requested Prescriptions     Pending Prescriptions Disp Refills     gabapentin (NEURONTIN) 100 MG capsule 90 capsule 1     Sig: Take 100 mg by mouth 3 (three) times a day.     oxyCODONE-acetaminophen (PERCOCET/ENDOCET) 7.5-325 mg per tablet 84 tablet 0     Sig: Take 1 tablet by mouth 3 (three) times a day as needed for pain.     No orders of the defined types were placed in this encounter.        The patient understand todays plan and has their questions answered in regards to expectations and current treatment plan.     Prevent unexpected access/loss of medication: Keep medication locked. Only carry what you need with you    The plan of care will be adjusted to accommodate the issues discussed, discussing management of their care and follow up that is recommended. 3/8/2021         Dayna Lundberg [facility]  [address]  [place] [address]  Ph: [phone]  Fax: [phone]

## 2021-06-15 NOTE — PROGRESS NOTES
"2/18/2021    Start time: 11:04 AM    Stop Time: 11:54 AM   Session # 6    Anand Felix is a 35 y.o. male who is being evaluated via a billable video visit.    Chief Complaint:    Major depression, severe   Generalized Anxiety Disorder  Chronic pain syndrome    The patient has been notified of the following:    \"This video visit will be conducted via a call between you and your provider. We have found that certain health care needs can be provided without the need for an in-person visit. This service lets us provide the care you need with a video conversation\".     \"Video visits are billed at different rates depending on your insurance coverage. Please reach out to your insurance provider with any questions\".     \"If during the course of the call the provider feels a video visit is not appropriate, you will not be charged for this service\".     Patient has been given verbal consent to a video visit: Yes    Patient would like the video invitation sent by: My Chart    Will anyone else be joining your video visit?: No    Video visit details    Type of service: Video visit    Originating Location (pt. Location): Home    Distant Location (provider location): [facility]    Platform used for Video Visit: [employer]    Functional Impairment:   Personal: 3  Family: 3  Work: 3  Social:3    Clinical assessment of mental status: Anand Felix presented on time.  He was open and cooperative, and dressed appropriately for this session and weather. His memory was in tact .  His  speech was appropriate.  Language was appropriate.  Concentration and focus is on task. Psychosis is not noted or reported. He reports his mood is depressed .  Affect is congruent with speech.  Fund of knowledge is adequate. Insight is adequate for therapy.     Suicidal/Homicidal Ideation present: None Reported This Session    Patient's impression of their current status: Patient reports continued difficulty with depression and anxiety symptoms. " "Patient struggles with stopping negative thoughts. Patient reports stressors related to social situations. Patient is working on acceptance of his chronic medical concerns. He is working on identifying future goals.     Therapist impression of patients current state: Patient seems to continue to struggle with depression and anxiety/worry. Discussed what seems to trigger symptoms. He processed thoughts and feelings regarding his concerns about his wife's health concerns. He stated \"I wish I could help her more\". Discussed ways to cope. Patient reports SI, but denies plan or means. He states SI is fleeting, but occurred more often this week. He states he has been recovering from a cold and this has reduced his ability to engage in usual activities, including hobbies and exercise. He continues to work on identifying future goals, but continues to struggle with  what he wants versus what others want for him. Discussed ways to find meaning in connection to future plans. He reports negative thoughts come and go, but are difficult to manage at times. Continued to discuss values clarification. Patient states it is difficult to sleep. He states medications help somewhat. Patient reports he is working on dietary changes and discussed some related challenges and progress. Fatigue and physical weakness persist, per his report. Patient is reaching out to his support system, as he is able. However, he reports he struggles in social situations as he has changed due to his medical issues. Patient seems to be making more effort towards using Mindfulness techniques in daily living. Gave patient praise for his efforts. Discussed challenges with acceptance and ways to re-evaluate what is important to him in his life now. Discussed grief/loss as related to physical changes in the past few years.     Type of psychotherapeutic technique provided: Client centered, CBT and Mindfulness    Progress toward short term goals: Patient " is working on increasing coping skills to manage symptoms related to mental health and medical concerns.    Review of long term goals: Not done at today's visit. Last review: 12/22/2020    Diagnosis: Major depression, severe   Generalized Anxiety Disorder  Chronic pain syndrome    Plan and Follow up: Follow pain center plan of care and overall medical plan. Schedule and attend psychotherapy every 1-2 weeks to further address mental health and chronic pain concerns. Patient understands that sessions are by telephone or video at this time, due to COVID-19 guidelines, until further notice. Practice meditations/coping resources as discussed. Continue to engage in hobbies and go outside daily, as appropriate. Continue to discuss future planning.       Discharge Criteria/Planning: Patient will continue with follow-up until therapy can be discontinued without return of signs and symptoms.    Alize Livingston 2/18/2021

## 2021-06-15 NOTE — PROGRESS NOTES
Outpatient Mental Health Treatment Plan    Name:  Anand Felix  :  1985  MRN:  [id number]    Treatment Plan:  Initial Treatment Plan  Date Treatment Plan Initiated:  18    Necessity for treatment: To address cognitive, behavioral, and/or emotional barriers in order to work toward goals and to improve quality of life.    Plan:      ? Depression    Goal:  Decrease average depression level from 8 to 5.   Strategies:    ?[x] Decrease social isolation     [x] Increase involvement in meaningful activities     ?[x] Discuss sleep hygiene     ?[x] Explore thoughts and expectations about self and others     ?[x] Process grief (loss of significant person, independence, role,        etc.)     ?[x] Assess for suicide risk     ?[x] Implement physical activity routine (with physician approval)     [x] Consider introduction of bibliotherapy and/or videos     [x] Continue compliance with medical treatment plan (or explore         barriers)       ?    Degree to which this is a problem (1-4): 4   Degree to which goal is met (1-4)0  Date of Review:          ?   ? Anxiety    Goal:  Decrease average anxiety level from 8 to 5.   Strategies: ? [x]Learn and practice relaxation techniques and other coping        strategies (e.g., thought stopping, reframing, meditation)     ? [x] Increase involvement in meaningful activities     ? [x] Discuss sleep hygiene     ? [x] Explore thoughts and expectations about self and others     ? [x] Identify and monitor triggers for panic/anxiety symptoms     ? [x] Implement physical activity routine (with physician approval)     ? [x] Consider introduction of bibliotherapy and/or videos     ? [x] Continue compliance with medical treatment plan (or explore          barriers)                                       [x]      Degree to which this is a problem (1-4): 4   Degree to which goal is met (1-4)0  Date of Review:  Chronic pain  Goal:  ? Decrease average pain level from 8 to 5.   ? Increase %  acceptance of pain from 50 to 80.   Strategies: ? [x] Explore thoughts and expectations about self and others    [x] Explore emotional reactions to illness/injury      ? [x] Learn and practice relaxation techniques and other coping          strategies            [x] Implement physical activity routine (with physician approval)     ? [x] Engage in values clarification and goal-setting     ? [x] Consider introduction of bibliotherapy and/or videos     ? [x] Increase involvement in meaningful activities     ? [x] Discuss sleep hygiene     ? [x] Process grief (loss of significant person, independence, role,          etc.)     ? [x] Assess for suicide risk     ?  Degree to which this is a problem (1-4): 4   Degree to which goal is met (1-4)0  Date of Review: 1/22/18    Functional Impairment: 1=Not at all/Rarely  2=Some days  3=Most Days  4=Every Day Personal: 4  Family: 4  Social: 4  Work: 4     Diagnosis: Major depression, severe  History of Generalized Anxiety Disorder  Chronic pain syndrome      Anticipated intensity of services:  Every 2 weeks    Estimated duration of treatment:  20 sessions  Strengths:  Patience, fast learner, hard working  Limitations: lacks assertiveness, wants to improve his health, health seems to be getting worse in the last year.   Cultural Considerations: Patient is a 32 year old,  American,  male who has a history of depression, anxiety and chronic pain.     Persons responsible for this plan:  ? [x] Patient ? [x] Provider ? [] Other: __________________      Provider: Alize Livingston  Date:  1/22/18          Psychologist Signature           Patient Signature:              Guardian Signature

## 2021-06-15 NOTE — PROGRESS NOTES
The Clinic Care Guide called the patient  today at the request of the patient to discuss possible clinic care coordination enrollment. Clinic care coordination was described to the patient and immediate needs were discussed. The patient agreed to enrollment in clinic care coordination and future appointments were scheduled for an RN care coordination assessment and an enrollment visit with the primary care physician and care guide. The patient was provided with contact information for the clinic care guide.

## 2021-06-15 NOTE — PROGRESS NOTES
Assessment:    1. Bilateral pulmonary embolism     2. Chronic ethmoidal sinusitis     3. Chronic pain syndrome     4. Monoparesis of leg     5. Foot drop, bilateral     6. General weakness     7. Dyspnea on exertion  Ambulatory referral to Pulmonology         Plan:    40 minutes total time with patient, > 50% with counseling and coordination of cares.  Healthcare home visit with goal setting encounter completed today.  Met with clinic care coordinator Komal as well.  Past medical social and family history reviewed and updated as noted below.  Prior lumbar surgery reviewed with right greater than left lower extremity weakness following however multiple neurologists without obvious etiology.  Generalized weakness.  Subsequent pulmonary emboli bilateral.  Recent CT angiogram showed no residual thrombus on exam.  Patient still has evidence for shortness of breath with activity especially with likely deconditioning etiology as well without history of asthma etc.  Did refer patient to see pulmonologist for further review.  Patient will continue management through [facility] pain clinic for medication management for chronic pain syndrome.  Goals associated with PCA services to be reviewed with [facility],  in order to provide assistance with activities of daily living including dressing, cleaning etc.  Patient will also seek help with financial medical bills through financial resource guide assistance.  Transportation needs also reviewed and did complete paperwork today for [employer] to help with transportation to medical appointments.  Patient completing Social Security disability paperwork and will provide assistance in addition if claim denied.  Patient to follow-up with pain clinic next Tuesday as noted.  Hematology scheduled February 23, 2018.  Patient will follow up in this office no later than 8 weeks, sooner with concerns.        Subjective:    Anand Isidro is seen today for goal setting encounter  associate with healthcare home.  Reviewed with clinic care coordinator Komal today as well.  Complicated past history reviewed.  Generalized weakness and chronic pain syndrome described, long-standing.  Had prior back surgery with right greater than left lower extremity weakness reviewed.  Bilateral foot drop deformity requiring bilateral ankle foot orthoses.  Also through pain clinic currently and titrating pain management.  Does describe some shortness of breath on exertion and had some chest discomfort this past weekend nonspecific otherwise recent CT angiogram showing resolved bilateral pulmonary emboli diagnosed 10/15/17 as noted below.  Continues Xarelto 20 mg daily for anticoagulation.  Was to see hematologist February 23, 2018.  Dyspnea with exertion.  No history of asthma.  No cough or hemoptysis.  No orthopnea or PND symptoms noted.  Previously since followed by Dr. Velez at the [facility] who is no longer at that office.  Patient requesting assistance with medical bills as well as need for assistance with transportation to medical appointments.  Requesting PCA services to help with activities of daily living including dressing and cleaning.  Completing Social Security disability paperwork currently.  Would request assistance if Social Security disability claim denied.  Does not require refills today.  Unable to drive safely with leg weakness and AFO braces.  Ambulates with a cane.  Patient's wife also on hemodialysis 3 days a week.  Comprehensive review of systems as above otherwise all negative.        Reviewed office note from 1/12/18:  Anand Felix is seen today for establishment of cares.  Also concerns regarding possible ongoing sinus infection.  Had been seen previously through [place] office and prescribed Augmentin December 27, 2017 with purulent drainage, halitosis, facial discomfort, postnasal drainage etc.  Described evaluation and management with Dr. Anand Wright otolaryngologist  previously.  Has had nasal surgery in the past.  Was admitted to [facility] transferred to [facility] October 15, 2017 for noted bilateral pulmonary emboli.  Chronic warfarin anticoagulation recommended with right greater than left lower extremity weakness unclear etiology with prior lumbar back surgery with Dr. Jeannine Hernandez in 2015.  Using bilateral AFO braces due to noted weakness and footdrop deformity.  Followed by neurologist.  Generalized pain nonspecific some tingling as well as muscle pain primarily with recent CK levels normal.  Awaiting authorization for cyclobenzaprine in order to discontinue tramadol otherwise continues oxycodone 5 mg 3-4 times daily.  Using vitamin D supplement 2000 units daily with prior vitamin D level as low as 20.0 October 23, 2015.  Scant blood can be noted in sinus drainage.  No history of TB.  No night sweats.  Denies chronic cough.  Comprehensive review of systems as above otherwise all negative.  Plan:  Reviewed CT sinuses January 3, 2018 through [place] radiology showing extensive opacification of paranasal sinuses with complete opacification of left frontal sinus, bilateral maxillary sinuses and inferior right sphenoid sinus.  Osseous stigmata of chronic right sphenoid sinusitis.  Polypoid soft tissues subtotally opacifying the middle and inferior right nasal cavity including right middle meatus.  Possible nasal polyposis or other mucosal mass suspected with mild leftward nasal septal deviation.  Z-David prescribed following recent Augmentin use December 27, 2017.  Fluticasone nasal spray 2 sprays per nostril daily.  Should follow-up with Dr. Leo Wright who is seen as otolaryngologist previously.  Patient's recent CT angiogram pulmonary without evidence for pulmonary emboli and normal-appearing lungs currently.  In regard to underlying generalized weakness and right greater than left lower extremity weakness did complete lab evaluation including comprehensive metabolic  "panel, CBC and TSH.  Repeat vitamin D with history of vitamin D deficiency currently using 2000 units once daily.  Continues follow-up through [facility] pain clinic with chronic pain management and transitioning from tramadol to cyclobenzaprine once insurance covers otherwise uses oxycodone 5 mg typically 1 tablet 3-4 times daily.  Continues Xarelto 20 mg daily for management of bilateral pulmonary emboli and requires chronic anticoagulation.  Will see hematologist upcoming.  Has clinic care coordination appointment scheduled for next Friday, January 19, 2018.  Continue AFO bracing bilateral lower extremity for described foot drop with noted leg weakness.         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 Bernal follows  Children - none  Employed - working on disability - monoparesis  Surgeries - nasal surgery; low back surgery (Dr. Jeannine Hernandez, 2015) with residual right leg weakness  Tobacco use - none  ETOH - none  Family hx   Mother - living - high chol. Strokes. MI.   Father - living - unknown  Siblings - living - 4 sisters, 2 brother -    Bilateral AFO braces worn due to muscle weakness and foot drop    Past Surgical History:   Procedure Laterality Date     MINIMALLY INVASIVE MICRODISCECTOMY LUMBAR SPINE Right 10/2015    L5-S1; Dr. Bettie Hernandez     SINUS SURGERY  2014    Dr. Wright        Family History   Problem Relation Age of Onset     Depression Mother      Hyperlipidemia Mother      Other Mother      \"Ulcers\" - Aches and pains throughout her body.     Cancer Father      No Medical Problems Sister      No Medical Problems Brother      No Medical Problems Sister      No Medical Problems Sister      No Medical Problems Sister      No Medical Problems Brother      Thyroid disease Maternal Grandmother      Thyroid disease Cousin         Past Medical History:   Diagnosis Date     Bulging lumbar disc      Lumbar herniated disc 10/23/2015     Lumbar radiculopathy 11/25/2015    "     Social History   Substance Use Topics     Smoking status: Never Smoker     Smokeless tobacco: Never Used     Alcohol use Yes      Comment: Monthly or less often. 1-2 drinks at a time.        Current Outpatient Prescriptions   Medication Sig Dispense Refill     acetaminophen (TYLENOL) 500 MG tablet Take 1 tablet (500 mg total) by mouth every 6 (six) hours as needed for pain.  0     cholecalciferol, vitamin D3, 2,000 unit cap Take 2,000 Units by mouth daily.       cyclobenzaprine (FLEXERIL) 5 MG tablet Take 1 tablet (5 mg total) by mouth 2 (two) times a day as needed for muscle spasms. 60 tablet 0     fluticasone (FLONASE) 50 mcg/actuation nasal spray 2 sprays into each nostril daily. 16 g 6     nortriptyline (PAMELOR) 25 MG capsule Take 1 capsule (25 mg total) by mouth at bedtime. 30 capsule 0     oxyCODONE (ROXICODONE) 5 MG immediate release tablet TAKE 1 TABLET BY MOUTH UP TO 4 TIMES DAILY AS NEEDED FOR PAIN 90 tablet 0     rivaroxaban 20 mg Tab Take 1 tablet (20 mg total) by mouth daily. 30 tablet 3     traMADol (ULTRAM) 50 mg tablet Take 1 tab up to four times daily, prn for low back, right thigh and calf pain. 120 tablet 0     No current facility-administered medications for this visit.           Objective:    Vitals:    01/19/18 1055   BP: 100/70   Pulse: (!) 120   Weight: 134 lb (60.8 kg)      Body mass index is 22.3 kg/(m^2).    Alert.  Cooperative and forthcoming without psychomotor agitation.  Transfers slowly and uses cane to assist with ambulation.  HEENT exam with moist mucous membranes.  Chest with diminished breath sounds throughout lung fields and does have an abnormal inspiratory phase without expiratory wheeze.  No inspiratory crackles.  No rhonchi.  Cardiac exam appears regular rate and rhythm.  Lower extremity exam with describe right greater than left lower extremity weakness historically with AFO brace bilaterally without significant ankle swelling.  No rash.

## 2021-06-15 NOTE — PROGRESS NOTES
Pain Clinic follow-up  ENCOUNTER DATE: 2018     Anand Felix    1985  MRN # [id number]  PCP: Kyle Velez CNP     CC: Anand Felix 32 y.o. is here today, sent to me by  to discuss        Chief Complaint   Patient presents with           Right side and back pain       Update 2018:    Patient returns today accompanied his wife after his initial consultation.  Nortriptyline 25 mg at bedtime has been taking regularly.  He has not started taking Flexeril.  He was told that he will be contacted by the pharmacy but never received a call that  has prescription is ready. His pain condition has changed since last visit.  He continues to have muscle weakness and fatigue as well as limitation of his physical activity.  Urine drug screen report was reviewed which is consistent.  Pain continues to be 6 on a scale of 0-10.  Some sedation from nortriptyline was reported.  Rheumatology consultation is still pending.  Patient started doing  psychotherapy.  Patient reports good [facility] with [facility].     Otherwise, the past medical history, past surgical history, review of system, allergies, medications, family history are reviewed with no change from last visit.    HPI:      Pain started: In his teenager all over body pain along with muscle weakness and tremor.  Pain level: On a scale of 1-10, the patient rates their pain on average at a 6  Pain is described: Constant, moderate, sharp, burning, aching, tingling, numbness, stabbing, deep, throbbing, heavy  Pain interferes with: Daily activities, sleep, work, recreational activities  Aggravating factors: Any movement  Alleviating factors: Medications, sleep, massage, heat  Associated Symptoms: Rash and legs             Past Medical History:   Diagnosis Date     Bulging lumbar disc       Lumbar herniated disc 10/23/2015     Lumbar radiculopathy 2015               Past Surgical History:   Procedure Laterality Date     MINIMALLY INVASIVE  MICRODISCECTOMY LUMBAR SPINE Right 10/2015     L5-S1; Dr. Bettie Hernandez     SINUS SURGERY   2014     Dr. Wright         SOCIAL HISTORY:   reports that he has never smoked. He has never used smokeless tobacco. He reports that he drinks alcohol. He reports that he does not use illicit drugs.     FAMILY HISTORY  family history includes Cancer in his father; Depression in his mother; Hyperlipidemia in his mother; No Medical Problems in his brother, brother, sister, sister, sister, and sister; Other in his mother; Thyroid disease in his cousin and maternal grandmother.     No Known Allergies      Current Medications           Current Outpatient Prescriptions   Medication Sig Dispense Refill     acetaminophen (TYLENOL) 500 MG tablet Take 1 tablet (500 mg total) by mouth every 6 (six) hours as needed for pain.   0     amoxicillin-clavulanate (AUGMENTIN) 875-125 mg per tablet Take 1 tablet by mouth 2 (two) times a day for 10 days. 20 tablet 0     cholecalciferol, vitamin D3, 2,000 unit cap Take 2,000 Units by mouth daily.         diazePAM (VALIUM) 5 MG tablet Take 1 tablet (5 mg total) by mouth 2 (two) times a day as needed for muscle spasms. 60 tablet 0     oxyCODONE (ROXICODONE) 5 MG immediate release tablet TAKE 1 TABLET BY MOUTH UP TO 4 TIMES DAILY AS NEEDED FOR PAIN 90 tablet 0     rivaroxaban 20 mg Tab Take 1 tablet (20 mg total) by mouth daily. 30 tablet 3     traMADol (ULTRAM) 50 mg tablet Take 1 tab up to four times daily, prn for low back, right thigh and calf pain. 120 tablet 0      No current facility-administered medications for this encounter.             Chemical Dependency History: Denies        Mental Health History: Depression and anxiety.  Previous psychotherapy        REVIEW OF SYSTEMS:  12 point systems were reviewed with pt as documented on pt health form of 1/2/2018. ROS was positive for fatigue, weakness, trouble sleeping, itching, ringing in ears, glasses, flashing lights, nose stuffiness, nausea,  vomiting, change in bowel habits, urinary frequency, calf pain, muscle pain, stiffness, back pain, dizziness, weakness, numbness, tingling, tremor, memory difficulties, easy bruising, easy bleeding, allergies, stress  The rest of systems were pertinent negative.         PHYSICAL EXAM:     Constitutional: 32 y.o.  male in NAD; alert and oriented x4/4; appears stated age.  Normal body habitus.  Patient is cooperative, polite, communicates well, makes eye contact, and expresses appropriate concern throughout history. Inspection of the neck demonstrates no skin abnormalities or deformities.  Posture is appropriate with no obvious listing, scoliosis, or rigidity.  HEENT:   Head: Normocephalic and atraumatic.   Right Ear: External ear normal.   Left Ear: External ear normal.   Nose: Nose normal.   Mouth/Throat: Oropharynx is clear and moist.   Eyes: Conjunctivae and EOM are normal. Right eye exhibits no discharge. Left eye exhibits no discharge.   Neck: Normal range of motion. Neck supple.   Cardiovascular: Normal rate, regular rhythm and normal heart sounds.    Pulmonary/Chest: Effort normal and breath sounds normal. No respiratory distress. No wheezes. Noo rales.  Integumentary: no rashes or breaks in the skin, no open wounds.   Psychiatry:  The patient described anxious mood. Affect is congruent. No abnormal speech. The patient denies any suicidal ideation. No hallucination. Judgement and insight are normal.      Musculoskeletal exam:       There was all over body tenderness and muscle and soft tissues.  Gait: Patient walks with antalgic gait.  Patient had tremor while he was standing and walking.  He uses a cane.  He has difficulty standing on tiptoes and heels.  Patient rises from a seated position with difficulty.          Neurological exam:      Motor Examination:  Muscles are symmetrical, no deformities or atrophy.  Motor examination in the upper and lower extremities demonstrates grade 3-4/5 strength in all  "major motor groups.  Patient has AFO braces for his feet.  Sensory Examination:  Light touch sensation in the upper and lower extremities is subjectively normal throughout all major dermatomes.   Deep Tendon Reflexes:  Reflexes at the biceps, brachioradialis, and triceps are symmetric and grade 1 bilaterally. Reflexes at the Patellar and Achilles are symmetric and grade 1 bilaterally.          Images:      Diagnosis  1. Muscle Weakness Generalized - \"Neuromuscular Condition\" with normal EMG.  Ambulatory referral to Rheumatology     nortriptyline (PAMELOR) 25 MG capsule   2. Muscle spasm  cyclobenzaprine (FLEXERIL) 5 MG tablet            Assessment:     This is a 32-year-old male patient who has complicated medical history.  He presented today accompanied by his wife for follow-up regarding chronic all over body pain, muscle spasm, muscle weakness, tremor.  She has been seen by multiple neurologists. patient has no specific diagnosis for his condition.  He has had EMG which was normal.  He has done some physical therapy and occupational therapy with no relief.  He was trialed on gabapentin which was not helpful.  He was tried on Tylenol, Lidoderm, compounded cream, oxycodone, morphine, Dilaudid which they were helpful.  He was trialed on tramadol which was not helpful.      Since last visit, he has had no change of his condition.  His sleep has improved with the addition of nortriptyline.    Discussed long-acting opioid and reduce short acting opioid to help him with coverage of pain throughout the day.  Discuss increase of nortriptyline to improve his quality of sleep with help to provide her with some pain relief.  Also, he may benefit from a mood standpoint.    Rheumatology consultation is still pending.    Urine drug screen is consistent.   is consistent.        PLAN:     Available medical records including diagnostic studies were reviewed today with the patient. Plan of care was discussed with the patient. " Education about patient pain condition, pathology, and strategies to manage the pain was provided.      Diagnotic Studies/Lab orders: None     Interventions: None     Non Opioid Management: Increase nortriptyline to 50 mg at bedtime.  Discussed potential increased sedation.  Patient reports improvement of sleep with nortriptyline.  Will hold on Flexeril for time being.     :  Dated 1/24/2018  Reviewed and consistent.     Opioid Management:     Urine drug screen is consistent.  Was reviewed with the patient.  I started the patient on OxyContin 10 mg twice a day.  Oxycodone 5 mg 1 time a day as needed.  Patient reports only couple of hours of relief from oxycodone.  Hence, long acting opioid is reasonable.     The patient understands that pain medication is not prescribed during the initial patient consultation at the pain center. If the patient is on pain medications for chronic pain, the PCP or prescribing provider will continue to prescribe until the patient presents for follow up at the pain center. Medication prescriptions provided by the pain center, will depend on the UA results, safe prescribing practices established by the CDC and patient response to their current treatment regimen.      Patient required a random Urine Drug Testing, due to the need to comply with [place] Model Policy Guidelines and CDC Guideline for the use of any controlled substances. This is to ensure that patient is compliant with treatment, and monitor for risks such as diversion, abuse, or any other aberrant behaviors. Patient is either being considered for or taking a controlled substance. Unexpected findings will be discussed and treatment decision may be adjusted. Testing is being implemented across the board randomly w/o bias related to age, race, gender, socioeconomic status or Mandaeism affiliation. Risks versus benefits of short and long term opioid management were discussed. Compliance, potential adverse effects Opioid  agreement was provided to the patient for review.  report was reviewed and discussed with the patient today.      SAFETY REMINDERS  No alcohol while taking controlled substances. Alcohol is not an illegal substance, it is unsafe to use in combination. It is a build up of substances in the body that can be extremely hazardous and may cause respirations to slow to a dangerous rate resulting in hospitalization, brain damage, or death.     Opioid medications have been associated with sharp rise in unintentional overdose and death.  Overdose is a condition characterized by the consumption in excess of a particular drug causing adverse effects. This can happen b/c you are sick, accidentally or intentionally took an extra dose, are on multiple medication that can interact. Someone took your medication and they are not use to the medication.  Symptoms of overdose include:   !breathing slow and shallow, erratic or not at all  !pinpoint pupils, hallucinations  !confusion  !muscle jerks, slack muscles   !extreme sleepiness or loss of alertness   !awake but not able to talk   !face pale or clammy, vomiting, for lighter skinned people, the skin tone turns bluish purple, for darker skinned people, it turns grayish or ashen   If in a situation where overdose is a concern engage the emergency response system (dial 911).     In one study it was noted that 80% of unintentional overdoses occurred in people who were taking a combination of opioids and benzodiazepines.     Do not sell, loan, borrow or share your opioid medication with anyone. Deaths have occurred as a result of this practice. It is illegal and patients are being prosecuted.      Prevent unexpected access/loss of medication: Keep medication locked. Only carry what you need with you.     The patient agrees to the plan and has no further questions, if questions arise the patient knows to call [phone].      Behavioral Health:  Continue psychotherapy as scheduled.       Referral Rheumatologist consultation is still pending.  Referring the patient for H wave.  Patient was asked to contact his insurance regarding acupuncture if it is covered.    Please see your current provider for any continued prescription. They will continue to manage your general health and have requested you see the pain center for pain management. Please discuss any health concerns with your PCP      Follow up: 4 weeks.          Ham Churchill MD  [facility]  [address]  [place] [address]  Ph: [phone]  Fax: [phone]

## 2021-06-15 NOTE — PROGRESS NOTES
"2/4/2021    Start time: 11:02 AM    Stop Time: 11:52 AM   Session # 5    Anand Felix is a 35 y.o. male who is being evaluated via a billable video visit.    Chief Complaint:    Major depression, severe   Generalized Anxiety Disorder  Chronic pain syndrome    The patient has been notified of the following:    \"This video visit will be conducted via a call between you and your provider. We have found that certain health care needs can be provided without the need for an in-person visit. This service lets us provide the care you need with a video conversation\".     \"Video visits are billed at different rates depending on your insurance coverage. Please reach out to your insurance provider with any questions\".     \"If during the course of the call the provider feels a video visit is not appropriate, you will not be charged for this service\".     Patient has been given verbal consent to a video visit: Yes    Patient would like the video invitation sent by: My Chart    Will anyone else be joining your video visit?: No    Video visit details    Type of service: Video visit    Originating Location (pt. Location): Home    Distant Location (provider location): [facility]    Platform used for Video Visit: [employer]    Functional Impairment:   Personal: 3  Family: 3  Work:3  Social:3    Clinical assessment of mental status: Anand Felix presented on time.  He was open and cooperative, and dressed appropriately for this session and weather. His memory was in tact .  His  speech was appropriate.  Language was appropriate.  Concentration and focus is on task. Psychosis is not noted or reported. He reports his mood is depressed and anxious .  Affect is congruent with speech.  Fund of knowledge is adequate. Insight is adequate for therapy.     Suicidal/Homicidal Ideation present: Patient reports passive SI. He denies plan or means. He agrees to follow safety plan to call 911, go to the ER for evaluation and/or call the " crisis line if symptoms worsen or he is feeling unsafe. He describes protective factors, including his wife/family and spiritual belief.         Patient's impression of their current status: Patient reports continued difficulty with depression and anxiety symptoms. Patient struggles with stopping negative thoughts. Patient reports stressors related to social situations. Patient is working on acceptance of his chronic medical concerns. He is working on identifying future goals.     Therapist impression of patients current state: Patient seems to continue to struggle with depression and anxiety/worry. Discussed what seems to trigger symptoms. Patient reports SI, but denies plan or means. He states SI is fleeting and less often this week. Patient believes that working on clarifying his future goals has helped to reduce SI. He reports negative thoughts come and go, but are difficult to manage at times.  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 Further discussed external and internal pressures to go back to work. Continued to discuss values clarification. Patient states it is difficult to sleep. He states medications help somewhat. Patient reports he is working on dietary changes and discussed some related challenges. Patient is working on exercising for short periods of time, as he is able. Fatigue and physical weakness persist, per his report. Patient is reaching out to his support system, as he is able. However, he reports he struggles in social situations as he has changed due to his medical issues. Patient seems to be making more effort towards using Mindfulness techniques in daily living, as well as in social situations. Gave patient praise for his efforts. Discussed challenges with acceptance and ways to re-evaluate what is important to him in his life now. Discussed grief/loss as related to physical changes in the past few years.  Presented grounding exercise and patient seemed to benefit.     Type of psychotherapeutic technique provided: Client centered, CBT and Mindfulness    Progress toward short term goals: Patient is working on increasing coping skills to manage symptoms related to mental health and medical concerns.    Review of long term goals: Not done at today's visit. Last review: 12/22/2020    Diagnosis: Major depression, severe   Generalized Anxiety Disorder  Chronic pain syndrome    Plan and Follow up:  Follow pain center plan of care and overall medical plan. Schedule and attend psychotherapy every 1-2 weeks to further address mental health and chronic pain concerns. Patient understands that sessions are by telephone or video at this time, due to COVID-19 guidelines, until further notice. Practice meditations/coping resources as discussed. Continue to engage in hobbies and go outside daily, as appropriate. Continue to discuss future planning.       Discharge Criteria/Planning: Patient will continue with follow-up until therapy can be discontinued without return of signs and symptoms.    Alize Livingston 2/4/2021

## 2021-06-15 NOTE — PATIENT INSTRUCTIONS - HE
Plan:   Interventions-    Follow up in 8 weeks     Continue avoiding gluten and sugar, as well as different oils that are fruit.     Stop gluten, whey and sugar.      Drink water with fresh claire squeezed into it daily to help with digestion     Continue the use of the magnesium at bedtime with water      Continue the use of the medical cannabis it is helpful when used for the chronic pain.      Ok to continue the use of the percocet at this time. Refills sent to the pharmacy 3/10-4/7, 4/7-5/5    Ok to stop the use of the gabapentin at this time.       Orders placed today  Medications that were ordered today   Requested Prescriptions     Pending Prescriptions Disp Refills     gabapentin (NEURONTIN) 100 MG capsule 90 capsule 1     Sig: Take 100 mg by mouth 3 (three) times a day.     oxyCODONE-acetaminophen (PERCOCET/ENDOCET) 7.5-325 mg per tablet 84 tablet 0     Sig: Take 1 tablet by mouth 3 (three) times a day as needed for pain.     No orders of the defined types were placed in this encounter.        The patient understand todays plan and has their questions answered in regards to expectations and current treatment plan.     Prevent unexpected access/loss of medication: Keep medication locked. Only carry what you need with you    The plan of care will be adjusted to accommodate the issues discussed, discussing management of their care and follow up that is recommended. 3/8/2021         Dayna Lundberg [facility]  [address]  [place] [address]  Ph: [phone]  Fax: [phone]

## 2021-06-15 NOTE — PROGRESS NOTES
Anand Felix is a 36 y.o. male who is being evaluated via a billable video visit.      How would you like to obtain your AVS? Quick[employer]ift[employer]art.  If dropped from the video visit, the video invitation should be resent by: Text to cell phone: [phone]  Will anyone else be joining your video visit? No        Platform used for Video Visit: [employer]     Pain score 5  Constant   What does your pain like feel during a flare? Sharp, tight  Does the pain interfere with:  Work ----- yes  Walking ------ yes  Sleep ------- no  Daily activities ------no  Relationships -------yes  F=6    AMPAROT/TOMMIE - 01/27/2020

## 2021-06-15 NOTE — PROGRESS NOTES
RESPIRATORY CARE NOTE     Patient Name: Anand Felix  Today's Date: 2/1/2018     Complete PFT done. Pt performed tests with good effort. Test results meet ATS criteria, except DLCO. Did 5 efforts selected the best two. Patient's respirations very short, rapid and choppy. Patient was shaking through out test. Results scanned into epic. Pt left in no distress.       Genia Carlin,LRT

## 2021-06-15 NOTE — PROGRESS NOTES
"2/25/2021    Start time: 10:05 AM    Stop Time: 10:55 AM   Session # 7    Anand Felix is a 35 y.o. male who is being evaluated via a billable video visit.    Chief Complaint:    Major depression, severe   Generalized Anxiety Disorder  Chronic pain syndrome    The patient has been notified of the following:    \"This video visit will be conducted via a call between you and your provider. We have found that certain health care needs can be provided without the need for an in-person visit. This service lets us provide the care you need with a video conversation\".     \"Video visits are billed at different rates depending on your insurance coverage. Please reach out to your insurance provider with any questions\".     \"If during the course of the call the provider feels a video visit is not appropriate, you will not be charged for this service\".     Patient has been given verbal consent to a video visit: Yes    Patient would like the video invitation sent by: My Chart    Will anyone else be joining your video visit?: No    Video visit details    Type of service: Video visit    Originating Location (pt. Location): Home    Distant Location (provider location): [facility]    Platform used for Video Visit: [employer]    Functional Impairment:   Personal: 3  Family: 3  Work: 3  Social:3    Clinical assessment of mental status: Anand Felix presented on time.  He was open and cooperative, and dressed appropriately for this session and weather. His memory was in tact .  His  speech was appropriate.  Language was appropriate.  Concentration and focus is on task. Psychosis is not noted or reported. He reports his mood is depressed .  Affect is congruent with speech.  Fund of knowledge is adequate. Insight is adequate for therapy.     Suicidal/Homicidal Ideation present: None Reported This Session. However, he reports passive SI this week. He denies plan or means    Patient's impression of their current status: Patient " reports continued difficulty with depression and anxiety symptoms. Patient struggles with stopping negative thoughts. Patient reports stressors related to social situations and challenges with feeling connected in some family relationships. Patient is working on acceptance of his chronic medical concerns. He is working on identifying future goals.        Therapist impression of patients current state: Patient seems to continue to struggle with depression and anxiety/worry. Discussed what seems to trigger symptoms. He processed thoughts and feelings regarding good news they received regarding his wife's health concern.  Patient reports SI, but denies plan or means. He states SI is fleeting and he is able to let go more often of negative thoughts.  He continues to work on identifying future goals, but continues to struggle with  what he wants versus what others want for him. Discussed ways to find meaning and connection to future plans. Continued to discuss values clarification. Patient states sleep has been better, especially with his current medications. Patient reports he is working on dietary changes and discussed some related challenges and progress. Fatigue and physical weakness persist, per his report. Patient is reaching out to his support system, as he is able. However, he reports he struggles in social situations as he has changed due to his medical issues. He struggles with feeling connected with some family relationships. He reports staying connected to his spiritual program and discussed ways to further connect that may be meaningful to him. Patient seems to be making more effort towards using Mindfulness techniques in daily living. Gave patient praise for his efforts. Discussed challenges with acceptance and ways to re-evaluate what is important to him in his life now. Discussed grief/loss as related to physical changes in the past few years.     Type of psychotherapeutic technique provided:  Client centered, CBT and Mindfulness    Progress toward short term goals: Patient is working on increasing coping skills to manage symptoms related to mental health and medical concerns.    Review of long term goals: Not done at today's visit. Last review: 12/22/2020    Diagnosis: Major depression, severe   Generalized Anxiety Disorder  Chronic pain syndrome    Plan and Follow up: Follow pain center plan of care and overall medical plan. Schedule and attend psychotherapy every 1-2 weeks to further address mental health and chronic pain concerns. Patient understands that sessions are by telephone or video at this time, due to COVID-19 guidelines, until further notice. Practice meditations/coping resources as discussed. Continue to engage in hobbies and go outside daily, as appropriate. Continue to discuss future planning. Follow up on personal goal to reach out to spiritual leader at [employer].      Discharge Criteria/Planning: Patient will continue with follow-up until therapy can be discontinued without return of signs and symptoms.    Alize Livingston 2/25/2021

## 2021-06-15 NOTE — PROGRESS NOTES
My Clinic Care Coordination Care Plan    [facility]  [address], [address] [place] [address]  [phone]    My Preferred Method of Contact:  Phone: [phone]    My Primary/Preferred Language:  English    Preferred Learning Style:  Reading information, Face to face discussion, Pictures/Diagrams and Hands on teaching    Emergency Contact: Extended Emergency Contact Information  Primary Emergency Contact: Miguel Felix   [facility]  Home Phone: [phone]  Relation: Spouse     PCP:  Malachi Choudhary MD  Specialists:    Care Team            Malachi Choudhary MD PCP - General, Family Medicine    [phone]     Stephanie Bell, RN Registered Nurse, Clinic Care Coordination    Estefany Groves MD Physician, Hematology and Oncology    [phone]     LORAINE Brooks Physician, Rheumatology    [phone]     Ham Churchill MD Physician, Pain Medicine    [phone]     Alize Livingston, [facility], Psychology    [phone]     Sylvia Yael [facility] Care Coordination Care Guide, Clinic Care Coordination    [facility]. Phone: [phone]; Fax: [phone]        Hospitalizations and/or ED Visits  Most Recent: October 15th 2017     Reason: Chest Pain    Concerns regarding my health transportation, medical bills and needing help in the home.    Advanced Directive/Living Will: The patient was given information regarding Adanced Directives/Living Will      Anand elected to enroll in the [facility] Care Coordination.  Anand was given a copy of the Clinic Care Coordination brochure and full description of how to access their care team both during clinic hours and after hours.     My Care Guide's Name and Phone Number: Sylvia Conley [phone]    The Care Guide and myself agreed to be in contact monthly.      Medication Update  The patient's medication list is up to date per Jana Mensah    Health Maintenance and Immunization Update  The patient's preventive health screenings  and immunizations are up to date per Jana Mensah.    My Emergency Plan  Managing Chronic Pain: Medicines  Medicines can help you live better with chronic pain. You may use over-the-counter or prescription medicines. It can take some time and trial and error to work out the best treatment plan for you. Work with your health care provider to find the best medicines for you, and to use them safely and effectively.  Tell your health care provider about all medicines you`re taking, including herbs and vitamins.   A part of your treatment plan  Depending on your situation and the type of pain, you may take medicines:    At times when pain is more intense than usual.    For pain relief throughout the day.    Before activities that tend to trigger pain, like going shopping or doing physical therapy.     To decrease sensitivity to pain and help you sleep.  There are 4 major groupings of medicines for the treatment of chronic pain:  Non-opioids. These include the commonly used medicine acetaminophen as well as the non-steroidal anti-inflammatory drugs (NSAIDs), like aspirin and ibuprofen, naproxen sodium, and ketoprofen. These all help control pain but NSAIDs also help relieve inflammation. These drugs are available over-the-counter. Some NSAIDS are available by prescription only.  Acetaminophen can cause liver damage if taken above the recommended dose. NSAIDs may cause stomach problems like bleeding ulcers. Using them over the long-term can cause heart problems and stroke in a very small number of people. None of these drugs is addictive.  Opioids. This includes drugs, like morphine, oxycodone, codeine, fentanyl, and methadone. Opioids may be used to treat breakthrough pain or severe chronic pain. Opioids are available only by prescription. These medicines may be effective for managing chronic pain. But they are controversial because of their side effects and because they can be addictive.    Adjuvants. This group includes  medicines that were originally made to treat other conditions, but were also found to relieve pain. Examples of adjuvant drugs include antidepressants and anticonvulsants.  Antidepressants. These help pain by working on the same brain chemicals that play a role in depression. They also help improve sleep. Tricyclic antidepressants are 1 group of antidepressants used for treating chronic pain caused by nerve injury (neuropathic pain). Examples include amitriptyline, nortriptyline, and desipramine. Serotonin-norepinephrine reuptake inhibitors (SNRIs), like duloxetine and milnacipran, are also used.  Some types of antidepressants are used in low doses for sleep problems. They may also be prescribed if you are very sensitive to pain or some kinds of nerve pain.  Anticonvulsants, developed to prevent seizures, can help certain pain conditions, particularly nerve (neuropathic) pain. Examples include gabapentin and pregabalin.  Other pain medicines    Topical. These medicines, like lidocaine or capsaicin, are applied to the skin to treat pain in one location.    Muscle relaxants. These may be used to stop painful muscle spasms. Drugs like cyclobenzaprine can be sedating.  Taking medicine safely    Take your medicine on time and in the right dose as prescribed.    Tell your health care professional if your medicine doesn't relieve your pain or work for a long enough time, or if you have side effects.    Don't take other people's medicines. They may not be safe for you.  Avoid alcohol, tobacco, and illegal drugs. These may interact with your medicines causing you harm.      All [facility] clinic patients have access to a Nurse 24 hours a day, 7 days a week.  If you have questions or want advice from a Nurse, please know [facility] is here for you.  You can call your clinic and they will connect you or you can call [facility] at [phone].  [facility] also has Walk In Care clinics in multiple locations.  Call the  number listed above for more information about our Walk In Care clinics or visit the [employer] website at [email or web address].    Patient Support  I will ask my emergency contact for help in supporting me in these goals.  Relationship to patient: Spouse

## 2021-06-16 ENCOUNTER — COMMUNICATION - HEALTHEAST (OUTPATIENT)
Dept: FAMILY MEDICINE | Facility: CLINIC | Age: 36
End: 2021-06-16

## 2021-06-16 DIAGNOSIS — F33.1 MODERATE EPISODE OF RECURRENT MAJOR DEPRESSIVE DISORDER (H): ICD-10-CM

## 2021-06-16 PROBLEM — I26.99 BILATERAL PULMONARY EMBOLISM (H): Status: ACTIVE | Noted: 2017-10-15

## 2021-06-16 PROBLEM — I26.99 PULMONARY EMBOLISM (H): Status: ACTIVE | Noted: 2017-10-15

## 2021-06-16 PROBLEM — F32.1 MODERATE MAJOR DEPRESSION (H): Status: ACTIVE | Noted: 2021-03-19

## 2021-06-16 PROBLEM — M79.10 MYALGIA: Status: ACTIVE | Noted: 2018-02-13

## 2021-06-16 PROBLEM — R00.0 SINUS TACHYCARDIA: Status: ACTIVE | Noted: 2018-08-27

## 2021-06-16 PROBLEM — J34.1 MUCOCELE OF FRONTAL SINUS: Status: ACTIVE | Noted: 2020-02-28

## 2021-06-16 NOTE — PROGRESS NOTES
[facility] Hematology and Oncology Progress Note    Patient: Anand Felix  MRN: [id number]  Date of Service: 02/23/2018        Reason for Visit        Assessment and Plan    ECOG Performance   ECOG Performance Status: 1     Distress Assessment  Distress Assessment Score: Extreme distress (pain and disease)    Pain  Currently in Pain: Yes  Pain Score (Initial OR Reassessment): 6  Location: lower back, right leg    #.  Acute bilateral pulmonary embolism (subsegmental PE in bilateral lower lobes with a small area of pulmonary infarction in the right lower lobe) in October 2017-symptomatic at presentation.   He is currently taking Xarelto 20 mg once daily.  He will complete 6 months of therapy in a couple of months.  She does not have any major bleeding although he has easy bleeding and a couple episodes of blood in stool possibly from fissure or hemorrhoids.   We reviewed his history again and it appeared that he developed pulmonary emboli unprovoked.  One of the possible etiology was his poor mobility, otherwise it will be idiopathic etiology.  I reviewed him about the recurrent risk of pulmonary embolism in unprovoked PE is about 10% in the first year and about 5% the year after.  Due to his limited mobility and also it was felt to be the cause of his pulmonary emboli, I feel that he should remain on long-term anticoagulation at the end of primary treatment for pulmonary emboli.  If he were not having a mobility issue, we could stop anticoagulation therapy at the end of 3-6 months treatment and to remain on indefinite anticoagulation therapy at recurrence.     I also discussed about options of secondary prevention by continuing current dose of Xarelto or lowering dose of Xarelto to 10 mg once daily (instead of 20 mg once daily to have less side effects of bleeding) or low-dose aspirin 81 mg once daily which will give some protection although as not as effective as anticoagulation.  I personally would prefer to have  Xarelto 10 mg once daily or aspirin 81 mg once daily for secondary prevention in his case.     He was previously recommended for indefinite anticoagulation.  I agreed that long-term anticoagulation due to the overall benefits outweigh the risk currently.     I also discussed about alternative choices of anticoagulation therapy with new oral anticoagulant or Coumadin.  Coumadin would be cheaper, however he will need to have INR monitoring.  He has a good understanding of choices of anticoagulation therapy.   At the end of our visit, he agreed to continue to complete 6 months of anticoagulation therapy, then he will continue to discuss with Dr. Choudhary regarding the choice of anticoagulation or antiplatelet agents for secondary prevention.    Recommendations:  -Recommend long-term anticoagulation therapy as long as he is at low risk of bleeding.  He will need to complete 6 months of full dose anticoagulation, then determine secondary prevention option of continuing current full dose of anticoagulation, or dose reduction of Xarelto to 10 mg daily, or aspirin 81 mg daily.  He will follow-up discuss with Dr. Choudhary and make that decision.  He is now convinced that he would be a candidate for long-term anticoagulation/antiplatelet therapy as long as he he is at low risk of bleeding.  -I also reminded him that if he decides to stay on anticoagulation therapy, he needs to have her routine follow-up at least once a year with Dr. Choudhary to monitor his liver and kidney function for continued drug monitoring.  -Is also reminded to aware of signs and symptoms of bleeding and time to seek medical attention.    Problem List    1. Bilateral pulmonary embolism        ______________________________________________________________________________    History of Present Illness    Mr. Anand Felix is a very pleasant 32-year-old  American gentleman accompanied by his sister.    He initially developed right-sided chest pain acutely in  October and found to have subsegmental pulmonary embolism bilaterally in lower lobes with a small area of pulmonary infarction in the right lower lobe.  He reported that his lower extremity weakness has not improved, even more week or.  However, he has been doing this some minor house chores and he is able to use a walker move around in the house.     He had CTA chest in January 2018 and it showed no evidence of pulmonary emboli.    Pain Status  Currently in Pain: Yes    Review of Systems    Constitutional  Constitutional (WDL): Exceptions to WDL  Fatigue: Fatigue relieved by rest  Neurosensory  Neurosensory (WDL): Exceptions to WDL  Ataxia: Moderate symptoms, limiting instrumental ADL (uses cane)  Peripheral Sensory Neuropathy: Moderate symptoms, limiting instrumental ADL (right leg)  Eye   Eye Disorder (WDL): All eye disorder elements are within defined limits  Ear  Ear Disorder (WDL): Exceptions to WDL  Tinnitus: Mild symptoms, intervention not indicated (chronic on/off)  Cardiovascular  Cardiovascular (WDL): Exceptions to WDL (tachy)  Pulmonary  Respiratory (WDL): Exceptions to WDL  Cough: Mild symptoms, nonprescription intervention indicated  Dyspnea: Shortness of breath with moderate exertion  Gastrointestinal  Gastrointestinal (WDL): All gastrointestinal elements are within defined limits  Genitourinary  Genitourinary (WDL): All genitourinary elements are within defined limits  Lymphatic  Lymph (WDL): All lymph disorder elements are within defined limits  Musculoskeletal and Connective Tissue  Musculoskeletal and Connetive Tissue Disorders (WDL): Exceptions to WDL  Muscle Weakness : Symptomatic, evident on physical exam, limiting instrumental ADL  Myalgia: Mild pain  Integumentary  Integumentary (WDL): All integumentary elements are within defined limits  Patient Coping  Patient Coping: Accepting  Distress Assessment  Distress Assessment Score: Extreme distress (pain and disease)  Accompanied by  Accompanied  by: Family Member  Oral Chemo Adherence       Past History  Past Medical History:   Diagnosis Date     Bulging lumbar disc      Lumbar herniated disc 10/23/2015     Lumbar radiculopathy 11/25/2015       Physical Exam    Recent Vitals 2/23/2018   Height -   Weight 148 lbs 8 oz   BSA (m2) -   /79   Pulse 130   Temp 98.2   Temp src -   SpO2 97   Some recent data might be hidden     General: alert, awake, not in acute distress.  Walk in with a cane.  HEENT: Head: Normal, normocephalic, atraumatic.  Eye: Normal external eye, conjunctiva, lids cornea, LISA.  Ears:  Normal auditory canals and external ears. Non-tender.  Nose: Normal external nose, mucus membranes and septum.  Pharynx: Dental Hygiene adequate. Normal buccal mucosa. Normal pharynx.  Neck / Thyroid: Supple, no masses, nodes, nodules or enlargement.  Lymphatics: No abnormally enlarged lymph nodes.  Chest: Normal chest wall and respirations. Clear to auscultation.  Heart: S1 S2 RRR, no murmur.   Abdomen: abdomen is soft without significant tenderness, masses, organomegaly or guarding  Extremities: normal strength, tone, and muscle mass.  Equal muscle box in bilateral lower extremity.  He wears ankle braces.  Skin: normal. no rash or abnormalities  CNS: Tremulous with movement. Unsteady gait.  Nonfocal otherwise.      Lab Results    No results found for this or any previous visit (from the past 168 hour(s)).    Imaging    Xr Chest 2 Views    Result Date: 1/31/2018  XR CHEST 2 VIEWS 1/31/2018 4:51 PM INDICATION: Chest Pain COMPARISON: 10/15/2017. FINDINGS: The lungs are clear. The heart size and pulmonary vascularity are normal. There is no pneumothorax or pleural effusion. There is been no significant change from the prior study.        Signed by: Estefany Groves MD

## 2021-06-16 NOTE — PROGRESS NOTES
"4/13/2021    Start time: 11:02 AM    Stop Time: 11:52 AM   Session # 12    Anand Felix is a 36 y.o. male who is being evaluated via a billable video visit.    Chief Complaint:    Major depression, severe   Generalized Anxiety Disorder  Chronic pain syndrome    The patient has been notified of the following:    \"This video visit will be conducted via a call between you and your provider. We have found that certain health care needs can be provided without the need for an in-person visit. This service lets us provide the care you need with a video conversation\".     \"Video visits are billed at different rates depending on your insurance coverage. Please reach out to your insurance provider with any questions\".     \"If during the course of the call the provider feels a video visit is not appropriate, you will not be charged for this service\".     Patient has been given verbal consent to a video visit: Yes    Patient would like the video invitation sent by: My Chart    Will anyone else be joining your video visit?: No    Video visit details    Type of service: Video visit    Originating Location (pt. Location): Home    Distant Location (provider location): [facility]    Platform used for Video Visit: Arte Manifiesto    Functional Impairment:   Personal: 3  Family: 3  Work: 3  Social:3    Clinical assessment of mental status: Anand Felix presented on time.  He was open and cooperative, and dressed appropriately for this session and weather. His memory was in tact .  His  speech was appropriate.  Language was appropriate.  Concentration and focus is on task. Psychosis is not noted or reported. He reports his mood is depressed .  Affect is congruent with speech.  Fund of knowledge is adequate. Insight is adequate for therapy.     Suicidal/Homicidal Ideation present: None Reported This Session. However, he reports passive SI this week and states he feels this way less often. He denies plan or means.    Patient's " "impression of their current status: Patient reports continued difficulty with depression and anxiety symptoms. However, he reports symptoms have been more manageable lately. Patient states his mind is \"less busy\". Patient continues to have some mild side effects from medication. He continues to have concerns and lack of interest in social situations. Patient is working on acceptance of his chronic medical concerns. He is working on identifying future goals.    Therapist impression of patients current state: Although anxiety and depression symptoms persist, patient reports that his symptoms are much more manageable lately. He states he is \"more mellow, spacy and neutral\". He reports he continues to take his antidepressant and he does have some mild side effects. Patient reports SI has been less. He denies plan or means. He states SI is fleeting. He continues to work on identifying future goals and he continues to work on  what he wants versus what others want for him. Discussed cultural concerns he is facing as well. Discussed ways to find meaning and connection to future plans. Continued to discuss values clarification. Patient reports he continues to work on dietary changes and discussed some related challenges and progress. Patient does report lack of appetite and nausea lately. He will follow up with his medical provider regarding these symptoms next week. Fatigue and physical weakness/burning sensation persist, per his report. Patient continues to work on routine/schedule, as well as physical activity, as he is able. Patient is reaching out to his support system, as he is able. However, he reports he feels indifferent about connecting socially at this time. He struggles with feeling connected with some family relationships. He continues to adjust to changes as his wife recently returned to work. He seems to be staying busy while she is away. He reports staying connected to his spiritual program and " discussed ways to further connect that may be meaningful to him. Patient seems to be making more effort towards using Mindfulness techniques in daily living. Gave patient praise for his efforts. Discussed challenges with acceptance and ways to re-evaluate what is important to him in his life now. Discussed grief/loss as related to physical changes in the past few years. He seems to be gaining insight and making progress.Presented grounding exercise and patient seemed to benefit.     Type of psychotherapeutic technique provided: Client centered, CBT and Mindfulness    Progress toward short term goals: Patient is working on increasing coping skills to manage symptoms related to mental health and medical concerns. Patient verbally agreed to treatment plan this date. Unable to sign at this time, due to phone/video visit.     Review of long term goals: Patient verbally agreed to treatment plan this date. Unable to sign at this time, due to phone/video visit.     Diagnosis: Major depression, severe   Generalized Anxiety Disorder  Chronic pain syndrome    Plan and Follow up: Follow pain center plan of care and overall medical plan. Schedule and attend psychotherapy every 1-2 weeks to further address mental health and chronic pain concerns. Patient understands that sessions are by telephone or video at this time, due to COVID-19 guidelines. He is aware that there are a limited number of in-person sessions at this time, but he prefers video due to transportation barriers. Practice meditations/coping resources as discussed. Continue to engage in hobbies and go outside daily, as appropriate. Continue to discuss future planning. Continue to follow up on personal goal to reach out to spiritual leader at [facility].      Discharge Criteria/Planning: Patient will continue with follow-up until therapy can be discontinued without return of signs and symptoms.    Alize Livingston 4/13/2021

## 2021-06-16 NOTE — TELEPHONE ENCOUNTER
Telephone Encounter by Jolie Mendez RN at 1/3/2020  8:31 AM     Author: Jolie Mendez RN Service: -- Author Type: Registered Nurse    Filed: 1/3/2020  8:35 AM Encounter Date: 2020 Status: Signed    : Jolie Mendez RN (Registered Nurse)       Medication being requested: Singulair (last ordered 19) and Percocet  Last visit date: 19  Provider: JOSUE  Next visit date: 20.  Provider: JOSUE  Expected follow up: 4-8 weeks  MTM visit (Pain Center) date: No  UDT date: 2019  Agreement date: 2019   snipped in:    Pertinent between visit information about requested medication (telephone, mychart, prior authorization, concerns, comments):   Patient did not  Rx in December so Percocet ordered   19  Acupuncture  19 Cancelled with Yara  Script being sent to provider by nurse- dates and quantity:   Percocet 1/3/20-20, 84 tabs  Singulair 1/3/20-20, 30 tabs  Requested Prescriptions     Pending Prescriptions Disp Refills   ? oxyCODONE-acetaminophen (PERCOCET/ENDOCET) 7.5-325 mg per tablet 84 tablet 0     Sig: Take 1 tablet by mouth 3 (three) times a day as needed for pain.   ? montelukast (SINGULAIR) 10 mg tablet 30 tablet 0     Sig: Take 1 tablet (10 mg total) by mouth at bedtime.     [facility]  Standing orders for withdrawal protocol implemented: BOBBY

## 2021-06-16 NOTE — PROGRESS NOTES
Anand Felix is a 36 y.o. male who is being evaluated via a billable video visit.      How would you like to obtain your AVS? Mail a copy.  If dropped from the video visit, the video invitation should be resent by: Text to cell phone: [phone]  Will anyone else be joining your video visit? No      Video Start Time: 11:25 AM    Assessment & Plan     Moderate episode of recurrent major depressive disorder (H)  Patient will continue Lexapro 10 mg daily for management of depression.  We will hold off on increasing at this time due to possible side effects.  We discussed monitoring for any new or worsening symptoms.  He may consider switching the time of day that he is taking the Lexapro to see if this helps with energy level.  He should follow-up in 3 to 6 months or sooner with any worsening symptoms.    Other chronic pain  Stable, managed by pain clinic.       Depression Screening Follow Up    PHQ 4/23/2021   PHQ-9 Total Score 9   Q9: Thoughts of better off dead/self-harm past 2 weeks 1     PHQ-9 DEPRESSION SCALE 4/23/2021   Little interest or pleasure in doing things 1   Feeling down, depressed, or hopeless 1   Trouble falling or staying asleep, or sleeping too much 1   Feeling tired or having little energy 2   Poor appetite or overeating 2   Feeling bad about yourself - or that you are a failure or have let yourself or your family down 0   Trouble concentrating on things, such as reading the newspaper or watching television 0   Moving or speaking so slowly that other people could have noticed. Or the opposite - being so fidgety or restless that you have been moving around a lot more than usual 1   Thoughts that you would be better off dead, or of hurting yourself in some way 1   PHQ-9 Total Score 9   Some recent data might be hidden     No follow-ups on file.    Melissa Acevedo, CNP  [facility]   Anand Felix is 36 y.o. and presents today for the following health issues   HPI   Is here  "today to follow-up on depression.  Medical history is significant for chronic pain syndrome.  He continues to follow with pain clinic for management.  He has had depression for several years but was recently started on medication.  Currently taking Escitalopram 10 mg.  He has been taking this for the last month or so.  Overall feels that it is helping.  Describes decrease in his racing thoughts and he feels less distracted.  He has noticed some symptoms that could be side effects from medication but he is not sure.  He describes somewhat reduced appetite.  He feels tired throughout the day.  He is currently taking his medication at night.  This is helping with his sleep.  He still has occasional thoughts of self-harm which he states he has had for a long time.  This has improved since starting Lexapro.  He has not had any plan or intent in place to harm himself.       Review of Systems  Review of Systems - pertinent positives noted in HPI, otherwise ROS is negative.        Objective    Vitals - Patient Reported  Weight (Patient Reported): 138 lb (62.6 kg)  Height (Patient Reported): 5' 5\" (165.1 cm)  BMI (Based on Pt Reported Ht/Wt): 22.96  Vitals:  No vitals were obtained today due to virtual visit.    Physical Exam      General:  Patient is pleasant and cooperative over the phone. No obvious sounds of distress. Answers questions appropriately.            Video-Visit Details    Type of service:  Video Visit    Video End Time (time video stopped): 11:38 AM  Originating Location (pt. Location): Home    Distant Location (provider location):  [facility] [place]     Platform used for Video Visit: Panchito  "

## 2021-06-16 NOTE — TELEPHONE ENCOUNTER
Telephone Encounter by Светлана Bajwa RN at 10/1/2019  1:20 PM     Author: Светлана Bajwa RN Service: -- Author Type: Registered Nurse    Filed: 10/1/2019  1:34 PM Encounter Date: 9/30/2019 Status: Signed    : Светлана Bajwa RN (Registered Nurse)       Medication being requested: Percocet, Tizanidine and Gabapentin  Last visit date:09/06 Provider: JOSUE  Next visit date: 10/28 Provider: JOSUE  Expected follow up: 8 wks  MTM visit (Pain Center) date: N/A  Pertinent between visit information about requested medication (telephone, mychart, prior authorization, concerns): None noted       Script being sent to provider - dates and quantity:  Requested Prescriptions     Pending Prescriptions Disp Refills   ? tiZANidine (ZANAFLEX) 2 MG tablet 90 tablet 0     Sig: Take 2 tablets (4 mg total) by mouth every 6 (six) hours as needed.   ? gabapentin (NEURONTIN) 100 MG capsule 84 capsule 0     Sig: TAKE 1 CAPSULE BY MOUTH THREE TIMES DAILY. OKAY TO REDUCE TO 2 TIMES A DAY IF TOO DROWSY   ? oxyCODONE-acetaminophen (PERCOCET/ENDOCET) 7.5-325 mg per tablet 84 tablet 0     Sig: Take 1 tablet by mouth 3 (three) times a day as needed for pain.      Tizanidine last prescribed 09/06  [facility] [place]

## 2021-06-16 NOTE — TELEPHONE ENCOUNTER
Telephone Encounter by Sirisha Go RN at 3/18/2020 12:13 PM     Author: Sirisha Go RN Service: -- Author Type: Registered Nurse    Filed: 3/18/2020 12:20 PM Encounter Date: 3/18/2020 Status: Signed    : Sirisha Go RN (Registered Nurse)       Medication being requested: oxycodone 7.5/325 mg  singulair 10 mg  Last visit date: 1/27  Provider: JOSUE  Continue the use of the Singulair will increase the dose to 20 mg at   nighttime.   Ok to continue the use of the percocet- 7.5/325 mg up to 3 per day   Next visit date: 3/27  Provider: JOSUE  Expected follow up: 8 weeks  MTM visit (Pain Center) date: no  UDT/CSA - 01/27/2020   snipped in:    Pertinent between visit information about requested medication (telephone, mychart, prior authorization, concerns, comments):   Psychotherapy and accupunture apts since last ov, 1 missed psychotherapy ov with ali since last ov.  Script being sent to provider by nurse- dates and quantity:   Requested Prescriptions     Pending Prescriptions Disp Refills   ? oxyCODONE-acetaminophen (PERCOCET/ENDOCET) 7.5-325 mg per tablet 84 tablet 0     Sig: Take 1 tablet by mouth 3 (three) times a day as needed for pain.   ? montelukast (SINGULAIR) 10 mg tablet 30 tablet 1     Sig: Take 2 tablets (20 mg total) by mouth at bedtime.     [facility]  Standing orders for withdrawal protocol implemented: BOBBY

## 2021-06-16 NOTE — TELEPHONE ENCOUNTER
Telephone Encounter by Mini Holland at 3/19/2019  1:55 PM     Author: Mini Holland Service: -- Author Type: --    Filed: 3/19/2019  1:58 PM Encounter Date: 3/18/2019 Status: Addendum    : Mini Holland    Related Notes: Original Note by Mini Holland filed at 3/19/2019  1:56 PM       PRIOR AUTHORIZATION DENIED    Denial Rational:  Needs to try/fail brand name Butrans patches. If patient has tried/failed this please route back to [facility] team with start/end dates of trial of butrans patches. We can re-submit this PA with that information.           Appeal Information: This medication was denied. If physician would like to appeal because patient has contraindication or allergy to covered medication please write letter of medical necessity and route back to PA team to initiate.  If no further action is needed please close encounter thank you.

## 2021-06-16 NOTE — PROGRESS NOTES
Patient oxygen saturation on RA at rest is 97%.  Oxygen saturation after ambulating 75ft on RA is 98%.    Patient is ambulatory within his/her home.

## 2021-06-16 NOTE — PROGRESS NOTES
RESPIRATORY CARE NOTE     Patient Name: Anand Felix  Today's Date: 3/19/2018       MIP, MEP and MVV performed by pt. Pt gave good effort. Pt needed to rest several minutes between efforts due to SOB.       Stephanie Arriaga

## 2021-06-16 NOTE — TELEPHONE ENCOUNTER
Telephone Encounter by Sirisha Go RN at 2/25/2019  2:57 PM     Author: Sirisha Go RN Service: -- Author Type: Registered Nurse    Filed: 2/25/2019  2:58 PM Encounter Date: 2/25/2019 Status: Signed    : Sirisha Go RN (Registered Nurse)

## 2021-06-16 NOTE — PROGRESS NOTES
Pain Clinic Followup  ENCOUNTER DATE: 2018    Anand HERNANDEZ 1985  MRN # [id number]  PCP: Malachi Choudhary MD    CC: Anand Isidro 32 y.o. is here today, sent to me by  to discuss   Chief Complaint   Patient presents with     Pain         HPI:      Pain level: On a scale of 1-10, the patient rates their pain today 5    Pain is described:  Aching, soreness, sharp at times.    Aggravating factors: activities  Alleviating factors: medication  New pain:  None  Since last visit, pain has improved  Associated Symptoms: general weakness, right leg pain  Pain interferes with:  Sleep, walking, activities.      Pertinent Medical History:   New diagnosis of GERD and sinus injection. He has received treatment for both. He reports improvement with the new medications.       Past Medical History:   Diagnosis Date     Bulging lumbar disc      Lumbar herniated disc 10/23/2015     Lumbar radiculopathy 2015       Past Surgical History:   Procedure Laterality Date     MINIMALLY INVASIVE MICRODISCECTOMY LUMBAR SPINE Right 10/2015    L5-S1; Dr. Bettie Hernandez     SINUS SURGERY      Dr. Wright       SOCIAL HISTORY:   reports that he has never smoked. He has never used smokeless tobacco. He reports that he drinks alcohol. He reports that he does not use illicit drugs.    FAMILY HISTORY  family history includes Cancer in his father; Depression in his mother; Hyperlipidemia in his mother; No Medical Problems in his brother, brother, sister, sister, sister, and sister; Other in his mother; Thyroid disease in his cousin and maternal grandmother.    No Known Allergies    Current Outpatient Prescriptions   Medication Sig Dispense Refill     acetaminophen (TYLENOL) 500 MG tablet Take 1 tablet (500 mg total) by mouth every 6 (six) hours as needed for pain.  0     cholecalciferol, vitamin D3, 2,000 unit cap Take 2,000 Units by mouth daily.       cyclobenzaprine (FLEXERIL) 10 MG tablet 1/2 to 1 tab twice a day as needed 60  tablet 0     famotidine (PEPCID) 20 MG tablet Take 1 tablet (20 mg total) by mouth 2 (two) times a day as needed for heartburn. 30 tablet 0     fluticasone (FLONASE) 50 mcg/actuation nasal spray 2 sprays into each nostril daily. 16 g 6     nortriptyline (PAMELOR) 50 MG capsule Take 1 capsule (50 mg total) by mouth at bedtime. 30 capsule 0     omeprazole (PRILOSEC) 20 MG capsule Take 1 capsule (20 mg total) by mouth daily. 90 capsule 1     oxyCODONE (OXYCONTIN) 10 mg 12 hr tablet Take 1 tablet (10 mg total) by mouth every 12 (twelve) hours. 60 each 0     oxyCODONE (ROXICODONE) 5 MG immediate release tablet TAKE 1 TABLET BY MOUTH UP TO 4 TIMES DAILY AS NEEDED FOR PAIN 90 tablet 0     rivaroxaban 20 mg Tab Take 1 tablet (20 mg total) by mouth daily. 30 tablet 3     No current facility-administered medications for this encounter.          REVIEW OF SYSTEMS:     12 point systems were reviewed with pt as documented on pt health form and the patient denies any new diagnosis or changes in 12 point system review since the last visit except GERD and sinus infection.     PHYSICAL EXAM:    Constitutional: 32 y.o.  male in NAD; alert and oriented x4/4; appears stated age.  Normal body habitus.  Patient is cooperative, polite, communicates well, makes eye contact, and expresses appropriate concern throughout history. Inspection of the neck demonstrates no skin abnormalities or deformities.  Posture is appropriate with no obvious listing, scoliosis, or rigidity.  HEENT:   Head: Normocephalic and atraumatic.   Right Ear: External ear normal.   Left Ear: External ear normal.   Nose: Nose normal.   Mouth/Throat: Oropharynx is clear and moist.   Eyes: Conjunctivae and EOM are normal. Right eye exhibits no discharge. Left eye exhibits no discharge.   Neck: Normal range of motion. Neck supple.   Cardiovascular: Normal rate, regular rhythm and normal heart sounds.    Pulmonary/Chest: Effort normal. No respiratory distress.  "  Integumentary: no rashes or breaks in the skin, no open wounds.   Psychiatry:  The patient described normal mood. Affect is normal. No abnormal speech. The patient denies any suicidal ideation. No hallucination. Judgement and insight are normal.     Musculoskeletal exam:       Gait: Patient walks with a off balanced gait.  Patient rises from a seated position without difficulty.           Images: No new diagnostic studies    Diagnosis    1. Muscle Weakness Generalized - \"Neuromuscular Condition\" with normal EMG.  oxyCODONE (OXYCONTIN) 10 mg 12 hr tablet   2. Muscle spasm     3. Chronic right-sided low back pain with right-sided sciatica  oxyCODONE (ROXICODONE) 5 MG immediate release tablet         Assessment:    This is a 32-year-old male patient who presented today for follow-up regarding his generalized muscle weakness.  He reports improvement after starting oxycodone extended release 10 mg twice a day.  We reduce oxycodone IR to 5 mg once a day as needed.  His pain is better controlled.  He started seeing Alize for psychotherapy.  He has not noticed any change in terms of his mood yet.  He was recently diagnosed with GERD and sinus infection which they are treated in better control.  He was seen by rheumatology with no specific recommendation regarding his generalized muscle weakness.  Continues to follow-up with hematology.  All over, his pain is better controlled.  No aberrant behavior.  Patient stable on the current regimen of OxyContin, oxycodone, nortriptyline, and Flexeril.      PLAN:    Plan of care was discussed with the patient. Education about patient pain condition, pathology, and strategies to manage the pain was provided.     Diagnotic Studies/Lab orders:  None    Non Opioid Management: Continue Flexeril and nortriptyline as scheduled    :  Dated 2/26/2018  Reviewed and consistent.    Opioid Management:    Continue oxycodone ER 10 mg twice a day and oxycodone IR 5 mg once a day as needed.  " Call 5 days prior to your next refill.    SAFETY REMINDERS  No alcohol while taking controlled substances. Alcohol is not an illegal substance, it is unsafe to use in combination. It is a build up of substances in the body that can be extremely hazardous and may cause respirations to slow to a dangerous rate resulting in hospitalization, brain damage, or death.    Opioid medications have been associated with sharp rise in unintentional overdose and death.  Overdose is a condition characterized by the consumption in excess of a particular drug causing adverse effects. This can happen b/c you are sick, accidentally or intentionally took an extra dose, are on multiple medication that can interact. Someone took your medication and they are not use to the medication.  Symptoms of overdose include:   !breathing slow and shallow, erratic or not at all  !pinpoint pupils, hallucinations  !confusion  !muscle jerks, slack muscles   !extreme sleepiness or loss of alertness   !awake but not able to talk   !face pale or clammy, vomiting, for lighter skinned people, the skin tone turns bluish purple, for darker skinned people, it turns grayish or ashen   If in a situation where overdose is a concern engage the emergency response system (dial 911).    In one study it was noted that 80% of unintentional overdoses occurred in people who were taking a combination of opioids and benzodiazepines.    Do not sell, loan, borrow or share your opioid medication with anyone. Deaths have occurred as a result of this practice. It is illegal and patients are being prosecuted.     Prevent unexpected access/loss of medication: Keep medication locked. Only carry what you need with you.    The patient agrees to the plan and has no further questions, if questions arise the patient knows to call [phone].     Behavioral Health: Continue psychotherapy every 2 weeks as scheduled    Please see your current provider for any continued prescription.  They will continue to manage your general health and have requested you see the pain center for pain management. Please discuss any health concerns with your PCP     Follow up: 8 weeks.     Orders placed today:    No orders of the defined types were placed in this encounter.       Medications:     Requested Prescriptions     Pending Prescriptions Disp Refills     oxyCODONE (OXYCONTIN) 10 mg 12 hr tablet 60 each 0     Sig: Take 1 tablet (10 mg total) by mouth every 12 (twelve) hours.     oxyCODONE (ROXICODONE) 5 MG immediate release tablet 90 tablet 0     Sig: TAKE 1 TABLET BY MOUTH UP TO 4 TIMES DAILY AS NEEDED FOR PAIN         Ham Churchill MD  [facility]  [address]  [place] [address]  Ph: [phone]  Fax: [phone]

## 2021-06-16 NOTE — TELEPHONE ENCOUNTER
Telephone Encounter by Halina Piña at 2/19/2019  8:39 AM     Author: Halina Piña Service: -- Author Type: --    Filed: 2/19/2019  8:42 AM Encounter Date: 2/18/2019 Status: Signed    : Halina Piña APPROVED:    Approval start date:01/19/2019  Approval end date:02/18/2020    Pharmacy has been notified of approval and will contact patient when medication is ready for pickup.

## 2021-06-16 NOTE — PROGRESS NOTES
Email: [email or web address]  Marital Status: -Miguel Felix  Children: No  Born and Raised:   Occupation: On Disability  Living Situation: Lives in [place] with his wife.  Smoking, Alcohol, other:  Services receiving:   [facility] Follow up s: Monthly  [facility] Enrollment: January 19th 2018  Next CCC Follow up: 4/23/18    Action Plan:    will:  1)  Can assist with PCA application once pt has active MA        Care Guide will:  Work with [facility] to schedule pt to apply for [place] and [place] benefits; Schedule with SW if needed to apply for PCA (pt may not need to be present depending on type of MA)- Anand met with [facility] on 3/22/18 and completed [employer] application.

## 2021-06-16 NOTE — PROGRESS NOTES
"4/20/2021   Start time: 11:01 AM   Stop Time: 11:51 AM   Session # 13    Anand Felix is a 36 y.o. male who is being evaluated via a billable video visit.    Chief Complaint:    Major depression, severe   Generalized Anxiety Disorder  Chronic pain syndrome    The patient has been notified of the following:    \"This video visit will be conducted via a call between you and your provider. We have found that certain health care needs can be provided without the need for an in-person visit. This service lets us provide the care you need with a video conversation\".     \"Video visits are billed at different rates depending on your insurance coverage. Please reach out to your insurance provider with any questions\".     \"If during the course of the call the provider feels a video visit is not appropriate, you will not be charged for this service\".     Patient has been given verbal consent to a video visit: Yes    Patient would like the video invitation sent by: My Chart    Will anyone else be joining your video visit?: No    Video visit details    Type of service: Video visit    Originating Location (pt. Location): Home    Distant Location (provider location): [facility]    Platform used for Video Visit: [employer]    Functional Impairment:   Personal: 3  Family: 3  Work: 3  Social:3    Clinical assessment of mental status: Anand Felix presented on time.  He was open and cooperative, and dressed appropriately for this session and weather. His memory was in tact .  His  speech was appropriate.  Language was appropriate.  Concentration and focus is on task. Psychosis is not noted or reported. He reports his mood is depressed.  Affect is congruent with speech.  Fund of knowledge is adequate. Insight is adequate for therapy.     Suicidal/Homicidal Ideation present: None Reported This Session. However, he reports passive SI this week and states he feels this way less often. He denies plan or means.    Patient's " "impression of their current status: Patient reports continued difficulty with depression and anxiety symptoms. However, he reports symptoms have been more manageable lately. Patient states he feels more \"neutral\" lately. Patient continues to have some mild side effects from medication. He continues to have concerns and lack of interest in social situations. Patient is working on acceptance of his chronic medical concerns. He is working on identifying future goals.    Therapist impression of patients current state: Although anxiety and depression symptoms persist, patient reports that his symptoms are much more manageable lately. He states he is \"neutral\" lately and that his mind is \"less active\". He does report feeling tired often. He reports feeling \"blank\" at times.  He reports he continues to take his antidepressant and he does have some mild side effects. Patient reports SI has been less/fleeting. He denies plan or means.  He continues to work on identifying future goals and he continues to work on  what he wants versus what others want for him. Discussed cultural concerns he is facing as well. Discussed ways to find meaning and connection to future plans. Continued to discuss values clarification. Patient reports he continues to work on dietary changes and discussed some related challenges and progress. Patient reports some improvement with appetite. He will follow up with his medical provider regarding these symptoms next week. Fatigue and physical weakness/burning sensation persist, per his report. Patient continues to work on routine/schedule, as well as physical activity, as he is able. Patient is reaching out to his support system, as he is able. However, he reports he feels indifferent about connecting socially at this time. He struggles with feeling connected with some family relationships but he does stay in touch with a few family members and finds this to be meaningful. He continues to " adjust to changes as his wife recently returned to work. He seems to be staying busy while she is away. He reports staying connected to his spiritual program and discussed ways to further connect that may be meaningful to him. Patient seems to be making more effort towards using Mindfulness techniques in daily living. Gave patient praise for his efforts. Discussed challenges with acceptance and ways to re-evaluate what is important to him in his life now. Discussed grief/loss as related to physical changes in the past few years. He seems to be gaining insight and making progress. Worked on best resourced self exercise and patient appeared to benefit. Will continue next time.     Type of psychotherapeutic technique provided: Client centered, CBT and Mindfulness, AIR network    Progress toward short term goals: Patient is working on increasing coping skills to manage symptoms related to mental health and medical concerns.     Review of long term goals: Not done at today's visit. Last review: 4/13/2021    Diagnosis: Major depression, severe   Generalized Anxiety Disorder  Chronic pain syndrome    Plan and Follow up: Follow pain center plan of care and overall medical plan. Schedule and attend psychotherapy every 1-2 weeks to further address mental health and chronic pain concerns. Patient understands that sessions are by telephone or video at this time, due to COVID-19 guidelines. He is aware that there are a limited number of in-person sessions at this time, but he prefers video due to transportation barriers. Practice meditations/coping resources as discussed. Continue to engage in hobbies and go outside daily, as appropriate. Continue to discuss future planning. Continue to follow up on personal goal to reach out to spiritual leader at [employer]. Continue best resourced self exercise.       Discharge Criteria/Planning: Patient will continue with follow-up until therapy can be discontinued without return of signs and  symptoms.    Alize Livingston 4/20/2021

## 2021-06-16 NOTE — PROGRESS NOTES
Sylvia Conley- [employer]up Financial Resource Guide    3/15/18 Screened patient for MA. Patient is not working or collecting any income at this time. Patients wife was laid off 3/1/18 will be receiving a one time severance payment of $3,833, his wife belives she should receive this around 4/1/18. No other income for household. Made appointment for Anand to meet with Catrachita George on 3/20/18 @ 10:00.      Screened patient for MA. Patient is not working or collecting any income at this time. Patients wife was laid off 3/1/18 will be receiving a one time severance payment of $3,833, his wife belives she should receive this around 4/1/18. No other income for household.            Household of 2.

## 2021-06-16 NOTE — PROGRESS NOTES
"3/8/2018    Start time: 2:00 PM    Stop Time: 2:50 PM   Session # 4    Anand Felix is a 33 y.o. male is being seen today for    Chief Complaint   Patient presents with     Mental Health Note   . Major depression, severe  History of Generalized Anxiety Disorder  Chronic pain syndrome    New symptoms or complaints: None    Functional Impairment:   Personal: 4  Family: 4  Work: 4  Social:4    Clinical assessment of mental status: Anand Felix presented on time.  He was open and cooperative, and dressed appropriately for this session and weather. His memory was in tact .  His  speech was appropriate/soft spoken.  Language was appropriate/soft spoken.  Concentration and focus is on task. Psychosis is not noted or reported. He reports his mood is depressed .  Affect is congruent with speech.  Fund of knowledge is adequate. Insight is adequate for therapy.     Suicidal/Homicidal Ideation present: Patient reports passive suicidal ideation. He reports suicidal ideation has been \"less\" this week. He denies plan or means at this time. Patient agrees to verbal safety plan to go to the ER, call 911 and/or call the crisis connection if symptoms change or worsen. Patient also identified protective factors.     Patient's impression of their current status: Patient reported he continues to be working on addressing symptoms of depression (feeling sad/angry). He reports sleep continues to be \"slightly better\" and medication has been helpful. He also reports his pain has been better managed with current medications. Patient reports he continues to have negative thinking and this has been challenging. Patient reported he has a daily routine that has been helpful. He described some coping skills that he uses in daily living.  Patient reports he continues to have weakness and pain, and that the weakness/fatigue is most difficult to manage. Patient talked about thoughts and feelings regarding recent medical appointments with hematology. He has " an appointment today with a lung specialist. He does report reaching out to his support group, at times, and challenges he has with opening up. Patient reports conflict regarding the changes in his life due to chronic pain/medical concerns.  He continues to get help from the care guide at his primary care clinic and will talk to a  next week regarding PCA services. Patient reports drinking a warm beverage, engaging in prayer and listening to pod casts and this has helped him cope with difficult thoughts and feelings. Patient engaged in a walking meditation in session and expressed benefit.      Therapist impression of patients current state: Patient seems to be working on addressing depression and chronic medical symptoms. He appears to be working on improving mood and attitude, but this appears to be a challenge. Patient appears to be finding some benefit with his current medications, especially in regards to sleep and pain management. Patient seems to have experienced some losses with changes in his medical health, especially in regards to physical activity. It would likely be worthwhile to continue work on addressing role/identity now, given patient's health concerns. It seems the care guide at his primary care clinic has been helpful and this has reduced stress for patient. It seems he will receive additional help through social work next week. Patient appears to have some challenges with household activities and he seems to feel movement does not come as naturally as it used to, due to his current medical concerns. He seems to reach out to family as needed, regarding household needs. Encouraged patient to increase efforts to reach out to social/family support to connect socially/emotionally.  Discussed potential benefits of Mindfulness meditation with patient and also continued to educate on EMDR. Encouraged meditation home practice. Presented walking meditation in session and patient seemed to  benefit.      Type of psychotherapeutic technique provided: Client centered, CBT and Mindfulness    Progress toward short term goals: Patient continues to work on increasing coping skills to manage chronic pain and mental health. Patient continues medical care as recommended by his medical providers. He engages in physical activity as he is able.     Review of long term goals: Not done at today's visit    Diagnosis: Major depression, severe  History of Generalized Anxiety Disorder  Chronic pain syndrome    Plan and Follow up: Patient to follow pain center plan of care. Patient to follow all medical recommendations. Patient to schedule every 2 weeks for psychotherapy to further address depression, anxiety and chronic pain concerns. Will continue to educate on EMDR and Mindfulness meditation. Practice meditation as presented in session. Further address adjustment to role/identity due to chronic pain and health concerns.       Discharge Criteria/Planning: Patient will continue with follow-up until therapy can be discontinued without return of signs and symptoms.    Alize Livingston 3/8/2018

## 2021-06-16 NOTE — TELEPHONE ENCOUNTER
Telephone Encounter by Stephanie Healy, RN at 7/8/2019 10:56 AM     Author: Stephanie Healy, RN Service: -- Author Type: Registered Nurse    Filed: 7/8/2019 10:59 AM Encounter Date: 7/6/2019 Status: Signed    : Stephanie Healy RN (Registered Nurse)       Medication being requested:  Gabapentin 100 2 - 3 xd or 2 x d if drowsy  Last visit date: 6/14.  Provider: CAM  Next visit date: 7/12.  Provider: CAM  Expected follow up: 4 wks  MTM visit (Pain Center) date: no  UDT date: 2/19  Agreement date: 2/19   snipped in:    Pertinent between visit information about requested medication (telephone, mychart, prior authorization, concerns, comments): 6/26 Dr Welsh injection  Script being sent to provider by nurse- dates and quantity: qu: 90  30 day supply at the least  Requested Prescriptions     Pending Prescriptions Disp Refills   ? gabapentin (NEURONTIN) 100 MG capsule [Pharmacy Med Name: GABAPENTIN 100 MG CAPSULE] 90 capsule 0     Sig: TAKE 1 CAPSULE BY MOUTH THREE TIMES DAILY. OKAY TO REDUCE TO 2 TIMES A DAY IF TOO DROWSY     Pharmacy cued: [facility]  Standing orders for withdrawal protocol implemented: na

## 2021-06-16 NOTE — PROGRESS NOTES
"Pulmonary Consult Note  3/8/2018      Reason for Consult: Shortness of Breath      Problem List:     Patient Active Problem List   Diagnosis     Muscle Weakness Generalized - \"Neuromuscular Condition\" with normal EMG.     Tremor     Arthralgias In Multiple Sites     Muscle Aches, Generalized (Myalgias)     Chronic Ethmoidal Sinusitis     Hypovitaminosis D - [place]     Monoparesis of leg     Weakness - without demonstrable CNS or peripheral cause - local neurologist at [facility], and neurologist at [facility], and [facility] consultant.     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     Nasal polyposis - right naris on MRI (2016)     Pulmonary embolism     Bilateral pulmonary embolism     Myalgia       History:     Mr. Anand Felix is a 34 yo male with PMH significant for generalized weakness, PE on Xarelto, and chronic pain, who presents to pulmonary clinic for evaluation for shortness of breath.  This has been worse especially since his PE diagnosis last year.  He states that he has had shortness of breath really for some time.  He has a history of generalized weakness and has been evaluated by several neurologist who have not found an answer.  He has a history of herniated disc at L1 causing leg weakness.  Since then he has had some mild dyspnea.  It seems that Dr. Donis has proposed that some of his symptoms are due to a somatoform disorder.  He also notes that he can walk on a treadmill, but this is at a pace of 0.5 to 0.6 mph and never really faster.  He uses a cane to walk as well.  He did have a PE and has been on xarelto.  He states he does have early satiety, but no orthopnea or LE weakness.  No cough or sputum production.  No wheeze.      Of note, his wife is present as well and asks about KOJO as he is noted to stop breathing in his sleep.  His ESS is 14 and his STOP BANG is " "4.    Past Medical History:   Diagnosis Date     Bulging lumbar disc      Lumbar herniated disc 10/23/2015     Lumbar radiculopathy 11/25/2015     Past Surgical History:   Procedure Laterality Date     MINIMALLY INVASIVE MICRODISCECTOMY LUMBAR SPINE Right 10/2015    L5-S1; Dr. Bettie Hernandez     SINUS SURGERY  2014    Dr. Wright     Social History     Social History     Marital status: Single     Spouse name: Miguel     Number of children: 0     Years of education: GED and some collese     Occupational History     unemployed      Social History Main Topics     Smoking status: Never Smoker     Smokeless tobacco: Never Used     Alcohol use Yes      Comment: Monthly or less often. 1-2 drinks at a time.     Drug use: No     Sexual activity: Yes     Partners: Female     Other Topics Concern     Not on file     Social History Narrative     Family History   Problem Relation Age of Onset     Depression Mother      Hyperlipidemia Mother      Other Mother      \"Ulcers\" - Aches and pains throughout her body.     Cancer Father      No Medical Problems Sister      No Medical Problems Brother      No Medical Problems Sister      No Medical Problems Sister      No Medical Problems Sister      No Medical Problems Brother      Thyroid disease Maternal Grandmother      Thyroid disease Cousin      No Known Allergies    Review of Systems - 10 point review of systems negative except what is mentioned in the HPI.  Also with positive excessive sweating, fatigue, sinus congestion, sinus pressure, epistaxis, PND, tinnitus, abdominal pain, heartburn, difficulty urinating, heat intolerance, rash, arthralgias/myalgias, allergies, headaches, tremor.        Medications:     Current Outpatient Prescriptions on File Prior to Visit   Medication Sig Dispense Refill     acetaminophen (TYLENOL) 500 MG tablet Take 1 tablet (500 mg total) by mouth every 6 (six) hours as needed for pain.  0     cholecalciferol, vitamin D3, 2,000 unit cap Take 2,000 Units by " mouth daily.       cyclobenzaprine (FLEXERIL) 10 MG tablet 1/2 to 1 tab twice a day as needed 60 tablet 0     fluticasone (FLONASE) 50 mcg/actuation nasal spray 2 sprays into each nostril daily. 16 g 6     nortriptyline (PAMELOR) 50 MG capsule Take 1 capsule (50 mg total) by mouth at bedtime. 30 capsule 0     omeprazole (PRILOSEC) 20 MG capsule Take 1 capsule (20 mg total) by mouth daily. 90 capsule 1     oxyCODONE (OXYCONTIN) 10 mg 12 hr tablet Take 1 tablet (10 mg total) by mouth every 12 (twelve) hours. 60 each 0     oxyCODONE (ROXICODONE) 5 MG immediate release tablet TAKE 1 TABLET BY MOUTH oneTIME DAILY AS NEEDED FOR PAIN 30 tablet 0     rivaroxaban 20 mg Tab Take 1 tablet (20 mg total) by mouth daily. 30 tablet 3     [DISCONTINUED] famotidine (PEPCID) 20 MG tablet Take 1 tablet (20 mg total) by mouth 2 (two) times a day as needed for heartburn. 30 tablet 0     No current facility-administered medications on file prior to visit.            Exam/Data:   Vitals  Vitals:    03/08/18 1532   BP: 114/84   Pulse: (!) 124   Resp: 24   SpO2: 98%       EXAM:  GEN: Alert and oriented x3, NAD  HEENT: Nares patent, posterior oropharynx clear.  MP II.  RESPIRATORY: CTAB.  No wheeze or rales, noted shaking with deep inspiration  CARDIOVASCULAR: tachycardic but regular, no m/r/g  GASTROINTESTINAL: Round, soft, NT/ND  HEM/ONC: no adenopathy, no ecchymosis  MSK: no clubbing.  Ambulates slowly with a cane  NEURO: cranial nerves appear grossly intact, muscle strength equal  SKIN: no rash or ulcerations      DATA      PFT DATA:  FEV1/FVC is 106 and is normal.  FEV1 is 96% predicted and is normal.  FVC is 95% predicted and normal.  There was no improvement in spirometry after a single inhaled dose of bronchodilator.  TLC is 76% predicted and is reduced.  RV is 68% predicted and is reduced.  DLCO is 88% predicted and is normal when it   is corrected for hemoglobin.     Impression:  Full Pulmonary Function Test is abnormal.    PFTs  are consistent with mild restrictive disease with TLC of 76%  Spirometry is not consistent with reversibility.  There is no hyperinflation.  There is no air-trapping.  Diffusion capacity when corrected for hemoglobin is normal.    IMAGING:   Personally reviewed CTA in 10/17.  Noted PE.  No parenchymal lung disease.  Small right pleural effusion.     Modified walk test with reduced exertional capacity without evidence of desaturation or hypoxia.    Assessment/Plan:   Anand Felix is a 33 y.o. male being seen for shortness of breath, likely multifactorial, with PMH significant for acute PE, chronic weakness that is unexplained, and chronic pain.    1. Shortness of Breath:  This is likely multifactorial.  On exam he shook when he took a deep breath that would indicate extremely weak core/abdominal musculature, and not true general muscle weakness which would produce shallow steady breaths.  With that, his TLC is mildly reduced with a normal diffusing capacity, suggesting neuromuscular weakness.  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  He will stay on Xarelto.  Finally, I did discuss with him the use of narcotics with weakness and that this should really be avoided.  He will discuss this with his pain specialist.    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  At some point, I will likely do a split night PSG on him, but first I want to do the above tests.    Recommend:  - echocardiogram to evaluate for PH  - V/Q scan to evaluate for chronic PE development  - Likely split night PSG in the future  - MIP/MEP/MVV for respiratory muscle strength assessment  - stay on Xarelto    I spent more than 80 minutes in review and assessment of the patient, as well as formulating plan of care, with > 50% time spent face-to-face.      Anand Winn, DO

## 2021-06-16 NOTE — PROGRESS NOTES
Email: [email or web address]  Marital Status: -Miguel Felix  Children: No  Born and Raised:   Occupation: On Disability  Living Situation: Lives in [place] with his wife.  Smoking, Alcohol, other:  Services receiving:   [facility] Follow up s: Monthly  [facility] Enrollment: January 19th 2018

## 2021-06-16 NOTE — PROGRESS NOTES
"3/22/2018    Start time: 1:05 PM    Stop Time: 1:55 PM   Session # 5    Anand Felix is a 33 y.o. male is being seen today for    Chief Complaint   Patient presents with     Mental Health Note   . Major depression, severe  History of Generalized Anxiety Disorder  Chronic pain syndrome    New symptoms or complaints: None    Functional Impairment:   Personal: 4  Family: 4  Work: 4  Social:4    Clinical assessment of mental status: Anand Felix presented on time.  He was open and cooperative, and dressed appropriately for this session and weather. His memory was in tact .  His  speech was appropriate.  Language was appropriate.  Concentration and focus is on task. Psychosis is not noted or reported. He reports his mood is depressed .  Affect is congruent with speech.  Fund of knowledge is adequate. Insight is adequate for therapy.     Suicidal/Homicidal Ideation present: None Reported This Session    Patient's impression of their current status: Patient reported he continues to be working on addressing symptoms of depression (feeling sad/angry). He reports sleep continues to be \"slightly better\" and medication has been helpful. He also reports his pain has been better managed with current medications. However, he reports he is doing more at home and has noticed symptoms of pain and weakness are triggered. Patient reports he continues to have negative thinking and this has been challenging. Patient reported he has a daily routine that has been helpful. He described some coping skills that he uses in daily living. Patient talked about thoughts and feelings regarding recent medical appointments/outcomes. He does report reaching out to his support group, at times, and challenges he has with opening up. Patient reports conflict regarding the changes in his life due to chronic pain/medical concerns. He cried as he reported he cannot remember a time without pain/fatigue. He discussed ways he has changed socially. He continues to " get help from the care guide at his primary care clinic. Patient reports drinking a warm beverage, engaging in prayer, engaging in meditation and listening to pod casts and this has helped him cope with difficult thoughts and feelings.         Therapist impression of patients current state: Patient seems to be working on addressing depression and chronic medical symptoms. He appears to be working on improving mood and attitude, but this appears to be a challenge. Patient appears to be finding some benefit with his current medications, especially in regards to sleep and pain management, despite continued physical challenges. Patient seems to have experienced some losses with changes in his medical health, especially in regards to physical activity. It would likely be worthwhile to continue work on addressing role/identity now, given patient's health concerns. It seems the care guide at his primary care clinic has been helpful and this has reduced stress for patient. Patient appears to have some challenges with household activities, but he appears to be working on being more active at home. Encouraged patient to increase efforts to reach out to social/family support to connect socially/emotionally. It seems he feels more disconnected from others since he had changes in his health. Discussed potential benefits of Mindfulness meditation with patient and also continued to educate on EMDR. Encouraged meditation home practice.      Type of psychotherapeutic technique provided: Client centered, CBT and Mindfulness    Progress toward short term goals: Patient continues to work on increasing coping skills to manage chronic pain and mental health. Patient continues medical care as recommended by his medical providers. He engages in physical activity as he is able. He is engaging in meditation in daily living.     Review of long term goals: Not done at today's visit    Diagnosis: Major depression, severe  History of Generalized  Anxiety Disorder  Chronic pain syndrome    Plan and Follow up: Patient to follow pain center plan of care. Patient to follow all medical recommendations. Patient to schedule every 2 weeks for psychotherapy to further address depression, anxiety and chronic pain concerns. Will continue to educate on EMDR and Mindfulness meditation. Practice meditation as presented in session. Further address adjustment to role/identity due to chronic pain and health concerns.       Discharge Criteria/Planning: Patient will continue with follow-up until therapy can be discontinued without return of signs and symptoms.    Alize Livingston 3/22/2018

## 2021-06-16 NOTE — TELEPHONE ENCOUNTER
Telephone Encounter by Mini Holland at 3/28/2019  9:11 AM     Author: Mini Holland Service: -- Author Type: --    Filed: 3/28/2019  9:11 AM Encounter Date: 3/27/2019 Status: Signed    : Mini Holland APPROVED:    Approval start date: 3/27/19  Approval end date:3/31/19    Pharmacy has been notified of approval and will contact patient when medication is ready for pickup.

## 2021-06-16 NOTE — TELEPHONE ENCOUNTER
Telephone Encounter by Betsy Aranda, RN at 2/25/2019  3:03 PM     Author: Betsy Aranda RN Service: -- Author Type: Registered Nurse    Filed: 2/25/2019  3:05 PM Encounter Date: 2/25/2019 Status: Signed    : Betsy Aranda RN (Registered Nurse)       PMPNote

## 2021-06-16 NOTE — TELEPHONE ENCOUNTER
Telephone Encounter by Stephanie Healy RN at 9/3/2019  8:56 AM     Author: Stephanie Healy RN Service: -- Author Type: Registered Nurse    Filed: 9/3/2019  8:59 AM Encounter Date: 8/29/2019 Status: Signed    : Stephanie Healy RN (Registered Nurse)       Medication being requested: oxycodone 5mg 2xd PRN  Last visit date: 9/6.  Provider: CAM  Next visit date: 2/19.  Provider: CAM  Expected follow up: 8 wks  MTM visit (Pain Center) date: no  UDT date: 2/2019  Agreement date: 2/2019   snipped in:    Pertinent between visit information about requested medication (telephone, mychart, prior authorization, concerns, comments): no  Script being sent to provider by nurse- dates and quantity: 56 for 28 day supply last filled 7/12 and is taking PRN is appropriate   Requested Prescriptions     Pending Prescriptions Disp Refills   ? oxyCODONE (ROXICODONE) 5 MG immediate release tablet 56 tablet 0     Sig: Take 1 tablet (5 mg total) by mouth 2 (two) times a day.     Pharmacy cued: [facility] [place]  Standing orders for withdrawal protocol implemented: no

## 2021-06-16 NOTE — TELEPHONE ENCOUNTER
Telephone Encounter by Gus Byrne RN at 5/6/2019  8:04 AM     Author: Gus Byrne RN Service: -- Author Type: RN, Care Manager    Filed: 5/6/2019  8:04 AM Encounter Date: 5/1/2019 Status: Signed    : Gus Byrne RN (RN, Care Manager)       Malachi Choudhary MD Draz, Bettie RED, PharmD 3 days ago      Okay to proceed with no bridge.  Thank you :)     Malachi Choudhary MD     Routing comment

## 2021-06-16 NOTE — TELEPHONE ENCOUNTER
Telephone Encounter by Marquita Desouza, RN at 6/3/2019  3:32 PM     Author: Marquita Desouza RN Service: -- Author Type: Registered Nurse    Filed: 6/3/2019  3:35 PM Encounter Date: 6/3/2019 Status: Signed    : Marquita Desouza RN (Registered Nurse)       Medication being requested: butrans patch 7.5mcg and gabapentin 100mg #90  Last visit date: 4/29/19 with Crystal  Next visit date: 6/14/19 with Crystal  Expected follow up: 4 weeks  MTM visit (Pain Center) date: no  UDT date: per 4/29 ov note was done 2/19  Agreement date: did not find in chart   snipped in:    Pertinent between visit information about requested medication (telephone, mychart, prior authorization, concerns, comments): pt in ED 5/12 and 5/19 for abd pain, cxl appt 5/29  Script being sent to provider by nurse- dates and quantity:   Requested Prescriptions     Pending Prescriptions Disp Refills   ? gabapentin (NEURONTIN) 100 MG capsule 90 capsule 0     Sig: Take 100 mg by mouth 3 (three) times a day. Ok to reduce to two times a day if too drowsy   ? buprenorphine (BUTRANS) 7.5 mcg/hour PTWK patch 4 patch 0     Sig: Place 1 patch on the skin every 7 days.     [facility] [place]  Standing orders for withdrawal protocol implemented: na

## 2021-06-16 NOTE — PROGRESS NOTES
Programs applying for: Medical Assistance, SNAP, CASH, HE FINANCIAL, Emergency assistance, CAP  Case: [id number] (SNAP)  MNSURE Case: #[id number]  Financial worker: Jose Roberto vitale# [phone] (SNAP)  MNSURE Tracking- 3/1/2019?      5/3/2018: Left message today advising patient that his Medical assistance is active as of 12/1/2017 and that he should be able to  medications and be seen in the clinics.  No further FRG is needed at this time.     Update:  Medical assistance- ACTIVE 12/1/2017  SNAP- ACTIVE  CASH- DENIED  HE FAA- Can't Reapply until June (no longer needed for Anand)  CAP- DON'T Track  EMERGENCY ASSISTANCE- DENIED  3/20/2018      5/1/2018: Checked Mnits today, Patient is now active. I have notified billing and asked Will this take any of his bills out of collections if he has any? Will wait response before reaching out to Anand.     MA: Medical Assistance.  Lilog Type AX: MA Early Expansion  Eligibility Begin Date: 12/01/2017  Eligibility End Date: --/--/----    4/30/2018: checked mnits today, still not active. Spoke with Juan Quiros. I was advised nothing was pending however there was a human county error that isn't showing patient is active. She said, she was entering to have it interfaces and should hopefully active in the next 24 hours.  If not, I will try calling back on Thursday.  She indicated that he should be back dated as of 12/1/2017. Before I reach out to HE billing, I will confirm that insurance is good to go first.     4/24/2018: Checked Mnits today, still no change. Its been almost 2 weeks since they indicated it being worked on. I will check back early next week and if no change will call [facility] about this again.     4/16/2018: Checked mnits today. Pt isn't active. I will check back either later this week or beginning of next week as its only been a couple business days since I called.       4/12/2018: Called Juan Kovacs today to see if there was anything pending on his  medical insurance. She referred me to the patient's  and provided me the case #.  She did tell me his was approved for SNAP.  I spoke with Jose Roberto and he stated he wasn't following the patients for medical insurance, that I would need to call [facility]. Patient was declined Cash assistance due to over income. I asked was it in regards to the one lump sum Miguel received. He said yes. However, they can reapply for Cash assistance again if they wish as we discussed that there is no more household income.  I called [facility] to verify that the income verification that was submitted was received. I was advised it was and it should be worked on this week.     SNAP first payment was on 4/5/2018 for $352.00    4.5.18: checked Mnits today, still indicated patient isn't active. Spoke with Beth today. I was advised that its stating its still pending income verification for Miguel. I asked if he can see if they received the documentation that I sent showing Miguel is no longer working and what her lump sum payment was. He said, he couldn't see this and advised to call [facility].     3/27/2018: Checked mnits. Doesn't show active at this time.  Will check back in a couple weeks.     3/22/2018: I Called [email or web address] billing today to discuss HE Financial Aid/billing.  They placed Anand's account on hold as he is pending MA at this time. I will keep Amanda updated on the status of this. Met with Anand and Miguel today and completed CAP application.  They were advised that I will not be follow up on this application and to keep an eye on anything that comes in the mail.  If they request additional information or approved/denied. They understood.  I advised them it could take 30-60 days for them to process. They are also aware that [facility] requested additional information on Miguel's income. I had faxed last 2 paystubs and severance document. Keep an eye if they request any additional documentation.     They aren't due to reapply for  HE financial aid until June! Application will not be submitted at this time.     3/20/2018: Met with Anand and his wife Miguel. We completed an online application through [employer] today. We didn't apply for Miguel today as she needs to wait per the  she met with. She was advised to inform Sylvia right away if she needs to be added to the case if she doesn't get the insurance she was expecting or when she is ready to be added.  Anand has preliminary approval for MA. They are requesting income verification from Miguel. I will fax this information per their request.   Miguel is no longer working and is receiving a 1 lump sum of  $3833.48. Her employment ended effectively 3/5/2018. Neither Anand nor Miguel have income coming in now besides the severance check. Combined application faxed today. MNSURE Proof was faxed today to [employer]. Angel

## 2021-06-16 NOTE — PROGRESS NOTES
Anand Felix is a 36 y.o. male who is being evaluated via a billable video visit.      How would you like to obtain your AVS? MyChart.  If dropped from the video visit, the video invitation should be resent by: Text to cell phone: [phone]  Will anyone else be joining your video visit? No      Video Start Time: 10:53 AM    Assessment & Plan     Moderate episode of recurrent major depressive disorder (H)  We discussed patient's PHQ-9 score of 12.  He has been contemplating starting medication for his depressive symptoms but has been hesitant up to this point.  He is now wishing to try SSRI.  Will initiate Lexapro 10 mg.  Recommend starting with half a tablet (5 mg) for the first week and if tolerating well then increase to 10 mg thereafter.  We discussed resources available in the event that he has any suicidal ideation.  He should follow-up in 1 month to reassess depression or sooner with any new or worsening symptoms.  - escitalopram oxalate (LEXAPRO) 10 MG tablet  Dispense: 30 tablet; Refill: 2    Other chronic pain  Reviewed history of chronic pain.  Continue to follow with pain clinic.    RUQ pain  Patient was evaluated earlier in the week for right upper quadrant abdominal pain.  Labs and ultrasound have come back negative.  We discussed monitoring and following up with any persisting or worsening symptoms.  Will determine if further work-up or referral is indicated in that event.      Depression Screening Follow Up    PHQ 3/19/2021   PHQ-9 Total Score 12   Q9: Thoughts of better off dead/self-harm past 2 weeks 1     PHQ-9 DEPRESSION SCALE 3/19/2021   Little interest or pleasure in doing things 1   Feeling down, depressed, or hopeless 1   Trouble falling or staying asleep, or sleeping too much 2   Feeling tired or having little energy 2   Poor appetite or overeating 2   Feeling bad about yourself - or that you are a failure or have let yourself or your family down 1   Trouble concentrating on things, such as  reading the newspaper or watching television 1   Moving or speaking so slowly that other people could have noticed. Or the opposite - being so fidgety or restless that you have been moving around a lot more than usual 1   Thoughts that you would be better off dead, or of hurting yourself in some way 1   PHQ-9 Total Score 12   If you checked off any problems, how difficult have these problems made it for you to do your work, take care of things at home, or get along with other people? Somewhat difficult   Some recent data might be hidden     Follow Up in 4 weeks for virtual visit    Follow Up Actions Taken  Crisis resource information provided in the After Visit Summary  Referred patient back to PCP      Melissa Acevedo CNP  [facility]   Anand Felix is 36 y.o. and presents today for the following health issues   HPI   Patient is here today to discuss depression.  States that he has had symptoms of depression for several years.  He scored 12 on his PHQ-9 screen when in clinic the other day for concerns of abdominal pain.  He also reports having some passive thoughts of suicide but no plan or intent in place.  He states that these suicidal thoughts have been recurring for him but he is safe and is able to deal with these thoughts.  He reports discussing antidepressant medication with his pain providers.  He has been hesitant to start any medication due to possible side effects.  Today however, he is open to trying an antidepressant.  He follows with Alize Livingston in psychology.    Patient continues to have some abdominal pain in his right upper quadrant.  Reviewed his labs and ultrasound from last week which came back normal.  We discussed potentially obtaining CT scan or referring to specialist of pain persists or worsens in any way.      Review of Systems  Review of Systems - pertinent positives noted in HPI, otherwise ROS is negative.        Objective       Vitals:  No vitals were  obtained today due to virtual visit.    Physical Exam    General Appearance:  Patient is well appearing. Alert, pleasant, cooperative, no sign of distress, appears stated age.            Video-Visit Details    Type of service:  Video Visit    Video End Time (time video stopped): 11:03 AM  Originating Location (pt. Location): Home    Distant Location (provider location):  [facility]     Platform used for Video Visit: [employer]

## 2021-06-16 NOTE — PROGRESS NOTES
Assessment & Plan     RUQ abdominal pain  Patient elicits some discomfort with deep palpation of right upper quadrant.  Otherwise exam is without any notable findings.  Will obtain labs as noted below to help rule out possible underlying causes.  Will also refer for abdominal ultrasound.  We will follow-up with patient regarding results when available.  Discussed red flag symptoms of abdominal pain that would warrant immediate follow-up.  Evaluation- HM2(CBC w/o Differential)  - Comprehensive Metabolic Panel  - Lipase  - US Abdomen Limited    Chronic pain syndrome  Reviewed history of chronic pain syndrome, using Percocet 3 times daily as needed.  Continue to follow with pain clinic.    Nonintractable episodic headache, unspecified headache type  Refill provided for propranolol 8 mg to use for headache.  - propranoloL (INDERAL) 80 MG tablet  Dispense: 270 tablet; Refill: 3     Depression Screening Follow Up    PHQ 3/16/2021   PHQ-9 Total Score 10   Q9: Thoughts of better off dead/self-harm past 2 weeks 2       Follow Up    Follow Up Actions Taken  Referred patient back to PCP      No follow-ups on file.    Melissa Acevedo, CNP  [facility]   Anand Felix is 36 y.o. and presents today for the following health issues   HPI   Patient is here today to discuss right upper quadrant abdominal pain.  First noticed symptoms a few months ago.  Symptoms have not been worsening but have persisted.  Pain is not accompanied by nausea or vomiting.  He is having normal stools.  Denies any bleeding or black stools.  Some mild constipation on and off.  Uses magnesium as needed to help with this.  He does have history of reflux and continues use of omeprazole 20 mg.  Does not feel that symptoms currently are related to his reflux.  He follows with the pain clinic for management of chronic pain syndrome, myalgias, arthralgias.  Continues Percocet for pain management.  He was recommended by pain clinic  to be evaluated for his right upper quadrant discomfort to ensure that there is not an underlying cause of her symptoms such as gallstones etc.  He denies any fevers, chills, body aches or other systemic symptoms.  No recent illness.  He has history of migraines and uses propranolol for management.  He is requesting refill of it today.  He does not have any additional concerns at this time.      Review of Systems  Review of Systems - pertinent positives noted in HPI, otherwise ROS is negative.        Objective    /86 (Patient Site: Left Arm, Patient Position: Sitting, Cuff Size: Adult Regular)   Pulse 68   Temp 99.5  F (37.5  C)   Wt 143 lb 12.8 oz (65.2 kg)   BMI 23.93 kg/m    Body mass index is 23.93 kg/m .  Physical Exam    General Appearance:  Alert, cooperative, no distress, appears stated age   Lungs:   Clear to auscultation bilaterally, respirations unlabored.  No expiratory wheeze or inspiratory crackles noted.   Heart:  Regular rate and rhythm, S1, S2 normal, no murmur, rub or gallop   Abdomen:    Tenderness noted with palpation of right upper quadrant.  Otherwise abdomen is nontender, soft, positive bowel sounds, no masses, no organomegaly   Extremities:  Patient ambulates with a cane.  Extremities normal.  No cyanosis or edema   Skin: Warm, dry.  Skin color, texture, turgor normal, no rashes or lesions   Neurologic:  Grossly intact.  Ambulates slowly with a cane.

## 2021-06-16 NOTE — PROGRESS NOTES
"4/6/2021   Start time: 11:08 AM    Stop Time: 11:58 AM   Session # 11    Anand Felix is a 36 y.o. male who is being evaluated via a billable video visit.    Chief Complaint:    Major depression, severe   Generalized Anxiety Disorder  Chronic pain syndrome    The patient has been notified of the following:    \"This video visit will be conducted via a call between you and your provider. We have found that certain health care needs can be provided without the need for an in-person visit. This service lets us provide the care you need with a video conversation\".     \"Video visits are billed at different rates depending on your insurance coverage. Please reach out to your insurance provider with any questions\".     \"If during the course of the call the provider feels a video visit is not appropriate, you will not be charged for this service\".     Patient has been given verbal consent to a video visit: Yes    Patient would like the video invitation sent by: My Chart    Will anyone else be joining your video visit?: No    Video visit details    Type of service: Video visit    Originating Location (pt. Location): Home    Distant Location (provider location): [facility]    Platform used for Video Visit: [employer]    Functional Impairment:   Personal: 3  Family: 3  Work: 3  Social:3    Clinical assessment of mental status: Anand Felix presented on time.  He was open and cooperative, and dressed appropriately for this session and weather. His memory was in tact .  His  speech was appropriate.  Language was appropriate.  Concentration and focus is on task. Psychosis is not noted or reported. He reports his mood is depressed .  Affect is congruent with speech.  Fund of knowledge is adequate. Insight is adequate for therapy.     Suicidal/Homicidal Ideation present: None Reported This Session. However, he reports passive SI this week. He denies plan or means. He agrees to follow safety plan to call 911, go to the ER " "for evaluation and/or call the crisis line if symptoms worsen or he is feeling unsafe.     Patient's impression of their current status: Patient reports continued difficulty with depression and anxiety symptoms at times, however, he reports his mood feels more \"neutral\" lately. He does report some medication side effects since starting an antidepressant.  Patient reports stressors related to social situations and challenges with feeling connected in some family relationships. Patient is working on acceptance of his chronic medical concerns. He is working on identifying future goals.    Therapist impression of patients current state: Patient seems to continue to struggle with depression and anxiety/worry, however, symptoms seemed to have decreased lately. He identifies that he \"dwells less often\" and that his mind races less often. He states he seems to focus better, but then also \"spaces out\" at times.  Patient reports SI, but denies plan or means. He states SI is fleeting and \"less often\" this week. He states looking forward to some positive changes in the future has helped, as well as current medications.  He continues to work on identifying future goals and he continues to work on  what he wants versus what others want for him. Discussed ways to find meaning and connection to future plans. Continued to discuss family pressures that he feels about his role in the family. Discussed how this impacts daily living. Continued to discuss values clarification. Patient reports he continues to work on dietary changes and discussed progress. He reports some changes with sleep. Patient reports some improvement with appetite lately. Fatigue and physical weakness/burning sensation persist, per his report. Patient is reaching out to his support system, as he is able. However, he reports he struggles in social situations as he has changed due to his medical issues. He struggles with feeling connected with some family " relationships. He continues to adjust to changes as his wife recently returned to work. Discussed ways he is using his time while she is at her place of employment. He reports staying connected to his spiritual program and discussed ways to further connect that may be meaningful to him. Patient reports engaging in meditation less often this week and encouraged him to return to a more regular routine. Discussed challenges with acceptance and ways to re-evaluate what is important to him in his life now. Discussed grief/loss as related to physical changes in the past few years. He seems to be gaining insight and making progress. Encouraged resourcing after the session, as time did not permit today.     Type of psychotherapeutic technique provided: Client centered, CBT and Mindfulness    Progress toward short term goals: Patient is working on increasing coping skills to manage symptoms related to mental health and medical concerns.    Review of long term goals: Not done at today's visit. Last reviewed paperwork on 3/25/2021. Time did not permit to review updated treatment plan today and will review next time.     Diagnosis: Major depression, severe   Generalized Anxiety Disorder  Chronic pain syndrome    Plan and Follow up: Follow pain center plan of care and overall medical plan. Schedule and attend psychotherapy every 1-2 weeks to further address mental health and chronic pain concerns. Patient understands that sessions are by telephone or video at this time, due to COVID-19 guidelines, until further notice. He is aware that a small number of in-person sessions will be offered starting this month, but due to transportation challenges, he prefers to meet via video at this time. Practice meditations/coping resources as discussed. Continue to engage in hobbies and go outside daily, as appropriate. Continue to discuss future planning. Continue to follow up on personal goal to reach out to spiritual leader at  clive.      Discharge Criteria/Planning: Patient will continue with follow-up until therapy can be discontinued without return of signs and symptoms.    Alize Livingston 4/6/2021

## 2021-06-16 NOTE — PROGRESS NOTES
"2/23/2018    Start time: 10:10 AM    Stop Time: 11:00 AM   Session # 3    Anand Felix is a 32 y.o. male is being seen today for    Chief Complaint   Patient presents with     Mental Health Note   .  Major depression, severe  History of Generalized Anxiety Disorder  Chronic pain syndrome    New symptoms or complaints: None    Functional Impairment:   Personal: 4  Family: 4  Work: 4  Social:4    Clinical assessment of mental status: Anand Felix presented on time.  He was open and cooperative, and dressed appropriately for this session and weather. His memory was in tact .  His  speech was appropriate.  Language was appropriate.  Concentration and focus is on task. Psychosis is not noted or reported. He reports his mood is  depressed .  Affect is congruent with speech.  Fund of knowledge is adequate. Insight is adequate for therapy.     Suicidal/Homicidal Ideation present: Patient reports passive suicidal ideation. He denies plan or means at this time. Patient agrees to verbal safety plan to go to the ER, call 911 and/or call the crisis connection if symptoms change or worsen.     Patient's impression of their current status: Patient reported he continues to be working on addressing symptoms of depression (feeling sad/angry). He reports sleep is \"slightly better\" and medication has been helpful. He also reports his pain has been better managed with current medications. Patient reports he continues to have negative thinking and this has been challenging. Patient reported he has a daily routine that has been helpful. He described some coping skills that he uses in daily living.  Patient reports he continues to have weakness and pain, and that the weakness/fatigue is most difficult to manage. Patient talked about thoughts and feelings regarding recent medical appointments. He does report reaching out to his support group, at times, and challenges he has with opening up. Patient reports conflict regarding the changes in his " life due to chronic pain/medical concerns.  He continues to get help from the care guide at his primary care clinic and will talk to a  in March regarding PCA services. Patient reports engaging in prayer has helped him cope with difficult thoughts and feelings.     Therapist impression of patients current state: Patient seems to be working on addressing depression and chronic medical symptoms. He appears to be working on improving mood and attitude, but this appears to be a challenge. Patient appears to be finding some benefit with his current medications, especially in regards to sleep and pain management. Patient seems to have experienced some losses with changes in his medical health, especially in regards to physical activity. It would likely be worthwhile to continue work on addressing role/identity now, given patient's health concerns. It seems the care guide at his primary care clinic has been helpful and this has reduced stress for patient. It seems he will receive additional help through social work in March. Patient appears to have some challenges with household activities, but appears to be reaching out to his support system. Discussed potential benefits of Mindfulness meditation with patient and also continued to educate on EMDR. Encouraged meditation home practice.        Type of psychotherapeutic technique provided: Client centered, CBT and Mindfulness    Progress toward short term goals: Patient signed/agreed to treatment plan this date. Patient continues to work on increasing coping skills to manage chronic pain and mental health.     Review of long term goals: Patient signed/agreed to treatment plan this date.     Diagnosis:  Major depression, severe  History of Generalized Anxiety Disorder  Chronic pain syndrome    Plan and Follow up: Patient to follow pain center plan of care. Patient to follow all medical recommendations. Patient to schedule every 2 weeks for psychotherapy to further  address depression, anxiety and chronic pain concerns. Will continue to educate on EMDR and Mindfulness meditation. Practice meditation as presented in session. Further address adjustment to role/identity due to chronic pain and health concerns.             Discharge Criteria/Planning: Patient will continue with follow-up until therapy can be discontinued without return of signs and symptoms.    Alize Livingston 2/23/2018

## 2021-06-16 NOTE — TELEPHONE ENCOUNTER
Controlled Substance Refill Request  Medication Name:   Requested Prescriptions     Pending Prescriptions Disp Refills     zolpidem (AMBIEN) 10 mg tablet [Pharmacy Med Name: ZOLPIDEM TARTRATE 10 MG TABLET] 30 tablet 2     Sig: TAKE 1 TABLET BY MOUTH AT BEDTIME AS NEEDED FOR SLEEP.     Refused Prescriptions Disp Refills     omeprazole (PRILOSEC) 20 MG capsule [Pharmacy Med Name: OMEPRAZOLE DR 20 MG CAPSULE] 90 capsule 3     Sig: TAKE 1 CAPSULE BY MOUTH EVERY DAY     Refused By: MARY SORTO     Reason for Refusal: Patient has requested refill too soon     Date Last Fill: 12/21/20  Requested Pharmacy: [facility]  Submit electronically to pharmacy  Controlled Substance Agreement on file:   Encounter-Level CSA Scan Date - 01/27/2020:    Scan on 1/28/2020  8:02 AM by María Ndiaye: [facility] NARCOTIC AGREEMENT           Encounter-Level CSA Scan Date - 01/02/2018:    Scan on 1/4/2018  4:09 PM by María Ndiaye: [facility] NARCOTIC AGREEMENT        Last office visit:  3/19/21

## 2021-06-16 NOTE — TELEPHONE ENCOUNTER
Telephone Encounter by Mini Holland at 3/27/2019 11:57 AM     Author: Mini Holland Service: -- Author Type: --    Filed: 3/27/2019 11:58 AM Encounter Date: 3/18/2019 Status: Signed    : Mini Holland       APPEAL DENIAL    DENIAL RATIONALE: Upheld original denial reasoning, patient needs to try/fail brand name [employer]            2ND LEVEL APPEAL INFORMATION: 2nd level appeals are handled by clinic staff as they might require [id number] reviews or further assistance from clinic.  2nd level appeal information listed below.

## 2021-06-16 NOTE — TELEPHONE ENCOUNTER
Patient called back and was able to schedule a virtual visit for Friday morning with Melissa Acevedo.

## 2021-06-16 NOTE — TELEPHONE ENCOUNTER
Please help patient schedule virtual visit with me (one of these upcoming Friday mornings if possible) to discuss high PHQ-9 score/depression.
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
## 2021-06-16 NOTE — PROGRESS NOTES
Outpatient Mental Health Treatment Plan    Name:  Anand Felix  :  1985  MRN:  [id number]    Treatment Plan:  Updated Treatment Plan  Intake/initial treatment plan date:  2020  Benefit and risks and alternatives have been discussed: Yes  Is this treatment appropriate with minimal intrusion/restrictions: Yes  Estimated duration of treatment:  Approximately 20 sessions.  Anticipated frequency of services:  Every 1-2 weeks  Necessity for frequency: This frequency is needed to establish therapeutic goals and for continuity of care in order to monitor progress.  Necessity for treatment: To address cognitive, behavioral, and/or emotional barriers in order to work toward goals and to improve quality of life.    Session Type: Patient is presenting for an Individual session.    Plan:      ? Depression    Goal:  Decrease average depression level from 8 to 5.   Strategies:    ?[x] Decrease social isolation     [x] Increase involvement in meaningful activities     ?[x] Discuss sleep hygiene     ?[x] Explore thoughts and expectations about self and others     ?[x] Process grief (loss of significant person, independence, role,        etc.)     ?[x] Assess for suicide risk     ?[x] Implement physical activity routine (with physician approval)     [x] Consider introduction of bibliotherapy and/or videos     [x] Continue compliance with medical treatment plan (or explore         barriers)       ?  ?Degree to which this is a problem: 4  Degree to which goal is met: 1  Date of Review: 3/25/2021          ?   ? Anxiety    Goal:  Decrease average anxiety level from 8 to 5.   Strategies: ? [x]Learn and practice relaxation techniques and other coping        strategies (e.g., thought stopping, reframing, meditation)     ? [x] Increase involvement in meaningful activities     ? [x] Discuss sleep hygiene     ? [x] Explore thoughts and expectations about self and others     ? [x] Identify and monitor triggers for panic/anxiety  symptoms     ? [x] Implement physical activity routine (with physician approval)     ? [x] Consider introduction of bibliotherapy and/or videos     ? [x] Continue compliance with medical treatment plan (or explore          barriers)                                       [x]     Degree to which this is a problem: 4  Degree to which goal is met: 1  Date of Review: 3/25/2021  Chronic pain  Goal:  ? Decrease average pain level from 8 to 5.   ? Increase % acceptance of pain from 50 to 80.   Strategies: ? [x] Explore thoughts and expectations about self and others    [x] Explore emotional reactions to illness/injury      ? [x] Learn and practice relaxation techniques and other coping          strategies            [x] Implement physical activity routine (with physician approval)     ? [x] Engage in values clarification and goal-setting     ? [x] Consider introduction of bibliotherapy and/or videos     ? [x] Increase involvement in meaningful activities     ? [x] Discuss sleep hygiene     ? [x] Process grief (loss of significant person, independence, role,          etc.)     ? [x] Assess for suicide risk     ?  Degree to which this is a problem: 4  Degree to which goal is met: 1  Date of Review: 3/25/2021       Functional Impairment:  1=Not at all/Rarely  2=Some days  3=Most Days  4=Every Day    Personal : 3  Family : 3  Social : 3   Work/school : 3    Diagnosis:  Major depression, severe   Generalized Anxiety Disorder  Chronic pain syndrome    WHODAS 2.0 12-item version 37.50%      Scores presented in qualifiers to represent level of disability.  MODERATE Problem (medium, fair, ...) 25-49%    H1= 15  H2= 2  H3= 5-6        Clinical assessments and measures completed:. CAROLYN-7 and PHQ-9     Strengths:Patience, fast learner, hard working  Limitations:  lacks assertiveness, wants to improve his health.  Cultural Considerations: Patient is a 36 year old,  American,  male who has a history of depression, anxiety and  chronic pain.     Persons responsible for this plan: Patient and Provider            Psychotherapist Signature           Patient Signature:              Guardian Signature             Provider: Performed and documented by Alize Livingston [facility]   Date:  3/25/2021

## 2021-06-16 NOTE — TELEPHONE ENCOUNTER
Telephone Encounter by Mini Holland at 11/19/2019  1:24 PM     Author: Mini Holland Service: -- Author Type: --    Filed: 11/19/2019  1:29 PM Encounter Date: 11/18/2019 Status: Signed    : Mini Holland       Per call to insurance to confirm, Xarelto is covered under patient's plan but the starter pack is not. Starter packs unfortunately are never covered under PMAP plans  Xarelto is covered for the 10mg, 15mg, and 20mg.      Is provider willing to switch to a preferred strength and write 2 Rx's?

## 2021-06-16 NOTE — PROGRESS NOTES
"3/18/2021    Start time:1:05 PM    Stop Time: 1:55 PM   Session # 9    Anand Felix is a 36 y.o. male who is being evaluated via a billable video visit.    Chief Complaint:    Major depression, severe   Generalized Anxiety Disorder  Chronic pain syndrome       The patient has been notified of the following:    \"This video visit will be conducted via a call between you and your provider. We have found that certain health care needs can be provided without the need for an in-person visit. This service lets us provide the care you need with a video conversation\".     \"Video visits are billed at different rates depending on your insurance coverage. Please reach out to your insurance provider with any questions\".     \"If during the course of the call the provider feels a video visit is not appropriate, you will not be charged for this service\".     Patient has been given verbal consent to a video visit: Yes    Patient would like the video invitation sent by: My Chart    Will anyone else be joining your video visit?: No    Video visit details    Type of service: Video visit    Originating Location (pt. Location): Home    Distant Location (provider location): [facility]    Platform used for Video Visit: [employer] - /l?k â€¢ splik/    Functional Impairment:   Personal: 3  Family: 3  Work: 3  Social:3    Clinical assessment of mental status: Anand Felix presented on time.  He was open and cooperative, and dressed appropriately for this session and weather. His memory was in tact .  His  speech was appropriate.  Language was appropriate.  Concentration and focus is on task. Psychosis is not noted or reported. He reports his mood is depressed .  Affect is congruent with speech.  Fund of knowledge is adequate. Insight is adequate for therapy.     Suicidal/Homicidal Ideation present: None Reported This Session. However, he reports passive SI this week. He denies plan or means    Patient's impression of their current status: Patient " reports continued difficulty with depression and anxiety symptoms. Patient reports some progress with stopping negative thoughts. Patient reports stressors related to social situations and challenges with feeling connected in some family relationships. Patient is working on acceptance of his chronic medical concerns. He is working on identifying future goals, including housing goals.     Therapist impression of patients current state: Patient seems to continue to struggle with depression and anxiety/worry. Discussed specifically what seems to trigger symptoms. Patient reports SI, but denies plan or means. He states SI is fleeting and reported a decrease this week. He states looking forward to some positive changes in the future has helped.  He continues to work on identifying future goals and he continues to work on  what he wants versus what others want for him. Discussed ways to find meaning and connection to future plans. Continued to discuss family pressures that he feels about his role in the family. Discussed how this impacts daily living. Continued to discuss values clarification. Patient reports he is working on dietary changes and discussed some related challenges and progress. Patient met with his pain center provider recently and he had a recent medication change. Fatigue and physical weakness/burning sensation persist, per his report. Patient is reaching out to his support system, as he is able. However, he reports he struggles in social situations as he has changed due to his medical issues. He struggles with feeling connected with some family relationships, but he reports a positive interaction with a family member last week.  He reports staying connected to his spiritual program and discussed ways to further connect that may be meaningful to him. Patient seems to be making more effort towards using Mindfulness techniques in daily living. Gave patient praise for his efforts. Discussed  challenges with acceptance and ways to re-evaluate what is important to him in his life now. Discussed grief/loss as related to physical changes in the past few years. He seems to be gaining insight and making progress. Encouraged resourcing after the session, as time did not permit today.     Type of psychotherapeutic technique provided: Client centered, CBT and Mindfulness    Progress toward short term goals: Patient is working on increasing coping skills to manage symptoms related to mental health and medical concerns.    Review of long term goals: Not done at today's visit. Last review: 12/22/2020    Diagnosis: Major depression, severe   Generalized Anxiety Disorder  Chronic pain syndrome     Plan and Follow up: Follow pain center plan of care and overall medical plan. Schedule and attend psychotherapy every 1-2 weeks to further address mental health and chronic pain concerns. Patient understands that sessions are by telephone or video at this time, due to COVID-19 guidelines, until further notice. Practice meditations/coping resources as discussed. Continue to engage in hobbies and go outside daily, as appropriate. Continue to discuss future planning. Continue to follow up on personal goal to reach out to spiritual leader at [facility].      Discharge Criteria/Planning: Patient will continue with follow-up until therapy can be discontinued without return of signs and symptoms.    Alize Livingston 3/18/2021

## 2021-06-16 NOTE — TELEPHONE ENCOUNTER
Telephone Encounter by Halina Piña at 2/18/2019  3:18 PM     Author: Halina Piña Service: -- Author Type: --    Filed: 2/18/2019  3:18 PM Encounter Date: 2/18/2019 Status: Signed    : Halina Piña PA team  [phone]    PA has been initiated.

## 2021-06-16 NOTE — PROGRESS NOTES
Assessment  SW scheduled to meet with pt to discuss PCA resources.  Pts wife who is also a CCC pt - present as well.  SW identified that pt does not have Medical Assistance and does not have access to PCA services.  Pts wife lost job as of March 5th.  Pt has insurance through wife's employer until 3/31/18.  At that point, pt will likely qualify for MA.  Discussed situation with care guide who will facilitate referral to [facility].  Pts wife will get a severance package of $3000- otherwise the couple has no income.  They have been using savings to pay for mobile home land rent.  Pt is working on his disability claim with [employer].  He has been denied 2x and has started the appeal process.  [facility] to work with couple on MA application for pt- as well as applying for cash/food assistance.  SW provided pt with local food shelf information.        Action Plan:    will:  1)  Can assist with PCA application once pt has active MA      Care Guide will:  Work with [facility] to schedule pt to apply for [place] and [place] benefits; Schedule with SW if needed to apply for PCA (pt may not need to be present depending on type of MA)        Subjective     N/A        Objective    Well groomed, walks slowly with cane, soft spoken, appears stated age, stable mood.

## 2021-06-16 NOTE — TELEPHONE ENCOUNTER
Telephone Encounter by Mini Holland at 4/9/2019 11:06 AM     Author: Mini Holland Service: -- Author Type: --    Filed: 4/9/2019 11:10 AM Encounter Date: 4/9/2019 Status: Signed    : Mini Holland       No PA needed, pharmacy did not have the updated insurance information which is now [employer].  They now have a paid claim with the correct insurance.

## 2021-06-16 NOTE — TELEPHONE ENCOUNTER
Telephone Encounter by Marquita Desouza RN at 1/2/2019  2:34 PM     Author: Marquita Desouza RN Service: -- Author Type: Registered Nurse    Filed: 1/2/2019  2:34 PM Encounter Date: 1/2/2019 Status: Signed    : Marquita Desouza RN (Registered Nurse)

## 2021-06-16 NOTE — PROGRESS NOTES
"3/25/2021    Start time: 10:03 AM    Stop Time: 10:53 AM   Session # 10    Anand Felix is a 36 y.o. male who is being evaluated via a billable video visit.    Chief Complaint:    Major depression, severe   Generalized Anxiety Disorder  Chronic pain syndrome    The patient has been notified of the following:    \"This video visit will be conducted via a call between you and your provider. We have found that certain health care needs can be provided without the need for an in-person visit. This service lets us provide the care you need with a video conversation\".     \"Video visits are billed at different rates depending on your insurance coverage. Please reach out to your insurance provider with any questions\".     \"If during the course of the call the provider feels a video visit is not appropriate, you will not be charged for this service\".     Patient has been given verbal consent to a video visit: Yes    Patient would like the video invitation sent by: My Chart    Will anyone else be joining your video visit?: No    Video visit details    Type of service: Video visit    Originating Location (pt. Location): Home    Distant Location (provider location): [facility]    Platform used for Video Visit: [employer]    Functional Impairment:   Personal: 3  Family: 3  Work: 3  Social:3    Clinical assessment of mental status: Anand Felix presented on time.  He was open and cooperative, and dressed appropriately for this session and weather. His memory was in tact .  His  speech was appropriate.  Language was appropriate.  Concentration and focus is on task. Psychosis is not noted or reported. He reports his mood is depressed .  Affect is congruent with speech.  Fund of knowledge is adequate. Insight is adequate for therapy.     Patient verbally updated paperwork this session: PHQ-9 depression score is 15 and indicates severe symptoms of depression, CAROLYN-7 anxiety score is 15 and  indicates severe symptoms of " anxiety, WHODAS score is 37.50%, H1= 15, H2=2, H3=5-6, PANSI was not completed today.     Suicidal/Homicidal Ideation present:  None Reported This Session. However, he reports passive SI this week. He denies plan or means    Patient's impression of their current status:  Patient reports continued difficulty with depression and anxiety symptoms. He reported he has been worrying more than usual. Patient reports stressors related to social situations and challenges with feeling connected in some family relationships. Patient is working on acceptance of his chronic medical concerns. He is working on identifying future goals.    Therapist impression of patients current state: Patient seems to continue to struggle with depression and anxiety/worry. Discussed specifically what seems to be triggering increased worry and ways to cope. Patient reports SI, but denies plan or means. He states SI is fleeting. He states looking forward to some positive changes in the future has helped.  He continues to work on identifying future goals and he continues to work on  what he wants versus what others want for him. Discussed ways to find meaning and connection to future plans. Continued to discuss family pressures that he feels about his role in the family. Discussed how this impacts daily living. Continued to discuss values clarification. Patient reports he continues to work on dietary changes and discussed some related challenges and progress. Patient does report lack of appetite lately. Patient reported he recently met with primary care and he started an antidepressant medication. Fatigue and physical weakness/burning sensation persist, per his report. Patient is reaching out to his support system, as he is able. However, he reports he struggles in social situations as he has changed due to his medical issues. He struggles with feeling connected with some family relationships. He is adjusting to changes as his wife  recently returned to work. Discussed ways he is using his time while she is at her place of employment. He reports staying connected to his spiritual program and discussed ways to further connect that may be meaningful to him. Patient seems to be making more effort towards using Mindfulness techniques in daily living. Gave patient praise for his efforts. Discussed challenges with acceptance and ways to re-evaluate what is important to him in his life now. Discussed grief/loss as related to physical changes in the past few years. He seems to be gaining insight and making progress. Encouraged resourcing after the session, as time did not permit today.     Type of psychotherapeutic technique provided: Client centered, CBT and Mindfulness    Progress toward short term goals: Patient is working on increasing coping skills to manage symptoms related to mental health and medical concerns. Discussed patient's goals and treatment plan will be developed.     Review of long term goals: Discussed patient's goals and treatment plan will be developed.     Diagnosis: Major depression, severe   Generalized Anxiety Disorder  Chronic pain syndrome    Plan and Follow up: Follow pain center plan of care and overall medical plan. Schedule and attend psychotherapy every 1-2 weeks to further address mental health and chronic pain concerns. Patient understands that sessions are by telephone or video at this time, due to COVID-19 guidelines, until further notice. Practice meditations/coping resources as discussed. Continue to engage in hobbies and go outside daily, as appropriate. Continue to discuss future planning. Continue to follow up on personal goal to reach out to spiritual leader at [employer].      Discharge Criteria/Planning: Patient will continue with follow-up until therapy can be discontinued without return of signs and symptoms.    Alize Livingston 3/25/2021

## 2021-06-16 NOTE — PROGRESS NOTES
Assessment/Plan:    1. Bilateral pulmonary embolism  Bilateral pulmonary emboli history October 2017.  Continue Xarelto 20 mg daily.  Followed by Dr. Groves.  Recent nuclear medicine VQ scan negative for thromboembolic disease and otherwise negative scan.  Pulmonary function testing pending.  Follow with Dr. Winn, pulmonologist as noted.  - rivaroxaban 20 mg Tab; Take 1 tablet (20 mg total) by mouth daily.  Dispense: 30 tablet; Refill: 5    2. Gastroesophageal reflux disease without esophagitis  Improvement with omeprazole 20 mg daily without breakthrough symptoms currently.    3. Chronic pain syndrome  Continues OxyContin 10 mg every 12 hours plus oxycodone 5 mg on as-needed basis for breakthrough pain.    4. Urinary retention  Patient feels a urinary retention issues improved while on OxyContin.  Will continue to monitor.    5. Tachycardia  Persistent tachycardia with history of pulmonary emboli etc.  This may be secondary to nortriptyline as a common side effect otherwise did refer to cardiology with history of bilateral pulmonary emboli to ensure no other concerns identified.  Recent thyroid testing, electrolytes, etc. were unremarkable.  - Ambulatory referral to Cardiology    6. Hyperlipidemia  Does have history of hyperlipidemia did recommend fasting lipid cascade at follow-up in 4 months.            Subjective:    Anand Felix is seen today for follow-up evaluation.  Bilateral pulmonary emboli October 2017.  Continue Xarelto for chronic anticoagulation per hematologist.  Did have repeat nuclear medicine VQ scan yesterday negative for thromboembolic disease.  Scan was described as otherwise negative.  Reflux symptoms controlled with omeprazole 20 mg daily.  Chronic pain management with OxyContin 10 mg every 12 hours and oxycodone 5 mg on as-needed basis for breakthrough symptoms.  Patient denies concerns for withdrawal symptoms etc.  No heat intolerance.  No diarrhea.  Recent thyroid testing was normal.  History  "urinary retention likely associate with OxyContin, improved without further concern at this time.  Noted history of hyperlipidemia as well diet controlled currently.  Comprehensive review of systems as above otherwise all negative.     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 Bernal follows  Children - none  Employed - working on disability - monoparesis  Surgeries - nasal surgery; low back surgery (Dr. Jeannine Hernandez, 2015) with residual right leg weakness  Tobacco use - none  ETOH - none  Family hx   Mother - living - high chol. Strokes. MI.   Father - living - unknown  Siblings - living - 4 sisters, 2 brother -    Bilateral AFO braces worn due to muscle weakness and foot drop    Past Surgical History:   Procedure Laterality Date     MINIMALLY INVASIVE MICRODISCECTOMY LUMBAR SPINE Right 10/2015    L5-S1; Dr. Bettie Hernandez     SINUS SURGERY  2014    Dr. Wright        Family History   Problem Relation Age of Onset     Depression Mother      Hyperlipidemia Mother      Other Mother      \"Ulcers\" - Aches and pains throughout her body.     Cancer Father      No Medical Problems Sister      No Medical Problems Brother      No Medical Problems Sister      No Medical Problems Sister      No Medical Problems Sister      No Medical Problems Brother      Thyroid disease Maternal Grandmother      Thyroid disease Cousin         Past Medical History:   Diagnosis Date     Bulging lumbar disc      Lumbar herniated disc 10/23/2015     Lumbar radiculopathy 11/25/2015        Social History   Substance Use Topics     Smoking status: Never Smoker     Smokeless tobacco: Never Used     Alcohol use Yes      Comment: Monthly or less often. 1-2 drinks at a time.        Current Outpatient Prescriptions   Medication Sig Dispense Refill     acetaminophen (TYLENOL) 500 MG tablet Take 1 tablet (500 mg total) by mouth every 6 (six) hours as needed for pain.  0     cholecalciferol, vitamin D3, 2,000 unit cap Take " 2,000 Units by mouth daily.       cyclobenzaprine (FLEXERIL) 10 MG tablet 1/2 to 1 tab twice a day as needed 60 tablet 0     fluticasone (FLONASE) 50 mcg/actuation nasal spray 2 sprays into each nostril daily. 16 g 6     nortriptyline (PAMELOR) 50 MG capsule Take 1 capsule (50 mg total) by mouth at bedtime. 30 capsule 0     omeprazole (PRILOSEC) 20 MG capsule Take 1 capsule (20 mg total) by mouth daily. 90 capsule 1     oxyCODONE (OXYCONTIN) 10 mg 12 hr tablet Take 1 tablet (10 mg total) by mouth every 12 (twelve) hours. 60 each 0     oxyCODONE (ROXICODONE) 5 MG immediate release tablet TAKE 1 TABLET BY MOUTH oneTIME DAILY AS NEEDED FOR PAIN 30 tablet 0     rivaroxaban 20 mg Tab Take 1 tablet (20 mg total) by mouth daily. 30 tablet 5     No current facility-administered medications for this visit.           Objective:    Vitals:    03/20/18 1258   BP: 130/80   Pulse: 100   Weight: 142 lb (64.4 kg)      Body mass index is 23.63 kg/(m^2).    Alert.  No apparent distress.  Chest clear.  Cardiac exam with pulse 120, regular.  Abdomen benign without mass.  Extremities warm and dry.  Ambulates with cane.  Appears pleasant and forthcoming without significant psychomotor agitation.         Echo 3/16/18 Summary     Normal left ventricular size and systolic function.    Left ventricle ejection fraction is normal. The calculated left ventricular ejection fraction is 63%.    Normal right ventricular size and systolic function.    No hemodynamically significant valvular heart abnormalities.    No previous study for comparison.         NM LUNG VQ SCAN  3/19/2018 11:29 AM     INDICATION: Chronic thromboembolic pulmonary hypertension.  TECHNIQUE: 45.7 mCi technetium 99m DTPA aerosol. 8.7 mCi technetium 99m MAA IV.      COMPARISON: Chest radiograph from same day.     FINDINGS: No significant ventilation or perfusion matched or mismatched defects. Relative homogeneous perfusion and ventilation. Probable small amount of radiotracer  swallowed.     IMPRESSION:   CONCLUSION:  1.  No findings to suggest pulmonary artery thromboembolic disease.   2.  Normal exam.

## 2021-06-16 NOTE — PROGRESS NOTES
HISTORY OF PRESENT ILLNESS  Patient reports that he had a bad sinus infection this year and last year.  He was given an antibiotics and fluticasone.  The antibiotics did help.  He reports that he does feel better.  His PCP recommended recheck.      REVIEW OF SYSTEMS  Review of Systems: a 10-system review was performed. Pertinent positives are noted in the HPI and on a separate scanned document in the chart.    PMH, PSH, FH and SH has documented in the EHR.      EXAM    CONSTITUTIONAL  General Appearance:  Normal, well developed, well nourished, no obvious distress  Ability to Communicate:  communicates appropriately.    HEAD AND FACE  Appearance and Symmetry:  Normal, no scalp or facial scarring or suspicious lesions.  Paranasal sinuses tenderness:  Normal, Paranasal sinuses non tender    EARS  Clinical speech reception threshold:  Normal, able to hear normal speech.  Auricle:  Normal, Auricles without scars, lesions, masses.  External auditory canal:  Normal, External auditory canal normal.  Tympanic membrane:  Normal, Tympanic membranes normal without swelling or erythema.  Tympanic membrane mobility:  Normal, Normal tympanic membrane mobility.    NOSE (speculum or scope)  Architecture:  Normal, Grossly normal external nasal architecture with no masses or lesions.  Mucosa:  Nasal polyps right nose  Septum:  Normal, Septum non-obstructing.  Turbinates:  Normal, No turbinate abnormalities    ORAL CAVITY AND OROPHARYNX  Lips:  Normal.  Dental and gingiva:  Normal, No obvious dental or gingival disease.  Mucosa:  Normal, Moist mucous membranes.  Tongue:  Normal, Tongue mobile with no mucosal abnormalities  Hard and soft palate:  Normal, Hard and soft palate without cleft or mucosal lesions.  Oral pharynx:  Normal, Posterior pharynx without lesions or remarkable asymmetry.  Saliva:  Normal, Clear saliva.  Masses:  Normal, No palpable masses or pathologically enlarged lymph nodes.    NECK  Masses/lymph nodes:  Normal,  No worrisome neck masses or lymph nodes.  Salivary glands:  Normal, Parotid and submandibular glands.  Trachea and larynx position:  Normal, Trachea and larynx midline.  Thyroid:  Normal, No thyroid abnormality.  Tenderness:  Normal, No cervical tenderness.  Suppleness:  Normal, Neck supple    NEUROLOGICAL  Speech pattern:  Normal, Proasaic    RESPIRATORY  Symmetry and Respiratory effort:  Normal, Symmetric chest movement and expansion with no increased intercostal retractions or use of accessory muscles.     IMPRESSION  Right nasal polyps    RECOMMENDATION  I discussed medical and surgical management.  I explained that the prednisone will give him some relief.  If and when he decides to pursue polypectomy, then we will need to get a CT sinus.    Leo Wright MD

## 2021-06-17 NOTE — PROGRESS NOTES
Email: [email or web address]  Marital Status: -Miguel Felix  Children: No  Born and Raised:   Occupation: On Disability  Living Situation: Lives in [place] with his wife.  Smoking, Alcohol, other:  Services receiving:   [facility] Follow up s: Monthly  CCC/[facility] Enrollment: January 19th 2018  Next CCC Follow up: 5/25/18    Action Plan:    will:  1)  Can assist with PCA application once pt has active MA        Care Guide will:  Work with [facility] to schedule pt to apply for [place] and [place] benefits; Schedule with SW if needed to apply for PCA (pt may not need to be present depending on type of MA)- Anand met with [facility] on 3/22/18 and completed [employer] application.

## 2021-06-17 NOTE — PROGRESS NOTES
"5/4/2021    Start time: 11:08 AM    Stop Time: 11:58 AM   Session # 15    Anand Felix is a 36 y.o. male who is being evaluated via a billable video visit.    Chief Complaint:    Major depression, severe   Generalized Anxiety Disorder  Chronic pain syndrome    The patient has been notified of the following:    \"This video visit will be conducted via a call between you and your provider. We have found that certain health care needs can be provided without the need for an in-person visit. This service lets us provide the care you need with a video conversation\".     \"Video visits are billed at different rates depending on your insurance coverage. Please reach out to your insurance provider with any questions\".     \"If during the course of the call the provider feels a video visit is not appropriate, you will not be charged for this service\".     Patient has been given verbal consent to a video visit: Yes    Patient would like the video invitation sent by: My Chart    Will anyone else be joining your video visit?: No    Video visit details    Type of service: Video visit    Originating Location (pt. Location): Home    Distant Location (provider location): [facility]    Platform used for Video Visit: [employer]    Functional Impairment:   Personal: 4  Family: 4  Work: 4  Social:4    Clinical assessment of mental status: Anand Felix presented on time.  He was open and cooperative, and dressed appropriately for this session and weather. His memory was in tact .  His  speech was appropriate.  Language was appropriate.  Concentration and focus is on task. Psychosis is not noted or reported. He reports his mood is depressed .  Affect is congruent with speech.  Fund of knowledge is adequate. Insight is adequate for therapy.     Suicidal/Homicidal Ideation present: None Reported This Session. However, he reports passive SI this week (3-4 days) and states he feels that this continues to decrease. He denies plan or " means. He is able to identify protective factors.     Patient's impression of their current status: Patient reports continued depression and anxiety symptoms. However, he reports symptoms have been improving over time. Patient continues to have some mild side effects from medication and fatigue is the most difficult to manage. He continues to have concerns and lack of interest in social situations. Patient is working on acceptance of his chronic medical concerns. He is working on identifying future goals.    Therapist impression of patients current state:  Although anxiety and depression symptoms persist, patient reports that his symptoms are more manageable lately. He does report feeling fatigued, but other side effects have been improving. Patient reports SI has been less/fleeting. He denies plan or means. Discussed protective factors. He continues to work on identifying future goals and he continues to work on  what he wants versus what others want for him. Discussed cultural influences as well. Discussed ways to find meaning and connection to future plans. Continued to discuss values clarification. Patient reports he continues to work on dietary changes and discussed some related progress. Patient reports some improvement with appetite. Patient continues to address pain issues.  Patient continues to work on routine/schedule, as well as physical activity, as he is able. Patient is reaching out to his support system, as he is able. However, he reports he feels indifferent about connecting socially at this time. He struggles with feeling connected with some family relationships at times. He continues to adjust to changes as his wife recently returned to work. He seems to be staying busy while she is away. He reports staying connected to his spiritual program and discussed ways to further connect that may be meaningful to him. Patient seems to be making more effort towards using Mindfulness techniques in  daily living. Gave patient praise for his efforts. Discussed challenges with acceptance and ways to re-evaluate what is important to him in his life now. Discussed grief/loss as related to physical changes in the past few years. He seems to be gaining insight and making progress. Began vulnerabilities exercise and will finish next time. He reported benefit. Encouraged patient to engage in resourcing after the session, due to time restrictions.     Type of psychotherapeutic technique provided: Client centered, CBT and Mindfulness and [facility]    Progress toward short term goals: Patient is working on increasing coping skills to manage symptoms related to mental health and medical concerns.     Review of long term goals: Not done at today's visit. Last review: 4/13/2021    Diagnosis: Major depression, severe   Generalized Anxiety Disorder  Chronic pain syndrome    Plan and Follow up: Follow pain center plan of care and overall medical plan. Schedule and attend psychotherapy every 1-2 weeks to further address mental health and chronic pain concerns. Patient understands that sessions are by telephone or video at this time, due to COVID-19 guidelines. He is aware that there are a limited number of in-person sessions at this time, but he prefers video due to transportation barriers. Practice meditations/coping resources as discussed. Continue to engage in hobbies and go outside daily, as appropriate. Continue to discuss future planning. Continue to follow up on personal goal to reach out to spiritual leader at [employer]. Finish vulnerabilities exercise next time.     Discharge Criteria/Planning: Patient will continue with follow-up until therapy can be discontinued without return of signs and symptoms.    Alize Livingston 5/4/2021

## 2021-06-17 NOTE — PROGRESS NOTES
"5/18/2021    Start time: 11:10 AM    Stop Time: 12:00 PM   Session # 17    Anand Felix is a 36 y.o. male who is being evaluated via a billable video visit.    Chief Complaint:    Major depression, severe   Generalized Anxiety Disorder  Chronic pain syndrome    The patient has been notified of the following:    \"This video visit will be conducted via a call between you and your provider. We have found that certain health care needs can be provided without the need for an in-person visit. This service lets us provide the care you need with a video conversation\".     \"Video visits are billed at different rates depending on your insurance coverage. Please reach out to your insurance provider with any questions\".     \"If during the course of the call the provider feels a video visit is not appropriate, you will not be charged for this service\".     Patient has been given verbal consent to a video visit: Yes    Patient would like the video invitation sent by: My Chart    Will anyone else be joining your video visit?: No    Video visit details    Type of service: Video visit    Originating Location (pt. Location): Home    Distant Location (provider location): [facility]    Platform used for Video Visit: Health[employer]    Functional Impairment:   Personal: 4  Family: 4  Work: 4  Social:4    Clinical assessment of mental status: Anand Felix presented on time.  He was open and cooperative, and dressed appropriately for this session and weather. His memory was in tact .  His  speech was appropriate.  Language was appropriate.  Concentration and focus is on task. Psychosis is not noted or reported. He reports his mood is depressed .  Affect is congruent with speech.  Fund of knowledge is adequate. Insight is adequate for therapy.     Suicidal/Homicidal Ideation present: None Reported This Session. However, he reports passive SI this week (1-2 days) and states he feels that this continues to decrease. He denies plan or " "means. He is able to identify protective factors.     Patient's impression of their current status: Patient reports grief/loss due to the recent death of a nephew. Patient reports continued difficulty with depression and anxiety symptoms. Although he reports some symptom improvement, he states he is not sleeping as well and thoughts are \"picking up\". Patient continues to have some mild side effects from medication and fatigue is the most difficult to manage. He continues to have concerns and lack of interest in social situations. Patient is working on acceptance of his chronic medical concerns. He is working on identifying future goals.    Therapist impression of patients current state: Although anxiety and depression symptoms persist, patient reports that his symptoms are improving. He does report feeling fatigued and was yawning frequently during the session. Medication side effects continue, however, he reports they are manageable overall.  He states sleep is more disrupted the past few nights and thoughts are \"picking up more\". Encouraged him to check in with his medical provider, if needed. Patient reports SI has been less/fleeting. He denies plan or means. Discussed triggers and protective factors. He continues to work on identifying future goals and he continues to work on  what he wants versus what others want for him. Discussed cultural/family influences as well and how this has impacted his ability to express feelings. Discussed ways to find meaning and connection to future plans. Continued to discuss values clarification. Patient reports appetite is \"up and down\" lately. Patient continues to address pain issues and reports symptoms in the morning are most difficult.  Patient continues to work on routine/schedule, as well as physical activity, as he is able. Patient is reaching out to his support system, as he is able. However, he reports he feels indifferent about connecting socially at this " time. He struggles with feeling connected with some family relationships. He continues to adjust to changes as his wife recently returned to work. He seems to be staying busy while she is away. He reports staying connected to his spiritual program and discussed ways to further connect that may be meaningful to him. Patient seems to be making more effort towards using Mindfulness techniques in daily living. Gave patient praise for his efforts. Discussed challenges with acceptance and ways to re-evaluate what is important to him in his life now. Discussed grief/loss regarding the recent death of his young nephew. Discussed ways to cope. Presented meditation focused on the sense of sight today and patient seemed to benefit.     Type of psychotherapeutic technique provided: Client centered, CBT and Mindfulness    Progress toward short term goals: Patient is working on increasing coping skills to manage symptoms related to mental health and medical concerns.     Review of long term goals: Not done at today's visit. Last review: 4/13/2021    Diagnosis: Major depression, severe   Generalized Anxiety Disorder  Chronic pain syndrome    Plan and Follow up:  Follow pain center plan of care and overall medical plan. Schedule and attend psychotherapy every 1-2 weeks to further address mental health and chronic pain concerns. Patient understands that sessions are by telephone or video at this time, due to COVID-19 guidelines. He is aware that there are a limited number of in-person sessions at this time, but he prefers video due to transportation barriers. Practice meditations/coping resources as discussed. Continue to engage in hobbies and go outside daily, as appropriate. Continue to discuss future planning. Continue to follow up on personal goal to reach out to spiritual leader at [employer]. Continue to go over vulnerabilities exercise next session.       Discharge Criteria/Planning: Patient will continue with follow-up until  therapy can be discontinued without return of signs and symptoms.    Alize Livingston 5/18/2021

## 2021-06-17 NOTE — TELEPHONE ENCOUNTER
Refill Approved    Rx renewed per Medication Renewal Policy. Medication was last renewed on 5/4/20, last OV .3/9/21    Nesha Martines, [facility] Triage/Med Refill 5/22/2021     Requested Prescriptions   Pending Prescriptions Disp Refills     omeprazole (PRILOSEC) 20 MG capsule [Pharmacy Med Name: OMEPRAZOLE DR 20 MG CAPSULE] 90 capsule 3     Sig: TAKE 1 CAPSULE BY MOUTH EVERY DAY       GI Medications Refill Protocol Passed - 5/22/2021  5:27 PM        Passed - PCP or prescribing provider visit in last 12 or next 3 months.     Last office visit with prescriber/PCP: 3/4/2020 Malachi Choudhary MD OR same dept: 3/16/2021 Melissa Acevedo CNP OR same specialty: 3/16/2021 Melissa Acevedo CNP  Last physical: 5/1/2019 Last MTM visit: Visit date not found   Next visit within 3 mo: Visit date not found  Next physical within 3 mo: Visit date not found  Prescriber OR PCP: Malachi Choudhary MD  Last diagnosis associated with med order: 1. GERD (gastroesophageal reflux disease)  - omeprazole (PRILOSEC) 20 MG capsule [Pharmacy Med Name: OMEPRAZOLE DR 20 MG CAPSULE]; TAKE 1 CAPSULE BY MOUTH EVERY DAY  Dispense: 90 capsule; Refill: 3    If protocol passes may refill for 12 months if within 3 months of last provider visit (or a total of 15 months).

## 2021-06-17 NOTE — TELEPHONE ENCOUNTER
Telephone Encounter by Sirisha Go RN at 6/26/2020  2:46 PM     Author: Sirisha Go RN Service: -- Author Type: Registered Nurse    Filed: 6/26/2020  2:56 PM Encounter Date: 6/26/2020 Status: Signed    : Sirisha Go RN (Registered Nurse)       Medication being requested: percocet 7.5/325mg  Last visit date: 5/18  Provider: JOSUE  Next visit date: 7/13.  Provider: JOSUE  Expected follow up: 8 weeks  MTM visit (Pain Center) date: no  UDT/CSA - 01/27/2020   snipped in:    Pertinent between visit information about requested medication (telephone, mychart, prior authorization, concerns, comments):   5/27-apt missed for acupuncture  6/10-apt missed for acupuncture  Script being sent to provider by nurse- dates and quantity:   Requested Prescriptions     Pending Prescriptions Disp Refills   ? oxyCODONE-acetaminophen (PERCOCET/ENDOCET) 7.5-325 mg per tablet 84 tablet 0     Sig: Take 1 tablet by mouth 3 (three) times a day as needed for pain.     Pharmacy cued: [facility]  Standing orders for withdrawal protocol implemented: NA

## 2021-06-17 NOTE — PROGRESS NOTES
"Pulmonary Consult Note  5/8/2018      Reason for Consult: Shortness of Breath, likely neuromuscular disease      Problem List:     Patient Active Problem List   Diagnosis     Muscle Weakness Generalized - \"Neuromuscular Condition\" with normal EMG.     Tremor     Arthralgias In Multiple Sites     Muscle Aches, Generalized (Myalgias)     Chronic Ethmoidal Sinusitis     Hypovitaminosis D - [place]     Monoparesis of leg     Weakness - without demonstrable CNS or peripheral cause - local neurologist at [facility], and neurologist at [facility], and [facility] consultant.     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     Nasal polyposis - right naris on MRI (2016)     Pulmonary embolism     Bilateral pulmonary embolism     Myalgia       History:     Mr. Anand Felix is a 34 yo male with PMH significant for generalized weakness, PE on Xarelto, and chronic pain, who presents to pulmonary clinic for evaluation for shortness of breath.  This has been worse especially since his PE diagnosis last year.  He states that he has had shortness of breath really for some time.  He has a history of generalized weakness and has been evaluated by several neurologist who have not found an answer.  He has a history of herniated disc at L1 causing leg weakness.  Since then he has had some mild dyspnea.  It seems that Dr. Donis has proposed that some of his symptoms are due to a somatoform disorder.  He also notes that he can walk on a treadmill, but this is at a pace of 0.5 to 0.6 mph and never really faster.  He uses a cane to walk as well.  He did have a PE and has been on xarelto.  He states he does have early satiety, but no orthopnea or LE weakness.  No cough or sputum production.  No wheeze.      Of note, his wife is present as well and had asked about KOJO as he is noted to stop breathing in his sleep.  " "His ESS is 14 and his STOP BANG is 4.    Returns today for follow-up on his additional pulmonary function tests which she was noted to have extreme fatigue after completing.  His MIP and MEP were both reduced.  He continues to have intermittent cough or shortness of breath.  He continues to have a lot of weakness, falls, myalgias, and chest discomfort.  He does complain of some pain under his right lower rib cage.  On examination he also continues to have significant weakness manifested through shaking just simply taking a large breath.  I did review with him results of previous neurology consultations.  It does sound as though there was some talk of a muscle biopsy at some time but it is it was not pursued.  There was some concern apparently of his anticoagulation.    Past Medical History:   Diagnosis Date     Bulging lumbar disc      Lumbar herniated disc 10/23/2015     Lumbar radiculopathy 11/25/2015     Past Surgical History:   Procedure Laterality Date     MINIMALLY INVASIVE MICRODISCECTOMY LUMBAR SPINE Right 10/2015    L5-S1; Dr. Bettie Hernandez     SINUS SURGERY  2014    Dr. Wright     Social History     Social History     Marital status: Single     Spouse name: Miguel     Number of children: 0     Years of education: GED and some collese     Occupational History     unemployed      Social History Main Topics     Smoking status: Never Smoker     Smokeless tobacco: Never Used     Alcohol use Yes      Comment: Monthly or less often. 1-2 drinks at a time.     Drug use: No     Sexual activity: Yes     Partners: Female     Other Topics Concern     Not on file     Social History Narrative     Family History   Problem Relation Age of Onset     Depression Mother      Hyperlipidemia Mother      Other Mother      \"Ulcers\" - Aches and pains throughout her body.     Cancer Father      No Medical Problems Sister      No Medical Problems Brother      No Medical Problems Sister      No Medical Problems Sister      No Medical " Problems Sister      No Medical Problems Brother      Thyroid disease Maternal Grandmother      Thyroid disease Cousin      No Known Allergies    Review of Systems - 10 point review of systems negative except what is mentioned in the HPI.  Also with positive excessive sweating, fatigue, sinus congestion, sinus pressure, epistaxis, PND, tinnitus, abdominal pain, heartburn, difficulty urinating, heat intolerance, rash, arthralgias/myalgias, allergies, headaches, tremor and these are unchanged.        Medications:     Current Outpatient Prescriptions on File Prior to Visit   Medication Sig Dispense Refill     acetaminophen (TYLENOL) 500 MG tablet Take 1 tablet (500 mg total) by mouth every 6 (six) hours as needed for pain.  0     cholecalciferol, vitamin D3, 2,000 unit cap Take 2,000 Units by mouth daily.       cyclobenzaprine (FLEXERIL) 10 MG tablet TAKE 1/2-1 TABLET BY MOUTH TWICE DAILY AS NEEDED 60 tablet 0     fluticasone (FLONASE) 50 mcg/actuation nasal spray 2 sprays into each nostril daily. 16 g 6     nortriptyline (PAMELOR) 50 MG capsule TAKE 1 CAPSULE (50 MG TOTAL) BY MOUTH AT BEDTIME. 30 capsule 0     omeprazole (PRILOSEC) 20 MG capsule Take 1 capsule (20 mg total) by mouth daily. 90 capsule 1     oxyCODONE (OXYCONTIN) 10 mg 12 hr tablet Take 1 tablet (10 mg total) by mouth every 12 (twelve) hours. 60 each 0     oxyCODONE (ROXICODONE) 5 MG immediate release tablet TAKE 1 TABLET BY MOUTH oneTIME DAILY AS NEEDED FOR PAIN 30 tablet 0     rivaroxaban 20 mg Tab Take 1 tablet (20 mg total) by mouth daily. 30 tablet 5     No current facility-administered medications on file prior to visit.            Exam/Data:   Vitals  Vitals:    05/08/18 1437   BP: 122/64   Pulse: (!) 125   Resp: 17   SpO2: 97%       EXAM:  GEN: Alert and oriented x3, NAD  HEENT: Nares patent, posterior oropharynx clear.  MP II.  RESPIRATORY: CTAB.  No wheeze or rales, noted shaking with deep inspiration  CARDIOVASCULAR: tachycardic but regular,  no m/r/g  GASTROINTESTINAL: Round, soft, NT/ND  HEM/ONC: no adenopathy, no ecchymosis  MSK: no clubbing.  Ambulates slowly with a cane  NEURO: cranial nerves appear grossly intact, muscle strength equal  SKIN: no rash or ulcerations      DATA      PFT DATA:  FEV1/FVC is 106 and is normal.  FEV1 is 96% predicted and is normal.  FVC is 95% predicted and normal.  There was no improvement in spirometry after a single inhaled dose of bronchodilator.  TLC is 76% predicted and is reduced.  RV is 68% predicted and is reduced.  DLCO is 88% predicted and is normal when it   is corrected for hemoglobin.     Impression:  Full Pulmonary Function Test is abnormal.    PFTs are consistent with mild restrictive disease with TLC of 76%  Spirometry is not consistent with reversibility.  There is no hyperinflation.  There is no air-trapping.  Diffusion capacity when corrected for hemoglobin is normal.    MIP and MEP were reviewed and are reduced.    IMAGING:   Personally reviewed CTA in 10/17.  Noted PE.  No parenchymal lung disease.  Small right pleural effusion.     Modified walk test with reduced exertional capacity without evidence of desaturation or hypoxia.    Assessment/Plan:   Anand Felix is a 33 y.o. male being seen for shortness of breath, likely multifactorial, with PMH significant for acute PE, chronic weakness that is unexplained, and chronic pain.    1. Shortness of Breath:  This is likely multifactorial.  On exam he shook when he took a deep breath that would indicate extremely weak core/abdominal musculature, and not true general muscle weakness which would produce shallow steady breaths.  With that, his TLC is mildly reduced with a normal diffusing capacity, suggesting neuromuscular weakness.  His MEP/MIP are also reduced.  He also has had a PE and been on therapy, but he is without evidence of chronic thromboembolic pulmonary hypertension/disease.  He will stay on Xarelto.  Finally, I did discuss with him the use of  narcotics with weakness and that this should really be avoided.  Ultimately I am concerned that he does have an underlying neuromuscular disease that has not been diagnosed.  At this point though he is not hypoxic and I do not think would benefit from any further pulmonary treatment.  If there was any concern about how to obtain him muscle biopsy while on anticoagulation we could assist with bridging him on Lovenox.  Will defer any further workup of neuromuscular disease though to his neurologist or his primary care physician.  We will continue to monitor the patient and I am concerned that in the future he may need some form of positive airway pressure.  Also discussed the case with Dr. James Collins MD, about whether or not a polysomnogram may be able to further lend evidence to neuromuscular weakness.  There are certain situations where this may be beneficial, but it would be more beneficial to evaluate the patient's breathing patterns while sleeping as a somatoform disease would not be persistent during sleep.  Is questionable whether or not this test would be beneficial in that respect.    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  At some point, I will likely do a split night PSG on him, but first I want to do the above tests.  As noted above we will consider this in the near future.  At this time though will just observe and the patient come back in 3 months.    Recommend:  - Likely split night PSG in the future  - stay on Xarelto  - Return to clinic in 3 months    I spent more than 25 minutes in review and assessment of the patient, as well as formulating plan of care, with > 50% time spent face-to-face.      Anand Winn, DO

## 2021-06-17 NOTE — PROGRESS NOTES
SW consult with FRG.  Pt approved for MA, pt has selected [facility] plan but has not yet gotten MA or [facility] cards in the mail and needing to fill prescriptions.  Per discussion with FRG, SW advise pt to get prescriptions filled using MA number.  SW printed [id number] confirmation of MA.

## 2021-06-17 NOTE — PROGRESS NOTES
"5/11/2021    Start time: 11:08 AM    Stop Time: 11:58 AM   Session # 16    Anand Felix is a 36 y.o. male who is being evaluated via a billable video visit.    Chief Complaint:    Major depression, severe   Generalized Anxiety Disorder  Chronic pain syndrome    The patient has been notified of the following:    \"This video visit will be conducted via a call between you and your provider. We have found that certain health care needs can be provided without the need for an in-person visit. This service lets us provide the care you need with a video conversation\".     \"Video visits are billed at different rates depending on your insurance coverage. Please reach out to your insurance provider with any questions\".     \"If during the course of the call the provider feels a video visit is not appropriate, you will not be charged for this service\".     Patient has been given verbal consent to a video visit: Yes    Patient would like the video invitation sent by: My Chart    Will anyone else be joining your video visit?: No    Video visit details    Type of service: Video visit    Originating Location (pt. Location): Home    Distant Location (provider location): [facility]    Platform used for Video Visit: [employer]erta[employer]    Functional Impairment:   Personal: 4  Family: 4  Work: 4  Social:4    Clinical assessment of mental status: Anand Felix presented on time.  He was open and cooperative, and dressed appropriately for this session and weather. His memory was in tact .  His  speech was appropriate.  Language was appropriate.  Concentration and focus is on task. Psychosis is not noted or reported. He reports his mood is depressed .  Affect is congruent with speech.  Fund of knowledge is adequate. Insight is adequate for therapy.     Suicidal/Homicidal Ideation present: None Reported This Session. However, he reports passive SI this week (2-3 days) and states he feels that this continues to decrease. He denies plan or " "means. He is able to identify protective factors.     Patient's impression of their current status: Patient reports continued difficulty with depression and anxiety symptoms. However, he reports symptoms have been more manageable lately and he feels \"neutral\" today.. Patient continues to have some mild side effects from medication and fatigue is the most difficult to manage. Sleep issues have resurfaced as well. He continues to have concerns and lack of interest in social situations. Patient is working on acceptance of his chronic medical concerns. He is working on identifying future goals.    Therapist impression of patients current state: Although anxiety and depression symptoms persist, patient reports that his symptoms are much more manageable lately. He does report feeling fatigued, but other side effects have been improving. He states staying asleep has been difficult again lately. Discussed ways to cope and strategies to use to get better rest.  Patient reports SI has been less/fleeting. He denies plan or means. Discussed triggers and protective factors. He continues to work on identifying future goals and he continues to work on  what he wants versus what others want for him. Discussed cultural influences as well. Discussed ways to find meaning and connection to future plans. Continued to discuss values clarification. Patient reports he continues to work on dietary changes and discussed some related progress. Patient continues to address pain issues and reports symptoms in the morning are most difficult.  Patient continues to work on routine/schedule, as well as physical activity, as he is able. Patient is reaching out to his support system, as he is able. However, he reports he feels indifferent about connecting socially at this time. He struggles with feeling connected with some family relationships. He continues to adjust to changes as his wife recently returned to work. He seems to be staying " busy while she is away. He reports staying connected to his spiritual program and discussed ways to further connect that may be meaningful to him. Patient seems to be making more effort towards using Mindfulness techniques in daily living. Gave patient praise for his efforts. Discussed challenges with acceptance and ways to re-evaluate what is important to him in his life now. Discussed grief/loss as related to physical changes in the past few years. He seems to be gaining insight and making progress. Encouraged patient to participate in resourcing after the session as time did not permit today.     Type of psychotherapeutic technique provided: Client centered, CBT and Mindfulness    Progress toward short term goals: Patient is working on increasing coping skills to manage symptoms related to mental health and medical concerns.     Review of long term goals: Not done at today's visit. Last review: 4/13/2021    Diagnosis: Major depression, severe   Generalized Anxiety Disorder  Chronic pain syndrome    Plan and Follow up:  Follow pain center plan of care and overall medical plan. Schedule and attend psychotherapy every 1-2 weeks to further address mental health and chronic pain concerns. Patient understands that sessions are by telephone or video at this time, due to COVID-19 guidelines. He is aware that there are a limited number of in-person sessions at this time, but he prefers video due to transportation barriers. Practice meditations/coping resources as discussed. Continue to engage in hobbies and go outside daily, as appropriate. Continue to discuss future planning. Continue to follow up on personal goal to reach out to spiritual leader at [employer]. Go over vulnerabilities exercise next session.       Discharge Criteria/Planning: Patient will continue with follow-up until therapy can be discontinued without return of signs and symptoms.    Alize Livingston 5/11/2021

## 2021-06-17 NOTE — PROGRESS NOTES
Pain Clinic Followup  ENCOUNTER DATE: 2018    Anand Felix    1985  MRN # [id number]  PCP: Malachi Choudhary MD    CC: Anand Felix 33 y.o. is here today, sent to me by  to discuss   Chief Complaint   Patient presents with     Muscle Pain     Back Pain         HPI:      Pain level: On a scale of 1-10, the patient rates their pain today 8  Pain is described: Sharp piercing, tearing, burning  Aggravating factors: Any activities  Alleviating factors: Medications, salt bath, hot cold pack  New pain: Yes knee pain in the chest wall and along with shortness of breath.  Since last visit, pain has worsened  Associated Symptoms: Numbness and weakness all over more on the right side.  Night pain.  Chills.  Muscle spasm.  Pain interferes with: Sleep, walking, activities of daily living, relationships      Pertinent Medical/family/social/medication/allergy History:  Reviewed.  Patient was evaluated by pulmonologist.  He has weakness of the chest wall muscles which caused some shortness of breath.  Pulmonologist recommended for a muscle biopsy.  New medication Xarelto 20 mg    Function: No improvement    Patient feels depressed, angry, frustrated, anxious, helpless.  No suicidal ideation      Past Medical History:   Diagnosis Date     Bulging lumbar disc      Lumbar herniated disc 10/23/2015     Lumbar radiculopathy 2015       Past Surgical History:   Procedure Laterality Date     MINIMALLY INVASIVE MICRODISCECTOMY LUMBAR SPINE Right 10/2015    L5-S1; Dr. Bettie Hernandez     SINUS SURGERY      Dr. Wright       SOCIAL HISTORY:   reports that he has never smoked. He has never used smokeless tobacco. He reports that he drinks alcohol. He reports that he does not use illicit drugs.    FAMILY HISTORY  family history includes Cancer in his father; Depression in his mother; Hyperlipidemia in his mother; No Medical Problems in his brother, brother, sister, sister, sister, and sister; Other in his mother; Thyroid  disease in his cousin and maternal grandmother.    No Known Allergies    Current Outpatient Prescriptions   Medication Sig Dispense Refill     acetaminophen (TYLENOL) 500 MG tablet Take 1 tablet (500 mg total) by mouth every 6 (six) hours as needed for pain.  0     cholecalciferol, vitamin D3, 2,000 unit cap Take 2,000 Units by mouth daily.       cyclobenzaprine (FLEXERIL) 10 MG tablet TAKE 1/2-1 TABLET BY MOUTH TWICE DAILY AS NEEDED 60 tablet 0     fluticasone (FLONASE) 50 mcg/actuation nasal spray 2 sprays into each nostril daily. 16 g 6     nortriptyline (PAMELOR) 50 MG capsule TAKE 1 CAPSULE (50 MG TOTAL) BY MOUTH AT BEDTIME. 30 capsule 0     omeprazole (PRILOSEC) 20 MG capsule Take 1 capsule (20 mg total) by mouth daily. 90 capsule 1     oxyCODONE (OXYCONTIN) 10 mg 12 hr tablet Take 1 tablet (10 mg total) by mouth every 12 (twelve) hours. 60 each 0     oxyCODONE (ROXICODONE) 5 MG immediate release tablet TAKE 1 TABLET BY MOUTH oneTIME DAILY AS NEEDED FOR PAIN 30 tablet 0     rivaroxaban 20 mg Tab Take 1 tablet (20 mg total) by mouth daily. 30 tablet 5     No current facility-administered medications for this encounter.          REVIEW OF SYSTEMS:     12 point systems were reviewed with pt as documented on pt health form and the patient denies any new diagnosis or changes in 12 point system review since the last visit.     PHYSICAL EXAM:    Constitutional: 33 y.o.  male in NAD; alert and oriented x4/4; appears stated age.  Normal body habitus.  Patient is cooperative, polite, communicates well, makes eye contact, and expresses appropriate concern throughout history. Inspection of the neck demonstrates no skin abnormalities or deformities.  Posture is appropriate with no obvious listing, scoliosis, or rigidity.  HEENT:   Head: Normocephalic and atraumatic.   Right Ear: External ear normal.   Left Ear: External ear normal.   Nose: Nose normal.   Mouth/Throat: Oropharynx is clear and moist.   Eyes:  Conjunctivae and EOM are normal. Right eye exhibits no discharge. Left eye exhibits no discharge.   Neck: Normal range of motion. Neck supple.   Cardiovascular: Normal rate, regular rhythm and normal heart sounds.    Pulmonary/Chest: Effort normal. No respiratory distress.   Integumentary: no rashes or breaks in the skin, no open wounds.   Psychiatry:  The patient described depressed mood. Affect is depressed. No abnormal speech. The patient denies any suicidal ideation. No hallucination. Judgement and insight are normal.     Musculoskeletal exam:       Gait: Patient walks with a shuffled gait.  Patient rises from a seated position without difficulty.        Images: No new diagnostic studies    Diagnosis    1. Chronic pain syndrome     2. Chronic pain  oxyCODONE (OXYCONTIN) 10 mg 12 hr tablet   3. Chronic right-sided low back pain with right-sided sciatica  oxyCODONE (ROXICODONE) 5 MG immediate release tablet         Assessment:    This is a 33-year-old male patient presented today, his wife.  He has worsening of his pain all over along with shortness of breath.  His muscles have been more spastic and weaker.  He has complicated medical history.  He requires multi-disciplinary care.  Biopsy is recommended by his pulmonologist.  I discussed with him that a consultation by immunology may be reasonable.  Also, referral to department of neurology at [facility] is reasonable.  He was seen by 3 neurologist in the [facility].  None of the neurologists can  provide with specific diagnosis of his condition.  In terms of his medication managements, oxycodone 5 mg is not helpful anymore.  OxyContin 10 mg last to 3 hours.  He has more muscle spasm which he needs more Flexeril.  He has no side effects.       PLAN:    Plan of care was discussed with the patient. Education about patient pain condition, pathology, and strategies to manage the pain was provided.     Non Opioid Management: Increase Flexeril 10 mg 3 times a day.  Continue  nortriptyline 50 mg at bedtime    :  Dated 5/9/2018  Reviewed and consistent.    Opioid Management:    Increase OxyContin 10 mg 3 times a day. discontinue oxycodone 5 mg    SAFETY REMINDERS  No alcohol while taking controlled substances. Alcohol is not an illegal substance, it is unsafe to use in combination. It is a build up of substances in the body that can be extremely hazardous and may cause respirations to slow to a dangerous rate resulting in hospitalization, brain damage, or death.    Opioid medications have been associated with sharp rise in unintentional overdose and death.  Overdose is a condition characterized by the consumption in excess of a particular drug causing adverse effects. This can happen b/c you are sick, accidentally or intentionally took an extra dose, are on multiple medication that can interact. Someone took your medication and they are not use to the medication.  Symptoms of overdose include:   !breathing slow and shallow, erratic or not at all  !pinpoint pupils, hallucinations  !confusion  !muscle jerks, slack muscles   !extreme sleepiness or loss of alertness   !awake but not able to talk   !face pale or clammy, vomiting, for lighter skinned people, the skin tone turns bluish purple, for darker skinned people, it turns grayish or ashen   If in a situation where overdose is a concern engage the emergency response system (dial 911).    In one study it was noted that 80% of unintentional overdoses occurred in people who were taking a combination of opioids and benzodiazepines.    Do not sell, loan, borrow or share your opioid medication with anyone. Deaths have occurred as a result of this practice. It is illegal and patients are being prosecuted.     Prevent unexpected access/loss of medication: Keep medication locked. Only carry what you need with you.    The patient agrees to the plan and has no further questions, if questions arise the patient knows to call [phone].      Behavioral Health: Continue behavioral health as instructed    Discussed with primary care physician for a referral to department of neurology at [facility]    Please see your current provider for any continued prescription. They will continue to manage your general health and have requested you see the pain center for pain management. Please discuss any health concerns with your PCP     Follow up: 4 weeks.     Orders placed today:    Orders Placed This Encounter   Procedures     Ambulatory referral to Immunology     Ambulatory referral to Physical Therapy        Medications:     Requested Prescriptions     Pending Prescriptions Disp Refills     oxyCODONE (OXYCONTIN) 10 mg 12 hr tablet 60 each 0     Sig: Take 1 tablet (10 mg total) by mouth every 12 (twelve) hours.     oxyCODONE (ROXICODONE) 5 MG immediate release tablet 30 tablet 0     Sig: TAKE 1 TABLET BY MOUTH oneTIME DAILY AS NEEDED FOR PAIN         Ham Churchill MD  [employer] Board Certified Interventional Pain Physicain  [facility]  [address]  [place] [address]  Ph: [phone]  Fax: [phone]

## 2021-06-17 NOTE — PROGRESS NOTES
"4/30/2021    Start time: 1:04 PM    Stop Time: 1:54 PM   Session # 14    Anand Felix is a 36 y.o. male who is being evaluated via a billable video visit.    Chief Complaint:    Major depression, severe   Generalized Anxiety Disorder  Chronic pain syndrome    The patient has been notified of the following:    \"This video visit will be conducted via a call between you and your provider. We have found that certain health care needs can be provided without the need for an in-person visit. This service lets us provide the care you need with a video conversation\".     \"Video visits are billed at different rates depending on your insurance coverage. Please reach out to your insurance provider with any questions\".     \"If during the course of the call the provider feels a video visit is not appropriate, you will not be charged for this service\".     Patient has been given verbal consent to a video visit: Yes    Patient would like the video invitation sent by: My Chart    Will anyone else be joining your video visit?: No    Video visit details    Type of service: Video visit    Originating Location (pt. Location): Home    Distant Location (provider location): [facility]    Platform used for Video Visit: [employer]    Functional Impairment:   Personal: 4  Family: 4  Work: 4  Social:4    Clinical assessment of mental status: Anand Felix presented on time.  He was open and cooperative, and dressed appropriately for this session and weather. His memory was in tact .  His  speech was appropriate/quiet.  Language was appropriate/quiet.  Concentration and focus is on task. Psychosis is not noted or reported. He reports his mood is depressed .  Affect is congruent with speech.  Fund of knowledge is adequate. Insight is adequate for therapy.     Suicidal/Homicidal Ideation present:None Reported This Session. However, he reports passive SI this week (3-4 days) and states he feels that this continues to decrease. He denies " "plan or means. He is able to identify protective factors.     Patient's impression of their current status: Patient reports continued difficulty with depression and anxiety symptoms. However, he reports symptoms have been more manageable lately. Patient continues to have some mild side effects from medication and fatigue is the most difficult to manage. He continues to have concerns and lack of interest in social situations. Patient is working on acceptance of his chronic medical concerns. He is working on identifying future goals.    Therapist impression of patients current state: Although anxiety and depression symptoms persist, patient reports that his symptoms are much more manageable lately. He does report feeling fatigued, but other side effects have been improving. He states he feels less \"spacey\". Patient reports SI has been less/fleeting. He denies plan or means. Discussed protective factors. He continues to work on identifying future goals and he continues to work on  what he wants versus what others want for him. Discussed cultural concerns he is facing as well. Discussed ways to find meaning and connection to future plans. Continued to discuss values clarification. Patient reports he continues to work on dietary changes and discussed some related progress. Patient reports some improvement with appetite. He attended a follow up with primary care last week. Patient continues to address pain issues.  Patient continues to work on routine/schedule, as well as physical activity, as he is able. Patient is reaching out to his support system, as he is able. However, he reports he feels indifferent about connecting socially at this time. He struggles with feeling connected with some family relationships but he did visit a sister this past week and this went well.  He continues to adjust to changes as his wife recently returned to work. He seems to be staying busy while she is away. He reports staying " connected to his spiritual program and discussed ways to further connect that may be meaningful to him. Patient seems to be making more effort towards using Mindfulness techniques in daily living. Gave patient praise for his efforts. Discussed challenges with acceptance and ways to re-evaluate what is important to him in his life now. Discussed grief/loss as related to physical changes in the past few years. He seems to be gaining insight and making progress. Finished best resourced self exercise and patient appeared to benefit. Will go over vulnerabilities exercise next time.     Type of psychotherapeutic technique provided: Client centered, CBT and Mindfulness and [employer] [facility]    Progress toward short term goals: Patient is working on increasing coping skills to manage symptoms related to mental health and medical concerns.     Review of long term goals: Not done at today's visit. Last review: 4/13/2021    Diagnosis:Major depression, severe   Generalized Anxiety Disorder  Chronic pain syndrome     Plan and Follow up: Follow pain center plan of care and overall medical plan. Schedule and attend psychotherapy every 1-2 weeks to further address mental health and chronic pain concerns. Patient understands that sessions are by telephone or video at this time, due to COVID-19 guidelines. He is aware that there are a limited number of in-person sessions at this time, but he prefers video due to transportation barriers. Practice meditations/coping resources as discussed. Continue to engage in hobbies and go outside daily, as appropriate. Continue to discuss future planning. Continue to follow up on personal goal to reach out to spiritual leader at [employer]. Go over vulnerabilities exercise next session.       Discharge Criteria/Planning: Patient will continue with follow-up until therapy can be discontinued without return of signs and symptoms.    Alize Livingston 4/30/2021

## 2021-06-18 NOTE — PROGRESS NOTES
"[facility] Daily Progress     Patient Name: Anand Felix  Date: 2018  Visit #: 6  PTA visit #:  3  Referral Diagnosis: Muscle weakness (generalized)  Referring provider: Ham Churchill MD  Visit Diagnosis:     ICD-10-CM    1. Impaired functional mobility, balance, gait, and endurance Z74.09    2. 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 Muscle weakness (generalized) M62.81      Anand Felix is a 33 y.o. male who presents to therapy today with chief complaints of muscle weakness and impaired gait and balance. Onset date of sx was 2015.  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  Pain symptoms are aching, burning, numbness, sharp, tingling and weakness.  Functional impairments include walking, dressing, bathing, sitting for long periods, and standing for long periods.  Pt demo's signs and sx consistent with impaired gait and balance secondary to muscle weakness    Assessment:     HEP/POC compliance is  good .  The patient is compliant with his HEP.  His movements during exercise do not correlate with symptoms.  Vigorous shaking during activities bilaterally.  He also easily throws himself off balance.  Patient needed to use a walker to exit therapy today.  He is appropriate to continue with lower level exercise.    Goal Status:  Pt. will be independent with home exercise program in : 6 weeks  Pt will: be able to walk 10 minutes with spO2 levels 95%+; in 8 weeks  Pt will: be able to maneuver 5 steps to get into his home without difficulty; in 8 weeks    Plan / Patient Education:     Continue with initial plan of care.  Progress with home program as tolerated.  Plan for next visit: Progress strengthening exercises.      Subjective:     Pain Ratin in mid low back  The patient reports a fall on Wednesday when getting out of the tub.  His pain is \"ok.\"  He feels like when he is walking, his feet aren't dragging as much.  He does continue to get shortness of breath easily.    Objective: " "    Pt ambulates into clinic with a Hurricane used on L and and stiff/rigid R LE    Exercises:  Exercise #1: Quad sets  Comment #1: 10-15 at home  Exercise #2: Hamstring isometrics  Comment #2: 10-15 at home  Exercise #3: Bent knee fallouts  Comment #3: L1 HEP unilateral pull  Exercise #4: Pre-bridge muscle contractions (TA/glute/hip add)  Comment #4: mini lift (2 inch) x 10, added 1-2' hold HEP  Exercise #5: Nustep seat 5 arms 8 L6 x 5'  Comment #5: SAO2 98&,  BPM  Exercise #6: Ab sets/marching  Comment #6: HEP  Exercise #7: Pilates arch with L1 band , pull to knees  Comment #7: L1 HEP  Exercise #8: Stairs  Comment #8: x 8 with 1 rail ( on R) and cane, vc for foot flat, not landing just on R heeel, and to keep feet fully on steps, not today  Exercise #9: seated on bed \"scoot\" 12 reps each direction ,  hinged slightly forward to WB through LE's  Comment #9: Not in clinic  Exercise #10: standing mini squats HEP  Comment #10: standing hip abd HEP  Exercise #11: STS  Comment #11: x 5 with min-maxA    Appt time: 12:02PM - 12:30PM    Treatment Today     TREATMENT MINUTES COMMENTS   Evaluation     Self-care/ Home management     Manual therapy     Neuromuscular Re-education 15 Balance activities:  -Stepping onto 4\" step x 5 bilaterally; patient needed therapist to brace R LE during L LE step up in // bars  -NBOS x 30 seconds  -Modified tandem with small step forward x 30 seconds bilaterally   Therapeutic Activity     Therapeutic Exercises 13 Discussed HEP  See flowsheet   Gait training     Modality__________________                Total 28    Blank areas are intentional and mean the treatment did not include these items.       Beatriz Mdeina, PT, DPT  6/22/2018  "

## 2021-06-18 NOTE — PROGRESS NOTES
5/31/2018    Start time: 9:05 AM    Stop Time: 9:55 AM   Session # 7    Anand Felix is a 33 y.o. male is being seen today for    Chief Complaint   Patient presents with     Mental Health Note   . Major depression, severe  History of Generalized Anxiety Disorder  Chronic pain syndrome    New symptoms or complaints: None    Functional Impairment:   Personal: 4  Family: 4  Work: 4  Social:4    Clinical assessment of mental status: Anand Felix presented on time.  He was open and cooperative, and dressed appropriately for this session and weather. His memory was in tact .  His  speech was appropriate.  Language was appropriate.  Concentration and focus is mostly on task, but was redirected at times. Psychosis is not noted or reported. He reports his mood is depressed .  Affect is congruent with speech.  Fund of knowledge is adequate. Insight is adequate for therapy.     Suicidal/Homicidal Ideation present: Patient reports passive suicidal ideation. He denies plan or means. Protective factors are his wife and family.     Patient's impression of their current status: Patient reported he continues to be working on addressing symptoms of depression (feeling sad, irritable, frustrated/angry) and anxiety. He reports sleep continues to be hard to manage. He reports fatigue and pain has been difficult to manage. He was able to resolve some recent medication issues and now has his prescriptions. He is addressing other medical needs and following recommendations. Patient reports he continues to have negative thinking and this has been challenging. Patient reported he has a daily routine, including some exercise, that has been helpful. He described some coping skills that he uses in daily living. Patient talked about thoughts and feelings regarding his wife's health/medical concerns. He does report reaching out to his support group, at times, and challenges he has with opening up. He does report he has been enjoying regular [facility]  attendance. Patient reports conflict regarding the changes in his life due to chronic pain/medical concerns. He continues to get help from the care guide at his primary care clinic. Patient participated in a calm place meditation and expressed benefit.     Therapist impression of patients current state: Patient seems to be working on addressing depression, anxiety and chronic medical symptoms. He appears to be working on improving mood and attitude, but this appears to be a challenge due to continued health concerns. Patient seems to have experienced some losses with changes in his and his wife's medical concerns, which is  increasing worry.  It would likely be worthwhile to continue work on addressing role/identity now, given patient's health concerns. It seems the care guide at his primary care clinic has been helpful and this has reduced stress for patient. Patient appears to have some challenges with household activities, but he appears to be working on being more active at home. Encouraged patient to continue to increase efforts to reach out to social/family support to connect socially/emotionally. It seems he is attending [employer] with greater regularity and finding benefit. Continued to discuss potential benefits of Mindfulness meditation with patient and also continued to educate on EMDR. Encouraged meditation home practice. Presented calm place meditation and patient appeared to benefit. Patient may benefit from a letting go exercise in a future session.      Type of psychotherapeutic technique provided: Client centered, CBT and Mindfulness, EMDR    Progress toward short term goals: Patient continues to work on increasing coping skills to manage chronic pain and mental health. Patient continues medical care as recommended by his medical providers. He engages in physical activity and social activity as he is able. He is engaging in meditation in daily living. Patient signed/agreed to treatment plan this date.      Review of long term goals: Patient signed/agreed to treatment plan this date.     Diagnosis: Major depression, severe  History of Generalized Anxiety Disorder  Chronic pain syndrome    Plan and Follow up: Patient to follow pain center plan of care. Patient to follow all medical recommendations. Patient to schedule every 2 weeks for psychotherapy to further address depression, anxiety and chronic pain concerns. Will continue to educate on EMDR and Mindfulness meditation. Practice meditations as presented in session. Further address adjustment to role/identity due to chronic pain and health concerns. Present letting go exercise next session.       Discharge Criteria/Planning: Patient will continue with follow-up until therapy can be discontinued without return of signs and symptoms.    Alize Livingston 5/31/2018

## 2021-06-18 NOTE — PROGRESS NOTES
[facility] Daily Progress     Patient Name: Anand Felix  Date: 2018  Visit #: 5  PTA visit #:  3  Referral Diagnosis: Muscle weakness (generalized)  Referring provider: Malachi Choudhary MD  Visit Diagnosis:     ICD-10-CM    1. Impaired functional mobility, balance, gait, and endurance Z74.09    2. 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 Muscle weakness (generalized) M62.81    4. Bilateral pulmonary embolism (H) I26.99      Anand Felix is a 33 y.o. male who presents to therapy today with chief complaints of muscle weakness and impaired gait and balance. Onset date of sx was 2015.  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  Pain symptoms are aching, burning, numbness, sharp, tingling and weakness.  Functional impairments include walking, dressing, bathing, sitting for long periods, and standing for long periods.  Pt demo's signs and sx consistent with impaired gait and balance secondary to muscle weakness    Assessment:   Pt tolerated increased resistance on Nustep and was able to lift slightly higher with bridge prep exercises    HEP/POC compliance is  good .  The patient was able to complete new exercises.  He continues to have shakiness with exercise, both with LE movements and UE.  He did report that pain level has been improved.  He is appropriate to continue with PT services at this time.    Goal Status:  Pt. will be independent with home exercise program in : 6 weeks  Pt will: be able to walk 10 minutes with spO2 levels 95%+; in 8 weeks  Pt will: be able to maneuver 5 steps to get into his home without difficulty; in 8 weeks    Plan / Patient Education:     Continue with initial plan of care.  Progress with home program as tolerated.  Plan for next visit: Progress strengthening exercises.      Subjective:     Pain Ratin in mid low back  He is exercising daily, but exercises are still hard and he is not able to increase reps  He reports he is walking more often, usually 1-2  "blocks on most days    Objective:     Pt ambulates into clinic with a Hurricane used on L and and stiff/rigid R LE  SAO2 97%-99% , HR  BPM while in clinic, participating in exercise activity    Exercises:  Exercise #1: Quad sets  Comment #1: 10-15 at home  Exercise #2: Hamstring isometrics  Comment #2: 10-15 at home  Exercise #3: Bent knee fallouts  Comment #3: L1 HEP x 10 unilateral pull  Exercise #4: Pre-bridge muscle contractions (TA/glute/hip add)  Comment #4: mini lift (2 inch) x 10, added 1-2' hold, higher lift today  Exercise #5: Nustep seat 5 arms 8 L6 x 5'  Comment #5: SAO2 98&,  BPM  Exercise #6: Ab sets/marching  Comment #6: x 10; patient needed Dinora on the R, at home  Exercise #7: Pilates arch with L1 band , pull to knees  Comment #7: x 10 L1  Exercise #8: Stairs  Comment #8: x 8 with 1 rail ( on R) and cane, vc for foot flat, not landing just on R heeel, and to keep feet fully on steps, not today  Exercise #9: seated on bed \"scoot\" 12 reps each direction ,  hinged slightly forward to WB through LE's  Comment #9: SA)2 97%,  BPM  Exercise #10: standing mini squats x 2, very minimal hinge, not today  Comment #10: standing hip abd, unable today, became unsteady when trying to abduct L LE, at home         Treatment Today     TREATMENT MINUTES COMMENTS   Evaluation     Self-care/ Home management     Manual therapy     Neuromuscular Re-education     Therapeutic Activity     Therapeutic Exercises 30 Performed and progressed HEP as able. See flowsheet  above   Gait training     Modality__________________                Total 30    Blank areas are intentional and mean the treatment did not include these items.       Genia Juarez, PTA,CLT  6/18/2018  "

## 2021-06-18 NOTE — PROGRESS NOTES
Pain Clinic Followup  ENCOUNTER DATE: 2018    Anand HERNANDEZ 1985  MRN # [id number]  PCP: Malachi Choudhary MD    CC: Anand Felix 33 y.o. is here today, sent to me by  to discuss   Chief Complaint   Patient presents with     Back Pain         HPI:      Pain level: On a scale of 1-10, the patient rates their pain today 6  Pain is described: Sharp, deep, burning, tearing  Aggravating factors: Any activity  Alleviating factors: Salt baths, hot and cold packs, medications, laying down  New pain:  None  Since last visit, pain has improved  Associated Symptoms: Numbness in the right leg and arm, weakness all over his body  Pain interferes with: Sleep, walking, activities of daily living, relationships, sexual      Pertinent Medical/family/social/medication/allergy History:  Reviewed.   No change since last visit. Muscle biopsy is still pending    Function: Improved slightly    Patient reports increased pain when he is with his wife waiting while she is having dialysis 3 times a week.  Patient requested to have some as needed medication to help with his pain during the dialysis.       Past Medical History:   Diagnosis Date     Bulging lumbar disc      Lumbar herniated disc 10/23/2015     Lumbar radiculopathy 2015       Past Surgical History:   Procedure Laterality Date     MINIMALLY INVASIVE MICRODISCECTOMY LUMBAR SPINE Right 10/2015    L5-S1; Dr. Bettie Hernandez     SINUS SURGERY      Dr. Wright       SOCIAL HISTORY:   reports that he has never smoked. He has never used smokeless tobacco. He reports that he drinks alcohol. He reports that he does not use illicit drugs.    FAMILY HISTORY  family history includes Cancer in his father; Depression in his mother; Hyperlipidemia in his mother; No Medical Problems in his brother, brother, sister, sister, sister, and sister; Other in his mother; Thyroid disease in his cousin and maternal grandmother.    No Known Allergies    Current Outpatient Prescriptions    Medication Sig Dispense Refill     acetaminophen (TYLENOL) 500 MG tablet Take 1 tablet (500 mg total) by mouth every 6 (six) hours as needed for pain.  0     atenolol (TENORMIN) 25 MG tablet Take 1 tablet (25 mg total) by mouth daily. 30 tablet 5     cholecalciferol, vitamin D3, 2,000 unit cap Take 2,000 Units by mouth daily.       fluticasone (FLONASE) 50 mcg/actuation nasal spray 2 sprays into each nostril daily. 16 g 6     omeprazole (PRILOSEC) 20 MG capsule Take 1 capsule (20 mg total) by mouth daily. 90 capsule 1     warfarin (COUMADIN) 5 MG tablet Take 5 mg by mouth daily. Adjust dose based on INR results as directed. 60 tablet 5     [START ON 6/8/2018] cyclobenzaprine (FLEXERIL) 10 MG tablet Take 1 tablet (10 mg total) by mouth 3 (three) times a day as needed for muscle spasms. 90 tablet 0     [START ON 6/16/2018] morphine (MS CONTIN) 15 MG 12 hr tablet Take 1 tablet (15 mg total) by mouth 3 (three) times a day. 90 tablet 0     [START ON 6/8/2018] morphine (MSIR) 15 MG tablet Take 1 tablet (15 mg total) by mouth 3 (three) times a week. 12 tablet 0     [START ON 6/8/2018] nortriptyline (PAMELOR) 50 MG capsule Take 1 capsule (50 mg total) by mouth at bedtime. 30 capsule 0     No current facility-administered medications for this encounter.          REVIEW OF SYSTEMS:     12 point systems were reviewed with pt as documented on pt health form and the patient denies any new diagnosis or changes in 12 point system review since the last visit.     PHYSICAL EXAM:    Constitutional: 33 y.o.  male in NAD; alert and oriented x4/4; appears stated age.  Normal body habitus.  Patient is cooperative, polite, communicates well, makes eye contact, and expresses appropriate concern throughout history. Inspection of the neck demonstrates no skin abnormalities or deformities.  Posture is appropriate with no obvious listing, scoliosis, or rigidity.  HEENT:   Head: Normocephalic and atraumatic.   Right Ear: External ear  "normal.   Left Ear: External ear normal.   Nose: Nose normal.   Mouth/Throat: Oropharynx is clear and moist.   Eyes: Conjunctivae and EOM are normal. Right eye exhibits no discharge. Left eye exhibits no discharge.   Neck: Normal range of motion. Neck supple.   Cardiovascular: Normal rate, regular rhythm and normal heart sounds.    Pulmonary/Chest: Effort normal. No respiratory distress.   Integumentary: no rashes or breaks in the skin, no open wounds.   Psychiatry:  The patient described normal mood. Affect is normal. No abnormal speech. The patient denies any suicidal ideation. No hallucination. Judgement and insight are normal.     Musculoskeletal exam:       Gait: Patient walks with a well-balanced gait.  Patient rises from a seated position without difficulty.           Images: No new diagnostic studies    Diagnosis    1. Myalgia  morphine (MSIR) 15 MG tablet   2. Chronic pain  morphine (MS CONTIN) 15 MG 12 hr tablet    morphine (MSIR) 15 MG tablet   3. Muscle spasm  cyclobenzaprine (FLEXERIL) 10 MG tablet   4. Muscle Weakness Generalized - \"Neuromuscular Condition\" with normal EMG.  nortriptyline (PAMELOR) 50 MG capsule    morphine (MSIR) 15 MG tablet         Assessment:    Anand is a 33-year-old male patient who presented today accompanied by his wife for a follow-up.  Pain has improved along with function since MS Contin 15 mg 3 times a day has initiated.  Physical therapy evaluation and treatment was done last week.  He had 2 sessions last week.  He has not noticed improvement from PT yet.  He denies any adverse effects from medication.  He needs to take Flexeril, nortriptyline, MS Contin.  He gets well pain controlled with the combination of these medications.  He has flareup 3 times a week while he is waiting for his wife during her dialysis sessions.  It is reasonable to start morphine IR 15 mg 3 times a week to help with pain.  All over, patient is stable on the current regimen with no adverse effects or " aberrant behavior.      PLAN:     Plan of care was discussed with the patient. Education about patient pain condition, pathology, and strategies to manage the pain was provided.     Diagnotic Studies/Lab orders:  None    Physical Medicine and rehabilitations: Continue physical therapy as instructed    Non Opioid Management: Continue Flexeril 10 mg 3 times a day.  Continue nortriptyline 50 mg at bedtime    :  Dated 6/7/2018  Reviewed and consistent.    Opioid Management:    Continue meds Contin 15 mg 3 times a day.  Will start morphine IR 15 mg 3 times weekly for flareups.    SAFETY REMINDERS  No alcohol while taking controlled substances. Alcohol is not an illegal substance, it is unsafe to use in combination. It is a build up of substances in the body that can be extremely hazardous and may cause respirations to slow to a dangerous rate resulting in hospitalization, brain damage, or death.    Opioid medications have been associated with sharp rise in unintentional overdose and death.  Overdose is a condition characterized by the consumption in excess of a particular drug causing adverse effects. This can happen b/c you are sick, accidentally or intentionally took an extra dose, are on multiple medication that can interact. Someone took your medication and they are not use to the medication.  Symptoms of overdose include:   !breathing slow and shallow, erratic or not at all  !pinpoint pupils, hallucinations  !confusion  !muscle jerks, slack muscles   !extreme sleepiness or loss of alertness   !awake but not able to talk   !face pale or clammy, vomiting, for lighter skinned people, the skin tone turns bluish purple, for darker skinned people, it turns grayish or ashen   If in a situation where overdose is a concern engage the emergency response system (dial 911).    In one study it was noted that 80% of unintentional overdoses occurred in people who were taking a combination of opioids and benzodiazepines.    Do  not sell, loan, borrow or share your opioid medication with anyone. Deaths have occurred as a result of this practice. It is illegal and patients are being prosecuted.     Prevent unexpected access/loss of medication: Keep medication locked. Only carry what you need with you.    The patient agrees to the plan and has no further questions, if questions arise the patient knows to call [phone].     Behavioral Health: Continue behavioral health as instructed      Please see your current provider for any continued prescription. They will continue to manage your general health and have requested you see the pain center for pain management. Please discuss any health concerns with your PCP     Follow up: 8 weeks.       Medications:     Requested Prescriptions     Signed Prescriptions Disp Refills     morphine (MS CONTIN) 15 MG 12 hr tablet 90 tablet 0     Sig: Take 1 tablet (15 mg total) by mouth 3 (three) times a day.     cyclobenzaprine (FLEXERIL) 10 MG tablet 90 tablet 0     Sig: Take 1 tablet (10 mg total) by mouth 3 (three) times a day as needed for muscle spasms.     nortriptyline (PAMELOR) 50 MG capsule 30 capsule 0     Sig: Take 1 capsule (50 mg total) by mouth at bedtime.     morphine (MSIR) 15 MG tablet 12 tablet 0     Sig: Take 1 tablet (15 mg total) by mouth 3 (three) times a week.         Ham Churchill MD  [facility]  [address]  [place] [address]  Ph: [phone]  Fax: [phone]

## 2021-06-18 NOTE — PROGRESS NOTES
Pt and his wife, Miguel, were seen in clinic for f/up EKG after starting atenolol and prior to getting Holter monitor set up. Of note, pt has not taken his atenolol yet today. He reports that since starting it, he does feel a little dizzy for an hour or two afterwards but knows that he should give it a couple of weeks for his body to adjust. He denies any syncopal episodes. We discussed changing position slowly to adjust to orthostatic changes. Pt verbalized understanding.   EKG: Sinus Tachycardia, right axis deviation, nonspecific ST abnormality, abnormal EKg  Vent. Rate: 112 bpm  NY interval: 144 ms  QRS duration: 86 ms  QT/QtC: 308/420 ms  P-R-T axes: 39 120 63    /72 [id number]

## 2021-06-18 NOTE — PROGRESS NOTES
6/28/2018    Start time: 10:00 AM    Stop Time: 10:50 AM   Session # 9    Anand Felix is a 33 y.o. male is being seen today for    Chief Complaint   Patient presents with     Mental Health Note   . Major depression, severe  History of Generalized Anxiety Disorder  Chronic pain syndrome    New symptoms or complaints: None    Functional Impairment:   Personal: 4  Family: 4  Work: 4  Social:4    Clinical assessment of mental status: Anand Felix presented on time.  He was open and cooperative, and dressed appropriately for this session and weather. His memory was in tact .  His  speech was appropriate.  Language was appropriate.  Concentration and focus is on task. Psychosis is not noted or reported. He reports his mood is depressed .  Affect is congruent with speech.  Fund of knowledge is adequate. Insight is adequate for therapy.     Suicidal/Homicidal Ideation present: Patient reports passive suicidal ideation. He denies plan or means. He reports SI is less often at this time. Protective factors are his wife and family.     Patient's impression of their current status: Patient reported he continues to be working on addressing symptoms of depression and anxiety. He reports fatigue and pain has been difficult to manage.    Therapist impression of patients current state: Patient seems to be working on addressing depression, anxiety and chronic medical symptoms. He appears to be working on improving mood and attitude with some progress reported. Sleep has improved. Patient seems to have experienced some losses with changes in his and his wife's medical concerns, which triggers worry.  It would likely be worthwhile to continue work on addressing role/identity now, given patient's health concerns. He had a fall recently and was seen by a medical provider to address this. It seems the care guide at his primary care clinic has been helpful and this has reduced stress for patient. Patient appears to have some challenges with  household activities, but he appears to be working on being more active at home. Encouraged patient to continue to increase efforts to reach out to social/family support to connect socially/emotionally. It seems he is attending [employer] as he is able, with some barriers recently. Continued to discuss potential benefits of Mindfulness meditation with patient and also continued to educate on EMDR and discussed plan to set up target next session. Encouraged meditation home practice. Set up target sequence addressing negative cognition regarding pain. Will process this next session. Presented meditation and patient appeared to benefit.      Type of psychotherapeutic technique provided: Client centered, CBT and Mindfulness and EMDR    Progress toward short term goals: Patient continues to work on increasing coping skills to manage chronic pain and mental health. Patient continues medical care as recommended by his medical providers. He engages in physical activity and social activity as he is able. He is engaging in meditation in daily living. He is starting EMDR.     Review of long term goals: Not done at today's visit    Diagnosis: Major depression, severe  History of Generalized Anxiety Disorder  Chronic pain syndrome    Plan and Follow up: Patient to follow pain center plan of care. Patient to follow all medical recommendations. Patient to schedule every 2 weeks for psychotherapy to further address depression, anxiety and chronic pain concerns. Will continue to educate on EMDR and Mindfulness meditation. Practice meditations as presented in session. Further address adjustment to role/identity due to chronic pain and health concerns. Present letting go exercise next session. Will target negative cognition using EMDR next session.     Discharge Criteria/Planning: Patient will continue with follow-up until therapy can be discontinued without return of signs and symptoms.    Alize Livingston 6/28/2018

## 2021-06-18 NOTE — PROGRESS NOTES
"[facility] Daily Progress     Patient Name: Anand Felix  Date: 2018  Visit #: 2  PTA visit #:  1  Referral Diagnosis: Muscle weakness (generalized)  Referring provider: Malachi Choudhary MD  Visit Diagnosis:     ICD-10-CM    1. Impaired functional mobility, balance, gait, and endurance Z74.09    2. Bilateral pulmonary embolism I26.99    3. Muscle weakness (generalized) M62.81    4. 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 male who presents to therapy today with chief complaints of muscle weakness and impaired gait and balance. Onset date of sx was 2015.  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  Pain symptoms are aching, burning, numbness, sharp, tingling and weakness.  Functional impairments include walking, dressing, bathing, sitting for long periods, and standing for long periods.  Pt demo's signs and sx consistent with impaired gait and balance secondary to muscle weakness      Assessment:     Pt feels \"OK\" at end of visit  Significant shakiness with HEP.  Pt is tolerating increased reps and was able to perform a mini bridge lift   Patient demonstrates understanding/independence with home program.  Patient is appropriate to continue with skilled physical therapy intervention, as indicated by initial plan of care.    Goal Status:  Pt. will be independent with home exercise program in : 6 weeks  Pt will: be able to walk 10 minutes with spO2 levels 95%+; in 8 weeks  Pt will: be able to maneuver 5 steps to get into his home without difficulty; in 8 weeks    Plan / Patient Education:     Continue with initial plan of care.  Progress with home program as tolerated.  Plan for next visit: Progress strengthening exercises.    Subjective:     Pain Ratin/10 low back and R LE, down to ankle  Exercises are going well  Pt reports he uses a TM for 15' every hour    Objective:   LEFS    Pt ambulates into clinic with a Hurricane used on L and and stiff/rigid R " LE  Shakiness with exercise diminishes significantly when pt is distracted by conversation      Exercises:  Exercise #1: Quad sets  Comment #1: x 5, 10-15 at home  Exercise #2: Hamstring isometrics  Comment #2: x 5, 10-15 at home  Exercise #3: Bent knee fallouts  Comment #3: x 5, does better with light band around knees for tactile cues  Exercise #4: Pre-bridge muscle contractions (TA/glute/hip add)  Comment #4: x 5, added mini lift (1 inch)  Exercise #5: Nustep seat 7 arms 6 L4 x 5'      Treatment Today     TREATMENT MINUTES COMMENTS   Evaluation     Self-care/ Home management     Manual therapy     Neuromuscular Re-education     Therapeutic Activity     Therapeutic Exercises 32 Performed and progressed HEP as able. See flowsheet  above   Gait training     Modality__________________                Total 32    Blank areas are intentional and mean the treatment did not include these items.       Genia Juarez PTA,CLT  6/8/2018

## 2021-06-18 NOTE — PROGRESS NOTES
[facility] Daily Progress     Patient Name: Anand Felix  Date: 2018  Visit #: 3  PTA visit #:  1  Referral Diagnosis: Muscle weakness (generalized)  Referring provider: Malachi Choudhary MD  Visit Diagnosis:     ICD-10-CM    1. Impaired functional mobility, balance, gait, and endurance Z74.09    2. Muscle weakness (generalized) M62.81      Anand Felix is a 33 y.o. male who presents to therapy today with chief complaints of muscle weakness and impaired gait and balance. Onset date of sx was 2015.  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  Pain symptoms are aching, burning, numbness, sharp, tingling and weakness.  Functional impairments include walking, dressing, bathing, sitting for long periods, and standing for long periods.  Pt demo's signs and sx consistent with impaired gait and balance secondary to muscle weakness    Assessment:     HEP/POC compliance is  good .  The patient was able to complete new exercises.  He continues to have shakiness with exercise, both with LE movements and UE.  He did report that pain level has been improved.  He is appropriate to continue with PT services at this time.    Goal Status:  Pt. will be independent with home exercise program in : 6 weeks  Pt will: be able to walk 10 minutes with spO2 levels 95%+; in 8 weeks  Pt will: be able to maneuver 5 steps to get into his home without difficulty; in 8 weeks    Plan / Patient Education:     Continue with initial plan of care.  Progress with home program as tolerated.  Plan for next visit: Progress strengthening exercises.      Subjective:     Pain Ratin/10 low back and R LE, down to ankle  The patient reports that his pain is improving.  He has had some challenges with the hip abduction exercise, but overall they are going well.  He had one fall on Saturday trying to get out of the tub.  He is still having difficulty with breathing when walking for long periods.    Objective:     Pt ambulates  into clinic with a Hurricane used on L and and stiff/rigid R LE    Exercises:  Exercise #1: Quad sets  Comment #1: 10-15 at home  Exercise #2: Hamstring isometrics  Comment #2: 10-15 at home  Exercise #3: Bent knee fallouts  Comment #3: L1 HEP  Exercise #4: Pre-bridge muscle contractions (TA/glute/hip add)  Comment #4: mini lift (1 inch) x 10  Exercise #5: Nustep seat 5 arms 6 L4 x 5'  Exercise #6: Ab sets/marching  Comment #6: x 10; patient needed Dinora on the R  Exercise #7: Pilates arch  Comment #7: x 10 L3    Appt time: 12:10PM - 12:35PM    Treatment Today     TREATMENT MINUTES COMMENTS   Evaluation     Self-care/ Home management     Manual therapy     Neuromuscular Re-education     Therapeutic Activity     Therapeutic Exercises 25 Performed and progressed HEP as able. See flowsheet  above   Gait training     Modality__________________                Total 25    Blank areas are intentional and mean the treatment did not include these items.       Beatriz Medina, PT, DPT  6/11/2018

## 2021-06-18 NOTE — PROGRESS NOTES
Assessment/Plan:    1. Bilateral pulmonary embolism  History of bilateral pulmonary emboli.  Has been on Xarelto 20 mg since last October but then discontinued in April due to formulary change and medical assistance not covering Xarelto even with prior authorization.  Will initiate warfarin 5 mg daily and recheck INR on Friday, June 1, 2018.  Anticoagulation nurse referral was placed.  INR today was 1.00.  Check basic metabolic panel for med monitoring.  - warfarin (COUMADIN) 5 MG tablet; Take 5 mg by mouth daily. Adjust dose based on INR results as directed.  Dispense: 60 tablet; Refill: 5  - Ambulatory referral to Anticoagulation Monitoring  - INR  - HM2(CBC w/o Differential)  - Basic Metabolic Panel    2. Sinus tachycardia  History of sinus tachycardia described.  Now using atenolol 25 mg daily.  Holter monitor from May 22 reviewed appears otherwise normal.  Follows up with cardiologist to review.    3. Chronic pain syndrome  No longer on oxycodone or OxyContin.  Using MS Contin 15 mg 3 times daily which she is tolerating well and seems to provide better pain management immediately.    4. Muscle weakness  Muscle weakness described.  Recommendation per pulmonologist Dr. Winn.  Referral was placed to see neurologist in order to complete.  - Ambulatory referral to Neurology        Subjective:    Anand Felix is seen today for several concerns.  Wants to initiate warfarin in Place Xarelto since medical assistance does not cover Xarelto.  Had spoken with his hematologist  who recommended warfarin anticoagulation.  Also due to muscle weakness recommended completing a muscle biopsy for further evaluation.  MS Contin 15 mg 3 times daily for chronic pain management with benefits noted.  Sinus tachycardia with atenolol 25 mg daily.  Recent Holter monitor was normal while on atenolol 25 mg daily.  Comprehensive review of systems as above otherwise all negative.  Reviewed ER visit January 31, 2018 with INR at 2.24 however  "patient states was not on warfarin at that time.  No liver concerns.  No ankle edema.  No alcohol abuse.    Mr. Anand Felix is a 34 yo male with PMH significant for generalized weakness, PE on Xarelto, and chronic pain, who presents to pulmonary clinic for evaluation for shortness of breath.  This has been worse especially since his PE diagnosis last year.  He states that he has had shortness of breath really for some time.  He has a history of generalized weakness and has been evaluated by several neurologist who have not found an answer.  He has a history of herniated disc at L1 causing leg weakness.  Since then he has had some mild dyspnea.  It seems that Dr. Donis has proposed that some of his symptoms are due to a somatoform disorder.  He also notes that he can walk on a treadmill, but this is at a pace of 0.5 to 0.6 mph and never really faster.  He uses a cane to walk as well.  He did have a PE and has been on xarelto.  He states he does have early satiety, but no orthopnea or LE weakness.  No cough or sputum production.  No wheeze.       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 Bernal follows  Children - none  Employed - working on disability - monoparesis  Surgeries - nasal surgery; low back surgery (Dr. Jeannine Hernandez, 2015) with residual right leg weakness  Tobacco use - none  ETOH - none  Family hx   Mother - living - high chol. Strokes. MI.   Father - living - unknown  Siblings - living - 4 sisters, 2 brother -    Bilateral AFO braces worn due to muscle weakness and foot drop    Past Surgical History:   Procedure Laterality Date     MINIMALLY INVASIVE MICRODISCECTOMY LUMBAR SPINE Right 10/2015    L5-S1; Dr. Bettie Hernandez     SINUS SURGERY  2014    Dr. Wright        Family History   Problem Relation Age of Onset     Depression Mother      Hyperlipidemia Mother      Other Mother      \"Ulcers\" - Aches and pains throughout her body.     Cancer Father      No Medical Problems " Sister      No Medical Problems Brother      No Medical Problems Sister      No Medical Problems Sister      No Medical Problems Sister      No Medical Problems Brother      Thyroid disease Maternal Grandmother      Thyroid disease Cousin         Past Medical History:   Diagnosis Date     Bulging lumbar disc      Lumbar herniated disc 10/23/2015     Lumbar radiculopathy 11/25/2015        Social History   Substance Use Topics     Smoking status: Never Smoker     Smokeless tobacco: Never Used     Alcohol use Yes      Comment: Monthly or less often. 1-2 drinks at a time.        Current Outpatient Prescriptions   Medication Sig Dispense Refill     acetaminophen (TYLENOL) 500 MG tablet Take 1 tablet (500 mg total) by mouth every 6 (six) hours as needed for pain.  0     atenolol (TENORMIN) 25 MG tablet Take 1 tablet (25 mg total) by mouth daily. 30 tablet 5     cholecalciferol, vitamin D3, 2,000 unit cap Take 2,000 Units by mouth daily.       cyclobenzaprine (FLEXERIL) 10 MG tablet Take 1 tablet (10 mg total) by mouth 3 (three) times a day as needed for muscle spasms. 60 tablet 0     fluticasone (FLONASE) 50 mcg/actuation nasal spray 2 sprays into each nostril daily. 16 g 6     morphine (MS CONTIN) 15 MG 12 hr tablet Take 1 tablet (15 mg total) by mouth 3 (three) times a day. 90 tablet 0     nortriptyline (PAMELOR) 50 MG capsule TAKE 1 CAPSULE (50 MG TOTAL) BY MOUTH AT BEDTIME. 30 capsule 0     omeprazole (PRILOSEC) 20 MG capsule Take 1 capsule (20 mg total) by mouth daily. 90 capsule 1     warfarin (COUMADIN) 5 MG tablet Take 5 mg by mouth daily. Adjust dose based on INR results as directed. 60 tablet 5     No current facility-administered medications for this visit.           Objective:    Vitals:    05/29/18 1313   BP: 110/70   Pulse: 100   Weight: 152 lb (68.9 kg)      Body mass index is 25.29 kg/(m^2).    Alert.  No apparent distress.  Transfers with use of cane to assist with ambulation.  Chest clear.  Cardiac exam  regular.  Extremities warm and dry.        Holter Monitor 5/22/18 IMPRESSION:  1.  A 24-hour Holter monitor was applied 05/22/2018 with date of interpretation  05/25/2018.  2.  Sinus rhythm is present with normal electrocardiographic intervals.  3.  Average sinus rate is 92 beats per minute, ranges between 61 to 135 beats per  minute.  4.  No paroxysmal bradycardia, tachycardia, or high-grade heart block, no pauses.  5.  No ventricular ectopy.    6.  One premature atrial contraction.  7.  Episodes of shortness of breath and rapid heart rate noted on several occasions.   During these times, the patient is in sinus rhythm with heart rates about 110 beats  per minute episode of dizziness does not correlate to arrhythmia.     DISCUSSION:  Normal Holter monitor.  Various symptoms do not correlate to  arrhythmias.     COURTNEY TOVAR 05/25/2018 11:32:50  T 05/25/2018 11:51:00  R 05/25/2018 11:51:00  [id number]      This note has been dictated using voice recognition software and as a result may contain minor grammatical errors and unintended word substitutions.

## 2021-06-18 NOTE — PROGRESS NOTES
[facility] Daily Progress     Patient Name: Anand Felix  Date: 6/15/2018  Visit #: 4  PTA visit #:  2  Referral Diagnosis: Muscle weakness (generalized)  Referring provider: Malachi Choudhary MD  Visit Diagnosis:     ICD-10-CM    1. Impaired functional mobility, balance, gait, and endurance Z74.09    2. 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 Muscle weakness (generalized) M62.81      Anand Felix is a 33 y.o. male who presents to therapy today with chief complaints of muscle weakness and impaired gait and balance. Onset date of sx was October 2015.  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  Pain symptoms are aching, burning, numbness, sharp, tingling and weakness.  Functional impairments include walking, dressing, bathing, sitting for long periods, and standing for long periods.  Pt demo's signs and sx consistent with impaired gait and balance secondary to muscle weakness    Assessment:   Pt tolerated increased resistance on Nustep and was able to lift slightly higher with bridge prep exercises  HEP/POC compliance is  good .  The patient was able to complete new exercises.  He continues to have shakiness with exercise, both with LE movements and UE.  He did report that pain level has been improved.  He is appropriate to continue with PT services at this time.    Goal Status:  Pt. will be independent with home exercise program in : 6 weeks  Pt will: be able to walk 10 minutes with spO2 levels 95%+; in 8 weeks  Pt will: be able to maneuver 5 steps to get into his home without difficulty; in 8 weeks    Plan / Patient Education:     Continue with initial plan of care.  Progress with home program as tolerated.  Plan for next visit: Progress strengthening exercises.      Subjective:     Pain Rating: Pain level is OK with his medication  He is exercising daily, but exercises are still hard  He reports he is walking more often, usually 1-2 blocks    Objective:     Pt ambulates into clinic with a Hurricane  "used on L and and stiff/rigid R LE  SAO2 97%, HR 77 BPM    Exercises:  Exercise #1: Quad sets  Comment #1: 10-15 at home  Exercise #2: Hamstring isometrics  Comment #2: 10-15 at home  Exercise #3: Bent knee fallouts  Comment #3: L1 HEP  Exercise #4: Pre-bridge muscle contractions (TA/glute/hip add)  Comment #4: mini lift (2 inch) x 10, added 1-2' hold, higher lift today  Exercise #5: Nustep seat 5 arms 6 L5 x 5'  Exercise #6: Ab sets/marching  Comment #6: x 10; patient needed Dinora on the R  Exercise #7: Pilates arch  Comment #7: x 10 L3  Exercise #8: Stairs  Comment #8: x 8 with 1 rail ( on R) and cane, vc for foot flat, not landing just on R heeel, and to keep feet fully on stepos  Exercise #9: seated on bed \"scoot\" 5 reps each direction , x 2 sets, hinged slightly forward to WB through LE's  Exercise #10: standing mini squats x 2, very minimal hinge  Comment #10: standing hip abd, unable today, became unsteady when trying to abduct L LE    Appt time: 10:00AM - 10:28AM    Treatment Today     TREATMENT MINUTES COMMENTS   Evaluation     Self-care/ Home management     Manual therapy     Neuromuscular Re-education     Therapeutic Activity     Therapeutic Exercises 28 Performed and progressed HEP as able. See flowsheet  above   Gait training     Modality__________________                Total 28    Blank areas are intentional and mean the treatment did not include these items.       Genia Juarez, PTA,CLT  6/15/2018  "

## 2021-06-18 NOTE — PROGRESS NOTES
Follow up social work visit to assist pt in requesting initial PCA assessment now that his [employer] insurance is active.  SW called [facility] and requested form.  SW completed and faxed request form into [facility].  Pt should be contacted within one month by RN who will schedule in-home assessment.  Pt and wife present for visit.  Pt confirms they have called [facility] billing about medical bills.  They were denied energy assistance as they were over income in the beginning months of the year.  Pt is still waiting for court date for SS disability.  Pt reports he has started outpt PT.  SW available as needed.

## 2021-06-18 NOTE — PROGRESS NOTES
[facility] Daily Progress     Patient Name: Anand Felix  Date: 2018  Visit #: 7  PTA visit #:  3  Referral Diagnosis: Muscle weakness (generalized)  Referring provider: Ham Churchill MD  Visit Diagnosis:     ICD-10-CM    1. Impaired functional mobility, balance, gait, and endurance Z74.09    2. 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 Muscle weakness (generalized) M62.81      Anand Felix is a 33 y.o. male who presents to therapy today with chief complaints of muscle weakness and impaired gait and balance. Onset date of sx was 2015.  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  Pain symptoms are aching, burning, numbness, sharp, tingling and weakness.  Functional impairments include walking, dressing, bathing, sitting for long periods, and standing for long periods.  Pt demo's signs and sx consistent with impaired gait and balance secondary to muscle weakness    Assessment:     HEP/POC compliance is  good .  The patient demonstrates a good understanding of the TNE training today.  He is appropriate to continue with the pain education and low level exercise.    Goal Status:  Pt. will be independent with home exercise program in : 6 weeks  Pt will: be able to walk 10 minutes with spO2 levels 95%+; in 8 weeks  Pt will: be able to maneuver 5 steps to get into his home without difficulty; in 8 weeks    Plan / Patient Education:     Continue with initial plan of care.  Progress with home program as tolerated.  Plan for next visit: Discuss pain education homework.  Creat an aerobic program starting with 2 minutes of walking 3x/day.      Subjective:     Pain Ratin  The patient reports that he was pretty sore after his last PT session and is still sore today.  The muscle in his R thigh as well as his low back are sore.  He didn't really do much over the weekend exercise-wise.    Objective:     Pt ambulates into clinic with a Hurricane used on L and and stiff/rigid R LE    See flowsheet for  "date performed:  Exercises:  Exercise #1: Quad sets  Comment #1: 10-15 at home  Exercise #2: Hamstring isometrics  Comment #2: 10-15 at home  Exercise #3: Bent knee fallouts  Comment #3: L1 HEP unilateral pull  Exercise #4: Pre-bridge muscle contractions (TA/glute/hip add)  Comment #4: mini lift (2 inch) x 10, added 1-2' hold HEP  Exercise #5: Nustep seat 5 arms 8 L6 x 5'  Comment #5: SAO2 98&,  BPM  Exercise #6: Ab sets/marching  Comment #6: HEP  Exercise #7: Pilates arch with L1 band , pull to knees  Comment #7: L1 HEP  Exercise #8: Stairs  Comment #8: x 8 with 1 rail ( on R) and cane, vc for foot flat, not landing just on R heeel, and to keep feet fully on steps, not today  Exercise #9: seated on bed \"scoot\" 12 reps each direction ,  hinged slightly forward to WB through LE's  Comment #9: Not in clinic  Exercise #10: standing mini squats HEP  Comment #10: standing hip abd HEP  Exercise #11: STS  Comment #11: x 5 with min-maxA    Appt time: 12:00PM - 12:30PM    Treatment Today     TREATMENT MINUTES COMMENTS   Evaluation     Self-care/ Home management     Manual therapy     Neuromuscular Re-education 25 Education: Patient educated on the concept of the nervous system as the bodies alarm system, and the role of nociception to warn the body of danger. Peripheral nerve sensitization, hyperalgesia and allodynia were explained using metaphors to promote deep learning.   Homework: Patient encouraged to identify personal yellow flags, and explore/identify activity limitations as a result of nervous system hypersensitivity.    Therapeutic Activity     Therapeutic Exercises 5 NUSTEP x 5 minutes WL 5.0; subjective measures taken   Gait training     Modality__________________                Total 30    Blank areas are intentional and mean the treatment did not include these items.       Beatriz Medina, PT, DPT  6/25/2018  "

## 2021-06-18 NOTE — PROGRESS NOTES
6/14/2018    Start time: 11:05 AM    Stop Time: 11:55 AM   Session # 8    Anand Felix is a 33 y.o. male is being seen today for    Chief Complaint   Patient presents with     Mental Health Note   . Major depression, severe  History of Generalized Anxiety Disorder  Chronic pain syndrome    New symptoms or complaints: None    Functional Impairment:   Personal: 4  Family: 4  Work: 4  Social:4    Clinical assessment of mental status: Anand Felix presented on time.  He was open and cooperative, and dressed appropriately for this session and weather. His memory was in tact .  His  speech was appropriate.  Language was appropriate.  Concentration and focus is on task. Psychosis is not noted or reported. He reports his mood is depressed .  Affect is congruent with speech.  Fund of knowledge is adequate. Insight is adequate for therapy.     Suicidal/Homicidal Ideation present: Patient reports passive suicidal ideation. He denies plan or means. He reports SI is less often at this time. Protective factors are his wife and family.      Patient's impression of their current status: Patient reported he continues to be working on addressing symptoms of depression and anxiety and he reports his mood has improved. He reports sleep has improved. He reports fatigue and pain has been difficult to manage. He reports medication compliance. He is addressing other medical needs and following recommendations. Patient reports he continues to have negative thinking and this has been challenging. Patient reported he has a daily routine, including some exercise (walking), that has been helpful. Patient is also engaged in PT and chiropractic. He described some coping skills that he uses in daily living. Patient talked about thoughts and feelings regarding his wife's health/medical concerns. He does report reaching out to his support group, at times, and challenges he has with opening up. He attends [employer] as he is able, but has missed lately due to  sleeping in. Patient reports conflict regarding the changes in his life due to chronic pain/medical concerns. He continues to get help from the care guide at his primary care clinic. Patient participated in a letting go meditation and expressed benefit.     Therapist impression of patients current state: Patient seems to be working on addressing depression, anxiety and chronic medical symptoms. He appears to be working on improving mood and attitude with some progress reported. Patient seems to have experienced some losses with changes in his and his wife's medical concerns, which triggers worry.  It would likely be worthwhile to continue work on addressing role/identity now, given patient's health concerns. It seems the care guide at his primary care clinic has been helpful and this has reduced stress for patient. Patient appears to have some challenges with household activities, but he appears to be working on being more active at home. Encouraged patient to continue to increase efforts to reach out to social/family support to connect socially/emotionally. It seems he is attending [employer] as he is able, with some barriers recently. Continued to discuss potential benefits of Mindfulness meditation with patient and also continued to educate on EMDR and discussed plan to set up target next session. Encouraged meditation home practice. Presented letting go meditation and patient appeared to benefit.         Type of psychotherapeutic technique provided: Client centered, CBT and Mindfulness    Progress toward short term goals: Patient continues to work on increasing coping skills to manage chronic pain and mental health. Patient continues medical care as recommended by his medical providers. He engages in physical activity and social activity as he is able. He is engaging in meditation in daily living.     Review of long term goals: Not done at today's visit    Diagnosis: Major depression, severe  History of Generalized  Anxiety Disorder  Chronic pain syndrome    Plan and Follow up: Patient to follow pain center plan of care. Patient to follow all medical recommendations. Patient to schedule every 2 weeks for psychotherapy to further address depression, anxiety and chronic pain concerns. Will continue to educate on EMDR and Mindfulness meditation. Practice meditations as presented in session. Further address adjustment to role/identity due to chronic pain and health concerns. Present letting go exercise next session. Will begin to set up EMDR targeting pain symptoms.       Discharge Criteria/Planning: Patient will continue with follow-up until therapy can be discontinued without return of signs and symptoms.    Alize Livingston 6/14/2018

## 2021-06-18 NOTE — PROGRESS NOTES
5/15/2018    Start time: 3:10 PM    Stop Time: 4:00 PM   Session # 6    Anand Felix is a 33 y.o. male is being seen today for    Chief Complaint   Patient presents with     Mental Health Note   . Major depression, severe  History of Generalized Anxiety Disorder  Chronic pain syndrome    New symptoms or complaints: None    Functional Impairment:   Personal: 4  Family: 4  Work: 4  Social:4    Clinical assessment of mental status: Anand Felix presented on time.  He was open and cooperative, and dressed appropriately for this session and weather. His memory was in tact .  His  speech was appropriate.  Language was appropriate.  Concentration and focus is on task. Psychosis is not noted or reported. He reports his mood is depressed .  Affect is congruent with speech.  Fund of knowledge is adequate. Insight is adequate for therapy.     Patient updated paperwork this session: PHQ-9 depression score is 23 and indicates severe symptoms of depression, CAROLYN-7 anxiety score is 16 and  indicates severe symptoms of anxiety, WHODAS score is 60.42%, H1= 30, H2=10, H3=18, PANSI score indicates risk for suicide.     Suicidal/Homicidal Ideation present: Patient reports passive suicidal ideation. He denies plan or means. Protective factors are his wife and family.     Patient's impression of their current status: Patient reported he was unable to attend sessions due to insurance issues. He reports this has now been resolved. Patient reported he continues to be working on addressing symptoms of depression (feeling sad/angry) and anxiety. He reports sleep continues to be hard to manage and he has been sleeping during the day more. He reports fatigue and pain has been difficult to manage. He is waiting for an authorization for medications currently and that he has been out of this medication since Friday. Patient reports he continues to have negative thinking and this has been challenging. Patient reported he has a daily routine that has been  helpful. He described some coping skills that he uses in daily living. Patient talked about thoughts and feelings regarding his wife's health/medical concerns. He does report reaching out to his support group, at times, and challenges he has with opening up. Patient reports conflict regarding the changes in his life due to chronic pain/medical concerns. He continues to get help from the care guide at his primary care clinic. Patient reports attending [employer] when he is able. Patient participated in a short breath meditation and expressed benefit.     Therapist impression of patients current state: Patient seems to be working on addressing depression, anxiety and chronic medical symptoms. He appears to be working on improving mood and attitude, but this appears to be a challenge. Patient appears to be concerned about medications and was encouraged to follow up with the pain center regarding this. Patient seems to have experienced some losses with changes in his and his wife's medical concerns, which is  increasing worry.  It would likely be worthwhile to continue work on addressing role/identity now, given patient's health concerns. It seems the care guide at his primary care clinic has been helpful and this has reduced stress for patient. Patient appears to have some challenges with household activities, but he appears to be working on being more active at home. Encouraged patient to continue to increase efforts to reach out to social/family support to connect socially/emotionally. Continued to discuss potential benefits of Mindfulness meditation with patient and also continued to educate on EMDR. Encouraged meditation home practice. Presented breath meditation and patient appeared to benefit.       Type of psychotherapeutic technique provided: Client centered, CBT and Mindfulness    Progress toward short term goals: Patient continues to work on increasing coping skills to manage chronic pain and mental health.  Patient continues medical care as recommended by his medical providers. He engages in physical activity and social activity as he is able. He is engaging in meditation in daily living. Discussed patient's goals and treatment plan will be developed.     Review of long term goals: Discussed patient's goals and treatment plan will be developed.     Diagnosis: Major depression, severe  History of Generalized Anxiety Disorder  Chronic pain syndrome    Plan and Follow up: Patient to follow pain center plan of care. Patient to follow all medical recommendations. Patient to schedule every 2 weeks for psychotherapy to further address depression, anxiety and chronic pain concerns. Will continue to educate on EMDR and Mindfulness meditation. Practice meditation as presented in session. Further address adjustment to role/identity due to chronic pain and health concerns.          Discharge Criteria/Planning: Patient will continue with follow-up until therapy can be discontinued without return of signs and symptoms.    Alize Livingston 5/15/2018

## 2021-06-18 NOTE — PROGRESS NOTES
Outpatient Mental Health Treatment Plan    Name:  Anand Felix  :  1985  MRN:  [id number]    Treatment Plan:  Updated Treatment Plan  Intake/initial treatment plan date:  18  Benefit and risks and alternatives have been discussed: Yes  Is this treatment appropriate with minimal intrusion/restrictions: Yes  Estimated duration of treatment:  Approximately 20 sessions.  Anticipated frequency of services:  Every 2 weeks  Necessity for frequency: This frequency is needed to establish therapeutic goals and for continuity of care in order to monitor progress.  Necessity for treatment: To address cognitive, behavioral, and/or emotional barriers in order to work toward goals and to improve quality of life.    Plan:      ? Depression    Goal:  Decrease average depression level from 8 to 5.   Strategies:    ?[x] Decrease social isolation     [x] Increase involvement in meaningful activities     ?[x] Discuss sleep hygiene     ?[x] Explore thoughts and expectations about self and others     ?[x] Process grief (loss of significant person, independence, role,        etc.)     ?[x] Assess for suicide risk     ?[x] Implement physical activity routine (with physician approval)     [x] Consider introduction of bibliotherapy and/or videos     [x] Continue compliance with medical treatment plan (or explore         barriers)       ?  ?Degree to which this is a problem: 4  Degree to which goal is met: 0  Date of Review: 5/15/18          ?   ? Anxiety    Goal:  Decrease average anxiety level from 8 to 5.   Strategies: ? [x]Learn and practice relaxation techniques and other coping        strategies (e.g., thought stopping, reframing, meditation)     ? [x] Increase involvement in meaningful activities     ? [x] Discuss sleep hygiene     ? [x] Explore thoughts and expectations about self and others     ? [x] Identify and monitor triggers for panic/anxiety symptoms     ? [x] Implement physical activity routine (with physician  approval)     ? [x] Consider introduction of bibliotherapy and/or videos     ? [x] Continue compliance with medical treatment plan (or explore          barriers)                                       [x]     Degree to which this is a problem: 4  Degree to which goal is met: 0  Date of Review: 5/15/18  Chronic pain  Goal:  ? Decrease average pain level from 8 to 5.   ? Increase % acceptance of pain from 50 to 80.   Strategies: ? [x] Explore thoughts and expectations about self and others    [x] Explore emotional reactions to illness/injury      ? [x] Learn and practice relaxation techniques and other coping          strategies            [x] Implement physical activity routine (with physician approval)     ? [x] Engage in values clarification and goal-setting     ? [x] Consider introduction of bibliotherapy and/or videos     ? [x] Increase involvement in meaningful activities     ? [x] Discuss sleep hygiene     ? [x] Process grief (loss of significant person, independence, role,          etc.)     ? [x] Assess for suicide risk     ?  Degree to which this is a problem: 4  Degree to which goal is met: 0  Date of Review: 5/15/18       Functional Impairment:  1=Not at all/Rarely  2=Some days  3=Most Days  4=Every Day    Personal : 4  Family : 4  Social : 4   Work/school : 4    Diagnosis: Major depression, severe  History of Generalized Anxiety Disorder  Chronic pain syndrome    WHODAS 2.0 12-item version 60.42%      Scores presented in qualifiers to represent level of disability.  SEVERE Problem (high, extreme, ...) 50-95%    H1= 30  H2= 10  H3= 18        Clinical assessments and measures completed:. CAROLYN-7, PHQ-9 and PANSI     Strengths:Patience, fast learner, hard working  Limitations:  lacks assertiveness, wants to improve his health, health seems to be getting worse in the last year.   Cultural Considerations: Patient is a 33 year old,  American,  male who has a history of depression, anxiety and chronic  pain.     Persons responsible for this plan: Patient and Provider            Psychotherapist Signature           Patient Signature:              Guardian Signature             Provider: Performed and documented by Alize Livingston [facility]   Date:  5/15/18

## 2021-06-18 NOTE — PROGRESS NOTES
[facility]   Physical Deconditioning Initial Evaluation    Patient Name: Anand Felix  Date of evaluation: 6/5/2018  Referral Diagnosis: Muscle weakness (generalized)  Referring provider: Malachi Choudhary MD  Visit Diagnosis:     ICD-10-CM    1. Impaired functional mobility, balance, gait, and endurance Z74.09    2. Muscle weakness (generalized) M62.81        Assessment:        Anand Felix is a 33 y.o. male who presents to therapy today with chief complaints of muscle weakness and impaired gait and balance. Onset date of sx was October 2015.  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  Pain symptoms are aching, burning, numbness, sharp, tingling and weakness.  Functional impairments include walking, dressing, bathing, sitting for long periods, and standing for long periods.  Pt demo's signs and sx consistent with impaired gait and balance secondary to muscle weakness.   Pt. is appropriate for skilled PT intervention as outlined in the Plan of Care (POC).  Pt. is a good candidate for skilled PT services to improve pain levels and function.    Patient will benefit from 1:1 skilled PT services to address the above limitations.     Goals:  Pt. will be independent with home exercise program in : 6 weeks  Pt will: be able to walk 10 minutes with spO2 levels 95%+; in 8 weeks  Pt will: be able to maneuver 5 steps to get into his home without difficulty; in 8 weeks    Patient's expectations/goals are realistic.    Barriers to Learning or Achieving Goals:  Chronicity of the problem.  Mental illness or emotional factors.  somatoform disorder       Plan / Patient Instructions:        Plan of Care:   Authorization / Certification Start Date: 06/04/18  Authorization / Certification End Date: 09/02/18  Authorization / Certification Number of Visits: 12  Communication with: Referral Source  Patient Related Instruction: Nature of Condition;Treatment plan and rationale;Self Care instruction;Basis of  treatment;Body mechanics;Posture  Times per Week: 1-2  Number of Weeks: 6-12  Number of Visits: 12  Precautions / Restrictions : Somatoform disorder  Therapeutic Exercise: ROM;Stretching;Strengthening  Neuromuscular Reeducation: kinesio tape;posture;balance/proprioception;core    POC and pathology of condition were reviewed with patient.  Pt. is in agreement with the Plan of Care  A Home Exercise Program (HEP) was initiated today.  Pt. was instructed in exercises by PT and patient was given a handout with detailed instructions.    Plan for next visit: Progress strengthening exercises.  LEFS next session.    Treatment techniques, plan of care, and goals were discussed with the patient.  The patient agrees to the plan as outlined.  The plan of care is dynamic and will be modified on an ongoing basis.       Subjective:       The patient reports that he went in for a back surgery and afterwards, he couldn't really move his muscles.  He was in the hospital for 5 days and then he was ok enough to go home.  He had a L5-S1 microdiskectomy on the R.  After leaving the hospital, his symptoms didn't get better.  Last year he had an embolism in his L lung and had worsening symptoms.    Social information:   Living Situation: single family home   Occupation: unemployed   Work Status: NA   Equipment Available: SE cane, walker FWW and bilateral AFOs    Pain Ratin  Pain rating at best: 4  Pain rating at worst: 9  Pain description: aching, burning, numbness, sharp, tingling and weakness    Functional limitations are described as occurring with:   walking, dressing, bathing, sitting for long periods, and standing for long periods    Patient reports benefit from:  pain medication, heat, cold, lying down, and salt baths       Objective:      Note: Items left blank indicates the item was not performed or not indicated at the time of the evaluation.    Balance Examination  1. Impaired functional mobility, balance, gait, and endurance      2. Muscle weakness (generalized)       Involved side: Right and Bilateral  Posture Observation:      General standing posture is fair.  Gait Observation: R LE straight  Assistive Device:  SEC    Lower Extremity Strength: Minimal shaking/no shaking during functional movements, but significant shaking during MMT.  Date: 6/5/2018     LE strength/5 Right Left Right Left Right Left   Hip Flexion  4+ 4+       Hip Extension          Hip Abduction  4 4       Hip Adduction  4 4       Hip External Rotation         Hip Internal Rotation         Knee Extension  4+ 4+       Knee Flexion 4 4       Ankle Dorsiflexion  4+ 4+       Great Toe Extension         Ankle Plantar flexion          Abdominals        Lower Extremity ROM:   Date: 6/5/2018      Right Left Right Left Right Left   Hip Flexion (0-120 ) WNL WNL       Hip Abduction (0-45 ) WNL WNL       Hip External Rotation (0-50 ) WNL WNL       Hip Internal Rotation (0-40 ) WNL WNL       Hip Extension (0-15 ) WNL WNL       Knee Flexion WNL WNL       Knee Extension WNL WNL       Ankle Dorsiflexion         Ankle Plantarflexion         Ankle Inversion         Ankle Eversion           Sensation: WNL      Cardiovascular Testing:  Resting:  HR: 124  SpO2: 99%    After Walk test:  HR: 151  SpO2: 98%    After 2 minutes Rest:  HR: 111  SpO2: 99%    Two Minute Walk Test  Total meters walked: 30.2m  AD used? SEC  Age gender norm: 200m  TWO- MINUTE WALK TEST (2mwt) MEANS    WOMEN MEAN DISTANCE (FEET) METERS       + cm   18-54 600.4 183 m 0.192  cm   55-59 578.7 176 m 38.776 cm   60-64 545.9 166 m 32.936 cm   65-69 509.2 155 m 20.416 cm   70-74 478.7 145 m 95.9576 cm   75-79 462.3 140 m 90.904 cm   80-85 440.6 136 m 29.488 cm     MEN   MEAN DISTANCE (FEET) METERS     + cm   18-54 659.1 200 m 89.368 cm   55-59 626.6 190 m 98.768 cm   60-64 587.6 179 m 10.048 cm   65-69 604.3 184 m 19.064 cm   70-74 565.6 172 m 39.488 cm   75-79 517.1 157 m 61.208 cm   80-85 472.7 144 m 7.896 cm     R.W  Jalen et al (2014)  Conversion Yasmin Santiago Gee PT, CLT-RUPERT  Appt time: 9:05AM - 10:00AM    Treatment Today     TREATMENT MINUTES COMMENTS   Evaluation 40 Moderate complexity LE/gait evaluation   Self-care/ Home management     Manual therapy     Neuromuscular Re-education     Therapeutic Activity     Therapeutic Exercises 15 Demo/performance of HEP  Patient educated on pathology  Discussed POC  Exercise #1: Quad sets  Comment #1: x 5  Exercise #2: Hamstring isometrics  Comment #2: x 5  Exercise #3: Bent knee fallouts  Comment #3: x 5  Exercise #4: Pre-bridge muscle contractions (TA/glute/hip add)  Comment #4: x 5   Gait training     Modality__________________                Total 55    Blank areas are intentional and mean the treatment did not include these items.     PT Evaluation Code: (Please list factors)  Patient History/Comorbidities: somatoform disorder and monoparesis of leg  Examination: Significantly impaired 2 minute walk test,   Clinical Presentation: Changing characteristics d/t somatoform disorder  Clinical Decision Making: Moderate complexity    Patient History/  Comorbidities Examination  (body structures and functions, activity limitations, and/or participation restrictions) Clinical Presentation Clinical Decision Making (Complexity)   No documented Comorbidities or personal factors 1-2 Elements Stable and/or uncomplicated Low   1-2 documented comorbidities or personal factor 3 Elements Evolving clinical presentation with changing characteristics Moderate   3-4 documented comorbidities or personal factors 4 or more Unstable and unpredictable High            Beatriz Medina, PT, DPT  6/5/2018  2:22 PM

## 2021-06-18 NOTE — PROGRESS NOTES
Email: [email or web address]  Marital Status: -Miguel Felix  Children: No  Born and Raised:   Occupation: On Disability  Living Situation: Lives in [place] with his wife.  Smoking, Alcohol, other:  Services receiving:   [facility] Follow up s: Monthly  [facility] Enrollment: January 19th 2018  Next [facility] Follow up: 6/25/18

## 2021-06-18 NOTE — PROGRESS NOTES
Email: [email or web address]  Marital Status: -Miguel Felix  Children: No  Born and Raised:   Occupation: On Disability  Living Situation: Lives in [place] with his wife.  Smoking, Alcohol, other:  Services receiving:   [facility] Follow up s: Monthly  [facility] Enrollment: January 19th 2018  Next [facility] Follow up: 7/27/18

## 2021-06-18 NOTE — PROGRESS NOTES
Reviewed ECG. Shows sinus tachycardia with  bpm. P wave axis has normalized. Most recent ECG from last week likely had limb lead reversal.

## 2021-06-19 NOTE — PROGRESS NOTES
Pain Clinic Followup  ENCOUNTER DATE: 2018    Anand Felix    1985  MRN # [id number]  PCP: Malachi Choudhary MD    CC: Anand Felix 33 y.o. is here today, sent to me by  to discuss   Chief Complaint   Patient presents with     Back Pain         HPI:     Pain level: On a scale of 1-10, the patient rates their pain today 5  Pain is described: Sharp, deep, burning, tearing  Aggravating factors: Any activity  Alleviating factors: Salt baths, hot and cold packs, medications, laying down  New pain:  None  Since last visit, pain has not changed. Patient states that his pain is worse with Dialysis. He asked if Morphine IR can be increased during dialysis.   Associated Symptoms: Numbness in the right leg and arm, weakness all over his body  Pain interferes with: Sleep, walking, activities of daily living, relationships, sexual        Pertinent Medical/family/social/medication/allergy History:  Reviewed.   No change since last visit. Muscle biopsy is still pending     Function: Not changed         Past Medical History:   Diagnosis Date     Bulging lumbar disc      Lumbar herniated disc 10/23/2015     Lumbar radiculopathy 2015       Past Surgical History:   Procedure Laterality Date     MINIMALLY INVASIVE MICRODISCECTOMY LUMBAR SPINE Right 10/2015    L5-S1; Dr. Bettie Hernandez     SINUS SURGERY      Dr. Wright       SOCIAL HISTORY:   reports that he has never smoked. He has never used smokeless tobacco. He reports that he drinks alcohol. He reports that he does not use illicit drugs.    FAMILY HISTORY  family history includes Cancer in his father; Depression in his mother; Hyperlipidemia in his mother; No Medical Problems in his brother, brother, sister, sister, sister, and sister; Other in his mother; Thyroid disease in his cousin and maternal grandmother.    No Known Allergies    Current Outpatient Prescriptions   Medication Sig Dispense Refill     acetaminophen (TYLENOL) 500 MG tablet Take 1 tablet (500  mg total) by mouth every 6 (six) hours as needed for pain.  0     atenolol (TENORMIN) 25 MG tablet Take 1 tablet (25 mg total) by mouth daily. 30 tablet 5     cholecalciferol, vitamin D3, 2,000 unit cap Take 2,000 Units by mouth daily.       cyclobenzaprine (FLEXERIL) 10 MG tablet Take 1 tablet (10 mg total) by mouth 3 (three) times a day as needed for muscle spasms. 60 tablet 1     fluticasone (FLONASE) 50 mcg/actuation nasal spray 2 sprays into each nostril daily. 16 g 6     nortriptyline (PAMELOR) 50 MG capsule Take 1 capsule (50 mg total) by mouth at bedtime. 30 capsule 1     omeprazole (PRILOSEC) 20 MG capsule Take 1 capsule (20 mg total) by mouth daily. 90 capsule 1     warfarin (COUMADIN) 5 MG tablet Take 5 mg by mouth daily. Adjust dose based on INR results as directed. 60 tablet 5     [START ON 8/15/2018] morphine (MS CONTIN) 15 MG 12 hr tablet Take 1 tablet (15 mg total) by mouth 3 (three) times a day. 90 tablet 0     [START ON 8/3/2018] morphine (MSIR) 15 MG tablet Take 1 tablet (15 mg total) by mouth 3 (three) times a week. 12 tablet 0     No current facility-administered medications for this encounter.          REVIEW OF SYSTEMS:     12 point systems were reviewed with pt as documented on pt health form and the patient denies any new diagnosis or changes in 12 point system review since the last visit.     PHYSICAL EXAM:    Constitutional: 33 y.o.  male in NAD; alert and oriented x4/4; appears stated age.  Normal body habitus.  Patient is cooperative, polite, communicates well, makes eye contact, and expresses appropriate concern throughout history. Inspection of the neck demonstrates no skin abnormalities or deformities.  Posture is appropriate with no obvious listing, scoliosis, or rigidity.  HEENT:   Head: Normocephalic and atraumatic.   Right Ear: External ear normal.   Left Ear: External ear normal.   Nose: Nose normal.   Mouth/Throat: Oropharynx is clear and moist.   Eyes: Conjunctivae and EOM  "are normal. Right eye exhibits no discharge. Left eye exhibits no discharge.   Neck: Normal range of motion. Neck supple.   Cardiovascular: Normal rate, regular rhythm and normal heart sounds.    Pulmonary/Chest: Effort normal. No respiratory distress.   Integumentary: no rashes or breaks in the skin, no open wounds.   Psychiatry:  The patient described depressed mood. Affect is depressed. No abnormal speech. The patient denies any suicidal ideation. No hallucination. Judgement and insight are normal.     Musculoskeletal exam:       Gait: Patient walks with a  unsteady gait.  Patient rises from a seated position without difficulty.      MARYELLEN Score: 44  NDI Score: 0    Images: No new diagnostic studies    Diagnosis    1. Chronic pain syndrome     2. Chronic pain  morphine (MS CONTIN) 15 MG 12 hr tablet    morphine (MSIR) 15 MG tablet   3. Muscle Weakness Generalized - \"Neuromuscular Condition\" with normal EMG.  morphine (MSIR) 15 MG tablet   4. Myalgia  morphine (MSIR) 15 MG tablet         Assessment:    This is a 33-year-old male patient presented today accompanied his wife for a follow-up regarding his chronic pain.  He reports morphine IR 15 mg which is taken during dialysis 3 times weekly is not enough.  All over, his pain is still affecting him.  He describes depressed mood with no suicidal ideation.  He still has some trouble in sleep.  It is reasonable to increase nortriptyline to 50 mg 2 capsules at bedtime.  Patient was instructed to call the clinic if nortriptyline 100 mg is intolerable.  Then, I will prescribe him 25 [place] gram capsule to be taken with 50 mg.  Will consider adjusting morphine IR to 20 mg to the 50 mg next office visit if he continues to have pain.  He continues to take MS Contin which is somewhat helpful.  No adverse effects of his medications.  No aberrant behavior.      PLAN:     Plan of care was discussed with the patient. Education about patient pain condition, pathology, and " strategies to manage the pain was provided.     Diagnotic Studies/Lab orders:  None    Non Opioid Management: Increase nortriptyline 50 mg 2 capsules at bedtime.  If intolerable, will do nortriptyline 50 mg +25 mg at bedtime    :  Dated 8/2/2018  Reviewed and consistent.    Opioid Management:    Continue MS Contin 15 mg 3 times daily.  Continue morphine IR 15 mg 3 times weekly with dialysis.  Consider increasing morphine IR 20 mg 3 times weekly with dialysis if pain continues to be intolerable    SAFETY REMINDERS  No alcohol while taking controlled substances. Alcohol is not an illegal substance, it is unsafe to use in combination. It is a build up of substances in the body that can be extremely hazardous and may cause respirations to slow to a dangerous rate resulting in hospitalization, brain damage, or death.    Opioid medications have been associated with sharp rise in unintentional overdose and death.  Overdose is a condition characterized by the consumption in excess of a particular drug causing adverse effects. This can happen b/c you are sick, accidentally or intentionally took an extra dose, are on multiple medication that can interact. Someone took your medication and they are not use to the medication.  Symptoms of overdose include:   !breathing slow and shallow, erratic or not at all  !pinpoint pupils, hallucinations  !confusion  !muscle jerks, slack muscles   !extreme sleepiness or loss of alertness   !awake but not able to talk   !face pale or clammy, vomiting, for lighter skinned people, the skin tone turns bluish purple, for darker skinned people, it turns grayish or ashen   If in a situation where overdose is a concern engage the emergency response system (dial 911).    In one study it was noted that 80% of unintentional overdoses occurred in people who were taking a combination of opioids and benzodiazepines.    Do not sell, loan, borrow or share your opioid medication with anyone. Deaths have  occurred as a result of this practice. It is illegal and patients are being prosecuted.     Prevent unexpected access/loss of medication: Keep medication locked. Only carry what you need with you.    The patient agrees to the plan and has no further questions, if questions arise the patient knows to call [phone].     Please see your current provider for any continued prescription. They will continue to manage your general health and have requested you see the pain center for pain management. Please discuss any health concerns with your PCP     Follow up: 4 weeks.       Medications:     Requested Prescriptions     Signed Prescriptions Disp Refills     morphine (MS CONTIN) 15 MG 12 hr tablet 90 tablet 0     Sig: Take 1 tablet (15 mg total) by mouth 3 (three) times a day.     morphine (MSIR) 15 MG tablet 12 tablet 0     Sig: Take 1 tablet (15 mg total) by mouth 3 (three) times a week.         Ham Churchill MD  [employer] Board Certified [facility]  [address]  [place] [address]  Ph: [phone]  Fax: [phone]

## 2021-06-19 NOTE — LETTER
Letter by Bakari Chandler MD at      Author: Bakari Chandler MD Service: -- Author Type: --    Filed:  Encounter Date: 5/15/2019 Status: (Other)         Anand Felix  [address] [place] [address]             May 15, 2019         Dear Mr. Felix,    Below are the results from your recent visit:    Resulted Orders   Lipase   Result Value Ref Range    Lipase 50 0 - 52 U/L   Amylase   Result Value Ref Range    Amylase 82 5 - 120 U/L   Comprehensive Metabolic Panel   Result Value Ref Range    Sodium 140 136 - 145 mmol/L    Potassium 3.9 3.5 - 5.0 mmol/L    Chloride 108 (H) 98 - 107 mmol/L    CO2 19 (L) 22 - 31 mmol/L    Anion Gap, Calculation 13 5 - 18 mmol/L    Glucose 101 70 - 125 mg/dL    BUN 10 8 - 22 mg/dL    Creatinine 0.82 0.70 - 1.30 mg/dL    GFR MDRD Af Amer >60 >60 mL/min/1.73m2    GFR MDRD Non Af Amer >60 >60 mL/min/1.73m2    Bilirubin, Total 0.3 0.0 - 1.0 mg/dL    Calcium 9.6 8.5 - 10.5 mg/dL    Protein, Total 7.4 6.0 - 8.0 g/dL    Albumin 3.8 3.5 - 5.0 g/dL    Alkaline Phosphatase 69 45 - 120 U/L    AST 22 0 - 40 U/L    ALT 19 0 - 45 U/L    Narrative    Fasting Glucose reference range is 70-99 mg/dL per  American Diabetes Association (ADA) guidelines.   Urinalysis-UC if Indicated   Result Value Ref Range    Color, UA Yellow Colorless, Yellow, Straw, Light Yellow    Clarity, UA Clear Clear    Glucose, UA Negative Negative    Bilirubin, UA Negative Negative    Ketones, UA Negative Negative    Specific Gravity, UA 1.015 1.005 - 1.030    Blood, UA Small (!) Negative    pH, UA 5.5 5.0 - 8.0    Protein, UA Negative Negative mg/dL    Urobilinogen, UA 0.2 E.U./dL 0.2 E.U./dL, 1.0 E.U./dL    Nitrite, UA Negative Negative    Leukocytes, UA Negative Negative    Bacteria, UA Few (!) None Seen hpf    RBC, UA 3-5 (!) None Seen, 0-2 hpf    WBC, UA None Seen None Seen, 0-5 hpf    Squam Epithel, UA 0-5 None Seen, 0-5 lpf    Narrative    UC not indicated   HM1 (CBC with Diff)   Result Value Ref Range    WBC  7.9 4.0 - 11.0 thou/uL    RBC 5.47 4.40 - 6.20 mill/uL    Hemoglobin 14.9 14.0 - 18.0 g/dL    Hematocrit 45.2 40.0 - 54.0 %    MCV 83 80 - 100 fL    MCH 27.3 27.0 - 34.0 pg    MCHC 33.0 32.0 - 36.0 g/dL    RDW 13.2 11.0 - 14.5 %    Platelets 327 140 - 440 thou/uL    MPV 6.8 (L) 7.0 - 10.0 fL    Neutrophils % 58 50 - 70 %    Lymphocytes % 33 20 - 40 %    Monocytes % 7 2 - 10 %    Eosinophils % 2 0 - 6 %    Basophils % 1 0 - 2 %    Neutrophils Absolute 4.5 2.0 - 7.7 thou/uL    Lymphocytes Absolute 2.6 0.8 - 4.4 thou/uL    Monocytes Absolute 0.5 0.0 - 0.9 thou/uL    Eosinophils Absolute 0.2 0.0 - 0.4 thou/uL    Basophils Absolute 0.1 0.0 - 0.2 thou/uL           Normal         Please call with questions or contact us using [employer].    Sincerely,        Electronically signed by Bakari Chandler MD

## 2021-06-19 NOTE — PROGRESS NOTES
"[facility] Daily Progress     Patient Name: Anand Felix  Date: 7/17/2018  Visit #: 12  PTA visit #:  6  Referral Diagnosis: Muscle weakness (generalized)  Referring provider: Ham Churchill MD  Visit Diagnosis:     ICD-10-CM    1. Impaired functional mobility, balance, gait, and endurance Z74.09    2. Muscle weakness (generalized) M62.81    3. 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 male who presents to therapy today with chief complaints of muscle weakness and impaired gait and balance. Onset date of sx was October 2015.  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  Pain symptoms are aching, burning, numbness, sharp, tingling and weakness.  Functional impairments include walking, dressing, bathing, sitting for long periods, and standing for long periods.  Pt demo's signs and sx consistent with impaired gait and balance secondary to muscle weakness    Assessment:   HEP/POC compliance is  good .  The patient is compliant with his HEP and demonstrates an ability to lift his R LE independently without assistance or shakiness from a seated position.  This is a significant improvement from the beginning of PT services when he could not lift his leg without significant shakiness.  He is making progress in remaining calm during treadmill walking and seated exercise, which is helping with functional tasks.  His pain is significantly improved and rated verbally as \"mild.\"  He continues to have difficulty with daily tasks and function, but he would benefit from continued PT services to teach patient more self-management techniques and for functional strengthening.    Goal Status:  Pt. will be independent with home exercise program in : 6 weeks Progressing toward  Pt will: be able to walk 10 minutes with spO2 levels 95%+; in 8 weeks Progressing toward  Pt will: be able to maneuver 5 steps to get into his home without difficulty; Ongoing    Plan / Patient Education:     Continue with " "initial plan of care.  Progress with home program as tolerated.   Authorization / Certification Start Date: 18  Authorization / Certification End Date: 10/15/18  Authorization / Certification Number of Visits: 8  Communication with: Referral Source  Patient Related Instruction: Nature of Condition;Treatment plan and rationale;Self Care instruction;Basis of treatment;Body mechanics;Posture  Times per Week: 1  Number of Weeks: 8  Number of Visits: 8  Therapeutic Exercise: ROM;Stretching;Strengthening  Neuromuscular Reeducation: kinesio tape;posture;balance/proprioception;core    Next session: Continue with TM training.  Progress functional strengthening as able.    Subjective:     Pain Ratin (\"mild\")  The patient reports that he continues to have tightness in his chest and R side, which is constant but is worse when his activity is increased.  He is able to walk at .8mph for almost 1 minute.  Functioning at home seems to roughly be the same still.    Objective:   Prior to exercise:  108 BPM  99% SPO2    Ending: SAO2 88%  Ending HR: 114 BPM  Patient able to walk at .8mph for 1:20.    Pt ambulates into clinic with a Hurricane used on L and and stiff/rigid R LE    See flowsheet for date performed:  Exercises:  Exercise #1: Quad sets  Comment #1: 10-15 at home  Exercise #2: Hamstring isometrics  Comment #2: 10-15 at home  Exercise #3: Bent knee fallouts  Comment #3: L1 HEP unilateral pull progressed to L3 band from L2 band, partial pulls x 5 bilateral pulls  Exercise #4: Pre-bridge muscle contractions (TA/glute/hip add)  Comment #4: mini lift (2 inch) x 10, added 1-2' hold HEP  Exercise #5: Nustep seat 5 arms 8 L5 x 8', steps kept at 30-35/minute  Comment #5: Not in clinic today  Exercise #6: Ab sets/marching  Comment #6: HEP  Exercise #7: Pilates arch with L1 band , pull to knees  Comment #7: L1 HEP  Exercise #8: Stairs  Comment #8: x 8 with 1 rail ( on R) and cane, vc for foot flat, not landing just on R " "heeel, and to keep feet fully on steps, not today  Exercise #9: seated on bed \"scoot\" 12 reps each direction ,  hinged slightly forward to WB through LE's  Comment #9: Not in clinic, reviewed, pt performing x 10  to each side  Exercise #10: standing mini squats HEP, 15 reps at home  Comment #10: standing hip abd HEP, 15 reps at home  Exercise #11: STS  Comment #11: x 5 with min-maxA; tried to distract patient by having him answer questions, then count backwards by 7 from 100 during activity but was still extremely challenging  Exercise #12: 4\" step ups with 1 foot   Comment #12: Not in clinic  Exercise #13: discussed a walking program for home, ptusing a treadmill x 15' at .6-7 mph, to increase to 7-8 mph as able  Comment #13: Discussed walking out of doors, pt performs 5'-10'    Appt time: 11:30AM - 11:57AM    Treatment Today     TREATMENT MINUTES COMMENTS   Evaluation     Self-care/ Home management     Manual therapy     Neuromuscular Re-education     Therapeutic Activity     Therapeutic Exercises 27 Progression of TM gait . Patient performed Nustep x 6' at L6 and TM  X 8',   .6mph-.8mph (pt self set). He was able to complete .8 mph x 1 minute 20 seconds, but needed VC for motivation and support to complete  During TM exercise, discussed relaxation techniques by having him talk about things that are relaxing to him; at times, his foot didn't drag as much  Patient performed seated marching exercise bilaterally; explained that the movement was similar to stepping and walking, so the strength and movement should transfer to walking   Gait training     Modality__________________                Total 27    Blank areas are intentional and mean the treatment did not include these items.       Beatriz Medina, PT, DPT  7/17/2018  "

## 2021-06-19 NOTE — PROGRESS NOTES
"[facility] Daily Progress     Patient Name: Anand Felix  Date: 2018  Visit #:  +  ([facility] auth thru 10/15/18)  PTA visit #:  6  Referral Diagnosis: Muscle weakness (generalized)  Referring provider: Ham Churchill MD  Visit Diagnosis:     ICD-10-CM    1. Impaired functional mobility, balance, gait, and endurance Z74.09    2. Muscle weakness (generalized) M62.81    3. 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 male who presents to therapy today with chief complaints of muscle weakness and impaired gait and balance. Onset date of sx was 2015.  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  Pain symptoms are aching, burning, numbness, sharp, tingling and weakness.  Functional impairments include walking, dressing, bathing, sitting for long periods, and standing for long periods.  Pt demo's signs and sx consistent with impaired gait and balance secondary to muscle weakness    Assessment:   HEP/POC compliance is  good .  The patient is compliant with his HEP.  He had significant shakiness and trembling during movements and exercise today.  Attempted visualization techniques with exercise, but had the same result afterwards.  He has been given a new exercise and is appropriate to follow up in a week.    Goal Status:  Pt. will be independent with home exercise program in : 6 weeks Progressing toward  Pt will: be able to walk 10 minutes with spO2 levels 95%+; in 8 weeks Progressing toward  Pt will: be able to maneuver 5 steps to get into his home without difficulty; Ongoing    Plan / Patient Education:     Continue with initial plan of care.  Progress with home program as tolerated.     Next session: Continue with TM training.  Progress functional strengthening as able.    Subjective:     Pain Ratin (\"moderate\")  The patient reports that things have been \"ok\" since last session.  He reports that his leg trembles a lot as he has been working on the new seated marching " "exercise.  It is causing a lot of pain.  He has been walking on his treadmill at .7mph but at times will drop to .6mph or increase to .8mph.  He has been able to increase his exercise repetitions to 20 twice per day.    Objective:   Prior to exercise:  116 BPM  97% SPO2    Ending: SAO2 97%  Ending HR: 136 BPM  Patient had to drop to .4mph.    Pt ambulates into clinic with a Hurricane used on L and and stiff/rigid R LE    See flowsheet for date performed:  Exercises:  Exercise #1: Quad sets  Comment #1: 10-15 at home  Exercise #2: Hamstring isometrics  Comment #2: 10-15 at home  Exercise #3: Bent knee fallouts  Comment #3: L1 HEP unilateral pull progressed to L3 band from L2 band, partial pulls x 5 bilateral pulls  Exercise #4: Pre-bridge muscle contractions (TA/glute/hip add)  Comment #4: mini lift (2 inch) x 10, added 1-2' hold HEP  Exercise #5: Nustep seat 5 arms 8 L5 x 8', steps kept at 30-35/minute  Comment #5: Not in clinic today  Exercise #6: Ab sets/marching  Comment #6: HEP  Exercise #7: Pilates arch with L1 band , pull to knees  Comment #7: L1 HEP  Exercise #8: Stairs  Comment #8: x 8 with 1 rail ( on R) and cane, vc for foot flat, not landing just on R heeel, and to keep feet fully on steps, not today  Exercise #9: seated on bed \"scoot\" 12 reps each direction ,  hinged slightly forward to WB through LE's  Comment #9: Not in clinic, reviewed, pt performing x 10  to each side  Exercise #10: standing mini squats HEP, 15 reps at home  Comment #10: standing hip abd HEP, 15 reps at home  Exercise #11: STS  Comment #11: x 5 with min-maxA; tried to distract patient by having him answer questions, then count backwards by 7 from 100 during activity but was still extremely challenging  Exercise #12: 4\" step ups with 1 foot   Comment #12: Not in clinic  Exercise #13: discussed a walking program for home, ptusing a treadmill x 15' at .6-7 mph, to increase to 7-8 mph as able  Comment #13: Discussed walking out of doors, " pt performs 5'-10'    Appt time: 2:01PM - 2:28PM    Treatment Today     TREATMENT MINUTES COMMENTS   Evaluation     Self-care/ Home management     Manual therapy     Neuromuscular Re-education     Therapeutic Activity     Therapeutic Exercises 27 Progression of TM gait . Patient performed Nustep x 6' at L6 and TM  X 7',   .7mph-.4mph. Patient had shaking/trembling of R LE with decreased toe lift and dragging of foot.  During TM exercise, discussed relaxation techniques; tried also having him count  Visualization of seated knee extension exercise, then performed actively; patient had shakiness/trembling bilaterally with exercise  Seated marching performed bilaterally with significant shaking/trembling   Gait training     Modality__________________                Total 27    Blank areas are intentional and mean the treatment did not include these items.       Beatriz Medina, PT, DPT  8/2/2018

## 2021-06-19 NOTE — PROGRESS NOTES
Assessment/Plan:    1. Nasal polyposis  Right-sided nasal polyposis on exam.  Continues Flonase nasal spray or Cristal pot.  Referral to ENT for likely nasal polypectomy procedure.  - Ambulatory referral to ENT    2. Chronic ethmoidal sinusitis  Patient currently does not feel that antibiotic necessary and will await recommendation from otolaryngologist.    3. Bilateral pulmonary embolism (H)  History of bilateral pulmonary emboli describes using warfarin 5 mg using 1-1/2 tablets on Tuesdays and Fridays otherwise half tablet daily.  No bleeding concerns currently.  History of sinus tachycardia continues atenolol 25 mg daily.  Muscle weakness with disability.  Was referred to [facility] for muscle biopsy but told they do not perform this and will need to arrange elsewhere.  Using MS Contin 15 mg 3 times daily also has short acting morphine available 3 days per week when he attends his wife's hemodialysis treatments.    4. Sinus tachycardia  Continues atenolol 25 mg daily for chronotropic benefits with sinus tachycardia history.    5. Muscle weakness  As above, will continue to pursue muscle biopsy regarding muscle weakness question etiology.    6. Chronic pain syndrome  Patient has follow-up with pain specialist this Thursday and will continue chronic pain management as noted.    PHQ 9 questionnaire 12 out of 27 and CAROLYN 7 questionnaire 10 out of 21.      Subjective:    Anand Felix is seen today for follow-up evaluation.  Disability due to muscle weakness.  Bilateral pulmonary emboli history on chronic warfarin anticoagulation.  No bleeding concerns.  No shortness of breath or chest pain.  No palpitations.  Hard to breathe out of his right side of his nostril and understands he has a polyp that likely needs to be removed.  Using Flonase as well as Paloma pot treatments however hard to sleep on his left side due to this.  Chronic pain, stable.  Utilizes atenolol for rate control with sinus tachycardia history.  Prior  "thyroid testing normal.  No ankle swelling.  Comprehensive review of systems as above otherwise all negative.     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 Bernal follows  Children - none  Employed - working on disability - monoparesis  Surgeries - nasal surgery; low back surgery (Dr. Jeannine Hernandez, 2015) with residual right leg weakness  Tobacco use - none  ETOH - none  Family hx   Mother - living - high chol. Strokes. MI.   Father - living - unknown  Siblings - living - 4 sisters, 2 brother -    Bilateral AFO braces worn due to muscle weakness and foot drop    Past Surgical History:   Procedure Laterality Date     MINIMALLY INVASIVE MICRODISCECTOMY LUMBAR SPINE Right 10/2015    L5-S1; Dr. Bettie Hernandez     SINUS SURGERY  2014    Dr. Wright        Family History   Problem Relation Age of Onset     Depression Mother      Hyperlipidemia Mother      Other Mother      \"Ulcers\" - Aches and pains throughout her body.     Cancer Father      No Medical Problems Sister      No Medical Problems Brother      No Medical Problems Sister      No Medical Problems Sister      No Medical Problems Sister      No Medical Problems Brother      Thyroid disease Maternal Grandmother      Thyroid disease Cousin         Past Medical History:   Diagnosis Date     Bulging lumbar disc      Lumbar herniated disc 10/23/2015     Lumbar radiculopathy 11/25/2015        Social History   Substance Use Topics     Smoking status: Never Smoker     Smokeless tobacco: Never Used     Alcohol use Yes      Comment: Monthly or less often. 1-2 drinks at a time.        Current Outpatient Prescriptions   Medication Sig Dispense Refill     acetaminophen (TYLENOL) 500 MG tablet Take 1 tablet (500 mg total) by mouth every 6 (six) hours as needed for pain.  0     atenolol (TENORMIN) 25 MG tablet Take 1 tablet (25 mg total) by mouth daily. 30 tablet 5     cholecalciferol, vitamin D3, 2,000 unit cap Take 2,000 Units by mouth daily.   "     cyclobenzaprine (FLEXERIL) 10 MG tablet Take 1 tablet (10 mg total) by mouth 3 (three) times a day as needed for muscle spasms. 60 tablet 1     fluticasone (FLONASE) 50 mcg/actuation nasal spray 2 sprays into each nostril daily. 16 g 6     morphine (MS CONTIN) 15 MG 12 hr tablet Take 1 tablet (15 mg total) by mouth 3 (three) times a day. 90 tablet 0     morphine (MSIR) 15 MG tablet Take 1 tablet (15 mg total) by mouth 3 (three) times a week. 12 tablet 0     nortriptyline (PAMELOR) 50 MG capsule Take 1 capsule (50 mg total) by mouth at bedtime. 30 capsule 1     omeprazole (PRILOSEC) 20 MG capsule Take 1 capsule (20 mg total) by mouth daily. 90 capsule 1     warfarin (COUMADIN) 5 MG tablet Take 5 mg by mouth daily. Adjust dose based on INR results as directed. 60 tablet 5     No current facility-administered medications for this visit.           Objective:    Vitals:    07/31/18 1110   BP: 110/78   Pulse: (!) 107   SpO2: 97%   Weight: 156 lb (70.8 kg)      Body mass index is 25.96 kg/(m^2).    Alert.  Transfers slowly.  AFO braces bilateral lower extremities.  Ambulates with cane to assist with ambulation.  Resting heart rate around 110 bpm.  Chest appears clear however attempts at inspiration are somewhat choppy without smooth inspiration or expiration noted.      This note has been dictated using voice recognition software and as a result may contain minor grammatical errors and unintended word substitutions.

## 2021-06-19 NOTE — PROGRESS NOTES
[facility] Daily Progress     Patient Name: Anand Felix  Date: 2018  Visit #: 9  PTA visit #:  4  Referral Diagnosis: Muscle weakness (generalized)  Referring provider: Ham Churchill MD  Visit Diagnosis:     ICD-10-CM    1. Impaired functional mobility, balance, gait, and endurance Z74.09    2. 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 Muscle weakness (generalized) M62.81      Anand Felix is a 33 y.o. male who presents to therapy today with chief complaints of muscle weakness and impaired gait and balance. Onset date of sx was 2015.  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  Pain symptoms are aching, burning, numbness, sharp, tingling and weakness.  Functional impairments include walking, dressing, bathing, sitting for long periods, and standing for long periods.  Pt demo's signs and sx consistent with impaired gait and balance secondary to muscle weakness    Assessment:     HEP/POC compliance is  good .  The patient has been compliant with HEP and treadmill walking daily.  He does better with balance activities when distracted by conversation but continues to have significant difficulty with STS and ambulating with toe clearance.  He is appropriate to continue with PT x 3 visits.  Will determine POC afterwards.    Goal Status:  Pt. will be independent with home exercise program in : 6 weeks  Pt will: be able to walk 10 minutes with spO2 levels 95%+; in 8 weeks  Pt will: be able to maneuver 5 steps to get into his home without difficulty; in 8 weeks    Plan / Patient Education:     Continue with initial plan of care.  Progress with home program as tolerated.  Plan for next visit: Review and progress activity as able. Trial TM walking in clinic  Subjective:     Pain Ratin  The patient reports that he has been able to walk on the treadmill at .8/.9 mph but it is hard to maintain.  He reports doing visualization techniques and meditation to help calm his nerves.  He walks on the  "treadmill every 1.5 hours for 10-15 minutes.  He has been doing this for the past year.    Objective:     Pt ambulates into clinic with a Hurricane used on L and and stiff/rigid R LE    See flowsheet for date performed:  Exercises:  Exercise #1: Quad sets  Comment #1: 10-15 at home  Exercise #2: Hamstring isometrics  Comment #2: 10-15 at home  Exercise #3: Bent knee fallouts  Comment #3: L1 HEP unilateral pull  Exercise #4: Pre-bridge muscle contractions (TA/glute/hip add)  Comment #4: mini lift (2 inch) x 10, added 1-2' hold HEP  Exercise #5: Nustep seat 5 arms 8 L5 x 8', steps kept at 30-35/minute  Comment #5: Not in clinic today  Exercise #6: Ab sets/marching  Comment #6: HEP  Exercise #7: Pilates arch with L1 band , pull to knees  Comment #7: L1 HEP  Exercise #8: Stairs  Comment #8: x 8 with 1 rail ( on R) and cane, vc for foot flat, not landing just on R heeel, and to keep feet fully on steps, not today  Exercise #9: seated on bed \"scoot\" 12 reps each direction ,  hinged slightly forward to WB through LE's  Comment #9: Not in clinic, reviewed, pt performing x 10  to each side  Exercise #10: standing mini squats HEP, 15 reps at home  Comment #10: standing hip abd HEP, 15 reps at home  Exercise #11: STS  Comment #11: x 5 with min-maxA; tried to distract patient by having him answer questions, then count backwards by 7 from 100 during activity but was still extremely challenging  Exercise #12: 4\" step ups with 1 foot   Comment #12: Not in clinic  Exercise #13: discussed a walking program for home, ptusing a treadmill x 15' at .6-7 mph, to increase to 7-8 mph as able  Comment #13: Discussed walking out of doors, pt performs 5'-10'    Appt time: 10:31AM - 11:03AM    Treatment Today     TREATMENT MINUTES COMMENTS   Evaluation     Self-care/ Home management     Manual therapy     Neuromuscular Re-education 15 Standing NBOS x 60 seconds; patient able to balance better when distracted with conversation  Standing " modified tandem x 60 seconds bilaterally; patient able to balance better when distracted with conversation   Therapeutic Activity     Therapeutic Exercises 17 See flowsheet  Discussed walking program and trying for 1 week to walk at .8mph and next week at .9mph  Discussed TNE and what he has done with other providers  Patient required long rest and verbal cues to work on meditation to bring HR back down and breathing back down after STS   Gait training     Modality__________________                Total 32    Blank areas are intentional and mean the treatment did not include these items.       Beatriz Medina, PT, DPT  7/2/2018

## 2021-06-19 NOTE — PROGRESS NOTES
"[facility] Daily Progress     Patient Name: Anand Felix  Date: 7/5/2018  Visit #: 10  PTA visit #:  5  Referral Diagnosis: Muscle weakness (generalized)  Referring provider: Ham Churchill MD  Visit Diagnosis:     ICD-10-CM    1. Impaired functional mobility, balance, gait, and endurance Z74.09    2. Bilateral pulmonary embolism (H) I26.99    3. 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 Muscle weakness (generalized) M62.81    5. Monoparesis of leg (H) G83.10      Anand Felix is a 33 y.o. male who presents to therapy today with chief complaints of muscle weakness and impaired gait and balance. Onset date of sx was October 2015.  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  Pain symptoms are aching, burning, numbness, sharp, tingling and weakness.  Functional impairments include walking, dressing, bathing, sitting for long periods, and standing for long periods.  Pt demo's signs and sx consistent with impaired gait and balance secondary to muscle weakness    Assessment:   Pt has improved use of R LE with near equal WB when not focused on \"PT\" activity  His HR increases and balance diminishes when therapy is focused on function of R LE  HEP/POC compliance is  good .  The patient has been compliant with HEP and treadmill walking daily.  He does better with balance activities when distracted by conversation but continues to have significant difficulty with STS and ambulating with toe clearance.  He is appropriate to continue with PT x 3 visits.  Will determine POC afterwards.    Goal Status:  Pt. will be independent with home exercise program in : 6 weeks  Pt will: be able to walk 10 minutes with spO2 levels 95%+; in 8 weeks  Pt will: be able to maneuver 5 steps to get into his home without difficulty; in 8 weeks    Plan / Patient Education:     Continue with initial plan of care.  Progress with home program as tolerated.  Plan for next visit: Review and progress activity as able. Continue TM walking in " "clinic  Subjective:     Pain Ratin  The patient reports that he has been able to walk on the treadmill at .6mph-.7mph ,and has had difficulty increasing speed to .8 mph   He is gradually increasing reps with HEP and will continue to trial increased speed on TM    Objective:     Pt ambulates into clinic with a Hurricane used on L and and stiff/rigid R LE    See flowsheet for date performed:  Exercises:  Exercise #1: Quad sets  Comment #1: 10-15 at home  Exercise #2: Hamstring isometrics  Comment #2: 10-15 at home  Exercise #3: Bent knee fallouts  Comment #3: L1 HEP unilateral pull progressed to L3 band from L2 band, partial pulls x 5 bilateral pulls  Exercise #4: Pre-bridge muscle contractions (TA/glute/hip add)  Comment #4: mini lift (2 inch) x 10, added 1-2' hold HEP  Exercise #5: Nustep seat 5 arms 8 L5 x 8', steps kept at 30-35/minute  Comment #5: Not in clinic today  Exercise #6: Ab sets/marching  Comment #6: HEP  Exercise #7: Pilates arch with L1 band , pull to knees  Comment #7: L1 HEP  Exercise #8: Stairs  Comment #8: x 8 with 1 rail ( on R) and cane, vc for foot flat, not landing just on R heeel, and to keep feet fully on steps, not today  Exercise #9: seated on bed \"scoot\" 12 reps each direction ,  hinged slightly forward to WB through LE's  Comment #9: Not in clinic, reviewed, pt performing x 10  to each side  Exercise #10: standing mini squats HEP, 15 reps at home  Comment #10: standing hip abd HEP, 15 reps at home  Exercise #11: STS  Comment #11: x 5 with min-maxA; tried to distract patient by having him answer questions, then count backwards by 7 from 100 during activity but was still extremely challenging  Exercise #12: 4\" step ups with 1 foot   Comment #12: Not in clinic  Exercise #13: discussed a walking program for home, ptusing a treadmill x 15' at .6-7 mph, to increase to 7-8 mph as able  Comment #13: Discussed walking out of doors, pt performs 5'-10'    Appt time: 10:31AM - " 11:03AM    Treatment Today     TREATMENT MINUTES COMMENTS   Evaluation     Self-care/ Home management     Manual therapy     Neuromuscular Re-education  Standing NBOS x 60 seconds; patient able to balance better when distracted with conversation  Standing modified tandem x 60 seconds bilaterally; patient able to balance better when distracted with conversation   Therapeutic Activity     Therapeutic Exercises 2 Reviewed HEP, progression of TM gait and techniques for lowering HR   Gait training 28 Practiced walking  throughout the clinic with focus on  increased R knee bend and weight bearing. Added deep breathing for anxiety. TM gait practiced x 8' with focus on increased R knee flexion and improved WB on R LE. TM .3mph progressed by pt gradually to .8mph. When distracted gait pattern improved. When pt decreased R knee bend and WB and pointed this out to pt, he became visibly more anxious.    Modality__________________                Total 30    Blank areas are intentional and mean the treatment did not include these items.       Genia Juarez, PTA,CLT  7/5/2018

## 2021-06-19 NOTE — PROGRESS NOTES
"8/6/2018    Start time: 10:10 AM    Stop Time: 11:00 AM   Session # 11    Anand Felix is a 33 y.o. male is being seen today for    Chief Complaint   Patient presents with     Mental Health Note   . Major depression, severe  History of Generalized Anxiety Disorder  Chronic pain syndrome       New symptoms or complaints: None    Functional Impairment:   Personal: 4  Family: 4  Work: 4  Social:4    Clinical assessment of mental status: Anand Felix presented on time.  He was open and cooperative, and dressed appropriately for this session and weather. His memory was in tact .  His  speech was appropriate.  Language was appropriate.  Concentration and focus is on task. Psychosis is not noted or reported. He reports his mood is mildly depressed .  Affect is congruent with speech.  Fund of knowledge is adequate. Insight is adequate for therapy.     Suicidal/Homicidal Ideation present:  Patient reports passive suicidal ideation. He denies plan or means. Protective factors are his wife and family.      Patient's impression of their current status: Patient reported he continues to be working on addressing symptoms of depression and anxiety. He reports physical weakness and pain are more manageable.     Therapist impression of patients current state: Patient seems to be working on addressing depression, anxiety and chronic medical symptoms. He seems to have noticed improvement in mood, less negative thinking and that he is having greater acceptance of his chronic pain and medical symptoms. Discussed ways to cope with SI.  He reports he is consistent with PT and is noticing \"more fluid movement\". He seems to be working on letting go and focusing on abilities at this time. Patient reports he and his wife are working on focusing on their medical needs. Encouraged patient to continue to increase efforts to reach out to social/family support to connect socially/emotionally. He reports Rastaf[facility] attendance is consistent. Continued to " discuss potential benefits of Mindfulness meditation with patient. He reports regular meditation practice and that he especially connects with the walking meditation. Continued home practice is encouraged. Completed processing negative cognition regarding back pain symptoms in session. Will target leg pain next session. Presented container exercise and patient appeared to benefit.         Type of psychotherapeutic technique provided: Client centered, CBT and Mindfulness, EMDR    Progress toward short term goals: Patient continues to work on increasing coping skills to manage chronic pain and mental health. Patient continues medical care as recommended by his medical providers. He engages in physical activity and social activity as he is able. He is engaging in meditation in daily living. He continues psychotherapy/EMDR.     Review of long term goals: Not done at today's visit    Diagnosis: Major depression, severe  History of Generalized Anxiety Disorder  Chronic pain syndrome     Plan and Follow up: Patient to follow pain center plan of care. Patient to follow all medical recommendations. Patient to schedule every 2 weeks for psychotherapy to further address depression, anxiety and chronic pain concerns. Will continue to educate on EMDR and Mindfulness meditation. Practice meditations as presented in session. Further address adjustment to role/identity due to chronic pain and health concerns. Continue EMDR using pain protocol.      Discharge Criteria/Planning: Patient will continue with follow-up until therapy can be discontinued without return of signs and symptoms.    Alize Livingston 8/6/2018

## 2021-06-19 NOTE — PROGRESS NOTES
[facility] Daily Progress     Patient Name: Anand Felix  Date: 2018  Visit #: 8  PTA visit #:  4  Referral Diagnosis: Muscle weakness (generalized)  Referring provider: Ham Churchill MD  Visit Diagnosis:     ICD-10-CM    1. Bilateral pulmonary embolism (H) I26.99      Anand Felix is a 33 y.o. male who presents to therapy today with chief complaints of muscle weakness and impaired gait and balance. Onset date of sx was 2015.  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  Pain symptoms are aching, burning, numbness, sharp, tingling and weakness.  Functional impairments include walking, dressing, bathing, sitting for long periods, and standing for long periods.  Pt demo's signs and sx consistent with impaired gait and balance secondary to muscle weakness    Assessment:   HR initially increased with activity, but was lower at end of visit.   Pt states he is tired but OK at end of visit  HEP/POC compliance is  good .  The patient demonstrates a good understanding of the TNE training today.  He is appropriate to continue with the pain education and low level exercise.    Goal Status:  Pt. will be independent with home exercise program in : 6 weeks  Pt will: be able to walk 10 minutes with spO2 levels 95%+; in 8 weeks  Pt will: be able to maneuver 5 steps to get into his home without difficulty; in 8 weeks    Plan / Patient Education:     Continue with initial plan of care.  Progress with home program as tolerated.  Plan for next visit: Discuss pain education homework.  Review and progress activity as able. Trial TM walking in clinic  Subjective:     Pain Ratin  The patient reports he is walking 15' daily on the treadmill and 5'-10' out doors 1-2x/daily  He performs his HEP 1-2x/daily  Pt reports that he fell at home last Friday after PT has scabby areas on R wrist and scraped bothknees and R cheek  Feels better now  Objective:     Pt ambulates into clinic with a Hurricane used on L  "and and stiff/rigid R LE  SAO2 92-99% during visit  HR  BPM    Ending SAO2 93%  Ending  BPM  Pt required a rest period before walking out of clinic with     See flowsheet for date performed:  Exercises:  Exercise #1: Quad sets  Comment #1: 10-15 at home  Exercise #2: Hamstring isometrics  Comment #2: 10-15 at home  Exercise #3: Bent knee fallouts  Comment #3: L1 HEP unilateral pull  Exercise #4: Pre-bridge muscle contractions (TA/glute/hip add)  Comment #4: mini lift (2 inch) x 10, added 1-2' hold HEP  Exercise #5: Nustep seat 5 arms 8 L5 x 8', steps kept at 30-35/minute  Comment #5: SAO2 98&,  BPM  Exercise #6: Ab sets/marching  Comment #6: HEP  Exercise #7: Pilates arch with L1 band , pull to knees  Comment #7: L1 HEP  Exercise #8: Stairs  Comment #8: x 8 with 1 rail ( on R) and cane, vc for foot flat, not landing just on R heeel, and to keep feet fully on steps, not today  Exercise #9: seated on bed \"scoot\" 12 reps each direction ,  hinged slightly forward to WB through LE's  Comment #9: Not in clinic, reviewed, pt performing x 10  to each side  Exercise #10: standing mini squats HEP, 15 reps at home  Comment #10: standing hip abd HEP, 15 reps at home  Exercise #11: STS  Comment #11: x 5 with min-maxA, , 1-2 hands on rail to assist the last 3  Exercise #12: 4\" step ups with 1 foot   Comment #12: x 3 reps B, mod-max assist  Exercise #13: discussed a walking program for home, ptusing a treadmill x 15' at .6-7 mph, to increase to 7-8 mph as able  Comment #13: Discussed walking out of doors, pt performs 5'-10'      Treatment Today     TREATMENT MINUTES COMMENTS   Evaluation     Self-care/ Home management     Manual therapy     Neuromuscular Re-education 5 Attempted to discuss pain theory and pt triggers. Pt reluctant, so focused on therapeutic activity and reviewed  HR/SAO2 responses during clinic visit today   Therapeutic Activity     Therapeutic Exercises 25 See flowsheet for activities " performed today    Verbal review of HEP and how to increase reps, activity and walking program  NU STEP x 8 minutes WL 5.0; worked to keep steps at 30-35/minute subjective measures taken   Gait training     Modality__________________                Total 30    Blank areas are intentional and mean the treatment did not include these items.       Genia Juarez, PTA,CLT  6/28/2018

## 2021-06-19 NOTE — PROGRESS NOTES
Programs applying for:   MNSURE Case: [id number]  Financial worker:  MNSURE TRACKING - Jan (Due Feb)?         7/16/2018: DYAN Resendiz will contact patient regarding Medicare letter. No further Frg support needed at this time. Patient will now go on Mnsure Insurance Tracking.       7/10/2018: Called [facility] today as I had not gotten any response back from [facility]. I was then transferred to [facility]. [facility] advised that they didn't send that letter, but could see that patient doesn't have Medicare. She advised me to call [facility] at [phone]. Called [facility] and she then transferred me to Department of Human Services # [phone]. I spoke to someone at [facility] of Human services. He stated, that I shouldn't have been transferred to him as he doesn't do anything with Medicare and doesn't see this letter.I explained that the letter indicated Department of human services.  He than asked me what it said later in the conversation.  I advised it says [place] Department of Human services  [place] [facility] [place] Drug Cover[place]. Agency Name: [facility] Human Services Department, which sounds like I was transferred to the correct department.  He said, I think you should call Medicare because its a Medicare insurance request. I explained a couple times that, patient doesn't have Medicare, so there is no reason to call Medicare. He kept telling me to call Medicare or [facility]. I advised I have already called the [facility] and was advised it wasn't them. Tried calling the number on the letter again, and it brought me to the individual who stated that he couldn't assist me and to call [facility]. Everyone I have called as stated, they see that patient has MA and they have no indication that the patient has Medicare. They have all said the don't see this letter that I'm referring to.      Consulted with Astrid as I'm at a lost on what to do. I will fax the letter to Astrid for her to review. She is going to consult with [facility] and possible call wife to discuss further. She will advise if there is anything further I can do to assist. Does patient have benefits we aren't aware of or was this letter sent by error.  Erick doesn't indicate that patient has Medicare.  Faxed Document to Astrid BACH: Medical Assistance.  Carey Type AX: [place] Early Expansion  Eligibility Begin Date: 12/01/2017  Eligibility End Date: --/--/----  Prepaid Medical Assistance Program (PMAP) delivered through [facility].      7/5/2018: Left message for Kay today to see if she can assist me with determining what maybe going on.  I'm a little confused as well as I'm seeing a different case number on this letter. This other case number is [id number].  Asking Kay, if Anand is the primary person on his wives application? Did she indicate he was applying? Advised he doesn't have Medicare but wife does. Advised that he applied through [facility] due to not having Medicare.  Does this letter indicate that his wife needs to apply for Part D?   Did requesting for PCA services trigger something with his insurance?  Anand has applied for disability but has been denied.     7/3/2018: Received a message stating that they couldn't advise what was going on with the case since its through [facility]. I called the number on the letter. Tried calling Kay Rivera's worker to see if Anand had been added as needing insurance on her application. However, it could also be possible that he is the primary name on the application and his wife needs to apply for a Medicare supplement.  Will try calling Kay later this week as her vm is full.  The case number for Miguel is [id number]    Checked Erick BACH: Medical Assistance.  Carey Type AX: [place] Early Expansion  Eligibility Begin Date: 12/01/2017  Eligibility End Date: --/--/----    6/29/2018: Patient dropped of a letter from the [facility] stating that he needs to apply for a Medicare Part D. It also says, that he qualify or will qualify for Medicare.  I  clarified with staff if patient had been approved SSI or Medicare. He was declined and in the appeal process. He doesn't have Medicare and hasn't been approved Medicare. I left a message with the number provided on the letter to clarify why he is getting this letter based on the information I received today.

## 2021-06-19 NOTE — PROGRESS NOTES
HISTORY OF PRESENT ILLNESS  Patient reports that he is having more difficulty breathing through the nose. He has also noticed yellow nasal discharge. Symptoms are primarily right-sided.     REVIEW OF SYSTEMS  Review of Systems: a 10-system review was performed. Pertinent positives are noted in the HPI and on a separate scanned document in the chart.    PMH, PSH, FH and SH has documented in the EHR.      EXAM    CONSTITUTIONAL  General Appearance:  Normal, well developed, well nourished, no obvious distress  Ability to Communicate:  communicates appropriately.    HEAD AND FACE  Appearance and Symmetry:  Normal, no scalp or facial scarring or suspicious lesions.  Paranasal sinuses tenderness:  Normal, Paranasal sinuses non tender    EARS  Clinical speech reception threshold:  Normal, able to hear normal speech.  Auricle:  Normal, Auricles without scars, lesions, masses.  External auditory canal:  Normal, External auditory canal normal.  Tympanic membrane:  Normal, Tympanic membranes normal without swelling or erythema.  Tympanic membrane mobility:  Normal, Normal tympanic membrane mobility.    NOSE (speculum or scope)  Architecture:  Normal, Grossly normal external nasal architecture with no masses or lesions.  Mucosa:  Normal mucosa, No polyps or masses.  Septum:  Normal, Septum non-obstructing.  Turbinates:  Normal, No turbinate abnormalities    ORAL CAVITY AND OROPHARYNX  Lips:  Normal.  Dental and gingiva:  Normal, No obvious dental or gingival disease.  Mucosa:  Normal, Moist mucous membranes.  Tongue:  Normal, Tongue mobile with no mucosal abnormalities  Hard and soft palate:  Normal, Hard and soft palate without cleft or mucosal lesions.  Oral pharynx:  Normal, Posterior pharynx without lesions or remarkable asymmetry.  Saliva:  Normal, Clear saliva.  Masses:  Normal, No palpable masses or pathologically enlarged lymph nodes.    NECK  Masses/lymph nodes:  Normal, No worrisome neck masses or lymph  nodes.  Salivary glands:  Normal, Parotid and submandibular glands.  Trachea and larynx position:  Normal, Trachea and larynx midline.  Thyroid:  Normal, No thyroid abnormality.  Tenderness:  Normal, No cervical tenderness.  Suppleness:  Normal, Neck supple    NEUROLOGICAL  Speech pattern:  Normal, Proasaic    RESPIRATORY  Symmetry and Respiratory effort:  Normal, Symmetric chest movement and expansion with no increased intercostal retractions or use of accessory muscles.     IMPRESSION  Nasal polyposis right with acute pansinusitis.     RECOMMENDATION  He would prefer another round of medical management which seemed to have lasted 6 months. Will repeat steroids with antibiotics. If no improvement or should he want to go to surgery, then next step is CT sinus.     Leo Wright MD

## 2021-06-19 NOTE — PROGRESS NOTES
"[facility] Daily Progress     Patient Name: Anand Felix  Date: 2018  Visit #: 3/8 +  ([facility] auth thru 10/15/18)  PTA visit #:  6  Referral Diagnosis: Muscle weakness (generalized)  Referring provider: Ham Churchill MD  Visit Diagnosis:     ICD-10-CM    1. Impaired functional mobility, balance, gait, and endurance Z74.09    2. Muscle weakness (generalized) M62.81    3. 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 male who presents to therapy today with chief complaints of muscle weakness and impaired gait and balance. Onset date of sx was 2015.  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  Pain symptoms are aching, burning, numbness, sharp, tingling and weakness.  Functional impairments include walking, dressing, bathing, sitting for long periods, and standing for long periods.  Pt demo's signs and sx consistent with impaired gait and balance secondary to muscle weakness    Assessment:   HEP/POC compliance is  good .  The patient is compliant with his HEP.  He appears to be walking faster and reports that his wife said the same thing.  He continues to demonstrate significant shaking in his R LE during ambulation, but when asked to focus on breathing, his leg calms down.  He is appropriate to continue with PT services at this time.    Goal Status:  Pt. will be independent with home exercise program in : 6 weeks Progressing toward  Pt will: be able to walk 10 minutes with spO2 levels 95%+; in 8 weeks Progressing toward  Pt will: be able to maneuver 5 steps to get into his home without difficulty; Ongoing    Plan / Patient Education:     Continue with initial plan of care.  Progress with home program as tolerated.     Next session: Continue with TM training.  Progress functional strengthening as able.    Subjective:     Pain Ratin (\"not too bad\")  The patient reports that he was really sore for a few days after last session.  He feels like he is walking a little " "faster and it doesn't feel as heavy as it was before.  The calf mm feel like they're going to rip when he tries stretching them.    Objective:   Prior to TM walkin BPM right after NUSTEP  99% SPO2    Ending: SAO2 98%  Ending HR: 137 BPM    Pt ambulates into clinic with a Hurricane used on L and and stiff/rigid R LE    See flowsheet for date performed:  Exercises:  Exercise #1: Quad sets  Comment #1: 10-15 at home  Exercise #2: Hamstring isometrics  Comment #2: 10-15 at home  Exercise #3: Bent knee fallouts  Comment #3: L1 HEP unilateral pull progressed to L3 band from L2 band, partial pulls HEP  Exercise #4: Pre-bridge muscle contractions (TA/glute/hip add)  Comment #4: mini lift (2 inch) x 10, added 1-2' hold HEP  Exercise #5: Nustep seat 5 arms 8 L7 x 5'  Comment #5: Not in clinic today  Exercise #6: Ab sets/marching  Comment #6: HEP  Exercise #7: Pilates arch with L1 band , pull to knees  Comment #7: L1 HEP  Exercise #8: Stairs  Comment #8: 4 steps with 1 rail; up with the L foot and down with the R; cues to keep feet flat and to use his strong leg rather than pulling on the railing  Exercise #9: seated on bed \"scoot\" 12 reps each direction ,  hinged slightly forward to WB through LE's  Comment #9: Not in clinic, reviewed, pt performing x 10  to each side  Exercise #10: standing mini squats HEP, 15 reps at home  Comment #10: standing hip abd HEP, 15 reps at home  Exercise #11: STS  Comment #11: Not in clinic  Exercise #12: 4\" step ups with 1 foot   Comment #12: Not in clinic  Exercise #13: discussed a walking program for home, ptusing a treadmill x 15' at .6-7 mph, to increase to 7-8 mph as able  Comment #13: Not discussed today  Exercise #14: Hamstring stretch  Comment #14: 30 seconds  Exercise #15: Gastroc/soleus stretch  Comment #15: 30 seconds    Appt time: 12:01PM - 12:27PM    Treatment Today     TREATMENT MINUTES COMMENTS   Evaluation     Self-care/ Home management     Manual therapy   "   Neuromuscular Re-education     Therapeutic Activity     Therapeutic Exercises 26 NUSTEP x 5 minutes WL 7.0; subjective measures taken  Patient performed TM  X 7',   .7mph. Patient had shaking/trembling of R LE with decreased toe lift and dragging of foot; cues tor knee and foot lift as well as staying tall and focusing on breathing pattern.  See flowsheet   Gait training     Modality__________________                Total 26    Blank areas are intentional and mean the treatment did not include these items.       Beatriz Medina, PT, DPT  8/16/2018

## 2021-06-19 NOTE — PROGRESS NOTES
Email: [email or web address]  Marital Status: -Miguel Felix  Children: No  Born and Raised:   Occupation: On Disability  Living Situation: Lives in [place] with his wife.  Smoking, Alcohol, other:  Services receiving:   [facility] Follow up s: Monthly  [facility] Enrollment: January 19th 2018  Next [facility] Follow up: 9/12/18

## 2021-06-19 NOTE — PROGRESS NOTES
Consult with FRG regarding letter pt received from [facility] indicating that pt needs to apply for Medicare Part D plan.  Per FRG request, DYAN called [facility] and verified pt not in Medicare system, does not show as having Medicare.  DYAN contacted pts and left message inquiring about cost of prescription medication co-pays.  DYAN called and spoke to pts pharmacy- [facility].  Confirmed that pts prescriptions being billed to his [facility] plan and copays are $1.  Conference call with care guide and FRG discussing above.  Concluded that pt can disregard letter at this time as it must have been sent in error.  DYAN to fax PATSY to [facility] at [phone].

## 2021-06-19 NOTE — PROGRESS NOTES
Scheduled F/U Call:  Attempt 1    Care Guide called patient.  If this patient is returning our call please transfer to Sylvia Conley at ext [phone].  Email: [email or web address]  Marital Status: -Miguel Felix  Children: No  Born and Raised:   Occupation: On Disability  Living Situation: Lives in [place] with his wife.  Smoking, Alcohol, other:  Services receiving:   [facility] Follow up s: Monthly  [facility] Enrollment: January 19th 2018  Next [facility] Follow up: 8/1/18

## 2021-06-19 NOTE — PROGRESS NOTES
SW consulted with care guide re: pts situation.  SW called and left pt a message indicating that he based on SW research/phone calls he can disregard the letter that informs him to enroll in a Medicare Part D plan.  SW informed pt that [facility] PATSY being sent via mail and asked pt to sign and return as [facility] staff was consulted during this process.  Left pt SW direct number to return call with questions.

## 2021-06-19 NOTE — LETTER
Letter by Bakari Chandler MD at      Author: Bakari Chandler MD Service: -- Author Type: --    Filed:  Encounter Date: 5/15/2019 Status: (Other)         Anand Felix  [address] [place] [address]             May 15, 2019         Dear Mr. Felix,    Below are the results from your recent visit:    Resulted Orders   NM Hepatobiliary W EF or Pharm    Narrative    EXAM: NM HEPATOBILIARY W EF OR PHARM  LOCATION: [facility]  DATE/TIME: 5/13/2019 3:50 PM    INDICATION: ruq pain  COMPARISON: CT 05/12/2019 and ultrasound 05/12/2019  TECHNIQUE: Following the administration of 7.83 mCi of Tc-99m mebrofenin intravenously, anterior planar imaging of the abdomen was obtained for 60 minutes. 1.4 mcg of cholecystokinin analogue were then administered intravenously, and the gallbladder was   imaged for an additional 30 minutes to assess ejection fraction.    FINDINGS: Prompt uptake of agent the liver and prompt excretion into the biliary system. Gallbladder normally fills. Excretion into the small bowel identified. Ejection fraction 89%.      Impression    CONCLUSION:  Normal hepatobiliary study.                            Normal study    Recommend further evaluation and management by Gastroenterology, referral has been  placed. They will be calling to set up an appointment            Please call with questions or contact us using [employer].    Sincerely,        Electronically signed by Bakari Chandler MD

## 2021-06-19 NOTE — PROGRESS NOTES
Scheduled F/U Call: Attempt 2   Care Guide called patient.  If this patient is returning our call please transfer to Sylvia Conley at ext [phone].  Email: [email or web address]  Marital Status: -Miguel Felix  Children: No  Born and Raised:   Occupation: On Disability  Living Situation: Lives in [place] with his wife.  Smoking, Alcohol, other:  Services receiving:   [facility] Follow up s: Monthly  [facility] Enrollment: January 19th 2018  Next [facility] Follow up: 8/10/18

## 2021-06-19 NOTE — PROGRESS NOTES
7/11/18   Start time: 11:05 AM    Stop Time: 11:55 AM   Session # 10    Anand Felix is a 33 y.o. male is being seen today for    Chief Complaint   Patient presents with     Mental Health Note   . Major depression, severe  History of Generalized Anxiety Disorder  Chronic pain syndrome    New symptoms or complaints: None    Functional Impairment:   Personal: 4  Family: 4  Work: 4  Social:4    Clinical assessment of mental status: Anand Felix presented on time.  He was open and cooperative, and dressed appropriately for this session and weather. His memory was in tact .  His  speech was appropriate.  Language was appropriate.  Concentration and focus is on task. Psychosis is not noted or reported. He reports his mood is depressed .  Affect is congruent with speech.  Fund of knowledge is adequate. Insight is adequate for therapy.     Suicidal/Homicidal Ideation present: Patient reports passive suicidal ideation. He denies plan or means. Protective factors are his wife and family.     Patient's impression of their current status: Patient reported he continues to be working on addressing symptoms of depression and anxiety. He reports fatigue and pain has been difficult to manage.    Therapist impression of patients current state: Patient seems to be working on addressing depression, anxiety and chronic medical symptoms. He appears to be working on improving mood and attitude with some progress reported. He reports his sleep has been more consistent. It would likely be worthwhile to continue work on addressing role/identity now, given patient's health concerns. It seems the care guide at his primary care clinic has been helpful and this has reduced stress for patient. Patient appears to have some challenges with household activities, but he appears to be working on being more active at home. Encouraged patient to continue to increase efforts to reach out to social/family support to connect socially/emotionally. He reports  [employer] attendance is becoming more regular. Continued to discuss potential benefits of Mindfulness meditation with patient. Encouraged meditation home practice. Began processing negative cognition regarding pain symptoms in session. Will continue next session. Presented container exercise and patient appeared to benefit.         Type of psychotherapeutic technique provided: Client centered, CBT and Mindfulness and EMDR    Progress toward short term goals: Patient continues to work on increasing coping skills to manage chronic pain and mental health. Patient continues medical care as recommended by his medical providers. He engages in physical activity and social activity as he is able. He is engaging in meditation in daily living. He continues psychotherapy/EMDR.     Review of long term goals: Not done at today's visit    Diagnosis: Major depression, severe  History of Generalized Anxiety Disorder  Chronic pain syndrome    Plan and Follow up: Patient to follow pain center plan of care. Patient to follow all medical recommendations. Patient to schedule every 2 weeks for psychotherapy to further address depression, anxiety and chronic pain concerns. Will continue to educate on EMDR and Mindfulness meditation. Practice meditations as presented in session. Further address adjustment to role/identity due to chronic pain and health concerns. Continue to target negative cognition using EMDR.         Discharge Criteria/Planning: Patient will continue with follow-up until therapy can be discontinued without return of signs and symptoms.    Alize Livingston 7/11/18

## 2021-06-19 NOTE — PROGRESS NOTES
"[facility] Daily Progress     Patient Name: Anand Felix  Date: 2018  Visit #:  +  ([facility] auth thru 10/15/18)  PTA visit #:  6  Referral Diagnosis: Muscle weakness (generalized)  Referring provider: Ham Churchill MD  Visit Diagnosis:     ICD-10-CM    1. Impaired functional mobility, balance, gait, and endurance Z74.09    2. Muscle weakness (generalized) M62.81    3. 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 male who presents to therapy today with chief complaints of muscle weakness and impaired gait and balance. Onset date of sx was 2015.  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  Pain symptoms are aching, burning, numbness, sharp, tingling and weakness.  Functional impairments include walking, dressing, bathing, sitting for long periods, and standing for long periods.  Pt demo's signs and sx consistent with impaired gait and balance secondary to muscle weakness    Assessment:   HEP/POC compliance is  good .  The patient is compliant with his HEP.  He was able to ambulate from .6-.7mph on TM.  At times he would get worked up and so we needed to use calming techniques such as deep breathing to bring his breathing back down and muscle tension back down.  He was able to tolerate new stretching exercises and is appropriate to continue with PT services at this time.    Goal Status:  Pt. will be independent with home exercise program in : 6 weeks Progressing toward  Pt will: be able to walk 10 minutes with spO2 levels 95%+; in 8 weeks Progressing toward  Pt will: be able to maneuver 5 steps to get into his home without difficulty; Ongoing    Plan / Patient Education:     Continue with initial plan of care.  Progress with home program as tolerated.     Next session: Continue with TM training.  Progress functional strengthening as able.    Subjective:     Pain Ratin (\"moderate\")  The patient reports that he was pretty sore after last session.  , " ", and  are the worst since he is at dialysis with his wife.    Objective:   Prior to TM walkin BPM right after NUSTEP  97% SPO2    Ending: SAO2 97%  Ending HR: 158 BPM    Pt ambulates into clinic with a Hurricane used on L and and stiff/rigid R LE    See flowsheet for date performed:  Exercises:  Exercise #1: Quad sets  Comment #1: 10-15 at home  Exercise #2: Hamstring isometrics  Comment #2: 10-15 at home  Exercise #3: Bent knee fallouts  Comment #3: L1 HEP unilateral pull progressed to L3 band from L2 band, partial pulls HEP  Exercise #4: Pre-bridge muscle contractions (TA/glute/hip add)  Comment #4: mini lift (2 inch) x 10, added 1-2' hold HEP  Exercise #5: Nustep seat 5 arms 8 L7 x 5'  Comment #5: Not in clinic today  Exercise #6: Ab sets/marching  Comment #6: HEP  Exercise #7: Pilates arch with L1 band , pull to knees  Comment #7: L1 HEP  Exercise #8: Stairs  Comment #8: x 8 with 1 rail ( on R) and cane, vc for foot flat, not landing just on R heeel, and to keep feet fully on steps, not today  Exercise #9: seated on bed \"scoot\" 12 reps each direction ,  hinged slightly forward to WB through LE's  Comment #9: Not in clinic, reviewed, pt performing x 10  to each side  Exercise #10: standing mini squats HEP, 15 reps at home  Comment #10: standing hip abd HEP, 15 reps at home  Exercise #11: STS  Comment #11: Not in clinic  Exercise #12: 4\" step ups with 1 foot   Comment #12: Not in clinic  Exercise #13: discussed a walking program for home, ptusing a treadmill x 15' at .6-7 mph, to increase to 7-8 mph as able  Comment #13: Not discussed today  Exercise #14: Hamstring stretch  Comment #14: 30 seconds  Exercise #15: Gastroc/soleus stretch  Comment #15: 30 seconds    Appt time: 12:01PM - 12:32PM    Treatment Today     TREATMENT MINUTES COMMENTS   Evaluation     Self-care/ Home management 2 Patient educated on using rolling pin for decreasing muscle tightness on calf mm and hamstrings   Manual " therapy     Neuromuscular Re-education     Therapeutic Activity     Therapeutic Exercises 29 NUSTEP x 5 minutes WL 7.0; subjective measures taken  Progression of TM gait . Patient performed Nustep x 6' at L6 and TM  X 8',   .6mph-.7mph. Patient had shaking/trembling of R LE with decreased toe lift and dragging of foot.  See flowsheet   Gait training     Modality__________________                Total 31    Blank areas are intentional and mean the treatment did not include these items.       Beatriz Medina, PT, DPT  8/9/2018

## 2021-06-20 NOTE — PROGRESS NOTES
Pain Clinic Followup  ENCOUNTER DATE: 2018    Anand HERNANDEZ 1985  MRN # [id number]  PCP: Malachi Choudhary MD    CC: Anand Felix 33 y.o. is here today, sent to me by  to discuss   Chief Complaint   Patient presents with     Follow-up     back pain         HPI:      Pain level: On a scale of 1-10, the patient rates their pain today 7  Pain is described: Sharp, deep, burning, tearing  Aggravating factors: Any activity  Alleviating factors: Salt baths, hot and cold packs, medications, laying down  New pain:  None  Since last visit, pain has improved  Associated Symptoms: Numbness in the right leg and arm, weakness all over his body  Pain interferes with: Sleep, walking, activities of daily living          Pertinent Medical/family/social/medication/allergy History:  Reviewed.   No change since last visit.       Function: improved          Past Medical History:   Diagnosis Date     Bulging lumbar disc      Lumbar herniated disc 10/23/2015     Lumbar radiculopathy 2015       Past Surgical History:   Procedure Laterality Date     MINIMALLY INVASIVE MICRODISCECTOMY LUMBAR SPINE Right 10/2015    L5-S1; Dr. Bettie Hernandez     SINUS SURGERY      Dr. Wright       SOCIAL HISTORY:   reports that he has never smoked. He has never used smokeless tobacco. He reports that he drinks alcohol. He reports that he does not use illicit drugs.    FAMILY HISTORY  family history includes Cancer in his father; Depression in his mother; Hyperlipidemia in his mother; No Medical Problems in his brother, brother, sister, sister, sister, and sister; Other in his mother; Thyroid disease in his cousin and maternal grandmother.    No Known Allergies    Current Outpatient Prescriptions   Medication Sig Dispense Refill     acetaminophen (TYLENOL) 500 MG tablet Take 1 tablet (500 mg total) by mouth every 6 (six) hours as needed for pain.  0     atenolol (TENORMIN) 25 MG tablet Take 1 tablet (25 mg total) by mouth daily. 30 tablet  5     cholecalciferol, vitamin D3, 2,000 unit cap Take 2,000 Units by mouth daily.       cyclobenzaprine (FLEXERIL) 10 MG tablet Take 1 tablet (10 mg total) by mouth 3 (three) times a day as needed for muscle spasms. 90 tablet 0     [START ON 9/2/2018] cyclobenzaprine (FLEXERIL) 10 MG tablet TAKE 1 TABLET BY MOUTH THREE TIMES A DAY AS NEEDED FOR MUSCLE SPASMS 60 tablet 1     fluticasone (FLONASE) 50 mcg/actuation nasal spray 2 sprays into each nostril daily. 16 g 6     morphine (MS CONTIN) 15 MG 12 hr tablet Take 1 tablet (15 mg total) by mouth 3 (three) times a day. 90 tablet 0     morphine (MSIR) 15 MG tablet Take 1 tablet (15 mg total) by mouth 3 (three) times a week. 12 tablet 0     nortriptyline (PAMELOR) 50 MG capsule Take 2 capsules (100 mg total) by mouth at bedtime. 30 capsule 1     omeprazole (PRILOSEC) 20 MG capsule TAKE 1 CAPSULE (20 MG TOTAL) BY MOUTH DAILY. 90 capsule 3     warfarin (COUMADIN) 5 MG tablet Take 5 mg by mouth daily. Adjust dose based on INR results as directed. 60 tablet 5     No current facility-administered medications for this encounter.          REVIEW OF SYSTEMS:     12 point systems were reviewed with pt as documented on pt health form and the patient denies any new diagnosis or changes in 12 point system review since the last visit.     PHYSICAL EXAM:    Constitutional: 33 y.o.  male in NAD; alert and oriented x4/4; appears stated age.  Normal body habitus.  Patient is cooperative, polite, communicates well, makes eye contact, and expresses appropriate concern throughout history. Inspection of the neck demonstrates no skin abnormalities or deformities.  Posture is appropriate with no obvious listing, scoliosis, or rigidity.  HEENT:   Head: Normocephalic and atraumatic.   Right Ear: External ear normal.   Left Ear: External ear normal.   Nose: Nose normal.   Mouth/Throat: Oropharynx is clear and moist.   Eyes: Conjunctivae and EOM are normal. Right eye exhibits no discharge.  "Left eye exhibits no discharge.   Neck: Normal range of motion. Neck supple.   Cardiovascular: Normal rate, regular rhythm and normal heart sounds.    Pulmonary/Chest: Effort normal. No respiratory distress.   Integumentary: no rashes or breaks in the skin, no open wounds.   Psychiatry:   Affect is normal. No abnormal speech. The patient denies any suicidal ideation. No hallucination. Judgement and insight are normal.     Musculoskeletal exam:       Gait: Patient walks with a cane.  Patient rises from a seated position with some difficulty.           Images: No new diagnostic studies    Diagnosis    1. Chronic pain syndrome     2. Muscle Weakness Generalized - \"Neuromuscular Condition\" with normal EMG.  morphine (MSIR) 15 MG tablet   3. Arthralgias In Multiple Sites     4. Chronic pain  morphine (MS CONTIN) 15 MG 12 hr tablet    morphine (MSIR) 15 MG tablet    nortriptyline (PAMELOR) 50 MG capsule   5. Myalgia  morphine (MSIR) 15 MG tablet         Assessment:    This is a 33-year-old male patient returns today accompanied his wife for follow-up regarding his chronic widespread body pain.  Patient reports improvement of his mood, sleep, relationships since the increase of nortriptyline to 50 mg 2 capsules at bedtime.  He denies any side effects of nortriptyline.  He continues to take Flexeril 10 mg 3 times daily, MS Contin 15 mg 3 times daily and morphine IR 50 mg 3 times weekly with dialysis.  Patient denies any adverse effects.  Patient has no aberrant behavior.  All over, patient is doing better.      PLAN:     Plan of care was discussed with the patient. Education about patient pain condition, pathology, and strategies to manage the pain was provided.     Diagnotic Studies/Lab orders:  None    Non Opioid Management: Increase nortriptyline 50 mg 3 capsules at bedtime.  Continue Flexeril 10 mg 3 times daily    :  Dated 8/30/2018  Reviewed and consistent.    Opioid Management:    Continue MS Contin 15 mg 3 times " daily.  Continue morphine IR 15 mg 3 times weekly with dialysis.     SAFETY REMINDERS  No alcohol while taking controlled substances. Alcohol is not an illegal substance, it is unsafe to use in combination. It is a build up of substances in the body that can be extremely hazardous and may cause respirations to slow to a dangerous rate resulting in hospitalization, brain damage, or death.    Opioid medications have been associated with sharp rise in unintentional overdose and death.  Overdose is a condition characterized by the consumption in excess of a particular drug causing adverse effects. This can happen b/c you are sick, accidentally or intentionally took an extra dose, are on multiple medication that can interact. Someone took your medication and they are not use to the medication.  Symptoms of overdose include:   !breathing slow and shallow, erratic or not at all  !pinpoint pupils, hallucinations  !confusion  !muscle jerks, slack muscles   !extreme sleepiness or loss of alertness   !awake but not able to talk   !face pale or clammy, vomiting, for lighter skinned people, the skin tone turns bluish purple, for darker skinned people, it turns grayish or ashen   If in a situation where overdose is a concern engage the emergency response system (dial 911).    In one study it was noted that 80% of unintentional overdoses occurred in people who were taking a combination of opioids and benzodiazepines.    Do not sell, loan, borrow or share your opioid medication with anyone. Deaths have occurred as a result of this practice. It is illegal and patients are being prosecuted.     Prevent unexpected access/loss of medication: Keep medication locked. Only carry what you need with you.    The patient agrees to the plan and has no further questions, if questions arise the patient knows to call [phone].       Please see your current provider for any continued prescription. They will continue to manage your general  health and have requested you see the pain center for pain management. Please discuss any health concerns with your PCP     Follow up:  4 weeks.         Medications:     Requested Prescriptions     Signed Prescriptions Disp Refills     morphine (MS CONTIN) 15 MG 12 hr tablet 90 tablet 0     Sig: Take 1 tablet (15 mg total) by mouth 3 (three) times a day.     morphine (MSIR) 15 MG tablet 12 tablet 0     Sig: Take 1 tablet (15 mg total) by mouth 3 (three) times a week.     nortriptyline (PAMELOR) 50 MG capsule 90 capsule 0     Sig: Take 3 capsules by mouth at bedtime         Ham Churchill MD  [facility]  [address]  [place] [address]  Ph: [phone]  Fax: [phone]

## 2021-06-20 NOTE — PROGRESS NOTES
Order for sleep study received, reviewed, and approved.   Use a ResMed room.    Please document tidal volumes with each CPAP pressure (If CPAP is initiated)

## 2021-06-20 NOTE — PROGRESS NOTES
[facility] Daily Progress     Patient Name: Anand Felix  Date: 2018  Visit #:  +  ([facility] auth thru 10/15/18)  PTA visit #:  6  Referral Diagnosis: Muscle weakness (generalized)  Referring provider: Ham Churcihll MD  Visit Diagnosis:     ICD-10-CM    1. Impaired functional mobility, balance, gait, and endurance Z74.09    2. Muscle weakness (generalized) M62.81    3. 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 male who presents to therapy today with chief complaints of muscle weakness and impaired gait and balance. Onset date of sx was 2015.  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  Pain symptoms are aching, burning, numbness, sharp, tingling and weakness.  Functional impairments include walking, dressing, bathing, sitting for long periods, and standing for long periods.  Pt demo's signs and sx consistent with impaired gait and balance secondary to muscle weakness    Assessment:   HEP/POC compliance is  good .  The patient is compliant with his HEP.  His functional movements are not consistent with the shakiness he gets when performing exercises.  He reports having numbness/tingling in his R LE, so he was given lumbar mobility/stretching exercises.  He is appropriate to continue with PT services at this time for functional strengthening.    Goal Status:  Pt. will be independent with home exercise program in : 6 weeks Progressing toward  Pt will: be able to walk 10 minutes with spO2 levels 95%+; in 8 weeks Progressing toward  Pt will: be able to maneuver 5 steps to get into his home without difficulty; Ongoing    Plan / Patient Education:     Continue with initial plan of care.  Progress with home program as tolerated.     Next session: Continue with TM training.  Progress functional strengthening as able.    Subjective:     Pain Ratin  The patient reports that the rolling pin is helping a bit on the R leg.  He is noticing more numbness and tingling  "since over the weekend.  When he stretches, he feels like he is ripping his leg muscles.  Overall he feels sore after exercises.    Objective:     Pt ambulates into clinic with a Hurricane used on L and and stiff/rigid R LE  Patient able to use his R LE during sit to supine actively.    See flowsheet for date performed:  Exercises:  Exercise #1: Quad sets  Comment #1: 10-15 at home  Exercise #2: Hamstring isometrics  Comment #2: 10-15 at home  Exercise #3: Bent knee fallouts  Comment #3: L1 HEP unilateral pull progressed to L3 band from L2 band, partial pulls HEP  Exercise #4: Pre-bridge muscle contractions (TA/glute/hip add)  Comment #4: Bridging x 10; patient only able to lift 1-2\" off the ground  Exercise #5: Nustep seat 5 arms 8 L6 x 5'  Comment #5: Not in clinic today  Exercise #6: Ab sets/marching  Comment #6: x 10 bilaterally; cues to pull abs in  Exercise #7: Pilates arch with L1 band , pull to knees  Comment #7: L1 HEP  Exercise #8: Stairs  Comment #8: Not in clinic  Exercise #9: seated on bed \"scoot\" 12 reps each direction ,  hinged slightly forward to WB through LE's  Comment #9: Not in clinic, reviewed, pt performing x 10  to each side  Exercise #10: standing mini squats HEP, 15 reps at home  Comment #10: standing hip abd HEP, 15 reps at home  Exercise #11: STS  Comment #11: Not in clinic  Exercise #12: 4\" step ups with 1 foot   Comment #12: Not in clinic  Exercise #13: discussed a walking program for home, ptusing a treadmill x 15' at .6-7 mph, to increase to 7-8 mph as able  Comment #13: Not discussed today  Exercise #14: Hamstring stretch  Comment #14: HEP  Exercise #15: Gastroc/soleus stretch  Comment #15: HEP  Exercise #16: Piriformis stretch  Comment #16: 30 seconds  Exercise #17: LTR  Comment #17: x 10  Exercise #18: KTC stretch  Comment #18: 30 seconds    Appt time: 12:00PM - 12:27PM    Treatment Today     TREATMENT MINUTES COMMENTS   Evaluation     Self-care/ Home management     Manual therapy   "   Neuromuscular Re-education     Therapeutic Activity     Therapeutic Exercises 27 NUSTEP x 5 minutes WL 7.0; subjective measures taken  See flowsheet   Gait training     Modality__________________                Total 27    Blank areas are intentional and mean the treatment did not include these items.       Beatriz Medina, PT, DPT  8/23/2018

## 2021-06-20 NOTE — PROGRESS NOTES
DYAN called and spoke to [facility] Disability intake.  Informed that [place] completed on 6/15/18 by [facility] RN  Herman at [phone].   confirmed that pt was found eligible for CADI wavier.  However waiver unable to start until pts insurance switched to [place].  Since pt waiting to be approved for [place] disability pt will need to go through SMRT process.  DYAN was advised to contact pts [facility] financial worker Yunior at [phone] and request he initiate SMRT process asap.  [place] assessment is only good for 90 days so SMRT process needs to be started soon or pt will need new assessment.  DYAN consulted with [facility]'s and based on consult DYAN called and left message for financial worker and RN .      Care guide to update pt at next outreach and ask if pt has any updates from [facility].  DYAN and care guide will continue to follow.

## 2021-06-20 NOTE — PROGRESS NOTES
8/24/2018    Start time: 11:05 AM    Stop Time: 11:55 AM   Session # 12    Anand Felix is a 33 y.o. male is being seen today for    Chief Complaint   Patient presents with     Mental Health note   . Major depression, severe  History of Generalized Anxiety Disorder  Chronic pain syndrome    New symptoms or complaints: None    Functional Impairment:   Personal: 4  Family: 4  Work: 4  Social:4    Clinical assessment of mental status: Anand Felix presented on time.  He was open and cooperative, and dressed appropriately for this session and weather. His memory was in tact .  His  speech was appropriate.  Language was appropriate.  Concentration and focus is on task. Psychosis is not noted or reported. He reports his mood is depressed .  Affect is congruent with speech.  Fund of knowledge is adequate. Insight is adequate for therapy.     Suicidal/Homicidal Ideation present: Patient reports passive suicidal ideation. He denies plan or means. Protective factors are his wife and family. Discussed safety plan to call 911, go to the ER and/or call the crisis connection if symptoms change or worsen.        Patient's impression of their current status: Patient reported he continues to be working on addressing symptoms of depression and anxiety. He reports physical weakness and pain are more manageable. He has some financial concerns that he is addressing with the [facility].     Therapist impression of patients current state: Patient seems to be working on addressing depression, anxiety and chronic medical symptoms. He continues to work on addressing acceptance of his chronic pain and medical concerns, with some progress.  He reported recent financial stressors have increased depression symptoms. Discussed ways to cope with SI.  He reports he is consistent with PT and noticing positive improvement. He seems to be working on letting go and focusing on abilities at this time. Patient reports he and his wife are working on focusing on  their medical needs. Encouraged patient to continue to increase efforts to reach out to social/family support to connect socially/emotionally. He reports [employer] attendance is consistent, but otherwise he stays close to home. Continued to discuss potential benefits of Mindfulness meditation with patient. He reports regular meditation practice and grounding. Continued home practice is encouraged. Set up target to process next time regarding leg pain.  Presented container exercise and patient appeared to benefit.      Type of psychotherapeutic technique provided: Client centered, CBT and Mindfulness, EMDR    Progress toward short term goals: Patient continues to work on increasing coping skills to manage chronic pain and mental health. Patient continues medical care as recommended by his medical providers. He engages in physical activity and social activity as he is able. He is engaging in meditation/grounding in daily living. He continues psychotherapy/EMDR. He is working with a county .     Review of long term goals: Not done at today's visit    Diagnosis: Major depression, severe  History of Generalized Anxiety Disorder  Chronic pain syndrome    Plan and Follow up: Patient to follow pain center plan of care. Patient to follow all medical recommendations. Patient to schedule every 2 weeks for psychotherapy to further address depression, anxiety and chronic pain concerns. Will continue to educate on EMDR and Mindfulness meditation. Practice meditations as presented in session. Further address adjustment to role/identity due to chronic pain and health concerns. Continue EMDR using pain protocol. Present letting go exercise next session, perhaps sitting meditation: thoughts.             Discharge Criteria/Planning: Patient will continue with follow-up until therapy can be discontinued without return of signs and symptoms.    Alize Livnigston 8/24/2018

## 2021-06-20 NOTE — PROGRESS NOTES
10/10/2018    Start time: 1:10 PM    Stop Time: 2:00 PM   Session # 15    Anand Felix is a 33 y.o. male is being seen today for    Chief Complaint   Patient presents with     Mental Health Note   . Major depression, moderate  History of Generalized Anxiety Disorder  Chronic pain syndrome    New symptoms or complaints: None    Functional Impairment:   Personal: 4  Family: 4  Work: 4  Social:4    Clinical assessment of mental status: Anand Felix presented on time.  He was open and cooperative, and dressed appropriately for this session and weather. His memory was in tact .  His  speech was appropriate.  Language was appropriate.  Concentration and focus is on task. Psychosis is not noted or reported. He reports his mood is mildly depressed and anxious .  Affect is congruent with speech.  Fund of knowledge is adequate. Insight is adequate for therapy.     Patient updated paperwork this session: PHQ-9 depression score is 14 and indicates moderate symptoms of depression, CAROLYN-7 anxiety score is 12 and  indicates ,moderate symptoms of anxiety, WHODAS score is 37.50%, H1= 30, H2=1-2, H3=14, PANSI was not completed today. He denies suicidal ideation, plan or means at this time.     Suicidal/Homicidal Ideation present: None Reported This Session    Patient's impression of their current status: Patient reported he continues to be working on addressing symptoms of depression and anxiety. He reports pain symptoms are more manageable. He reports some worries and stress related to their recent move to his brother's house. He continues to have some difficulties with falling asleep.       Therapist impression of patients current state: Patient seems to be working on addressing depression, anxiety and chronic medical symptoms. He reports pain symptoms have improved and seem more manageable. He continues to work on addressing acceptance of his chronic pain and medical concerns, with some progress.  He reported he and his wife moved to his  brother's house recently. He reported some related stressors due to this, but also some positive outcomes. He reported he is engaged with others more often now and he is also getting out more for short walks. He processed worries about the transition and ways he is letting go. He denies SI at this time, but that he had some fleeting thoughts over the past 2 weeks. Discussed safety plan and ways to cope if symptoms should worsen or he is feeling unsafe.  He reports he is consistent with PT exercises in home practice and noticing ongoing positive improvement. He reported concerns with stuttering/slurring have returned about 2 weeks ago, but to a lesser degree. He states it is more noticeable when he talks faster. Discussed following up with his medical providers again and also discussed triggers related to recent changes in living arrangements. Encouraged patient to continue to increase efforts to reach out to social/family support to connect socially/emotionally. He reports [employer] attendance as he is able. Continued to discuss potential benefits of Mindfulness meditation with patient. He reports regular meditation practice and grounding. Continued home practice is encouraged. He preferred to process target/NC another time as he reported pain symptoms were low today.     Type of psychotherapeutic technique provided: Client centered, CBT and Mindfulness    Progress toward short term goals: Patient continues to work on increasing coping skills to manage chronic pain and mental health. Patient continues medical care as recommended by his medical providers. He engages in physical activity and social activity as he is able. He is engaging in meditation/grounding in daily living. He continues psychotherapy/EMDR. He is working with a county . Patient is adjusting to a recent move. Discussed patient's goals and treatment plan will be developed.     Review of long term goals: Discussed patient's goals and treatment  plan will be developed.     Diagnosis: Major depression, moderate  History of Generalized Anxiety Disorder  Chronic pain syndrome    Plan and Follow up: Patient to follow pain center plan of care. Patient to follow all medical recommendations. Patient to schedule every 2 weeks for psychotherapy to further address depression, anxiety and chronic pain concerns. Continue meditation and other healthy coping strategies.  Practice meditations as presented in session. Further address adjustment to role/identity due to chronic pain and health concerns. Continue EMDR using pain protocol.         Discharge Criteria/Planning: Patient will continue with follow-up until therapy can be discontinued without return of signs and symptoms.    Alize Livingston 10/10/2018

## 2021-06-20 NOTE — PROGRESS NOTES
Outpatient Mental Health Treatment Plan    Name:  Anand Felix  :  1985  MRN:  [id number]    Treatment Plan:  Updated Treatment Plan  Intake/initial treatment plan date:  18  Benefit and risks and alternatives have been discussed: Yes  Is this treatment appropriate with minimal intrusion/restrictions: Yes  Estimated duration of treatment:  Approximately 20 sessions.  Anticipated frequency of services:  Every 2 weeks  Necessity for frequency: This frequency is needed to establish therapeutic goals and for continuity of care in order to monitor progress.  Necessity for treatment: To address cognitive, behavioral, and/or emotional barriers in order to work toward goals and to improve quality of life.    Session Type: Patient is presenting for an Individual session.    Plan:      ? Depression    Goal:  Decrease average depression level from 6 to 4.   Strategies:    ?[x] Decrease social isolation     [x] Increase involvement in meaningful activities     ?[x] Discuss sleep hygiene     ?[x] Explore thoughts and expectations about self and others     ?[x] Process grief (loss of significant person, independence, role,        etc.)     ?[x] Assess for suicide risk     ?[x] Implement physical activity routine (with physician approval)     [x] Consider introduction of bibliotherapy and/or videos     [x] Continue compliance with medical treatment plan (or explore         barriers)       ?  ?Degree to which this is a problem: 4  Degree to which goal is met: 1  Date of Review: 10/10/18          ?   ? Anxiety    Goal:  Decrease average anxiety level from 7 to 4.   Strategies: ? [x]Learn and practice relaxation techniques and other coping        strategies (e.g., thought stopping, reframing, meditation)     ? [x] Increase involvement in meaningful activities     ? [x] Discuss sleep hygiene     ? [x] Explore thoughts and expectations about self and others     ? [x] Identify and monitor triggers for panic/anxiety  symptoms     ? [x] Implement physical activity routine (with physician approval)     ? [x] Consider introduction of bibliotherapy and/or videos     ? [x] Continue compliance with medical treatment plan (or explore          barriers)                                       [x]     Degree to which this is a problem: 4  Degree to which goal is met: 1  Date of Review: 10/10/18  Chronic pain  Goal:  ? Decrease average pain level from 7 to 4.   ? Increase % acceptance of pain from 60 to 80.   Strategies: ? [x] Explore thoughts and expectations about self and others    [x] Explore emotional reactions to illness/injury      ? [x] Learn and practice relaxation techniques and other coping          strategies            [x] Implement physical activity routine (with physician approval)     ? [x] Engage in values clarification and goal-setting     ? [x] Consider introduction of bibliotherapy and/or videos     ? [x] Increase involvement in meaningful activities     ? [x] Discuss sleep hygiene     ? [x] Process grief (loss of significant person, independence, role,          etc.)     ? [x] Assess for suicide risk     ?  Degree to which this is a problem: 4  Degree to which goal is met: 1  Date of Review: 10/10/18       Functional Impairment:  1=Not at all/Rarely  2=Some days  3=Most Days  4=Every Day    Personal : 4  Family : 4  Social : 4   Work/school : 4    Diagnosis: Major depression, moderate  History of Generalized Anxiety Disorder  Chronic pain syndrome      WHODAS 2.0 12-item version 37.50%      Scores presented in qualifiers to represent level of disability.  MODERATE Problem (medium, fair, ...) 25-49%    H1= 30  H2= 1-2  H3= 14        Clinical assessments and measures completed:. CAROLYN-7 and PHQ-9     Strengths:Patience, fast learner, hard working  Limitations:  lacks assertiveness, wants to improve his health, health seems to be getting worse in the last year.   Cultural Considerations: Patient is a 33 year old,   American,  male who has a history of depression, anxiety and chronic pain.     Persons responsible for this plan: Patient and Provider            Psychotherapist Signature           Patient Signature:              Guardian Signature             Provider: Performed and documented by Alize Livingston [facility]   Date:  10/10/18

## 2021-06-20 NOTE — PROGRESS NOTES
Opened in error  [facility] Daily Progress     Patient Name: Anand Felix  Date: 2018  Visit #: /8 +  ([facility] auth thru 10/15/18)  PTA visit #:  9  Referral Diagnosis: Muscle weakness (generalized)  Referring provider: Ham Churchill MD  Visit Diagnosis:     ICD-10-CM    1. Impaired functional mobility, balance, gait, and endurance Z74.09    2. Bilateral pulmonary embolism (H) I26.99    3. Muscle weakness (generalized) M62.81    4. 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 Monoparesis of leg (H) G83.10      Anand Felix is a 33 y.o. male who presents to therapy today with chief complaints of muscle weakness and impaired gait and balance. Onset date of sx was 2015.  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  Pain symptoms are aching, burning, numbness, sharp, tingling and weakness.  Functional impairments include walking, dressing, bathing, sitting for long periods, and standing for long periods.  Pt demo's signs and sx consistent with impaired gait and balance secondary to muscle weakness    Assessment:   HEP/POC compliance is  good .  The patient is compliant with his HEP.  His functional movements are not consistent with the torso and B LE shaking .  Standing activities appear more labored with increase assist of therapist to remain upright.    He is appropriate to continue with PT services at this time for functional strengthening.    Goal Status:  Pt. will be independent with home exercise program in : 6 weeks Progressing toward  Pt will: be able to walk 10 minutes with spO2 levels 95%+; in 8 weeks Progressing toward  Pt will: be able to maneuver 5 steps to get into his home without difficulty; Met Pt climbs 10 steps two times a day for exercise    Plan / Patient Education:     Continue with initial plan of care.  Progress with home program as tolerated.     Next session: Continue x 1 or as needed to maximize goals and independent HEP.    Subjective:     Pain Ratin  Using home  "TM x 15' bouts, walking at .8 mph for longer bouts, up to 3'-4'  Pt now stuttering at ties, MD  Is checking possible causes  Pt feels PT has improved his walking ability and his flexibiity    Objective:   Initial HR 67 BPM  SAO2 97%      Pt ambulates into clinic with a Hurricane used on L and and stiff/rigid R LE, but slightly faster pace, decreased R foot drag      See flowsheet for date performed:  Exercises:  Exercise #1: Quad sets  Comment #1: 10-15 at home  Exercise #2: Hamstring isometrics  Comment #2: 10-15 at home  Exercise #3: Bent knee fallouts  Comment #3: L1 HEP unilateral pull progressed to L3 band from L2 band, partial pulls HEP  Exercise #4: Pre-bridge muscle contractions (TA/glute/hip add)  Comment #4: Bridging x 7; patient only able to lift 2-3\" off the ground, with 5' hold, max effort today  Exercise #5: Nustep seat 5 arms 8 L6 x 5'  Comment #5: Not today  Exercise #6: Ab sets/marching  Comment #6: x 10 bilaterally; cues to pull abs in  Exercise #7: Pilates arch with L1 band , pull to knees  Comment #7: L1 HEP  Exercise #8: Stairs at home climbs 10 steps 2xday  Comment #8: Not in clinic  Exercise #9: seated on bed \"scoot\" changed to L2 band around low thighs, standing side stepping, holding on to plinth (kitchen counter at home)  Comment #9: Pt completed 16' sidestepping to L, 5 feet sidestepping to R, after last step  Exercise #10: standing mini squats HEP, 15 reps at home  Comment #10: standing hip abd HEP, 15 reps at home  Exercise #11: STS at home and today in clinic with L foot on foam, B HHA  Comment #11: x 10 today with 60% VC to hinge forward to use legs more completely, 10 was max effort  Exercise #12: Tap ups with alternating feet  Comment #12: x 4 B, unable to lift R foot up beyond 3\", max effort by pt, mod assist to remain standing  Exercise #13: discussed a walking program for home, ptusing a treadmill x 15' at .6-7 mph, to increase to 7-8 mph as able  Comment #13: Today performed TM x " "5', .7 mph x 4', .8 mph x 1'  Exercise #14: Hamstring stretch  Comment #14: HEP  Exercise #15: Gastroc/soleus stretch  Comment #15: reviewed, shown seated versions tot trial  Exercise #16: Piriformis stretch performed today  Comment #16: 30 seconds x2 B  Exercise #17: LTR performed today  Comment #17: reviewed  Exercise #18: KTC stretch, performed today  Comment #18: pt to hold longer than 5\", perform fewer reps  Exercise #19: Sidestepping around plinth wth L2 band  Comment #19: 10' x 2 B direction      Ending SAO2 97%, HR 91 BPM  HR did increase to 121-128 BPM with activity  SAO2 remained above 95%      Treatment Today     TREATMENT MINUTES COMMENTS   Evaluation     Self-care/ Home management     Manual therapy     Neuromuscular Re-education     Therapeutic Activity     Therapeutic Exercises 55 See flowsheet: focus today on standing activities and  Recently upgraded exercises  All standing and some supine exercises were performed with max effort by pt and B LE and torso shaking   Gait training     Modality__________________                Total 55    Blank areas are intentional and mean the treatment did not include these items.       Genia Juarez, PTA,CLT9/13/2018  "

## 2021-06-20 NOTE — PROGRESS NOTES
"9/25/2018    Start time: 11:15 AM    Stop Time: 12:00 PM   Session # 14    Anand Felix is a 33 y.o. male is being seen today for    Chief Complaint   Patient presents with     Mental Health Note   . Major depression, severe  History of Generalized Anxiety Disorder  Chronic pain syndrome       New symptoms or complaints: None    Functional Impairment:   Personal: 4  Family: 4  Work: 4  Social:4    Clinical assessment of mental status: Anand Felix presented on time. Due to a scheduling error, his appointment had been cancelled and another patient scheduled in that appointment time. Due to a \"no show\", patient was able to resume his original appointment, but the session started late due to this error.  He was open and cooperative, and dressed appropriately for this session and weather. His memory was in tact .  His  speech was appropriate.  Language was appropriate.  Concentration and focus is on task. Psychosis is not noted or reported. He reports his mood is mildly depressed and worried .  Affect is congruent with speech.  Fund of knowledge is adequate. Insight is adequate for therapy.     Suicidal/Homicidal Ideation present: Patient reports passive and fleeting suicidal ideation. He denies plan or means. Protective factors are his wife and family. Discussed safety plan to call 911, go to the ER and/or call the crisis connection if symptoms change or worsen.     Patient's impression of their current status: Patient reported he continues to be working on addressing symptoms of depression and anxiety. He reports pain symptoms are more manageable. He reports some worries and stress related to their move to his brother's this weekend. He continues to have some sleep difficulties.     Therapist impression of patients current state: Patient seems to be working on addressing depression, anxiety and chronic medical symptoms. He reports pain symptoms have improved and seem more manageable. He continues to work on addressing " acceptance of his chronic pain and medical concerns, with some progress.  He reported continued financial stressors and that he and his wife will move in with his brother at the end of the month. He processed worries about the transition and discussed ways to cope/ways to let go.  Discussed ways to cope with SI. He does report these thoughts do not linger as they once did.  He reports he is consistent with PT exercises and noticing ongoing positive improvement. He reported concerns with stuttering were resolved as his medical provider adjusted his medications. Symptoms went away within a few days of the medication change. He will have a sleep study in November, per recommendation of his medical provider. Encouraged patient to continue to increase efforts to reach out to social/family support to connect socially/emotionally. He reports Yarsan[employer] attendance and seeing extended family when he is able. Continued to discuss potential benefits of Mindfulness meditation with patient. He reports regular meditation practice and grounding. Continued home practice is encouraged.  Presented seeing and hearing meditations and he reported benefit. He preferred to process target/NC next time as he reported pain symptoms were low today.     Type of psychotherapeutic technique provided: Client centered, CBT and Mindfulness    Progress toward short term goals: Patient continues to work on increasing coping skills to manage chronic pain and mental health. Patient continues medical care as recommended by his medical providers. He engages in physical activity and social activity as he is able. He is engaging in meditation/grounding in daily living. He continues psychotherapy/EMDR. He is working with a county . Patient is working on moving at the end of this month.        Review of long term goals: Not done at today's visit    Diagnosis: Major depression, severe  History of Generalized Anxiety Disorder  Chronic pain  syndrome     Plan and Follow up: Patient to follow pain center plan of care. Patient to follow all medical recommendations. Patient to schedule every 2 weeks for psychotherapy to further address depression, anxiety and chronic pain concerns. Continue meditation and other healthy coping strategies.  Practice meditations as presented in session. Further address adjustment to role/identity due to chronic pain and health concerns. Continue EMDR using pain protocol.            Discharge Criteria/Planning: Patient will continue with follow-up until therapy can be discontinued without return of signs and symptoms.    Alize Livingston 9/25/2018

## 2021-06-20 NOTE — PROGRESS NOTES
Email: [email or web address]  Marital Status: -Miguel Felix  Children: No  Born and Raised:   Occupation: On Disability  Living Situation: Lives in [place] with his wife.  Smoking, Alcohol, other:  Services receiving:   [facility] Follow up s: Monthly  [facility] Enrollment: January 19th 2018  Next [facility] Follow up: 10/2/18

## 2021-06-20 NOTE — PROGRESS NOTES
"Pulmonary Follow Up Note  9/11/2018      Reason for Follow Up: Shortness of Breath, likely neuromuscular disease      Problem List:     Patient Active Problem List   Diagnosis     Muscle Weakness Generalized - \"Neuromuscular Condition\" with normal EMG.     Tremor     Arthralgias In Multiple Sites     Muscle Aches, Generalized (Myalgias)     Chronic Ethmoidal Sinusitis     Hypovitaminosis D - [place]     Monoparesis of leg (H)     Weakness - without demonstrable CNS or peripheral cause - local neurologist at [facility], and neurologist at [facility], and [facility] consultant.     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     Nasal polyposis - right naris on MRI (2016)     Pulmonary embolism (H)     Bilateral pulmonary embolism (H)     Myalgia     Sinus tachycardia       History:     Mr. Anand Felix is a 34 yo male with PMH significant for generalized weakness, PE on Xarelto, and chronic pain, who presents to pulmonary clinic for evaluation for shortness of breath.  This has been worse especially since his PE diagnosis last year.  He states that he has had shortness of breath really for some time.  He has a history of generalized weakness and has been evaluated by several neurologist who have not found an answer.  He has a history of herniated disc at L1 causing leg weakness.  Since then he has had some mild dyspnea.  It seems that Dr. Donis has proposed that some of his symptoms are due to a somatoform disorder.  He also notes that he can walk on a treadmill, but this is at a pace of 0.5 to 0.6 mph and never really faster.  He uses a cane to walk as well.  He did have a PE and has been on xarelto.  He states he does have early satiety, but no orthopnea or LE weakness.  No cough or sputum production.  No wheeze.      Of note, his wife is present as well and had asked about KOJO as he is " noted to stop breathing in his sleep.  His ESS is 14 and his STOP BANG is 4.    At follow-up on his additional pulmonary function tests which he was noted to have extreme fatigue after completing.  His MIP and MEP were both reduced.  He continued to have intermittent cough and shortness of breath.  He continues to have a lot of weakness, falls, myalgias, and chest discomfort.  He does complain of some pain under his right lower rib cage.  On examination he also continues to have significant weakness manifested through shaking just simply taking a large breath.  I did review with him results of previous neurology consultations.  It does sound as though there was some talk of a muscle biopsy at some time but it is it was not pursued.  There was some concern apparently of his anticoagulation.    Today he feels that things may be a bit better.  He has had some very mild hemoptysis that was while he had a sinusitis.  He was placed on prednisone and cefdinir by ENT and this improved.  Still having issues with pain in his back and weakness.  He also has some intermittent chest discomforts.  His wife does not think he is doing any better.     Past Medical History:   Diagnosis Date     Bulging lumbar disc      Lumbar herniated disc 10/23/2015     Lumbar radiculopathy 11/25/2015     Past Surgical History:   Procedure Laterality Date     MINIMALLY INVASIVE MICRODISCECTOMY LUMBAR SPINE Right 10/2015    L5-S1; Dr. Bettie Hernandez     SINUS SURGERY  2014    Dr. Wright     Social History     Social History     Marital status: Single     Spouse name: Miguel     Number of children: 0     Years of education: GED and some collese     Occupational History     unemployed      Social History Main Topics     Smoking status: Never Smoker     Smokeless tobacco: Never Used     Alcohol use Yes      Comment: Monthly or less often. 1-2 drinks at a time.     Drug use: No     Sexual activity: Yes     Partners: Female     Other Topics Concern     Not  "on file     Social History Narrative     Family History   Problem Relation Age of Onset     Depression Mother      Hyperlipidemia Mother      Other Mother      \"Ulcers\" - Aches and pains throughout her body.     Cancer Father      No Medical Problems Sister      No Medical Problems Brother      No Medical Problems Sister      No Medical Problems Sister      No Medical Problems Sister      No Medical Problems Brother      Thyroid disease Maternal Grandmother      Thyroid disease Cousin      No Known Allergies    Review of Systems - 10 point review of systems negative except what is mentioned in the HPI.  Also with positive excessive sweating, fatigue, sinus congestion, sinus pressure, epistaxis, PND, tinnitus, abdominal pain, heartburn, difficulty urinating, heat intolerance, rash, arthralgias/myalgias, allergies, headaches, tremor and these are unchanged.        Medications:     Current Outpatient Prescriptions on File Prior to Visit   Medication Sig Dispense Refill     acetaminophen (TYLENOL) 500 MG tablet Take 1 tablet (500 mg total) by mouth every 6 (six) hours as needed for pain.  0     atenolol (TENORMIN) 25 MG tablet Take 1 tablet (25 mg total) by mouth daily. 30 tablet 5     cholecalciferol, vitamin D3, 2,000 unit cap Take 2,000 Units by mouth daily.       cyclobenzaprine (FLEXERIL) 10 MG tablet Take 1 tablet (10 mg total) by mouth 3 (three) times a day as needed for muscle spasms. 90 tablet 0     fluticasone (FLONASE) 50 mcg/actuation nasal spray 2 sprays into each nostril daily. 16 g 6     [START ON 9/21/2018] morphine (MS CONTIN) 15 MG 12 hr tablet Take 1 tablet (15 mg total) by mouth 3 (three) times a day. 90 tablet 0     [START ON 9/21/2018] morphine (MSIR) 15 MG tablet Take 1 tablet (15 mg total) by mouth 3 (three) times a week. 12 tablet 0     nortriptyline (PAMELOR) 50 MG capsule Take 3 capsules by mouth at bedtime 90 capsule 0     omeprazole (PRILOSEC) 20 MG capsule TAKE 1 CAPSULE (20 MG TOTAL) BY " MOUTH DAILY. 90 capsule 3     warfarin (COUMADIN) 5 MG tablet Take 5 mg by mouth daily. Adjust dose based on INR results as directed. 60 tablet 5     [DISCONTINUED] cyclobenzaprine (FLEXERIL) 10 MG tablet TAKE 1 TABLET BY MOUTH THREE TIMES A DAY AS NEEDED FOR MUSCLE SPASMS 60 tablet 1     No current facility-administered medications on file prior to visit.            Exam/Data:   Vitals  Vitals:    09/11/18 1352   BP: 120/78   Resp: 18   SpO2: 95%       EXAM:  GEN: Alert and oriented x3, NAD  HEENT: Nares patent, posterior oropharynx clear.  MP II.  RESPIRATORY: CTAB.  No wheeze or rales, noted shaking with deep inspiration  CARDIOVASCULAR: tachycardic but regular, no m/r/g  GASTROINTESTINAL: Round, soft, NT/ND  HEM/ONC: no adenopathy, no ecchymosis  MSK: no clubbing.  Ambulates slowly with a cane  NEURO: cranial nerves appear grossly intact, muscle strength equal  SKIN: no rash or ulcerations      DATA      PFT DATA:  FEV1/FVC is 106 and is normal.  FEV1 is 96% predicted and is normal.  FVC is 95% predicted and normal.  There was no improvement in spirometry after a single inhaled dose of bronchodilator.  TLC is 76% predicted and is reduced.  RV is 68% predicted and is reduced.  DLCO is 88% predicted and is normal when it   is corrected for hemoglobin.     Impression:  Full Pulmonary Function Test is abnormal.    PFTs are consistent with mild restrictive disease with TLC of 76%  Spirometry is not consistent with reversibility.  There is no hyperinflation.  There is no air-trapping.  Diffusion capacity when corrected for hemoglobin is normal.    MIP and MEP were reviewed and are reduced.    IMAGING:   Personally reviewed CTA in 10/17.  Noted PE.  No parenchymal lung disease.  Small right pleural effusion.     Modified walk test with reduced exertional capacity without evidence of desaturation or hypoxia.    Assessment/Plan:   Anand Felix is a 33 y.o. male being seen for shortness of breath, likely multifactorial, with  PMH significant for acute PE, chronic weakness that is unexplained, and chronic pain.    1. Shortness of Breath:  This is likely multifactorial.  On exam he shook when he took a deep breath that would indicate extremely weak core/abdominal musculature, and not true general muscle weakness which would produce shallow steady breaths.  With that, his TLC is mildly reduced with a normal diffusing capacity, suggesting neuromuscular weakness.  His MEP/MIP are also reduced.  He also has had a PE and been on therapy, but he is without evidence of chronic thromboembolic pulmonary hypertension/disease.  He will stay on Xarelto.  Finally, I did discuss with him the use of narcotics with weakness and that this should really be avoided.  Ultimately I am concerned that he does have an underlying neuromuscular disease that has not been diagnosed.  At this point though he is not hypoxic and I do not think would benefit from any further pulmonary treatment.  If there was any concern about how to obtain him muscle biopsy while on anticoagulation we could assist with bridging him on Lovenox.  Will defer any further workup of neuromuscular disease though to his neurologist or his primary care physician.  I have also discussed the case with Dr. James Collins MD, about whether or not a polysomnogram may be able to further lend evidence to neuromuscular weakness.  There are certain situations where this may be beneficial, but it would be more beneficial to evaluate the patient's breathing patterns while sleeping as a somatoform disease would not be persistent during sleep. We will order a PSG with ala nasi muscle effort testing.    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  At some point, I will likely do a split night PSG on him, but first I want to do the above tests.  As noted above we will consider this in the near future.  At this time though will just observe and the patient come back in 3  months.    Recommend:  - PSG with ala nasi testing to evaluate KOJO as well as hypoventilation and muscle weakness/poor effort   - stay on Xarelto  - Return to clinic in 6 weeks    I spent more than 15 minutes in review and assessment of the patient, as well as formulating plan of care, with > 50% time spent face-to-face.      Anand Winn, DO

## 2021-06-20 NOTE — PROGRESS NOTES
[facility] Daily Progress     Patient Name: Anand Felix  Date: 2018  Visit #:  +  ([facility] auth thru 10/15/18)  PTA visit #:  7  Referral Diagnosis: Muscle weakness (generalized)  Referring provider: Ham Churchill MD  Visit Diagnosis:     ICD-10-CM    1. Bilateral pulmonary embolism (H) I26.99      Anand Felix is a 33 y.o. male who presents to therapy today with chief complaints of muscle weakness and impaired gait and balance. Onset date of sx was 2015.  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  Pain symptoms are aching, burning, numbness, sharp, tingling and weakness.  Functional impairments include walking, dressing, bathing, sitting for long periods, and standing for long periods.  Pt demo's signs and sx consistent with impaired gait and balance secondary to muscle weakness    Assessment:   HEP/POC compliance is  good .  The patient is compliant with his HEP.  His functional movements are not consistent with the shakiness he gets when performing exercises.  He reports having numbness/tingling in his R LE have decreased since stretching more often.  He is appropriate to continue with PT services at this time for functional strengthening.    Goal Status:  Pt. will be independent with home exercise program in : 6 weeks Progressing toward  Pt will: be able to walk 10 minutes with spO2 levels 95%+; in 8 weeks Progressing toward  Pt will: be able to maneuver 5 steps to get into his home without difficulty; Ongoing    Plan / Patient Education:     Continue with initial plan of care.  Progress with home program as tolerated.     Next session: Continue with TM training.  Progress functional strengthening as able.    Subjective:     Pain Ratin  The patient reports that the rolling pin continues to help.The new stretches are helping. Pt also reports a decrease in tingling and numbness .    Objective:     Pt ambulates into clinic with a Hurricane used on L and and  "stiff/rigid R LE, but slightly faster pace, decreased R foot drag      See flowsheet for date performed:  Exercises:  Exercise #1: Quad sets  Comment #1: 10-15 at home  Exercise #2: Hamstring isometrics  Comment #2: 10-15 at home  Exercise #3: Bent knee fallouts  Comment #3: L1 HEP unilateral pull progressed to L3 band from L2 band, partial pulls HEP  Exercise #4: Pre-bridge muscle contractions (TA/glute/hip add)  Comment #4: Bridging x 10; patient only able to lift 1-2\" off the ground  Exercise #5: Nustep seat 5 arms 8 L6 x 5'  Comment #5: Not in clinic today  Exercise #6: Ab sets/marching  Comment #6: x 10 bilaterally; cues to pull abs in  Exercise #7: Pilates arch with L1 band , pull to knees  Comment #7: L1 HEP  Exercise #8: Stairs  Comment #8: Not in clinic  Exercise #9: seated on bed \"scoot\" 12 reps each direction ,  hinged slightly forward to WB through LE's  Comment #9: Not in clinic, reviewed, pt performing x 10  to each side  Exercise #10: standing mini squats HEP, 15 reps at home  Comment #10: standing hip abd HEP, 15 reps at home  Exercise #11: STS  Comment #11: attempted x 1 onto foam with HHA, pt unable, performed x 4 without foam with HHA, max effort  Exercise #12: Tap ups with alternating feet  Comment #12: x 4 B, unable to lift R foot up beyond 3\"  Exercise #13: discussed a walking program for home, ptusing a treadmill x 15' at .6-7 mph, to increase to 7-8 mph as able  Comment #13: Today performed TM x 5', .7 mph x 4', .8 mph x 1'  Exercise #14: Hamstring stretch  Comment #14: HEP  Exercise #15: Gastroc/soleus stretch  Comment #15: reviewed  Exercise #16: Piriformis stretch performed today  Comment #16: 30 seconds  Exercise #17: LTR performed today  Comment #17: x 10  Exercise #18: KTC stretch, performed today  Comment #18: 30 seconds    Appt time: 12:00PM - 12:30PM  Ending SAO2 97%, HR 98 BPM    Treatment Today     TREATMENT MINUTES COMMENTS   Evaluation     Self-care/ Home management     Manual " therapy     Neuromuscular Re-education     Therapeutic Activity     Therapeutic Exercises 30 See flowsheet Stretching, STS, mini squats, toe taps performed before TM   Gait training     Modality__________________                Total 30    Blank areas are intentional and mean the treatment did not include these items.       Genia Juarez, [facility],CLT8/30/2018

## 2021-06-20 NOTE — PROGRESS NOTES
"9/11/18    Start time: 10:50 AM    Stop Time: 11:40 AM   Session # 13    Anand Felix is a 33 y.o. male is being seen today for    Chief Complaint   Patient presents with     Mental Health Note   .  Major depression, severe  History of Generalized Anxiety Disorder  Chronic pain syndrome    New symptoms or complaints: Patient reports he has been \"stuttering\" for about one week. He states this is unusual behavior for him.     Functional Impairment:   Personal: 4  Family: 4  Work: 4  Social:4    Clinical assessment of mental status: Anand Felix presented on time.  He was open and cooperative, and dressed appropriately for this session and weather. His memory was in tact .  His  speech was appropriate, but stuttered at times.  Language was appropriate, but stuttered at times.  Concentration and focus is on task. Psychosis is not noted or reported. He reports his mood is depressed and worried .  Affect is congruent with speech.  Fund of knowledge is adequate. Insight is adequate for therapy.     Suicidal/Homicidal Ideation present: Patient reports passive suicidal ideation. He denies plan or means. Protective factors are his wife and family. Discussed safety plan to call 911, go to the ER and/or call the crisis connection if symptoms change or worsen.     Patient's impression of their current status: Patient reported he continues to be working on addressing symptoms of depression and anxiety. He reports pain symptoms are more manageable. He reports worries about financial concerns and he and his wife will move in with his brother this month. Patient reports he started to stuttering about a week ago.     Therapist impression of patients current state: Patient seems to be working on addressing depression, anxiety and chronic medical symptoms. He reports pain symptoms have improved, but struggles with weakness and stiffness. He continues to work on addressing acceptance of his chronic pain and medical concerns, with some " progress.  He reported continued financial stressors and that he and his wife will move in with his brother at the end of the month. Patient sold his current home.  Discussed ways to cope with SI.  He reports he is consistent with PT and noticing positive improvement. He seems to be working on letting go and focusing on abilities at this time. Patient reports he and his wife are working on focusing on their medical needs. He reported concerns with stuttering. He was advised to address this with his medical care team. Discussed possible triggers, including anxiety/stress, yet encouraged patient to rule out medical/medication reasons. He did report a recent change in medication around the time he started to stutter. Encouraged patient to continue to increase efforts to reach out to social/family support to connect socially/emotionally. He reports Yazdan[employer] attendance and seeing extended family when he is able. Continued to discuss potential benefits of Mindfulness meditation with patient. He reports regular meditation practice and grounding. Continued home practice is encouraged.  Presented sitting meditation focused on thoughts. Patient reported difficulty with focusing today on the meditation. He preferred to process target/NC next time, due to wanting to discuss stressors with moving today.         Type of psychotherapeutic technique provided: Client centered, CBT and Mindfulness    Progress toward short term goals: Patient continues to work on increasing coping skills to manage chronic pain and mental health. Patient continues medical care as recommended by his medical providers. He engages in physical activity and social activity as he is able. He is engaging in meditation/grounding in daily living. He continues psychotherapy/EMDR. He is working with a county . Patient is working on moving at the end of this month.     Review of long term goals: Not done at today's visit    Diagnosis:  Major depression,  severe  History of Generalized Anxiety Disorder  Chronic pain syndrome    Plan and Follow up: Patient to follow pain center plan of care. Patient to follow all medical recommendations. Patient to schedule every 2 weeks for psychotherapy to further address depression, anxiety and chronic pain concerns. Will continue to educate on EMDR and Mindfulness meditation. Practice meditations as presented in session. Further address adjustment to role/identity due to chronic pain and health concerns. Continue EMDR using pain protocol.         Discharge Criteria/Planning: Patient will continue with follow-up until therapy can be discontinued without return of signs and symptoms.    Alize Livingston 9/11/18

## 2021-06-20 NOTE — PROGRESS NOTES
[facility] Daily Progress     Patient Name: Anand Felix  Date: 2018  Visit #: 7/8 +  ([facility] auth thru 10/15/18)  PTA visit #:  9  Referral Diagnosis: Muscle weakness (generalized)  Referring provider: Ham Churchill MD  Visit Diagnosis:     ICD-10-CM    1. Bilateral pulmonary embolism (H) I26.99      Anand Felix is a 33 y.o. male who presents to therapy today with chief complaints of muscle weakness and impaired gait and balance. Onset date of sx was 2015.  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  Pain symptoms are aching, burning, numbness, sharp, tingling and weakness.  Functional impairments include walking, dressing, bathing, sitting for long periods, and standing for long periods.  Pt demo's signs and sx consistent with impaired gait and balance secondary to muscle weakness    Assessment:   HEP/POC compliance is  good .  The patient is compliant with his HEP.  His functional movements are not consistent with the torso and B LE shaking .  Standing activities appear more labored with increase assist of therapist to remain upright.    He is appropriate to continue with PT services at this time for functional strengthening.    Goal Status:  Pt. will be independent with home exercise program in : 6 weeks Progressing toward  Pt will: be able to walk 10 minutes with spO2 levels 95%+; in 8 weeks Progressing toward  Pt will: be able to maneuver 5 steps to get into his home without difficulty; Met Pt climbs 10 steps two times a day for exercise    Plan / Patient Education:     Continue with initial plan of care.  Progress with home program as tolerated.     Next session: Continue x 1 or as needed to maximize goals and independent HEP.    Subjective:     Pain Ratin  Using home TM x 15' bouts, walking at .8 mph for longer bouts, up to 3'-4'  Pt now stuttering at [facility]  Is checking possible causes  Pt feels PT has improved his walking ability and his  "flexibiity    Objective:   Initial HR 67 BPM  SAO2 97%      Pt ambulates into clinic with a Hurricane used on L and and stiff/rigid R LE, but slightly faster pace, decreased R foot drag      See flowsheet for date performed:  Exercises:  Exercise #1: Quad sets  Comment #1: 10-15 at home  Exercise #2: Hamstring isometrics  Comment #2: 10-15 at home  Exercise #3: Bent knee fallouts  Comment #3: L1 HEP unilateral pull progressed to L3 band from L2 band, partial pulls HEP  Exercise #4: Pre-bridge muscle contractions (TA/glute/hip add)  Comment #4: Bridging x 7; patient only able to lift 2-3\" off the ground, with 5' hold, max effort today  Exercise #5: Nustep seat 5 arms 8 L6 x 5'  Comment #5: Not today  Exercise #6: Ab sets/marching  Comment #6: x 10 bilaterally; cues to pull abs in  Exercise #7: Pilates arch with L1 band , pull to knees  Comment #7: L1 HEP  Exercise #8: Stairs at home climbs 10 steps 2xday  Comment #8: Not in clinic  Exercise #9: seated on bed \"scoot\" changed to L2 band around low thighs, standing side stepping, holding on to plinth (kitchen counter at home)  Comment #9: Pt completed 16' sidestepping to L, 5 feet sidestepping to R, after last step  Exercise #10: standing mini squats HEP, 15 reps at home  Comment #10: standing hip abd HEP, 15 reps at home  Exercise #11: STS at home and today in clinic with L foot on foam, B HHA  Comment #11: x 10 today with 60% VC to hinge forward to use legs more completely, 10 was max effort  Exercise #12: Tap ups with alternating feet  Comment #12: x 4 B, unable to lift R foot up beyond 3\", max effort by pt, mod assist to remain standing  Exercise #13: discussed a walking program for home, ptusing a treadmill x 15' at .6-7 mph, to increase to 7-8 mph as able  Comment #13: Today performed TM x 5', .7 mph x 4', .8 mph x 1'  Exercise #14: Hamstring stretch  Comment #14: HEP  Exercise #15: Gastroc/soleus stretch  Comment #15: reviewed, shown seated versions tot " "trial  Exercise #16: Piriformis stretch performed today  Comment #16: 30 seconds x2 B  Exercise #17: LTR performed today  Comment #17: reviewed  Exercise #18: KTC stretch, performed today  Comment #18: pt to hold longer than 5\", perform fewer reps  Exercise #19: Sidestepping around plinth wth L2 band  Comment #19: 10' x 2 B direction      Ending SAO2 97%, HR 91 BPM  HR did increase to 121-128 BPM with activity  SAO2 remained above 95%      Treatment Today     TREATMENT MINUTES COMMENTS   Evaluation     Self-care/ Home management     Manual therapy     Neuromuscular Re-education     Therapeutic Activity     Therapeutic Exercises 55 See flowsheet: focus today on standing activities and  Recently upgraded exercises  All standing and some supine exercises were performed with max effort by pt and B LE and torso shaking   Gait training     Modality__________________                Total 55    Blank areas are intentional and mean the treatment did not include these items.       Genia Juarez, PTA,CLT9/13/2018  "

## 2021-06-20 NOTE — PROGRESS NOTES
SW received message from [facility] financial worker Yunior.  He will send pt paperwork needed to be completed to initiate SMRT process.  SW updated care guide.  Care guide to schedule pt with [facility] SW if needed to assist with paperwork.

## 2021-06-20 NOTE — PROGRESS NOTES
[facility] Daily Progress / Discharge Summary    Patient Name: Anand Felix  Date: 2018  Visit #:   +  ([facility] auth thru 10/15/18)  PTA visit #:  9  Referral Diagnosis: Muscle weakness (generalized)  Referring provider: Ham Churchill MD  Visit Diagnosis:     ICD-10-CM    1. Impaired functional mobility, balance, gait, and endurance Z74.09    2. Muscle weakness (generalized) M62.81    3. 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  Patient has benefitted from skilled physical therapy and is making steady progress toward functional goals.  The patient is compliant with his HEP and treadmill walking.  2 minute walk test improved from 30.2 to 52 seconds, which is a significant improvement, but still impaired compared to the age-gender norm of 200m.  He has met his PT goals and is appropriate for discharge from PT services at this time.    Goal Status:  Pt. will be independent with home exercise program in : 6 weeks; Met  Pt will: be able to walk 10 minutes with spO2 levels 95%+; in 8 weeks; Met  Pt will: be able to maneuver 5 steps to get into his home without difficulty; Met    Plan / Patient Education:     Initial plan of care has been completed. Patient has responded appropriately to skilled PT intervention.  The patient met goals and has demonstrated understanding of/independence in the home program for self-care and progression to next steps.  No further therapy is required at this time.  The patient will initiate contact if questions or concerns arise.    Subjective:     Pain Ratin  The patient reports that everything has been going ok.  He feels like he has been moving faster than he was before, so he is functioning better.    Patient Outcome Measures  Lower Extremity Functional Scale (_/80): 21   Scores range from 0-80, where a score of 80 represents maximum function. The minimal clinically important difference is a positive change of 9  "points.      Objective:     Two Minute Walk Test  Total meters walked: 52.0m  AD used? SEC  Age gender norm: 200m  TWO- MINUTE WALK TEST (2mwt) MEANS     WOMEN MEAN DISTANCE (FEET) METERS       + cm   18-54 600.4 183 m 0.192  cm   55-59 578.7 176 m 38.776 cm   60-64 545.9 166 m 32.936 cm   65-69 509.2 155 m 20.416 cm   70-74 478.7 145 m 95.9576 cm   75-79 462.3 140 m 90.904 cm   80-85 440.6 136 m 29.488 cm      MEN MEAN DISTANCE (FEET) METERS     + cm   18-54 659.1 200 m 89.368 cm   55-59 626.6 190 m 98.768 cm   60-64 587.6 179 m 10.048 cm   65-69 604.3 184 m 19.064 cm   70-74 565.6 172 m 39.488 cm   75-79 517.1 157 m 61.208 cm   80-85 472.7 144 m 7.896 cm     R.W Jalen et al (2014)  Conversion Yasmin Flynn PT, TEMO-RUPERT    TU seconds    Exercise #1: Quad sets  Comment #1: 10-15 at home  Exercise #2: Hamstring isometrics  Comment #2: 10-15 at home  Exercise #3: Bent knee fallouts  Comment #3: L1 HEP unilateral pull progressed to L3 band from L2 band, partial pulls HEP  Exercise #4: Pre-bridge muscle contractions (TA/glute/hip add)  Comment #4: Bridging x 7; patient only able to lift 2-3\" off the ground, with 5' hold, max effort today  Exercise #5: Nustep seat 5 arms 8 L6 x 5'  Comment #5: Not today  Exercise #6: Ab sets/marching  Comment #6: x 10 bilaterally; cues to pull abs in  Exercise #7: Pilates arch with L1 band , pull to knees  Comment #7: L1 HEP  Exercise #8: Stairs at home climbs 10 steps 2xday  Comment #8: Not in clinic  Exercise #9: seated on bed \"scoot\" changed to L2 band around low thighs, standing side stepping, holding on to plinth (kitchen counter at home)  Comment #9: Pt completed 16' sidestepping to L, 5 feet sidestepping to R, after last step  Exercise #10: standing mini squats HEP, 15 reps at home  Comment #10: standing hip abd HEP, 15 reps at home  Exercise #11: STS at home and today in clinic with L foot on foam, B HHA  Comment #11: x 10 today with 60% VC to hinge forward to " "use legs more completely, 10 was max effort  Exercise #12: Tap ups with alternating feet  Comment #12: x 4 B, unable to lift R foot up beyond 3\", max effort by pt, mod assist to remain standing  Exercise #13: discussed a walking program for home, ptusing a treadmill x 15' at .6-7 mph, to increase to 7-8 mph as able  Comment #13: Today performed TM x 5', .7 mph x 4', .8 mph x 1'  Exercise #14: Hamstring stretch  Comment #14: HEP  Exercise #15: Gastroc/soleus stretch  Comment #15: reviewed, shown seated versions tot trial  Exercise #16: Piriformis stretch performed today  Comment #16: 30 seconds x2 B  Exercise #17: LTR performed today  Comment #17: reviewed  Exercise #18: KTC stretch, performed today  Comment #18: pt to hold longer than 5\", perform fewer reps  Exercise #19: Sidestepping around plinth wth L2 band  Comment #19: 10' x 2 B direction    Appt time: 12:00PM - 12:25PM    Treatment Today  TREATMENT MINUTES COMMENTS   Evaluation     Self-care/ Home management     Manual therapy     Neuromuscular Re-education     Therapeutic Activity     Therapeutic Exercises 25 NUSTEP x 5 minutes WL 6.0; subjective measures taken  Discussed POC  Objective measures taken   Gait training     Modality__________________                Total 25    Blank areas are intentional and mean the treatment did not include these items.     Beatriz Medina, PT, DPT  9/20/2018  "

## 2021-06-21 NOTE — PROGRESS NOTES
"Pulmonary Follow Up Note  11/9/2018      Reason for Follow Up: Shortness of Breath, ? neuromuscular disease      Problem List:     Patient Active Problem List   Diagnosis     Muscle Weakness Generalized - \"Neuromuscular Condition\" with normal EMG.     Tremor     Arthralgias In Multiple Sites     Muscle Aches, Generalized (Myalgias)     Chronic Ethmoidal Sinusitis     Hypovitaminosis D - [place]     Monoparesis of leg (H)     Weakness - without demonstrable CNS or peripheral cause - local neurologist at [facility], and neurologist at [facility], and [facility] consultant.     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     Nasal polyposis - right naris on MRI (2016)     Pulmonary embolism (H)     Bilateral pulmonary embolism (H)     Myalgia     Sinus tachycardia       History:     Mr. Anand Felix is a 32 yo male with PMH significant for generalized weakness, PE on Xarelto, and chronic pain, who presents to pulmonary clinic for evaluation for shortness of breath.  This has been worse especially since his PE diagnosis last year.  He states that he has had shortness of breath really for some time.  He has a history of generalized weakness and has been evaluated by several neurologist who have not found an answer.  He has a history of herniated disc at L1 causing leg weakness.  Since then he has had some mild dyspnea.  It seems that Dr. Donis has proposed that some of his symptoms are due to a somatoform disorder.  He also notes that he can walk on a treadmill, but this is at a pace of 0.5 to 0.6 mph and never really faster.  He uses a cane to walk as well.  He did have a PE and has been on xarelto.  He states he does have early satiety, but no orthopnea or LE weakness.  No cough or sputum production.  No wheeze.      Of note, his wife is present as well and had asked about KOJO as he is noted " to stop breathing in his sleep.  His ESS is 14 and his STOP BANG is 4.    At follow-up on his additional pulmonary function tests which he was noted to have extreme fatigue after completing.  His MIP and MEP were both reduced.  He continued to have intermittent cough and shortness of breath.  He continues to have a lot of weakness, falls, myalgias, and chest discomfort.  He does complain of some pain under his right lower rib cage.  On examination he also continues to have significant weakness manifested through shaking just simply taking a large breath.  I did review with him results of previous neurology consultations.  It does sound as though there was some talk of a muscle biopsy at some time but it is it was not pursued.  There was some concern apparently of his anticoagulation.    Today he feels that things are stable.  He did have his PSG with ala nasi testing as well, and his AHI is 0.6.  He did not have any evidence of muscular dysfunction.  He is seeing Dr. Lundberg in the chronic pain clinic and they are considering medical THC for chronic pain to minimize opiates.  He otherwise has no change in symptoms.    Past Medical History:   Diagnosis Date     Bulging lumbar disc      Lumbar herniated disc 10/23/2015     Lumbar radiculopathy 11/25/2015     Past Surgical History:   Procedure Laterality Date     MINIMALLY INVASIVE MICRODISCECTOMY LUMBAR SPINE Right 10/2015    L5-S1; Dr. Bettie Hernandez     SINUS SURGERY  2014    Dr. Wright     Social History     Social History     Marital status: Single     Spouse name: Miguel     Number of children: 0     Years of education: GED and some collese     Occupational History     unemployed      Social History Main Topics     Smoking status: Never Smoker     Smokeless tobacco: Never Used     Alcohol use Yes      Comment: Monthly or less often. 1-2 drinks at a time.     Drug use: No     Sexual activity: Yes     Partners: Female     Other Topics Concern     Not on file  "    Social History Narrative     Family History   Problem Relation Age of Onset     Depression Mother      Hyperlipidemia Mother      Other Mother      \"Ulcers\" - Aches and pains throughout her body.     Cancer Father      No Medical Problems Sister      No Medical Problems Brother      No Medical Problems Sister      No Medical Problems Sister      No Medical Problems Sister      No Medical Problems Brother      Thyroid disease Maternal Grandmother      Thyroid disease Cousin      No Known Allergies    Review of Systems - 10 point review of systems negative except what is mentioned in the HPI.  Also with positive excessive sweating, fatigue, sinus congestion, sinus pressure, epistaxis, PND, tinnitus, abdominal pain, heartburn, difficulty urinating, heat intolerance, rash, arthralgias/myalgias, allergies, headaches, tremor and these are unchanged.        Medications:     Current Outpatient Prescriptions on File Prior to Visit   Medication Sig Dispense Refill     acetaminophen (TYLENOL) 500 MG tablet Take 1 tablet (500 mg total) by mouth every 6 (six) hours as needed for pain.  0     atenolol (TENORMIN) 25 MG tablet TAKE 1 TABLET BY MOUTH EVERY DAY 90 tablet 1     cholecalciferol, vitamin D3, 2,000 unit cap Take 2,000 Units by mouth daily.       cyclobenzaprine (FLEXERIL) 10 MG tablet Take 1 tablet (10 mg total) by mouth 3 (three) times a day as needed for muscle spasms. 90 tablet 0     fluticasone (FLONASE) 50 mcg/actuation nasal spray INSTILL 2 SPRAYS INTO EACH NOSTRIL DAILY. 48 g 2     gabapentin (NEURONTIN) 100 MG capsule Take 100 mg by mouth at bedtime. 30 capsule 0     morphine (MS CONTIN) 15 MG 12 hr tablet Take 1 tablet (15 mg total) by mouth 3 (three) times a day. 90 tablet 0     morphine (MSIR) 15 MG tablet Take 1 tablet (15 mg total) by mouth 3 (three) times a week. 12 tablet 0     omeprazole (PRILOSEC) 20 MG capsule TAKE 1 CAPSULE (20 MG TOTAL) BY MOUTH DAILY. 90 capsule 3     warfarin (COUMADIN) 5 MG " tablet Take 5 mg by mouth daily. Adjust dose based on INR results as directed. 60 tablet 5     nortriptyline (PAMELOR) 25 MG capsule Take 1 capsule (25 mg total) by mouth daily for 5 days. 5 capsule 0     nortriptyline (PAMELOR) 50 MG capsule TAKE 3 CAPSULES BY MOUTH AT BEDTIME 90 capsule 0     No current facility-administered medications on file prior to visit.            Exam/Data:   Vitals  Vitals:    11/09/18 0855   BP: 110/80   Pulse: (!) 121   Resp: 10   SpO2: 97%       EXAM:  GEN: Alert and oriented x3, NAD  HEENT: Nares patent, posterior oropharynx clear.  MP II.  RESPIRATORY: CTAB.  No wheeze or rales, noted shaking with deep inspiration  CARDIOVASCULAR: tachycardic but regular, no m/r/g  GASTROINTESTINAL: Round, soft, NT/ND  HEM/ONC: no adenopathy, no ecchymosis  MSK: no clubbing.  Ambulates slowly with a cane  NEURO: cranial nerves appear grossly intact, muscle strength equal  SKIN: no rash or ulcerations      DATA    PFT DATA:  FEV1/FVC is 106 and is normal.  FEV1 is 96% predicted and is normal.  FVC is 95% predicted and normal.  There was no improvement in spirometry after a single inhaled dose of bronchodilator.  TLC is 76% predicted and is reduced.  RV is 68% predicted and is reduced.  DLCO is 88% predicted and is normal when it   is corrected for hemoglobin.     Impression:  Full Pulmonary Function Test is abnormal.    PFTs are consistent with mild restrictive disease with TLC of 76%  Spirometry is not consistent with reversibility.  There is no hyperinflation.  There is no air-trapping.  Diffusion capacity when corrected for hemoglobin is normal.    MIP and MEP were reviewed and are reduced.    IMAGING:   Personally reviewed CTA in 10/17.  Noted PE.  No parenchymal lung disease.  Small right pleural effusion.     Modified walk test with reduced exertional capacity without evidence of desaturation or hypoxia.    PSG 10/23/18  Primary Findings:     1. Respiratory monitoring showed overall no  significant obstructive sleep apnea/hypopnea sufficient to disturb sleep (AHI=0.6).  2. Intermittent snoring was recorded during the test and was related to a period of fully-supine sleep that was recorded between 3:00 AM and 4:30 AM.   3. There was no evidence of decreased respiratory effort during sleep. Respiratory rate was normal.     Assessment/Plan:   Anand Felix is a 33 y.o. male being seen for shortness of breath, likely multifactorial, with PMH significant for acute PE, chronic weakness that is unexplained, and chronic pain.    1. Shortness of Breath:  This is likely multifactorial.  On exam he shook when he took a deep breath that would indicate extremely weak core/abdominal musculature, and not true general muscle weakness which would produce shallow steady breaths.  With that, his TLC is mildly reduced with a normal diffusing capacity, suggesting neuromuscular weakness.  His MEP/MIP are also reduced.  He also has had a PE and been on therapy, but he is without evidence of chronic thromboembolic pulmonary hypertension/disease.  He will stay on Xarelto.  Finally, I did discuss with him the use of narcotics with weakness and that this should really be avoided, which he is working on with Dr. Lundberg in Chronic Pain Therapy.  His PSG would suggest that there is no evidence of respiratory muscle weakness.  If there was any concern about how to obtain him muscle biopsy while on anticoagulation we could assist with bridging him on Lovenox.  Will defer any further workup of neuromuscular disease though to his neurologist or his primary care physician.     Finally, there was question about somatoform/conversion disorder.  This was mentioned by Dr. Donis.      I don't have anything further to offer the patient, but will check back with him in 1 year.    Recommend:  -No further pulmonary work up  -RTC in 1 year    I spent more than 15 minutes in review and assessment of the patient, as well as formulating plan of  care, with > 50% time spent face-to-face.      Anand Winn, DO

## 2021-06-21 NOTE — PROGRESS NOTES
Patient presents to the clinic today for a follow up with Dayna Lundberg CNP regarding back pain.

## 2021-06-21 NOTE — PROGRESS NOTES
Dear Dr. Kristian Hood, Do  [address] [facility].  [address] [address]    Thank you for the opportunity to participate in the care of Mr. Anand Felix.    He is a 33 y.o. male who comes to the clinic for the review of his sleep study.  The patient underwent a direct sleep study on 10/19/18.  The sleep study only revealed mild snoring and did not reveal any abnormal respiratory effort decrease.  He states that for the past 3 years he has been feeling tired despite adequate hours of sleep.  Patient also complains of some weakness of the lung area and tends to get short of breath.  His wife has also noticed that he has significant pauses in his breathing during sleep followed by loud snoring.  The patient also chronically wakes up with pain issues but despite seeing multiple specialists no one was able to find the cause.  The patient's review of system is otherwise unremarkable.     Ideal Sleep-Wake Cycle(devoid of societal pressure):    Patient would try to initiate sleep at around 10:30 PM with a sleep latency of 1 hour. The patient would have 1-2 awakening. Final wake up time is around 9 AM.    Past Medical History  Past Medical History:   Diagnosis Date     Bulging lumbar disc      Lumbar herniated disc 10/23/2015     Lumbar radiculopathy 11/25/2015        Past Surgical History  Past Surgical History:   Procedure Laterality Date     MINIMALLY INVASIVE MICRODISCECTOMY LUMBAR SPINE Right 10/2015    L5-S1; Dr. Bettie Hernandez     SINUS SURGERY  2014    Dr. Kyle Florentino  Current Outpatient Prescriptions   Medication Sig Dispense Refill     acetaminophen (TYLENOL) 500 MG tablet Take 1 tablet (500 mg total) by mouth every 6 (six) hours as needed for pain.  0     atenolol (TENORMIN) 25 MG tablet TAKE 1 TABLET BY MOUTH EVERY DAY 90 tablet 1     cholecalciferol, vitamin D3, 2,000 unit cap Take 2,000 Units by mouth daily.       cyclobenzaprine (FLEXERIL) 10 MG tablet Take 1 tablet (10 mg total) by mouth 3 (three)  "times a day as needed for muscle spasms. 90 tablet 0     fluticasone (FLONASE) 50 mcg/actuation nasal spray INSTILL 2 SPRAYS INTO EACH NOSTRIL DAILY. 48 g 2     gabapentin (NEURONTIN) 100 MG capsule Take 100 mg by mouth at bedtime. 30 capsule 0     morphine (MS CONTIN) 15 MG 12 hr tablet Take 1 tablet (15 mg total) by mouth 3 (three) times a day. 90 tablet 0     morphine (MSIR) 15 MG tablet Take 1 tablet (15 mg total) by mouth 3 (three) times a week. 12 tablet 0     omeprazole (PRILOSEC) 20 MG capsule TAKE 1 CAPSULE (20 MG TOTAL) BY MOUTH DAILY. 90 capsule 3     warfarin (COUMADIN) 5 MG tablet Take 5 mg by mouth daily. Adjust dose based on INR results as directed. 60 tablet 5     nortriptyline (PAMELOR) 25 MG capsule Take 1 capsule (25 mg total) by mouth daily for 5 days. 5 capsule 0     nortriptyline (PAMELOR) 50 MG capsule TAKE 3 CAPSULES BY MOUTH AT BEDTIME 90 capsule 0     No current facility-administered medications for this visit.         Allergies  Review of patient's allergies indicates no known allergies.     Social History  Social History     Social History     Marital status: Single     Spouse name: Miguel     Number of children: 0     Years of education: GED and some collese     Occupational History     unemployed      Social History Main Topics     Smoking status: Never Smoker     Smokeless tobacco: Never Used     Alcohol use Yes      Comment: Monthly or less often. 1-2 drinks at a time.     Drug use: No     Sexual activity: Yes     Partners: Female     Other Topics Concern     Not on file     Social History Narrative        Family History  Family History   Problem Relation Age of Onset     Depression Mother      Hyperlipidemia Mother      Other Mother      \"Ulcers\" - Aches and pains throughout her body.     Cancer Father      No Medical Problems Sister      No Medical Problems Brother      No Medical Problems Sister      No Medical Problems Sister      No Medical Problems Sister      No Medical Problems " Brother      Thyroid disease Maternal Grandmother      Thyroid disease Cousin         Review of Systems:  Constitutional: Negative except as noted in HPI.   Eyes: Negative except as noted in HPI.   ENT: Negative except as noted in HPI.   Cardiovascular: Negative except as noted in HPI.   Respiratory: Negative except as noted in HPI.   Gastrointestinal: Negative except as noted in HPI.   Genitourinary: Negative except as noted in HPI.   Musculoskeletal: Negative except as noted in HPI.   Integumentary: Negative except as noted in HPI.   Neurological: Negative except as noted in HPI.   Psychiatric: Negative except as noted in HPI.   Endocrine: Negative except as noted in HPI.   Hematologic/Lymphatic: Negative except as noted in HPI.      STOP BANG 11/9/2018   Do you snore loudly (louder than talking or loud enough to be heard through closed doors)? 1   Do you often feel tired, fatigued, or sleepy during daytime? 1   Has anyone observed you stop breathing in your sleep? 1   Do you have or are you being treated for high blood pressure? 0   BMI more than 35 kg/m2 0   Age over 50 years old? 0   Neck circumference greater than 16 inches? 0   Gender male? 1   Total Score 4   Epworths Sleepiness Scale 11/9/2018   Sitting and reading 1   Watching TV 3   Sitting, inactive in a public place (e.g. a theatre or a meeting) 1   As a passenger in a car for an hour without a break 3   Lying down to rest in the afternoon when circumstances permit 3   Sitting and talking to someone 1   Sitting quietly after a lunch without alcohol 3   In a car, while stopped for a few minutes in traffic 1   Total score 16   Rooming 11/9/2018   Usual bedtime 1030pm   Sleep Latency 1 hour   Awakenings 1-2   Wake Up Time 9am   Weekends 1030pm   Energy Drinks 0   Coffee 0   Cola 1-2can per day   Difficulty falling asleep Yes   Difficulty staying asleep No   Excessive daytime tiredness Yes   Excessive daytime sleepiness Yes   Dozing off while driving No  "  Shift Worker No   Sleep Walking? No   Sleep Talking? Yes   Kicking or punching? No   Restless legs symptoms Yes       Physical Exam:  /80  Pulse (!) 121  Ht 5' 5\" (1.651 m)  Wt 160 lb 4.8 oz (72.7 kg)  SpO2 97%  BMI 26.68 kg/m2  BMI:Body mass index is 26.68 kg/(m^2).   GEN: NAD, appropriate for age  Head: Normocephalic.  EYES: PERRLA, EOMI  ENT: Oropharynx is clear, mallampatti class 4+ airway.  Nasal mucosa is moist without erythema  Neck : Thyroid is within normal limits. Neck size 14.5in  CV: Regular rate and rhythm, S1 & S2 positive.  LUNGS: Bilateral breathsounds heard.   ABDOMEN: Positive bowel sounds in all quadrants, soft, no rebound or guarding  MUSCULOSKELETAL: Bilateral trace leg swelling  SKIN: warm, dry, no rashes  Neurological: Alert, oriented to time, place, and person.  Psych: normal mood, normal affect     Labs/Studies:     Lab Results   Component Value Date    WBC 6.2 05/29/2018    HGB 14.9 05/29/2018    HCT 44.5 05/29/2018    MCV 83 05/29/2018     05/29/2018         Chemistry        Component Value Date/Time     05/29/2018 1403    K 4.1 05/29/2018 1403     05/29/2018 1403    CO2 24 05/29/2018 1403    BUN 18 05/29/2018 1403    CREATININE 0.89 05/29/2018 1403    GLU 98 05/29/2018 1403        Component Value Date/Time    CALCIUM 9.2 05/29/2018 1403    ALKPHOS 70 01/12/2018 1040    AST 16 01/12/2018 1040    ALT 13 01/12/2018 1040    BILITOT 0.6 01/12/2018 1040            No results found for: FERRITIN  Lab Results   Component Value Date    TSH 3.10 01/12/2018         Assessment and Plan:  In summary Anand eFlix is a 33 y.o. year old male here for review of his sleep study.  1.  Snoring  We discussed some strategies how to minimize his snoring including positional therapy, anti-snore tongue retainer as well as Breathe Right nasal strips.  I will also give the patient a copy of his sleep study.  2.  Possible circadian phase delay  I educated the patient on judicious use of " melatonin at low dose.  Patient strongly advised not to drive or operate machinery after intake of melatonin.  3.  Poor sleep hygiene  Discussed with patient proper sleep hygiene stressing the importance of stimulus control.  I will also give the patient a handout on these topics.  4.  Possible hypoventilation  Reviewed the results of sleep study with patient and his wife.  It did not reveal any hypoventilation or sleep disordered breathing.      Patient verbalized understanding of these issues, agrees with the plan and all questions were answered today. Patient was given an opportuntity to voice any other symptoms or concerns not listed above. Patient did not have any other symptoms or concerns.      Patient told to return in one week after the sleep study is interpreted.      Kristian Hood DO  Board Certified in Internal Medicine and Sleep Medicine  [facility].    (Note created with Dragon voice recognition and unintended spelling errors and word substitutions may occur)

## 2021-06-21 NOTE — PROGRESS NOTES
"PAIN CLINIC FOLLOW UP PROGRESS NOTE    CC:  Chief Complaint   Patient presents with     Back Pain       HPI  Anand Felix is a 33 y.o. male who presents for evaluation   Chief Complaint   Patient presents with     Back Pain    that is causing continued pain. Since the last visit the patient denies any trips to the urgent care or ED specifically for their pain. The patient denies any new medications, diagnoses since the last visit. Specific questions indicated the patient wanted addressed today include: to follow up on his chronic pain and review his Xray- flexion and extension     Major issues:  1. Chronic pain syndrome    2. Chronic pain    3. Muscle Weakness Generalized - \"Neuromuscular Condition\" with normal EMG.    4. Myalgia    5. Lumbar pain determined by palpation and/or percussion at L1 (5/4/16)    6. Facet hypertrophy of lumbar region    7. Arthralgias In Multiple Sites            Pain level: On a scale of 1-10, the patient rates their pain today 5 this is a significant reduction in his pain score from the last assessment in the clinic  Pain is described: Sharp, deep, burning, tearing  Aggravating factors: Any activity  Alleviating factors: Salt baths, hot and cold packs, medications, laying down  New pain:  None  Since last visit, pain has improved  Associated Symptoms: Numbness in the right leg and arm, weakness all over his body  Pain interferes with: Sleep, walking, activities of daily living      Previous Medical History  Social History     Substance and Sexual Activity   Alcohol Use Yes    Comment: Monthly or less often. 1-2 drinks at a time.     Social History     Substance and Sexual Activity   Drug Use No     Social History     Tobacco Use   Smoking Status Never Smoker   Smokeless Tobacco Never Used       Pertinent Pain Medications/interventions:  He currently takes morphine extended release 3 times daily, morphine immediate release 3 times a week, gabapentin 100 mg  At bedtime       Review of " "Systems:  12 point systems were reviewed with pt as documented on pt health form and the patient denies any new diagnosis or changes in 12 point system review since the last visit.     Physical Exam  Vitals:    11/12/18 1416   BP: 105/75   Patient Site: Left Arm   Patient Position: Sitting   Cuff Size: Adult Regular   Pulse: 100   Resp: 18   Weight: 160 lb (72.6 kg)   Height: 5' 5\" (1.651 m)     General- patient is alert and oriented, in NAD, well-groomed, well-nourished  Psych- Judgment and insight normal, AOx4, recent and remote memory normal, mood and affect normal  Eyes- pupils are equal and reactive, conjunctiva is clear bilaterally, no ptosis is noted.   Respiratory- breathing is non-labored  Cardiovascular- extremities warm and well perfused, no peripheral edema or varicosities.  Musculoskeletal- gait is normal, extremities with no joint swelling, erythema, or warmth. Low back pain locally as well as radicular pain R>L in the L4,L5 distribution.  Neuro- normal strength, no gait abnormalities, normal sensation to pain, temperature, light touch.  Integumentary- no rashes, dermatitis or discolorations noted throughout, no open wounds noted.    Medications    Current Outpatient Medications:      acetaminophen (TYLENOL) 500 MG tablet, Take 1 tablet (500 mg total) by mouth every 6 (six) hours as needed for pain., Disp: , Rfl: 0     atenolol (TENORMIN) 25 MG tablet, TAKE 1 TABLET BY MOUTH EVERY DAY, Disp: 90 tablet, Rfl: 1     cholecalciferol, vitamin D3, 2,000 unit cap, Take 2,000 Units by mouth daily., Disp: , Rfl:      cyclobenzaprine (FLEXERIL) 10 MG tablet, Take 1 tablet (10 mg total) by mouth 3 (three) times a day as needed for muscle spasms., Disp: 90 tablet, Rfl: 0     fluticasone (FLONASE) 50 mcg/actuation nasal spray, INSTILL 2 SPRAYS INTO EACH NOSTRIL DAILY., Disp: 48 g, Rfl: 2     gabapentin (NEURONTIN) 100 MG capsule, Take 100 mg by mouth 3 (three) times a day., Disp: 90 capsule, Rfl: 0     morphine " (MSIR) 15 MG tablet, Take 1 tablet (15 mg total) by mouth 3 (three) times a week., Disp: 12 tablet, Rfl: 0     omeprazole (PRILOSEC) 20 MG capsule, TAKE 1 CAPSULE (20 MG TOTAL) BY MOUTH DAILY., Disp: 90 capsule, Rfl: 3     warfarin (COUMADIN) 5 MG tablet, Take 5 mg by mouth daily. Adjust dose based on INR results as directed., Disp: 60 tablet, Rfl: 5     [START ON 2018] morphine (MS CONTIN) 15 MG 12 hr tablet, Take 1 tablet (15 mg total) by mouth 3 (three) times a day., Disp: 90 tablet, Rfl: 0     nortriptyline (PAMELOR) 50 MG capsule, TAKE 3 CAPSULES BY MOUTH AT BEDTIME, Disp: 90 capsule, Rfl: 0     oxyCODONE (ROXICODONE) 5 MG immediate release tablet, Take 1 tablet (5 mg total) by mouth 2 (two) times a day as needed for pain., Disp: 60 tablet, Rfl: 0    Lab:  Last UDS on 2018 had expected results. Any abnormal results have been discussed with the patient and may change the plan of care. Please see the scanned document from the outside lab under the media tab. This was reviewed with the patient.      Imaging:  Any imaging viewed today was discussed with the patient on 2018  Study Result     XR LUMBAR SPINE 4 OR MORE VWS  10/15/2018 3:11 PM     INDICATION: Check instability  COMPARISON: MRI lumbar spine 09/15/2018.     FINDINGS: 5 lumbar-type vertebral bodies. Minimal levoconvex curvature of the lumbar spine. The vertebral bodies of the lumbar spine have normal stature and alignment. No evidence of dynamic instability on flexion or extension. Small anterior marginal   osteophytes at L2/L3, L3/L4 and L4/L5. The disc spaces are well-maintained. No other significant degenerative changes. The disc spaces are well-maintained. Soft tissues unremarkable.       Recent   Dated 2018 was reviewed with the patient today.   [employer] [place] Date: 18  Query Report Page#: 1  Patient Rx History Report  ISAAC LUNDBERG  Search Criteria: Last Name 'isaac' and First Name 'ita' and  = '[birth year]'  and Request Period = '11/09/17' to  '11/09/18' - 1 out of 1 Recipients Selected.  Fill Date Product, Str, Form Qty Days Pt ID Prescriber Written RX# N/R* Pharm **MED+  ---------- -------------------------------- ------------ ---- --------- ---------- ---------- ------------ ----- --------- ------  10/27/2018 MORPHINE SULFATE IR 15 MG TAB 12.208880 28 [id number] 10/15/2018 [id number] N [id number] 06.43  10/27/2018 MORPHINE SULF ER 15 MG TABLET 90.036330 30 [id number] 10/15/2018 [id number] N [id number] 45.0  10/15/2018 GABAPENTIN 100 MG CAPSULE 30.299912 30 [id number] 10/15/2018 [id number] N [id number] 00.0  09/27/2018 MORPHINE SULFATE IR 15 MG TAB 12.516565 28 [id number] 08/30/2018 [id number] N [id number] 06.43  09/27/2018 MORPHINE SULF ER 15 MG TABLET 90.664297 30 [id number] 08/30/2018 [id number] N [id number] 45.0  08/22/2018 MORPHINE SULFATE IR 15 MG TAB 12.516654 28 [id number] 08/02/2018 [id number] N [id number] 06.43  08/22/2018 MORPHINE SULF ER 15 MG TABLET 90.191177 30 [id number] 08/02/2018 [id number] N [id number] 45.0  07/16/2018 MORPHINE SULFATE IR 15 MG TAB 12.615291 28 [id number] 07/16/2018 [id number] N [id number] 06.43  07/16/2018 MORPHINE SULF ER 15 MG TABLET 90.239754 30 [id number] 07/16/2018 [id number] N [id number] 45.0  06/18/2018 MORPHINE SULF ER 15 MG TABLET 90.259134 30 [id number] 06/07/2018 [id number] N [id number] 45.0  06/11/2018 MORPHINE SULFATE IR 15 MG TAB 12.876509 28 [id number] 06/07/2018 [id number] N [id number] 06.43  05/17/2018 MORPHINE SULF ER 15 MG TABLET 90.656625 30 [id number] 05/17/2018 [id number] N [id number] 45.0  03/29/2018 OXYCODONE HCL 5 MG TABLET 30.932761 30 [id number] 03/27/2018 [id number] N [id number] 07.5  03/25/2018 OXYCONTIN 10 MG TABLET 60.780097 30 [id number] 02/26/2018 [id number] N [id number] 30.0  02/26/2018 OXYCODONE HCL 5 MG TABLET 30.830090 30 [id number] 02/26/2018 [id number] N [id number]  07.5  02/23/2018 OXYCONTIN 10 MG TABLET 60.413176 30 [id number] 02/23/2018 [id number] N [id number] 30.0  01/23/2018 OXYCONTIN 10 MG TABLET 60.781790 30 [id number] 01/23/2018 [id number] N [id number] 30.0  01/08/2018 OXYCODONE HCL 5 MG TABLET 90.663046 22 [id number] 01/08/2018 [id number] N [id number] 30.68  12/07/2017 OXYCODONE HCL 5 MG TABLET 90.546881 22 [id number] 12/06/2017 [id number] N [id number] 30.68  12/06/2017 DIAZEPAM 5 MG TABLET 60.551570 30 [id number] 12/06/2017 [id number] N [id number] UNK  11/27/2017 TRAMADOL HCL 50 MG TABLET 120.933145 30 [id number] 11/27/2017 [id number] N [id number] 20.0  *N/R N=New R=Refill  +MED Daily  Prescribers for prescriptions listed  ----------------------------------------------------------------------------------------------------------------------------------  [id number] BILLY MCCRARY; PRIMARY CARE NURSE PRACTITIONER, [address], [facility] [address]  [id number] ALANA MARTIN; [address], [address], [facility] [address]  [id number] ANNALISA FAULKNER; [facility], [address], [facility] [address]  [id number] TANIKA MCKEON (M.S., FNP-BC); [facility], [address], [facility]  Pharmacies that dispensed prescriptions listed  ----------------------------------------------------------------------------------------------------------------------------------  [id number] [facility].; : [facility] # [id number], [address] [address],  [id number] [facility] # [id number], [address], [place] [address],  [id number] [facility] # [id number], [address] [place], [place] [id number],  **Per CDC guidance, the conversion factors and associated daily morphine milligram equivalents for drugs prescribed as part of  medication-assisted treatment for opioid use disorder should not be used to benchmark against dosage  thresholds meant for opioids  prescribed for pain.  Report Disclaimers:  The report provided above is based upon the search criteria and the data provided by the dispensing entities. For more information  about any prescription, please contact the dispenser or the prescriber.  This report contains confidential information, including patient identifiers, and is not a public record. The information on this  report must be treated as protected health information and is to be disclosed to others only as authorized by applicable state  and Federal regulations.      Assessment:   Anand Felix is a 33 y.o. male seen in clinic today for   Chief Complaint   Patient presents with     Back Pain     Patients current MME is 60  Patient presents today for a follow up appointment in regards to his ongoing low back pain that radiates down his right leg. His current symptoms have been exacerbated for his low back pain. Today I have recommended injections for this local pain in his lumbar spine.     Question a subtle shifting at the L4-5 on flexion and extension of the lumbar spine, however it is difficult to ascertain due to the slight abnormal curvature and fish-mouthing sign noted on lateral view.     Patient reports that previously he has had the unilateral L5-S1 S1-2 injection and it did not help. Will order facet injections or medial branch blocks to proceed to RFA for the local back pain, also discussed the ongoing need for opioids as the medical cannabis certification process proceeds. The current morphine short acting is currently not helping him enough will reduce the amount of the opioids and increase the frequency to try to manage his chronic pain better. Oxycodone 5 mg two times a day prn.     Patient also noted that he does feel better in the am after taking the gabapentin at bedtime, will increase the frequency to three times a day and re-evaluate at the next appointment.     Patient set goals to   1.  To control his  ongoing chronic pain that is centered in his low back and radiates down his legs bilaterally    Plan:   Interventions-    Follow up in 4 weeks to evaluate the effectiveness of the treatment interventions ordered today. OVL    Therapy- PT/OT- not at this time recently completed    Will start oxycodone 5 mg up to 2 per day, discontinue the morphine short acting    Continue the use of the morphine ER     Increase in the gabapentin 100 mg three times a day     Behavioral Health with Edmundo - discuss medical cannabis     email- [email or web address]- will register based on medical cannabis assessment with Neha.     Medial Branch blocks for the L4-5, L5-S1 bilaterally with Dr. Welsh     Prescription Drug Management will be continued by the [facility]  A narcotic contract was signed by the patient and  Patient is unable to get narcotics from other providers. They will be subject to random UAs.      Orders placed today  Medications that were ordered today   Requested Prescriptions     Signed Prescriptions Disp Refills     morphine (MS CONTIN) 15 MG 12 hr tablet 90 tablet 0     Sig: Take 1 tablet (15 mg total) by mouth 3 (three) times a day.     gabapentin (NEURONTIN) 100 MG capsule 90 capsule 0     Sig: Take 100 mg by mouth 3 (three) times a day.     oxyCODONE (ROXICODONE) 5 MG immediate release tablet 60 tablet 0     Sig: Take 1 tablet (5 mg total) by mouth 2 (two) times a day as needed for pain.     No orders of the defined types were placed in this encounter.      UDT/SWAB:  Patient required a random Urine Drug Testing, due to the need to comply with [place] [employer] Policy Guidelines and CDC Guideline for the use of any controlled substances. This is to ensure that patient is compliant with treatment, and monitor for risks such as diversion, abuse, or any other aberrant behaviors. Patient is either being considered for or taking a controlled substance. Unexpected findings will be discussed and treatment  decision may be adjusted. Testing is being implemented across the board randomly w/o bias related to age, race, gender, socioeconomic status or Hindu affiliation.    The patient understand todays plan and has their questions answered in regards to expectations and current treatment plan.     SAFETY REMINDERS  No alcohol while taking controlled substances. Alcohol is not an illegal substance, it is unsafe to use in combination. It is a build up of substances in the body that can be extremely hazardous and may cause respirations to slow to a dangerous rate resulting in hospitalization, brain damage, or death.    Opioid medications have been associated with sharp rise in unintentional overdose and death.  Overdose is a condition characterized by the consumption in excess of a particular drug causing adverse effects. This can happen b/c you are sick, accidentally or intentionally took an extra dose, are on multiple medication that can interact. Someone took your medication and they are not use to the medication.  Symptoms of overdose include:   !breathing slow and shallow, erratic or not at all  !pinpoint pupils, hallucinations  !confusion  !muscle jerks, slack muscles   !extreme sleepiness or loss of alertness   !awake but not able to talk   !face pale or clammy, vomiting, for lighter skinned people, the skin tone turns bluish purple, for darker skinned people, it turns grayish or ashen   If in a situation where overdose is a concern engage the emergency response system (dial 911).    In one study it was noted that 80% of unintentional overdoses occurred in people who were taking a combination of opioids and benzodiazepines.    Do not sell, loan, borrow or share your opioid medication with anyone. Deaths have occurred as a result of this practice. It is illegal and patients are being prosecuted.     Prevent unexpected access/loss of medication: Keep medication locked. Only carry what you need with you.    Dayna DUVAL  Lundberg, CNP  [facility]  [address]  [place] [address]  Ph: [phone]  Fax: [phone]

## 2021-06-21 NOTE — LETTER
Letter by Jules To MD at      Author: Jules To MD Service: -- Author Type: --    Filed:  Encounter Date: 12/7/2020 Status: (Other)         Malachi Choudhary MD  [address]  [facility] [address]  [place] [address]                                  December 7, 2020    Patient: Anand Felix   MR Number: [id number]   YOB: 1985   Date of Visit: 12/7/2020     Dear Dr. Kenrick MD:    Thank you for referring Anand Felix to me for evaluation. Below are the relevant portions of my assessment and plan of care.    If you have questions, please do not hesitate to call me. I look forward to following Anand along with you.    Sincerely,        Jules To MD          CC  No Recipients  Jules To MD  12/7/2020  2:31 PM  Sign when Signing Visit  Assessment and Plan:Anand Felix is a 35 y.o. male with a past medical history significant for saddle embolism, chronic sinusitis, and presumed systemic muscular weakness who presents to clinic today for follow up.  Overall he thinks his dyspnea and weakness are slowly improving with no specific therapy.  He was briefly on warfarin for his prior history of PE but is now back on Xarelto which he prefers.  He is due for a flu shot.  He has no other issues that need to be addressed today.    1) PE - likely provoked by his muscular weakness issue, and should likely remain on Xarelto at least until this process is considered recovered, or he is able to ambulate freely and frequently again.  2) Dyspnea - likely secondary to the muscular weakness issue followed by neurology.  Seems his strength and thus his dyspnea is improved.  3) Encounter for immunization - will provide a flu shot today as he has not been in to see his PCP in some time.  4) RTC in 1 year..       CCx: Hx of PE, dyspnea    HPI: Mr. Felix is a 35 year old with a complex medical history including a large pulmonary embolism in 2017 for which he remains anticoagulated, and an unsual  muscular process that has left him weak and short of breath.  Dr. Winn had been following these processes for a couple of years and he was showing slow improvement of his weakness and dyspnea.  Today Mr. Felix feels his breathing issue is a little better still from last year.  He didn't struggle as much to get across our parking lot.  He feels a little stronger still.  He has not had any new pulmonary issues over the last year.  He has not had any bleeding on Xarelto, and feels he is not falling as much as he once did.  He did have a sinus surgery this fall for chronic sinusitis and a retention cyst.    ROS:  Review of Systems - History obtained from the patient  General ROS: negative  Psychological ROS: negative  ENT ROS: negative  Allergy and Immunology ROS: negative  Endocrine ROS: negative  Respiratory ROS: positive for - shortness of breath  negative for - hemoptysis, orthopnea, pleuritic pain, stridor, tachypnea or wheezingd  Cardiovascular ROS: no chest pain or palpitations  Gastrointestinal ROS: no abdominal pain, change in bowel habits, or black or bloody stools  Genito-Urinary ROS: no dysuria, trouble voiding, or hematuria  Musculoskeletal ROS: negative  Neurological ROS: no TIA or stroke symptoms  Dermatological ROS: negative      Current Meds:  Current Outpatient Medications   Medication Sig   ? acetaminophen (TYLENOL) 500 MG tablet Take 1 tablet (500 mg total) by mouth every 6 (six) hours as needed for pain.   ? cholecalciferol, vitamin D3, 2,000 unit cap Take 2,000 Units by mouth daily.   ? fluticasone propionate (FLONASE) 50 mcg/actuation nasal spray SPRAY 2 SPRAYS INTO EACH NOSTRIL EVERY DAY   ? gabapentin (NEURONTIN) 100 MG capsule Take 100 mg by mouth 3 (three) times a day.   ? HYDROcodone-acetaminophen 5-325 mg per tablet Take 1 tablet by mouth every 4 (four) hours as needed for pain.   ? NON FORMULARY MEDICAL CANNABIS   ? omeprazole (PRILOSEC) 20 MG capsule TAKE 1 CAPSULE BY MOUTH DAILY   ?  "propranoloL (INDERAL) 80 MG tablet TAKE 1 TAB BY MOUTH THREE TIMES DAILY   ? rivaroxaban (XARELTO) 20 mg tablet Take 1 tablet (20 mg total) by mouth daily. Start 20 mg daily after the 15 mg daily for 21 days.   ? rizatriptan (MAXALT-MLT) 10 MG disintegrating tablet DISSOLVE 1 TABLET ON TONGUE AS NEEDED FOR MIGRAINE. MAY REPEAT IN 2HRS IF NEEDED   ? zolpidem (AMBIEN) 5 MG tablet TAKE 1 TABLET (5 MG TOTAL) BY MOUTH AT BEDTIME AS NEEDED FOR SLEEP.   ? oxyCODONE-acetaminophen (PERCOCET/ENDOCET) 7.5-325 mg per tablet Take 1 tablet by mouth 3 (three) times a day as needed for pain.   ? oxyCODONE-acetaminophen (PERCOCET/ENDOCET) 7.5-325 mg per tablet Take 1 tablet by mouth 3 (three) times a day as needed for pain.       Labs:  No results found for this or any previous visit (from the past 72 hour(s)).    I have personally reviewed all pertinent imaging studies and PFT results unless otherwise noted.    Imaging studies:  No results found.      Physical Exam:  /84   Pulse 84   Ht 5' 5\" (1.651 m)   Wt 153 lb (69.4 kg)   SpO2 95% Comment: RA  BMI 25.46 kg/m    General - Well nourished  Ears/Mouth -  Deferred given mask use during pandemic  Neck - no cervical lymphadenopathy  Lungs - Clear to ausculation bilaterally but with muscular shaking at end inspiration  CVS - regular rhythm with no murmurs, rubs or gallups  Abdomen - soft, NT, ND, NABS  Ext - no cyanosis, clubbing or edema  Skin - no rash  Psychology - alert and oriented, answers appropriate        Electronically signed by:    Jules To MD PhD  [facility]         "

## 2021-06-21 NOTE — PROGRESS NOTES
Patient presents to the clinic today for a follow up with Dayna Lundberg CNP regarding back pain. He is describing his pain as sharp, throbbing, numb, aching. Function score is 4.

## 2021-06-21 NOTE — PROGRESS NOTES
Email: [email or web address]  Marital Status: -Miguel Felix  Children: No  Born and Raised:   Occupation: On Disability  Living Situation: Lives in [place] with his wife.  Smoking, Alcohol, other:  Services receiving:   [facility] Follow up s: Monthly  [facility] Enrollment: January 19th 2018  Next CCC Follow up: 11/14/18

## 2021-06-21 NOTE — PROGRESS NOTES
"Programs applying for:   [facility] Case#:  Financial worker:  CANDACE Case #:  Insurance Tracking:  PMI#:    10/31/18: Patient has no needs from [facility] at this time. If patient needs assistance from [facility] in the future please place a new referral. Removing patient from panel.     10/26/18: FRG called pt to discuss if patient needed any more assistance with paperwork. FR will make outreach in another week and then take patient off panel.     10/18/18: FRG called Miguel Felix to discuss paperwork. Miguel will not complete section D. CON and Miguel will talk next Thursday for any clarification needs on the packet.     Referral:  Guevara Díaz,            Here is the message I received from his wife about this.          \"Anand Weber told me he was going to be receiving some papers from the [facility] to fill out, but he forgot what it's called.           Today, we received an application called \"Supplement to MNsure Application for Health Coverage and Help Paying Costs ([id number])\". Is this what you were referring to when you two spoke?           If so, we'll need some help filling out the application. I'm confused with their Section D (complete if in nursing home or need services to stay in home) because half way through, it ask for spouse's name and etc.           So if you can call either one of us to schedule an appointment to meet with someone on the team who can help us, that would be great.           Thank you,      Miguel            "

## 2021-06-21 NOTE — PROGRESS NOTES
11/12/18   Start time: 3:10 PM    Stop Time: 4:00 PM   Session # 17    Anand Felix is a 33 y.o. male is being seen today for    Chief Complaint   Patient presents with     Mental Health Note   . Major depression, moderate   Generalized Anxiety Disorder  Chronic pain syndrome           New symptoms or complaints: None    Functional Impairment:   Personal: 4  Family: 4  Work: 4  Social:4    Clinical assessment of mental status: Anand Felix presented on time.  He was open and cooperative, and dressed appropriately for this session and weather. His memory was in tact .  His  speech was appropriate.  Language was appropriate.  Concentration and focus is on task. Psychosis is not noted or reported. He reports his mood is depressed .  Affect is congruent with speech.  Fund of knowledge is adequate. Insight is adequate for therapy.     Suicidal/Homicidal Ideation present: Patient reports passive SI. He denies plan or means. Discussed protective factors and safety plan to call 911, go to the ER and/or call the crisis connection if he is feeling unsafe or symptoms worsen.    Patient's impression of their current status: Patient reported he continues to be working on addressing symptoms of depression and anxiety. He reports pain symptoms continue and he reports frustration with how he feels. He reports improved sleep since a medication change. He denies slurred speech since a medication change.  He is interested in the medical cannabis program and his pain center provider wanted him to discuss this in session.         Therapist impression of patients current state: Patient seems to be working on addressing depression, anxiety and chronic medical symptoms. He reports pain symptoms have been difficult to manage and he has had some falling recently. Medication changes seemed to have been helpful and he is not slurring his speech like he was before. He is interested in the medical cannabis program and patient has low risk factors at  "this time. He denies a history of substance use/abuse issues. The financial part of the program may be a barrier. He continues to work on addressing acceptance of his chronic pain and medical concerns, with some progress. He does report feeling more rested recently. He and his wife continue to adjust to living at his brother's house. He reported he is engaged with others more often now and he is exercising more often with taking walks. He seems more accepting of this housing transition. Patient reports suicidal ideation, but denies plan or means. Discussed triggers for negative thinking. Discussed safety plan and ways to cope if symptoms should worsen or he is feeling unsafe.  He reports he is consistent with PT exercises in home practice and noticing ongoing positive improvement.  Encouraged patient to continue to increase efforts to reach out to social/family support to connect socially/emotionally. He reports [employer] attendance as he is able. Continued to discuss potential benefits of Mindfulness meditation with patient. He reports regular meditation practice and grounding. Continued home practice is encouraged. He preferred to process target/NC another time as he reported pain symptoms were low today. However, reinforced positive cognition using tappers in session. Patient reported he felt \"warmer\" following this. Presented  short hearing meditations today and patient appeared to benefit.         Type of psychotherapeutic technique provided: Client centered, CBT and Mindfulness and EMDR    Progress toward short term goals:. Patient continues to work on increasing coping skills to manage chronic pain and mental health. Patient continues medical care as recommended by his medical providers. He engages in physical activity and social activity as he is able. He is engaging in meditation/grounding in daily living. He continues psychotherapy/EMDR. He is working with a county . Patient continues to adjust a " recent move.        Review of long term goals: Not done at today's visit. Last review 10/26/18.    Diagnosis: Major depression, moderate   Generalized Anxiety Disorder  Chronic pain syndrome         Plan and Follow up: Patient to follow pain center plan of care. Continue to work with pain center provider regarding medical cannabis program, if appropriate. Patient to follow all medical recommendations. Patient to schedule every 2 weeks for psychotherapy to further address depression, anxiety and chronic pain concerns. Continue meditation and other healthy coping strategies.  Practice meditations as presented in session. Further address adjustment to role/identity due to chronic pain and health concerns. Continue EMDR using pain protocol, as desired. Continue getting in touch with the senses meditations.               Discharge Criteria/Planning: Patient will continue with follow-up until therapy can be discontinued without return of signs and symptoms.    Alize Livingston 11/12/18

## 2021-06-21 NOTE — PROGRESS NOTES
Email: [email or web address]  Marital Status: -Miguel Felix  Children: No  Born and Raised:   Occupation: On Disability  Living Situation: Lives in [place] with his wife.  Smoking, Alcohol, other:  Services receiving:   [facility] Follow up s: Monthly  [facility] Enrollment: January 19th 2018  Next [facility] Follow up: 12/13/18

## 2021-06-21 NOTE — PROGRESS NOTES
"PAIN CLINIC FOLLOW UP PROGRESS NOTE    CC:  Chief Complaint   Patient presents with     Back Pain       HPI  Anand Felix is a 33 y.o. male who presents for evaluation   Chief Complaint   Patient presents with     Back Pain    that is causing continued pain. Since the last visit the patient denies any trips to the urgent care or ED specifically for their pain. The patient denies any new medications, diagnoses since the last visit. Specific questions indicated the patient wanted addressed today include: Follow-up his chronic pain treatment plan in regards to his low back and bilateral weakness in his lower extremities patient will also require a refill today for duty of care    Major issues:  1. Chronic pain syndrome    2. Chronic pain    3. Muscle Weakness Generalized - \"Neuromuscular Condition\" with normal EMG.    4. Myalgia    5. Lumbar pain determined by palpation and/or percussion at L1 (5/4/16)    6. Muscle spasm          Pain level: On a scale of 1-10, the patient rates their pain today 4 this is a significant reduction in his pain score from the last assessment in the clinic  Pain is described: Sharp, deep, burning, tearing  Aggravating factors: Any activity  Alleviating factors: Salt baths, hot and cold packs, medications, laying down  New pain:  None  Since last visit, pain has improved  Associated Symptoms: Numbness in the right leg and arm, weakness all over his body  Pain interferes with: Sleep, walking, activities of daily living      Previous Medical History  History   Alcohol Use     Yes     Comment: Monthly or less often. 1-2 drinks at a time.     History   Drug Use No     History   Smoking Status     Never Smoker   Smokeless Tobacco     Never Used       Pertinent Pain Medications/interventions:  He currently takes morphine extended release 3 times daily, morphine immediate release 3 times a week, nortriptyline 50 mg tablets 2 capsules/day    Review of Systems:  12 point systems were reviewed with pt as " "documented on pt health form and the patient denies any new diagnosis or changes in 12 point system review since the last visit.     Physical Exam  Vitals:    10/15/18 1325   BP: 115/80   Patient Site: Left Arm   Patient Position: Sitting   Cuff Size: Adult Regular   Pulse: (!) 107   Resp: 18   Weight: 158 lb (71.7 kg)   Height: 5' 5\" (1.651 m)     General- patient is alert and oriented, in NAD, well-groomed, well-nourished  Psych- Judgment and insight normal, AOx4, recent and remote memory normal, mood and affect normal  Eyes- pupils are equal and reactive, conjunctiva is clear bilaterally, no ptosis is noted.   Respiratory- breathing is non-labored  Cardiovascular- extremities warm and well perfused, no peripheral edema or varicosities.  Musculoskeletal- gait is normal, extremities with no joint swelling, erythema, or warmth.  Reduced strength flexion-extension of his ankles bilaterally currently in PFO boots patient also utilizes a cane for mobilization  Neuro- normal strength, no gait abnormalities, normal sensation to pain, temperature, light touch.  Integumentary- no rashes, dermatitis or discolorations noted throughout, no open wounds noted.    Medications    Current Outpatient Prescriptions:      acetaminophen (TYLENOL) 500 MG tablet, Take 1 tablet (500 mg total) by mouth every 6 (six) hours as needed for pain., Disp: , Rfl: 0     atenolol (TENORMIN) 25 MG tablet, Take 1 tablet (25 mg total) by mouth daily., Disp: 30 tablet, Rfl: 5     cholecalciferol, vitamin D3, 2,000 unit cap, Take 2,000 Units by mouth daily., Disp: , Rfl:      cyclobenzaprine (FLEXERIL) 10 MG tablet, Take 1 tablet (10 mg total) by mouth 3 (three) times a day as needed for muscle spasms., Disp: 90 tablet, Rfl: 0     fluticasone (FLONASE) 50 mcg/actuation nasal spray, 2 sprays into each nostril daily., Disp: 16 g, Rfl: 6     nortriptyline (PAMELOR) 50 MG capsule, TAKE 3 CAPSULES BY MOUTH AT BEDTIME, Disp: 90 capsule, Rfl: 0     omeprazole " (PRILOSEC) 20 MG capsule, TAKE 1 CAPSULE (20 MG TOTAL) BY MOUTH DAILY., Disp: 90 capsule, Rfl: 3     warfarin (COUMADIN) 5 MG tablet, Take 5 mg by mouth daily. Adjust dose based on INR results as directed., Disp: 60 tablet, Rfl: 5     gabapentin (NEURONTIN) 100 MG capsule, Take 100 mg by mouth at bedtime., Disp: 30 capsule, Rfl: 0     [START ON 10/27/2018] morphine (MS CONTIN) 15 MG 12 hr tablet, Take 1 tablet (15 mg total) by mouth 3 (three) times a day., Disp: 90 tablet, Rfl: 0     [START ON 10/29/2018] morphine (MSIR) 15 MG tablet, Take 1 tablet (15 mg total) by mouth 3 (three) times a week., Disp: 12 tablet, Rfl: 0     nortriptyline (PAMELOR) 25 MG capsule, Take 1 capsule (25 mg total) by mouth daily for 5 days., Disp: 5 capsule, Rfl: 0    Lab:  Last UDS on 2018 had expected results. Any abnormal results have been discussed with the patient and may change the plan of care. Please see the scanned document from the outside lab under the media tab. This was reviewed with the patient.      Imaging:  No new imaging.      Recent   Dated 10/15/2018 was reviewed with the patient today.   [facility] [place] Date: 10/12/18  Query Report Page#: 1  Patient Rx History Report  ISAAC LUNDBERG  Search Criteria: Last Name 'isaac' and First Name 'ita' and  = '[birth year]' and Request Period = '10/12/17' to  '10/12/18' - 1 out of 1 Recipients Selected.  Fill Date Product, Str, Form Qty Days Pt ID Prescriber Written RX# N/R* Pharm **MED+  ---------- -------------------------------- ------------ ---- --------- ---------- ---------- ------------ ----- --------- ------  2018 MORPHINE SULFATE IR 15 MG TAB 12.483966 28 [id number] 2018 [id number] N [id number] 06.43  2018 MORPHINE SULF ER 15 MG TABLET 90.211479 30 [id number] 2018 [id number] N [id number] 45.0  2018 MORPHINE SULFATE IR 15 MG TAB 12.884565 28 [id number] 2018 [id number] N [id number] .43  2018 MORPHINE SULF  ER 15 MG TABLET 90.746071 30 [id number] 08/02/2018 [id number] N [id number] 45.0  07/16/2018 MORPHINE SULFATE IR 15 MG TAB 12.593490 28 [id number] 07/16/2018 [id number] N [id number] 06.43  07/16/2018 MORPHINE SULF ER 15 MG TABLET 90.546002 30 [id number] 07/16/2018 [id number] N [id number] 45.0  06/18/2018 MORPHINE SULF ER 15 MG TABLET 90.923755 30 [id number] 06/07/2018 [id number] N [id number] 45.0  06/11/2018 MORPHINE SULFATE IR 15 MG TAB 12.318930 28 [id number] 06/07/2018 [id number] N [id number] 06.43  05/17/2018 MORPHINE SULF ER 15 MG TABLET 90.200831 30 [id number] 05/17/2018 [id number] N [id number] 45.0  03/29/2018 OXYCODONE HCL 5 MG TABLET 30.150578 30 [id number] 03/27/2018 [id number] N [id number] 07.5  03/25/2018 OXYCONTIN 10 MG TABLET 60.075170 30 [id number] 02/26/2018 [id number] N [id number] 30.0  02/26/2018 OXYCODONE HCL 5 MG TABLET 30.479809 30 [id number] 02/26/2018 [id number] N [id number] 07.5  02/23/2018 OXYCONTIN 10 MG TABLET 60.668392 30 [id number] 02/23/2018 [id number] N [id number] 30.0  01/23/2018 OXYCONTIN 10 MG TABLET 60.436937 30 [id number] 01/23/2018 [id number] N [id number] 30.0  01/08/2018 OXYCODONE HCL 5 MG TABLET 90.737979 22 [id number] 01/08/2018 [id number] N [id number] 30.68  12/07/2017 OXYCODONE HCL 5 MG TABLET 90.906950 22 [id number] 12/06/2017 [id number] N [id number] 30.68  12/06/2017 DIAZEPAM 5 MG TABLET 60.583370 30 [id number] 12/06/2017 [id number] N [id number] UNK  11/27/2017 TRAMADOL HCL 50 MG TABLET 120.959224 30 [id number] 11/27/2017 [id number] N [id number] 20.0  11/03/2017 DIAZEPAM 5 MG TABLET 75.724451 30 [id number] 11/03/2017 [id number] N [id number] UN  11/03/2017 TRAMADOL HCL 50 MG TABLET 120.874896 30 [id number] 11/03/2017 [id number] N [id number] 20.0  *N/R N=New R=Refill  +MED Daily  Prescribers for prescriptions  listed  ----------------------------------------------------------------------------------------------------------------------------------  [id number] BILLY MCCRARY; PRIMARY CARE NURSE PRACTITIONER, [address], [facility] [address]  [id number] ALANA MARTIN; [address], [address], [place] [id number]  [id number] ANNALISA FAULKNER; [facility], [address] [facility] [address]  Pharmacies that dispensed prescriptions listed  ----------------------------------------------------------------------------------------------------------------------------------  [id number] [facility]; : [facility] # [id number], [address] [facility] [address],  [id number] [facility]; [facility] # [id number], [address] [facility] [address],  Patients that match search criteria  ----------------------------------------------------------------------------------------------------------------------------------  [id number] JESSE LUNDBERGDAVID 86; [address], [place] [address]  **Per CDC guidance, the conversion factors and associated daily morphine milligram equivalents for drugs prescribed as part of  medication-assisted treatment for opioid use disorder should not be used to benchmark against dosage thresholds meant for opioids  prescribed for pain.  Report Disclaimers:  The report provided above is based upon the search criteria and the data provided by the dispensing entities. For more information  about any prescription, please contact the dispenser or the prescriber.  This report contains confidential information, including patient identifiers, and is not a public record. The information on this  report must be treated as protected health information and is to be disclosed to others only as authorized by applicable state  and Federal regulations.    Assessment:   Anand Felix is a 33 y.o. male seen in clinic today for   Chief Complaint   Patient presents with     Back Pain      She presents the pain Center today to follow-up on his chronic pain treatment plan.  Previously was a patient of Dr. Hannah.  Next    Patient does report that previous right-sided transfemoral epidural steroid injections did not give him much relief however he continues to have low back pain that radiates down both of his legs.  Patient has had progressive weakness in his lower extremities over the past year he is now wearing PFO boots bilaterally.  Patient has had a right hemilaminectomy at the L5-S1 level in 2015 however since then his symptoms have progressively gotten worse.  Patient is also had a pulmonary embolus noted and treated he is currently continuing to take warfarin for this.    Today we did discuss his current treatment program that he is on which includes amitriptyline and he is reporting a side effect of increased stammering he has followed up with his therapist who feels that this is significant.  Because of this we will wean him off of the amitriptyline patient has had a history of having gabapentin without side effects I will restart this at 100 mg at bedtime he will reevaluate with me in 4 weeks.  In regards to his ongoing chronic pain treatment plan here he is currently taking morphine extended release 3 times a day as well as morphine immediate release as needed up to 3 times per week.  Patient states that these of the days he is most active when he takes his wife and for her dialysis.  Currently he is MME is within the CDC guidelines.    She is also following along with Neha for his continued concerns about chronic pain and anxiety.    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     Patients current MME is 45-60  Patient to call clinic for next month's prescription 5 days in advance. Based on the patients response to their previous narcotic therapy and  quality of life, which includes their participation in ADLs, I recommend that the narcotics therapy continue, however the changes listed below will be incorporated into their plan of care.     Patient set goals to   1.  To control his ongoing chronic pain that is centered in his low back and radiates down his legs bilaterally    Plan:   Interventions-    Follow up in 4 weeks to evaluate the effectiveness of the treatment interventions ordered today.     Please bring any opioids or controlled substances in that are prescribed by the clinic for pill counts for every visit.     Therapy- PT/OT- consider this as needed for weakness     Behavioral Health with Edmundo     Injections- Dr. Welsh bilateral S1-S2 TFESI injection below the L5-S1 recess stenosis and previous decompression     Will order Lumbar spine Xrays flexion and extension     Will start you on gabapentin 100 mg small dose at bedtime     Will wean you off of the pamelor cut down to 1 -50 mg cap for 5 days then reduce to 1-25 mg capsule for 5 days then stop     Will continue the use of the morphine IR and ER as you are and re-evaluate as needed     Will consider a second opinion with a spine surgeon due to the progressive weakness after your decompression     Prescription Drug Management will be continued by the [facility]  A narcotic contract was signed by the patient and  Patient is unable to get narcotics from other providers. They will be subject to random UAs.      Orders placed today  Medications that were ordered today   Requested Prescriptions     Signed Prescriptions Disp Refills     morphine (MS CONTIN) 15 MG 12 hr tablet 90 tablet 0     Sig: Take 1 tablet (15 mg total) by mouth 3 (three) times a day.     morphine (MSIR) 15 MG tablet 12 tablet 0     Sig: Take 1 tablet (15 mg total) by mouth 3 (three) times a week.     gabapentin (NEURONTIN) 100 MG capsule 30 capsule 0     Sig: Take 100 mg by mouth at bedtime.     nortriptyline (PAMELOR)  25 MG capsule 5 capsule 0     Sig: Take 1 capsule (25 mg total) by mouth daily for 5 days.     cyclobenzaprine (FLEXERIL) 10 MG tablet 90 tablet 0     Sig: Take 1 tablet (10 mg total) by mouth 3 (three) times a day as needed for muscle spasms.     Orders Placed This Encounter   Procedures     XR Lumbar Spine 4 or More VWS     AP, Lateral, Flexion, Extension views     Standing Status:   Future     Standing Expiration Date:   10/15/2019     Order Specific Question:   Reason for Exam (Describe Symptoms):     Answer:   check instability     Order Specific Question:   Can the procedure be changed per Radiologist protocol?     Answer:   Yes       UDT/SWAB:  Patient required a random Urine Drug Testing, due to the need to comply with [place] [employer] Policy Guidelines and CDC Guideline for the use of any controlled substances. This is to ensure that patient is compliant with treatment, and monitor for risks such as diversion, abuse, or any other aberrant behaviors. Patient is either being considered for or taking a controlled substance. Unexpected findings will be discussed and treatment decision may be adjusted. Testing is being implemented across the board randomly w/o bias related to age, race, gender, socioeconomic status or Islam affiliation.    The patient understand todays plan and has their questions answered in regards to expectations and current treatment plan.     SAFETY REMINDERS  No alcohol while taking controlled substances. Alcohol is not an illegal substance, it is unsafe to use in combination. It is a build up of substances in the body that can be extremely hazardous and may cause respirations to slow to a dangerous rate resulting in hospitalization, brain damage, or death.    Opioid medications have been associated with sharp rise in unintentional overdose and death.  Overdose is a condition characterized by the consumption in excess of a particular drug causing adverse effects. This can happen b/c  you are sick, accidentally or intentionally took an extra dose, are on multiple medication that can interact. Someone took your medication and they are not use to the medication.  Symptoms of overdose include:   !breathing slow and shallow, erratic or not at all  !pinpoint pupils, hallucinations  !confusion  !muscle jerks, slack muscles   !extreme sleepiness or loss of alertness   !awake but not able to talk   !face pale or clammy, vomiting, for lighter skinned people, the skin tone turns bluish purple, for darker skinned people, it turns grayish or ashen   If in a situation where overdose is a concern engage the emergency response system (dial 911).    In one study it was noted that 80% of unintentional overdoses occurred in people who were taking a combination of opioids and benzodiazepines.    Do not sell, loan, borrow or share your opioid medication with anyone. Deaths have occurred as a result of this practice. It is illegal and patients are being prosecuted.     Prevent unexpected access/loss of medication: Keep medication locked. Only carry what you need with you.    Dayna Lundberg, [facility]  [address]  [place] [address]  Ph: [phone]  Fax: [phone]

## 2021-06-22 ENCOUNTER — HOSPITAL ENCOUNTER (OUTPATIENT)
Dept: PALLIATIVE MEDICINE | Facility: OTHER | Age: 36
Discharge: HOME | End: 2021-06-22
Attending: COUNSELOR
Payer: COMMERCIAL

## 2021-06-22 DIAGNOSIS — F33.2 SEVERE EPISODE OF RECURRENT MAJOR DEPRESSIVE DISORDER, WITHOUT PSYCHOTIC FEATURES (H): ICD-10-CM

## 2021-06-22 DIAGNOSIS — G89.4 CHRONIC PAIN SYNDROME: ICD-10-CM

## 2021-06-22 DIAGNOSIS — F41.1 GAD (GENERALIZED ANXIETY DISORDER): ICD-10-CM

## 2021-06-22 NOTE — PROGRESS NOTES
"12/10/2018    Start time: 2:05 PM    Stop Time: 2:55 PM   Session # 19    Anand Felix is a 33 y.o. male is being seen today for    Chief Complaint   Patient presents with     Mental Health Note   . Major depression, moderate   Generalized Anxiety Disorder  Chronic pain syndrome           New symptoms or complaints: None    Functional Impairment:   Personal: 4  Family: 4  Work: 4  Social:4    Clinical assessment of mental status: Anand Felix presented on time.  He was open and cooperative, and dressed appropriately for this session and weather. His memory was in tact .  His  speech was appropriate.  Language was appropriate.  Concentration and focus is on task. Psychosis is not noted or reported. He reports his mood is depressed .  Affect is congruent with speech.  Fund of knowledge is adequate. Insight is adequate for therapy.     Suicidal/Homicidal Ideation present: Patient reports passive SI. He denies plan or means. Discussed protective factors and safety plan to call 911, go to the ER and/or call the crisis connection if he is feeling unsafe or symptoms worsen.    Patient's impression of their current status:  Patient reported he continues to be working on addressing symptoms of depression and anxiety. He reports he has a feeling that something bad will happen. He reports pain symptoms continue, but he has had some relief with his current plan of care. He reports sleep is continuing to improve, despite some challenges. He reports occasional slurred speech.  He is working with his pain center provider regarding the medical cannabis program.      Therapist impression of patients current state: Patient seems to be working on addressing depression, anxiety and chronic medical symptoms. He reports pain symptoms have been more manageable with his current plan of care. However, he does report feeling his joints \"cracking\" and it is harder to move around since it's been colder. Discussed some history regarding family and " patient processed thoughts and feelings. He reports he has had some occasional slurred speech and this will be monitored. Encouraged him to reach out to his medical provider with any concerns. He is interested in the medical cannabis program and he will be setting up an appointment with a dispensary soon. He continues to work on addressing acceptance of his chronic pain and medical concerns, with some progress. He does report feeling more rested recently. He and his wife continue to adjust to living at his brother's house. He reported he is engaged with others more often now and he continues exercising more often with taking walks. He seems to be adjusting to this housing transition. Patient reports suicidal ideation, but denies plan or means. Discussed triggers for negative thinking and focusing on his capabilities. Discussed safety plan and ways to cope if symptoms should worsen or he is feeling unsafe.  He reports he is consistent with PT exercises in home practice and noticing ongoing positive improvement.  Encouraged patient to continue to increase efforts to reach out to social/family support to connect socially/emotionally. He reports [employer] attendance as he is able. Continued to discuss potential benefits of Mindfulness meditation with patient. He reports regular meditation practice and grounding. Further, he finds comfort in music. Continued home practice is encouraged. He preferred to process target/NC another time as he reported pain symptoms were low today. Presented short meditation today and patient appeared to benefit.         Type of psychotherapeutic technique provided: Client centered, CBT and Mindfulness    Progress toward short term goals: Patient continues to work on increasing coping skills to manage chronic pain and mental health. Patient continues medical care as recommended by his medical providers. He engages in physical activity and social activity as he is able. He is engaging in  meditation/grounding in daily living. He continues psychotherapy/EMDR. He is working with a county . Patient continues to adjust a recent move.           Review of long term goals: Not done at today's visit. Last review 10/26/18.    Diagnosis: Major depression, moderate   Generalized Anxiety Disorder  Chronic pain syndrome         Plan and Follow up: Patient to follow pain center plan of care. Patient to follow all medical recommendations. Patient to schedule every 2 weeks for psychotherapy to further address depression, anxiety and chronic pain concerns. Continue meditation and other healthy coping strategies.  Practice meditations as presented in session. Further address adjustment to role/identity due to chronic pain and health concerns. Continue EMDR using pain protocol, as desired.            Discharge Criteria/Planning: Patient will continue with follow-up until therapy can be discontinued without return of signs and symptoms.    Alize Livingston 12/10/2018

## 2021-06-22 NOTE — TELEPHONE ENCOUNTER
Patient's original message/refill request:pt request refill of mscontin 15mg three times a day #90    Last appointment:12/10 with Dayna  Next appointment:2/4/19 with Dayna    This refill is in compliance with the last dictation/plan of care.   The patient is due for a refill of medication. Per  med last filled 11/28/18 11/28/2018 1 11/12/2018 Morphine Sulf Er 15 MG Tablet 90 30   Cr Mon [id number] Gra (2575) 0 45.00 MME Medicaid [place]  The patient has a prescription on file from 12/10/18 that the pharmacy will fill.

## 2021-06-22 NOTE — PROGRESS NOTES
"PAIN CLINIC FOLLOW UP PROGRESS NOTE    CC:  Chief Complaint   Patient presents with     Back Pain       HPI  Anand Felix is a 33 y.o. male who presents for evaluation   Chief Complaint   Patient presents with     Back Pain    that is causing continued pain. Since the last visit the patient denies any trips to the urgent care or ED specifically for their pain. The patient denies any new medications, diagnoses since the last visit. Specific questions indicated the patient wanted addressed today include: to address the ongoing pain in his low back and reports that his pain reduced to a 1/10 and got at least 90 % relief. Patient states that was the most relief that he has gotten in some time and and states that he got to skip a dose of pain medication do to this .    Major issues:  1. Chronic pain syndrome    2. Lumbar pain determined by palpation and/or percussion at L1 (5/4/16)    3. Arthralgias In Multiple Sites    4. Chronic pain    5. Muscle Weakness Generalized - \"Neuromuscular Condition\" with normal EMG.        Pain level: On a scale of 1-10, the patient rates their pain today 5.     Pain is described: Sharp, deep, burning, tearing  Aggravating factors: Any activity  Alleviating factors: Salt baths, hot and cold packs, medications, laying down  New pain:  None  Since last visit, pain has improved  Associated Symptoms: Numbness in the right leg and arm, weakness all over his body  Pain interferes with: Sleep, walking, activities of daily living        Previous Medical History  Social History     Substance and Sexual Activity   Alcohol Use Yes    Comment: Monthly or less often. 1-2 drinks at a time.     Social History     Substance and Sexual Activity   Drug Use No     Social History     Tobacco Use   Smoking Status Never Smoker   Smokeless Tobacco Never Used       Pertinent Pain Medications/interventions:  He currently takes morphine extended release 3 times daily, morphine immediate release 3 times a week, " "gabapentin 100 mg  At bedtime     Review of Systems:  12 point systems were reviewed with pt as documented on pt health form and the patient denies any new diagnosis or changes in 12 point system review since the last visit.     Physical Exam  Vitals:    12/10/18 1315   BP: 105/71   Patient Site: Left Arm   Patient Position: Sitting   Cuff Size: Adult Regular   Pulse: (!) 101   Resp: 18   Weight: 162 lb (73.5 kg)   Height: 5' 5\" (1.651 m)     General- patient is alert and oriented, in NAD, well-groomed, well-nourished  Psych- Judgment and insight normal, AOx4, recent and remote memory normal, mood and affect normal  Eyes- pupils are equal and reactive, conjunctiva is clear bilaterally, no ptosis is noted.   Respiratory- breathing is non-labored  Cardiovascular- extremities warm and well perfused, no peripheral edema or varicosities.  Musculoskeletal- gait is normal, extremities with no joint swelling, erythema, or warmth. Low back pain locally as well as radicular pain R>L in the L4,L5 distribution. No changes   Neuro- normal strength, no gait abnormalities, normal sensation to pain, temperature, light touch.  Integumentary- no rashes, dermatitis or discolorations noted throughout, no open wounds noted.         Medications    Current Outpatient Medications:      acetaminophen (TYLENOL) 500 MG tablet, Take 1 tablet (500 mg total) by mouth every 6 (six) hours as needed for pain., Disp: , Rfl: 0     atenolol (TENORMIN) 25 MG tablet, TAKE 1 TABLET BY MOUTH EVERY DAY, Disp: 90 tablet, Rfl: 1     cholecalciferol, vitamin D3, 2,000 unit cap, Take 2,000 Units by mouth daily., Disp: , Rfl:      cyclobenzaprine (FLEXERIL) 10 MG tablet, Take 1 tablet (10 mg total) by mouth 3 (three) times a day as needed for muscle spasms., Disp: 90 tablet, Rfl: 0     fluticasone (FLONASE) 50 mcg/actuation nasal spray, INSTILL 2 SPRAYS INTO EACH NOSTRIL DAILY., Disp: 48 g, Rfl: 2     omeprazole (PRILOSEC) 20 MG capsule, TAKE 1 CAPSULE (20 MG " TOTAL) BY MOUTH DAILY., Disp: 90 capsule, Rfl: 3     warfarin (COUMADIN) 5 MG tablet, Take 5 mg by mouth daily. Adjust dose based on INR results as directed., Disp: 60 tablet, Rfl: 5     gabapentin (NEURONTIN) 100 MG capsule, Take 100 mg by mouth 3 (three) times a day., Disp: 90 capsule, Rfl: 1     [START ON 12/26/2018] morphine (MS CONTIN) 15 MG 12 hr tablet, Take 1 tablet (15 mg total) by mouth 3 (three) times a day., Disp: 90 tablet, Rfl: 0     [START ON 12/12/2018] oxyCODONE (ROXICODONE) 5 MG immediate release tablet, Take 1 tablet (5 mg total) by mouth 2 (two) times a day as needed for pain., Disp: 60 tablet, Rfl: 0    Lab:  Last UDS on 1/2/2018 had expected results. Any abnormal results have been discussed with the patient and may change the plan of care. Please see the scanned document from the outside lab under the media tab. This was reviewed with the patient.      Imaging:  No new imaging.      Recent   Dated 12/10/2018 was reviewed with the patient today.     Paper copy reviewed    Assessment:   Anand Felix is a 33 y.o. male seen in clinic today for   Chief Complaint   Patient presents with     Back Pain     She presents to the pain Center today to follow-up on his chronic pain treatment plan in regards to his lumbar spine.  Patient notes that he had the medial branch block and reported at least a 90% relief of his symptoms and pain and even notes that he get to skip a dose of pain medication.  Patient has been doing very well on his current regimen he is been taking morphine extended release 3 times a day and has discontinued the morphine immediate release and takes oxycodone 5 mg tablets twice a day as needed.  Patient feels like he is doing better he looks forward to the next medial branch block and hopefully the RFA procedure.     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  Patient denies any side effects from the opioid use at this time he does present with his spouse who  seems supportive throughout the visit patient also utilizes a cane due to his uneven gait.  Patient is also currently trying to finish his registration for the medical cannabis program he does note he just has to fill out a self assessment to start the program.  Patient also reports the gabapentin has been helping him his radicular pain.  Patient has been compliant with cares no other concerns noted during the visit today.     Patient notes that he would like to destroy his immediate release morphine which he brought in with him today as well as his amitriptyline.  Patient needs a refill of oxycodone, gabapentin and morphine extended release this is been sent to his pharmacy.  Patient will call if he needs refill of Flexeril.     They are here for follow up and continued medical management of their pain. Today we reviewed the lab work of the UDT test and the results are expected because of the prescribed narcotics found in the urine drug screen.     I have also reviewed the information obtained from the last visit encounter after the PATSY was signed and have asked any pertintent questions needed from other healthcare providers/family/patient to continue care and formulate a treatment plan.     Patients current MME is 60  Patient to call clinic for next month's prescription 5 days in advance. Based on the patients response to their previous narcotic therapy and quality of life, which includes their participation in ADLs, I recommend that the narcotics therapy continue, however the changes listed below will be incorporated into their plan of care.      Patient set goals to   1.  To control his ongoing chronic pain that is centered in his low back and radiates down his legs bilaterally    Plan:   Interventions-    Follow up in 8 weeks to evaluate the effectiveness of the treatment interventions ordered today.     Please bring any opioids or controlled substances in that are prescribed by the clinic for pill counts for  every visit.     Agree with having the second MBB of the lumbar spine done as you report 90 % relief.     Refills of the morphine ER has been sent to the pharmacy [facility] to fill on 12/26    Medical cannabis registration process is close to completed. Please call the number provided for any questions.     Will destroy the nortriptyline/ morphine IR today as it is not helpful to you.     Call when you need a refill in the flexeril     Repeat the UDT     Continue the oxycodone 5 mg today- sent to the [facility] to fill on 12/12    Prescription Drug Management will be continued by the [facility]  A narcotic contract was signed by the patient and  Patient is unable to get narcotics from other providers. They will be subject to random UAs.      Orders placed today  Medications that were ordered today   Requested Prescriptions     Signed Prescriptions Disp Refills     oxyCODONE (ROXICODONE) 5 MG immediate release tablet 60 tablet 0     Sig: Take 1 tablet (5 mg total) by mouth 2 (two) times a day as needed for pain.     morphine (MS CONTIN) 15 MG 12 hr tablet 90 tablet 0     Sig: Take 1 tablet (15 mg total) by mouth 3 (three) times a day.     gabapentin (NEURONTIN) 100 MG capsule 90 capsule 1     Sig: Take 100 mg by mouth 3 (three) times a day.     No orders of the defined types were placed in this encounter.      UDT/SWAB:  Patient required a random Urine Drug Testing, due to the need to comply with [place] Model Policy Guidelines and CDC Guideline for the use of any controlled substances. This is to ensure that patient is compliant with treatment, and monitor for risks such as diversion, abuse, or any other aberrant behaviors. Patient is either being considered for or taking a controlled substance. Unexpected findings will be discussed and treatment decision may be adjusted. Testing is being implemented across the board randomly w/o bias related to age, race, gender, socioeconomic status or Oriental orthodox  affiliation.    The patient understand todays plan and has their questions answered in regards to expectations and current treatment plan.     SAFETY REMINDERS  No alcohol while taking controlled substances. Alcohol is not an illegal substance, it is unsafe to use in combination. It is a build up of substances in the body that can be extremely hazardous and may cause respirations to slow to a dangerous rate resulting in hospitalization, brain damage, or death.    Opioid medications have been associated with sharp rise in unintentional overdose and death.  Overdose is a condition characterized by the consumption in excess of a particular drug causing adverse effects. This can happen b/c you are sick, accidentally or intentionally took an extra dose, are on multiple medication that can interact. Someone took your medication and they are not use to the medication.  Symptoms of overdose include:   !breathing slow and shallow, erratic or not at all  !pinpoint pupils, hallucinations  !confusion  !muscle jerks, slack muscles   !extreme sleepiness or loss of alertness   !awake but not able to talk   !face pale or clammy, vomiting, for lighter skinned people, the skin tone turns bluish purple, for darker skinned people, it turns grayish or ashen   If in a situation where overdose is a concern engage the emergency response system (dial 911).    In one study it was noted that 80% of unintentional overdoses occurred in people who were taking a combination of opioids and benzodiazepines.    Do not sell, loan, borrow or share your opioid medication with anyone. Deaths have occurred as a result of this practice. It is illegal and patients are being prosecuted.     Prevent unexpected access/loss of medication: Keep medication locked. Only carry what you need with you.    Dayna Lundberg, [facility]  [address]  [place] [address]  Ph: [phone]  Fax: [phone]

## 2021-06-22 NOTE — PROGRESS NOTES
1/2/2019    Start time: 2:05 PM    Stop Time: 2:55 PM   Session # 1    Anand Felix is a 33 y.o. male is being seen today for    Chief Complaint   Patient presents with     Mental Health Note   .  Major depression, moderate   Generalized Anxiety Disorder  Chronic pain syndrome    New symptoms or complaints: None    Functional Impairment:   Personal: 4  Family: 4  Work: 4  Social:4    Clinical assessment of mental status: Anand Felix presented on time.  He was open and cooperative, and dressed appropriately for this session and weather. His memory was in tact .  His  speech was appropriate.  Language was appropriate.  Concentration and focus is on task. Psychosis is not noted or reported. He reports his mood is euthymic .  Affect is congruent with speech.  Fund of knowledge is adequate. Insight is adequate for therapy.     Suicidal/Homicidal Ideation present: Patient reports passive suicidal ideation and denies plan or means. He does report suicidal ideation is less often since last session. He reports protective factors and agrees to follow safety plan if symptoms change or worsen: call 911, go to the ER for evaluation, and call crisis line.     Patient's impression of their current status:   Patient reported he continues to be working on addressing symptoms of depression and anxiety. He reports pain symptoms continue, but he has had some relief with his current plan of care. He reports sleep is continuing to improve, despite some challenges. He reports occasional slurred speech.  He started the medical cannabis program and finds benefit. He is struggling with some relationship changes due to chronic pain/medical conditions.        Therapist impression of patients current state: Patient seems to be working on addressing depression, anxiety and chronic medical symptoms. He reports pain symptoms have been more manageable with his current plan of care. He started the medical cannabis program and finds benefit. He reports he  has been able to take less Opiates since being on the program. Discussed changes in relationships that have occurred since he has been struggling with chronic pain/medical issues. He cried as he discussed the changes in his life now versus the past and he processed thoughts and feelings about losses.  He reports he has had some occasional slurred speech and this will be monitored. Encouraged him to reach out to his medical provider with any concerns. He continues to work on addressing acceptance of his chronic pain and medical concerns, with some progress. He does report some improved sleep, but continues to work on challenges. He reported he is engaged with others more often now and he continues exercising more often with taking walks. Patient reports suicidal ideation, but denies plan or means. Discussed triggers for negative thinking and focusing on his capabilities. Discussed safety plan and ways to cope if symptoms should worsen or he is feeling unsafe.  He reports he is consistent with PT exercises in home practice and noticing ongoing positive improvement.  Encouraged patient to continue to increase efforts to reach out to social/family support to connect socially/emotionally. He reports [employer] attendance as he is able. Continued to discuss potential benefits of Mindfulness meditation with patient. He reports regular meditation practice and grounding. Continued home practice is encouraged. He preferred to process target/NC another time as he reported pain symptoms were low today. Presented grounding exercise today and patient appeared to benefit.        Type of psychotherapeutic technique provided: Client centered, CBT and Mindfulness    Progress toward short term goals: Patient continues to work on increasing coping skills to manage chronic pain and mental health. Patient continues medical care as recommended by his medical providers. He engages in physical activity and social activity as he is able. He is  engaging in meditation/grounding in daily living. He continues psychotherapy/EMDR. He is working with a county .           Review of long term goals: Not done at today's visit. Last review 10/26/18.    Diagnosis:  Major depression, moderate   Generalized Anxiety Disorder  Chronic pain syndrome    Plan and Follow up: Patient to follow pain center plan of care. Patient to follow all medical recommendations. Patient to schedule every 2 weeks for psychotherapy to further address depression, anxiety and chronic pain concerns. Continue meditation and other healthy coping strategies.  Practice meditations as presented in session. Further address adjustment to role/identity due to chronic pain and health concerns. Follow up on changes in relationships due to chronic pain/medical conditions next time. Continue EMDR using pain protocol, as desired.        Discharge Criteria/Planning: Patient will continue with follow-up until therapy can be discontinued without return of signs and symptoms.    Alize Livingston 1/2/2019

## 2021-06-22 NOTE — PROGRESS NOTES
Email: [email or web address]  Marital Status: -Miguel Felix  Children: No  Born and Raised:   Occupation: On Disability  Living Situation: Lives in [place] with his wife.  Smoking, Alcohol, other:  Services receiving:   [facility] Follow up s: Monthly  [facility] Enrollment: January 19th 2018  Next [facility] Follow up: 1/11/19

## 2021-06-22 NOTE — PROGRESS NOTES
Assessment/Plan:     1. Routine general medical examination at a health care facility  Routine healthcare maintenance.  Preventative cares reviewed.  Pneumovax immunization based on chronic health concerns also seasonal influenza vaccination provided.  Annual physical exams to continue.  - Pneumococcal polysaccharide vaccine 23-valent 1 yo or older, subq/IM  - Influenza, Seasonal,Quad Inj, 36+ MOS    2. Overweight (BMI 25.0-29.9)  Dietary and exercise modification for weight goal less than 155 pounds initially, less than 150 pounds ideally.    3. Chronic pain syndrome  Continue chronic pain management through pain clinic.  Awaiting cannabis registry approval.  Continues to work with Dr. Lundberg.  MS Contin 15 mg 3 times daily and oxycodone 5 mg twice daily as needed.  Gabapentin 100 mg 3 times daily.    4. Muscle weakness  Chronic, stable.    5. Gastroesophageal reflux disease without esophagitis  Omeprazole 20 mg daily without breakthrough symptoms.    6. Sinus tachycardia  Atenolol 25 mg daily for sinus tachycardia history.  - Basic Metabolic Panel    Lab Results   Component Value Date    TSH 3.10 01/12/2018        7. Hyperlipidemia, unspecified hyperlipidemia type  Check lipid cascade with history of mild hyperlipidemia, diet controlled.  - Lipid Cascade    8. Foot drop, bilateral  Bilateral AFO bracing.    9. Nasal polyposis  Significant right greater than left nasal polyposis with history of recurrent ethmoid sinusitis.  Will continue to follow with otolaryngologist for likely polypectomy procedure.    10. Bilateral pulmonary embolism (H)  History of bilateral pulmonary emboli.  Continues warfarin anticoagulation 5 mg on Friday otherwise 2.5 mg daily.  Check INR today.  - INR    11. Vitamin D deficiency  History of vitamin D deficiency.  Utilizing vitamin D supplement 2000 units daily.  Ensure adequate replacement.  - Vitamin D, Total (25-Hydroxy)       The following high BMI interventions were  performed this visit: encouragement to exercise, weight monitoring, weight loss from baseline weight and lifestyle education regarding diet. Ensure ongoing efforts to achieve weight goal < 155 pounds initially, < 150 pounds ideally.              Subjective:      Anand Felix is a 33 y.o. male who presents for an annual exam.  Chronic medical concerns remains stable.  History of nasal polyposis and chronic ethmoid sinusitis.  Followed by otolaryngology.  Was to consider polypectomy however awaiting response to current treatment for chronic pain syndrome with recent bilateral medial branch blocks last Wednesday which have helped a lot.  Repeat treatment in 2 weeks anticipated.  Continues chronic pain management with pain clinic specialist Dr. Lundberg.  Awaiting cannabis registry approval.  Atenolol 25 mg for sinus tachycardia.  Omeprazole 20 mg daily for reflux without breakthrough symptoms.  History of pulmonary emboli.  Using warfarin anticoagulation and chronic basis.  Comprehensive review of systems as above otherwise all negative     - Miguel (dealing with CKD-5 due to IgA nephropathy and HTN) - Dr. Bernal follows  Children - none  Employed - working on disability - monoparesis  Surgeries - nasal surgery; low back surgery (Dr. Jeannine Hernandez, 2015) with residual right leg weakness  Tobacco use - none  ETOH - none  Family hx   Mother - living - high chol. Strokes. MI.   Father - living - unknown  Siblings - living - 4 sisters, 2 brother -    Bilateral AFO braces worn due to muscle weakness and foot drop    Healthy Habits:   Regular Exercise: No  Healthy Diet: Yes  Dental Visits Regularly: Yes  Seat Belt: Yes   Sexually active:    Colonoscopy: N/A  Lipid Profile: Yes  Glucose Screen: Yes    Immunization History   Administered Date(s) Administered     Influenza, seasonal,quad inj 36+ mos 11/30/2018     Influenza,seasonal quad, PF, 36+MOS 11/08/2017     Pneumo Polysac 23-V 11/30/2018     Tdap 11/08/2017  "    Immunization status: needs flu shot and pneumonia shot.  Vision Screening:both eyes  Hearing: PASS     Current Outpatient Medications   Medication Sig Dispense Refill     acetaminophen (TYLENOL) 500 MG tablet Take 1 tablet (500 mg total) by mouth every 6 (six) hours as needed for pain.  0     atenolol (TENORMIN) 25 MG tablet TAKE 1 TABLET BY MOUTH EVERY DAY 90 tablet 1     cholecalciferol, vitamin D3, 2,000 unit cap Take 2,000 Units by mouth daily.       cyclobenzaprine (FLEXERIL) 10 MG tablet Take 1 tablet (10 mg total) by mouth 3 (three) times a day as needed for muscle spasms. 90 tablet 0     fluticasone (FLONASE) 50 mcg/actuation nasal spray INSTILL 2 SPRAYS INTO EACH NOSTRIL DAILY. 48 g 2     gabapentin (NEURONTIN) 100 MG capsule Take 100 mg by mouth 3 (three) times a day. 90 capsule 0     morphine (MS CONTIN) 15 MG 12 hr tablet Take 1 tablet (15 mg total) by mouth 3 (three) times a day. 90 tablet 0     omeprazole (PRILOSEC) 20 MG capsule TAKE 1 CAPSULE (20 MG TOTAL) BY MOUTH DAILY. 90 capsule 3     oxyCODONE (ROXICODONE) 5 MG immediate release tablet Take 1 tablet (5 mg total) by mouth 2 (two) times a day as needed for pain. 60 tablet 0     warfarin (COUMADIN) 5 MG tablet Take 5 mg by mouth daily. Adjust dose based on INR results as directed. 60 tablet 5     nortriptyline (PAMELOR) 50 MG capsule TAKE 3 CAPSULES BY MOUTH AT BEDTIME 90 capsule 0     No current facility-administered medications for this visit.      Past Medical History:   Diagnosis Date     Bulging lumbar disc      Lumbar herniated disc 10/23/2015     Lumbar radiculopathy 11/25/2015     Past Surgical History:   Procedure Laterality Date     MINIMALLY INVASIVE MICRODISCECTOMY LUMBAR SPINE Right 10/2015    L5-S1; Dr. Bettie Hernandez     SINUS SURGERY  2014    Dr. Wright     Patient has no known allergies.  Family History   Problem Relation Age of Onset     Depression Mother      Hyperlipidemia Mother      Other Mother         \"Ulcers\" - Aches and " "pains throughout her body.     Cancer Father      No Medical Problems Sister      No Medical Problems Brother      No Medical Problems Sister      No Medical Problems Sister      No Medical Problems Sister      No Medical Problems Brother      Thyroid disease Maternal Grandmother      Thyroid disease Cousin      Social History     Socioeconomic History     Marital status: Single     Spouse name: Miguel     Number of children: 0     Years of education: GED and some collese     Highest education level: Not on file   Social Needs     Financial resource strain: Not on file     Food insecurity - worry: Not on file     Food insecurity - inability: Not on file     Transportation needs - medical: Not on file     Transportation needs - non-medical: Not on file   Occupational History     Occupation: unemployed   Tobacco Use     Smoking status: Never Smoker     Smokeless tobacco: Never Used   Substance and Sexual Activity     Alcohol use: Yes     Comment: Monthly or less often. 1-2 drinks at a time.     Drug use: No     Sexual activity: Yes     Partners: Female   Other Topics Concern     Not on file   Social History Narrative     Not on file       Review of Systems  Comprehensive ROS: as above, otherwise all negative.           Objective:     /70   Pulse (!) 108   Ht 5' 5\" (1.651 m)   Wt 162 lb (73.5 kg)   SpO2 96%   BMI 26.96 kg/m    Body mass index is 26.96 kg/m .    Physical    General Appearance:    Alert, cooperative, no distress, appears stated age.  Overweight.   Head:    Normocephalic, without obvious abnormality, atraumatic   Eyes:    PERRL, conjunctiva/corneas clear, EOM's intact, fundi     benign, both eyes        Ears:    Normal TM's and external ear canals, both ears   Nose:   Nares normal, septum midline, mucosa normal, no drainage    or sinus tenderness   Throat:   Lips, mucosa, and tongue normal; teeth and gums normal   Neck:   Supple, symmetrical, trachea midline, no adenopathy;        thyroid:  No " enlargement/tenderness/nodules; no carotid    bruit or JVD   Back:     Symmetric, no curvature, ROM normal, no CVA tenderness   Lungs:     Clear to auscultation bilaterally, respirations unlabored.  Staggered inhalation noted due to muscle weakness.   Chest wall:    No tenderness or deformity   Heart:    Regular rate and rhythm, S1 and S2 normal, no murmur, rub   or gallop   Abdomen:     Soft, non-tender, bowel sounds active all four quadrants,     no masses, no organomegaly.     Genitalia:    deferred per patient request    Rectal:    deferred   Extremities:   Extremities normal, atraumatic, no cyanosis or edema   Pulses:   2+ and symmetric all extremities.   Skin:   Skin color, texture, turgor normal, no rashes or lesions   Lymph nodes:   Cervical, supraclavicular, and axillary nodes normal   Neurologic:   CNII-XII intact.  Bilateral foot drop.  Utilizes AFO bracing bilaterally.  Muscle weakness noted and does require assistance getting onto exam table.  Ambulates with cane.                This note has been dictated using voice recognition software and as a result may contain minor grammatical errors and unintended word substitutions.

## 2021-06-22 NOTE — PROGRESS NOTES
Patient presents to the clinic today for a follow up with Dayna Lundberg CNP regarding back pain. Patient describes their pain as sharp, tight, burning, achy, sore. His function score is 4.

## 2021-06-22 NOTE — PROGRESS NOTES
"11/30/18    Start time: 1:05 PM   Stop Time: 1:55 PM   Session # 18    Anand Felix is a 33 y.o. male is being seen today for    Chief Complaint   Patient presents with     Mental Health Note   . Major depression, moderate   Generalized Anxiety Disorder  Chronic pain syndrome        New symptoms or complaints: None    Functional Impairment:   Personal: 4  Family: 4  Work: 4  Social:4    Clinical assessment of mental status: Anand Felix presented on time.  He was open and cooperative, and dressed appropriately for this session and weather. His memory was in tact .  His  speech was appropriate.  Language was appropriate.  Concentration and focus is on task. Psychosis is not noted or reported. He reports his mood is depressed .  Affect is congruent with speech.  Fund of knowledge is adequate. Insight is adequate for therapy.     Suicidal/Homicidal Ideation present: Patient reports passive SI. He denies plan or means. Discussed protective factors and safety plan to call 911, go to the ER and/or call the crisis connection if he is feeling unsafe or symptoms worsen.    Patient's impression of their current status:  Patient reported he continues to be working on addressing symptoms of depression and anxiety. He reports pain symptoms continue, but he has had some relief with recent procedure. \"I haven't felt this good in a long time\". He reports sleep is continuing to improve, despite some challenges. He reports occasional slurred speech.  He is working with his pain center provider regarding the medical cannabis program.            Therapist impression of patients current state: Patient seems to be working on addressing depression, anxiety and chronic medical symptoms. He reports pain symptoms have decreased recently after a procedure. However, he does report feeling his joints \"cracking\" since the weather has turned colder. Discussed some history regarding family and patient processed thoughts and feelings. He reports he has " had some occasional slurred speech and this will be monitored. Encouraged him to reach out to his medical provider with any concerns. He is interested in the medical cannabis program and he's working with his pain center provider on this process. He continues to work on addressing acceptance of his chronic pain and medical concerns, with some progress. He does report feeling more rested recently. He and his wife continue to adjust to living at his brother's house. He reported he is engaged with others more often now and he continues exercising more often with taking walks. He seems to be adjusting to this housing transition. Patient reports suicidal ideation, but denies plan or means. Discussed triggers for negative thinking. Discussed safety plan and ways to cope if symptoms should worsen or he is feeling unsafe.  He reports he is consistent with PT exercises in home practice and noticing ongoing positive improvement.  Encouraged patient to continue to increase efforts to reach out to social/family support to connect socially/emotionally. He reports [facility] attendance as he is able. Continued to discuss potential benefits of Mindfulness meditation with patient. He reports regular meditation practice and grounding. Further, he finds comfort in music. Continued home practice is encouraged. He preferred to process target/NC another time as he reported pain symptoms were low today. Presented  short sense of taste meditation today and patient appeared to benefit.         Type of psychotherapeutic technique provided: Client centered, CBT and Mindfulness    Progress toward short term goals: Patient continues to work on increasing coping skills to manage chronic pain and mental health. Patient continues medical care as recommended by his medical providers. He engages in physical activity and social activity as he is able. He is engaging in meditation/grounding in daily living. He continues psychotherapy/EMDR. He is working  with a [facility] . Patient continues to adjust a recent move.       Review of long term goals: Not done at today's visit    Diagnosis: Major depression, moderate   Generalized Anxiety Disorder  Chronic pain syndrome      Plan and Follow up: Patient to follow pain center plan of care. Continue to work with pain center provider regarding medical cannabis program, if appropriate. Patient to follow all medical recommendations. Patient to schedule every 2 weeks for psychotherapy to further address depression, anxiety and chronic pain concerns. Continue meditation and other healthy coping strategies.  Practice meditations as presented in session. Further address adjustment to role/identity due to chronic pain and health concerns. Continue EMDR using pain protocol, as desired. Continue getting in touch with the senses meditations.               Discharge Criteria/Planning: Patient will continue with follow-up until therapy can be discontinued without return of signs and symptoms.    Alize Livingston 11/30/18

## 2021-06-23 NOTE — PROGRESS NOTES
Patient presents to the clinic today for a follow up with Dayna Lundberg CNP regarding back pain. Patient describes their pain as burning, tingling, tight, throbbing, constant. His function score is 3.

## 2021-06-23 NOTE — PROGRESS NOTES
See FR notes in chart.   Bre [facility] Financial Resource Guide  [phone]

## 2021-06-23 NOTE — PROGRESS NOTES
See FR notes in chart.   Bre [facility] Financial Resource Guide  [phone]

## 2021-06-23 NOTE — PROGRESS NOTES
Faxed SMRT approval letter to Yunior Green with [facility]. I also printed waiver forms ( [id number] and [id number]) to have ready for SW appt on 2/12/19 @ 9:00.    Email: [email or web address]  Marital Status: -Miguel Felix  Children: No  Born and Raised:   Occupation: On Disability  Living Situation: Lives in [place] with his wife.  Smoking, Alcohol, other:  Services receiving: SNAP, [place] (Case # [id number]), SMRT (Case #[id number])  [facility] Financial Worker:  Jose Roberto Green: Phone: [phone]; Fax: [phone]  [facility] Follow up s: Monthly  [facility] Enrollment: January 19th 2018  Next CCC Follow up: 2/24/19

## 2021-06-23 NOTE — PATIENT INSTRUCTIONS - HE
POST PROCEDURE INSTRUCTIONS      PAIN CENTER  PROCEDURE DONE TODAY: BILATERAL L3,L4,L5 LUMBAR RADIOFREQUENCY  Rest today. Resume activity tomorrow as able.  Patient demonstrates the ability to ambulate independently with a steady gait at the time of discharge.      It is not unusual to have a Temporary increase in your pain after a procedure.  You can use ice to the area 24-48 hrs for 15 minutes at a time.    You did not receive a steroid.    Low back or SI joint(sacroiliac) injection:  You may experience numbness in one or both legs.  Please walk with help.  This is temporary and will wear off in a few hours. Do not drive if you have tingling, numbness or weakness in your hands/arms or legs/feet.    Fever : call if fever 100 or over, redness/warmth/swelling over injections site.    No public hot tubs/pools for a couple days.    Physical therapy:  Check with Pain Center provider regarding resuming therapy.    Monitor you response to the injection and report this at your next appointment.    If you have been referred for a procedure only, please call your referring provider for a follow up.    Radio Frequency: may take up to 6 weeks before you will feel the full effects of this procedure.    CALL IF ANY QUESTIONS [phone]

## 2021-06-23 NOTE — PROGRESS NOTES
2/8/2019    Start time: 2:10 PM    Stop Time: 3:00 PM   Session # 3    Anand Felix is a 33 y.o. male is being seen today for    Chief Complaint   Patient presents with     Mental Health Note   . Major depression, moderate   Generalized Anxiety Disorder  Chronic pain syndrome        New symptoms or complaints: None    Functional Impairment:   Personal: 4  Family: 4  Work: 4  Social:4    Clinical assessment of mental status: Anand Felix presented on time.  He was open and cooperative, and dressed appropriately for this session and weather. His memory was in tact .  His  speech was appropriate.  Language was appropriate.  Concentration and focus is on task. Psychosis is not noted or reported. He reports his mood is depressed .  Affect is congruent with speech.  Fund of knowledge is adequate. Insight is adequate for therapy.     Patient updated paperwork this session: PHQ-9 depression score is 15 and indicates severe symptoms of depression, CAROLYN-7 anxiety score is 11 and  indicates moderately severe symptoms of anxiety, WHODAS score is 43.75%, H1= 30, H2=10, H3=10, PANSI score indicates risk for suicide.     Suicidal/Homicidal Ideation present: Patient reports passive SI that is fleeting. He denies plan or means. Patient reported protective factors, including family.     Patient's impression of their current status:  Patient reported he continues to be working on addressing symptoms of depression and anxiety. He reports pain symptoms continue, but he has had some relief with his current plan of care. He reports sleep is continuing to improve, despite some challenges. He is struggling with some relationship/social changes due to chronic pain/medical conditions.      Therapist impression of patients current state: Patient seems to be working on addressing depression, anxiety and chronic medical symptoms. He reports pain symptoms have been more manageable with his current plan of care. He started the medical cannabis program  and recent nerve block have been beneficial.  He reports he has been able to continually take less Opiates due to the benefits of his current program. Discussed changes in relationships/social life that have occurred since he has been struggling with chronic pain/medical issues.  He discussed social needs at this time and that he is further developing the close relationships he has at this time. Encouraged patient to continue to increase efforts to reach out to social/family support to connect socially/emotionally. Patient continues to work with case management and is working closely with a . He reports [employer] attendance as he is able. He continues to work on addressing acceptance of his chronic pain and medical concerns, with some progress. He continues to work on sleep improvement with some positive results.  Patient continues to take walks as he is able. Patient reports suicidal ideation, but denies plan or means. He states they are fleeting and not as intrusive as in the past.  Discussed triggers for negative thinking and focusing on his capabilities. Discussed safety plan and ways to cope if symptoms should worsen or he is feeling unsafe.  He reports he is consistent with PT exercises in home practice and noticing ongoing positive improvement. Continued to discuss potential benefits of Mindfulness meditation with patient. He reports regular meditation practice and grounding. Encouraged continued affirmation practice. He preferred to process target/NC another time as he reported pain symptoms were low today. Presented meditation with focus on the sense of touch exercise today and patient appeared to benefit.         Type of psychotherapeutic technique provided: Client centered, CBT and Mindfulness    Progress toward short term goals:Patient continues to work on increasing coping skills to manage chronic pain and mental health. Patient continues medical care as recommended by his medical  providers. He engages in physical activity and social activity as he is able. He is engaging in meditation/grounding in daily living. He continues psychotherapy/EMDR. He is working with a county . Discussed patient's goals and treatment plan will be developed.     Review of long term goals: Discussed patient's goals and treatment plan will be developed.     Diagnosis: Major depression, moderate   Generalized Anxiety Disorder  Chronic pain syndrome      Plan and Follow up: Patient to follow pain center plan of care. Patient to follow all medical recommendations. Patient to schedule every 2 weeks for psychotherapy to further address depression, anxiety and chronic pain concerns. Continue meditation and other healthy coping strategies.  Practice meditations as presented in session. Further address adjustment to role/identity due to chronic pain and health concerns. Follow up on changes in relationships due to chronic pain/medical conditions next time. Continue EMDR using pain protocol, as desired.           Discharge Criteria/Planning: Patient will continue with follow-up until therapy can be discontinued without return of signs and symptoms.    Alize Livingston 2/8/2019

## 2021-06-23 NOTE — TELEPHONE ENCOUNTER
ANTICOAGULATION  MANAGEMENT    Assessment     Today's INR result of 2.5 is Therapeutic (goal INR of 2.0-3.0)        Warfarin taken as previously instructed    No new diet changes affecting INR    No new medication/supplements affecting INR    Continues to tolerate warfarin with no reported s/s of bleeding or thromboembolism     Previous INR was Therapeutic    Plan:     Spoke with Anand regarding INR result and instructed:     Warfarin Dosing Instructions:  Continue current warfarin dose 5 mg daily on Fri; and 2.5 mg daily rest of week  (0 % change)    Instructed patient to follow up no later than: 4-6 weeks. Appt is made for 3/4.     Education provided: importance of taking warfarin as instructed    Anand verbalizes understanding and agrees to warfarin dosing plan.    Instructed to call the [facility] for any changes, questions or concerns. (#[phone])   ?   Gus Byrne RN    Subjective/Objective:      Anand Felix, a 33 y.o. male is on warfarin.     Anand reports:     Home warfarin dose: verbally confirmed home dose with pt and updated on anticoagulation calendar     Missed doses: No     Medication changes:  No     S/S of bleeding or thromboembolism:  No     New Injury or illness:  No     Changes in diet or alcohol consumption:  No     Upcoming surgery, procedure or cardioversion:  No    Anticoagulation Episode Summary     Current INR goal:   2.0-3.0   TTR:   67.4 % (7.6 mo)   Next INR check:   3/6/2019   INR from last check:   2.50 (1/23/2019)   Weekly max warfarin dose:      Target end date:   11/29/2018   INR check location:      Preferred lab:      Send INR reminders to:   ANTICOAGULATION POOL B (MPW,HUG,STW,RVL,OAK,RLN)    Indications    Bilateral pulmonary embolism (H) [I26.99]           Comments:            Anticoagulation Care Providers     Provider Role Specialty Phone number    Malachi Choudhary MD Referring Family Medicine [phone]

## 2021-06-23 NOTE — PATIENT INSTRUCTIONS - HE
Plan:   Interventions-    Follow up in 4 weeks to evaluate the effectiveness of the treatment interventions ordered today.     RFA gave him approximately 80 % relief     You have reduced the amount of morphine ER- ok to reduce this down to 1 tab per day for 7 days. If you want to eliminate the 1 tab per day ok to do this as well, if you need to use something for pain ok to use the oxycodone     Continue to use the medical cannabis     Continue to see Neha as you are     Today you did not need any refills, however you do take flexeril, gabapentin 100 mg three times a day and oxycodone 5 mg two times a day prn.     Will repeat the urine today as it is required for continued monitoring of your opioids.     Ok to consider the use of Belbuca in the future if needed for pain control as an alternative if pain increased with current plan.     Prescription Drug Management will be continued by the [facility]  A narcotic contract was signed by the patient and  Patient is unable to get narcotics from other providers. They will be subject to random UAs.      Orders placed today  Medications that were ordered today   Requested Prescriptions      No prescriptions requested or ordered in this encounter     Orders Placed This Encounter   Procedures     Pain Management Drug Panel, Urine     Standing Status:   Standing     Number of Occurrences:   1       UDT/SWAB:  Patient required a random Urine Drug Testing, due to the need to comply with [place] Model Policy Guidelines and CDC Guideline for the use of any controlled substances. This is to ensure that patient is compliant with treatment, and monitor for risks such as diversion, abuse, or any other aberrant behaviors. Patient is either being considered for or taking a controlled substance. Unexpected findings will be discussed and treatment decision may be adjusted. Testing is being implemented across the board randomly w/o bias related to age, race, gender,  socioeconomic status or Evangelical affiliation.    The patient understand todays plan and has their questions answered in regards to expectations and current treatment plan.     SAFETY REMINDERS  No alcohol while taking controlled substances. Alcohol is not an illegal substance, it is unsafe to use in combination. It is a build up of substances in the body that can be extremely hazardous and may cause respirations to slow to a dangerous rate resulting in hospitalization, brain damage, or death.    Opioid medications have been associated with sharp rise in unintentional overdose and death.  Overdose is a condition characterized by the consumption in excess of a particular drug causing adverse effects. This can happen b/c you are sick, accidentally or intentionally took an extra dose, are on multiple medication that can interact. Someone took your medication and they are not use to the medication.  Symptoms of overdose include:   !breathing slow and shallow, erratic or not at all  !pinpoint pupils, hallucinations  !confusion  !muscle jerks, slack muscles   !extreme sleepiness or loss of alertness   !awake but not able to talk   !face pale or clammy, vomiting, for lighter skinned people, the skin tone turns bluish purple, for darker skinned people, it turns grayish or ashen   If in a situation where overdose is a concern engage the emergency response system (dial 911).    In one study it was noted that 80% of unintentional overdoses occurred in people who were taking a combination of opioids and benzodiazepines.    Do not sell, loan, borrow or share your opioid medication with anyone. Deaths have occurred as a result of this practice. It is illegal and patients are being prosecuted.     Prevent unexpected access/loss of medication: Keep medication locked. Only carry what you need with you.    Dayna Lundberg, [facility], [facility]  [address]  [place] [address]  Ph: [phone]  Fax:  [phone]

## 2021-06-23 NOTE — TELEPHONE ENCOUNTER
Patient's original message/refill request:prescription refill request for oxycodone 5mg and flexeril 10mg    Last appointment:12/10/18 with Crystal  Next appointment:2/4/19 with Crystal  Opioid last filled: per  med last filled 12/16. Med due now  12/16/2018 2 12/10/2018 Oxycodone Hcl 5 MG Tablet 60 30   Cr Mon [id number] Gra (8312) 0 15.00 MME Medicaid [place]  Notes/Comments:flexeril due to be filled now    Med queued up and sent to Crystal

## 2021-06-23 NOTE — PROGRESS NOTES
Programs Applying For: SNAP/MA renewal   County: [place]   Case #: [id number]  [facility] Worker: Jose Roberto [phone]  Mnsure #:   PMI #: [id number]  Tracking: November 2019 for December 2019    Outreach:   3/15/2019: FRG checked [place], [place] is [place], renewal went through. FRG called patient to confirm his insurance is active. FRG sent CG/RN note that Medical Assistance is active. FRG will track patient while in [facility]. Insurance is in Epic and does not need to be rebilled. Patient is receiving $15.00 SNAP monthly.  This subscriber has eligibility for MA: Medical Assistance.  Elig Type AX: MA Early Expansion  Eligibility Begin Date: 12/01/2017  Eligibility End Date: --/--/----  This subscriber is eligible for the following service types: Medical Care , Chiropractic , Dental Care , Hospital , Hospital - Inpatient , Hospital - Outpatient , Emergency Services , Pharmacy , Professional (Physician) Visit - Office , Vision (Optometry) , Mental Health , Urgent Care     3/4/2019: [facility] and spoke with Tiffanie. Tiffanie stated renewal is still in process with financial worker and to check back in 2-3 weeks.     CLOSED ENCOUNTER:  2/28/2019: [facility] and spoke with Andrade Zafar indicated [facility] has received the renewal for insurance and combined application benefits have been issued. Andrade indicated to call [facility] Mnsure if patient's insurance in not reflected in [place] after 2/28. FRG will check [place] next week to make sure his health insurance is still active.   2/6/2019: Patient called FRG back and completed combined application. FRG faxed health insurance renewal and combined application to [facility] and will call in 3 weeks on status.   2/6/2019: FRG called patient to complete combined application. FRG left  and will make outreach call in 1 week.   Attempt X1  2/4/2019: FRG met with patient at [facility] and did renewal for health insurance/combined application. Patient now  lives with brothers family and we need to provide names/'s on combined application (we will not be applying for his family) FRG made phone appointment for  to call patient at 1 pm to complete combined application and then FR will send application to [facility].     CLOSED ENCOUNTER   2019: FRG reached out to patient on referral. Patient has renewal paperwork he would like help with. Scheduled appointment for  @ 11. Evite sent to .  2019: FRG called patient to discuss referral and left voicemail. FRG will make outreach call in 1 week.   Attempt X1      Health Insurance Information:       Referral:   Anand Castro is wanting help with his SNAP and MA renewal. Please give him a call:)     Thank you,   Sylvia

## 2021-06-23 NOTE — PROGRESS NOTES
"PAIN CLINIC FOLLOW UP PROGRESS NOTE    CC:  Chief Complaint   Patient presents with     Back Pain       HPI  Anand Felix is a 33 y.o. male who presents for evaluation   Chief Complaint   Patient presents with     Back Pain    that is causing continued pain. Since the last visit the patient denies any trips to the urgent care or ED specifically for their pain. The patient denies any new medications, diagnoses since the last visit. Specific questions indicated the patient wanted addressed today include: to reduce the amount of opioids while maintaining his medications.     Major issues:  1. Chronic pain syndrome    2. Lumbar pain determined by palpation and/or percussion at L1 (5/4/16)    3. Arthralgias In Multiple Sites    4. Chronic pain    5. Muscle Weakness Generalized - \"Neuromuscular Condition\" with normal EMG.    6. Muscle spasm        Pain level: On a scale of 1-10, the patient rates their pain today 4  Pain is described: Sharp, deep, burning, tearing  Aggravating factors: Any activity  Alleviating factors: Salt baths, hot and cold packs, medications, laying down  New pain:  None  Since last visit, pain has improved  Associated Symptoms: Numbness in the right leg and arm, weakness all over his body  Pain interferes with: Sleep, walking, activities of daily living      Previous Medical History  Social History     Substance and Sexual Activity   Alcohol Use Yes    Comment: Monthly or less often. 1-2 drinks at a time.     Social History     Substance and Sexual Activity   Drug Use No     Social History     Tobacco Use   Smoking Status Never Smoker   Smokeless Tobacco Never Used       Pertinent Pain Medications/interventions:  He currently takes morphine extended release 2 times daily,  gabapentin 100 mg three times a day, oxycodone 5 mg up to two times a day as needed     Review of Systems:  12 point systems were reviewed with pt as documented on pt health form and the patient denies any new diagnosis or changes in " "12 point system review since the last visit. Has had the RFA done and has reported 80 % relief.     Physical Exam  Vitals:    02/04/19 1440   BP: 112/62   Patient Site: Left Arm   Patient Position: Sitting   Cuff Size: Adult Regular   Pulse: (!) 104   Resp: 18   Weight: 162 lb (73.5 kg)   Height: 5' 5\" (1.651 m)     General- patient is alert and oriented, in NAD, well-groomed, well-nourished  Psych- Judgment and insight normal, AOx4, recent and remote memory normal, mood and affect normal  Eyes- pupils are equal and reactive, conjunctiva is clear bilaterally, no ptosis is noted.   Respiratory- breathing is non-labored  Cardiovascular- extremities warm and well perfused, no peripheral edema or varicosities.  Musculoskeletal- gait is normal, extremities with no joint swelling, erythema, or warmth. Low back pain and multi site joint pain throughout.  Neuro- normal strength, no gait abnormalities, normal sensation to pain, temperature, light touch.  Integumentary- no rashes, dermatitis or discolorations noted throughout, no open wounds noted.    Medications    Current Outpatient Medications:      acetaminophen (TYLENOL) 500 MG tablet, Take 1 tablet (500 mg total) by mouth every 6 (six) hours as needed for pain., Disp: , Rfl: 0     atenolol (TENORMIN) 25 MG tablet, TAKE 1 TABLET BY MOUTH EVERY DAY, Disp: 90 tablet, Rfl: 1     cholecalciferol, vitamin D3, 2,000 unit cap, Take 2,000 Units by mouth daily., Disp: , Rfl:      cyclobenzaprine (FLEXERIL) 10 MG tablet, Take 1 tablet (10 mg total) by mouth 3 (three) times a day as needed for muscle spasms., Disp: 90 tablet, Rfl: 0     diazePAM (VALIUM) 5 MG tablet, Take 1 tablet 30 minutes prior to procedure; take 2nd tablet after signing consent., Disp: 2 tablet, Rfl: 0     fluticasone (FLONASE) 50 mcg/actuation nasal spray, INSTILL 2 SPRAYS INTO EACH NOSTRIL DAILY., Disp: 48 g, Rfl: 2     morphine (MS CONTIN) 15 MG 12 hr tablet, Take 1 tablet (15 mg total) by mouth 3 (three) " times a day., Disp: 90 tablet, Rfl: 0     omeprazole (PRILOSEC) 20 MG capsule, TAKE 1 CAPSULE (20 MG TOTAL) BY MOUTH DAILY., Disp: 90 capsule, Rfl: 3     oxyCODONE (ROXICODONE) 5 MG immediate release tablet, Take 1 tablet (5 mg total) by mouth 2 (two) times a day as needed for pain., Disp: 60 tablet, Rfl: 0     warfarin (COUMADIN) 5 MG tablet, Take 5 mg by mouth daily. Adjust dose based on INR results as directed., Disp: 60 tablet, Rfl: 5    Lab:  Last UDS on 1/2/2018 had expected results. Any abnormal results have been discussed with the patient and may change the plan of care. Please see the scanned document from the outside lab under the media tab. This was reviewed with the patient.      Imaging:  No new imaging.      Recent   Dated 2/4/2019 was reviewed with the patient today.   Paper copy reviewed    Assessment:   Anand Felix is a 33 y.o. male seen in clinic today for   Chief Complaint   Patient presents with     Back Pain     Patient presents the pain Center today to follow-up on his chronic pain treatment plan.  Patient notes that he started the medical cannabis taking his need to have some of the morphine and has reduced down to 2 morphine tablets at 50 mg extended release daily.  Patient also notes that the oxycodone 5 mg tablets for which he is allowed to have 2/day he has not been using.  Like to try weaning off of the morphine extended release.  We will have the patient go down to 1 tablet of morphine per day for 7 days and then stop.  Patient was educated today in regards to withdrawal symptoms he is to call the clinic if he experiences any of the symptoms for withdrawal medication otherwise he is allowed to continue using his oxycodone as prescribed up to 2 tablets/day as needed.    Patient also reports RFA procedure he had completed of his low back did give him approximately 80% relief is currently happy with his plan of care he looks forward to weaning off of the opioids long-term.  She will  continue to use the gabapentin up to 3 times a day as needed.  Future if he needs an alternative for opioids would likely use Belbuca type medication.     Patient did set goals he like to attend the state fair this year.  He still mobilizes with a cane his spouse is with him he is engaged in the conversation.     Patients current MME is 25 with reduction  Patient to call clinic for next month's prescription 5 days in advance. Based on the patients response to their previous narcotic therapy and quality of life, which includes their participation in ADLs, I recommend that the narcotics therapy continue, however the changes listed below will be incorporated into their plan of care.     Patient set goals to   1.  To reduce his pain so that he can function    Plan:   Interventions-    Follow up in 4 weeks to evaluate the effectiveness of the treatment interventions ordered today.     RFA gave him approximately 80 % relief     You have reduced the amount of morphine ER- ok to reduce this down to 1 tab per day for 7 days. If you want to eliminate the 1 tab per day ok to do this as well, if you need to use something for pain ok to use the oxycodone     Continue to use the medical cannabis     Continue to see Neha as you are     Today you did not need any refills, however you do take flexeril, gabapentin 100 mg three times a day and oxycodone 5 mg two times a day prn.     Will repeat the urine today as it is required for continued monitoring of your opioids.     Ok to consider the use of Belbuca in the future if needed for pain control as an alternative if pain increased with current plan.     Prescription Drug Management will be continued by the [facility]  A narcotic contract was signed by the patient and  Patient is unable to get narcotics from other providers. They will be subject to random UAs.      Orders placed today  Medications that were ordered today   Requested Prescriptions      No prescriptions  requested or ordered in this encounter     Orders Placed This Encounter   Procedures     Pain Management Drug Panel, Urine     Standing Status:   Standing     Number of Occurrences:   1       UDT/SWAB:  Patient required a random Urine Drug Testing, due to the need to comply with [facility] Model Policy Guidelines and CDC Guideline for the use of any controlled substances. This is to ensure that patient is compliant with treatment, and monitor for risks such as diversion, abuse, or any other aberrant behaviors. Patient is either being considered for or taking a controlled substance. Unexpected findings will be discussed and treatment decision may be adjusted. Testing is being implemented across the board randomly w/o bias related to age, race, gender, socioeconomic status or Islam affiliation.    The patient understand todays plan and has their questions answered in regards to expectations and current treatment plan.     SAFETY REMINDERS  No alcohol while taking controlled substances. Alcohol is not an illegal substance, it is unsafe to use in combination. It is a build up of substances in the body that can be extremely hazardous and may cause respirations to slow to a dangerous rate resulting in hospitalization, brain damage, or death.    Opioid medications have been associated with sharp rise in unintentional overdose and death.  Overdose is a condition characterized by the consumption in excess of a particular drug causing adverse effects. This can happen b/c you are sick, accidentally or intentionally took an extra dose, are on multiple medication that can interact. Someone took your medication and they are not use to the medication.  Symptoms of overdose include:   !breathing slow and shallow, erratic or not at all  !pinpoint pupils, hallucinations  !confusion  !muscle jerks, slack muscles   !extreme sleepiness or loss of alertness   !awake but not able to talk   !face pale or clammy, vomiting, for lighter  skinned people, the skin tone turns bluish purple, for darker skinned people, it turns grayish or ashen   If in a situation where overdose is a concern engage the emergency response system (dial 911).    In one study it was noted that 80% of unintentional overdoses occurred in people who were taking a combination of opioids and benzodiazepines.    Do not sell, loan, borrow or share your opioid medication with anyone. Deaths have occurred as a result of this practice. It is illegal and patients are being prosecuted.     Prevent unexpected access/loss of medication: Keep medication locked. Only carry what you need with you.    Dayna Lundberg, [facility]  [address]  [place] [address]  Ph: [phone]  Fax: [phone]

## 2021-06-23 NOTE — PROGRESS NOTES
In clinic today for radio frequency ablation   Is on coumadin did not hold and MD aware   Pt informed of increased bruising may occur. Pain is 4 and does radiate right leg to ankle valium taken preprocedure

## 2021-06-23 NOTE — PROGRESS NOTES
Response email:     Guevara Rivera and Toño,     I received your email and the SMRT approval letter. I tried looking up the requested forms with DHS and I am having trouble finding them. My  will be here tomorrow and she is the person that assists with the SMRT process; so let me talk with her tomorrow and I will get back you right away.     Thanks,   Sylvia    Email received from Kris wife Miguel:    Sylvia,     I just spoke with Yunior Green, our  at [facility].     He wants a copy of the attached letter, of Toño's SMRT approval, faxed to him. Could you please have this document faxed to Yunior at fax # [phone].     In addition, Yunior said for you to request Form # 0429 and Form # 2678 from the [facility] access hospital and to help us fill those forms out. Once the Critical access hospital receives those forms, they will have to give Toño an assessment before he would be able to enroll in the other benefits you and Toño talked about last month.     Let me know if you received this email and when we can proceed with those forms once obtained.     Thanks,   Miguel Molina     Email: [email or web address]  Marital Status: -Miguel Molina  Children: No  Born and Raised:   Occupation: On Disability  Living Situation: Lives in [place] with his wife.  Smoking, Alcohol, other:  Services receiving:   [facility] Follow up s: Monthly  [facility] Enrollment: January 19th 2018  Next [facility] Follow up: 1/25/19    **1/11/19 Looked at [place] and it shows that Toño does not have a waiver currently.  SUBSCRIBER INFORMATION   Date of Service Subscriber ID Subscriber Name Birthdate Age Gender   01/11/2019 [id number] TOÑO MOLINA  1985 33 MALE     Address   [address] [place], [place] [id number]    PROVIDER INFORMATION   Provider ID Submitter Transaction ID Provider Name Taxonomy Code Qualifier Taxonomy Code   [id number] [facility]      Major Programs   This subscriber has eligibility for MA: Medical Assistance.  [place] Type AX: MA Early  Expansion  Eligibility Begin Date: 12/01/2017  Eligibility End Date: --/--/----  This subscriber is eligible for the following service types: Medical Care , Chiropractic , Dental Care , Hospital , Hospital - Inpatient , Hospital - Outpatient , Emergency Services , Pharmacy , Professional (Physician) Visit - Office , Vision (Optometry) , Mental Health , Urgent Care   Prepaid Health Plan   This subscriber receives [facility] Prepaid Medical Assistance Program (PMAP) delivered through [facility]. The phone numbers are: [phone] (metro) or [phone] (toll free).  Other Eligibility Information   No Special Transportation.   No Hospice.   Refer to Health Care Programs and Services Overview of the [facility] Provider Manual for a list of covered services.  Waivers None  Subscriber Responsibility Information   An office visit copay of 3 dollars may exist for this subscriber.   A copay of 3.50 dollars for Non-Emergency ER visits may exist for this subscriber.  Restricted Recipient Program None Medicare None

## 2021-06-23 NOTE — PROGRESS NOTES
1/14/2019    Start time: 1:00 PM    Stop Time: 1:50 PM   Session # 2    Anand Felix is a 33 y.o. male is being seen today for    Chief Complaint   Patient presents with     Mental Health Note   . Major depression, moderate   Generalized Anxiety Disorder  Chronic pain syndrome        New symptoms or complaints: None    Functional Impairment:   Personal: 4  Family: 4  Work: 4  Social:4    Clinical assessment of mental status: Anand Felix presented on time.  He was open and cooperative, and dressed appropriately for this session and weather. His memory was in tact .  His  speech was appropriate.  Language was appropriate.  Concentration and focus is on task. Psychosis is not noted or reported. He reports his mood is depressed .  Affect is congruent with speech.  Fund of knowledge is adequate. Insight is adequate for therapy.     Suicidal/Homicidal Ideation present: Patient reports passive SI that is fleeting. He denies plan or means. Patient reported protective factors, including family.     Patient's impression of their current status: Patient reported he continues to be working on addressing symptoms of depression and anxiety. He reports pain symptoms continue, but he has had some relief with his current plan of care. He reports sleep is continuing to improve, despite some challenges. He is struggling with some relationship/social changes due to chronic pain/medical conditions.            Therapist impression of patients current state: Patient seems to be working on addressing depression, anxiety and chronic medical symptoms. He reports pain symptoms have been more manageable with his current plan of care. He started the medical cannabis program and finds benefit. He reports he has been able to take less Opiates since being on the program. Discussed changes in relationships/social life that have occurred since he has been struggling with chronic pain/medical issues. He cried as he discussed the changes in his life now  "versus the past and he processed thoughts and feelings about losses. He once enjoyed an active social life that is no longer present. He discussed social needs at this time.Encouraged patient to continue to increase efforts to reach out to social/family support to connect socially/emotionally. He reports [employer] attendance as he is able. He continues to work on addressing acceptance of his chronic pain and medical concerns, with some progress. He does report some improved sleep, but continues to work on challenges. Patient continues to take walks as he is able. Patient reports suicidal ideation, but denies plan or means. He states \"I don't want to think these thoughts\". Discussed triggers for negative thinking and focusing on his capabilities. Discussed safety plan and ways to cope if symptoms should worsen or he is feeling unsafe.  He reports he is consistent with PT exercises in home practice and noticing ongoing positive improvement. Continued to discuss potential benefits of Mindfulness meditation with patient. He reports regular meditation practice and grounding. Discussed working more on affirmations and encouraged him to review written materials at home. Continued home practice is encouraged. He preferred to process target/NC another time as he reported pain symptoms were low today. Presented grounding exercise today and patient appeared to benefit.         Type of psychotherapeutic technique provided: Client centered, CBT and Mindfulness    Progress toward short term goals: Patient continues to work on increasing coping skills to manage chronic pain and mental health. Patient continues medical care as recommended by his medical providers. He engages in physical activity and social activity as he is able. He is engaging in meditation/grounding in daily living. He continues psychotherapy/EMDR. He is working with a county .            Review of long term goals: Not done at today's visit. Last review " 10/26/18.    Diagnosis: Major depression, moderate   Generalized Anxiety Disorder  Chronic pain syndrome      Plan and Follow up: Patient to follow pain center plan of care. Patient to follow all medical recommendations. Patient to schedule every 2 weeks for psychotherapy to further address depression, anxiety and chronic pain concerns. Continue meditation and other healthy coping strategies.  Practice meditations as presented in session. Further address adjustment to role/identity due to chronic pain and health concerns. Follow up on changes in relationships due to chronic pain/medical conditions next time. Continue EMDR using pain protocol, as desired.           Discharge Criteria/Planning: Patient will continue with follow-up until therapy can be discontinued without return of signs and symptoms.    Alize Livingston 1/14/2019

## 2021-06-23 NOTE — PROGRESS NOTES
Scheduled F/U Call:  Attempt 1    Care Guide called patient.  If this patient is returning our call please transfer to Sylvia Yael at ext [phone].  Email: [email or web address]  Marital Status: -Miguel Molina  Children: No  Born and Raised:   Occupation: On Disability  Living Situation: Lives in [place] with his wife.  Smoking, Alcohol, other:  Services receiving:   CCC/HCH Follow up s: Monthly  CCC/HCH Enrollment: January 19th 2018  Next CCC Follow up: 1/17/19    **1/11/19 Looked at [facility] and it shows that Toño does not have a waiver currently.  SUBSCRIBER INFORMATION   Date of Service Subscriber ID Subscriber Name Birthdate Age Gender   01/11/2019 [id number] TOÑO MOLINA  1985 33 MALE     Address   [address] [place] [address]    PROVIDER INFORMATION   Provider ID Submitter Transaction ID Provider Name Taxonomy Code Qualifier Taxonomy Code   [id number] [facility] Programs   This subscriber has eligibility for MA: Medical Assistance.  Lilog Type AX: MA Early Expansion  Eligibility Begin Date: 12/01/2017  Eligibility End Date: --/--/----  This subscriber is eligible for the following service types: Medical Care , Chiropractic , Dental Care , Hospital , Hospital - Inpatient , Hospital - Outpatient , Emergency Services , Pharmacy , Professional (Physician) Visit - Office , Vision (Optometry) , Mental Health , Urgent Care   Prepaid Health Plan   This subscriber receives [facility] - Prepaid Medical Assistance Program (PMAP) delivered through [facility]. The phone numbers are: [phone] (metro) or [phone] (toll free).  Other Eligibility Information   No Special Transportation.   No Hospice.   Refer to Health Care Programs and Services Overview of the [facility] Provider Manual for a list of covered services.  Waivers None  Subscriber Responsibility Information   An office visit copay of 3 dollars may exist for this subscriber.   A copay of 3.50 dollars for Non-Emergency  ER visits may exist for this subscriber.  Restricted Recipient Program None Medicare None

## 2021-06-23 NOTE — PROGRESS NOTES
Programs Applying For: SNAP/MA renewal   Case #:  [facility] Worker:   [place] #:   PMI #:   Tracking:     Outreach:     1/28/2019: FRG reached out to patient on referral. Patient has renewal paperwork he would like help with. Scheduled appointment for 2/4 @ 11. Evite sent to .    1/24/2019: FRG called patient to discuss referral and left voicemail. FRG will make outreach call in 1 week.   Attempt X1      Health Insurance Information:       Referral:   Anand Castro is wanting help with his SNAP and MA renewal. Please give him a call:)     Thank you,   Sylvia

## 2021-06-24 NOTE — TELEPHONE ENCOUNTER
Medication being requested: Oxycodone, Flexeril, Gabapentin  Last visit date: 2/4/19.  Provider: JOSUE  Next visit date: 3/4/19.  Provider: JOSUE  Expected follow up: 4 weeks  MTM visit (Pain Center) date: no  UDT date: 2/4/18  Needs review by provider.   Agreement date: 2/6/19   snipped in: SEE  in separate enc.  Red Flags/Comments identified on call: MY CHART MESSAGE EXPERIENCING WITHDRAWAL. BELBUCA ORDERED.Pertinent between visit information about requested medication (telephone, mychart, prior authorization): PA FOR BELBUCA. Per last OV,  Today you did not need any refills, however you do take flexeril, gabapentin 100 mg three times a day and oxycodone 5 mg two times a day prn. TAPERED OFF OF MORPHINE.     Script being sent to provider by nurse- dates and quantity:  Requested Prescriptions     Pending Prescriptions Disp Refills     gabapentin (NEURONTIN) 100 MG capsule 90 capsule 1     Sig: Take 100 mg by mouth 3 (three) times a day.     oxyCODONE (ROXICODONE) 5 MG immediate release tablet 60 tablet 0     Sig: Take 1 tablet (5 mg total) by mouth 2 (two) times a day as needed for pain.     cyclobenzaprine (FLEXERIL) 10 MG tablet 90 tablet 0     Sig: Take 1 tablet (10 mg total) by mouth 3 (three) times a day as needed for muscle spasms.     Pharmacy cued[facility]  Standing orders for withdrawal protocol implemented: NOT NEEDED

## 2021-06-24 NOTE — PROGRESS NOTES
"PAIN CLINIC FOLLOW UP PROGRESS NOTE    CC:  Chief Complaint   Patient presents with     Back Pain       HPI  Anand Felix is a 33 y.o. male who presents for evaluation   Chief Complaint   Patient presents with     Back Pain    that is causing continued pain. Since the last visit the patient denies any trips to the urgent care or ED specifically for their pain. The patient denies any new medications, diagnoses since the last visit. Specific questions indicated the patient wanted addressed today include: to follow up on his chronic pain as well as discuss his ongoing pain plan.     Major issues:  1. Chronic pain syndrome    2. Lumbar pain determined by palpation and/or percussion at L1 (5/4/16)    3. Arthralgias In Multiple Sites    4. Opioid type dependence, continuous (H)    5. Muscle spasm    6. Muscle Weakness Generalized - \"Neuromuscular Condition\" with normal EMG.      Pain level: On a scale of 1-10, the patient rates their pain today 1  Pain is described: Sharp, deep, burning, tearing when active  Aggravating factors: Any activity  Alleviating factors: Salt baths, hot and cold packs, medications, laying down  New pain:  None  Since last visit, pain has improved  Associated Symptoms: Numbness in the right leg and arm, weakness all over his body  Pain interferes with: Sleep, walking, activities of daily living    Previous Medical History  Social History     Substance and Sexual Activity   Alcohol Use Yes    Comment: Monthly or less often. 1-2 drinks at a time.     Social History     Substance and Sexual Activity   Drug Use No     Social History     Tobacco Use   Smoking Status Never Smoker   Smokeless Tobacco Never Used       Pertinent Pain Medications/interventions:  He  currently is taking Belbuca 75 mcg, gabapentin 100 mg three times a day, oxycodone 5 mg up to two times a day as needed, medical cannabis    Review of Systems:  12 point systems were reviewed with pt as documented on pt health form and the patient " "denies any new diagnosis or changes in 12 point system review since the last visit. Has had the RFA done and has reported 80 % relief.     Physical Exam  Vitals:    03/04/19 1452   BP: 121/86   Patient Site: Right Arm   Patient Position: Sitting   Cuff Size: Adult Regular   Pulse: 95   Resp: 16   Weight: 162 lb (73.5 kg)   Height: 5' 5\" (1.651 m)     General- patient is alert and oriented, in NAD, well-groomed, well-nourished  Psych- Judgment and insight normal, AOx4, recent and remote memory normal, mood and affect normal  Eyes- pupils are equal and reactive, conjunctiva is clear bilaterally, no ptosis is noted.   Respiratory- breathing is non-labored  Cardiovascular- extremities warm and well perfused, no peripheral edema or varicosities.  Musculoskeletal- gait is normal, extremities with no joint swelling, erythema, or warmth. Low back pain and multi site joint pain throughout.  Neuro- normal strength, no gait abnormalities, normal sensation to pain, temperature, light touch.  Integumentary- no rashes, dermatitis or discolorations noted throughout, no open wounds noted.    Medications    Current Outpatient Medications:      acetaminophen (TYLENOL) 500 MG tablet, Take 1 tablet (500 mg total) by mouth every 6 (six) hours as needed for pain., Disp: , Rfl: 0     atenolol (TENORMIN) 25 MG tablet, TAKE 1 TABLET BY MOUTH EVERY DAY, Disp: 90 tablet, Rfl: 1     buprenorphine HCl (BELBUCA) 75 mcg Film, Apply 75 mcg to cheek 2 (two) times a day., Disp: 60 each, Rfl: 0     cholecalciferol, vitamin D3, 2,000 unit cap, Take 2,000 Units by mouth daily., Disp: , Rfl:      cyclobenzaprine (FLEXERIL) 10 MG tablet, Take 1 tablet (10 mg total) by mouth 3 (three) times a day as needed for muscle spasms., Disp: 90 tablet, Rfl: 0     diazePAM (VALIUM) 5 MG tablet, Take 1 tablet 30 minutes prior to procedure; take 2nd tablet after signing consent., Disp: 2 tablet, Rfl: 0     fluticasone (FLONASE) 50 mcg/actuation nasal spray, INSTILL 2 " SPRAYS INTO EACH NOSTRIL DAILY., Disp: 48 g, Rfl: 2     gabapentin (NEURONTIN) 100 MG capsule, Take 100 mg by mouth 3 (three) times a day., Disp: 90 capsule, Rfl: 1     hydrOXYzine pamoate (VISTARIL) 25 MG capsule, Take 1 capsule (25 mg total) by mouth 3 (three) times a day as needed for itching, anxiety or other (withdrawal symptoms)., Disp: 50 capsule, Rfl: 0     omeprazole (PRILOSEC) 20 MG capsule, TAKE 1 CAPSULE (20 MG TOTAL) BY MOUTH DAILY., Disp: 90 capsule, Rfl: 3     tiZANidine (ZANAFLEX) 2 MG tablet, Take 1 tablet (2 mg total) by mouth every 6 (six) hours as needed (withdrawl symptoms)., Disp: 50 tablet, Rfl: 0     warfarin (COUMADIN) 5 MG tablet, Take 5 mg by mouth daily. Adjust dose based on INR results as directed., Disp: 60 tablet, Rfl: 5     [START ON 3/27/2019] oxyCODONE (ROXICODONE) 5 MG immediate release tablet, Take 1 tablet (5 mg total) by mouth 2 (two) times a day as needed for pain., Disp: 60 tablet, Rfl: 0    Lab:  Last UDS on 2/4/2019 had expected results. Any abnormal results have been discussed with the patient and may change the plan of care. Please see the scanned document from the outside lab under the media tab. This was reviewed with the patient.      Imaging:  No new imaging.      Recent   Dated 3/4/2019 was reviewed with the patient today.     Paper copy reviewed    Assessment:   Anand Felix is a 33 y.o. male seen in clinic today for   Chief Complaint   Patient presents with     Back Pain     She presents the pain Center today to follow-up on his chronic pain treatment plan.  Patient did have slight withdrawals between visits when stopping the morphine extended release, despite being on Belbuca and tapering.  She was sent withdrawal medication which included Vistaril and tizanidine patient notes that this helped him tremendously he states he has not had to take this for approximately 3 days at this time and feels good.  Patient notes he has continued to take the oxycodone but only  when he has to leave the house for an activity he endorses that his spouse does the driving.  Patient notes that the oxycodone up to 2/day works well with the medical cannabis and the gabapentin.  She is also taking Flexeril as needed.    Patient has had the medial branch blocks and the RFA completed from L4 to-5, L5-S1 and feels that his low back pain is greatly relieved we did discuss the repeat of this as needed approximately annually.     She is currently happy with this plan of care he is curious to know if the Belbuca is helping him at all he is sleeping well.  We did discuss for him to reduce the use of the Belbuca to 1 film per day if his pain increases he is to increase back to twice daily.  Otherwise that the patient notices no change he can stop taking the Belbuca.         Patients current MME is 5-10  Patient to call clinic for next month's prescription 5 days in advance. Based on the patients response to their previous narcotic therapy and quality of life, which includes their participation in ADLs, I recommend that the narcotics therapy continue, however the changes listed below will be incorporated into their plan of care.     Patient set goals to   1. To continue to treat his ongoing pain and increase his function.    Plan:   Interventions-    Follow up in 8 weeks to evaluate the effectiveness of the treatment interventions ordered today.     Please destroy the morphine today in the office.     Ok to continue the oxycodone 5 mg tabs two times a day prn - ok to fill on 3/27/2019    Stopped the zanaflex and visteril- no longer having withdrawal symptoms.     Ok to continue the medical cannabis as you are     continue the flexeril, oxycodone and the gabapentin     Ok to reduce the use of the belbuca to once per day- if this does nothing ok to stop taking it, if you pain increases ok to continue use and then call for a refill. No refill was sent in for this today. We did talk about an increase to 150 mcg  if needed.     Ok to repeat the RFA when needed.     Prescription Drug Management will be continued by the [facility]  A narcotic contract was signed by the patient and  Patient is unable to get narcotics from other providers. They will be subject to random UAs.      Orders placed today  Medications that were ordered today   Requested Prescriptions     Signed Prescriptions Disp Refills     oxyCODONE (ROXICODONE) 5 MG immediate release tablet 60 tablet 0     Sig: Take 1 tablet (5 mg total) by mouth 2 (two) times a day as needed for pain.     No orders of the defined types were placed in this encounter.      UDT/SWAB:  Patient required a random Urine Drug Testing, due to the need to comply with [facility] Model Policy Guidelines and CDC Guideline for the use of any controlled substances. This is to ensure that patient is compliant with treatment, and monitor for risks such as diversion, abuse, or any other aberrant behaviors. Patient is either being considered for or taking a controlled substance. Unexpected findings will be discussed and treatment decision may be adjusted. Testing is being implemented across the board randomly w/o bias related to age, race, gender, socioeconomic status or [employer] affiliation.    The patient understand todays plan and has their questions answered in regards to expectations and current treatment plan.     SAFETY REMINDERS  No alcohol while taking controlled substances. Alcohol is not an illegal substance, it is unsafe to use in combination. It is a build up of substances in the body that can be extremely hazardous and may cause respirations to slow to a dangerous rate resulting in hospitalization, brain damage, or death.    Opioid medications have been associated with sharp rise in unintentional overdose and death.  Overdose is a condition characterized by the consumption in excess of a particular drug causing adverse effects. This can happen b/c you are sick,  accidentally or intentionally took an extra dose, are on multiple medication that can interact. Someone took your medication and they are not use to the medication.  Symptoms of overdose include:   !breathing slow and shallow, erratic or not at all  !pinpoint pupils, hallucinations  !confusion  !muscle jerks, slack muscles   !extreme sleepiness or loss of alertness   !awake but not able to talk   !face pale or clammy, vomiting, for lighter skinned people, the skin tone turns bluish purple, for darker skinned people, it turns grayish or ashen   If in a situation where overdose is a concern engage the emergency response system (dial 911).    In one study it was noted that 80% of unintentional overdoses occurred in people who were taking a combination of opioids and benzodiazepines.    Do not sell, loan, borrow or share your opioid medication with anyone. Deaths have occurred as a result of this practice. It is illegal and patients are being prosecuted.     Prevent unexpected access/loss of medication: Keep medication locked. Only carry what you need with you.    Dayna Lundberg, [facility]  [address]  [place] [address]  Ph: [phone]  Fax: [phone]

## 2021-06-24 NOTE — PROGRESS NOTES
"DYAN spoke to Dilia with [facility] [place] choices intake re: pt being SMRT'd and next steps.  Pt was sent paperwork by [place] financial worker and it is unclear who needs to complete.      Dilia advised that DHS form needs to be completed and signed by pt and sent to financial worker so the financial worker can create a \"diagnosis code.\"  Dilia also suggested faxing the SMRT confirmation letter to financial worker as well.    DYAN completed phone intake for [place] assessment with Dilia and pt is now on wait list for assessment.  He will be contacted in 2-3 months when he is at top of list.  Dilia shared that pt had [place] choices assessment 4/25/18 but declined services at that time.  Lastly, Dilia requested SMRT confirmation letter be faxed to her so she can include it in intake: [phone] (fax).      DYAN discussed above with care guide who will assist with getting pt signature and faxing paperwork. Care guide has contact information for pts County financial worker.   "

## 2021-06-24 NOTE — PROGRESS NOTES
Email: [email or web address]  Marital Status: -Miguel Felix  Children: No  Born and Raised:   Occupation: On Disability  Living Situation: Lives in [place] with his wife.  Smoking, Alcohol, other:  Services receiving: SNAP, MA (Case # [id number]), SMRT (Case #[id number])  SMRT worker: Leo Avendaño: Phone: [phone]  [facility] Financial Worker:  Jose Roberto Green: Phone: [phone]; Fax: [phone]  CCC/HCH Follow up s: Monthly  CCC/HCH Enrollment: January 19th 2018  Next CCC Follow up: 3/25/19

## 2021-06-24 NOTE — PATIENT INSTRUCTIONS - HE
Plan:   Interventions-    Follow up in 8 weeks to evaluate the effectiveness of the treatment interventions ordered today.     Please destroy the morphine today in the office.     Ok to continue the oxycodone 5 mg tabs two times a day prn - ok to fill on 3/27/2019    Stopped the zanaflex and visteril- no longer having withdrawal symptoms.     Ok to continue the medical cannabis as you are     continue the flexeril, oxycodone and the gabapentin     Ok to reduce the use of the belbuca to once per day- if this does nothing ok to stop taking it, if you pain increases ok to continue use and then call for a refill. No refill was sent in for this today. We did talk about an increase to 150 mcg if needed.       Ok to repeat the RFA when needed.     Prescription Drug Management will be continued by the [facility]  A narcotic contract was signed by the patient and  Patient is unable to get narcotics from other providers. They will be subject to random UAs.      Orders placed today  Medications that were ordered today   Requested Prescriptions     Signed Prescriptions Disp Refills     oxyCODONE (ROXICODONE) 5 MG immediate release tablet 60 tablet 0     Sig: Take 1 tablet (5 mg total) by mouth 2 (two) times a day as needed for pain.     No orders of the defined types were placed in this encounter.      UDT/SWAB:  Patient required a random Urine Drug Testing, due to the need to comply with [place] Model Policy Guidelines and CDC Guideline for the use of any controlled substances. This is to ensure that patient is compliant with treatment, and monitor for risks such as diversion, abuse, or any other aberrant behaviors. Patient is either being considered for or taking a controlled substance. Unexpected findings will be discussed and treatment decision may be adjusted. Testing is being implemented across the board randomly w/o bias related to age, race, gender, socioeconomic status or Judaism  affiliation.    The patient understand todays plan and has their questions answered in regards to expectations and current treatment plan.     SAFETY REMINDERS  No alcohol while taking controlled substances. Alcohol is not an illegal substance, it is unsafe to use in combination. It is a build up of substances in the body that can be extremely hazardous and may cause respirations to slow to a dangerous rate resulting in hospitalization, brain damage, or death.    Opioid medications have been associated with sharp rise in unintentional overdose and death.  Overdose is a condition characterized by the consumption in excess of a particular drug causing adverse effects. This can happen b/c you are sick, accidentally or intentionally took an extra dose, are on multiple medication that can interact. Someone took your medication and they are not use to the medication.  Symptoms of overdose include:   !breathing slow and shallow, erratic or not at all  !pinpoint pupils, hallucinations  !confusion  !muscle jerks, slack muscles   !extreme sleepiness or loss of alertness   !awake but not able to talk   !face pale or clammy, vomiting, for lighter skinned people, the skin tone turns bluish purple, for darker skinned people, it turns grayish or ashen   If in a situation where overdose is a concern engage the emergency response system (dial 911).    In one study it was noted that 80% of unintentional overdoses occurred in people who were taking a combination of opioids and benzodiazepines.    Do not sell, loan, borrow or share your opioid medication with anyone. Deaths have occurred as a result of this practice. It is illegal and patients are being prosecuted.     Prevent unexpected access/loss of medication: Keep medication locked. Only carry what you need with you.    Dyana Lundberg, [facility]  [address]  [place] [address]  Ph: [phone]  Fax: [phone]

## 2021-06-24 NOTE — TELEPHONE ENCOUNTER
Prior Authorization Request  Who s requesting:  Dayna Lundberg CNP/ Tiffanie Alejandro CMA  Pharmacy Name and Location: [facility]  Phone [phone]  Fax [phone]  Medication Name: Belbuca 75mcg  Insurance Plan: Sarah MA  Insurance Member ID Number:  [id number]  Informed patient that prior authorizations can take up to 10 business days for response:   Yes  Okay to leave a detailed message: Yes

## 2021-06-24 NOTE — PROGRESS NOTES
Patient presents to the clinic today for a follow up with Dayna Lundberg CNP regarding back pain. Patient describes their pain as sharp, burning, tight, tearing, constant. His function score is 5.

## 2021-06-24 NOTE — TELEPHONE ENCOUNTER
ANTICOAGULATION  MANAGEMENT    Assessment     Today's INR result of 3.3 is Supratherapeutic (goal INR of 2.0-3.0)        Warfarin taken as previously instructed    No new diet changes affecting INR    No new medication/supplements affecting INR    Continues to tolerate warfarin with no reported s/s of bleeding or thromboembolism     Previous INR was Therapeutic    Plan:     Spoke with Anand regarding INR result and instructed:     Warfarin Dosing Instructions:  Continue current warfarin dose 5 mg daily on Fri; and 2.5 mg daily rest of week  (0 % change)  Pt agreed to eat one extra serving of greens today.    Instructed patient to follow up no later than: 2 weeks. Appt is made for 3/18.     Education provided: importance of taking warfarin as instructed and no interaction anticipated between warfarin and Tizanidine or Hydroxyzine    Anand verbalizes understanding and agrees to warfarin dosing plan.    Instructed to call the [facility] for any changes, questions or concerns. (#[phone])   ?   Gus Byrne RN    Subjective/Objective:      Anand Felix, a 33 y.o. male is on warfarin.     Anand reports:     Home warfarin dose: verbally confirmed home dose with pt and updated on anticoagulation calendar     Missed doses: No     Medication changes:  Yes: stopped Morphine; started PRN Hydroxyzine and Tizanidine.      S/S of bleeding or thromboembolism:  No     New Injury or illness:  No     Changes in diet or alcohol consumption:  No     Upcoming surgery, procedure or cardioversion:  No    Anticoagulation Episode Summary     Current INR goal:   2.0-3.0   TTR:   66.6 % (9 mo)   Next INR check:   3/18/2019   INR from last check:   3.30! (3/4/2019)   Weekly max warfarin dose:      Target end date:   11/29/2018   INR check location:      Preferred lab:      Send INR reminders to:   ANTICOAGULATION POOL B (MPW,HUG,STW,RVL,OAK,RLN)    Indications    Bilateral pulmonary embolism (H) [I26.99]           Comments:             Anticoagulation Care Providers     Provider Role Specialty Phone number    Malachi Choudhary MD Referring Family Medicine [phone]

## 2021-06-24 NOTE — PROGRESS NOTES
3/13/2019    Start time: 3:10 PM    Stop Time: 4:00 PM   Session # 5    Anand Felix is a 34 y.o. male is being seen today for    Chief Complaint   Patient presents with     Mental Health Note   . Major depression, moderate   Generalized Anxiety Disorder  Chronic pain syndrome        New symptoms or complaints: None    Functional Impairment:   Personal: 4  Family: 4  Work: 4  Social:4    Clinical assessment of mental status: Anand Felix presented on time.  He was open and cooperative, and dressed appropriately for this session and weather. His memory was in tact .  His  speech was  appropriate.  Language was appropriate.  Concentration and focus is on task. Psychosis is not noted or reported. He reports his mood is  depressed .  Affect is congruent with speech.  Fund of knowledge is adequate. Insight is adequate for therapy.     Suicidal/Homicidal Ideation present: Patient reports passive SI that is fleeting. He denies plan or means. Patient reported protective factors, including family and spiritual beliefs.         Patient's impression of their current status: Patient reported he continues to be working on addressing symptoms of depression, anger and anxiety. He reports pain/numbness/weakness symptoms continue, but he has had some relief with his current plan of care. He is struggling with some relationship/social changes due to chronic pain/medical conditions. He reports SI comes and goes.        Therapist impression of patients current state: Patient seems to be working on addressing depression, anger, anxiety and chronic medical symptoms. He reports pain symptoms have been more manageable with his current plan of care. He continues the medical cannabis program and is finding this to be helpful with pain symptom reduction. Continued to discuss changes in relationships/social life that have occurred since he has been struggling with chronic pain/medical issues.  He discussed social needs at this time and how he would  like to proceed in the future with social interactions. Encouraged patient to continue to increase efforts to reach out to social/family support to connect socially/emotionally. He discussed some family history this session. Patient continues to work with case management and is working closely with a . He reports Restorationist attendance as he is able. He continues to work on addressing acceptance of his chronic pain and medical concerns, with some progress. He continues to work on sleep improvement, which has been challenging recently.  Patient continues to take walks and/or exercise at home as he is able. Patient reports suicidal ideation, but denies plan or means at this time. He reported SI comes and goes. Discussed triggers for negative thinking and focusing on his capabilities. Discussed safety plan and ways to cope if symptoms should worsen or he is feeling unsafe. Began reviewing suicide prevention worksheet in session and will continue in future sessions. Patient is encouraged to continue to review questions discussed today and discuss insights next session.  He reports he is consistent with PT exercises in home practice and noticing ongoing positive improvement. Continued to discuss potential benefits of Mindfulness meditation with patient. He reports regular meditation practice and grounding. Encouraged continued affirmation practice. He preferred to process target/NC another time as he reported pain symptoms were low today. Presented meditation with focus on the sense of smell exercise today and patient appeared to benefit.         Type of psychotherapeutic technique provided: Client centered, CBT and Mindfulness    Progress toward short term goals:Patient continues to work on increasing coping skills to manage chronic pain and mental health. Patient continues medical care as recommended by his medical providers. He engages in physical activity and social activity as he is able. He is engaging  in meditation/grounding in daily living. He is working with a county .            Review of long term goals: Not done at today's visit. Last review: 2/27/19.    Diagnosis: Major depression, moderate   Generalized Anxiety Disorder  Chronic pain syndrome      Plan and Follow up:Follow pain center plan of care. Patient to follow all medical recommendations. Patient to schedule every 2 weeks for psychotherapy to further address depression, anxiety and chronic pain concerns. Continue meditation and other healthy coping strategies. Practice meditations as presented in session. Further address adjustment to role/identity due to chronic pain and health concerns. Continue to work on healthy communication skills/relationship building. Continue EMDR using pain protocol, as desired. Will go over homework assignment in session.           Discharge Criteria/Planning: Patient will continue with follow-up until therapy can be discontinued without return of signs and symptoms.    Alize Livingston 3/13/2019

## 2021-06-24 NOTE — PROGRESS NOTES
2/27/2019    Start time: 9:00 AM    Stop Time: 9:50 AM   Session # 4    Anand Felix is a 33 y.o. male is being seen today for    Chief Complaint   Patient presents with     Mental Health Note   . Major depression, moderate   Generalized Anxiety Disorder  Chronic pain syndrome        New symptoms or complaints: Recent withdrawal symptoms.     Functional Impairment:   Personal: 4  Family: 4  Work: 4  Social:4    Clinical assessment of mental status: Anand Felix presented on time.  He was open and cooperative, and dressed appropriately for this session and weather. His memory was in tact .  His  speech was appropriate.  Language was appropriate.  Concentration and focus is on task. Psychosis is not noted or reported. He reports his mood is depressed .  Affect is congruent with speech.  Fund of knowledge is adequate. Insight is adequate for therapy.     Suicidal/Homicidal Ideation present: Patient reports passive SI that is fleeting. He denies plan or means. Patient reported protective factors, including family and spiritual beliefs.     Patient's impression of their current status:  Patient reported he continues to be working on addressing symptoms of depression and anxiety. He reports pain symptoms continue, but he has had some relief with his current plan of care. He is struggling with some relationship/social changes due to chronic pain/medical conditions. He reported he is just starting to feel better from withdrawal symptoms as he was tapering off of Opioids.         Therapist impression of patients current state: Patient seems to be working on addressing depression, anxiety and chronic medical symptoms. He reports pain symptoms have been more manageable with his current plan of care. He continues the medical cannabis program and is finding this to be helpful with pain symptom reduction.  He was recently experiencing withdrawal symptoms as he was tapering off Opioids. He stated feeling agitated, poor appetite, poor  "sleep, chills, angry and frustrated. He states he started to feel better on Sunday and is no longer on withdrawal medication. He did reach out to his pain center provider regarding his concerns. He reports he is feeling much better and denies any withdrawal at this time. Discussed changes in relationships/social life that have occurred since he has been struggling with chronic pain/medical issues.  He discussed social needs at this time and how he would like to proceed in the future with social interactions. Encouraged patient to continue to increase efforts to reach out to social/family support to connect socially/emotionally. He discussed some family history this session. Patient continues to work with case management and is working closely with a . He reports Nondenominational attendance as he is able, but he has missed the past few weeks due to difficulty with withdrawal. He continues to work on addressing acceptance of his chronic pain and medical concerns, with some progress. He continues to work on sleep improvement, which has been challenging recently.  Patient continues to take walks and/or exercise at home as he is able. Patient reports suicidal ideation, but denies plan or means at this time. He reported SI was more often and \"lingering\" while he was experiencing withdrawal symptoms. Discussed triggers for negative thinking and focusing on his capabilities. Discussed safety plan and ways to cope if symptoms should worsen or he is feeling unsafe. Began reviewing suicide prevention worksheet in session. Patient is encouraged to continue to review questions discussed today and discuss insights next session.  He reports he is consistent with PT exercises in home practice and noticing ongoing positive improvement. Continued to discuss potential benefits of Mindfulness meditation with patient. He reports regular meditation practice and grounding. Encouraged continued affirmation practice. He preferred to " process target/NC another time as he reported pain symptoms were low today. Presented meditation with focus on the sense of taste exercise today and patient appeared to benefit.            Type of psychotherapeutic technique provided: Client centered, CBT and Mindfulness    Progress toward short term goals: Patient continues to work on increasing coping skills to manage chronic pain and mental health. Patient continues medical care as recommended by his medical providers. He engages in physical activity and social activity as he is able. He is engaging in meditation/grounding in daily living. He is working with a county . Patient signed/agreed to treatment plan this date.     Review of long term goals: Patient signed/agreed to treatment plan this date.     Diagnosis: Major depression, moderate   Generalized Anxiety Disorder  Chronic pain syndrome      Plan and Follow up: Follow pain center plan of care. Patient to follow all medical recommendations. Patient to schedule every 2 weeks for psychotherapy to further address depression, anxiety and chronic pain concerns. Continue meditation and other healthy coping strategies. Practice meditations as presented in session. Further address adjustment to role/identity due to chronic pain and health concerns. Continue to work on healthy communication skills/relationship building. Continue EMDR using pain protocol, as desired.           Discharge Criteria/Planning: Patient will continue with follow-up until therapy can be discontinued without return of signs and symptoms.    Alize Livingston 2/27/2019

## 2021-06-24 NOTE — PROGRESS NOTES
DYAN attempted to leave message for [facility] intake staff Dilia at [phone] to inquire if pt on wait list for assessment and consult about paperwork that was sent to pt by financial worker.  Dilia's VM was full.  DYAN called main intake line and left message requesting consult and call back.

## 2021-06-24 NOTE — TELEPHONE ENCOUNTER
Prior Authorization Request  Who s requesting:  Dayna Lundberg CNP/Kasey Fatima CMA  Pharmacy Name and Location: [facility] [place], [place]  Medication Name: buprenorphine HCl (BELBUCA) 75 mcg Film  Insurance Plan: [employer]  Insurance Member ID Number:  [id number]  Informed patient that prior authorizations can take up to 10 business days for response:   Yes; 72 hours  Okay to leave a detailed message: Yes

## 2021-06-24 NOTE — PROGRESS NOTES
Outpatient Mental Health Treatment Plan    Name:  Anand Felix  :  1985  MRN:  [id number]    Treatment Plan:  Updated Treatment Plan  Intake/initial treatment plan date:  18  Benefit and risks and alternatives have been discussed: Yes  Is this treatment appropriate with minimal intrusion/restrictions: Yes  Estimated duration of treatment:  Approximately 20 sessions.  Anticipated frequency of services:  Every 2 weeks  Necessity for frequency: This frequency is needed to establish therapeutic goals and for continuity of care in order to monitor progress.  Necessity for treatment: To address cognitive, behavioral, and/or emotional barriers in order to work toward goals and to improve quality of life.    Session Type: Patient is presenting for an Individual session.    Plan:      ? Depression    Goal:  Decrease average depression level from 6 to 4.   Strategies:    ?[x] Decrease social isolation     [x] Increase involvement in meaningful activities     ?[x] Discuss sleep hygiene     ?[x] Explore thoughts and expectations about self and others     ?[x] Process grief (loss of significant person, independence, role,        etc.)     ?[x] Assess for suicide risk     ?[x] Implement physical activity routine (with physician approval)     [x] Consider introduction of bibliotherapy and/or videos     [x] Continue compliance with medical treatment plan (or explore         barriers)       ?  ?Degree to which this is a problem: 4  Degree to which goal is met: 1  Date of Review: 19          ?   ? Anxiety    Goal:  Decrease average anxiety level from 6 to 4.   Strategies: ? [x]Learn and practice relaxation techniques and other coping        strategies (e.g., thought stopping, reframing, meditation)     ? [x] Increase involvement in meaningful activities     ? [x] Discuss sleep hygiene     ? [x] Explore thoughts and expectations about self and others     ? [x] Identify and monitor triggers for panic/anxiety symptoms     ?  [x] Implement physical activity routine (with physician approval)     ? [x] Consider introduction of bibliotherapy and/or videos     ? [x] Continue compliance with medical treatment plan (or explore          barriers)                                       [x]     Degree to which this is a problem: 4  Degree to which goal is met: 1  Date of Review: 2/8/19  Chronic pain  Goal:  ? Decrease average pain level from 6 to 4.   ? Increase % acceptance of pain from 60 to 80.   Strategies: ? [x] Explore thoughts and expectations about self and others    [x] Explore emotional reactions to illness/injury      ? [x] Learn and practice relaxation techniques and other coping          strategies            [x] Implement physical activity routine (with physician approval)     ? [x] Engage in values clarification and goal-setting     ? [x] Consider introduction of bibliotherapy and/or videos     ? [x] Increase involvement in meaningful activities     ? [x] Discuss sleep hygiene     ? [x] Process grief (loss of significant person, independence, role,          etc.)     ? [x] Assess for suicide risk     ?  Degree to which this is a problem: 4  Degree to which goal is met: 1  Date of Review: 2/8/19       Functional Impairment:  1=Not at all/Rarely  2=Some days  3=Most Days  4=Every Day    Personal : 4  Family : 4  Social : 4   Work/school : 4    Diagnosis:  Major depression, moderate   Generalized Anxiety Disorder  Chronic pain syndrome           WHODAS 2.0 12-item version 43.75%      Scores presented in qualifiers to represent level of disability.  MODERATE Problem (medium, fair, ...) 25-49%    H1= 30  H2= 10  H3= 10        Clinical assessments and measures completed:. CAROLYN-7, PHQ-9 and PANSI     Strengths:Patience, fast learner, hard working  Limitations:  lacks assertiveness, wants to improve his health, health seems to be getting worse in the last year.   Cultural Considerations: Patient is a 33 year old,  American,  male  who has a history of depression, anxiety and chronic pain.        Persons responsible for this plan: Patient and Provider            Psychotherapist Signature           Patient Signature:              Guardian Signature             Provider: Performed and documented by Alize Livingston [facility]   Date:  2/8/19

## 2021-06-25 NOTE — TELEPHONE ENCOUNTER
[facility]                                                               PLEASE EXPEDITE  [address] [place] [address]  [phone]    Insurance Company: Medicaid  Prior Authorization Appeals  Fax: [phone]    Date:     RE:  Anand iRos  :  1985  Medication:  Belbuca 150mcg  ID #:  [id number]  To Whom It May Concern,    [facility] specializes in management of chronic pain issues. All of our providers are pain specialists. The goal for our patients is to reach their highest level of function. Standard therapy in the pain clinic is:  interventional injections, NSAIDS, anticonvulsants, physical therapy, behavior health, topical medications (such as Lidoderm, Flector, Voltaren gel), and opioids at the lowest effective dose.      Mr. Rios has been stable on the use of Belbuca 150mcg with no side effects. He has been able to reduce his use of opioid medication. I would like to continue for him to remain on this medication as he has been able to function with this pain treatment. He is engaging in therapy, his daily chores and working to take better care of himself without the help of others                                         Mr. Rios is diagnosed with chronic pain syndrome and generalized neuromuscular condition. Treatment with this medication decreases her pain and increases her function.         Please reconsider the denial of this medication.         Sincerely,    Dayna Lundberg CNP/ Tiffanie Alejandro [facility]    Pain Treatment Specialist

## 2021-06-25 NOTE — TELEPHONE ENCOUNTER
Patient called asking if he can schedule sinus surgery that Dr. Wright recommended. Pt was last seen August 2018 and a CT of the sinus was recommended. I placed the order for CT to be completed to see if surgery is necessary. Order sent to Dr. Wright.    Gianna Mejia RN  [facility]  [phone]

## 2021-06-25 NOTE — TELEPHONE ENCOUNTER
Prior Authorization Request  Who s requesting:  Pharmacy/[place]  [facility]  Medication Name: Belbuca 150mcg, 2/day  Insurance Plan: IBRAHIMA BACH  Insurance Member ID Number:  [id number]  Informed patient that prior authorizations can take up to 10 business days for response:   Yes   72 hrs  Okay to leave a detailed message: Yes      Previous PA approved for belbuca 75mcg  1/19/19-2/18/20

## 2021-06-25 NOTE — PROGRESS NOTES
Progress Notes by Kyle Velez at 10/20/2017 10:40 AM     Author: Kyle Velez Service: -- Author Type: Nurse Practitioner    Filed: 11/11/2017 12:04 PM Encounter Date: 10/20/2017 Status: Signed    : Kyle Velez              [facility]    Date of Service: 10/20/2017  Primary Provider: Kyle Velez CNP    Patient Care Team:  Kyle Velez CNP as PCP - General (Nurse Practitioner)     ______________________________________________________________________     Patient's Pharmacy:    [facility] [address] [place], [place] [address] [address]  Phone: [phone] Fax: [phone]    [address] [address]  Phone: [phone] Fax: [phone]     Patient's Insurance:    Payor: AETNA / Plan: AETNA PPO,ELECT,MANAGED CHOICE / Product Type: PPO/POS/FFS /     ______________________________________________________________________    Assessment:    1. Hospital discharge follow-up    2. Bilateral pulmonary embolism    3. Right foot drop    4. Hypercalcemia       ______________________________________________________________________      PHQ-2 Total Score: 0 (8/4/2017  5:00 PM)     Plan:  Patient Instructions   1. Orthotics referral placed today:  - Ambulatory referral for Orthotics - [facility]    2. Continue current medications    3. Labs ordered today:  - Basic Metabolic Panel    4. Hematology referral placed at this visit: Need follow up within 3-6 months re: duration of Xarelto treatment      ______________________________________________________________________     Anand Felix is 32 y.o. male who comes in today for:    Chief Complaint   Patient presents with   ? Follow-up     questions for you, had several blood clots 10/15-10/17 was a [facility] and [facility]fered to [facility]  "      Patient Active Problem List   Diagnosis   ? Muscle Weakness Generalized - \"Neuromuscular Condition\" with normal EMG.   ? Tremor   ? Arthralgias In Multiple Sites   ? Muscle Aches, Generalized (Myalgias)   ? Chronic Ethmoidal Sinusitis   ? Hypovitaminosis D - [place]   ? Monoparesis of leg   ? Weakness - without demonstrable CNS or peripheral cause - local neurologist at [facility], and neurologist at [facility], and [facility] consultant.   ? Lumbar pain determined by palpation and/or percussion at L1 (5/4/16)   ? Somatoform disorder - diagnosis proposed by Dr. Donis, his neurologist.   ? Nasal polyposis - right naris on MRI (2016)   ? Pulmonary embolism   ? Bilateral pulmonary embolism     Past Medical History:   Diagnosis Date   ? Bulging lumbar disc    ? Lumbar herniated disc 10/23/2015   ? Lumbar radiculopathy 11/25/2015     Past Surgical History:   Procedure Laterality Date   ? MINIMALLY INVASIVE MICRODISCECTOMY LUMBAR SPINE Right 10/2015    L5-S1; Dr. Bettie Hernandez   ? SINUS SURGERY  2014    Dr. Wright     No Known Allergies    Current Outpatient Prescriptions   Medication Sig Note   ? acetaminophen (TYLENOL) 500 MG tablet Take 1 tablet (500 mg total) by mouth every 6 (six) hours as needed for pain.    ? cholecalciferol, vitamin D3, 2,000 unit cap Take 2,000 Units by mouth daily.    ? diazePAM (VALIUM) 5 MG tablet Take 2.5 mg by mouth in the morning, and 5 mg BID PRN (Total of 12.5 mg/Day) (Patient taking differently: Take 2.5-5 mg by mouth see administration instructions. Take 2.5 mg by mouth in the morning, and 5 mg BID PRN (Total of 12.5 mg/Day))    ? rivaroxaban 15 mg Tab Take 1 tablet (15 mg total) by mouth 2 (two) times a day with meals for 21 days.    ? traMADol (ULTRAM) 50 mg tablet Take 1 tab up to four times daily, prn for low back, right thigh and calf pain. (Patient taking differently: Take 50 mg by mouth 4 (four) times a day as needed. Take 1 tab up to four " times daily, prn for low back, right thigh and calf pain.) 10/15/2017: Takes on average 4 tabs per day     ? [START ON 11/7/2017] rivaroxaban 20 mg Tab Take 1 tablet (20 mg total) by mouth daily.      Social History     Social History   ? Marital status: Single     Spouse name: Miguel   ? Number of children: 0   ? Years of education: GED and some collese     Occupational History   ? unemployed      Social History Main Topics   ? Smoking status: Never Smoker   ? Smokeless tobacco: Never Used   ? Alcohol use Yes      Comment: Monthly or less often. 1-2 drinks at a time.   ? Drug use: No   ? Sexual activity: Yes     Partners: Female     Other Topics Concern   ? Not on file     Social History Narrative     ______________________________________________________________________     History of present illness: Anand Felix is a pleasant 32 y.o. male who presents in clinic today, accompanied by his wife, for [facility] discharge follow up for bilateral pulmonary embolism and small pulmonary infarct RLL on 10/15/2017 - 10/17/2017.  Initially, he presented to [facility] ED earlier on 10/15/2017 for progressively worsening right-sided chest pain and shortness of breath, abnormal EKG, elevated d-dimer, and subsequently CT scan of the chest was obtained noting subsegmental pulmonary emboli of the lower lobes bilaterally.  Also noted on the CT was a small pulmonary infarction in the posterior basal segment of the right lower lobe.  He was started on anticoagulation, heparin, in the ED and also IV morphine and IV Ativan for his persistent right-sided chest pain, however, he remained tachycardic and due to a lack of telemetry beds he was transferred to [facility] for continued care and admission for hospitalization.  He was evaluated by hematology for the PE which was thought to be due to immobilization due to his monoparesis and it was recommended that he be on indefinite anticoagulation.  He was started  on Xarelto with a 21 day initial dose and then to change to once a day dosing for maintenance with recommendation for follow-up with hematology as outpatient in 3-6 months.  In addition, he was also recommended to follow-up with neurology, Dr. Donis regarding his RLE monoparesis.  Today, he is feeling much better since being in the hospital but still has some mild shortness of breath at rest.  He denies chest pain or palpitations, lightheadedness or dizziness today.  He continues on the tramadol and Valium for his RLE pain and muscle spasms which he finds of benefit and is tolerating well at this time.    *[facility] ED and [facility] emergency room records and discharge summary recommendations were reviewed today and the details of his visit discussed.    Review of systems:   On ROS, the patient denies fever, cough, chest pain or shortness of breath, abdominal pain, nausea/vomiting, or diarrhea.  Positive for symptoms as noted in the HPI.    ______________________________________________________________________    Wt Readings from Last 3 Encounters:   10/20/17 142 lb 8 oz (64.6 kg)   10/17/17 142 lb 14.4 oz (64.8 kg)   10/15/17 140 lb (63.5 kg)     BP Readings from Last 3 Encounters:   10/20/17 128/76   10/17/17 108/69   10/15/17 130/81     /76  Pulse (!) 108  Wt 142 lb 8 oz (64.6 kg)  SpO2 98%  BMI 23 kg/m2     Physical Exam:  General Appearance: Alert, cooperative, no distress, appears stated age  Head: Normocephalic, without obvious abnormality, atraumatic  Eyes: PERRL, conjunctiva/corneas clear, EOM's intact  Nose: Nares normal, septum midline,mucosa normal, no drainage  Throat: Lips, mucosa, and tongue normal; teeth and gums normal, no erythema, exudate, or thrush  Neck: Supple, symmetrical, trachea midline, no adenopathy; no carotid bruit or JVD  Back: Symmetric, no CVA tenderness  Lungs: Clear to auscultation bilaterally, respirations unlabored  Heart: Regular tachy rate and  rhythm, S1 and S2 normal, no murmur, rub, or gallop  Abdomen: Soft, non-tender, bowel sounds active all four quadrants  Musculoskeletal: FENTON. No joint swelling or deformity.   Extremities: Extremities normal, atraumatic, no cyanosis or edema  Lymph nodes: Cervical & supraclavicular nodes normal  Neurologic: He is alert & oriented x 3. He has a slight RLE limp with ambulation, uses cane intermittently for support/stabilization.   Psychiatric: He has a normal mood and affect.     Diagnostics:  Results for orders placed or performed in visit on 10/20/17   Basic Metabolic Panel   Result Value Ref Range    Sodium 139 136 - 145 mmol/L    Potassium 3.8 3.5 - 5.0 mmol/L    Chloride 104 98 - 107 mmol/L    CO2 25 22 - 31 mmol/L    Anion Gap, Calculation 10 5 - 18 mmol/L    Glucose 87 70 - 125 mg/dL    Calcium 9.4 8.5 - 10.5 mg/dL    BUN 12 8 - 22 mg/dL    Creatinine 0.77 0.70 - 1.30 mg/dL    GFR MDRD Af Amer >60 >60 mL/min/1.73m2    GFR MDRD Non Af Amer >60 >60 mL/min/1.73m2       CTA CHEST PE RUN  10/15/2017 3:33 AM     INDICATION: right sided chest pain and SOB  TECHNIQUE: Helical acquisition through the chest was performed during the arterial phase of contrast enhancement using IV contrast. 2D and 3D reconstructions were performed by the CT technologist. Dose reduction techniques were used.   IV CONTRAST: Iohexol (Omni) 100 mL  COMPARISON: Radiographs from same date.     FINDINGS:  ANGIOGRAM CHEST: Motion artifact compromises multiple images, and the contrast bolus is suboptimal. In spite of this, there are subsegmental pulmonary emboli in both lower lobes. No definite upper or middle lobe clot. No central pulmonary embolism. No   saddle pulmonary embolism. No CT evidence of right heart strain. Normal thoracic aorta.     LUNGS AND PLEURA: Small amount of airspace infiltrate in the posterior sulcus on the right with adjacent small pleural effusion. The left lung is clear. No pneumothorax.     MEDIASTINUM: Heart size normal.  No pericardial effusion. No adenopathy.     LIMITED UPPER ABDOMEN: Negative.     MUSCULOSKELETAL: Negative.     IMPRESSION:   CONCLUSION:  1.  Although the exam is somewhat motion degraded, there are subsegmental pulmonary emboli in both lower lobes. Airspace infiltrate in the posterior basal segment of the right lower lobe is consistent with a small pulmonary infarction, with an adjacent   small pleural effusion. Findings discussed by telephone with Dr. Gonzalez at 0351 on 10/15/2017.      US VENOUS LEGS BILATERAL  10/15/2017 1:10 PM     INDICATION: edema. Pulmonary emboli.  TECHNIQUE: Routine exam with compression, augmentation, and duplex utilizing 2D gray-scale imaging, Doppler interrogation with color-flow and spectral waveform analysis.  COMPARISON: None.     FINDINGS: The common femoral, femoral, popliteal, and segmentally visualized calf veins were evaluated.     Right leg veins are negative for deep venous thrombosis.  Left leg veins are negative for deep venous thrombosis.     No popliteal cysts.     IMPRESSION:   CONCLUSION:  1.  Bilateral leg veins are negative for DVT.      Kyle Velez CNP  Internal Medicine  [facility]     Return in about 6 months (around 4/20/2018) for or, after your Specialist appointment(s).

## 2021-06-25 NOTE — TELEPHONE ENCOUNTER
Central PA team  [phone]  Pool: HE PA MED ([id number])          PA has been initiated.       PA form completed and faxed insurance via Cover My Meds     Key:  [id number]     Medication:  Belbuca 150mcg    Insurance: [place] Medicaid        Response will be received via fax and may take up to 5-10 business days depending on plan

## 2021-06-25 NOTE — TELEPHONE ENCOUNTER
Reason for Call:  Medication or medication refill:    Do you use a [facility]?  Name of the pharmacy and phone number for the current request[facility]     Name of the medication requested: escitalopram oxalate 10 mg    Other request: NA    Can we leave a detailed message on this number? No call back needed    Phone number patient can be reached at: Cell number on file:    Telephone Information:   Mobile [phone]       Best Time: NA    Call taken on 6/15/2021 at 4:42 PM by Daphney Wells

## 2021-06-25 NOTE — PROGRESS NOTES
"5/25/2021    Start time: 11:10 AM    Stop Time: 12:00 PM   Session # 18    Anand Felix is a 36 y.o. male who is being evaluated via a billable telephone visit.    The patient has been notified of following:     \"This telephone visit will be conducted via a call between you and your provider. This service lets us provide the care you need with a phone conversation.      If during the course of the call the provider feels a telephone visit is not appropriate, you will not be charged for this service.\"     Chief Complaint:    Major depression, severe   Generalized Anxiety Disorder  Chronic pain syndrome    Functional Impairment:   Personal: 4  Family: 4  Work: 4  Social:4    Clinical assessment of mental status: Anand Felix presented late today as he was unable to access the [employer] reina for a video visit. Reached out to patient via telephone when he did not arrive to the video visit. We decided to proceed with a video visit, due to patient's difficulty with accessing the [employer] reina today.  He was open and cooperative. His memory was in tact .  His  speech was appropriate.  Language was appropriate.  Concentration and focus is on task. Psychosis is not noted or reported. He reports his mood is depressed .  Affect is congruent with speech.  Fund of knowledge is adequate. Insight is adequate for therapy.     Suicidal/Homicidal Ideation present: None Reported This Session. However, he reports passive SI this week (1-2 days) and states he feels that this is about the same as last week. He denies plan or means. He is able to identify protective factors.     Patient's impression of their current status: Patient reports continued difficulty with depression and anxiety symptoms. He does report some improvement with sleep, despite some continued challenges. Patient continues to have some mild side effects from medication that are improving. He continues to have concerns and lack of interest in social situations. Patient is working on " acceptance of his chronic medical concerns. He is working on identifying future goals. He reports some family stressors at this time.     Therapist impression of patients current state: Although anxiety and depression symptoms persist, patient reports that his symptoms are improving. He does report feeling fatigued, but this is improving. Medication side effects continue, however, he reports they are manageable overall.  He states sleep has improved, despite some challenges. Encouraged him to check in with his medical provider, if needed. Patient reports SI has been less/fleeting. He denies plan or means. Discussed triggers and protective factors. He continues to work on identifying future goals and he continues to work on  what he wants versus what others want for him. Discussed cultural/family influences as well and how this has impacted his ability to express feelings. Further, discussed current impact regarding's expectations of him and ways he is coping. Discussed ways to find meaning and connection to future plans. Continued to discuss values clarification. Patient continues to address pain issues and reports symptoms in the morning are most difficult.  Patient continues to work on routine/schedule, as well as physical activity, as he is able. Patient is reaching out to his support system, as he is able. However, he reports he feels indifferent about connecting socially at this time. He continues to adjust to changes as his wife recently returned to work. He seems to be staying busy while she is away. He continues to work on housing needs. He reports staying connected to his spiritual program and discussed ways to further connect that may be meaningful to him. Patient seems to be making more effort towards using Mindfulness techniques in daily living. Gave patient praise for his efforts. Discussed challenges with acceptance and ways to re-evaluate what is important to him in his life now. Continued  to discuss grief/loss regarding the recent death of his young nephew. Discussed ways to cope. Presented meditation focused on the sense of sound today and patient seemed to benefit.     Type of psychotherapeutic technique provided: Client centered, CBT and Mindfulness    Progress toward short term goals: Patient is working on increasing coping skills to manage symptoms related to mental health and medical concerns.     Review of long term goals: Not done at today's visit. Last review: 4/13/2021    Diagnosis: Major depression, severe   Generalized Anxiety Disorder  Chronic pain syndrome    Plan and Follow up:  Follow pain center plan of care and overall medical plan. Schedule and attend psychotherapy every 1-2 weeks to further address mental health and chronic pain concerns. Patient understands that sessions are by telephone or video at this time, due to COVID-19 guidelines. He is aware that there are a limited number of in-person sessions at this time, but he prefers video due to transportation barriers. Practice meditations/coping resources as discussed. Continue to engage in hobbies and go outside daily, as appropriate. Continue to discuss future planning. Continue to follow up on personal goal to reach out to spiritual leader at [facility]. Continue to go over vulnerabilities exercise next session.       Discharge Criteria/Planning: Patient will continue with follow-up until therapy can be discontinued without return of signs and symptoms.    Alize Livingston 5/25/2021

## 2021-06-25 NOTE — PROGRESS NOTES
"Faxed SMRT Certification letter to Dilia with [facility] intake @ [phone] today 3/15/19.    I spoke with Kris Rivera wife, today and she requested that we mail Anand the \"Request for Payment of Long term care services\" form to Anand for him to sign and send back to me. Once I receive the \"Request for Payment of Long term care services\" form back from Aannd I will send the \"Request for Payment of Long term care services\" form and the \"Lead Agency  //Worker LTC Communication form\" to Dilia with [facility] intake and Financial worker Yunior Green.     Email: [email or web address]  Marital Status: -Miguel Felix  Children: No  Born and Raised:   Occupation: On Disability  Living Situation: Lives in [place] with his wife.  Smoking, Alcohol, other:  Services receiving: SNAP, [place] (Case # [id number]), SMRT (Case #[id number])  [facility] worker: Leo Avendaño: Phone: [phone]  [facility] Financial Worker:  Jose Roberto Green: Phone: [phone]; Fax: [phone]  [facility] Follow up s: Monthly  [facility] Enrollment: January 19th 2018  Next [facility] Follow up: 3/26/19    "

## 2021-06-25 NOTE — PROGRESS NOTES
Progress Notes by Kyle Velez at 8/4/2017  4:40 PM     Author: Kyle Velez Service: -- Author Type: Nurse Practitioner    Filed: 8/18/2017  9:12 AM Encounter Date: 8/4/2017 Status: Signed    : [facility]    Date of Service: 8/4/2017  Primary Provider: Kyle Velez CNP    Patient Care Team:  Kyle Velez CNP as PCP - General (Nurse Practitioner)     ______________________________________________________________________     Patient's Pharmacy:    [facility] [address] [place] [address]  Phone: [phone] Fax: [phone]     Patient's Insurance:    Payor: AETNA / Plan: AETNA PPO,ELECT,MANAGED CHOICE / Product Type: PPO/POS/FFS /     ______________________________________________________________________     Assessment:    1. Muscle spasm of right lower extremity    2. Low back pain       ______________________________________________________________________     PHQ-2 Total Score: 0 (8/4/2017  5:00 PM)    Plan:  Patient Instructions   1. Medications prescribed/refilled at this visit:  - traMADol (ULTRAM) 50 mg tablet; Take 1 tab up to four times daily, prn for low back, right thigh and calf pain.  Dispense: 120 tablet; Refill: 0    - diazePAM (VALIUM) 5 MG tablet; Take 1 tablet (5 mg total) by mouth every 6 (six) hours as needed for sleep or muscle spasms.  Dispense: 30 tablet; Refill: 0    2. Await [facility] performance evaluation for pending disability paperwork.      *Total of 35 minutes or greater were spent with the patient today with greater than 50% of the times spent in counseling and coordination of care.       ______________________________________________________________________     Anand Felix is a 32 y.o. male who comes in today for:    Chief Complaint   Patient presents with   ? Follow-up     Disablility  "paperwork     Patient Active Problem List   Diagnosis   ? Muscle Weakness Generalized - \"Neuromuscular Condition\" with normal EMG.   ? Tremor   ? Arthralgias In Multiple Sites   ? Muscle Aches, Generalized (Myalgias)   ? Chronic Ethmoidal Sinusitis   ? [place]ov[place]   ? Monoparesis of leg   ? Weakness - without demonstrable CNS or peripheral cause - local neurologist at [facility], and neurologist at [facility], and [facility] consultant.   ? Lumbar pain determined by palpation and/or percussion at L1 (5/4/16)   ? Somatoform disorder - diagnosis proposed by Dr. Donis, his neurologist.   ? Nasal polyposis - right naris on MRI (2016)     Past Medical History:   Diagnosis Date   ? Bulging lumbar disc    ? Lumbar herniated disc 10/23/2015   ? Lumbar radiculopathy 11/25/2015     Past Surgical History:   Procedure Laterality Date   ? MINIMALLY INVASIVE MICRODISCECTOMY LUMBAR SPINE Right 10/2015    L5-S1; Dr. Bettie Hernandez   ? SINUS SURGERY  2014    Dr. Wright     No Known Allergies    Current Outpatient Prescriptions   Medication Sig   ? acetaminophen (TYLENOL) 500 MG tablet Take 1 tablet (500 mg total) by mouth every 6 (six) hours as needed for pain.   ? cholecalciferol, vitamin D3, 2,000 unit cap Take 2,000 Units by mouth daily.   ? ibuprofen (ADVIL,MOTRIN) 800 MG tablet Take 800 mg by mouth 4 (four) times a day.   ? traMADol (ULTRAM) 50 mg tablet Take 1 tab up to four times daily, prn for low back, right thigh and calf pain.   ? diazePAM (VALIUM) 5 MG tablet Take 1 tablet (5 mg total) by mouth every 6 (six) hours as needed for sleep or muscle spasms.     Social History     Social History   ? Marital status: Single     Spouse name: Miguel   ? Number of children: 0   ? Years of education: GED and some collese     Occupational History   ? unemployed      Social History Main Topics   ? Smoking status: Never Smoker   ? Smokeless tobacco: Never Used   ? Alcohol use Yes      Comment: " Monthly or less often. 1-2 drinks at a time.   ? Drug use: No   ? Sexual activity: Yes     Partners: Female     Other Topics Concern   ? Not on file     Social History Narrative     ______________________________________________________________________     History of present illness: Anand Felix is a pleasant 32 y.o. male with a past medical history pertinent for bulging lumbar disc at L5-S1, generalized myalgia, right lower extremity weakness and monoparesis, and bilateral hand tremors who presents in clinic today, accompanied by his wife, for follow up regarding pursuit of disability for his low back pain and weakness of RLE.  He continues to suffer from ongoing low back pain and RLE weakness/numbness symptoms that he states had developed s/p right L5-S1 microdiscectomy in 10/2015 with Dr. Bettie Hernandez.  Since that time, he has been seen and evaluated by different neurologists and also by his Orthopedic surgeon and has undergone a variety of testing which have not shown to be able to demonstrate CNS or peripheral cause for his for his weakness.  One Neurologist proposed the diagnosis of somatoform disorder as possibility.  He states that he continues to have low back muscle spasms and difficulty with ambulation and sleep. We discussed the need for him to be evaluated for his physical limitations by [facility] as this information is required for completion of his disability forms.  There has been some difficulty getting a response from the facility to get him in for this evaluation but our Specialty , Shiva, has been working on this and reports that he should be getting a call to get this appointment set up at anytime.  We have made some adjustments to his medications to see if he can get better relief for his symptoms.    *[place] reviewed today and no inappropriate prescriptions identified.    Review of systems:   10 point review of systems is negative unless noted in the  HPI.    ______________________________________________________________________      Wt Readings from Last 3 Encounters:   08/04/17 150 lb 12.8 oz (68.4 kg)   05/25/17 157 lb 9.6 oz (71.5 kg)   01/23/17 156 lb 6.4 oz (70.9 kg)     BP Readings from Last 3 Encounters:   08/04/17 120/70   05/25/17 112/72   01/23/17 130/84      /70 (Patient Site: Right Arm, Patient Position: Sitting, Cuff Size: Adult Regular)  Pulse (!) 106  Wt 150 lb 12.8 oz (68.4 kg)  BMI 25.09 kg/m2     Physical Exam:  General Appearance: Alert, cooperative, no distress, appears stated age  Head: Normocephalic, without obvious abnormality, atraumatic  Eyes: PERRL, conjunctiva/corneas clear, EOM's intact  Ears: Normal TM's and external ear canals, both ears  Nose: Nares normal, septum midline,mucosa normal, no drainage  Throat: Lips, mucosa, and tongue normal; teeth and gums normal, no erythema, exudate, or thrush  Neck: Supple, symmetrical, trachea midline, no adenopathy  Back: Symmetric, no curvature, ROM mildly decreased with flexion/lateral bending - R>L, no CVA tenderness  Lungs: Clear to auscultation bilaterally, respirations unlabored  Heart: Regular, mildly tachy rate and rhythm, S1 and S2 normal, no murmur, rub, or gallop  Abdomen: Soft, non-tender, bowel sounds active all four quadrants  Musculoskeletal: Decreased range of motion, mild, RLE with flexion/extension/IR & ER. No joint swelling or deformity.  Extremities: Extremities appear normal, atraumatic, no cyanosis or edema  Skin: Skin color, texture, turgor normal, no rashes or lesions  Lymph nodes: Cervical & supraclavicular nodes normal  Neurologic: He is alert & oriented x 3. He has an abnormal gait. Uses a cane prn for support with ambulation. Mild hand tremors bilaterally.  Psychiatric: He has a normal mood and affect. Insomnia.    Diagnostics:  [facility]  MR LUMBAR SPINE W WO CONTRAST  9/15/2016 5:39 PM     INDICATION: Low back pain, >6 wks / red flag(s) /  radiculopathy  TECHNIQUE: MRI lumbar spine without and with contrast, 15 cc Magnevist  COMPARISON: XR 10/23/2015, MR 10/01/2015     FINDINGS:      Nomenclature is based on 5 lumbar type vertebral bodies.     There are no suspicious bone marrow lesions. The lumbar spine is in anatomic alignment. The conus terminates at L1. There is no abnormal signal in the visualized lumbar cord.     T12-L1: Normal disc height and signal. No central canal or neural foraminal stenosis.     L1-L2: Normal disc height and signal. No central canal or neural foraminal stenosis.     L2-L3: Normal disc height and signal. No central canal or neural foraminal stenosis.     L3-L4: Normal disc height and signal. No central canal or neural foraminal stenosis.     L4-L5: Normal disc height and signal. Mild disc bulge. This minimally contacts the traversing left L5 nerve root. There is no significant central canal or neural foraminal stenosis.     L5-S1: There is evidence of interval right laminotomy. When compared to the prior study, there is evidence of recurrent/persistent disc bulge with a small central protrusion. The bulge, coupled with some facet hypertrophy, contacts and likely compresses   the bilateral traversing S1 nerve roots. The overall mass effect upon these nerve roots is relatively similar slightly less compared to the preoperative study. There has been slight interval improvement in caliber of the central canal, without evidence   stenosis.     On postcontrast imaging, there is evidence of enhancing tissue extending from the laminotomy bed into the right lateral aspect of the thecal sac. This appears to surround the traversing right S1 nerve root. (image 6, series 12) There is no significant   neural foraminal stenosis.     IMPRESSION:      1. Patient is status post right L5-S1 right laminotomy. When compared to the prior 10/1/2015 study, there is persistent/recurrent disc bulge with small central protrusion which results in  contact and likely compression of the bilateral traversing S1 nerve roots. The degree of mass effect on the nerve roots is stable to slightly decreased compared to the prior study.     2. Enhancing tissue in the L5-S1 right laminotomy bed extends along the right lateral aspect of the thecal sac and likely surrounds the traversing right S1 nerve root. This could relate to granulation tissue versus early scar formation      Kyle Velez, [facility]  Internal Medicine  [facility]    Return for follow up visit after your performance evaluation at [facility].

## 2021-06-25 NOTE — PROGRESS NOTES
"Progress Notes by Kyle Velez at 5/25/2017 12:20 PM     Author: Kyle Velez Service: -- Author Type: Nurse Practitioner    Filed: 5/29/2017 11:26 PM Encounter Date: 5/25/2017 Status: Signed    : [facility]    Date of Service: 5/25/2017  Primary Provider: Kyle Velez CNP    Patient Care Team:  Kyle Velez CNP as PCP - General (Nurse Practitioner)     ______________________________________________________________________     Patient's Pharmacy:    [facility] [id number] [address] [address]  Phone: [phone] Fax: [phone]     Patient's Insurance:    Payor: AETNA / Plan: AETNA PPO,ELECT,MANAGED CHOICE / Product Type: PPO/POS/FFS /     ______________________________________________________________________    Assessment:    1. Low back pain    2. Muscle weakness-general       ______________________________________________________________________      PHQ-2 Total Score: 0 (6/17/2016  3:00 PM)     Plan:  Patient Instructions   1. Check UDS today  2. Refilled tramadol      ______________________________________________________________________     Anand Felix is 32 y.o. male who comes in today for:    Chief Complaint   Patient presents with   ? Establish Care     muscle weakness, tremors, refill       Patient Active Problem List   Diagnosis   ? Muscle Weakness Generalized - \"Neuromuscular Condition\" with normal EMG.   ? Tremor   ? Arthralgias In Multiple Sites   ? Muscle Aches, Generalized (Myalgias)   ? Chronic Ethmoidal Sinusitis   ? Hypovitaminosis D - [place]   ? Monoparesis of leg   ? Weakness - without demonstrable CNS or peripheral cause - local neurologist at [facility], and neurologist at [facility], and [facility] consultant.   ? Lumbar pain determined by palpation and/or percussion at L1 " (5/4/16)   ? Somatoform disorder - diagnosis proposed by Dr. Donis, his neurologist.   ? Nasal polyposis - right naris on MRI (2016)     Past Medical History:   Diagnosis Date   ? Bulging lumbar disc    ? Lumbar herniated disc 10/23/2015   ? Lumbar radiculopathy 11/25/2015     Past Surgical History:   Procedure Laterality Date   ? SINUS SURGERY  2014    Dr. Wright     No Known Allergies    Current Outpatient Prescriptions   Medication Sig   ? acetaminophen (TYLENOL) 500 MG tablet Take 1 tablet (500 mg total) by mouth every 6 (six) hours as needed for pain.   ? cholecalciferol, vitamin D3, 2,000 unit cap Take 2,000 Units by mouth daily.   ? ibuprofen (ADVIL,MOTRIN) 800 MG tablet Take 800 mg by mouth 4 (four) times a day.   ? traMADol (ULTRAM) 50 mg tablet Take 1 tid, prn for low back, right thigh and calf pain.     Social History     Social History   ? Marital status: Single     Spouse name: Miguel   ? Number of children: 0   ? Years of education: GED and some collese     Occupational History   ? unemployed      Social History Main Topics   ? Smoking status: Never Smoker   ? Smokeless tobacco: Never Used   ? Alcohol use Yes      Comment: Monthly or less often. 1-2 drinks at a time.   ? Drug use: No   ? Sexual activity: Yes     Partners: Female     Other Topics Concern   ? Not on file     Social History Narrative       ______________________________________________________________________     History of present illness: Anand Felix is a pleasant 32 y.o. male who presents in clinic today, accompanied by his wife, to establish care and for follow up low back pain, right leg pain. These symptoms have been going on since his spinal surgery in October 2015, L5-S1, and has worsened over time.  He has seen two Neurologists since this and no conclusive testing has determined the etiology of his pain.  He continues to have pain at it's worst with Tramadol is 6-7/10 and is felt to be more bearable.  He still gets intermittent  numbness and tingling (sciatica) which worsens with walking/standing/sitting for longer periods, but may occur sooner depending on the surface or chair he is on.  He has previously been to PT, and feels that his exercises were of benefit, but he hit a plateau which he no longer found of benefit.  He still does the exercises and some others as well which are beneficial.  Otherwise, he is feeling well and has no other complaints that he would like to discuss at this visit.  He would also like to refill his tramadol prescription today.    *This is a former patient of Dr. Kyle Hutchins.    Review of systems:   10 point review of systems is negative unless noted in the HPI.  ______________________________________________________________________    Wt Readings from Last 3 Encounters:   05/25/17 157 lb 9.6 oz (71.5 kg)   01/23/17 156 lb 6.4 oz (70.9 kg)   11/23/16 155 lb 9.6 oz (70.6 kg)     BP Readings from Last 3 Encounters:   05/25/17 112/72   01/23/17 130/84   11/23/16 114/80     /72 (Patient Site: Left Arm, Patient Position: Sitting, Cuff Size: Adult Regular)  Pulse 74  Wt 157 lb 9.6 oz (71.5 kg)  BMI 26.23 kg/m2     Physical Exam:  General Appearance: Alert, cooperative, no distress, appears stated age  Head: Normocephalic, without obvious abnormality, atraumatic  Eyes: PERRL, conjunctiva/corneas clear, EOM's intact  Ears: Normal TM's and external ear canals, both ears  Nose: Nares normal, septum midline,mucosa normal, no drainage  Throat: Lips, mucosa, and tongue normal; teeth and gums normal, no erythema, exudate, or thrush  Neck: Supple, symmetrical, trachea midline, no adenopathy;  thyroid: not enlarged, symmetric, no tenderness/mass/nodules  Back: Symmetric, no curvature, ROM decreased with flexion/extension/lateral bending, no CVA tenderness  Lungs: Clear to auscultation bilaterally, respirations unlabored  Heart: Regular rate and rhythm, S1 and S2 normal, no murmur, rub, or gallop  Abdomen: Soft,  non-tender, bowel sounds active all four quadrants,  no masses, no organomegaly  Musculoskeletal: Decreased range of motion R>L. No joint swelling or deformity.   Extremities: Extremities normal, atraumatic, no cyanosis or edema, RLE weakness  Skin: Skin color, texture, turgor normal, no rashes or lesions  Lymph nodes: Cervical & supraclavicular nodes normal  Neurologic: He is alert & oriented x 3.   Psychiatric: He has a normal mood and affect.     Diagnostics:  -UDS pending      Kyle Velez CNP  Internal Medicine  [facility]     Return in about 6 months (around 11/25/2017) for or, as needed.

## 2021-06-26 NOTE — PROGRESS NOTES
Progress Notes by Kyle Velez at 1/2/2018  5:20 PM     Author: Kyle Velez Service: -- Author Type: Nurse Practitioner    Filed: 1/2/2018  7:21 PM Encounter Date: 1/2/2018 Status: Signed    : Kyle Velez              [place] [facility]    Date of Service: 1/2/2018  Primary Provider: Kyle Velez CNP    Patient Care Team:  Kyle Velez CNP as PCP - General (Nurse Practitioner)     ______________________________________________________________________     Patient's Pharmacy:    [facility] [address] [address]  Phone: [phone] Fax: [phone]    [facility] [address] [place] [address]  Phone: [phone] Fax: [phone]     Patient's Insurance:    Payor: [facility] [employer] C[employer]/LABORCARE / Plan: [facility] / Product Type: PPO/POS/FFS /     ______________________________________________________________________    Assessment:    1. Follow up    2. Acute sinusitis    3. Frequent epistaxis       ______________________________________________________________________      PHQ-2 Total Score: 0 (12/27/2017 12:37 PM)  PHQ-9 Total Score: 3 (12/27/2017 12:37 PM)     Plan:  Patient Instructions   1. Imaging orders placed today:  - CT Sinuses Without Contrast  - CTA Chest PE Run; Future  - CTA Chest PE Run    2. Finish current course of Augmentin.        ______________________________________________________________________     Anand Felix is 32 y.o. male who comes in today for:    Chief Complaint   Patient presents with   ? Follow-up     nasal congestion, antibotics helped a little bit it got rid of the thick mucus. But then he startd coughing up blood again and having nose bleeds on saturday. Since staurday he has had really bad congstion again       Patient Active Problem List   Diagnosis   ? Muscle Weakness  "Generalized - \"Neuromuscular Condition\" with normal EMG.   ? Tremor   ? Arthralgias In Multiple Sites   ? Muscle Aches, Generalized (Myalgias)   ? Chronic Ethmoidal Sinusitis   ? Hypovitaminosis D - [place].   ? Monoparesis of leg   ? Weakness - without demonstrable CNS or peripheral cause - local neurologist at [facility], and neurologist at [facility], and [facility] consultant.   ? Lumbar pain determined by palpation and/or percussion at L1 (5/4/16)   ? Somatoform disorder - diagnosis proposed by Dr. Donis, his neurologist.   ? Nasal polyposis - right naris on MRI (2016)   ? Pulmonary embolism   ? Bilateral pulmonary embolism     Current Outpatient Prescriptions   Medication Sig   ? acetaminophen (TYLENOL) 500 MG tablet Take 1 tablet (500 mg total) by mouth every 6 (six) hours as needed for pain.   ? amoxicillin-clavulanate (AUGMENTIN) 875-125 mg per tablet Take 1 tablet by mouth 2 (two) times a day for 10 days.   ? cholecalciferol, vitamin D3, 2,000 unit cap Take 2,000 Units by mouth daily.   ? cyclobenzaprine (FLEXERIL) 5 MG tablet Take 1 tablet (5 mg total) by mouth 2 (two) times a day as needed for muscle spasms.   ? nortriptyline (PAMELOR) 25 MG capsule Take 1 capsule (25 mg total) by mouth at bedtime.   ? oxyCODONE (ROXICODONE) 5 MG immediate release tablet TAKE 1 TABLET BY MOUTH UP TO 4 TIMES DAILY AS NEEDED FOR PAIN   ? rivaroxaban 20 mg Tab Take 1 tablet (20 mg total) by mouth daily.   ? traMADol (ULTRAM) 50 mg tablet Take 1 tab up to four times daily, prn for low back, right thigh and calf pain.     Social History     Social History   ? Marital status: Single     Spouse name: Miguel   ? Number of children: 0   ? Years of education: GED and some collese     Occupational History   ? unemployed      Social History Main Topics   ? Smoking status: Never Smoker   ? Smokeless tobacco: Never Used   ? Alcohol use Yes      Comment: Monthly or less often. 1-2 drinks at a time.   ? Drug " "use: No   ? Sexual activity: Yes     Partners: Female     Other Topics Concern   ? Not on file     Social History Narrative      ______________________________________________________________________     History of present illness: Anand Felix is a pleasant 32 y.o. male who presents in clinic today for follow up acute sinusitis and epistaxis.  He had previously had a \"nasty dark green colored sputum\" that was quite malodorous and purulent.  He also has had frequent and recurrent epistaxis that seemed to start out with possible hemoptysis, but seems to be more situated in the sinuses, right side, as when he blows his nose their is bright red blood gradually changing to lighter color pink.  He has been taking Augmentin since his last visit (day #6 today) and the purulent color, odor have resolved, but still with right sided maxillary sinus pain and mild rhinorrhea from right nostril as well as persisting epistaxis symptoms, mainly in the morning, but fades out throughout the day.  His mucus is now clear.    Review of systems:   On ROS, the patient denies fevers, chills, sweats, wheezing,    Positive for mostly dry cough, but occasionally productive, generalized anterior chest pain, some increased shortness of breath possibly, and symptoms as noted in the HPI.    ______________________________________________________________________    Wt Readings from Last 3 Encounters:   01/02/18 139 lb 12.8 oz (63.4 kg)   01/02/18 138 lb (62.6 kg)   12/27/17 138 lb (62.6 kg)     BP Readings from Last 3 Encounters:   01/02/18 110/70   01/02/18 (!) 127/96   12/27/17 120/80     /70 (Patient Site: Right Arm, Patient Position: Sitting, Cuff Size: Adult Regular)  Pulse 91  Temp 98.2  F (36.8  C) (Oral)   Wt 139 lb 12.8 oz (63.4 kg)  SpO2 98%  BMI 23.26 kg/m2     Physical Exam:  General Appearance: Alert, cooperative, no distress, appears stated age  Head: Normocephalic, without obvious abnormality, atraumatic  Eyes: PERRL, " conjunctiva/corneas clear, EOM's intact  Ears: Normal TM's and external ear canals, both ears  Nose: Nares normal, septum midline,mucosa normal, no drainage  Throat: Lips, mucosa, and tongue normal; teeth and gums normal; no erythema, exudate, or thrush  Neck: Supple, symmetrical, trachea midline, no adenopathy;  thyroid: not enlarged, symmetric, no tenderness/mass/nodules  Back: Symmetric, no curvature, ROM normal, no CVA tenderness  Lungs: Clear to auscultation bilaterally, respirations unlabored  Heart: Regular rate and rhythm, S1 and S2 normal, no murmur, rub, or gallop  Abdomen: Soft, non-tender, bowel sounds active all four quadrants,  no masses, no organomegaly  Musculoskeletal: Normal range of motion. No joint swelling or deformity.   Extremities: Extremities normal, atraumatic, no cyanosis or edema  Skin: Skin color, texture, turgor normal, no rashes or lesions  Lymph nodes: Cervical & supraclavicular nodes normal  Neurologic: He is alert & oriented x 3. He has a normal gait.   Psychiatric: He has a normal mood and affect.       Kyle Velez CNP  Internal Medicine  [facility]     Return for review of your test results..

## 2021-06-26 NOTE — PROGRESS NOTES
Progress Notes by Kyle Velez at 12/27/2017 11:40 AM     Author: Kyle Velez Service: -- Author Type: Nurse Practitioner    Filed: 12/27/2017  2:09 PM Encounter Date: 12/27/2017 Status: Signed    : Kyle Velez              [facility]    Date of Service: 12/27/2017  Primary Provider: Kyle Velez CNP    Patient Care Team:  Kyle Velez CNP as PCP - General (Nurse Practitioner)     ______________________________________________________________________     Patient's Pharmacy:    [facility] [address] [place], [place] [address] [address] [address]  Phone: [phone] Fax: [phone]    [address] [address]  Phone: [phone] Fax: [phone]     Patient's Insurance:    Payor: AETNA / Plan: AETNA PPO,ELECT,MANAGED CHOICE / Product Type: PPO/POS/FFS /     ______________________________________________________________________     Assessment:    1. Cough with hemoptysis    2. Maxillary sinusitis, acute       ______________________________________________________________________    PHQ-2 Total Score: 0 (12/27/2017 12:37 PM)  PHQ-9 Total Score: 3 (12/27/2017 12:37 PM)       Plan:  Patient Instructions   1. Imaging ordered today:  - XR Chest 2 Views    2. Medications prescribed at this visit:  - amoxicillin-clavulanate (AUGMENTIN) 875-125 mg per tablet; Take 1 tablet by mouth 2 (two) times a day for 10 days.  Dispense: 20 tablet; Refill: 0       ______________________________________________________________________     Anand Felix is 32 y.o. male who comes in today for:  Chief Complaint   Patient presents with   ? Cough     x 2 weeks, was coughing up blood, coughing up mucus but not the normal mucus had an odor to it. no fever, no pain anywhere. itchy throat     "  ______________________________________________________________________     Patient Active Problem List   Diagnosis   ? Muscle Weakness Generalized - \"Neuromuscular Condition\" with normal EMG.   ? Tremor   ? Arthralgias In Multiple Sites   ? Muscle Aches, Generalized (Myalgias)   ? Chronic Ethmoidal Sinusitis   ? Hypovitaminosis D - [place]   ? Monoparesis of leg   ? Weakness - without demonstrable CNS or peripheral cause - local neurologist at [facility], and neurologist at [facility], and [facility] consultant.   ? Lumbar pain determined by palpation and/or percussion at L1 (5/4/16)   ? Somatoform disorder - diagnosis proposed by Dr. Donis, his neurologist.   ? Nasal polyposis - right naris on MRI (2016)   ? Pulmonary embolism   ? Bilateral pulmonary embolism      Current Outpatient Prescriptions   Medication Sig   ? acetaminophen (TYLENOL) 500 MG tablet Take 1 tablet (500 mg total) by mouth every 6 (six) hours as needed for pain.   ? cholecalciferol, vitamin D3, 2,000 unit cap Take 2,000 Units by mouth daily.   ? diazePAM (VALIUM) 5 MG tablet Take 1 tablet (5 mg total) by mouth 2 (two) times a day as needed for muscle spasms.   ? oxyCODONE (ROXICODONE) 5 MG immediate release tablet TAKE 1 TABLET BY MOUTH UP TO 4 TIMES DAILY AS NEEDED FOR PAIN   ? rivaroxaban 20 mg Tab Take 1 tablet (20 mg total) by mouth daily.   ? traMADol (ULTRAM) 50 mg tablet Take 1 tab up to four times daily, prn for low back, right thigh and calf pain.   ? amoxicillin-clavulanate (AUGMENTIN) 875-125 mg per tablet Take 1 tablet by mouth 2 (two) times a day for 10 days.      ______________________________________________________________________     History of present illness: Anand Felix is a pleasant 32 y.o. male who presents in clinic today for evaluation recent hemoptysis and productive cough x 2 weeks. He developed productive cough with dark, black, mixed in with a dark green color sputum. He notes that every " winter time he will get a time when he will cough up some blood a few times then it will change to clear and go away on its own over a day or two.  But this time it has persisted a few days and has not recurred since.  He has not had bright red blood.  He also notes a foul odor that makes his breath smell horribly. He has also been using a Navage (nasal lavage electronic machine) which has been of benefit with his sinuses.  He is currently taking an anticoagulant, Xarelto, for PE.  He reports that he has an appointment with [facility] on 1/02/2018.    Review of systems:   On ROS, the patient denies fever, chills, night sweats, chest pain, abdominal pain, N/V, or diarrhea.  Positive for wheezing last night, increased shortness of breath also last night, postnasal drip, and symptoms as noted in the HPI.  ______________________________________________________________________     /80 (Patient Site: Right Arm, Patient Position: Sitting, Cuff Size: Adult Regular)  Pulse 96  Temp 98.6  F (37  C) (Oral)   Wt 138 lb (62.6 kg)  SpO2 98%  BMI 22.27 kg/m2    Exam:  Patient is comfortable, no apparent distress.  Mood good.  Insight good.  Ears - mild left TM and canal erythema; TM and canal on right are normal.  Nose exam shows no rhinitis; mild right maxillary sinus pain.  Throat - no erythema, exudate, or thrush; foul odorous breath.  Neck is supple, there is no cervical adenopathy.  No thyromegaly.  Heart regular rate and rhythm.  Lungs are clear to auscultation bilaterally.  Respiratory effort is good.       Kyle Velez, [facility]  Internal Medicine  [facility]

## 2021-06-26 NOTE — PROGRESS NOTES
Progress Notes by Nigel Rae MD at 5/17/2018  3:10 PM     Author: Nigel Rae MD Service: -- Author Type: Physician    Filed: 5/17/2018  4:07 PM Encounter Date: 5/17/2018 Status: Signed    : Nigel Rae MD (Physician)           Click to link to [facility]     [facility] HEART CARE NOTE    Thank you, Dr. Malachi Choudhary MD, for asking the [facility] team to see Mr. Anand Felix to evaluate Consult for tachycardia.      Assessment/Recommendations   Assessment:    1. Sinus tachycardia - likely reactive from his respiratory weakness and other pulmonary issues. His echo from march was reassuring and suggested a normal right atrial pressure. On previous ECGs he had evidence of right heart strain. Today's ECG had a shift in p wave axis as well as QRS axis suggestive of lead placement error.  2. Respiratory muscle weakness - evaluation per pulmonary medicine. Consider neurology referral as this may be due to an underlying neuromuscular degenerative disease.    Plan:  1. Start atenolol 25 mg daily - if pt has any worsening of his symptoms after starting this, would discontinue (as the tachycardia is likely compensatory and not a primary cardiac arrhythmia).  2. 24 hour holter monitor to assess heart rate variability  3. Repeat ECG when hooking up holter monitor to assess p wave morphology.         History of Present Illness   Mr. Anand Felix is a 33 y.o. male with a significant past history of bilateral pulmonary emboli and chronic pain syndrome treated with chronic OxyContin who presents for evaluation of chest pain. His pulmonary emboli were identified in October 2017. An echocardiogram was performed on 3/16/18 and demonstrated normal RV size and function. V/Q scan from 3/19 demonstrated no evidence of chronic thromboembolic pulmonary disease. He recently saw pulmonary medicine (Dr. 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     Mr. Felix is noted some shortness of breath with exertion and some faster heart rates since a back surgery about 2 years ago.  This markedly became worse last October after he developed bilateral pulmonary emboli.  He was treated with Xarelto 20 mg daily which he continues to take with improvement in ventilation perfusion imaging.  He currently has persistent severe shortness of breath with minimal exertion and associated tachycardia that is noted at rest and with minimal activity.  He feels palpitations with walking less than half block at a very slow pace. He has bilateral foot drop and wears braces on his ankles.    Other than noted above, Mr. Felix denies any chest pain/pressure/tightness, shortness of breath at rest or with exertion, light headedness/dizziness, pre-syncope, syncope, lower extremity swelling, palpitations, paroxysmal nocturnal dyspnea (PND), or orthopnea.     Cardiac Problems and Cardiac Diagnostics   Cardiac Problem List:  1. Sinus tachycardia  2. Exercise intolerance - likely due to respiratory muscle weakness.    Most Recent Cardiac testing:  ECG dated 5/17/18 (personaly reviewed and interpreted): sinus tachycardia, possibly EAT, but there is a change in QRS and p wave axis, particularly in lead I to suggest a reversal in limb leads.    ECHO (report reviewed):   Echo results:   Results for orders placed during the hospital encounter of 03/16/18   Echo Complete [ECH10] 03/16/2018    Narrative   Normal left ventricular size and systolic function.    Left ventricle ejection fraction is normal. The calculated left   ventricular ejection fraction is 63%.    Normal right ventricular size and systolic function.    No hemodynamically significant valvular heart abnormalities.    No previous study for comparison.             Medications  Allergies   Current Outpatient Prescriptions   Medication Sig Dispense Refill   ? acetaminophen (TYLENOL) 500 MG tablet Take 1 tablet (500 mg total)  by mouth every 6 (six) hours as needed for pain.  0   ? cholecalciferol, vitamin D3, 2,000 unit cap Take 2,000 Units by mouth daily.     ? cyclobenzaprine (FLEXERIL) 10 MG tablet Take 1 tablet (10 mg total) by mouth 3 (three) times a day as needed for muscle spasms. 60 tablet 0   ? fluticasone (FLONASE) 50 mcg/actuation nasal spray 2 sprays into each nostril daily. 16 g 6   ? nortriptyline (PAMELOR) 50 MG capsule TAKE 1 CAPSULE (50 MG TOTAL) BY MOUTH AT BEDTIME. 30 capsule 0   ? omeprazole (PRILOSEC) 20 MG capsule Take 1 capsule (20 mg total) by mouth daily. 90 capsule 1   ? oxyCODONE (OXYCONTIN) 10 mg 12 hr tablet Take 1 tablet (10 mg total) by mouth 3 (three) times a day. 90 each 0   ? oxyCODONE (ROXICODONE) 5 MG immediate release tablet TAKE 1 TABLET BY MOUTH oneTIME DAILY AS NEEDED FOR PAIN 30 tablet 0   ? rivaroxaban 20 mg Tab Take 1 tablet (20 mg total) by mouth daily. 30 tablet 5     No current facility-administered medications for this visit.       No Known Allergies     Physical Examination Review of Systems   There were no vitals filed for this visit.  There is no height or weight on file to calculate BMI.  Wt Readings from Last 3 Encounters:   05/09/18 139 lb (63 kg)   05/08/18 139 lb (63 kg)   03/20/18 142 lb (64.4 kg)       General Appearance:   Pleasant man, no distress, normal body habitus   ENT/Mouth: membranes moist, no apparent gingival bleeding.      EYES:  no scleral icterus, normal conjunctivae   Neck: no carotid bruits. No anterior cervical lymphadenopaty   Respiratory:   lungs are clear to auscultation. Breathing is shallow and borderline fast at baseline. Pt struggles to take deep breaths.    Cardiovascular:   Regular rhythm, tachycardic rate. Normal first and second heart sounds with no murmurs, rubs, or gallops; the carotid, radial and posterior tibial pulses are intact, Jugular venous pressure normal, no edema bilaterally    Abdomen/GI:  no organomegaly, masses, bruits, or tenderness;  "bowel sounds are present   Extremities: no cyanosis or clubbing   Skin: no xanthelasma, warm.    Heme/lymph/ Immunology No apparent bleeding noted.   Neurologic: Alert and oriented. Bilateral foot drop. Wearing braces on both ankles. Ambulates with considerable difficulty.     Psychiatric: Pleasant, calm, appropriate affect.    A complete 10 system review of systems was performed and is negative except as mentioned in the HPI or below:  General: Night Sweats  Eyes: WNL  Ears/Nose/Throat: Nosebleeds  Lungs: Cough, Shortness of Breath, Snoring, Wheezing  Heart: Chest Pain, Arm Pain, Shortness of Breath with activity  Stomach: Heartburn  Bladder: WNL  Muscle/Joints: Joint Pain, Muscle Weakness, Muscle Pain  Skin: WNL  Nervous System: Daytime Sleepiness, Loss of Balance  Mental Health: Depression, Anxiety     Blood: Easy Bleeding, Easy Bruising       Past History   Past Medical History:   Past Medical History:   Diagnosis Date   ? Bulging lumbar disc    ? Lumbar herniated disc 10/23/2015   ? Lumbar radiculopathy 11/25/2015       Past Surgical History:   Past Surgical History:   Procedure Laterality Date   ? MINIMALLY INVASIVE MICRODISCECTOMY LUMBAR SPINE Right 10/2015    L5-S1; Dr. Bettie Hernandez   ? SINUS SURGERY  2014    Dr. Wright       Family History:   Family History   Problem Relation Age of Onset   ? Depression Mother    ? Hyperlipidemia Mother    ? Other Mother      \"Ulcers\" - Aches and pains throughout her body.   ? Cancer Father    ? No Medical Problems Sister    ? No Medical Problems Brother    ? No Medical Problems Sister    ? No Medical Problems Sister    ? No Medical Problems Sister    ? No Medical Problems Brother    ? Thyroid disease Maternal Grandmother    ? Thyroid disease Cousin        Social History:   Social History     Social History   ? Marital status: Single     Spouse name: Miguel   ? Number of children: 0   ? Years of education: GED and some collese     Occupational History   ? unemployed  "     Social History Main Topics   ? Smoking status: Never Smoker   ? Smokeless tobacco: Never Used   ? Alcohol use Yes      Comment: Monthly or less often. 1-2 drinks at a time.   ? Drug use: No   ? Sexual activity: Yes     Partners: Female     Other Topics Concern   ? Not on file     Social History Narrative              Lab Results    Chemistry/lipid CBC Cardiac Enzymes/BNP/TSH/INR   Lab Results   Component Value Date    CHOL 234 (H) 05/25/2017    HDL 53 05/25/2017    LDLCALC 137 (H) 05/25/2017    TRIG 222 (H) 05/25/2017    CREATININE 0.85 01/31/2018    BUN 13 01/31/2018    K 4.0 01/31/2018     01/31/2018     01/31/2018    CO2 26 01/31/2018    Lab Results   Component Value Date    WBC 8.7 01/31/2018    HGB 14.6 01/31/2018    HCT 43.9 01/31/2018    MCV 84 01/31/2018     01/31/2018    Lab Results   Component Value Date    CKTOTAL 63 10/15/2017    TROPONINI <0.01 01/31/2018    BNP 31 10/17/2017    TSH 3.10 01/12/2018    INR 2.24 (H) 01/31/2018          Nigel Rae MD  Non-invasive Cardiology  [facility]

## 2021-06-26 NOTE — PROGRESS NOTES
Progress Notes by Genia Juarez PTA at 7/12/2018  1:30 PM     Author: Genia Juarez PTA Service: -- Author Type: Physical Therapist Assistant    Filed: 7/12/2018  2:29 PM Encounter Date: 7/12/2018 Status: Attested    : Genia Juarez PTA (Physical Therapist Assistant) Cosigner: Beatriz Medina PT at 7/12/2018  2:57 PM    Attestation signed by Beatriz Medina PT at 7/12/2018  2:57 PM    Physical Therapist observed treatment and reviewed patient's subjective and objective progress, progress towards goals and plan of care with the PTA.  It is appropriate to continue per Plan of Care.   Beatriz Medina PT, DPT                    [facility] Daily Progress     Patient Name: Anand Felix  Date: 7/12/2018  Visit #: 11  PTA visit #:  6  Referral Diagnosis: Muscle weakness (generalized)  Referring provider: Ham Churchill MD  Visit Diagnosis:     ICD-10-CM    1. Impaired functional mobility, balance, gait, and endurance Z74.09    2. Bilateral pulmonary embolism (H) I26.99    3. 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 Muscle weakness (generalized) M62.81    5. Monoparesis of leg (H) G83.10      Anand Felix is a 33 y.o. male who presents to therapy today with chief complaints of muscle weakness and impaired gait and balance. Onset date of sx was October 2015.  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  Pain symptoms are aching, burning, numbness, sharp, tingling and weakness.  Functional impairments include walking, dressing, bathing, sitting for long periods, and standing for long periods.  Pt demo's signs and sx consistent with impaired gait and balance secondary to muscle weakness    Assessment:   HEP/POC compliance is  good .  The patient has been compliant with HEP and treadmill walking daily, but is having difficulty walking at .8mph or higher at home  He was able to increase SAO2 levels significantly from 91%-98% with deep breathing exercises in clinic today  R LE remains very  rigid  during PT unless patient becomes distracted. Rigidity appears more pronounced when patient is challenged with balance or walking activities  .  Goal Status:  Pt. will be independent with home exercise program in : 6 weeks Progressing  Pt will: be able to walk 10 minutes with spO2 levels 95%+; in 8 weeks Progressing  Pt will: be able to maneuver 5 steps to get into his home without difficulty; in 8 weeks remains challenging    Plan / Patient Education:     Continue with initial plan of care.  Progress with home program as tolerated.  Plan for next visit: Review and progress activity as able. Continue TM walking in clinic, deep breathing  And exercise   Subjective:     Pain Ratin  The patient reports that he has been able to walk on the treadmill at .6mph-.7mph  but has had difficulty increasing speed to .8 mph   He is gradually increasing reps with HEP and will continue to trial increased speed on TM    Objective:   Initial : SAO2 89%, increased to 91% in 1 minute  Initial: HR: 77 BPM    Ending: SAO2 98%  Ending HR: 113BPM           Pt ambulates into clinic with a Hurricane used on L and and stiff/rigid R LE    See flowsheet for date performed:  Exercises:  Exercise #1: Quad sets  Comment #1: 10-15 at home  Exercise #2: Hamstring isometrics  Comment #2: 10-15 at home  Exercise #3: Bent knee fallouts  Comment #3: L1 HEP unilateral pull progressed to L3 band from L2 band, partial pulls x 5 bilateral pulls  Exercise #4: Pre-bridge muscle contractions (TA/glute/hip add)  Comment #4: mini lift (2 inch) x 10, added 1-2' hold HEP  Exercise #5: Nustep seat 5 arms 8 L5 x 8', steps kept at 30-35/minute  Comment #5: Not in clinic today  Exercise #6: Ab sets/marching  Comment #6: HEP  Exercise #7: Pilates arch with L1 band , pull to knees  Comment #7: L1 HEP  Exercise #8: Stairs  Comment #8: x 8 with 1 rail ( on R) and cane, vc for foot flat, not landing just on R heeel, and to keep feet fully on steps, not  "today  Exercise #9: seated on bed \"scoot\" 12 reps each direction ,  hinged slightly forward to WB through LE's  Comment #9: Not in clinic, reviewed, pt performing x 10  to each side  Exercise #10: standing mini squats HEP, 15 reps at home  Comment #10: standing hip abd HEP, 15 reps at home  Exercise #11: STS  Comment #11: x 5 with min-maxA; tried to distract patient by having him answer questions, then count backwards by 7 from 100 during activity but was still extremely challenging  Exercise #12: 4\" step ups with 1 foot   Comment #12: Not in clinic  Exercise #13: discussed a walking program for home, ptusing a treadmill x 15' at .6-7 mph, to increase to 7-8 mph as able  Comment #13: Discussed walking out of doors, pt performs 5'-10'    Appt time: 1:30-2:02pm in clinic    Treatment Today     TREATMENT MINUTES COMMENTS   Evaluation     Self-care/ Home management 12 Instructed patient in deep breathing techniques, supine and seated , for stress relief and to address his concerns regarding strengthening of his diaphragm. Monitored and reviewed with patient how SAO2 and HR levels have improved with activity and deep breathing   Manual therapy     Neuromuscular Re-education Not today Standing NBOS x 60 seconds; patient able to balance better when distracted with conversation  Standing modified tandem x 60 seconds bilaterally; patient able to balance better when distracted with conversation   Therapeutic Activity     Therapeutic Exercises 20 Reviewed HEP, progression of TM gait . Patient performed Nustep x 6' at L5 and TM  X 8',   .6mph-.8mph (pt self set). He was able to complete .8 mph x 1 minute, although R LE became more rigid and patient leaned laterally to the left quite significantly.   Gait training Not today Practiced walking  throughout the clinic with focus on  increased R knee bend and weight bearing. Added deep breathing for anxiety. TM gait practiced x 8' with focus on increased R knee flexion and improved " WB on R LE. TM .3mph progressed by pt gradually to .8mph. When distracted gait pattern improved. When pt decreased R knee bend and WB and pointed this out to pt, he became visibly more anxious.    Modality__________________                Total 32    Blank areas are intentional and mean the treatment did not include these items.       Genia Juarez, PTA,CLT  7/12/2018

## 2021-06-26 NOTE — PROGRESS NOTES
"6/22/2021    Start time: 1:03 PM    Stop Time: 1:24 PM   Session # 20    Anand Felix is a 36 y.o. male who is being evaluated via a billable video visit.    The patient has been notified of the following:    \"This video visit will be conducted via a call between you and your provider. We have found that certain health care needs can be provided without the need for an in-person visit. This service lets us provide the care you need with a video conversation\".     \"Video visits are billed at different rates depending on your insurance coverage. Please reach out to your insurance provider with any questions\".     \"If during the course of the call the provider feels a video visit is not appropriate, you will not be charged for this service\".     Patient has been given verbal consent to a video visit: Yes    Patient would like the video invitation sent by: My Chart    Will anyone else be joining your video visit?: No    Video visit details    Type of service: Video visit    Originating Location (pt. Location): [facility]    Distant Location (provider location): [facility]    Platform used for Video Visit: ArcSight    Chief Complaint:    Major depression, severe   Generalized Anxiety Disorder  Chronic pain syndrome    Functional Impairment:   Personal: 4  Family: 4  Work: 4  Social:4    Clinical assessment of mental status: Anand Felix presented on time today.   He was open and cooperative. His memory was in tact .  His  speech was appropriate.  Language was appropriate.  Concentration and focus is on task. Psychosis is not noted or reported. He reports his mood is euthymic .  Affect is congruent with speech.  Fund of knowledge is adequate. Insight is adequate for therapy.     Suicidal/Homicidal Ideation present: None Reported This Session.     Patient's impression of their current status: Patient reports some difficulty recently as his wife has had surgery. He states there were unexpected complications and " "that he is working on coping with his concerns about this. He states his family has been supportive and he has been with his wife daily at the hospital since last week.     Therapist impression of patients current state: Patient processed thoughts and feelings about his wife's recent surgeries. He reports concerns for her wellbeing and has been with her daily at the hospital since last week. He was at the hospital today during the session. There were some issues with the video technology, therefore, the visit was shorter today. However, he was able to discuss his concerns and feelings at this time during the session. He states he has been sleeping better, due to feeling \"exhausted\" by the end of the day. He seems to have good support from his family during this challenging time. Despite his concerns, he seems to have a positive attitude and seems hopeful for the future. Patient's next visit is 7/8/21. Informed him that if he would like a visit prior to that time, he can call the scheduling department to schedule, if needed.     Type of psychotherapeutic technique provided: Client centered, CBT and Mindfulness    Progress toward short term goals: Patient is working on increasing coping skills to manage symptoms related to mental health and medical concerns.     Review of long term goals: Not done at today's visit. Last review: 4/13/2021    Diagnosis: Major depression, severe   Generalized Anxiety Disorder  Chronic pain syndrome    Plan and Follow up:  Follow pain center plan of care and overall medical plan. Schedule and attend psychotherapy every 1-2 weeks to further address mental health and chronic pain concerns. Patient understands that sessions are by telephone or video at this time, due to COVID-19 guidelines. He is aware that there are a limited number of in-person sessions at this time, but he prefers video due to transportation barriers. Practice meditations/coping resources as discussed. Continue to engage " in hobbies and go outside daily, as appropriate. Continue to discuss future planning. Continue to follow up on personal goal to reach out to spiritual leader at [employer]. Continue to go over vulnerabilities exercise in a future session.       Discharge Criteria/Planning: Patient will continue with follow-up until therapy can be discontinued without return of signs and symptoms.    Alize Livingston 6/22/2021

## 2021-06-26 NOTE — PROGRESS NOTES
Progress Notes by Genia Juarez PTA at 9/6/2018 12:00 PM     Author: Genia Juarez PTA Service: -- Author Type: Physical Therapist Assistant    Filed: 9/6/2018  2:54 PM Encounter Date: 9/6/2018 Status: Attested    : Genia Juarez PTA (Physical Therapist Assistant) Cosigner: Beatriz Medina PT at 9/6/2018  3:38 PM    Attestation signed by Beatriz Medina PT at 9/6/2018  3:38 PM    Physical Therapist observed treatment and reviewed patient's subjective and objective progress, progress towards goals and plan of care with the PTA.  It is appropriate to continue per Plan of Care.   Beatriz Medina PT, DPT                    [facility] Daily Progress     Patient Name: Anand Felix  Date: 9/6/2018  Visit #: 6/8 + 12/12 ([facility] auth thru 10/15/18)  PTA visit #:  8  Referral Diagnosis: Muscle weakness (generalized)  Referring provider: Ham Churchill MD  Visit Diagnosis:     ICD-10-CM    1. Impaired functional mobility, balance, gait, and endurance Z74.09    2. Bilateral pulmonary embolism (H) I26.99    3. Muscle weakness (generalized) M62.81    4. 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 Monoparesis of leg (H) G83.10      Anand Felix is a 33 y.o. male who presents to therapy today with chief complaints of muscle weakness and impaired gait and balance. Onset date of sx was October 2015.  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  Pain symptoms are aching, burning, numbness, sharp, tingling and weakness.  Functional impairments include walking, dressing, bathing, sitting for long periods, and standing for long periods.  Pt demo's signs and sx consistent with impaired gait and balance secondary to muscle weakness    Assessment:   HEP/POC compliance is  good .  The patient is compliant with his HEP.  His functional movements are not consistent with the shakiness and shortness of breath he experiences when performing exercises. He did start to lose balance at end of side stepping  "exercise, while he was holding on to the plinth, but was able to remain upright.   Safety was dicussed for home activities.  He is appropriate to continue with PT services at this time for functional strengthening.    Goal Status:  Pt. will be independent with home exercise program in : 6 weeks Progressing toward  Pt will: be able to walk 10 minutes with spO2 levels 95%+; in 8 weeks Progressing toward  Pt will: be able to maneuver 5 steps to get into his home without difficulty; Ongoing    Plan / Patient Education:     Continue with initial plan of care.  Progress with home program as tolerated.     Next session: Continue with TM training.  Progress functional strengthening as able.    Subjective:     Pain Ratin  Using home TM x 15' bouts, walking at .8 x 2' as able    Objective:     Pt ambulates into clinic with a Hurricane used on L and and stiff/rigid R LE, but slightly faster pace, decreased R foot drag      See flowsheet for date performed:  Exercises:  Exercise #1: Quad sets  Comment #1: 10-15 at home  Exercise #2: Hamstring isometrics  Comment #2: 10-15 at home  Exercise #3: Bent knee fallouts  Comment #3: L1 HEP unilateral pull progressed to L3 band from L2 band, partial pulls HEP  Exercise #4: Pre-bridge muscle contractions (TA/glute/hip add)  Comment #4: Bridging x 10; patient only able to lift 1-2\" off the ground  Exercise #5: Nustep seat 5 arms 8 L6 x 5'  Comment #5: Not in clinic today  Exercise #6: Ab sets/marching  Comment #6: x 10 bilaterally; cues to pull abs in  Exercise #7: Pilates arch with L1 band , pull to knees  Comment #7: L1 HEP  Exercise #8: Stairs  Comment #8: Not in clinic  Exercise #9: seated on bed \"scoot\" changed to L2 band around low thighs, standing side stepping, holding on to plinth (kitchen counter at home)  Comment #9: Pt completed 16' sidestepping to L, 14 feet sidestepping to R, after last step, pt became shakey and almost fell. Pt says he wpould like to try at home for " "fewer steps. Safety discussed  Exercise #10: standing mini squats HEP, 15 reps at home  Comment #10: standing hip abd HEP, 15 reps at home  Exercise #11: STS  Comment #11: x 10 today with 60% VC to hinge forward to use legs more completely, 10 was max effort  Exercise #12: Tap ups with alternating feet  Comment #12: x 4 B, unable to lift R foot up beyond 3\"  Exercise #13: discussed a walking program for home, ptusing a treadmill x 15' at .6-7 mph, to increase to 7-8 mph as able  Comment #13: Today performed TM x 5', .7 mph x 4', .8 mph x 1'  Exercise #14: Hamstring stretch  Comment #14: HEP  Exercise #15: Gastroc/soleus stretch  Comment #15: reviewed  Exercise #16: Piriformis stretch performed today  Comment #16: 30 seconds x2 B  Exercise #17: LTR performed today  Comment #17: reviewed  Exercise #18: KTC stretch, performed today  Comment #18: pt to hold longer than 5\", perform fewer reps      Ending SAO2 94%,  BPM    Treatment Today     TREATMENT MINUTES COMMENTS   Evaluation     Self-care/ Home management     Manual therapy     Neuromuscular Re-education     Therapeutic Activity     Therapeutic Exercises 30 See flowsheet: focus today on upgrading exercises as able  Pt to hold stretches longer than 5\", up to 30\" with fewer reps, changed seated side scoot to standing side step (safety discussed). Pt performed 10 STS (max effort) with cues to hinge forward to use LE's   Gait training     Modality__________________                Total 30    Blank areas are intentional and mean the treatment did not include these items.       Genia Juarez, PTA,CLT9/6/2018       "

## 2021-06-26 NOTE — PROGRESS NOTES
Progress Notes by Nigel Rae MD at 8/27/2018 10:10 AM     Author: Nigel Rae MD Service: -- Author Type: Physician    Filed: 8/27/2018 10:32 AM Encounter Date: 8/27/2018 Status: Signed    : Nigel Rae MD (Physician)           Click to link to [facility] HEART CARE NOTE    Thank you, Dr. Malachi Choudhary MD, for asking the [facility] Heart Care team to see Mr. Anand Felix to evaluate Follow-up tachycardia..      Assessment/Recommendations   Assessment:    1. Sinus tachycardia -reactive due to a noncardiac issue.    2. Subjective muscle weakness without demonstrable CNS or peripheral cause. Has undergone neurological evaluation.  3. History of pulmonary embolism - pt reports he needs INR drawn today. His anticoagulation is managed by Dr. Choudhary. I will place the order for the INR and route to Dr. Choudhary to address warfarin dosing.    Plan:  1. No further cardiac evaluation or change in management is necessary.  He can continue atenolol as long as he feels this is helping.  This could also potentially be discontinued as a trial to see if he feels any different.  2. Follow up as needed.           History of Present Illness   Mr. Anand Felix is a 33 y.o. male with a significant past history of exertional intolerance, sinus tachycardia, generalized anxiety disorder who presents for follow-up of his sinus tachycardia.  I last saw the patient about 3 months ago on initial consult for his persistent tachycardia.  We evaluated with a Holter monitor that was essentially normal.  His average heart rate during that monitoring period was 92 bpm.  He had several symptom events that did not correlate with any arrhythmias.     He continues to report difficulties taking deep breaths. He also has some pleuritic pain in his left lateral chest wall and right lateral chest wall. Worse with deep breathing and cough. His palpitations have improved with atenolol. Overall he feels a  little better today than he did 3 months ago, but many of his prior symptoms remain.    Other than noted above, Mr. Felix denies any chest pain/pressure/tightness, shortness of breath at rest or with exertion, light headedness/dizziness, pre-syncope, syncope, lower extremity swelling, palpitations, paroxysmal nocturnal dyspnea (PND), or orthopnea.     Cardiac Problems and Cardiac Diagnostics     Most Recent Cardiac testing:    ECHO (report reviewed):   Echo results:   Results for orders placed during the hospital encounter of 03/16/18   Echo Complete [ECH10] 03/16/2018    Narrative   Normal left ventricular size and systolic function.    Left ventricle ejection fraction is normal. The calculated left   ventricular ejection fraction is 63%.    Normal right ventricular size and systolic function.    No hemodynamically significant valvular heart abnormalities.    No previous study for comparison.        Holter monitor 5/22/18  IMPRESSION:  1.  A 24-hour Holter monitor was applied 05/22/2018 with date of interpretation  05/25/2018.  2.  Sinus rhythm is present with normal electrocardiographic intervals.  3.  Average sinus rate is 92 beats per minute, ranges between 61 to 135 beats per  minute.  4.  No paroxysmal bradycardia, tachycardia, or high-grade heart block, no pauses.  5.  No ventricular ectopy.    6.  One premature atrial contraction.  7.  Episodes of shortness of breath and rapid heart rate noted on several occasions.   During these times, the patient is in sinus rhythm with heart rates about 110 beats  per minute episode of dizziness does not correlate to arrhythmia.     DISCUSSION:  Normal Holter monitor.  Various symptoms do not correlate to  arrhythmias.         Medications  Allergies   Current Outpatient Prescriptions   Medication Sig Dispense Refill   ? acetaminophen (TYLENOL) 500 MG tablet Take 1 tablet (500 mg total) by mouth every 6 (six) hours as needed for pain.  0   ? atenolol (TENORMIN) 25 MG tablet  Take 1 tablet (25 mg total) by mouth daily. 30 tablet 5   ? cefuroxime (CEFTIN) 250 MG tablet Take 1 tablet (250 mg total) by mouth 2 (two) times a day for 14 days. 28 tablet 0   ? cholecalciferol, vitamin D3, 2,000 unit cap Take 2,000 Units by mouth daily.     ? cyclobenzaprine (FLEXERIL) 10 MG tablet Take 1 tablet (10 mg total) by mouth 3 (three) times a day as needed for muscle spasms. 60 tablet 1   ? fluticasone (FLONASE) 50 mcg/actuation nasal spray 2 sprays into each nostril daily. 16 g 6   ? morphine (MS CONTIN) 15 MG 12 hr tablet Take 1 tablet (15 mg total) by mouth 3 (three) times a day. 90 tablet 0   ? morphine (MSIR) 15 MG tablet Take 1 tablet (15 mg total) by mouth 3 (three) times a week. 12 tablet 0   ? nortriptyline (PAMELOR) 50 MG capsule Take 2 capsules (100 mg total) by mouth at bedtime. 30 capsule 1   ? omeprazole (PRILOSEC) 20 MG capsule TAKE 1 CAPSULE (20 MG TOTAL) BY MOUTH DAILY. 90 capsule 3   ? warfarin (COUMADIN) 5 MG tablet Take 5 mg by mouth daily. Adjust dose based on INR results as directed. 60 tablet 5     No current facility-administered medications for this visit.       No Known Allergies     Physical Examination Review of Systems   Vitals:    08/27/18 0955   BP: 120/86   Pulse: (!) 108   Resp: 16     Body mass index is 25.46 kg/(m^2).  Wt Readings from Last 3 Encounters:   08/27/18 153 lb (69.4 kg)   08/02/18 156 lb (70.8 kg)   07/31/18 156 lb (70.8 kg)       General Appearance:   Pleasant male, appears stated age. no acute distress, normal body habitus   ENT/Mouth: membranes moist, no apparent gingival bleeding.      EYES:  no scleral icterus, normal conjunctivae   Neck: no carotid bruits. No anterior cervical lymphadenopaty   Respiratory:   lungs are clear to auscultation, no rales or wheezing, equal chest wall expansion. Appears to have difficulty with deep breathing.   Cardiovascular:   Regular rhythm, tachycardic rate. Normal first and second heart sounds with no murmurs, rubs,  "or gallops; the carotid, radial and posterior tibial pulses are intact, Jugular venous pressure normal, no edema bilaterally    Abdomen/GI:  no organomegaly, masses, bruits, or tenderness; bowel sounds are present   Extremities: no cyanosis or clubbing   Skin: no xanthelasma, warm.    Heme/lymph/ Immunology No apparent bleeding noted.   Neurologic: Alert and oriented. Ambulates with cane. no tremors     Psychiatric: Pleasant, calm, appropriate affect.    A complete 10 system review of systems was performed and is negative except as mentioned in the HPI or below:  General: Night Sweats, Weight Gain  Eyes: WNL  Ears/Nose/Throat: WNL  Lungs: Cough, Shortness of Breath, Snoring, Wheezing  Heart: Chest Pain, Arm Pain, Shortness of Breath with activity  Stomach: Heartburn  Bladder: WNL  Muscle/Joints: Joint Pain, Muscle Weakness, Muscle Pain  Skin: WNL  Nervous System: Falls, Loss of Balance  Mental Health: Anxiety, Depression     Blood: Easy Bleeding       Past History   Past Medical History:   Past Medical History:   Diagnosis Date   ? Bulging lumbar disc    ? Lumbar herniated disc 10/23/2015   ? Lumbar radiculopathy 11/25/2015       Past Surgical History:   Past Surgical History:   Procedure Laterality Date   ? MINIMALLY INVASIVE MICRODISCECTOMY LUMBAR SPINE Right 10/2015    L5-S1; Dr. Bettie Hernandez   ? SINUS SURGERY  2014    Dr. Wright       Family History:   Family History   Problem Relation Age of Onset   ? Depression Mother    ? Hyperlipidemia Mother    ? Other Mother      \"Ulcers\" - Aches and pains throughout her body.   ? Cancer Father    ? No Medical Problems Sister    ? No Medical Problems Brother    ? No Medical Problems Sister    ? No Medical Problems Sister    ? No Medical Problems Sister    ? No Medical Problems Brother    ? Thyroid disease Maternal Grandmother    ? Thyroid disease Cousin        Social History:   Social History     Social History   ? Marital status: Single     Spouse name: Miguel   ? Number of " children: 0   ? Years of education: GED and some collese     Occupational History   ? unemployed      Social History Main Topics   ? Smoking status: Never Smoker   ? Smokeless tobacco: Never Used   ? Alcohol use Yes      Comment: Monthly or less often. 1-2 drinks at a time.   ? Drug use: No   ? Sexual activity: Yes     Partners: Female     Other Topics Concern   ? Not on file     Social History Narrative              Lab Results    Chemistry/lipid CBC Cardiac Enzymes/BNP/TSH/INR   Lab Results   Component Value Date    CHOL 234 (H) 05/25/2017    HDL 53 05/25/2017    LDLCALC 137 (H) 05/25/2017    TRIG 222 (H) 05/25/2017    CREATININE 0.89 05/29/2018    BUN 18 05/29/2018    K 4.1 05/29/2018     05/29/2018     05/29/2018    CO2 24 05/29/2018    Lab Results   Component Value Date    WBC 6.2 05/29/2018    HGB 14.9 05/29/2018    HCT 44.5 05/29/2018    MCV 83 05/29/2018     05/29/2018    Lab Results   Component Value Date    CKTOTAL 63 10/15/2017    TROPONINI <0.01 01/31/2018    BNP 31 10/17/2017    TSH 3.10 01/12/2018    INR 2.40 (H) 08/17/2018          Nigel Rae MD  Non-invasive Cardiology  [facility]

## 2021-06-26 NOTE — PROGRESS NOTES
"6/8/2021    Start time: 11:07 AM    Stop Time: 11:57 PM   Session # 19    Anand Felix is a 36 y.o. male who is being evaluated via a billable video visit.    The patient has been notified of the following:    \"This video visit will be conducted via a call between you and your provider. We have found that certain health care needs can be provided without the need for an in-person visit. This service lets us provide the care you need with a video conversation\".     \"Video visits are billed at different rates depending on your insurance coverage. Please reach out to your insurance provider with any questions\".     \"If during the course of the call the provider feels a video visit is not appropriate, you will not be charged for this service\".     Patient has been given verbal consent to a video visit: Yes    Patient would like the video invitation sent by: My Chart    Will anyone else be joining your video visit?: No    Video visit details    Type of service: Video visit    Originating Location (pt. Location): Home    Distant Location (provider location): [facility]    Platform used for Video Visit: Freepath    Chief Complaint:    Major depression, severe   Generalized Anxiety Disorder  Chronic pain syndrome    Functional Impairment:   Personal: 4  Family: 4  Work: 4  Social:4    Clinical assessment of mental status: Anand Felix presented on time today.   He was open and cooperative. His memory was in tact .  His  speech was appropriate.  Language was appropriate.  Concentration and focus is on task. Psychosis is not noted or reported. He reports his mood is depressed .  Affect is congruent with speech.  Fund of knowledge is adequate. Insight is adequate for therapy.     Suicidal/Homicidal Ideation present: None Reported This Session. However, he reports passive SI this week (1 day per week).  He denies plan or means. He is able to identify protective factors.     Patient's impression of their current " status: Patient reports continued difficulty with depression and anxiety symptoms. He does report some improvement with sleep with some medication changes. Patient continues to have some mild side effects from antidepressant medication that are improving. He continues to have concerns and lack of interest in social situations. Patient is working on acceptance of his chronic medical concerns. He is working on identifying future goals. He reports some good news related to his wife's health condition.      Therapist impression of patients current state:  Although anxiety and depression symptoms persist, patient reports that his symptoms are improving. He does report feeling fatigued, but this is improving. Medication side effects continue, however, he reports they are manageable overall.  He states sleep has improved since having some changes to his medications. Encouraged him to check in with his medical provider, if needed. He has been having some sinus issues, but states this has been improving. Patient reports SI has been less/fleeting. He denies plan or means. Discussed triggers and protective factors. He continues to work on identifying future goals and he continues to work on  what he wants versus what others want for him. Discussed cultural/family influences as well and how this has impacted his ability to express feelings. Further, discussed current impact regarding's expectations of him and ways he is coping. Discussed ways to find meaning and connection to future plans. Continued to discuss values clarification. Patient continues to address pain issues and reports symptoms in the morning are most difficult.  Patient continues to work on routine/schedule, as well as physical activity, as he is able. Patient is reaching out to his support system, as he is able. However, he reports he feels indifferent about connecting socially at this time. He processed thoughts and feelings related to his wife's  medical procedure scheduled for next week.  He continues to work on housing needs. He reports staying connected to his spiritual program and discussed ways to further connect that may be meaningful to him. Patient seems to be making more effort towards using Mindfulness techniques in daily living. Gave patient praise for his efforts. Discussed challenges with acceptance and ways to re-evaluate what is important to him in his life now. Presented grounding exercise today and patient seemed to benefit.     Type of psychotherapeutic technique provided: Client centered, CBT and Mindfulness    Progress toward short term goals: Patient is working on increasing coping skills to manage symptoms related to mental health and medical concerns.     Review of long term goals: Not done at today's visit. Last review: 4/13/2021    Diagnosis: Major depression, severe   Generalized Anxiety Disorder  Chronic pain syndrome    Plan and Follow up:  Follow pain center plan of care and overall medical plan. Schedule and attend psychotherapy every 1-2 weeks to further address mental health and chronic pain concerns. Patient understands that sessions are by telephone or video at this time, due to COVID-19 guidelines. He is aware that there are a limited number of in-person sessions at this time, but he prefers video due to transportation barriers. Practice meditations/coping resources as discussed. Continue to engage in hobbies and go outside daily, as appropriate. Continue to discuss future planning. Continue to follow up on personal goal to reach out to spiritual leader at [employer]. Continue to go over vulnerabilities exercise in a future session.       Discharge Criteria/Planning: Patient will continue with follow-up until therapy can be discontinued without return of signs and symptoms.    Alize Livingston 6/8/2021

## 2021-06-28 NOTE — PROGRESS NOTES
"Progress Notes by Halina Donahue CMA at 3/27/2020  1:00 PM     Author: Halina Donahue CMA Service: -- Author Type: Certified Medical Assistant    Filed: 3/27/2020  1:25 PM Date of Service: 3/27/2020  1:00 PM Status: Signed    : Halina Donahue CMA (Certified Medical Assistant)       Anand Felix is a 35 y.o. male who is being evaluated via a billable telephone visit.      The patient has been notified of following:     \"This telephone visit will be conducted via a call between you and your physician/provider. We have found that certain health care needs can be provided without the need for a physical exam.  This service lets us provide the care you need with a short phone conversation.  If a prescription is necessary we can send it directly to your pharmacy.  If lab work is needed we can place an order for that and you can then stop by our lab to have the test done at a later time.    If during the course of the call the physician/provider feels a telephone visit is not appropriate, you will not be charged for this service.\"     Pain score 4  Constant or intermittent ----constant  What does your pain like feel during a flare? Burning, tightness, sharp, weakness  Does the pain interfere with:  Work ----- yes  Walking ------ yes  Sleep ------- yes  Daily activities ------yes  Relationships -------yes  F=8          Halina TORRES CMA  8:04 AM  3/27/2020      Anand Felxi complains of    Chief Complaint   Patient presents with   ? Back Pain   ? Neck Pain   ? Leg Pain       Patient presents to the clinic today for a follow up with Dayna Lundberg CNP regarding leg, back, and neck pain. Patient describes their pain as weakness, sharp, tight, and burning. Her/His function score is 8.    Does the patient use mychart or want AVS mailed? mychart      Halina Donahue CMA           "

## 2021-06-29 ENCOUNTER — RECORDS - HEALTHEAST (OUTPATIENT)
Dept: PALLIATIVE MEDICINE | Facility: OTHER | Age: 36
End: 2021-06-29

## 2021-06-29 NOTE — PROGRESS NOTES
"Progress Notes by Dayna Lundberg CNP at 7/13/2020  2:00 PM     Author: Dayna Lundberg CNP Service: -- Author Type: Nurse Practitioner    Filed: 7/13/2020  2:31 PM Date of Service: 7/13/2020  2:00 PM Status: Signed    : Dayna Lundberg CNP (Nurse Practitioner)       Anand Felix is a 35 y.o. male who is being evaluated via a billable telephone visit.      Start time- 2:16 pm   End time- 2:28 pm 11:50     The patient has been notified of following:     \"This telephone visit will be conducted via a call between you and your physician/provider. We have found that certain health care needs can be provided without the need for a physical exam.  This service lets us provide the care you need with a short phone conversation.  If a prescription is necessary we can send it directly to your pharmacy.  If lab work is needed we can place an order for that and you can then stop by our lab to have the test done at a later time.    If during the course of the call the physician/provider feels a telephone visit is not appropriate, you will not be charged for this service.\"     Anand Felix complains of    Chief Complaint   Patient presents with   ? Pain     multi site       I have reviewed and updated the patient's Past Medical History, Social History, Family History and Medication List as well as the Precharting workup by the [facility].       PAIN CLINIC FOLLOW UP PROGRESS NOTE    CC:  Chief Complaint   Patient presents with   ? Pain     multi site       HPI  Anand Felix is a 35 y.o. male who presents for evaluation   Chief Complaint   Patient presents with   ? Pain     multi site    that is causing continued pain. Specific questions indicated the patient wanted addressed today include: medication management       Major issues:  1. Chronic pain syndrome    2. Myalgia    3. Cervicalgia    4. Arthralgias In Multiple Sites        Pain score---4  Constant  What does your pain feel like: sharp, burning, tight  Does the " pain interfere with:  Work -----no  Walking ------yes  Sleep -------yes  Daily activities ------no  Relationships -------no  F=2          ALLERGIES  Patient has no known allergies.        Previous Medical History  Social History     Substance and Sexual Activity   Alcohol Use Yes    Comment: Monthly or less often. 1-2 drinks at a time.     Social History     Substance and Sexual Activity   Drug Use Yes   ? Types: Marijuana     Social History     Tobacco Use   Smoking Status Never Smoker   Smokeless Tobacco Never Used       Pertinent Pain Medications/interventions:    D/c belbuca at this time, will increase the use of the gabapentin to 300 mg two times a day and continue the use of the medical cannabis and percocet up to 3 per day as needed- 5/18/2020     He currently belbuca 300 mcg two times a day, gabapentin 100 mg three times a day, oxycodone 5 mg two times a day prn and medical cannabis, tizanidine and Norco.      Belbuca- headaches are associated so he stopped taking this. 9/6     Previously taking Belbuca 75 mcg, gabapentin 100 mg three times a day, oxycodone 5 mg up to two times a day as needed, medical cannabis, baclofen and flexeril      RFA did provide him with 80 % relief.      Medical cannabis    Review of Systems:  12 point systems were reviewed with pt as documented on on previous exams. Any new diagnosis or new medication is reviewed today.     Medications    Current Outpatient Medications:   ?  acetaminophen (TYLENOL) 500 MG tablet, Take 1 tablet (500 mg total) by mouth every 6 (six) hours as needed for pain., Disp: , Rfl: 0  ?  cholecalciferol, vitamin D3, 2,000 unit cap, Take 2,000 Units by mouth daily., Disp: , Rfl:   ?  divalproex (DEPAKOTE ER) 500 MG 24 hour tablet, , Disp: , Rfl:   ?  fluticasone propionate (FLONASE) 50 mcg/actuation nasal spray, SPRAY 2 SPRAYS INTO EACH NOSTRIL EVERY DAY, Disp: 16 g, Rfl: 3  ?  NON FORMULARY,    , Disp: , Rfl:   ?  omeprazole (PRILOSEC) 20 MG capsule, TAKE 1  CAPSULE BY MOUTH DAILY, Disp: 90 capsule, Rfl: 3  ?  propranoloL (INDERAL) 80 MG tablet, TAKE 1 TAB BY MOUTH THREE TIMES DAILY, Disp: 270 tablet, Rfl: 3  ?  rivaroxaban (XARELTO) 20 mg tablet, Take 1 tablet (20 mg total) by mouth daily. Start 20 mg daily after the 15 mg daily for 21 days., Disp: 30 tablet, Rfl: 11  ?  rizatriptan (MAXALT-MLT) 10 MG disintegrating tablet, DISSOLVE 1 TABLET ON TONGUE AS NEEDED FOR MIGRAINE. MAY REPEAT IN 2HRS IF NEEDED, Disp: 12 tablet, Rfl: 6  ?  gabapentin (NEURONTIN) 100 MG capsule, Take 100 mg by mouth 3 (three) times a day., Disp: 90 capsule, Rfl: 1  ?  montelukast (SINGULAIR) 10 mg tablet, TAKE 2 TABLETS (20 MG TOTAL) BY MOUTH AT BEDTIME., Disp: 30 tablet, Rfl: 1  ?  [START ON 7/24/2020] oxyCODONE-acetaminophen (PERCOCET/ENDOCET) 7.5-325 mg per tablet, Take 1 tablet by mouth 3 (three) times a day as needed for pain., Disp: 84 tablet, Rfl: 0  ?  [START ON 8/21/2020] oxyCODONE-acetaminophen (PERCOCET/ENDOCET) 7.5-325 mg per tablet, Take 1 tablet by mouth 3 (three) times a day as needed for pain., Disp: 84 tablet, Rfl: 0      Lab:  Last UDS on 1/27/2020 had expected results. Last UDT on file was reviewed.    Imaging:  No new imaging reviewed with patient over the phone, no new orders placed       Recent   Dated 7/13/2020 was reviewed with the patient today.   Paper copy reviewed     Assessment:     Anand Felix is a 35 y.o. male seen in clinic today for   Chief Complaint   Patient presents with   ? Pain     multi site       They are here for follow up and continued medical management of their pain. Today we reviewed the plan of care for their chronic pain and determined that the patient will need a refill of the current prescription.      Due to the Covid 19 precautions all visits are converted to telephone encounters until the government restrictions are lifted and the precautions have been lifted.     New concerns today- he has continued burning sensation down his legs, shoulder  and neck area.     Any new diagnosis since the last visit- none at this time    Any new prescriptions since the last visit- none at this time   Patient denies side effects from the current regimen  Plan of care going forward for ongoing pain control- he notes that the burning could be better controlled. Will increase the use of gabapentin, he is also using medical cannabis with the percocet for pain control, he notes that it is steady at this time. He notes that the function in the day has increased. He would like to go to PT for myofascial release, order is active in the system.   Patients current MME is 34    Plan:   Interventions-    Follow up in 8 weeks     Provide patient with the phone number for [facility] to begin PT, order is in the system and active.     Refill for the percocet- no changes today- refill dates are 7/24-8/21, 8/21-9/18    Continue the medical cannabis at this time     Increase the use of the gabapentin 100 mg to three times a day for the burning pain .     This limited telehealth/televideo encounter is due to the Covid 19,  as required by the governmental agencies at this time due to risk of transmission of the pandemic, therefore testing availability is limited as well as physical exams.      Prescription Drug Management will be continued by the [facility]    Orders placed today  Medications that were ordered today   Requested Prescriptions     Signed Prescriptions Disp Refills   ? oxyCODONE-acetaminophen (PERCOCET/ENDOCET) 7.5-325 mg per tablet 84 tablet 0     Sig: Take 1 tablet by mouth 3 (three) times a day as needed for pain.   ? gabapentin (NEURONTIN) 100 MG capsule 90 capsule 1     Sig: Take 100 mg by mouth 3 (three) times a day.   ? oxyCODONE-acetaminophen (PERCOCET/ENDOCET) 7.5-325 mg per tablet 84 tablet 0     Sig: Take 1 tablet by mouth 3 (three) times a day as needed for pain.     No orders of the defined types were placed in this encounter.        The patient  understand todays plan and has their questions answered in regards to expectations and current treatment plan.     Prevent unexpected access/loss of medication: Keep medication locked. Only carry what you need with you    The plan of care will be adjusted to accommodate the issues discussed, discussing management of their care and follow up that is recommended. 7/13/2020         Dayna Lundbreg CNP  [facility]  [address]  [place] [address]  Ph: [phone]  Fax: [phone]

## 2021-06-29 NOTE — PROGRESS NOTES
"Progress Notes by Kasey Reynolds CMA at 7/13/2020  2:00 PM     Author: Kasey Reynolds CMA Service: -- Author Type: Certified Medical Assistant    Filed: 7/13/2020  2:31 PM Date of Service: 7/13/2020  2:00 PM Status: Signed    : Kasey Reynolds CMA (Certified Medical Assistant)       Anand Felix is a 35 y.o. male who is being evaluated via a billable video visit.      The patient has been notified of following:     \"This video visit will be conducted via a call between you and your physician/provider. We have found that certain health care needs can be provided without the need for an in-person physical exam.  This service lets us provide the care you need with a video conversation.  If a prescription is necessary we can send it directly to your pharmacy.  If lab work is needed we can place an order for that and you can then stop by our lab to have the test done at a later time. Video visits are billed at different rates depending on your insurance coverage. Please reach out to your insurance provider with any questions.\"    Patient has given verbal consent to a Video visit? Yes  How would you like to obtain your AVS? AVS Preference: [place].  Patient would like the video invitation sent by: text to [phone]  Will anyone else be joining your video visit? No  Pain score---4  Constant  What does your pain feel like: sharp, burning, tight  Does the pain interfere with:  Work -----no  Walking ------yes  Sleep -------yes  Daily activities ------no  Relationships -------no  F=2    Kasey Reynolds CMA         "

## 2021-06-29 NOTE — PROGRESS NOTES
"Progress Notes by Tasha Ching CMA at 5/18/2020  2:00 PM     Author: Tasha Ching CMA Service: -- Author Type: Certified Medical Assistant    Filed: 5/18/2020  2:24 PM Date of Service: 5/18/2020  2:00 PM Status: Signed    : Tasha Ching CMA (Certified Medical Assistant)       Pt is being treated today via telephone visit with Dayna Lundberg CNP, for refills and f/u of back and rt leg pain.    Anand Felix is a 35 y.o. male who is being evaluated via a billable telephone visit.      The patient has been notified of following:     \"This telephone visit will be conducted via a call between you and your physician/provider. We have found that certain health care needs can be provided without the need for a physical exam.  This service lets us provide the care you need with a short phone conversation.  If a prescription is necessary we can send it directly to your pharmacy.  If lab work is needed we can place an order for that and you can then stop by our lab to have the test done at a later time.    Telephone visits are billed at different rates depending on your insurance coverage. During this emergency period, for some insurers they may be billed the same as an in-person visit.  Please reach out to your insurance provider with any questions.    If during the course of the call the physician/provider feels a telephone visit is not appropriate, you will not be charged for this service.\"    Patient has given verbal consent to a Telephone visit? Yes    What phone number would you like to be contacted at? [phone]    Patient would like to receive their AVS by AVS Preference: [place].    Pain score 4  Constant   What does your pain like feel during a flare? Sharp, burning  Does the pain interfere with:  Work ----- yes  Walking ------ yes  Sleep ------- yes  Daily activities ------yes  Relationships -------yes  F= 8        Tasha Ching CMA           "

## 2021-06-29 NOTE — PROGRESS NOTES
"Progress Notes by Dayna Lundberg CNP at 5/18/2020  2:00 PM     Author: Dayna Lundberg CNP Service: -- Author Type: Nurse Practitioner    Filed: 5/18/2020  2:24 PM Date of Service: 5/18/2020  2:00 PM Status: Signed    : Dayna Lundberg CNP (Nurse Practitioner)       Anand Felix is a 35 y.o. male who is being evaluated via a billable telephone visit.      Start time- 2:09 pm   End time- 2:20 pm    The patient has been notified of following:     \"This telephone visit will be conducted via a call between you and your physician/provider. We have found that certain health care needs can be provided without the need for a physical exam.  This service lets us provide the care you need with a short phone conversation.  If a prescription is necessary we can send it directly to your pharmacy.  If lab work is needed we can place an order for that and you can then stop by our lab to have the test done at a later time.    If during the course of the call the physician/provider feels a telephone visit is not appropriate, you will not be charged for this service.\"     Anand Felix complains of    Chief Complaint   Patient presents with   ? Follow-up     back and rt leg pain       I have reviewed and updated the patient's Past Medical History, Social History, Family History and Medication List as well as the Precharting workup by the [facility].       PAIN CLINIC FOLLOW UP PROGRESS NOTE    CC:  Chief Complaint   Patient presents with   ? Follow-up     back and rt leg pain       HPI  Anand Felix is a 35 y.o. male who presents for evaluation   Chief Complaint   Patient presents with   ? Follow-up     back and rt leg pain    that is causing continued pain. Specific questions indicated the patient wanted addressed today include: medication management       Major issues:  1. Chronic pain syndrome    2. Myalgia    3. Cervicalgia    4. Arthralgias In Multiple Sites        Pain score 4  Constant   What does your pain like " feel during a flare? Sharp, burning  Does the pain interfere with:  Work ----- yes  Walking ------ yes  Sleep ------- yes  Daily activities ------yes  Relationships -------yes  F= 8            ALLERGIES  Patient has no known allergies.    Previous Medical History  Social History     Substance and Sexual Activity   Alcohol Use Yes    Comment: Monthly or less often. 1-2 drinks at a time.     Social History     Substance and Sexual Activity   Drug Use Yes   ? Types: Marijuana     Social History     Tobacco Use   Smoking Status Never Smoker   Smokeless Tobacco Never Used       Pertinent Pain Medications/interventions:    D/c belbuca at this time, will increase the use of the gabapentin to 300 mg two times a day and continue the use of the medical cannabis and percocet up to 3 per day as needed- 5/18/2020    He currently belbuca 300 mcg two times a day, gabapentin 100 mg three times a day, oxycodone 5 mg two times a day prn and medical cannabis, tizanidine and Norco.      Belbuca- headaches are associated so he stopped taking this. 9/6     Previously taking Belbuca 75 mcg, gabapentin 100 mg three times a day, oxycodone 5 mg up to two times a day as needed, medical cannabis, baclofen and flexeril      RFA did provide him with 80 % relief.      Medical cannabis    Review of Systems:  12 point systems were reviewed with pt as documented on on previous exams. Any new diagnosis or new medication is reviewed today.     Medications    Current Outpatient Medications:   ?  acetaminophen (TYLENOL) 500 MG tablet, Take 1 tablet (500 mg total) by mouth every 6 (six) hours as needed for pain., Disp: , Rfl: 0  ?  cholecalciferol, vitamin D3, 2,000 unit cap, Take 2,000 Units by mouth daily., Disp: , Rfl:   ?  divalproex (DEPAKOTE ER) 500 MG 24 hour tablet, , Disp: , Rfl:   ?  fluticasone propionate (FLONASE) 50 mcg/actuation nasal spray, SPRAY 2 SPRAYS INTO EACH NOSTRIL EVERY DAY, Disp: 16 g, Rfl: 3  ?  [START ON 5/30/2020] gabapentin  (NEURONTIN) 100 MG capsule, Take 300 mg by mouth 2 (two) times a day as needed., Disp: 56 capsule, Rfl: 1  ?  montelukast (SINGULAIR) 10 mg tablet, TAKE 2 TABLETS (20 MG TOTAL) BY MOUTH AT BEDTIME., Disp: 30 tablet, Rfl: 1  ?  NON FORMULARY,    , Disp: , Rfl:   ?  omeprazole (PRILOSEC) 20 MG capsule, TAKE 1 CAPSULE BY MOUTH DAILY, Disp: 90 capsule, Rfl: 3  ?  [START ON 5/20/2020] oxyCODONE-acetaminophen (PERCOCET/ENDOCET) 7.5-325 mg per tablet, Take 1 tablet by mouth 3 (three) times a day as needed for pain., Disp: 84 tablet, Rfl: 0  ?  propranoloL (INDERAL) 80 MG tablet, TAKE 1 TAB BY MOUTH THREE TIMES DAILY, Disp: 270 tablet, Rfl: 3  ?  rivaroxaban (XARELTO) 20 mg tablet, Take 1 tablet (20 mg total) by mouth daily. Start 20 mg daily after the 15 mg daily for 21 days., Disp: 30 tablet, Rfl: 11  ?  rizatriptan (MAXALT-MLT) 10 MG disintegrating tablet, DISSOLVE 1 TABLET ON TONGUE AS NEEDED FOR MIGRAINE. MAY REPEAT IN 2HRS IF NEEDED, Disp: 12 tablet, Rfl: 6    Lab:  Last UDS on 2/2019 had expected results. Last UDT on file was reviewed.    Imaging:  No new imaging reviewed with patient over the phone, no new orders placed       Recent   Dated 5/18/2020 was reviewed with the patient today.   Paper copy reviewed     Assessment:     Anand Felix is a 35 y.o. male seen in clinic today for   Chief Complaint   Patient presents with   ? Follow-up     back and rt leg pain       They are here for follow up and continued medical management of their pain. Today we reviewed the plan of care for their chronic pain and determined that the patient will need a refill of the current prescription.      Due to the Covid 19 precautions all visits are converted to telephone encounters until the government restrictions are lifted and the precautions have been lifted.     New concerns today- the only thing that is bothering him is an increase in burning sensations throughout his body, he feels that it is related to increase in physical  activity mostly in his upper back and shoulder.     Any new diagnosis since the last visit- none     Any new prescriptions since the last visit- none   Patient denies side effects from the current regimen  Plan of care going forward for ongoing pain control- he notes that when his wife massages him it feels better, will order myofascial release for the patient- discussed fibromyalgia type symptoms with patient and education provided. Patient notes that this has been brought up before. Will continue the use of the percocet- refills have been sent to the pharmacy and increase the gabapentin at this time, continue the use of the medical cannabis.   Patients current MME is 10-34    Plan:   Interventions-    Follow up in 8 weeks     Increase in the gabapentin at this time to 300 mg two times a day    Ok to continue the use of the percocet at this time 5/20-6/17    Ok to continue the use of the medical cannabis.     Refer to PT for myofascial release.     This limited telehealth encounter is due to the Covid 19,  as required by the governmental agencies at this time due to risk of transmission of the pandemic, therefore testing availability is limited as well as physical exams.      Prescription Drug Management will be continued by the [facility]    Orders placed today  Medications that were ordered today   Requested Prescriptions     Signed Prescriptions Disp Refills   ? oxyCODONE-acetaminophen (PERCOCET/ENDOCET) 7.5-325 mg per tablet 84 tablet 0     Sig: Take 1 tablet by mouth 3 (three) times a day as needed for pain.   ? gabapentin (NEURONTIN) 100 MG capsule 56 capsule 1     Sig: Take 300 mg by mouth 2 (two) times a day as needed.     Orders Placed This Encounter   Procedures   ? Ambulatory referral to Physical Therapy     Referral Priority:   Routine     Referral Type:   Physical Therapy     Referral Reason:   Evaluation and Treatment     Requested Specialty:   Physical Therapy     Number of Visits  Requested:   1         The patient understand todays plan and has their questions answered in regards to expectations and current treatment plan.     Prevent unexpected access/loss of medication: Keep medication locked. Only carry what you need with you    The plan of care will be adjusted to accommodate the issues discussed, discussing management of their care and follow up that is recommended. 5/18/2020         Dayna Lundberg CNP  [facility]  [address]  [place] [address]  Ph: [phone]  Fax: [phone]

## 2021-07-03 NOTE — ADDENDUM NOTE
Addendum Note by Mary Ellen Minor at 11/30/2018 11:59 PM     Author: Mary Ellen Minor Service: -- Author Type: --    Filed: 12/12/2018  9:43 AM Date of Service: 11/30/2018 11:59 PM Status: Signed    : Mary Ellen Minor    Encounter addended by: Mary Ellen Minor on: 12/12/2018  9:43 AM      Actions taken: Charge Capture section accepted

## 2021-07-03 NOTE — ANESTHESIA PREPROCEDURE EVALUATION
Anesthesia Preprocedure Evaluation by Eddy Ruiz MD at 11/20/2020  8:06 AM     Author: Eddy Ruiz MD Service: -- Author Type: Physician    Filed: 11/20/2020  8:48 AM Date of Service: 11/20/2020  8:06 AM Status: Addendum    : Eddy Ruiz MD (Physician)    Related Notes: Original Note by Eddy Ruiz MD (Physician) filed at 11/20/2020  8:38 AM       Anesthesia Evaluation      Patient summary reviewed   No history of anesthetic complications     Airway   Mallampati: II   Pulmonary - normal exam     ROS comment: H/O PE after back surgery - on xarelto                         Cardiovascular - negative ROS and normal exam  Exercise tolerance: > or = 4 METS  Rhythm: regular  Rate: normal,         Neuro/Psych    (+) neuromuscular disease (lumbar DDD),  depression, chronic pain (on buprenorphine patch)    Endo/Other - negative ROS      Comments: right antrochoanal polyp    GI/Hepatic/Renal    (+) GERD,        Other findings: Was admitted to ICU for generalized weakness after L5-S1 microdiskectomy. Extensive neurological workup was negative. Diagnosed as a functional/somatoform disorder. See [facility] note from 2016 for further details.    Results for TOÑO MOLINA (MRN [id number]) as of 11/20/2020 08:37    11/16/2020 14:26  COVID-19 VIRUS(CORONAVIRUS)PCR: Rpt  COVID-19 VIRUS PCR MRF: Rpt  2019-nCOV: Not Detected  COVID-19 VIRUS SPECIMEN SOURCE: Nasopharyngeal    11/18/2020 13:49  Sodium: 143  Potassium: 4.2  Chloride: 104  CO2: 25  Anion Gap, Calculation: 14  BUN: 19  Creatinine: 1.12  GFR MDRD Af Amer: >60  GFR MDRD Non Af Amer: >60  Calcium: 10.8 (H)  Glucose: 120  WBC: 7.7  RBC: 5.71  Hemoglobin: 16.6  Hematocrit: 50.8  MCV: 89  MCH: 29.1  MCHC: 32.7  RDW: 12.4  Platelets: 325  MPV: 6.8 (L)        Dental - normal exam                              Anesthesia Plan  Planned anesthetic: general endotracheal  GAETT  antiemetics  ASA 2   Induction: intravenous   Anesthetic plan and risks  discussed with: patient  Anesthesia plan special considerations: antiemetics,   Post-op plan: routine recovery

## 2021-07-03 NOTE — ADDENDUM NOTE
Addendum Note by Denise Kwong at 1/8/2021  2:00 PM     Author: Denise Kwong Service: -- Author Type: --    Filed: 1/12/2021  9:54 AM Date of Service: 1/8/2021  2:00 PM Status: Signed    : Denise Kwong    Encounter addended by: Denise Kwong on: 1/12/2021  9:54 AM      Actions taken: Charge Capture section accepted

## 2021-07-03 NOTE — ANESTHESIA PREPROCEDURE EVALUATION
Anesthesia Preprocedure Evaluation by Dario Sheriff MD at 5/17/2019  3:10 PM     Author: Dario Sheriff MD Service: -- Author Type: Physician    Filed: 5/22/2019 10:44 AM Date of Service: 5/17/2019  3:10 PM Status: Addendum    : Dario Sheriff MD (Physician)    Related Notes: Original Note by Francisco Molina MD (Physician) filed at 5/17/2019  3:12 PM       Anesthesia Evaluation      Patient summary reviewed   No history of anesthetic complications     Airway   Mallampati: II  Neck ROM: full   Pulmonary - normal exam     ROS comment: H/O PE - on chronic coumadin                         Cardiovascular - negative ROS and normal exam  Exercise tolerance: > or = 4 METS   Neuro/Psych    (+) neuromuscular disease (lumbar DDD),  depression, chronic pain (on buprenorphine patch)    Endo/Other - negative ROS      GI/Hepatic/Renal    (+) GERD well controlled and intermittent,        Other findings: Was admitted to ICU for generalized weakness after L5-S1 microdiskectomy. Extensive neurological workup was negative. Diagnosed as a functional/somatoform disorder. See [facility] note from 2016 for further details.      Dental    (+) chipped                           Anesthesia Plan  Planned anesthetic: general endotracheal  Versed/fent/prop/prince  Decadron/zofran    May need midazolam postop for anxiety.   ASA 3   Induction: intravenous   Anesthetic plan and risks discussed with: patient and spouse  Anesthesia plan special considerations: antiemetics,   Post-op plan: routine recovery        Chemistry        Component Value Date/Time     05/19/2019 1258    K 4.1 05/19/2019 1258     05/19/2019 1258    CO2 20 (L) 05/19/2019 1258    BUN 13 05/19/2019 1258    CREATININE 0.84 05/19/2019 1258     05/19/2019 1258        Component Value Date/Time    CALCIUM 10.0 05/19/2019 1258    ALKPHOS 70 05/19/2019 1258    AST 24 05/19/2019 1258    ALT 20 05/19/2019 1258    BILITOT 0.5 05/19/2019 1258         Lab Results   Component Value Date    WBC 7.9 05/19/2019    HGB 16.1 05/19/2019    HCT 47.1 05/19/2019    MCV 80 05/19/2019     05/19/2019

## 2021-07-03 NOTE — ADDENDUM NOTE
Addendum Note by Tiffanie Alejandro CMA at 7/12/2019  1:05 PM     Author: Tiffanie Alejandro CMA Service: -- Author Type: Certified Medical Assistant    Filed: 7/15/2019  8:19 AM Date of Service: 7/12/2019  1:05 PM Status: Signed    : Tiffanie Alejandro CMA (Certified Medical Assistant)    Encounter addended by: Tiffanie Alejandro CMA on: 7/15/2019  8:19 AM      Actions taken: Visit Navigator Flowsheet section accepted

## 2021-07-03 NOTE — ADDENDUM NOTE
Addendum Note by Denise Kwong at 5/18/2020  2:00 PM     Author: Denise Kwong Service: -- Author Type: --    Filed: 5/21/2020  7:45 AM Date of Service: 5/18/2020  2:00 PM Status: Signed    : Denise Kwong    Encounter addended by: Denise Kwong on: 5/21/2020  7:45 AM      Actions taken: Charge Capture section accepted

## 2021-07-03 NOTE — ADDENDUM NOTE
Addendum Note by Alize Livingston LPCC at 1/9/2018 11:59 PM     Author: Alize Livingston LPCC Service: -- Author Type: [facility]    Filed: 1/16/2018  4:42 PM Date of Service: 1/9/2018 11:59 PM Status: Signed    : Alize Livingston LPCC ([facility])    Encounter addended by: ROBERT Cope on: 1/16/2018  4:42 PM<BR>     Actions taken: Charge Capture section accepted

## 2021-07-03 NOTE — ADDENDUM NOTE
Addendum Note by Denise Kwong at 7/13/2020  2:00 PM     Author: Denise Kwong Service: -- Author Type: --    Filed: 7/21/2020  5:17 AM Date of Service: 7/13/2020  2:00 PM Status: Signed    : Denise Kwong    Encounter addended by: Denise Kwong on: 7/21/2020  5:17 AM      Actions taken: Charge Capture section accepted

## 2021-07-03 NOTE — ADDENDUM NOTE
Addendum Note by Denise Kwong at 11/4/2020  2:00 PM     Author: Denise Kwong Service: -- Author Type: --    Filed: 11/9/2020  8:04 AM Date of Service: 11/4/2020  2:00 PM Status: Signed    : Denise Kwong    Encounter addended by: Denise Kwong on: 11/9/2020  8:04 AM      Actions taken: Charge Capture section accepted

## 2021-07-03 NOTE — ADDENDUM NOTE
Addendum Note by Alize Livingston LPCC at 1/9/2018 11:59 PM     Author: Alize Livingston LPCC Service: -- Author Type: [facility]    Filed: 1/16/2018  4:04 PM Date of Service: 1/9/2018 11:59 PM Status: Signed    : Alize Livingston LPCC ([facility])    Encounter addended by: ROBERT Cope on: 1/16/2018  4:04 PM<BR>     Actions taken: Sign clinical note

## 2021-07-04 NOTE — TELEPHONE ENCOUNTER
Telephone Encounter by Jolie Mendez RN at 6/29/2021  3:55 PM     Author: Jolie Mendez RN Service: -- Author Type: Registered Nurse    Filed: 6/29/2021  4:03 PM Encounter Date: 6/28/2021 Status: Signed    : Jolie Mendez RN (Registered Nurse)       Medication being requested: Percocet  Last visit date: 3/8/21.  Provider: JOSUE  Next visit date: 7/30/21.  Provider: JOSUE  Expected follow up: 8 weeks  MTM visit ([facility] Center) date: No  UDT date: 1/2020  Agreement date: 1/2020   (Last fill date; name; strength; provider; MME; quantity):  05/24/2021 Oxycodone-Acetaminophn 7.5-325 Qty: 84 for 28 days Cr Mon  Pertinent between visit information about requested medication (telephone, mychart, prior authorization, concerns, comments):   Multiple appointments with [facility]  Script being sent to provider by nurse- dates and quantity:   Defer to provider due to patient being outside of 3 month window  Pharmacy cued: [facility]  Standing orders for withdrawal protocol implemented: NA

## 2021-07-04 NOTE — TELEPHONE ENCOUNTER
Telephone Encounter by María Ndiaye at 6/29/2021  3:05 PM     Author: María Ndiaye Service: -- Author Type: Patient Access    Filed: 6/29/2021  3:05 PM Encounter Date: 6/28/2021 Status: Signed    : María Ndiaye (Patient Access)       Scheduled for 07-30-21

## 2021-07-04 NOTE — ADDENDUM NOTE
Addendum Note by Denise Kwong at 3/8/2021  1:20 PM     Author: Denise Kwong Service: -- Author Type: --    Filed: 3/16/2021  6:26 AM Date of Service: 3/8/2021  1:20 PM Status: Signed    : Denise Kwong    Encounter addended by: Denise Kwong on: 3/16/2021  6:26 AM      Actions taken: Charge Capture section accepted

## 2021-07-04 NOTE — ADDENDUM NOTE
Addendum Note by Denise Kwong at 5/25/2021 11:00 AM     Author: Denise Kwong Service: -- Author Type: --    Filed: 6/1/2021  9:06 AM Date of Service: 5/25/2021 11:00 AM Status: Signed    : Denise Kwong    Encounter addended by: Denise Kwong on: 6/1/2021  9:06 AM      Actions taken: Charge Capture section accepted

## 2021-07-30 ENCOUNTER — VIRTUAL VISIT (OUTPATIENT)
Dept: PALLIATIVE MEDICINE | Facility: OTHER | Age: 36
End: 2021-07-30
Payer: COMMERCIAL

## 2021-07-30 DIAGNOSIS — G89.4 CHRONIC PAIN SYNDROME: Primary | ICD-10-CM

## 2021-07-30 PROCEDURE — 999N001193 HC VIDEO/TELEPHONE VISIT; NO CHARGE: Performed by: NURSE PRACTITIONER

## 2021-07-30 PROCEDURE — 99213 OFFICE O/P EST LOW 20 MIN: CPT | Mod: 95 | Performed by: NURSE PRACTITIONER

## 2021-07-30 RX ORDER — ESCITALOPRAM OXALATE 10 MG/1
10 TABLET ORAL
COMMUNITY
Start: 2021-06-15 | End: 2021-09-20

## 2021-07-30 RX ORDER — OXYCODONE AND ACETAMINOPHEN 7.5; 325 MG/1; MG/1
1 TABLET ORAL
COMMUNITY
Start: 2021-02-17 | End: 2021-07-30

## 2021-07-30 RX ORDER — OXYCODONE AND ACETAMINOPHEN 7.5; 325 MG/1; MG/1
1 TABLET ORAL 4 TIMES DAILY PRN
Qty: 112 TABLET | Refills: 0 | Status: SHIPPED | OUTPATIENT
Start: 2021-07-30 | End: 2021-09-13

## 2021-07-30 RX ORDER — ZOLPIDEM TARTRATE 10 MG/1
TABLET ORAL
COMMUNITY
Start: 2021-03-25 | End: 2022-04-21

## 2021-07-30 RX ORDER — FLUTICASONE PROPIONATE 50 MCG
SPRAY, SUSPENSION (ML) NASAL
COMMUNITY
Start: 2020-12-23 | End: 2022-12-28

## 2021-07-30 ASSESSMENT — PAIN SCALES - GENERAL: PAINLEVEL: MILD PAIN (3)

## 2021-07-30 NOTE — PROGRESS NOTES
Anand Felix is a 36 year old male who [facility]- via telephone       Start time- 2:01 pm   End time- 2:17 pm   Patient location- of site- home  Provider location- off site- virtual     Subjective-    I have reviewed and updated the patient's Past Medical History, Social History, Family History and Medication List as well as the Precharting workup by the [facility].     PAIN CLINIC FOLLOW UP PROGRESS NOTE    CC:chief complaint    HPI  Anand Felix is a 36 year old male who presents for evaluation chief complaint that is causing continued pain. Specific questions indicated the patient wanted addressed today include: to follow up with ongoing chronic pain and refills.         Objective-  Major issues:  1. Chronic pain syndrome        Pain score: 3  Constant   What does your pain feel like: Sharp, tight  Does the pain interfere with:  Work ----- yes  Walking ------ yes  Sleep ------- no  Daily activities ------no  Relationships -------yes  F=6    ALLERGIES  Patient has no known allergies.    Previous Medical History  Social History    Substance and Sexual Activity      Alcohol use: Yes        Comment: Alcoholic Drinks/day: Monthly or less often. 1-2 drinks at a time.    History   Drug Use     Types: Marijuana     History   Smoking Status     Never Smoker   Smokeless Tobacco     Never Used       Pertinent Pain Medications/interventions:    D/c belbuca at this time, will increase the use of the gabapentin to 300 mg two times a day and continue the use of the medical cannabis and percocet up to 3 per day as needed- 5/18/2020     He currently belbuca 300 mcg two times a day, gabapentin 100 mg three times a day, oxycodone 5 mg two times a day prn and medical cannabis, tizanidine and Norco.      Belbuca- headaches are associated so he stopped taking this. 9/6     Previously taking Belbuca 75 mcg, gabapentin 100 mg three times a day, oxycodone 5 mg up to two times a day as needed, medical  cannabis, baclofen and flexeril      RFA did provide him with 80 % relief.      Medical cannabis      Medications    Current Outpatient Medications:      Acetaminophen (TYLENOL PO), , Disp: , Rfl:      escitalopram (LEXAPRO) 10 MG tablet, Take 10 mg by mouth, Disp: , Rfl:      fluticasone (FLONASE) 50 MCG/ACT nasal spray, SPRAY 2 SPRAYS INTO EACH NOSTRIL EVERY DAY, Disp: , Rfl:      IBUPROFEN PO, , Disp: , Rfl:      omeprazole (PRILOSEC) 20 MG DR capsule, TAKE 1 CAPSULE BY MOUTH EVERY DAY, Disp: , Rfl:      oxyCODONE-acetaminophen (PERCOCET) 7.5-325 MG per tablet, Take 1 tablet by mouth, Disp: , Rfl:      rivaroxaban ANTICOAGULANT (XARELTO) 20 MG TABS tablet, Take 20 mg by mouth, Disp: , Rfl:      VITAMIN D, CHOLECALCIFEROL, PO, Take 2,000 Units by mouth daily, Disp: , Rfl:      zolpidem (AMBIEN) 10 MG tablet, TAKE 1 TABLET BY MOUTH AT BEDTIME AS NEEDED FOR SLEEP., Disp: , Rfl:       Lab:  Last UDS on 1/2020 had expected results. Last UDT on file was reviewed.    Imaging:  No new imaging reviewed with patient over the phone, no new orders placed       Recent   Dated 7/30/2021 was reviewed.       Assessment:     Anand Felix is a 36 year old male seen in clinic today for chief complaint    New concerns today- more burning sensation in his shoulder, back, right calf and right arm located in the joints for the most part.     Plan of care going forward for ongoing pain control-  He reports that since he went off of the gabapentin his muscle twitching has increased and the burning sensation has stayed the same. He is interested in therapy- myofascial release but currently is providing care for his spouse who just received a transplant.   Patient reports that using fresh lemon juice has been helpful for his GI and the previous burning sensation has dissipated. He is still abstaining from inflammatory foods.     Patient reports with the increase in discomfort he would like to increase the percocet to 4 per day, discussed  that he may need to go back on the [facility] to meet his needs for pain management, will evaluate at the next appointment.     Patients current MME is 33    Plan:   Interventions-    Follow up in 4-8 weeks      Continue avoiding gluten and sugar, as well as different oils that are fruit.      Continue the use of the magnesium at bedtime with water      Continue the use of the medical cannabis it is helpful when used for the chronic pain.      Ok to continue the use of the percocet at this time. Refills sent to the pharmacy      UDT is needed will have staff call and schedule this with you.       Orders placed today  Medications that were ordered today   Pending Prescriptions:                       Disp   Refills    oxyCODONE-acetaminophen (PERCOCET) 7.5-32*112 ta*0            Sig: Take 1 tablet by mouth 4 times daily as needed           for moderate to severe pain    Signed Prescriptions:                        Disp   Refills    escitalopram (LEXAPRO) 10 MG tablet                        Sig: Take 10 mg by mouth  Authorizing Provider: PATIENT REPORTED    fluticasone (FLONASE) 50 MCG/ACT nasal spr*                Sig: SPRAY 2 SPRAYS INTO EACH NOSTRIL EVERY DAY  Authorizing Provider: PATIENT REPORTED    oxyCODONE-acetaminophen (PERCOCET) 7.5-325*                Sig: Take 1 tablet by mouth  Authorizing Provider: PATIENT REPORTED    omeprazole (PRILOSEC) 20 MG DR capsule                     Sig: TAKE 1 CAPSULE BY MOUTH EVERY DAY  Authorizing Provider: PATIENT REPORTED    rivaroxaban ANTICOAGULANT (XARELTO) 20 MG *                Sig: Take 20 mg by mouth  Authorizing Provider: PATIENT REPORTED    zolpidem (AMBIEN) 10 MG tablet                             Sig: TAKE 1 TABLET BY MOUTH AT BEDTIME AS NEEDED FOR           SLEEP.  Authorizing Provider: PATIENT REPORTED    No orders of the defined types were placed in this encounter.        The patient understand todays plan and has their questions answered in regards to  expectations and current treatment plan.     Prevent unexpected access/loss of medication: Keep medication locked. Only carry what you need with you    The plan of care will be adjusted to accommodate the issues discussed, discussing management of their care and follow up that is recommended. 7/30/2021         WOLF Stevenson [facility]  [address]  [place] [address]  Ph: [phone]  Fax: [phone]

## 2021-07-30 NOTE — PATIENT INSTRUCTIONS
Plan:   Interventions-    Follow up in 4-8 weeks      Continue avoiding gluten and sugar, as well as different oils that are fruit.      Continue the use of the magnesium at bedtime with water      Continue the use of the medical cannabis it is helpful when used for the chronic pain.      Ok to continue the use of the percocet at this time. Refills sent to the pharmacy      UDT is needed will have staff call and schedule this with you.       Orders placed today  Medications that were ordered today   Pending Prescriptions:                       Disp   Refills    oxyCODONE-acetaminophen (PERCOCET) 7.5-32*112 ta*0            Sig: Take 1 tablet by mouth 4 times daily as needed           for moderate to severe pain    Signed Prescriptions:                        Disp   Refills    escitalopram (LEXAPRO) 10 MG tablet                        Sig: Take 10 mg by mouth  Authorizing Provider: PATIENT REPORTED    fluticasone (FLONASE) 50 MCG/ACT nasal spr*                Sig: SPRAY 2 SPRAYS INTO EACH NOSTRIL EVERY DAY  Authorizing Provider: PATIENT REPORTED    oxyCODONE-acetaminophen (PERCOCET) 7.5-325*                Sig: Take 1 tablet by mouth  Authorizing Provider: PATIENT REPORTED    omeprazole (PRILOSEC) 20 MG DR capsule                     Sig: TAKE 1 CAPSULE BY MOUTH EVERY DAY  Authorizing Provider: PATIENT REPORTED    rivaroxaban ANTICOAGULANT (XARELTO) 20 MG *                Sig: Take 20 mg by mouth  Authorizing Provider: PATIENT REPORTED    zolpidem (AMBIEN) 10 MG tablet                             Sig: TAKE 1 TABLET BY MOUTH AT BEDTIME AS NEEDED FOR           SLEEP.  Authorizing Provider: PATIENT REPORTED    No orders of the defined types were placed in this encounter.        The patient understand todays plan and has their questions answered in regards to expectations and current treatment plan.     Prevent unexpected access/loss of medication: Keep medication locked. Only carry what you need with you    The plan of  care will be adjusted to accommodate the issues discussed, discussing management of their care and follow up that is recommended. 7/30/2021         WOLF Stevenson [facility]  [address]  [place] [address]  Ph: [phone]  Fax: [phone]

## 2021-07-30 NOTE — PROGRESS NOTES
Anand Felix is a 36 y.o. male who is being evaluated via a billable telephone visit.       How would you like to obtain your AVS? [employer].  Phone number to reach patient: [phone]     Pain score: 3  Constant   What does your pain feel like: Sharp, tight  Does the pain interfere with:  Work ----- yes  Walking ------ yes  Sleep ------- no  Daily activities ------no  Relationships -------yes  F=6

## 2021-07-30 NOTE — LETTER
7/30/2021         RE: Anand Felix  [address]  [place] [address]        Dear Colleague,    Thank you for referring your patient, Anand Felix, to the [facility]. Please see a copy of my visit note below.    Anand Felix is a 36 y.o. male who is being evaluated via a billable telephone visit.       How would you like to obtain your AVS? [employer].  Phone number to reach patient: [phone]     Pain score: 3  Constant   What does your pain feel like: Sharp, tight  Does the pain interfere with:  Work ----- yes  Walking ------ yes  Sleep ------- no  Daily activities ------no  Relationships -------yes  F=6    Anand Felix is a 36 year old male who is being evaluated with [employer] or [employer] services- via telephone       Start time- 2:01 pm   End time- 2:17 pm   Patient location- of site- home  Provider location- off site- virtual     Subjective-    I have reviewed and updated the patient's Past Medical History, Social History, Family History and Medication List as well as the Precharting workup by the [facility].     PAIN CLINIC FOLLOW UP PROGRESS NOTE    CC:chief complaint    HPI  Anand Felix is a 36 year old male who presents for evaluation chief complaint that is causing continued pain. Specific questions indicated the patient wanted addressed today include: to follow up with ongoing chronic pain and refills.         Objective-  Major issues:  1. Chronic pain syndrome        Pain score: 3  Constant   What does your pain feel like: Sharp, tight  Does the pain interfere with:  Work ----- yes  Walking ------ yes  Sleep ------- no  Daily activities ------no  Relationships -------yes  F=6    ALLERGIES  Patient has no known allergies.    Previous Medical History  Social History    Substance and Sexual Activity      Alcohol use: Yes        Comment: Alcoholic Drinks/day: Monthly or less often. 1-2 drinks at a time.    History   Drug Use     Types: Marijuana     History   Smoking Status     Never Smoker   Smokeless  Tobacco     Never Used       Pertinent Pain Medications/interventions:    D/c belbuca at this time, will increase the use of the gabapentin to 300 mg two times a day and continue the use of the medical cannabis and percocet up to 3 per day as needed- 5/18/2020     He currently belbuca 300 mcg two times a day, gabapentin 100 mg three times a day, oxycodone 5 mg two times a day prn and medical cannabis, tizanidine and Norco.      Belbuca- headaches are associated so he stopped taking this. 9/6     Previously taking Belbuca 75 mcg, gabapentin 100 mg three times a day, oxycodone 5 mg up to two times a day as needed, medical cannabis, baclofen and flexeril      RFA did provide him with 80 % relief.      Medical cannabis      Medications    Current Outpatient Medications:      Acetaminophen (TYLENOL PO), , Disp: , Rfl:      escitalopram (LEXAPRO) 10 MG tablet, Take 10 mg by mouth, Disp: , Rfl:      fluticasone (FLONASE) 50 MCG/ACT nasal spray, SPRAY 2 SPRAYS INTO EACH NOSTRIL EVERY DAY, Disp: , Rfl:      IBUPROFEN PO, , Disp: , Rfl:      omeprazole (PRILOSEC) 20 MG DR capsule, TAKE 1 CAPSULE BY MOUTH EVERY DAY, Disp: , Rfl:      oxyCODONE-acetaminophen (PERCOCET) 7.5-325 MG per tablet, Take 1 tablet by mouth, Disp: , Rfl:      rivaroxaban ANTICOAGULANT (XARELTO) 20 MG TABS tablet, Take 20 mg by mouth, Disp: , Rfl:      VITAMIN D, CHOLECALCIFEROL, PO, Take 2,000 Units by mouth daily, Disp: , Rfl:      zolpidem (AMBIEN) 10 MG tablet, TAKE 1 TABLET BY MOUTH AT BEDTIME AS NEEDED FOR SLEEP., Disp: , Rfl:       Lab:  Last UDS on 1/2020 had expected results. Last UDT on file was reviewed.    Imaging:  No new imaging reviewed with patient over the phone, no new orders placed       Recent   Dated 7/30/2021 was reviewed.       Assessment:     Anand Felix is a 36 year old male seen in clinic today for chief complaint    New concerns today- more burning sensation in his shoulder, back, right calf and right arm located in the joints  for the most part.     Plan of care going forward for ongoing pain control-  He reports that since he went off of the gabapentin his muscle twitching has increased and the burning sensation has stayed the same. He is interested in therapy- myofascial release but currently is providing care for his spouse who just received a transplant.   Patient reports that using fresh lemon juice has been helpful for his GI and the previous burning sensation has dissipated. He is still abstaining from inflammatory foods.     Patient reports with the increase in discomfort he would like to increase the percocet to 4 per day, discussed that he may need to go back on the belbuca to meet his needs for pain management, will evaluate at the next appointment.     Patients current MME is 33    Plan:   Interventions-    Follow up in 4-8 weeks      Continue avoiding gluten and sugar, as well as different oils that are fruit.      Continue the use of the magnesium at bedtime with water      Continue the use of the medical cannabis it is helpful when used for the chronic pain.      Ok to continue the use of the percocet at this time. Refills sent to the pharmacy      UDT is needed will have staff call and schedule this with you.       Orders placed today  Medications that were ordered today   Pending Prescriptions:                       Disp   Refills    oxyCODONE-acetaminophen (PERCOCET) 7.5-32*112 ta*0            Sig: Take 1 tablet by mouth 4 times daily as needed           for moderate to severe pain    Signed Prescriptions:                        Disp   Refills    escitalopram (LEXAPRO) 10 MG tablet                        Sig: Take 10 mg by mouth  Authorizing Provider: PATIENT REPORTED    fluticasone (FLONASE) 50 MCG/ACT nasal spr*                Sig: SPRAY 2 SPRAYS INTO EACH NOSTRIL EVERY DAY  Authorizing Provider: PATIENT REPORTED    oxyCODONE-acetaminophen (PERCOCET) 7.5-325*                Sig: Take 1 tablet by mouth  Authorizing  Provider: PATIENT REPORTED    omeprazole (PRILOSEC) 20 MG DR capsule                     Sig: TAKE 1 CAPSULE BY MOUTH EVERY DAY  Authorizing Provider: PATIENT REPORTED    rivaroxaban ANTICOAGULANT (XARELTO) 20 MG *                Sig: Take 20 mg by mouth  Authorizing Provider: PATIENT REPORTED    zolpidem (AMBIEN) 10 MG tablet                             Sig: TAKE 1 TABLET BY MOUTH AT BEDTIME AS NEEDED FOR           SLEEP.  Authorizing Provider: PATIENT REPORTED    No orders of the defined types were placed in this encounter.        The patient understand todays plan and has their questions answered in regards to expectations and current treatment plan.     Prevent unexpected access/loss of medication: Keep medication locked. Only carry what you need with you    The plan of care will be adjusted to accommodate the issues discussed, discussing management of their care and follow up that is recommended. 7/30/2021         WOLF Stevenson CNP  [facility]  [address]  [place] [address]  Ph: [phone]  Fax: [phone]            Again, thank you for allowing me to participate in the care of your patient.        Sincerely,        WOLF Stevenson CNP

## 2021-08-14 ENCOUNTER — HEALTH MAINTENANCE LETTER (OUTPATIENT)
Age: 36
End: 2021-08-14

## 2021-09-13 ENCOUNTER — MYC REFILL (OUTPATIENT)
Dept: PALLIATIVE MEDICINE | Facility: OTHER | Age: 36
End: 2021-09-13

## 2021-09-13 DIAGNOSIS — G89.4 CHRONIC PAIN SYNDROME: ICD-10-CM

## 2021-09-14 RX ORDER — OXYCODONE AND ACETAMINOPHEN 7.5; 325 MG/1; MG/1
1 TABLET ORAL 4 TIMES DAILY PRN
Qty: 112 TABLET | Refills: 0 | Status: SHIPPED | OUTPATIENT
Start: 2021-09-14 | End: 2021-09-20

## 2021-09-14 NOTE — TELEPHONE ENCOUNTER
Received call from patient requesting refill oxycodone    Last dispensed from pharmacy on unable to access outside medication record    Patient's last office/virtual visit by prescribing provider on 7/30/21  Next office/virtual appointment scheduled for needs to schedule a follow up apt    AMPAROT/TOMMIE - 01/27/2020    Pending Prescriptions:                       Disp   Refills    oxyCODONE-acetaminophen (PERCOCET) 7.5-32*112 ta*0            Sig: Take 1 tablet by mouth 4 times daily as needed           for moderate to severe pain    [facility]

## 2021-09-20 RX ORDER — OXYCODONE AND ACETAMINOPHEN 7.5; 325 MG/1; MG/1
1 TABLET ORAL 4 TIMES DAILY PRN
Qty: 112 TABLET | Refills: 0 | Status: SHIPPED | OUTPATIENT
Start: 2021-09-20 | End: 2021-10-08

## 2021-09-20 NOTE — TELEPHONE ENCOUNTER
Patient requesting to have oxycodone RX e-scribed to the pharmacy.  Current RX that is printed has been shredded   Will re-cue as e-scribe    Pending Prescriptions:                       Disp   Refills    oxyCODONE-acetaminophen (PERCOCET) 7.5-32*112 ta*0            Sig: Take 1 tablet by mouth 4 times daily as needed           for moderate to severe pain    Signed Prescriptions:                        Disp   Refills    oxyCODONE-acetaminophen (PERCOCET) 7.5-325*112 ta*0        Sig: Take 1 tablet by mouth 4 times daily as needed for           moderate to severe pain  Authorizing Provider: TANIKA MCKEON

## 2021-10-04 ENCOUNTER — HEALTH MAINTENANCE LETTER (OUTPATIENT)
Age: 36
End: 2021-10-04

## 2021-10-08 ENCOUNTER — VIRTUAL VISIT (OUTPATIENT)
Dept: PALLIATIVE MEDICINE | Facility: OTHER | Age: 36
End: 2021-10-08
Payer: COMMERCIAL

## 2021-10-08 DIAGNOSIS — G89.4 CHRONIC PAIN SYNDROME: ICD-10-CM

## 2021-10-08 PROCEDURE — 99213 OFFICE O/P EST LOW 20 MIN: CPT | Mod: 95 | Performed by: NURSE PRACTITIONER

## 2021-10-08 RX ORDER — OXYCODONE AND ACETAMINOPHEN 7.5; 325 MG/1; MG/1
1 TABLET ORAL 4 TIMES DAILY PRN
Qty: 112 TABLET | Refills: 0 | Status: SHIPPED | OUTPATIENT
Start: 2021-10-18 | End: 2021-12-14

## 2021-10-08 ASSESSMENT — PAIN SCALES - GENERAL: PAINLEVEL: MILD PAIN (3)

## 2021-10-08 NOTE — PROGRESS NOTES
Anand is a 36 year old who is being evaluated via a billable video visit.      How would you like to obtain your AVS? [employer]  If the video visit is dropped, the invitation should be resent by: call [phone]   Will anyone else be joining your video visit? No      Platform used for Video Visit:      Pain score: 3  Constant   What does your pain feel like: Sharp, burning and aching  Does the pain interfere with:  Work ----- N/A  Walking ------yes, but I can't remember what it's like to not have pain.   Sleep --Towards morning but less so with Ambien   Daily activities ------yes, but I still do what I have to do  Relationships -----Not as much now as before, but yes  Mood--yes  F=7    DELROY/TOMMIE - 01/27/2020

## 2021-10-08 NOTE — PROGRESS NOTES
Anand Felix is a 36 year old male who [facility]- via video       Start time- 1:44 pm   End time- 1:58 pm   Patient location- of site- home  Provider location- off site- virtual     Subjective-    I have reviewed and updated the patient's Past Medical History, Social History, Family History and Medication List as well as the Precharting workup by the [facility].     PAIN CLINIC FOLLOW UP PROGRESS NOTE    CC:chief complaint    HPI  Anand Felix is a 36 year old male who presents for evaluation chief complaint that is causing continued pain. Specific questions indicated the patient wanted addressed today include: to follow up on his ongoing chronic pain         Objective-  Major issues:  1. Chronic pain syndrome        Pain score: 3  Constant   What does your pain feel like: Sharp, burning and aching  Does the pain interfere with:  Work ----- N/A  Walking ------yes, but I can't remember what it's like to not have pain.   Sleep --Towards morning but less so with Ambien   Daily activities ------yes, but I still do what I have to do  Relationships -----Not as much now as before, but yes  Mood--yes  F=7     UDT/CSA - 01/27/2020    ALLERGIES  Patient has no known allergies.    Previous Medical History  Social History    Substance and Sexual Activity      Alcohol use: Yes        Comment: Alcoholic Drinks/day: Monthly or less often. 1-2 drinks at a time.    History   Drug Use     Types: Marijuana     History   Smoking Status     Never Smoker   Smokeless Tobacco     Never Used       Pertinent Pain Medications/interventions:    10/8/2021- gabapentin was stopped previously, he is using medical cannabis- last renewed 10/8/2021, percocet- 4 tabs of 7.5 mg per day.     D/c belbuca at this time, will increase the use of the gabapentin to 300 mg two times a day and continue the use of the medical cannabis and percocet up to 3 per day as needed- 5/18/2020     He currently belbuca 300 mcg two times a day,  gabapentin 100 mg three times a day, oxycodone 5 mg two times a day prn and medical cannabis, tizanidine and Norco.      Belbuca- headaches are associated so he stopped taking this. 9/6     Previously taking Belbuca 75 mcg, gabapentin 100 mg three times a day, oxycodone 5 mg up to two times a day as needed, medical cannabis, baclofen and flexeril      RFA did provide him with 80 % relief.      Medical cannabis      Medications    Current Outpatient Medications:      Acetaminophen (TYLENOL PO), , Disp: , Rfl:      escitalopram (LEXAPRO) 10 MG tablet, Take 1 tablet (10 mg) by mouth daily, Disp: 90 tablet, Rfl: 3     fluticasone (FLONASE) 50 MCG/ACT nasal spray, SPRAY 2 SPRAYS INTO EACH NOSTRIL EVERY DAY, Disp: , Rfl:      IBUPROFEN PO, , Disp: , Rfl:      omeprazole (PRILOSEC) 20 MG DR capsule, TAKE 1 CAPSULE BY MOUTH EVERY DAY, Disp: , Rfl:      oxyCODONE-acetaminophen (PERCOCET) 7.5-325 MG per tablet, Take 1 tablet by mouth 4 times daily as needed for moderate to severe pain, Disp: 112 tablet, Rfl: 0     rivaroxaban ANTICOAGULANT (XARELTO) 20 MG TABS tablet, Take 20 mg by mouth, Disp: , Rfl:      VITAMIN D, CHOLECALCIFEROL, PO, Take 2,000 Units by mouth daily, Disp: , Rfl:      zolpidem (AMBIEN) 10 MG tablet, TAKE 1 TABLET BY MOUTH AT BEDTIME AS NEEDED FOR SLEEP., Disp: , Rfl:       Lab:  Last UDS on 1/2020 had expected results. Last UDT on file was reviewed.    Imaging:  No new imaging reviewed with patient over the phone, no new orders placed       Recent   Dated 10/8/2021 was reviewed.       Assessment:     Anand Felix is a 36 year old male seen in clinic today for chief complaint    New concerns today- his wife is having a hard time after kidney transplant surgery    Plan of care going forward for ongoing pain control- currently patient is doing ok with his use of medical cannabis as well as percocet, currently he is taking approximately 4 per day. He is due to a UDT and will need to schedule to be up to date.  Patient is also stable on his current dose of opioids and is able to return to his PCP for further management. Discussed muscle spasticity and the use of the correct magnesium form as well as dehydration and avoidance of coffee to reduce this. A refill will be sent today for the patient until he can see Dr. Choudhary    Patients current MME is 33    Plan:   Interventions-    Follow up in 4-8 weeks     Stop caffeine intake    Staff to call and schedule a UDT    Continue taking magnesium in a capsule form 400 mg     Continue taking medical cannabis - renewed today for 1 year.     Ok to continue the percocet- refill dates are 10/18-11/15      Orders placed today  Medications that were ordered today   Pending Prescriptions:                       Disp   Refills    oxyCODONE-acetaminophen (PERCOCET) 7.5-32*112 ta*0            Sig: Take 1 tablet by mouth 4 times daily as needed           for moderate to severe pain    No orders of the defined types were placed in this encounter.        The patient understand todays plan and has their questions answered in regards to expectations and current treatment plan.     Prevent unexpected access/loss of medication: Keep medication locked. Only carry what you need with you    The plan of care will be adjusted to accommodate the issues discussed, discussing management of their care and follow up that is recommended. 10/8/2021         WOLF Stevenson [facility]  [address]  [place] [address]  Ph: [phone]  Fax: [phone]

## 2021-10-08 NOTE — LETTER
Dear Malachi Choudhary MD     I have evaluated your patient today at the pain center for follow up. At this time we are working on returning stable patients back to their primary care providers as outlined by the recent meetings by our systems medical leaders.     Recommendations will be made for the patient if appropriate, to try different modalities for chronic pain management. If opioids are to be used in this patients care, once they are stable, they will be returned to their PCP care.     Any changes that the patients request can be evaluated by the pain center if the provider is uncomfortable managing dosing changes. If the patient has not been seen at the pain center for > 1 year a new referral will need to be placed. Feel free to reach out to the pain center for any questions.     Currently, Anand is using medical cannabis- this was renewed today by the pain center and percocet 7.5 mg up to 4 per day. His MME is 34.     My last day is 10/20/2021 if you have any questions.     WOLF Stevenson CNP

## 2021-10-08 NOTE — PATIENT INSTRUCTIONS
Plan:   Interventions-    Follow up in 4-8 weeks     Stop caffeine intake    Staff to call and schedule a UDT    Continue taking magnesium in a capsule form 400 mg     Continue taking medical cannabis - renewed today for 1 year.     Ok to continue the percocet- refill dates are 10/18-11/15      Orders placed today  Medications that were ordered today   Pending Prescriptions:                       Disp   Refills    oxyCODONE-acetaminophen (PERCOCET) 7.5-32*112 ta*0            Sig: Take 1 tablet by mouth 4 times daily as needed           for moderate to severe pain    No orders of the defined types were placed in this encounter.        The patient understand todays plan and has their questions answered in regards to expectations and current treatment plan.     Prevent unexpected access/loss of medication: Keep medication locked. Only carry what you need with you    The plan of care will be adjusted to accommodate the issues discussed, discussing management of their care and follow up that is recommended. 10/8/2021         WOLF Stevenson [facility]  [address]  [place] [address]  Ph: [phone]  Fax: [phone]

## 2021-10-13 ENCOUNTER — ALLIED HEALTH/NURSE VISIT (OUTPATIENT)
Dept: PALLIATIVE MEDICINE | Facility: OTHER | Age: 36
End: 2021-10-13
Payer: COMMERCIAL

## 2021-10-13 DIAGNOSIS — G89.4 CHRONIC PAIN SYNDROME: Primary | ICD-10-CM

## 2021-10-13 LAB
CANNABINOIDS UR QL SCN: ABNORMAL
CREAT UR-MCNC: 374 MG/DL
CREAT UR-MCNC: 374 MG/DL

## 2021-10-13 PROCEDURE — 80349 CANNABINOIDS NATURAL: CPT

## 2021-10-13 PROCEDURE — 82570 ASSAY OF URINE CREATININE: CPT

## 2021-10-13 PROCEDURE — 80307 DRUG TEST PRSMV CHEM ANLYZR: CPT

## 2021-10-13 PROCEDURE — 80320 DRUG SCREEN QUANTALCOHOLS: CPT

## 2021-10-13 NOTE — PROGRESS NOTES
Urine Drug Test: 10/13/2021     Medication: Percocet and Medical Cannabis  Last dose of scheduled / tracked / medical cannabis / CBD: Percocet at 10 am on 10/13/2021; Medical Cannabis at 2 pm on 10/13/2021    Witnessed Patch Location: N/A    Medication Current List    Current Outpatient Medications:      Acetaminophen (TYLENOL PO), , Disp: , Rfl:      escitalopram (LEXAPRO) 10 MG tablet, Take 1 tablet (10 mg) by mouth daily, Disp: 90 tablet, Rfl: 3     fluticasone (FLONASE) 50 MCG/ACT nasal spray, SPRAY 2 SPRAYS INTO EACH NOSTRIL EVERY DAY, Disp: , Rfl:      IBUPROFEN PO, , Disp: , Rfl:      omeprazole (PRILOSEC) 20 MG DR capsule, TAKE 1 CAPSULE BY MOUTH EVERY DAY, Disp: , Rfl:      [START ON 10/18/2021] oxyCODONE-acetaminophen (PERCOCET) 7.5-325 MG per tablet, Take 1 tablet by mouth 4 times daily as needed for moderate to severe pain, Disp: 112 tablet, Rfl: 0     rivaroxaban ANTICOAGULANT (XARELTO) 20 MG TABS tablet, Take 20 mg by mouth, Disp: , Rfl:      VITAMIN D, CHOLECALCIFEROL, PO, Take 2,000 Units by mouth daily, Disp: , Rfl:      zolpidem (AMBIEN) 10 MG tablet, TAKE 1 TABLET BY MOUTH AT BEDTIME AS NEEDED FOR SLEEP., Disp: , Rfl:     Personal belongings: Left outside the bathroom.    Comments:

## 2021-10-14 LAB
BARBITURATES UR QL: NORMAL
ETHANOL UR QL SCN: NORMAL

## 2021-10-16 LAB
ETHANOL UR QL CFM: NEGATIVE
ETHYL GLUCURONIDE UR QL SCN: NOT DETECTED NG/MG CREAT
ETHYL SULFATE/CREAT UR: NOT DETECTED NG/MG CREAT
LEVEL OF DETECTION (ETHANOL): NORMAL

## 2021-10-19 ENCOUNTER — TELEPHONE (OUTPATIENT)
Dept: FAMILY MEDICINE | Facility: CLINIC | Age: 36
End: 2021-10-19
Payer: COMMERCIAL

## 2021-10-19 PROBLEM — F32.9 MAJOR DEPRESSION: Status: ACTIVE | Noted: 2021-03-19

## 2021-10-19 RX ORDER — ZOLPIDEM TARTRATE 10 MG/1
TABLET ORAL
Status: CANCELLED | OUTPATIENT
Start: 2021-10-19

## 2021-10-19 NOTE — TELEPHONE ENCOUNTER
LDM for pt/Anand, I did let him know that in order to fill the RX/Zolpidem 10 MG tablet request we recvd from his pharmacy/[facility], Anand will need to arrange an apt with Dr Malachi Choudhary or Melissa Acevedo NP for an office visit and labs.     Pls arrange for an apt when pt/Anand calls bk     Last apt was 03.19.2021 w/Melissa Acevedo NP

## 2021-10-21 LAB
CANNABINOIDS UR CFM-MCNC: 78 NG/ML
CARBOXYTHC/CREAT UR: 21 NG/MG CREAT
CREAT UR-MCNC: 374 MG/DL

## 2021-10-25 ENCOUNTER — MYC MEDICAL ADVICE (OUTPATIENT)
Dept: FAMILY MEDICINE | Facility: CLINIC | Age: 36
End: 2021-10-25

## 2021-12-09 ENCOUNTER — OFFICE VISIT (OUTPATIENT)
Dept: PALLIATIVE MEDICINE | Facility: OTHER | Age: 36
End: 2021-12-09
Payer: COMMERCIAL

## 2021-12-09 VITALS — SYSTOLIC BLOOD PRESSURE: 115 MMHG | HEART RATE: 78 BPM | DIASTOLIC BLOOD PRESSURE: 79 MMHG

## 2021-12-09 DIAGNOSIS — G89.4 CHRONIC PAIN SYNDROME: Primary | ICD-10-CM

## 2021-12-09 DIAGNOSIS — F11.90 CHRONIC, CONTINUOUS USE OF OPIOIDS: ICD-10-CM

## 2021-12-09 DIAGNOSIS — G89.29 CHRONIC RIGHT-SIDED LOW BACK PAIN WITH RIGHT-SIDED SCIATICA: ICD-10-CM

## 2021-12-09 DIAGNOSIS — M54.41 CHRONIC RIGHT-SIDED LOW BACK PAIN WITH RIGHT-SIDED SCIATICA: ICD-10-CM

## 2021-12-09 DIAGNOSIS — Z79.899 MEDICAL CANNABIS USE: ICD-10-CM

## 2021-12-09 PROCEDURE — G0463 HOSPITAL OUTPT CLINIC VISIT: HCPCS

## 2021-12-09 RX ORDER — PREGABALIN 50 MG/1
CAPSULE ORAL
Qty: 69 CAPSULE | Refills: 0 | Status: SHIPPED | OUTPATIENT
Start: 2021-12-09 | End: 2022-01-11

## 2021-12-09 RX ORDER — PROPRANOLOL HYDROCHLORIDE 80 MG/1
TABLET ORAL
COMMUNITY
Start: 2021-06-10 | End: 2022-04-21

## 2021-12-09 RX ORDER — GABAPENTIN 100 MG/1
CAPSULE ORAL
COMMUNITY
Start: 2021-01-24 | End: 2021-12-09

## 2021-12-09 ASSESSMENT — PAIN SCALES - GENERAL: PAINLEVEL: MODERATE PAIN (5)

## 2021-12-09 NOTE — LETTER
"    12/9/2021         RE: Anand Felix  [address]  [facility] [address]        Dear Colleague,    Thank you for referring your patient, Anand Felix, to the [facility]. Please see a copy of my visit note below.    PAIN CENTER PROGRESS NOTE    Subjective:   Anand Felix is a 36 year old male who presents for evaluation of low back pain.  He states this has been present for a very long time and 2018 had back surgery for sciatica and states it helped his leg pain but made his back pain worse.      Major issues:  1. Chronic pain syndrome    2. Chronic, continuous use of opioids    3. Medical cannabis use    4. Chronic right-sided low back pain with right-sided sciatica      Pain location and description: see CMA Note.  Moderate Pain (5)  Sharp, burning sensation and like a tight ripping in back and legs.  Radiation of pain: bilateral legs, right worse than left.    Gait disturbance: None reported, denies recent falls. Has balance changes and uses cane as needed.  Exacerbating factors: Almost everything - standing, lifting, sitting long periods  Alleviating factors: heat and cold packs, epsom bath salts, massage.  Associated symptoms: Weakness in legs, numbness intermittent in legs,   Adverse effects of medications: None reported  Current treatment efficacy: Good   Current treatment compliance: New to this provider.    Since the last Pain Center visit, the patient denies any use of anticoagulant medications. Denies any new diagnostic testing, new treatments or new medical conditions, any changes in medications, or any ED/urgent care visits.      Patient was seeing Dayna Lundberg CNP, last visit reviewed from 10/08/2021:  \"Pertinent Pain Medications/interventions:     10/8/2021- gabapentin was stopped previously, he is using medical cannabis- last renewed 10/8/2021, percocet- 4 tabs of 7.5 mg per day.      D/c belbuca at this time, will increase the use of the gabapentin to 300 mg two times a day and " "continue the use of the medical cannabis and percocet up to 3 per day as needed- 5/18/2020     He currently belbuca 300 mcg two times a day, gabapentin 100 mg three times a day, oxycodone 5 mg two times a day prn and medical cannabis, tizanidine and Norco.      Belbuca- headaches are associated so he stopped taking this. 9/6     Previously taking Belbuca 75 mcg, gabapentin 100 mg three times a day, oxycodone 5 mg up to two times a day as needed, medical cannabis, baclofen and flexeril      RFA did provide him with 80 % relief.      Medical cannabis\"    Current Outpatient Medications   Medication Sig Dispense Refill     Acetaminophen (TYLENOL PO)        escitalopram (LEXAPRO) 10 MG tablet Take 1 tablet (10 mg) by mouth daily 90 tablet 3     fluticasone (FLONASE) 50 MCG/ACT nasal spray SPRAY 2 SPRAYS INTO EACH NOSTRIL EVERY DAY       omeprazole (PRILOSEC) 20 MG DR capsule TAKE 1 CAPSULE BY MOUTH EVERY DAY       oxyCODONE-acetaminophen (PERCOCET) 7.5-325 MG per tablet Take 1 tablet by mouth 4 times daily as needed for moderate to severe pain 112 tablet 0     propranolol (INDERAL) 80 MG tablet        rivaroxaban ANTICOAGULANT (XARELTO) 20 MG TABS tablet Take 20 mg by mouth       VITAMIN D, CHOLECALCIFEROL, PO Take 2,000 Units by mouth daily       zolpidem (AMBIEN) 10 MG tablet TAKE 1 TABLET BY MOUTH AT BEDTIME AS NEEDED FOR SLEEP.       gabapentin (NEURONTIN) 100 MG capsule  (Patient not taking: Reported on 12/9/2021)       IBUPROFEN PO  (Patient not taking: Reported on 12/9/2021)       Stopped gabapentin to see if benefits were there and he states it was helpful and notices more muscle spasms without it.  He has been off a few months.  Denies side effects. Was taking Gabapentin 100 mg TID.    He is taking Percocet 7.5/325 mg QID prn and states sometimes skips 4th dose if able.  He last refilled 10/29/21 for 28 days.  He states he uses pill box fills himself, sometimes PCA double checks it.  He has 1 week remaining.  " "States it is more than 50% helpful, denies side effects.      Uses medical cannabis oral tangerine oil and vape - he is dosing oil suspension twice a day and 1 dose to help with sleep and the vape is 2-3 puffs 3-4 times a day as needed.  He states this is helpful for pain and denies side effects.  He usually registers in the fall.      He changed his diet gluten free and avoiding sugar and caffeine as much as possible - he states benefit with regular bowel movements, helping some of the burning sensation which is less.  He states his sleep at night is \"ok\" with Ambien, will use melatonin 3 mg as needed.  States mood has been ok, states wife and him are going through a lot this summer - she recently had kidney transplant.  He has psychotherapy with [facility]    Social: lives at home with wife no children, has PCA to help with lifting, occasional bathing or housework.  4 hours a day.  Denies working.   Doesn't drive.    Review of Systems  Constitutional: Denies fever, chills, night sweats, lethargy, weight loss, weight gain.  Musculoskeletal: Positive for back pain, muscle weakness.  Denies muscle pain, joint pain, joint swelling, recent falls.  Gastrointestional: Denies difficulty swallowing, change in appetite, abdominal pain, constipation, nausea, vomiting, diarrhea, GERD, fecal incontinence.  Genitourinary: Denies urinary incontinence, dysuria, hematuria, UTI, frequency, hesitancy  Neurologic: Denies headaches, confusion, seizure, changes in balance, changes in speech.  Psychiatric: Depression, anxiety.  Denies memory loss, psychoses, suicidal ideation, substance use/abuse.     Objective:     Vitals:    12/09/21 1336   BP: 115/79   Pulse: 78   PainSc: Moderate Pain (5)   PainLoc: Upper Leg     Physical Exam  Constitutional- General appearance: Normal.  Well developed, comfortable, within normal limits of ideal weight and appearance reflects stated age.  No acute distress or pain behaviors noted.  " Presents alone today.   Psychiatric- Judgment and insight: Normal  Speech: Normal rhythm.  Thought process: Normal.  No abnormal thoughts reported. Alert & Oriented to person, place, and time.  Recent and remote memory: Normal.  Mood and affect: Normal.  Observed mood: anxious.   Respiratory- Breathing is non-labored; normal rhythm and rate.  Cardiovascular- Extremities warm and well perfused, no peripheral edema or varicosities.  Dermatologic- Exposed skin is clean, dry, and intact to inspection and palpation.  Musculoskeletal- Gait and station: Normal.  Gait evaluation demonstrates ambulating independently.  Patient does not have difficulty rising from a seated position.    *Opioid Universal Precautions:    UDT -  10/13/2021    Opioid Agreement - 10/13/2021  Pharmacy- as documented    [place]  Reviewed - 12/09/2021  Pill Count - n/a  Psychological evaluation - [facility]  MME - 42  Pharmacogenetic testing - N/A    Imaging:  XR LUMBAR SPINE 4 OR MORE VWS  10/15/2018 3:11 PM     INDICATION: Check instability  COMPARISON: MRI lumbar spine 09/15/2018.     FINDINGS: 5 lumbar-type vertebral bodies. Minimal levoconvex curvature of the lumbar spine. The vertebral bodies of the lumbar spine have normal stature and alignment. No evidence of dynamic instability on flexion or extension. Small anterior marginal   osteophytes at L2/L3, L3/L4 and L4/L5. The disc spaces are well-maintained. No other significant degenerative changes. The disc spaces are well-maintained. Soft tissues unremarkable.    MRI Lumbar Spine with and without contrast 09/15/2016      Assessment:   Anand Felix is a 36 year old male seen in clinic today for low back pain, history of right L5-S1 right laminotomy.  Patient has failed LESI and lumbar radiofrequency in the past.  Possible to consider SCS trial in the future.  He is stable with medication plan and requests to trial neuropathic medication for burning.  He has taken Gabapentin in the past;  discuss trial of Lyrica including possible risks and side effects.  He is also interested in returning to acupuncture for chronic low back pain and possibly PT in the future.  He is participating in behavioral health visits.  He also has medical cannabis for pain and trying to minimize opioid use as possible.  Discuss this provider's last day at the pain center and he will continue care with another provider after 12/29/21.     Plan:   Continue Percocet 7.5/325 mg 4 tabs a day as needed for pain.  Refill sent to fill now; may last longer than 28 days.  Please call for next refill following guidelines below.  Continue medical cannabis use for pain.  Continue with pain psychotherapy visits as able  Resume acupuncture visits with [facility] - referral placed, you may schedule weekly.  Also discussed option of myofascial release PT referral in the future   Trial Lyrica for nerve pain - 50 mg at night x 1 week, then increase to 50 mg twice a day x 1 week, then 50 mg 3 times a day.  Call nurse line with any side effect concerns.  Information below.    Follow-up in 8-10 weeks with any provider at the pain center or virtual visit.    Jovita Jo PA-C  [facility]   [address]  [place] [address]  Ph: [phone]  Fax: [phone]    37 minutes spent on the date of the encounter doing chart review, history and exam, documentation,and further activities.    UDT/CSA = 10/13/2021    percocet at noon today           12/09/21 1336   PEG: A Thee-Item Scale Assessing Pain Intensity and Interference        0 = No pain / No interference    10 = Pain as bad as you can imagine / Completely interferes   What number best describes your pain on average in the past week? 5   What number best describes how, during the past week, pain has interfered with your enjoyment of life? 3   What number best describes how, during the past week, pain has interfered with your general activity? 4    PEG Total Score 4       Again, thank you for allowing me to participate in the care of your patient.        Sincerely,        Jovita Jo PA-C

## 2021-12-09 NOTE — PATIENT INSTRUCTIONS
Continue Percocet 7.5/325 mg 4 tabs a day as needed for pain.  Refill sent to fill now; may last longer than 28 days.  Please call for next refill following guidelines below.  Continue medical cannabis use for pain.  Continue with pain psychotherapy visits as able  Resume acupuncture visits with [facility] - referral placed, you may schedule weekly.  Also discussed option of myofascial release PT referral in the future   Trial Lyrica for nerve pain - 50 mg at night x 1 week, then increase to 50 mg twice a day x 1 week, then 50 mg 3 times a day.  Call nurse line with any side effect concerns.  Information below.    Follow-up in 8-10 weeks with any provider at the pain center or virtual visit.        [facility] Number:  [phone]    Call with any questions about your care and for scheduling assistance.     Calls are returned Monday through Friday between 8 AM and 4:30 PM. We usually get back to you within 2 business days depending on the issue/request.    If we are prescribing your medications:    For opioid medication refills, call the clinic or send a [employer] message 7 days in advance.  Please include:    Name of requested medication    Name of the pharmacy.    For non-opioid medications, call your pharmacy directly to request a refill. Please allow 3-4 days to be processed.     Per [place] State Law:    All controlled substance prescriptions must be filled within 30 days of being written.      For those controlled substances allowing refills, pickup must occur within 30 days of last fill.      We believe regular attendance is key to your success in our program!      Any time you are unable to keep your appointment we ask that you call us at least 24 hours in advance to cancel.This will allow us to offer the appointment time to another patient.     Multiple missed appointments may lead to dismissal from the clinic.    Patient Education     Lyrica Oral Capsule 50  mg  Uses  This medicine is used for the following purposes:    anxiety    menopausal symptoms    muscle aches    nerve pain    pain    restless leg syndrome    seizures  Instructions  This medicine may be taken with or without food.  It is very important that you take the medicine at about the same time every day. It will work best if you do this.  Keep the medicine at room temperature. Avoid heat and direct light.  It is important that you keep taking each dose of this medicine on time even if you are feeling well.  If you forget to take a dose on time, take it as soon as you remember. If it is almost time for the next dose, do not take the missed dose. Return to your normal dosing schedule. Do not take 2 doses of this medicine at one time.  Please tell your doctor and pharmacist about all the medicines you take. Include both prescription and over-the-counter medicines. Also tell them about any vitamins, herbal medicines, or anything else you take for your health.  Do not suddenly stop taking this medicine. Check with your doctor before stopping.  Cautions  Tell your doctor and pharmacist if you ever had an allergic reaction to a medicine. Symptoms of an allergic reaction can include trouble breathing, skin rash, itching, swelling, or severe dizziness.  Do not use the medication any more than instructed.  If possible, avoid using with marijuana or other medicines that can cause dizziness or drowsiness. These include allergy/cold products, muscle relaxers, sleep aids, and pain relievers.  Your ability to stay alert or to react quickly may be impaired by this medicine. Do not drive or operate machinery until you know how this medicine will affect you.  Do not drink beverages with alcohol while on this medicine.  Family should check on the patient often. Call the doctor if patient becomes more depressed, has thoughts of suicide, or shows changes in behavior.  Call the doctor if there are any signs of confusion or  unusual changes in behavior.  Tell the doctor or pharmacist if you are pregnant, planning to be pregnant, or breastfeeding.  Ask your pharmacist if this medicine can interact with any of your other medicines. Be sure to tell them about all the medicines you take.  Please tell all your doctors and dentists that you are on this medicine before they provide care.  Do not start or stop any other medicines without first speaking to your doctor or pharmacist.  Call your doctor right away if you notice slow or shallow breathing.  Call your doctor right away if you notice any unusual bleeding or bruising.  Do not share this medicine with anyone who has not been prescribed this medicine.  This medicine can cause serious side effects in some patients. Important information from the U[place] Food and Drug Administration (FDA) is available from your pharmacist. Please review it carefully with your pharmacist to understand the risks associated with this medicine.  Side Effects  The following is a list of some common side effects from this medicine. Please speak with your doctor about what you should do if you experience these or other side effects.    constipation    dizziness    drowsiness or sedation    dry mouth    lack of energy and tiredness    weight gain  Call your doctor or get medical help right away if you notice any of these more serious side effects:    swelling of the legs, feet, and hands    blurring or changes of vision  A few people may have an allergic reactions to this medicine. Symptoms can include difficulty breathing, skin rash, itching, swelling, or severe dizziness. If you notice any of these symptoms, seek medical help quickly.  Extra  Please speak with your doctor, nurse, or pharmacist if you have any questions about this medicine.  [email or web address]  IMPORTANT NOTE: This document tells you briefly how to take your medicine, but it does not tell you all there is to know about  it.Your doctor or pharmacist may give you other documents about your medicine. Please talk to them if you have any questions.Always follow their advice. There is a more complete description of this medicine available in English.Scan this code on your smartphone or tablet or use the web address below. You can also ask your pharmacist for a printout. If you have any questions, please ask your pharmacist.     2021 [employer].           Patient Education     Lyrica Oral Capsule 50 mg  Uses  This medicine is used for the following purposes:    anxiety    menopausal symptoms    muscle aches    nerve pain    pain    restless leg syndrome    seizures  Instructions  This medicine may be taken with or without food.  It is very important that you take the medicine at about the same time every day. It will work best if you do this.  Keep the medicine at room temperature. Avoid heat and direct light.  It is important that you keep taking each dose of this medicine on time even if you are feeling well.  If you forget to take a dose on time, take it as soon as you remember. If it is almost time for the next dose, do not take the missed dose. Return to your normal dosing schedule. Do not take 2 doses of this medicine at one time.  Please tell your doctor and pharmacist about all the medicines you take. Include both prescription and over-the-counter medicines. Also tell them about any vitamins, herbal medicines, or anything else you take for your health.  Do not suddenly stop taking this medicine. Check with your doctor before stopping.  Cautions  Tell your doctor and pharmacist if you ever had an allergic reaction to a medicine. Symptoms of an allergic reaction can include trouble breathing, skin rash, itching, swelling, or severe dizziness.  Do not use the medication any more than instructed.  If possible, avoid using with marijuana or other medicines that can cause dizziness or drowsiness. These include allergy/cold  products, muscle relaxers, sleep aids, and pain relievers.  Your ability to stay alert or to react quickly may be impaired by this medicine. Do not drive or operate machinery until you know how this medicine will affect you.  Do not drink beverages with alcohol while on this medicine.  Family should check on the patient often. Call the doctor if patient becomes more depressed, has thoughts of suicide, or shows changes in behavior.  Call the doctor if there are any signs of confusion or unusual changes in behavior.  Tell the doctor or pharmacist if you are pregnant, planning to be pregnant, or breastfeeding.  Ask your pharmacist if this medicine can interact with any of your other medicines. Be sure to tell them about all the medicines you take.  Please tell all your doctors and dentists that you are on this medicine before they provide care.  Do not start or stop any other medicines without first speaking to your doctor or pharmacist.  Call your doctor right away if you notice slow or shallow breathing.  Call your doctor right away if you notice any unusual bleeding or bruising.  Do not share this medicine with anyone who has not been prescribed this medicine.  This medicine can cause serious side effects in some patients. Important information from the [place] Food and Drug Administration (FDA) is available from your pharmacist. Please review it carefully with your pharmacist to understand the risks associated with this medicine.  Side Effects  The following is a list of some common side effects from this medicine. Please speak with your doctor about what you should do if you experience these or other side effects.    constipation    dizziness    drowsiness or sedation    dry mouth    lack of energy and tiredness    weight gain  Call your doctor or get medical help right away if you notice any of these more serious side effects:    swelling of the legs, feet, and hands    blurring or changes of vision  A few people  may have an allergic reactions to this medicine. Symptoms can include difficulty breathing, skin rash, itching, swelling, or severe dizziness. If you notice any of these symptoms, seek medical help quickly.  Extra  Please speak with your doctor, nurse, or pharmacist if you have any questions about this medicine.  [email or web address]  IMPORTANT NOTE: This document tells you briefly how to take your medicine, but it does not tell you all there is to know about it.Your doctor or pharmacist may give you other documents about your medicine. Please talk to them if you have any questions.Always follow their advice. There is a more complete description of this medicine available in English.Scan this code on your smartphone or tablet or use the web address below. You can also ask your pharmacist for a printout. If you have any questions, please ask your pharmacist.     2021 [employer].

## 2021-12-09 NOTE — PROGRESS NOTES
12/09/21 7896   PEG: A Thee-Item Scale Assessing Pain Intensity and Interference        0 = No pain / No interference    10 = Pain as bad as you can imagine / Completely interferes   What number best describes your pain on average in the past week? 5   What number best describes how, during the past week, pain has interfered with your enjoyment of life? 3   What number best describes how, during the past week, pain has interfered with your general activity? 4   PEG Total Score 4

## 2021-12-09 NOTE — PROGRESS NOTES
"PAIN CENTER PROGRESS NOTE    Subjective:   Anand Felix is a 36 year old male who presents for evaluation of low back pain.  He states this has been present for a very long time and 2018 had back surgery for sciatica and states it helped his leg pain but made his back pain worse.      Major issues:  1. Chronic pain syndrome    2. Chronic, continuous use of opioids    3. Medical cannabis use    4. Chronic right-sided low back pain with right-sided sciatica      Pain location and description: see CMA Note.  Moderate Pain (5)  Sharp, burning sensation and like a tight ripping in back and legs.  Radiation of pain: bilateral legs, right worse than left.    Gait disturbance: None reported, denies recent falls. Has balance changes and uses cane as needed.  Exacerbating factors: Almost everything - standing, lifting, sitting long periods  Alleviating factors: heat and cold packs, epsom bath salts, massage.  Associated symptoms: Weakness in legs, numbness intermittent in legs,   Adverse effects of medications: None reported  Current treatment efficacy: Good   Current treatment compliance: New to this provider.    Since the last Pain Center visit, the patient denies any use of anticoagulant medications. Denies any new diagnostic testing, new treatments or new medical conditions, any changes in medications, or any ED/urgent care visits.      Patient was seeing Dayna Lundberg CNP, last visit reviewed from 10/08/2021:  \"Pertinent Pain Medications/interventions:     10/8/2021- gabapentin was stopped previously, he is using medical cannabis- last renewed 10/8/2021, percocet- 4 tabs of 7.5 mg per day.      D/c belbuca at this time, will increase the use of the gabapentin to 300 mg two times a day and continue the use of the medical cannabis and percocet up to 3 per day as needed- 5/18/2020     He currently belbuca 300 mcg two times a day, gabapentin 100 mg three times a day, oxycodone 5 mg two times a day prn and medical cannabis, " "tizanidine and Norco.      Belbuca- headaches are associated so he stopped taking this. 9/6     Previously taking Belbuca 75 mcg, gabapentin 100 mg three times a day, oxycodone 5 mg up to two times a day as needed, medical cannabis, baclofen and flexeril      RFA did provide him with 80 % relief.      Medical cannabis\"    Current Outpatient Medications   Medication Sig Dispense Refill     Acetaminophen (TYLENOL PO)        escitalopram (LEXAPRO) 10 MG tablet Take 1 tablet (10 mg) by mouth daily 90 tablet 3     fluticasone (FLONASE) 50 MCG/ACT nasal spray SPRAY 2 SPRAYS INTO EACH NOSTRIL EVERY DAY       omeprazole (PRILOSEC) 20 MG DR capsule TAKE 1 CAPSULE BY MOUTH EVERY DAY       oxyCODONE-acetaminophen (PERCOCET) 7.5-325 MG per tablet Take 1 tablet by mouth 4 times daily as needed for moderate to severe pain 112 tablet 0     propranolol (INDERAL) 80 MG tablet        rivaroxaban ANTICOAGULANT (XARELTO) 20 MG TABS tablet Take 20 mg by mouth       VITAMIN D, CHOLECALCIFEROL, PO Take 2,000 Units by mouth daily       zolpidem (AMBIEN) 10 MG tablet TAKE 1 TABLET BY MOUTH AT BEDTIME AS NEEDED FOR SLEEP.       gabapentin (NEURONTIN) 100 MG capsule  (Patient not taking: Reported on 12/9/2021)       IBUPROFEN PO  (Patient not taking: Reported on 12/9/2021)       Stopped gabapentin to see if benefits were there and he states it was helpful and notices more muscle spasms without it.  He has been off a few months.  Denies side effects. Was taking Gabapentin 100 mg TID.    He is taking Percocet 7.5/325 mg QID prn and states sometimes skips 4th dose if able.  He last refilled 10/29/21 for 28 days.  He states he uses pill box fills himself, sometimes PCA double checks it.  He has 1 week remaining.  States it is more than 50% helpful, denies side effects.      Uses medical cannabis oral tangerine oil and vape - he is dosing oil suspension twice a day and 1 dose to help with sleep and the vape is 2-3 puffs 3-4 times a day as needed.  " "He states this is helpful for pain and denies side effects.  He usually registers in the fall.      He changed his diet gluten free and avoiding sugar and caffeine as much as possible - he states benefit with regular bowel movements, helping some of the burning sensation which is less.  He states his sleep at night is \"ok\" with Ambien, will use melatonin 3 mg as needed.  States mood has been ok, states wife and him are going through a lot this summer - she recently had kidney transplant.  He has psychotherapy with [facility]    Social: lives at home with wife no children, has PCA to help with lifting, occasional bathing or housework.  4 hours a day.  Denies working.   Doesn't drive.    Review of Systems  Constitutional: Denies fever, chills, night sweats, lethargy, weight loss, weight gain.  Musculoskeletal: Positive for back pain, muscle weakness.  Denies muscle pain, joint pain, joint swelling, recent falls.  Gastrointestional: Denies difficulty swallowing, change in appetite, abdominal pain, constipation, nausea, vomiting, diarrhea, GERD, fecal incontinence.  Genitourinary: Denies urinary incontinence, dysuria, hematuria, UTI, frequency, hesitancy  Neurologic: Denies headaches, confusion, seizure, changes in balance, changes in speech.  Psychiatric: Depression, anxiety.  Denies memory loss, psychoses, suicidal ideation, substance use/abuse.     Objective:     Vitals:    12/09/21 1336   BP: 115/79   Pulse: 78   PainSc: Moderate Pain (5)   PainLoc: Upper Leg     Physical Exam  Constitutional- General appearance: Normal.  Well developed, comfortable, within normal limits of ideal weight and appearance reflects stated age.  No acute distress or pain behaviors noted.  Presents alone today.   Psychiatric- Judgment and insight: Normal  Speech: Normal rhythm.  Thought process: Normal.  No abnormal thoughts reported. Alert & Oriented to person, place, and time.  Recent and remote memory: Normal.  Mood and " affect: Normal.  Observed mood: anxious.   Respiratory- Breathing is non-labored; normal rhythm and rate.  Cardiovascular- Extremities warm and well perfused, no peripheral edema or varicosities.  Dermatologic- Exposed skin is clean, dry, and intact to inspection and palpation.  Musculoskeletal- Gait and station: Normal.  Gait evaluation demonstrates ambulating independently.  Patient does not have difficulty rising from a seated position.    *Opioid Universal Precautions:    UDT -  10/13/2021    Opioid Agreement - 10/13/2021  Pharmacy- as documented    [place]  Reviewed - 12/09/2021  Pill Count - n/a  Psychological evaluation - [facility]  MME - 42  Pharmacogenetic testing - N/A    Imaging:  XR LUMBAR SPINE 4 OR MORE VWS  10/15/2018 3:11 PM     INDICATION: Check instability  COMPARISON: MRI lumbar spine 09/15/2018.     FINDINGS: 5 lumbar-type vertebral bodies. Minimal levoconvex curvature of the lumbar spine. The vertebral bodies of the lumbar spine have normal stature and alignment. No evidence of dynamic instability on flexion or extension. Small anterior marginal   osteophytes at L2/L3, L3/L4 and L4/L5. The disc spaces are well-maintained. No other significant degenerative changes. The disc spaces are well-maintained. Soft tissues unremarkable.    MRI Lumbar Spine with and without contrast 09/15/2016      Assessment:   Anand Felix is a 36 year old male seen in clinic today for low back pain, history of right L5-S1 right laminotomy.  Patient has failed LESI and lumbar radiofrequency in the past.  Possible to consider SCS trial in the future.  He is stable with medication plan and requests to trial neuropathic medication for burning.  He has taken Gabapentin in the past; discuss trial of Lyrica including possible risks and side effects.  He is also interested in returning to acupuncture for chronic low back pain and possibly PT in the future.  He is participating in behavioral health visits.  He also has  medical cannabis for pain and trying to minimize opioid use as possible.  Discuss this provider's last day at the pain center and he will continue care with another provider after 12/29/21.     Plan:   Continue Percocet 7.5/325 mg 4 tabs a day as needed for pain.  Refill sent to fill now; may last longer than 28 days.  Please call for next refill following guidelines below.  Continue medical cannabis use for pain.  Continue with pain psychotherapy visits as able  Resume acupuncture visits with [facility] - referral placed, you may schedule weekly.  Also discussed option of myofascial release PT referral in the future   Trial Lyrica for nerve pain - 50 mg at night x 1 week, then increase to 50 mg twice a day x 1 week, then 50 mg 3 times a day.  Call nurse line with any side effect concerns.  Information below.    Follow-up in 8-10 weeks with any provider at the pain center or virtual visit.    Jovita Jo PA-C  [facility]   [address]  [place] [address]  Ph: [phone]  Fax: [phone]    37 minutes spent on the date of the encounter doing chart review, history and exam, documentation,and further activities.

## 2021-12-13 ENCOUNTER — OFFICE VISIT (OUTPATIENT)
Dept: PULMONOLOGY | Facility: OTHER | Age: 36
End: 2021-12-13
Payer: COMMERCIAL

## 2021-12-13 VITALS
OXYGEN SATURATION: 94 % | SYSTOLIC BLOOD PRESSURE: 130 MMHG | WEIGHT: 155.8 LBS | HEART RATE: 73 BPM | DIASTOLIC BLOOD PRESSURE: 80 MMHG | BODY MASS INDEX: 25.93 KG/M2

## 2021-12-13 DIAGNOSIS — R06.09 DYSPNEA ON EXERTION: Primary | ICD-10-CM

## 2021-12-13 PROCEDURE — 99214 OFFICE O/P EST MOD 30 MIN: CPT | Performed by: INTERNAL MEDICINE

## 2021-12-13 RX ORDER — PREDNISONE 10 MG/1
TABLET ORAL
Qty: 30 TABLET | Refills: 0 | Status: SHIPPED | OUTPATIENT
Start: 2021-12-13 | End: 2021-12-25

## 2021-12-13 NOTE — PATIENT INSTRUCTIONS
1) I'm not sure if your worsening shortness of breath is due to weakness or inflammation in your lung  2) Your cough and wheezing could be due to upper airway drainage  3) I'm going to ask you to do pulmonary function tests again as well as a chest xray to make sure your weakness isn't getting worse  4) I will also give you a round of prednisone to see if this helps your shortness of breath or cough.  If it does it suggests you have inflammation driving this with mucous.  Lets see how it works and call us with the results

## 2021-12-13 NOTE — PROGRESS NOTES
Assessment and Plan:Anand Felix is a 36 year old male with a past medical history significant for an unclassified muscular weakness and pulmonary embolism who presents to clinic today for follow up.  Since his last visit his shortness of breath seems to have gotten worse and he is coughing more.  I suspect his inspiration is getting shallower and he is having more symptoms of atelectasis.  He also has chronic sinusitis which could be driving his cough and upper airway wheezing.    1) Dyspnea - will repeat PFTs with MIP and MEP as it has been 3 years.  If this shows a decline it will point towards increasing weakness as the cause of his dyspnea, and likely atelectasis.  He may need a workup for nocturnal BIPAP if so, this may include blood gases to determine if he has hypercapnea.  His last PFTs showed restriction from weakness  2) Cough and wheezing - will give a course of prednisone to see if we can dry up his sinuses or reverse any pulmonary inflammation he has.  If this makes him feel remarkably better we can work towards more inhaler therapy, however if no different, I will be more apt to think his weakness is the sole contributor.  3) Pulmonary embolism - given his weakness and relative immobility recommend he stay on the blood thinner lifelong.  4) RTC in 3 months to follow up this workup.           CCx: dyspnea    HPI: Mr. Felix is a 36 year old male with a history of muscular weakness and pulmonary embolism who returns for a one year follow up.  Overall Anand feels his breathing is getting worse.  He can get short of breath at rest sometimes.  He does have increased coughing and sometimes hears wheezing that can clear with a cough.  He doesn't feel his legs and arm are getting a lot worse, but they aren't better either.  He uses incentive spirometry but it typically doesn't go above 500.  He continues to struggle with chronic sinusitis and chronic pains.      ROS:  Review of Systems - History obtained from the  patient  General ROS: negative  Psychological ROS: negative  ENT ROS: negative  Allergy and Immunology ROS: negative  Endocrine ROS: negative  Respiratory ROS: positive for - cough, shortness of breath, sputum changes and wheezing  negative for - hemoptysis or orthopnea  Cardiovascular ROS: no chest pain or palpitations  Gastrointestinal ROS: no abdominal pain, change in bowel habits, or black or bloody stools  Genito-Urinary ROS: no dysuria, trouble voiding, or hematuria  Musculoskeletal ROS: weakness  Neurological ROS: no TIA or stroke symptoms  Dermatological ROS: negative      Current Meds:  Current Outpatient Medications   Medication Sig Dispense Refill     Acetaminophen (TYLENOL PO)        escitalopram (LEXAPRO) 10 MG tablet Take 1 tablet (10 mg) by mouth daily 90 tablet 3     fluticasone (FLONASE) 50 MCG/ACT nasal spray SPRAY 2 SPRAYS INTO EACH NOSTRIL EVERY DAY       omeprazole (PRILOSEC) 20 MG DR capsule TAKE 1 CAPSULE BY MOUTH EVERY DAY       predniSONE (DELTASONE) 10 MG tablet Take 4 tablets (40 mg) by mouth daily for 3 days, THEN 3 tablets (30 mg) daily for 3 days, THEN 2 tablets (20 mg) daily for 3 days, THEN 1 tablet (10 mg) daily for 3 days. 30 tablet 0     pregabalin (LYRICA) 50 MG capsule Take 1 capsule (50 mg) by mouth At Bedtime for 7 days, THEN 1 capsule (50 mg) 2 times daily for 7 days, THEN 1 capsule (50 mg) 3 times daily for 16 days. 69 capsule 0     propranolol (INDERAL) 80 MG tablet        rivaroxaban ANTICOAGULANT (XARELTO) 20 MG TABS tablet Take 20 mg by mouth       VITAMIN D, CHOLECALCIFEROL, PO Take 2,000 Units by mouth daily       zolpidem (AMBIEN) 10 MG tablet TAKE 1 TABLET BY MOUTH AT BEDTIME AS NEEDED FOR SLEEP.       oxyCODONE-acetaminophen (PERCOCET) 7.5-325 MG per tablet Take 1 tablet by mouth 4 times daily as needed for moderate to severe pain 112 tablet 0       Labs:  @clab@  Lab Results   Component Value Date    WBC 8.0 03/16/2021    HGB 15.7 03/16/2021    HCT 47.5 03/16/2021      03/16/2021     03/16/2021    POTASSIUM 3.9 03/16/2021    CHLORIDE 105 03/16/2021    CO2 26 03/16/2021     03/16/2021    BUN 18 03/16/2021    CR 0.91 03/16/2021    INR 1.52 (H) 11/24/2019    BILITOTAL 0.6 03/16/2021    AST 18 03/16/2021    ALT 18 03/16/2021    ALKPHOS 72 03/16/2021    PROTTOTAL 8.0 03/16/2021    ALBUMIN 4.4 03/16/2021       I have personally reviewed all pertinent imaging studies and PFT results unless otherwise noted.    Imaging studies:  No results found.      Physical Exam:  /80   Pulse 73   Wt 70.7 kg (155 lb 12.8 oz)   SpO2 94%   BMI 25.93 kg/m    General - Well nourished  Ears/Mouth -  Deferred given mask use during pandemic  Neck - no cervical lymphadenopathy  Lungs - Clear to ausculation bilaterally but very shallow breaths  - noted some fasciculations on attempt to take a deep breath with postural instability  CVS - regular rhythm with no murmurs, rubs or gallups  Abdomen - soft, NT, ND, NABS  Ext - no cyanosis, clubbing or edema  Skin - no rash  Psychology - alert and oriented, answers appropriate        Electronically signed by:    Jules To MD PhD  [facility] Critical [facility] Medicine

## 2021-12-13 NOTE — LETTER
12/13/2021       RE: Anand Felix  [address]  [address]  [facility] [address]     Dear Colleague,    Thank you for referring your patient, Anand Felix, to the [facility]. Please see a copy of my visit note below.    Assessment and Plan:Anand Felix is a 36 year old male with a past medical history significant for an unclassified muscular weakness and pulmonary embolism who presents to clinic today for follow up.  Since his last visit his shortness of breath seems to have gotten worse and he is coughing more.  I suspect his inspiration is getting shallower and he is having more symptoms of atelectasis.  He also has chronic sinusitis which could be driving his cough and upper airway wheezing.    1) Dyspnea - will repeat PFTs with MIP and MEP as it has been 3 years.  If this shows a decline it will point towards increasing weakness as the cause of his dyspnea, and likely atelectasis.  He may need a workup for nocturnal BIPAP if so, this may include blood gases to determine if he has hypercapnea.  His last PFTs showed restriction from weakness  2) Cough and wheezing - will give a course of prednisone to see if we can dry up his sinuses or reverse any pulmonary inflammation he has.  If this makes him feel remarkably better we can work towards more inhaler therapy, however if no different, I will be more apt to think his weakness is the sole contributor.  3) Pulmonary embolism - given his weakness and relative immobility recommend he stay on the blood thinner lifelong.  4) RTC in 3 months to follow up this workup.           CCx: dyspnea    HPI: Mr. Felix is a 36 year old male with a history of muscular weakness and pulmonary embolism who returns for a one year follow up.  Overall Anand feels his breathing is getting worse.  He can get short of breath at rest sometimes.  He does have increased coughing and sometimes hears wheezing that can  clear with a cough.  He doesn't feel his legs and arm are getting a lot worse, but they aren't better either.  He uses incentive spirometry but it typically doesn't go above 500.  He continues to struggle with chronic sinusitis and chronic pains.      ROS:  Review of Systems - History obtained from the patient  General ROS: negative  Psychological ROS: negative  ENT ROS: negative  Allergy and Immunology ROS: negative  Endocrine ROS: negative  Respiratory ROS: positive for - cough, shortness of breath, sputum changes and wheezing  negative for - hemoptysis or orthopnea  Cardiovascular ROS: no chest pain or palpitations  Gastrointestinal ROS: no abdominal pain, change in bowel habits, or black or bloody stools  Genito-Urinary ROS: no dysuria, trouble voiding, or hematuria  Musculoskeletal ROS: weakness  Neurological ROS: no TIA or stroke symptoms  Dermatological ROS: negative      Current Meds:  Current Outpatient Medications   Medication Sig Dispense Refill     Acetaminophen (TYLENOL PO)        escitalopram (LEXAPRO) 10 MG tablet Take 1 tablet (10 mg) by mouth daily 90 tablet 3     fluticasone (FLONASE) 50 MCG/ACT nasal spray SPRAY 2 SPRAYS INTO EACH NOSTRIL EVERY DAY       omeprazole (PRILOSEC) 20 MG DR capsule TAKE 1 CAPSULE BY MOUTH EVERY DAY       predniSONE (DELTASONE) 10 MG tablet Take 4 tablets (40 mg) by mouth daily for 3 days, THEN 3 tablets (30 mg) daily for 3 days, THEN 2 tablets (20 mg) daily for 3 days, THEN 1 tablet (10 mg) daily for 3 days. 30 tablet 0     pregabalin (LYRICA) 50 MG capsule Take 1 capsule (50 mg) by mouth At Bedtime for 7 days, THEN 1 capsule (50 mg) 2 times daily for 7 days, THEN 1 capsule (50 mg) 3 times daily for 16 days. 69 capsule 0     propranolol (INDERAL) 80 MG tablet        rivaroxaban ANTICOAGULANT (XARELTO) 20 MG TABS tablet Take 20 mg by mouth       VITAMIN D, CHOLECALCIFEROL, PO Take 2,000 Units by mouth daily       zolpidem (AMBIEN) 10 MG tablet TAKE 1 TABLET BY MOUTH AT  BEDTIME AS NEEDED FOR SLEEP.       oxyCODONE-acetaminophen (PERCOCET) 7.5-325 MG per tablet Take 1 tablet by mouth 4 times daily as needed for moderate to severe pain 112 tablet 0       Labs:  @clab@  Lab Results   Component Value Date    WBC 8.0 03/16/2021    HGB 15.7 03/16/2021    HCT 47.5 03/16/2021     03/16/2021     03/16/2021    POTASSIUM 3.9 03/16/2021    CHLORIDE 105 03/16/2021    CO2 26 03/16/2021     03/16/2021    BUN 18 03/16/2021    CR 0.91 03/16/2021    INR 1.52 (H) 11/24/2019    BILITOTAL 0.6 03/16/2021    AST 18 03/16/2021    ALT 18 03/16/2021    ALKPHOS 72 03/16/2021    PROTTOTAL 8.0 03/16/2021    ALBUMIN 4.4 03/16/2021       I have personally reviewed all pertinent imaging studies and PFT results unless otherwise noted.    Imaging studies:  No results found.      Physical Exam:  /80   Pulse 73   Wt 70.7 kg (155 lb 12.8 oz)   SpO2 94%   BMI 25.93 kg/m    General - Well nourished  Ears/Mouth -  Deferred given mask use during pandemic  Neck - no cervical lymphadenopathy  Lungs - Clear to ausculation bilaterally but very shallow breaths  - noted some fasciculations on attempt to take a deep breath with postural instability  CVS - regular rhythm with no murmurs, rubs or gallups  Abdomen - soft, NT, ND, NABS  Ext - no cyanosis, clubbing or edema  Skin - no rash  Psychology - alert and oriented, answers appropriate        Electronically signed by:    Jules To MD PhD  [facility] Pulmonary and Critical Care Medicine      Again, thank you for allowing me to participate in the care of your patient.      Sincerely,    Jules To MD

## 2021-12-15 ENCOUNTER — HOSPITAL ENCOUNTER (OUTPATIENT)
Dept: RADIOLOGY | Facility: HOSPITAL | Age: 36
Discharge: HOME | End: 2021-12-15
Attending: INTERNAL MEDICINE | Admitting: INTERNAL MEDICINE
Payer: COMMERCIAL

## 2021-12-15 DIAGNOSIS — R06.09 DYSPNEA ON EXERTION: ICD-10-CM

## 2021-12-15 PROCEDURE — 71046 X-RAY EXAM CHEST 2 VIEWS: CPT

## 2021-12-28 DIAGNOSIS — I26.99 PULMONARY EMBOLISM (H): Primary | ICD-10-CM

## 2022-01-10 DIAGNOSIS — G89.4 CHRONIC PAIN SYNDROME: ICD-10-CM

## 2022-01-11 RX ORDER — PREGABALIN 50 MG/1
50 CAPSULE ORAL 3 TIMES DAILY
Qty: 90 CAPSULE | Refills: 1 | Status: SHIPPED | OUTPATIENT
Start: 2022-01-11 | End: 2022-03-07

## 2022-01-11 NOTE — TELEPHONE ENCOUNTER
Received call from patient requesting refill(s) of Lyrica    Last dispensed from pharmacy on 12/9/21    Patient's last office/virtual visit by prescribing provider on 12/9/21  Next office/virtual appointment scheduled for 3/4/22    Last urine drug screen date 10/2021  Current opioid agreement on file (completed within the last year) Yes Date of opioid agreement: 10/2021    E-prescribe to [facility] pharmacy  Pending Prescriptions:                       Disp   Refills    pregabalin (LYRICA) 50 MG capsule [Pharma*90 cap*0            Sig: Take 1 capsule (50 mg) by mouth 3 times daily Use           dates: 1/11/22-2/10/22      .

## 2022-02-22 ENCOUNTER — IMMUNIZATION (OUTPATIENT)
Dept: NURSING | Facility: CLINIC | Age: 37
End: 2022-02-22
Payer: COMMERCIAL

## 2022-02-22 PROCEDURE — 0051A COVID-19,PF,PFIZER (12+ YRS): CPT

## 2022-02-22 PROCEDURE — 91305 COVID-19,PF,PFIZER (12+ YRS): CPT

## 2022-03-15 ENCOUNTER — IMMUNIZATION (OUTPATIENT)
Dept: NURSING | Facility: CLINIC | Age: 37
End: 2022-03-15
Attending: FAMILY MEDICINE
Payer: COMMERCIAL

## 2022-03-15 PROCEDURE — 0052A COVID-19,PF,PFIZER (12+ YRS): CPT

## 2022-03-15 PROCEDURE — 91305 COVID-19,PF,PFIZER (12+ YRS): CPT

## 2022-03-30 ENCOUNTER — DOCUMENTATION ONLY (OUTPATIENT)
Dept: PALLIATIVE MEDICINE | Facility: OTHER | Age: 37
End: 2022-03-30
Payer: COMMERCIAL

## 2022-03-30 NOTE — TELEPHONE ENCOUNTER
D) Sent intent to discharge from psychotherapy services letter this date. Patient was last seen on 6/22/21. He will be discharged on 4/8/22 if he does not resume care. See letter on file.

## 2022-04-28 ENCOUNTER — MYC MEDICAL ADVICE (OUTPATIENT)
Dept: FAMILY MEDICINE | Facility: CLINIC | Age: 37
End: 2022-04-28
Payer: COMMERCIAL

## 2022-04-29 DIAGNOSIS — G47.00 INSOMNIA, UNSPECIFIED TYPE: ICD-10-CM

## 2022-04-29 RX ORDER — ZOLPIDEM TARTRATE 10 MG/1
5-10 TABLET ORAL
Qty: 30 TABLET | Refills: 2 | Status: SHIPPED | OUTPATIENT
Start: 2022-04-29 | End: 2022-11-27

## 2022-04-29 NOTE — TELEPHONE ENCOUNTER
Left message for patient that I will resend zolpidem 10 mg to use nightly as needed for insomnia management.  30 tablets with 2 refills.  It had been sent apparently April 21, 2022 however again I will resend prescription.

## 2022-05-02 ENCOUNTER — DOCUMENTATION ONLY (OUTPATIENT)
Dept: PALLIATIVE MEDICINE | Facility: OTHER | Age: 37
End: 2022-05-02
Payer: COMMERCIAL

## 2022-05-02 NOTE — TELEPHONE ENCOUNTER
Discharge Summary  Multiple Sessions    Client Name: Anand Felix MRN#: [id number] YOB: 1985      Intake / Discharge Date: Intake: 11/16/20  Discharge: 5/2/22      DSM5 Diagnoses: (Sustained by DSM5 Criteria Listed Above)  Diagnoses: Major depression, severe   Generalized Anxiety Disorder  Chronic pain syndrome  Psychosocial & Contextual Factors: Chronic pain and mental health concerns. History of trauma  WHODAS 2.0 (12 item) Score: 47.92%          Presenting Concern:  Patient struggled with mental health and chronic pain concerns. He has a history of trauma. He had worries about his wife, who also had health issues. He struggled with not being as active as he would have liked, due to health issues.       Reason for Discharge:  Client did not return      Disposition at Time of Last Encounter:   Comments: Patient continued to struggle with depression and chronic pain/medical issues. He had increased stress/concern due to his wife's health issues and a desire to move to their own home.        Risk Management:   Client has had a history of suicidal ideation: He denies plan or means  Recommended that patient call 911 or go to the local ED should there be a change in any of these risk factors.      Referred To:  Patient to continue working with his medical providers as scheduled. Return to psychotherapy, if interested.         ROBERT COON   5/2/2022

## 2022-05-23 DIAGNOSIS — G89.4 CHRONIC PAIN SYNDROME: ICD-10-CM

## 2022-05-23 RX ORDER — PREGABALIN 50 MG/1
50 CAPSULE ORAL 3 TIMES DAILY
Qty: 90 CAPSULE | Refills: 1 | Status: SHIPPED | OUTPATIENT
Start: 2022-05-23 | End: 2022-06-16

## 2022-05-23 NOTE — TELEPHONE ENCOUNTER
Received fax from pharmacy requesting refill(s) for pregabalin (LYRICA) 50 MG capsule     Last refilled on 4/19/22    Pt last seen on 12/9/22  Next appt scheduled for 6/16/22    E-prescribe to:    [id number] [place], [place] - [address]     Will facilitate refill.

## 2022-06-16 ENCOUNTER — OFFICE VISIT (OUTPATIENT)
Dept: PALLIATIVE MEDICINE | Facility: OTHER | Age: 37
End: 2022-06-16
Payer: COMMERCIAL

## 2022-06-16 VITALS
SYSTOLIC BLOOD PRESSURE: 129 MMHG | BODY MASS INDEX: 31.12 KG/M2 | WEIGHT: 187 LBS | HEART RATE: 89 BPM | OXYGEN SATURATION: 96 % | DIASTOLIC BLOOD PRESSURE: 88 MMHG

## 2022-06-16 DIAGNOSIS — G89.4 CHRONIC PAIN SYNDROME: ICD-10-CM

## 2022-06-16 PROCEDURE — 99215 OFFICE O/P EST HI 40 MIN: CPT | Performed by: ANESTHESIOLOGY

## 2022-06-16 PROCEDURE — G0463 HOSPITAL OUTPT CLINIC VISIT: HCPCS

## 2022-06-16 RX ORDER — OXYCODONE AND ACETAMINOPHEN 7.5; 325 MG/1; MG/1
1 TABLET ORAL 4 TIMES DAILY PRN
Qty: 112 TABLET | Refills: 0 | Status: SHIPPED | OUTPATIENT
Start: 2022-06-23 | End: 2022-07-20

## 2022-06-16 RX ORDER — LAMOTRIGINE 25 MG/1
TABLET ORAL
Qty: 60 TABLET | Refills: 3 | Status: SHIPPED | OUTPATIENT
Start: 2022-06-16 | End: 2022-08-04

## 2022-06-16 RX ORDER — PREGABALIN 50 MG/1
50 CAPSULE ORAL 2 TIMES DAILY
Qty: 45 CAPSULE | Refills: 1 | Status: SHIPPED | OUTPATIENT
Start: 2022-06-16 | End: 2022-07-07

## 2022-06-16 ASSESSMENT — PAIN SCALES - GENERAL: PAINLEVEL: MODERATE PAIN (5)

## 2022-06-16 NOTE — PROGRESS NOTES
[facility] - Office Visit    ASSESSMENT & PLAN     Anand was seen today for pain.    Diagnoses and all orders for this visit:    Chronic pain syndrome  -     oxyCODONE-acetaminophen (PERCOCET) 7.5-325 MG per tablet; Take 1 tablet by mouth 4 times daily as needed for moderate to severe pain May fill/start 5/23/22  -     lamoTRIgine (LAMICTAL) 25 MG tablet; One daily for 2 weeks, one twice a day 2 weeks, two morning and one bedtime  -     pregabalin (LYRICA) 50 MG capsule; Take 1 capsule (50 mg) by mouth 2 times daily Use dates: 3/12/22-4/11/22  -     PAIN INJECTION EVAL/TREAT/FOLLOW UP; Future  -     Vitamin D deficiency screening; Future        Patient Instructions   PLAN:    Discussed an evaluation for spinal cord stimulator trial.    Reviewed weight gain possibly from Lyrica,  taper off, 50 mg twice a day for 10 days, once a day for 10 days and stop.    Begin now lamotrigine to help with nerve pain, 25 mg 1 daily for 2 weeks, 1 twice a day for 2 weeks, then 2 in the morning and 1 at bedtime.    Continue the oxycodone 7.5 mg every 6 hours as needed up to 4 daily.    Continue the medical cannabis program.    Obtain a vitamin D level at any [facility] laboratory.    Follow-up with Santino Escobar, [facility] pharmacist in 2 to 3 weeks and with Dr. Faulkner in 2 to 3 months        -----  ANNALISA FAULKNER MD  [facility]       SUBJECTIVE      Anand Felix is a 37 year old year old male who presents to clinic today for the following:     Back pain and radiculopathy with a history of laminectomy.  Transitioning care from [facility] NP    7-year-old male living in [place] with his wife.  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  No children.    He describes as a teenager experiencing a herniated disc.  He was able to live with it.  2015 he developed more problems a sciatic down his right leg.  Reviews a variety of steroid injection without benefit, physical  "therapy.  He did have surgery at the end of 2015.  He describes it did not go well with complications.  He was left with having right greater than left leg weakness and pain down the back of his legs.  Further injections and physical therapy did not help.    MRI 2016 reports there is likely compression of the bilateral traversing S1 root with enhancing tissue could relate to granulation tissue.  He reports being told there were no further surgical options    There might have been some mention of a spinal cord stimulator trial.    He describes presently pain is in his back can move up into his shoulders, particularly down the back of his right leg.  He has been wearing braces on both feet since then due to weakness.  No bowel and bladder changes.    Has been using Percocet 7.5 mg 3-4 times a day which seems to be helpful.  No particular side effects.   reviewed as expected last urine drug test in October.  A testosterone level checked in 2019 normal    He tried gabapentin in the past seem to affect his balance.  Is now on Lyrica 50 mg 3 times a day.  Seems to be helpful.  Still some twitching and spasms.  We discussed possible side effects he has gained some 35 pounds over the last year which may correlate with this, increased eating and appetite.    He has been using the medical cannabis high CBD oil and vaporizer which does seem to help to some extent.    Does not recall using duloxetine, is on Lexapro 10 mg which seems to help with mood without side effects.    He did have a trial of Belbuca, did not think it was helpful.    Sleep is okay with Ambien.  Energy is somewhat low in the winter better mood and energy in this pregnant summer.    Notes financial stressors as his wife is on disability and has dialysis and he has not working.    Describes he is \"adjusting\" with the impact of pain on his activity level.    Substance use does not use alcohol or drugs.    Medical history had a PE in 2016, possibly related " to less activity in his leg and may have had a DVT.  Is on blood thinner.  Is on vitamin D replacement      Current Outpatient Medications:      Acetaminophen (TYLENOL PO), , Disp: , Rfl:      escitalopram (LEXAPRO) 10 MG tablet, Take 1 tablet (10 mg) by mouth daily, Disp: 90 tablet, Rfl: 3     fluticasone (FLONASE) 50 MCG/ACT nasal spray, SPRAY 2 SPRAYS INTO EACH NOSTRIL EVERY DAY, Disp: , Rfl:      lamoTRIgine (LAMICTAL) 25 MG tablet, One daily for 2 weeks, one twice a day 2 weeks, two morning and one bedtime, Disp: 60 tablet, Rfl: 3     omeprazole (PRILOSEC) 20 MG DR capsule, TAKE 1 CAPSULE BY MOUTH EVERY DAY, Disp: , Rfl:      [START ON 6/23/2022] oxyCODONE-acetaminophen (PERCOCET) 7.5-325 MG per tablet, Take 1 tablet by mouth 4 times daily as needed for moderate to severe pain May fill/start 5/23/22, Disp: 112 tablet, Rfl: 0     pregabalin (LYRICA) 50 MG capsule, Take 1 capsule (50 mg) by mouth 2 times daily Use dates: 3/12/22-4/11/22, Disp: 45 capsule, Rfl: 1     propranolol (INDERAL) 80 MG tablet, Take 1 tablet (80 mg) by mouth 3 times daily, Disp: 270 tablet, Rfl: 3     VITAMIN D, CHOLECALCIFEROL, PO, Take 2,000 Units by mouth daily, Disp: , Rfl:      zolpidem (AMBIEN) 10 MG tablet, Take 0.5-1 tablets (5-10 mg) by mouth nightly as needed for sleep, Disp: 30 tablet, Rfl: 2     rivaroxaban ANTICOAGULANT (XARELTO) 20 MG TABS tablet, Take 1 tablet (20 mg) by mouth daily (with dinner), Disp: 30 tablet, Rfl: 11  Family history, uncle with back problems, brother and cousin also having herniated disks relatively young.  No clear family history of mood disorder    Review of Systems   General, psych, musculoskeletal, bowels and bladder otherwise normal other than above.    Substance abuse history: Anand  reports that he has never smoked. He has never used smokeless tobacco. He reports current alcohol use. He reports current drug use. Drug: Marijuana.    Past Medical History: Anand  has a past medical history of Anxiety,  Bulging lumbar disc, Depression, History of anesthesia complications, History of blood clots, Lumbar herniated disc (10/23/2015), Lumbar radiculopathy (11/25/2015), Shortness of breath, and Tachycardia.    Past Surgical history: Anand  has a past surgical history that includes sinus surgery (2014); Minimally Invasive Microdiscectomy Lumbar Spine (Right, 10/2015); back surgery; Pr Nasal/Sinus Endoscopy,Bx/[facility] Polyp/Debrid (Right, 5/22/2019); and Pr Nasal/Sinus Endoscopy,Bx/[facility] Polyp/Debrid (Bilateral, 11/20/2020).     Developmental history: Born in [place] then moved to [place] in [place].  Father was a  and a .  He is in the 6-7 children.  Completed high school some college.  Hobbies now include electronics and games.  He is raised [employer].  Denies issues of abuse.        OBJECTIVE   BP (!) 171/102   Pulse 106   Ht 1.829 m (6')   Wt 103.8 kg (228 lb 14.4 oz)   SpO2 97%   BMI 31.04 kg/m        Physical Exam  General: Appropriately groomed.  Ambulates with right foot externally rotated and limps.  Braces on both ankles.  Cardiovascular: Normal rate  Lungs: Pulmonary effort is normal, speaking in full sentences  MSK: Creased muscle tone trapezius, nontender, more tender in lumbar area to palpation.  On straight leg raising right leg feels tightness in the back and the knee.  Weakness with dorsiflexion bilaterally.  Skin: Warm and dry. No concerning rashes or lesions.  Neurologic: No focal deficit, alert and oriented x3  Psychiatric: normal mood and affect, cooperative      Assessment: History of laminectomy, ongoing radiculopathy, and weakness in legs, wearing AFO braces.  Has had conservative treatment.  Involved in the medical cannabis program reporting some benefit    May have weight gain related to the Lyrica.  Reports benefit from the Percocet and no concerns with use.  Has a TENS unit he uses at times.    We discussed concern with weight gain on the Lyrica, still target  symptoms, reviewed possible other agents to try for neuropathic benefit.  We will change a trial of lamotrigine.    Reviewed the role of spinal cord stimulator, suggested he have an assessment to see if he is considered appropriate candidate.    Total time more than 50 minutes

## 2022-06-16 NOTE — PATIENT INSTRUCTIONS
PLAN:    Discussed an evaluation for spinal cord stimulator trial.    Reviewed weight gain possibly from Lyrica,  taper off, 50 mg twice a day for 10 days, once a day for 10 days and stop.    Begin now lamotrigine to help with nerve pain, 25 mg 1 daily for 2 weeks, 1 twice a day for 2 weeks, then 2 in the morning and 1 at bedtime.    Continue the oxycodone 7.5 mg every 6 hours as needed up to 4 daily.    Continue the medical cannabis program.    Obtain a vitamin D level at [facility] laboratory.    Follow-up with DAIN Mauricio pharmacist in 2 to 3 weeks and with Dr. Small in 2 to 3 months

## 2022-06-16 NOTE — NURSING NOTE
Patient presents to the clinic today for a follow up with ANNALISA FAULKNER MD regarding Pain Management.      PEG Score 12/9/2021 6/16/2022   PEG Total Score 4 3           UDT/CSA = 10/13/2021      Medications-        QUESTIONS:              Adina FONTANA [facility] Patient Facilitator

## 2022-07-05 NOTE — PROGRESS NOTES
Medication Therapy Management (MTM) Encounter    ASSESSMENT:                            Medication Adherence/Access: No issues identified        Chronic Pain: Finding the transition from pregabalin to Lamotrigine effective, even with low doses. Notes less stiffness. Will continue titration as previously prescribed. Can continue to titrate dose at next follow-up.     Pt asks about long-term use of opioids. Reviewed potential adverse effects of long-term opioid use. Also reviewed concept of opioid tolerance. Will continue to reduce opioid use as able.       Headaches/Tachycardia : BP at goal <140/90. Pulse at goal . Infrequent headaches since starting Beta-blocker.       Mood/depression: PHQ9 at goal <5. Does note some continued depression - would like to re-establish with Edmundo. Reviewed that Lamotrigine may also help with depressive symptoms. Continue to monitor with dose titrations.     Sleeping well with Zolpidem. Pt asks safety of about long-term use. Reviewed that studies have not demonstrated long-term benefit. As mood and pain control improve with lamotrigine titration, can reassess need for Zolpidem.       Low Vitamin D: Forgot that he needed labs done. Pt to stop by [facility] after today's visit to have this drawn. Target levels 45-60 for chronic pain/mood support.     Heartburn: Well controlled. Continue PPI.       Pulmonary embolism: Follow-up with pulm as scheduled.     PLAN:                            1. Continue Lamotrigine as prescribed. Consider further titration at next MTM visit.   2. Pt given BH intake packet to re-stablish with Edmundo.   3. Have Vitamin D level drawn at [facility] after today's MTM visit.   4. PharmD to follow-up on SCS consult scheduling.       Follow-up: Return in about 4 weeks (around 8/4/2022) for Medication Management Pharmacist, by phone.  8/25 with Dr. Small.     SUBJECTIVE/OBJECTIVE:                          Anand Felix is a 37 year old male coming in for an  initial visit. He was referred to me from Anthony Small MD.      Reason for visit: Pain Follow-up.     Allergies/ADRs: Reviewed in chart  Past Medical History: Reviewed in chart  Tobacco: He reports that he has never smoked. He has never used smokeless tobacco.  Alcohol:  reports current alcohol use. Monthly or less 1-2 drinks at a time.       Medication Adherence/Access: no issues reported        Chronic Pain: Recently transitioned from Dayna Lundberg CNP to seeing Anthony Small MD on 6/16. At that time, tapered off pregablin due to concerns of weight gain. Started on lamotrigine 25 mg 1 tab daily for 2 weeks then daily for 2 weeks and 3 tabs daily for 2 weeks.  Continue with oxycodone-acetaminophen 7.5-325 mg 4 times daily as needed. Also enrolled in medical cannabis program - uses Rosanne oil three times daily and [place] vape as needed. On average, using Tangerine 2 puffs about 4 times per day.     Has worked up to Lamotrigine 25 mg twice daily.     Typically needing 3 tabs of Oxycodone on average.     Didn't understand that he was to taper off the Pregabalin. He just stopped. Didn't have any adverse reaction. Pain is actually less stiff since being off Pregablin and on Lamotrigine.     Had discussed SCS eval at last visit with Dr. Small, but hasn't heard about schedule.        Headaches/Tachycardia : Prescribed propranolol 80 mg 3 times daily.     With propranolol, maybe 1-2 headaches per month.     BP Readings from Last 3 Encounters:   07/07/22 120/85   06/16/22 129/88   12/13/21 130/80      Pulse Readings from Last 3 Encounters:   07/07/22 81   06/16/22 89   12/13/21 73       Mood/depression: Prescribed escitalopram 10 mg daily, Zolpidem 10 mg 0.5-1 tab at bedtime as needed. Typically using Zolpidem 0.33-0.5 tabs every night.     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 Hasn't seen her for a year. Interested in re-establishing.     Denies any concerns with anxiety or racing thoughts.          Low Vitamin D: Prescribed Vitamin D3 2000 units daily.       Was supposed to have a level done but forgot about this.      Latest Reference Range & Units 11/30/18 12:06   Vitamin D, Total (25-Hydroxy) 30.0 - 80.0 ng/mL  30.0 - 80.0 ng/mL 43.0  43.0         Heartburn: Prescribed omeprazole 20 mg daily    Well managed with omeprazole. Denies breakthrough symptoms.       Pulmonary embolism: Prescribed rivaroxaban 20 mg daily.  Seen by pulmonology on 12/13/2021 -recommended lifelong anticoagulation    Baseline has shortness of breath -nothing new or worsened recently. Has pulmonology appt tomorrow.       Today's Vitals: /85   Pulse 81   ----------------      I spent 60 minutes with this patient today. All changes were made via collaborative practice agreement with Anthony Small MD. A copy of the visit note was provided to the patient's provider(s).    The patient was sent via [employer] a summary of these recommendations.     Santino Escobar, SeD  Medication Therapy Management (MTM) Pharmacist  [facility]       Medication Therapy Recommendations  No medication therapy recommendations to display

## 2022-07-07 ENCOUNTER — OFFICE VISIT (OUTPATIENT)
Dept: PHARMACY | Facility: OTHER | Age: 37
End: 2022-07-07
Payer: COMMERCIAL

## 2022-07-07 ENCOUNTER — LAB (OUTPATIENT)
Dept: LAB | Facility: HOSPITAL | Age: 37
End: 2022-07-07
Payer: COMMERCIAL

## 2022-07-07 VITALS — DIASTOLIC BLOOD PRESSURE: 85 MMHG | SYSTOLIC BLOOD PRESSURE: 120 MMHG | HEART RATE: 81 BPM

## 2022-07-07 DIAGNOSIS — R00.0 SINUS TACHYCARDIA: ICD-10-CM

## 2022-07-07 DIAGNOSIS — K21.00 GASTROESOPHAGEAL REFLUX DISEASE WITH ESOPHAGITIS, UNSPECIFIED WHETHER HEMORRHAGE: ICD-10-CM

## 2022-07-07 DIAGNOSIS — E55.9 HYPOVITAMINOSIS D: ICD-10-CM

## 2022-07-07 DIAGNOSIS — F32.9 MAJOR DEPRESSIVE DISORDER, REMISSION STATUS UNSPECIFIED, UNSPECIFIED WHETHER RECURRENT: ICD-10-CM

## 2022-07-07 DIAGNOSIS — I26.99 PULMONARY EMBOLISM, UNSPECIFIED CHRONICITY, UNSPECIFIED PULMONARY EMBOLISM TYPE, UNSPECIFIED WHETHER ACUTE COR PULMONALE PRESENT (H): ICD-10-CM

## 2022-07-07 DIAGNOSIS — G89.4 CHRONIC PAIN SYNDROME: ICD-10-CM

## 2022-07-07 DIAGNOSIS — M25.50 PAIN IN JOINT, MULTIPLE SITES: Primary | ICD-10-CM

## 2022-07-07 PROCEDURE — 36415 COLL VENOUS BLD VENIPUNCTURE: CPT

## 2022-07-07 PROCEDURE — 82306 VITAMIN D 25 HYDROXY: CPT

## 2022-07-07 PROCEDURE — 99605 MTMS BY PHARM NP 15 MIN: CPT | Performed by: PHARMACIST

## 2022-07-07 ASSESSMENT — PATIENT HEALTH QUESTIONNAIRE - PHQ9: SUM OF ALL RESPONSES TO PHQ QUESTIONS 1-9: 3

## 2022-07-07 NOTE — PATIENT INSTRUCTIONS
"Recommendations from today's MTM visit:                                                    MTM (medication therapy management) is a service provided by a clinical pharmacist designed to help you get the most of out of your medicines.   Today we reviewed what your medicines are for, how to know if they are working, that your medicines are safe and how to make your medicine regimen as easy as possible.      Continue Lamotrigine as prescribed. We will touch base in the beginning of August to see how it's going and potentially increase doses if needed.   Call [phone] to schedule the spinal cord stim consult.     Follow-up: Return in about 4 weeks (around 8/4/2022) for Medication Management Pharmacist, by phone.    It was great speaking with you today.  I value your experience and would be very thankful for your time in providing feedback in our clinic survey. In the next few days, you may receive an email or text message from [employer] with a link to a survey related to your  clinical pharmacist.\"     To schedule another MTM appointment, please call the clinic directly or you may call the MTM scheduling line at [phone] or toll-free at [phone].     My Clinical Pharmacist's contact information:                                                      Please feel free to contact me with any questions or concerns you have.      Santino Escobar, PharmD  Medication Therapy Management (MTM) Pharmacist  [facility]      "

## 2022-07-08 ENCOUNTER — ALLIED HEALTH/NURSE VISIT (OUTPATIENT)
Dept: PULMONOLOGY | Facility: OTHER | Age: 37
End: 2022-07-08
Payer: COMMERCIAL

## 2022-07-08 DIAGNOSIS — R06.09 DYSPNEA ON EXERTION: ICD-10-CM

## 2022-07-08 LAB
DEPRECATED CALCIDIOL+CALCIFEROL SERPL-MC: 74 UG/L (ref 20–75)
HGB BLD-MCNC: 15.2 G/DL

## 2022-07-08 PROCEDURE — 85018 HEMOGLOBIN: CPT

## 2022-07-08 PROCEDURE — 94726 PLETHYSMOGRAPHY LUNG VOLUMES: CPT | Performed by: INTERNAL MEDICINE

## 2022-07-08 PROCEDURE — 94060 EVALUATION OF WHEEZING: CPT | Performed by: INTERNAL MEDICINE

## 2022-07-08 PROCEDURE — 94729 DIFFUSING CAPACITY: CPT | Performed by: INTERNAL MEDICINE

## 2022-07-10 LAB
DLCOCOR-%PRED-PRE: 77 %
DLCOCOR-PRE: 20.97 ML/MIN/MMHG
DLCOUNC-%PRED-PRE: 79 %
DLCOUNC-PRE: 21.59 ML/MIN/MMHG
DLCOUNC-PRED: 27.07 ML/MIN/MMHG
ERV-%PRED-PRE: 35 %
ERV-PRE: 0.53 L
ERV-PRED: 1.5 L
EXPTIME-PRE: 11.51 SEC
FEF2575-%PRED-POST: 99 %
FEF2575-%PRED-PRE: 86 %
FEF2575-POST: 3.72 L/SEC
FEF2575-PRE: 3.25 L/SEC
FEF2575-PRED: 3.75 L/SEC
FEFMAX-PRE: 6.56 L/SEC
FEV1-%PRED-PRE: 77 %
FEV1-PRE: 2.59 L
FEV1FEV6-PRE: 86 %
FEV1FVC-PRE: 85 %
FEV1FVC-PRED: 85 %
FEV1SVC-PRE: 81 %
FEV1SVC-PRED: 72 %
FIFMAX-PRE: 3.46 L/SEC
FRCPLETH-%PRED-PRE: 62 %
FRCPLETH-PRE: 1.94 L
FRCPLETH-PRED: 3.1 L
FVC-%PRED-PRE: 76 %
FVC-PRE: 3.03 L
FVC-PRED: 3.96 L
IC-%PRED-PRE: 75 %
IC-PRE: 2.39 L
IC-PRED: 3.17 L
RVPLETH-%PRED-PRE: 65 %
RVPLETH-PRE: 1.13 L
RVPLETH-PRED: 1.72 L
TLCPLETH-%PRED-PRE: 70 %
TLCPLETH-PRE: 4.33 L
TLCPLETH-PRED: 6.11 L
VA-%PRED-PRE: 71 %
VA-PRE: 3.99 L
VC-%PRED-PRE: 68 %
VC-PRE: 3.2 L
VC-PRED: 4.67 L

## 2022-07-11 ENCOUNTER — OFFICE VISIT (OUTPATIENT)
Dept: PULMONOLOGY | Facility: OTHER | Age: 37
End: 2022-07-11
Payer: COMMERCIAL

## 2022-07-11 VITALS
HEART RATE: 84 BPM | SYSTOLIC BLOOD PRESSURE: 136 MMHG | BODY MASS INDEX: 29.8 KG/M2 | DIASTOLIC BLOOD PRESSURE: 88 MMHG | WEIGHT: 179.1 LBS | OXYGEN SATURATION: 96 %

## 2022-07-11 DIAGNOSIS — G70.9 NEUROMUSCULAR WEAKNESS (H): Primary | ICD-10-CM

## 2022-07-11 PROCEDURE — 99214 OFFICE O/P EST MOD 30 MIN: CPT | Performed by: INTERNAL MEDICINE

## 2022-07-11 NOTE — PATIENT INSTRUCTIONS
1) Your breathing tests are likely the same as before  2) You are able to generate normal force to take a full breath, but there is variability and I suspect the nerves or muscles trying to give full effort do so sporadically  3) I'd like you to see neurology again to check in and see if they have thoughts on this weakness in your breathing and limbs to see if they can reassess this

## 2022-07-11 NOTE — PROGRESS NOTES
Assessment and Plan:Anand Felix is a 37 year old male with a past medical history significant for chronic pain, neuromuscular weakness and shortness of breath who presents to clinic today for follow up.  He repeated his PFTs.  His TLC remains slightly reduced in the 70% range.  His MIP and MEP is capable of being normal on these maneuvers, but there is a lot of variability.  The last time we chcked this in 2018 the numbers were about the same.  His FEV1 and FVC maybe a little lower this time, but with the variability in his MIP and MEP I think the overall impression is that he has the capability of normal airflows but may have trouble triggering his muscles groups consistently, or fatigues them quickly.    I think he should be re-evaluated by neurology to get their impression as to whether his condition is progressing, or is now able to be officially diagnosed.  Inhalers will not improve this, nor am I sure this is progressive.  He would likely benefit from a rehab program and core strength training IF neurology agrees he has normal conduction pathways.    1) Dyspnea - from neuromuscular weakness.  Re-eval by neurology to ensure his core muscle strength is satisfactory, consider EMG of diaphragm.  Consider core strength training if OKd by neurology    2) RTC prn          CCx: dyspnea    HPI: Mr. Felix is a 37 year old male with a history of neuromuscular weakness and dyspnea that returns for follow up.  Since his last visit his symptoms haven't really changed.  He is dyspneic with most exertional activities and has to stop to catch his breath.  He repeated PFTs 3 days ago.  He is also looking into a spinal stimulator to help with his back pain and had his pain medications changed recently as his other regimen was causing weight gain.    ROS:  Review of Systems - History obtained from the patient  General ROS: negative  Psychological ROS: negative  ENT ROS: negative  Allergy and Immunology ROS: negative  Endocrine ROS:  negative  Respiratory ROS: positive for - shortness of breath  negative for - orthopnea, sputum changes, stridor or tachypnea  Cardiovascular ROS: no chest pain or palpitations  Gastrointestinal ROS: no abdominal pain, change in bowel habits, or black or bloody stools  Genito-Urinary ROS: no dysuria, trouble voiding, or hematuria  Musculoskeletal ROS: negative  Neurological ROS: no TIA or stroke symptoms  Dermatological ROS: negative      Current Meds:  Current Outpatient Medications   Medication Sig Dispense Refill     Acetaminophen (TYLENOL PO) Take 500-1,000 mg by mouth every 8 hours as needed       escitalopram (LEXAPRO) 10 MG tablet Take 1 tablet (10 mg) by mouth daily 90 tablet 3     fluticasone (FLONASE) 50 MCG/ACT nasal spray SPRAY 2 SPRAYS INTO EACH NOSTRIL EVERY DAY       lamoTRIgine (LAMICTAL) 25 MG tablet One daily for 2 weeks, one twice a day 2 weeks, two morning and one bedtime 60 tablet 3     medical cannabis (Patient's own supply) See Admin Instructions (The purpose of this order is to document that the patient reports taking medical cannabis.  This is not a prescription, and is not used to certify that the patient has a qualifying medical condition.)       omeprazole (PRILOSEC) 20 MG DR capsule TAKE 1 CAPSULE BY MOUTH EVERY DAY       oxyCODONE-acetaminophen (PERCOCET) 7.5-325 MG per tablet Take 1 tablet by mouth 4 times daily as needed for moderate to severe pain May fill/start 5/23/22 112 tablet 0     propranolol (INDERAL) 80 MG tablet Take 1 tablet (80 mg) by mouth 3 times daily 270 tablet 3     rivaroxaban ANTICOAGULANT (XARELTO) 20 MG TABS tablet Take 1 tablet (20 mg) by mouth daily (with dinner) 30 tablet 11     VITAMIN D, CHOLECALCIFEROL, PO Take 2,000 Units by mouth daily       zolpidem (AMBIEN) 10 MG tablet Take 0.5-1 tablets (5-10 mg) by mouth nightly as needed for sleep 30 tablet 2       Labs:  @clab@  Lab Results   Component Value Date    WBC 8.0 03/16/2021    HGB 15.2 07/08/2022    HCT  47.5 03/16/2021     03/16/2021     03/16/2021    POTASSIUM 3.9 03/16/2021    CHLORIDE 105 03/16/2021    CO2 26 03/16/2021     03/16/2021    BUN 18 03/16/2021    CR 0.91 03/16/2021    INR 1.52 (H) 11/24/2019    BILITOTAL 0.6 03/16/2021    AST 18 03/16/2021    ALT 18 03/16/2021    ALKPHOS 72 03/16/2021    PROTTOTAL 8.0 03/16/2021    ALBUMIN 4.4 03/16/2021       I have personally reviewed all pertinent imaging studies and PFT results unless otherwise noted.    Imaging studies:  XR Chest 2 Views    Result Date: 12/15/2021  EXAM: XR CHEST 2 VW LOCATION: [facility] DATE/TIME: 12/15/2021 4:00 PM INDICATION:  Dyspnea on exertion COMPARISON: 3/19/2018.     IMPRESSION: Negative chest.        Physical Exam:  /88 (BP Location: Right arm, Patient Position: Chair, Cuff Size: Adult Regular)   Pulse 84   Wt 81.2 kg (179 lb 1.6 oz)   SpO2 96%   BMI 29.80 kg/m    General - Well nourished  Ears/Mouth -  Deferred given mask use during pandemic  Neck - no cervical lymphadenopathy  Lungs - Clear to ausculation bilaterally  CVS - regular rhythm with no murmurs, rubs or gallups  Abdomen - soft, NT, ND, NABS  Ext - no cyanosis, clubbing or edema  Skin - no rash  Psychology - alert and oriented, answers appropriate        Electronically signed by:    Jules To MD PhD  [facility]monary and Critical Care Medicine

## 2022-07-11 NOTE — LETTER
7/11/2022       RE: Anand Felix  [address]  [place] [address]     Dear Colleague,    Thank you for referring your patient, Anand Felix, to the [facility]. Please see a copy of my visit note below.    Assessment and Plan:Anand Felix is a 37 year old male with a past medical history significant for chronic pain, neuromuscular weakness and shortness of breath who presents to clinic today for follow up.  He repeated his PFTs.  His TLC remains slightly reduced in the 70% range.  His MIP and MEP is capable of being normal on these maneuvers, but there is a lot of variability.  The last time we chcked this in 2018 the numbers were about the same.  His FEV1 and FVC maybe a little lower this time, but with the variability in his MIP and MEP I think the overall impression is that he has the capability of normal airflows but may have trouble triggering his muscles groups consistently, or fatigues them quickly.    I think he should be re-evaluated by neurology to get their impression as to whether his condition is progressing, or is now able to be officially diagnosed.  Inhalers will not improve this, nor am I sure this is progressive.  He would likely benefit from a rehab program and core strength training IF neurology agrees he has normal conduction pathways.    1) Dyspnea - from neuromuscular weakness.  Re-eval by neurology to ensure his core muscle strength is satisfactory, consider EMG of diaphragm.  Consider core strength training if OKd by neurology    2) RTC prn          CCx: dyspnea    HPI: Mr. Felix is a 37 year old male with a history of neuromuscular weakness and dyspnea that returns for follow up.  Since his last visit his symptoms haven't really changed.  He is dyspneic with most exertional activities and has to stop to catch his breath.  He repeated PFTs 3 days ago.  He is also looking into a spinal stimulator to help  with his back pain and had his pain medications changed recently as his other regimen was causing weight gain.    ROS:  Review of Systems - History obtained from the patient  General ROS: negative  Psychological ROS: negative  ENT ROS: negative  Allergy and Immunology ROS: negative  Endocrine ROS: negative  Respiratory ROS: positive for - shortness of breath  negative for - orthopnea, sputum changes, stridor or tachypnea  Cardiovascular ROS: no chest pain or palpitations  Gastrointestinal ROS: no abdominal pain, change in bowel habits, or black or bloody stools  Genito-Urinary ROS: no dysuria, trouble voiding, or hematuria  Musculoskeletal ROS: negative  Neurological ROS: no TIA or stroke symptoms  Dermatological ROS: negative      Current Meds:  Current Outpatient Medications   Medication Sig Dispense Refill     Acetaminophen (TYLENOL PO) Take 500-1,000 mg by mouth every 8 hours as needed       escitalopram (LEXAPRO) 10 MG tablet Take 1 tablet (10 mg) by mouth daily 90 tablet 3     fluticasone (FLONASE) 50 MCG/ACT nasal spray SPRAY 2 SPRAYS INTO EACH NOSTRIL EVERY DAY       lamoTRIgine (LAMICTAL) 25 MG tablet One daily for 2 weeks, one twice a day 2 weeks, two morning and one bedtime 60 tablet 3     medical cannabis (Patient's own supply) See Admin Instructions (The purpose of this order is to document that the patient reports taking medical cannabis.  This is not a prescription, and is not used to certify that the patient has a qualifying medical condition.)       omeprazole (PRILOSEC) 20 MG DR capsule TAKE 1 CAPSULE BY MOUTH EVERY DAY       oxyCODONE-acetaminophen (PERCOCET) 7.5-325 MG per tablet Take 1 tablet by mouth 4 times daily as needed for moderate to severe pain May fill/start 5/23/22 112 tablet 0     propranolol (INDERAL) 80 MG tablet Take 1 tablet (80 mg) by mouth 3 times daily 270 tablet 3     rivaroxaban ANTICOAGULANT (XARELTO) 20 MG TABS tablet Take 1 tablet (20 mg) by mouth daily (with dinner) 30  tablet 11     VITAMIN D, CHOLECALCIFEROL, PO Take 2,000 Units by mouth daily       zolpidem (AMBIEN) 10 MG tablet Take 0.5-1 tablets (5-10 mg) by mouth nightly as needed for sleep 30 tablet 2       Labs:  @clab@  Lab Results   Component Value Date    WBC 8.0 03/16/2021    HGB 15.2 07/08/2022    HCT 47.5 03/16/2021     03/16/2021     03/16/2021    POTASSIUM 3.9 03/16/2021    CHLORIDE 105 03/16/2021    CO2 26 03/16/2021     03/16/2021    BUN 18 03/16/2021    CR 0.91 03/16/2021    INR 1.52 (H) 11/24/2019    BILITOTAL 0.6 03/16/2021    AST 18 03/16/2021    ALT 18 03/16/2021    ALKPHOS 72 03/16/2021    PROTTOTAL 8.0 03/16/2021    ALBUMIN 4.4 03/16/2021       I have personally reviewed all pertinent imaging studies and PFT results unless otherwise noted.    Imaging studies:  XR Chest 2 Views    Result Date: 12/15/2021  EXAM: XR CHEST 2 VW LOCATION: [facility] DATE/TIME: 12/15/2021 4:00 PM INDICATION:  Dyspnea on exertion COMPARISON: 3/19/2018.     IMPRESSION: Negative chest.        Physical Exam:  /88 (BP Location: Right arm, Patient Position: Chair, Cuff Size: Adult Regular)   Pulse 84   Wt 81.2 kg (179 lb 1.6 oz)   SpO2 96%   BMI 29.80 kg/m    General - Well nourished  Ears/Mouth -  Deferred given mask use during pandemic  Neck - no cervical lymphadenopathy  Lungs - Clear to ausculation bilaterally  CVS - regular rhythm with no murmurs, rubs or gallups  Abdomen - soft, NT, ND, NABS  Ext - no cyanosis, clubbing or edema  Skin - no rash  Psychology - alert and oriented, answers appropriate        Electronically signed by:    Jules To MD PhD  [facility] Critical [facility] Medicine      Again, thank you for allowing me to participate in the care of your patient.      Sincerely,    Jules To MD

## 2022-07-16 ENCOUNTER — MYC REFILL (OUTPATIENT)
Dept: PALLIATIVE MEDICINE | Facility: OTHER | Age: 37
End: 2022-07-16

## 2022-07-16 DIAGNOSIS — G89.4 CHRONIC PAIN SYNDROME: ICD-10-CM

## 2022-07-18 RX ORDER — OXYCODONE AND ACETAMINOPHEN 7.5; 325 MG/1; MG/1
1 TABLET ORAL 4 TIMES DAILY PRN
Qty: 112 TABLET | Refills: 0 | OUTPATIENT
Start: 2022-07-18

## 2022-07-18 NOTE — TELEPHONE ENCOUNTER
Received call from patient requesting refill(s) of Percocet     Last dispensed from pharmacy on 5/29/22 per     Patient's last office/virtual visit by prescribing provider on 6/16/22  Next office/virtual appointment scheduled for 8/25/22    Last urine drug screen date 10/2021  Current opioid agreement on file (completed within the last year) Yes Date of opioid agreement: 10/2021    E-prescribe to [facility] pharmacy  Patient has a Rx at pharmacy

## 2022-08-03 NOTE — PROGRESS NOTES
Medication Therapy Management (MTM) Encounter    ASSESSMENT:                            Medication Adherence/Access: No issues identified      Chronic Pain: Initially found the transition from pregabalin to Lamotrigine to be effective. Hasn't noticed significant difference with the recent dose increases, but may benefit from continued titration. Does have eval for SCS scheduled later this month.       Headaches/Tachycardia : BP at goal <140/90. Pulse at goal . Infrequent headaches since starting Beta-blocker.       Mood/depression: PHQ9 at goal <5.  Denies depression or anxiety concerns. Sleeping well with Zolpidem. Reviewed discussion from last visit regarding limited data to support long-term use. Okay to continue for now. Discussed sleep hygiene habits that can help with improving sleep.       Low Vitamin D: Last vit D WNL.       Heartburn: Well controlled. Continue PPI.       Pulmonary embolism: Continuing exercises as recommended from pulm. Has the recommended neuro visit scheduled for December.     PLAN:                            1. Increase Lamotrigine 25 mg to 2 tabs twice daily x 2 weeks then switch to Lamotrigine 100 mg twice daily.   2. Recommend avoiding TV before bedtime.     Follow-up: 8/25 with Dr. Small.  Return in about 2 months (around 10/4/2022) for Medication Management Pharmacist, by phone.        SUBJECTIVE/OBJECTIVE:                          Anand Felix is a 37 year old male called for a follow-up visit. He was referred to me from Anthony Small MD. Today's visit is a follow-up MTM visit from 07/07/2022.      Reason for visit: Pain Follow-up.     Allergies/ADRs: Reviewed in chart  Past Medical History: Reviewed in chart  Tobacco: He reports that he has never smoked. He has never used smokeless tobacco.  Alcohol:  reports current alcohol use. Monthly or less 1-2 drinks at a time.       Medication Adherence/Access: no issues reported      Chronic Pain: Recently transitioned from  "Pregabalin to Lamotrigine. Currently using Lamotrigine 25 mg 2 tabs AM and 1 tab HS.     Continues oxycodone-acetaminophen 7.5-325 mg 4 times daily as needed. Also enrolled in medical cannabis program - uses Rosanne oil three times daily and [place] vape as needed. On average, using Tangerine 2 puffs about 4 times per day.     Since last visit, increased the dose of Lamotrigine further as instructed. Today reports \"its about the same.\" Previously had noted less stiffness with the transition from pregablin to Lamotrigine.     Typically needing 3 tabs of Oxycodone on average.     Has evaluation scheduled 8/23 for SCS.       Headaches/Tachycardia : Prescribed propranolol 80 mg 3 times daily.     With propranolol, maybe 1-2 headaches per month.     BP Readings from Last 3 Encounters:   07/11/22 136/88   07/07/22 120/85   06/16/22 129/88      Pulse Readings from Last 3 Encounters:   07/11/22 84   07/07/22 81   06/16/22 89       Mood/depression: Prescribed escitalopram 10 mg daily, Zolpidem 10 mg 0.5-1 tab at bedtime as needed. Typically using Zolpidem 0.33-0.5 tabs every night.     Mood has been stable.   Denies any concerns with anxiety or racing thoughts.     Sleep has been \"okay\" with Zolpidem. Not waking up as often with Zolpidem. Sometimes pain makes it hard to fall asleep. Other small noises.     Watches TV before bed to help relax. Cannabis helps to relax.     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 Hasn't seen her for a year. Interested in re-establishing.         Low Vitamin D: Prescribed Vitamin D3 2000 units daily.     Vitamin D Deficiency Screening Results:  Lab Results   Component Value Date    VITDT 74 07/07/2022         Heartburn: Prescribed omeprazole 20 mg daily    Well managed with omeprazole. Denies breakthrough symptoms.       Pulmonary embolism: Prescribed rivaroxaban 20 mg daily.  Seen by pulmonology on 12/13/2021 -recommended lifelong anticoagulation    Baseline has shortness of breath " -nothing new or worsened recently.      Doing core strength training. Will be seeing Neuro in December for EMG.           Today's Vitals: There were no vitals taken for this visit.  ----------------      I spent 16 minutes with this patient today. All changes were made via collaborative practice agreement with Anthony Small MD. A copy of the visit note was provided to the patient's provider(s).    The patient was sent via [employer] a summary of these recommendations.     Santino Escobar PharmD  Medication Therapy Management (MTM) Pharmacist  [facility]        Telemedicine Visit Details  Type of service:  Telephone visit  Start Time: 1:06 PM  End Time: 1:22 PM  Originating Location (patient location): Home  Distant Location (provider location):  [facility]       Medication Therapy Recommendations  Pain in joint, multiple sites    Current Medication: lamoTRIgine (LAMICTAL) 25 MG tablet (Discontinued)   Rationale: Dose too low - Dosage too low - Effectiveness   Recommendation: Increase Dose - lamoTRIgine 100 MG tablet   Status: Accepted per CPA

## 2022-08-04 ENCOUNTER — VIRTUAL VISIT (OUTPATIENT)
Dept: PHARMACY | Facility: OTHER | Age: 37
End: 2022-08-04
Payer: COMMERCIAL

## 2022-08-04 DIAGNOSIS — F32.9 MAJOR DEPRESSIVE DISORDER, REMISSION STATUS UNSPECIFIED, UNSPECIFIED WHETHER RECURRENT: ICD-10-CM

## 2022-08-04 DIAGNOSIS — M25.50 PAIN IN JOINT, MULTIPLE SITES: Primary | ICD-10-CM

## 2022-08-04 DIAGNOSIS — K21.00 GASTROESOPHAGEAL REFLUX DISEASE WITH ESOPHAGITIS, UNSPECIFIED WHETHER HEMORRHAGE: ICD-10-CM

## 2022-08-04 DIAGNOSIS — E55.9 HYPOVITAMINOSIS D: ICD-10-CM

## 2022-08-04 DIAGNOSIS — R00.0 SINUS TACHYCARDIA: ICD-10-CM

## 2022-08-04 DIAGNOSIS — I26.99 PULMONARY EMBOLISM, UNSPECIFIED CHRONICITY, UNSPECIFIED PULMONARY EMBOLISM TYPE, UNSPECIFIED WHETHER ACUTE COR PULMONALE PRESENT (H): ICD-10-CM

## 2022-08-04 PROCEDURE — 99607 MTMS BY PHARM ADDL 15 MIN: CPT | Performed by: PHARMACIST

## 2022-08-04 PROCEDURE — 99606 MTMS BY PHARM EST 15 MIN: CPT | Performed by: PHARMACIST

## 2022-08-04 RX ORDER — LAMOTRIGINE 100 MG/1
100 TABLET ORAL 2 TIMES DAILY
Qty: 60 TABLET | Refills: 1 | Status: SHIPPED | OUTPATIENT
Start: 2022-08-04 | End: 2022-09-08

## 2022-08-04 NOTE — PATIENT INSTRUCTIONS
"Recommendations from today's MTM visit:                                                         1. Increase Lamotrigine 25 mg to 2 tabs twice daily x 2 weeks then switch to Lamotrigine 100 mg twice daily.   2. Recommend avoiding TV before bedtime. Take a look at these other \"sleep hygiene\" techniques that can also help to promote restful and restorative sleep.     [email or web address]/sleep-hygiene    Follow-up: Return in about 2 months (around 10/4/2022) for Medication Management Pharmacist, by phone.    It was great speaking with you today.  I value your experience and would be very thankful for your time in providing feedback in our clinic survey. In the next few days, you may receive an email or text message from [employer] with a link to a survey related to your  clinical pharmacist.\"     To schedule another MTM appointment, please call the clinic directly or you may call the MTM scheduling line at [phone] or toll-free at [phone].     My Clinical Pharmacist's contact information:                                                      Please feel free to contact me with any questions or concerns you have.      Santino Escobar, PharmD  Medication Therapy Management (MTM) Pharmacist  [facility]      "

## 2022-08-25 ENCOUNTER — OFFICE VISIT (OUTPATIENT)
Dept: PALLIATIVE MEDICINE | Facility: OTHER | Age: 37
End: 2022-08-25
Payer: COMMERCIAL

## 2022-08-25 VITALS
SYSTOLIC BLOOD PRESSURE: 135 MMHG | OXYGEN SATURATION: 96 % | BODY MASS INDEX: 29.79 KG/M2 | WEIGHT: 179 LBS | HEART RATE: 78 BPM | DIASTOLIC BLOOD PRESSURE: 89 MMHG

## 2022-08-25 DIAGNOSIS — Z79.891 LONG TERM (CURRENT) USE OF OPIATE ANALGESIC: Primary | ICD-10-CM

## 2022-08-25 DIAGNOSIS — G89.4 CHRONIC PAIN SYNDROME: ICD-10-CM

## 2022-08-25 LAB
CANNABINOIDS UR QL SCN: NORMAL
CREAT UR-MCNC: 258 MG/DL
CREAT UR-MCNC: 258 MG/DL

## 2022-08-25 PROCEDURE — 80307 DRUG TEST PRSMV CHEM ANLYZR: CPT | Performed by: ANESTHESIOLOGY

## 2022-08-25 PROCEDURE — 99213 OFFICE O/P EST LOW 20 MIN: CPT | Performed by: ANESTHESIOLOGY

## 2022-08-25 PROCEDURE — G0463 HOSPITAL OUTPT CLINIC VISIT: HCPCS

## 2022-08-25 PROCEDURE — 80321 ALCOHOLS BIOMARKERS 1OR 2: CPT | Performed by: ANESTHESIOLOGY

## 2022-08-25 RX ORDER — OXYCODONE AND ACETAMINOPHEN 7.5; 325 MG/1; MG/1
1 TABLET ORAL 4 TIMES DAILY PRN
Qty: 112 TABLET | Refills: 0 | Status: SHIPPED | OUTPATIENT
Start: 2022-08-25 | End: 2022-09-20

## 2022-08-25 ASSESSMENT — PAIN SCALES - GENERAL: PAINLEVEL: MILD PAIN (3)

## 2022-08-25 NOTE — NURSING NOTE
Patient presents to the clinic today for a follow up with ANNALISA FAULKNER MD regarding Pain Management.      PEG Score 12/9/2021 6/16/2022 8/25/2022   PEG Total Score 4 3 4.67           UDS/CSA-10.13.2021      Medications-Percocet    today 8am      QUESTIONS:              Adina FONTANA [facility] Visit Facilitator

## 2022-08-25 NOTE — PROGRESS NOTES
[facility] - Office Visit    ASSESSMENT & PLAN     Anand was seen today for pain.    Diagnoses and all orders for this visit:    Chronic pain syndrome  -     oxyCODONE-acetaminophen (PERCOCET) 7.5-325 MG per tablet; Take 1 tablet by mouth 4 times daily as needed for moderate to severe pain  -     Acupuncture Referral; Future        Patient Instructions   PLAN:    Increase lamotrigine to 100 mg 1 in the morning and one half at night to see if your itching decreases.    You have rescheduled an evaluation for a spinal cord stimulator.    At checkout may schedule for acupuncture.    Tinea the oxycodone 7.5 mg up to 4 times a day.    Follow-up with Dr. Faulkner in 3 months        -----  ANNALISA FAULKNER MD  [facility]       SUBJECTIVE      Anand Felix is a 37 year old year old male who presents to clinic today for the following:     Followed for low back pain, history of laminectomy.    He has been following with our [facility] pharmacist.  Transitioning from Lyrica to lamotrigine with some weight loss.  A vitamin D level came back 74 taking some replacement.    He has been followed in pulmonology, with references to neuromuscular weakness.    On interview he reviews he has improved a bit since last seen.  Changing from Lyrica to lamotrigine he has lost perhaps 10 pounds.  He feels his pain may be a better controlled.  He has had some intermittent itching, on his right arm, sometimes his leg more noticeable at night.  Not consistent.  Does not have other significant lesions.    He has not been using the medical cannabis over the last couple months due to cost.    He was scheduled for valuation for a spinal cord stimulator evaluation, had to cancel due to a family emergency and is rescheduled.    He does continue with the oxycodone 7.5 mg 3-4 times a day helping the pain in his low back and legs.   reviewed.  Last urine drug test in October.    I inquired about the evaluation of  pulmonology.  States after his back surgery was followed in neurology for a while, felt to have some sort of neuromuscular weakness condition, laboratories were not revealing and he is followed.    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  He spends time on a treadmill and walking.  He is trying to look for some sort of work that he could do in the home.  He last worked 5 years ago before his surgery.  Did apply once for disability.    Pain he notes sometimes in his upper back and right shoulder.  Recalls was doing acupuncture before COVID which was helpful.  Low back into his right leg greater than his left.      Current Outpatient Medications:      Acetaminophen (TYLENOL PO), Take 500-1,000 mg by mouth every 8 hours as needed, Disp: , Rfl:      escitalopram (LEXAPRO) 10 MG tablet, Take 1 tablet (10 mg) by mouth daily, Disp: 90 tablet, Rfl: 3     fluticasone (FLONASE) 50 MCG/ACT nasal spray, SPRAY 2 SPRAYS INTO EACH NOSTRIL EVERY DAY, Disp: , Rfl:      lamoTRIgine (LAMICTAL) 100 MG tablet, Take 1 tablet (100 mg) by mouth 2 times daily, Disp: 60 tablet, Rfl: 1     medical cannabis (Patient's own supply), See Admin Instructions (The purpose of this order is to document that the patient reports taking medical cannabis.  This is not a prescription, and is not used to certify that the patient has a qualifying medical condition.), Disp: , Rfl:      omeprazole (PRILOSEC) 20 MG DR capsule, TAKE 1 CAPSULE BY MOUTH EVERY DAY, Disp: , Rfl:      oxyCODONE-acetaminophen (PERCOCET) 7.5-325 MG per tablet, Take 1 tablet by mouth 4 times daily as needed for moderate to severe pain, Disp: 112 tablet, Rfl: 0     propranolol (INDERAL) 80 MG tablet, Take 1 tablet (80 mg) by mouth 3 times daily, Disp: 270 tablet, Rfl: 3     rivaroxaban ANTICOAGULANT (XARELTO) 20 MG TABS tablet, Take 1 tablet (20 mg) by mouth daily (with dinner), Disp: 30 tablet, Rfl: 11     VITAMIN D, CHOLECALCIFEROL, PO, Take 2,000 Units by  mouth daily, Disp: , Rfl:      zolpidem (AMBIEN) 10 MG tablet, Take 0.5-1 tablets (5-10 mg) by mouth nightly as needed for sleep, Disp: 30 tablet, Rfl: 2      Review of Systems   General, psych, musculoskeletal, bowels and bladder otherwise normal other than above.          OBJECTIVE   /89   Pulse 78   Wt 81.2 kg (179 lb)   SpO2 96%   BMI 29.79 kg/m          Physical Exam  General:  Normal appearance, no apparent distress  Cardiovascular: Normal rate  Lungs: Pulmonary effort is normal, speaking in full sentences  MSK: Some tenderness on palpation over his lower back.  Normal gait.  Observed getting out of car without pain behavior.  Skin: Some superficial excoriations noted on right forearm, no clear rash, vesicles.  Neurologic: No focal deficit, alert and oriented x3  Psychiatric: Constricted affect.  Wearing the [employer], brightens somewhat talking about baseball      Assessment: History of lumbar laminectomy with some ongoing back and lower extremity pain.    Has rescheduled a spinal cord stimulator evaluation.    Changing from Lyrica to the lamotrigine may have about the same or better pain coverage, some weight loss.  Of concern some itching, no clear issues with rash.  Reviewed with him the black box warning concern we will try decreasing to see if a refill solves and still has the same pain control.    I learned today about neuromuscular weakness which has been followed.    Plan as above.    Total time more than 20 minutes

## 2022-08-25 NOTE — PATIENT INSTRUCTIONS
PLAN:    Increase lamotrigine to 100 mg 1 in the morning and one half at night to see if your itching decreases.    You have rescheduled an evaluation for a spinal cord stimulator.    At checkout may schedule for acupuncture.    Tinea the oxycodone 7.5 mg up to 4 times a day.    Follow-up with Dr. Small in 3 months

## 2022-08-29 LAB — ETHYL GLUCURONIDE UR QL SCN: POSITIVE NG/ML

## 2022-08-30 LAB
OXYCODONE UR CFM-MCNC: ABNORMAL NG/ML
OXYCODONE/CREAT UR: ABNORMAL
OXYMORPHONE UR CFM-MCNC: 2180 NG/ML
OXYMORPHONE/CREAT UR: 845 NG/MG {CREAT}

## 2022-09-01 LAB
ETHYL GLUCURONIDE UR CFM-MCNC: 621 NG/ML
ETHYL SULFATE UR CFM-MCNC: <100 NG/ML

## 2022-09-08 DIAGNOSIS — M25.50 PAIN IN JOINT, MULTIPLE SITES: ICD-10-CM

## 2022-09-08 RX ORDER — LAMOTRIGINE 100 MG/1
100 TABLET ORAL 2 TIMES DAILY
Qty: 60 TABLET | Refills: 1 | Status: SHIPPED | OUTPATIENT
Start: 2022-09-08 | End: 2022-10-11

## 2022-09-08 NOTE — TELEPHONE ENCOUNTER
Received fax request from [facility] [id number] [address] [place], [place] [address] [facility] [place] pharmacy requesting refill(s) for lamoTRIgine (LAMICTAL) 100 MG tablet    Last refilled on 08/04/2022    Pt last seen on 08/25/2022  Next appt scheduled for 12/01/2022    Will facilitate refill.    Tiffanie Flores MA  [facility] [place]

## 2022-09-11 ENCOUNTER — HEALTH MAINTENANCE LETTER (OUTPATIENT)
Age: 37
End: 2022-09-11

## 2022-09-17 ENCOUNTER — MYC MEDICAL ADVICE (OUTPATIENT)
Dept: FAMILY MEDICINE | Facility: CLINIC | Age: 37
End: 2022-09-17

## 2022-09-17 DIAGNOSIS — F33.1 MODERATE EPISODE OF RECURRENT MAJOR DEPRESSIVE DISORDER (H): ICD-10-CM

## 2022-09-19 RX ORDER — ESCITALOPRAM OXALATE 10 MG/1
10 TABLET ORAL DAILY
Qty: 90 TABLET | Refills: 3 | Status: SHIPPED | OUTPATIENT
Start: 2022-09-19 | End: 2023-08-29

## 2022-09-20 NOTE — TELEPHONE ENCOUNTER
two telephone messages left with patient to review the urine drug test and alcohol.  He has not yet returned the call.  Will not refill the oxycodone until discussed

## 2022-09-21 NOTE — PROGRESS NOTES
Medication Therapy Management (MTM) Encounter    ASSESSMENT:                            Medication Adherence/Access: No issues identified      Chronic Pain: Itching improved with lower dose of Lamotrigine. Likely okay to continue current dose. Reviewed results from recent UDS. Reviewed safety concerns with alcohol in combination with current medications, including oxycodone and zolpidem. Patient denies any use of alcohol in the past year. Does not know how results came back positive. Was informed to present to clinic within 24 hours with medications for a pill count and repeat UDS. Pt confirmed he will come in tomorrow at 10 am.       Headaches/Tachycardia : BP at goal <140/90. Pulse at goal . Infrequent headaches since starting Beta-blocker.       Mood/depression: PHQ9 at goal <5.  Denies depression or anxiety concerns. Sleeping well with Zolpidem.     Low Vitamin D: Last vit D WNL.       Heartburn: Well controlled. Continue PPI.       Pulmonary embolism: Continuing exercises as recommended from pulm. Has the recommended neuro visit scheduled for December.     PLAN:                            1. No medication changes.   2. Pt to present for pill count and UDS 9/23/22 before 2:30 pm.     Follow-up: 12/1 with Dr. Small.        SUBJECTIVE/OBJECTIVE:                          Anand Felix is a 37 year old male called for a follow-up visit. He was referred to me from Anthony Small MD. Today's visit is a follow-up MTM visit from 08/04/2022.      Reason for visit: Pain Follow-up.     Allergies/ADRs: Reviewed in chart  Past Medical History: Reviewed in chart  Tobacco: He reports that he has never smoked. He has never used smokeless tobacco.  Alcohol:  reports current alcohol use. Denies alcohol use in quite some time.       Medication Adherence/Access: no issues reported      Chronic Pain: At last visit with Dr. Small, decreased Lamotrigine to 100 mg 1 tab AM and 0.5 tab PM due to itching concerns.  Continues  "oxycodone-acetaminophen 7.5-325 mg 4 times daily as needed.     Also enrolled in medical cannabis program - limited use due to cost.     Had evaluation scheduled 8/23 for SCS but needed to cancel due to family emergency. Now rescheduled for 10/4.     Had discussed scheduling acupuncture with Dr. Small, but feels like now is not a good time.     Not feeling as itchy with decreased lamotrigine dose.     Pain has been \"manageable\"     Recent UDS positive for alcohol. States he didn't get the two messages from Dr. Small. Denies any recent alcohol. States he hasn't been drinking since he's been on the oxycodone. States he hasn't had any alcohol in the past year.         Headaches/Tachycardia : Prescribed propranolol 80 mg 3 times daily.     With propranolol, maybe 1-2 headaches per month.     BP Readings from Last 3 Encounters:   08/25/22 135/89   07/11/22 136/88   07/07/22 120/85      Pulse Readings from Last 3 Encounters:   08/25/22 78   07/11/22 84   07/07/22 81         Mood/depression: Prescribed escitalopram 10 mg daily, Zolpidem 10 mg 0.5-1 tab at bedtime as needed. Typically using Zolpidem 0.33-0.5 tabs every night.     Mood has been stable.   Denies any concerns with anxiety or racing thoughts.     Sleep has been \"okay\" with Zolpidem. Not waking up as often with Zolpidem. There are some days where he's tired in the early evening and needs to nap.         Low Vitamin D: Prescribed Vitamin D3 2000 units daily.     Vitamin D Deficiency Screening Results:  Lab Results   Component Value Date    VITDT 74 07/07/2022         Heartburn: Prescribed omeprazole 20 mg daily    Well managed with omeprazole. Denies breakthrough symptoms.         Pulmonary embolism: Prescribed rivaroxaban 20 mg daily.  Seen by pulmonology on 12/13/2021 -recommended lifelong anticoagulation    Baseline has shortness of breath -nothing new or worsened recently.      Doing core strength training. Will be seeing Neuro in December for EMG. "           Today's Vitals: There were no vitals taken for this visit.  ----------------      I spent 15 minutes with this patient today. All changes were made via collaborative practice agreement with Anthony Small MD. A copy of the visit note was provided to the patient's provider(s).    The patient declined a summary of these recommendations.     Santino Escobar PharmD  Medication Therapy Management (MTM) Pharmacist  [facility]        Telemedicine Visit Details  Type of service:  Telephone visit  Start Time: 2:32 PM   End Time: 2:47 PM  Originating Location (patient location): [place]  Distant Location (provider location):  [facility]       Medication Therapy Recommendations  No medication therapy recommendations to display

## 2022-09-22 ENCOUNTER — TELEPHONE (OUTPATIENT)
Dept: PALLIATIVE MEDICINE | Facility: OTHER | Age: 37
End: 2022-09-22

## 2022-09-22 ENCOUNTER — VIRTUAL VISIT (OUTPATIENT)
Dept: PHARMACY | Facility: OTHER | Age: 37
End: 2022-09-22
Payer: COMMERCIAL

## 2022-09-22 DIAGNOSIS — R00.0 SINUS TACHYCARDIA: ICD-10-CM

## 2022-09-22 DIAGNOSIS — K21.00 GASTROESOPHAGEAL REFLUX DISEASE WITH ESOPHAGITIS, UNSPECIFIED WHETHER HEMORRHAGE: ICD-10-CM

## 2022-09-22 DIAGNOSIS — M25.50 PAIN IN JOINT, MULTIPLE SITES: Primary | ICD-10-CM

## 2022-09-22 DIAGNOSIS — F32.9 MAJOR DEPRESSIVE DISORDER, REMISSION STATUS UNSPECIFIED, UNSPECIFIED WHETHER RECURRENT: ICD-10-CM

## 2022-09-22 DIAGNOSIS — Z79.891 LONG TERM (CURRENT) USE OF OPIATE ANALGESIC: Primary | ICD-10-CM

## 2022-09-22 DIAGNOSIS — E55.9 HYPOVITAMINOSIS D: ICD-10-CM

## 2022-09-22 DIAGNOSIS — I26.99 PULMONARY EMBOLISM, UNSPECIFIED CHRONICITY, UNSPECIFIED PULMONARY EMBOLISM TYPE, UNSPECIFIED WHETHER ACUTE COR PULMONALE PRESENT (H): ICD-10-CM

## 2022-09-22 PROCEDURE — 99606 MTMS BY PHARM EST 15 MIN: CPT | Mod: 93 | Performed by: PHARMACIST

## 2022-09-22 NOTE — TELEPHONE ENCOUNTER
Per Santino-patient on the phone for MTM apt and the need for a pill count and repeat UDT has been relayed during this visit.  Patient made aware that this needs to happen prior to 240 pm on 9/23/22  Patient will be in with medication and to provide a repeat UDT at approximately 10 am on 9/23/22.

## 2022-09-22 NOTE — TELEPHONE ENCOUNTER
Call patient on 9/22/22 to come in to the clinic by 9/23/22 at the time of the phone call for pill count and repeat UDT.

## 2022-09-22 NOTE — Clinical Note
Reviewed UDS results. He denies any alcohol use in the past year. He's coming in for repeat and pill count tomorrow morning at 10 am.

## 2022-09-22 NOTE — TELEPHONE ENCOUNTER
----- Message from Nathan J Breyer, RN sent at 9/21/2022  8:19 AM CDT -----  Good morning-    I think this message was mistakenly sent to our in-basket.  Can you help get it to where it needs to go?    Thanks!      Blane  ----- Message -----  From: Anthony Small MD  Sent: 9/20/2022   5:03 PM CDT  To: [facility] Procedure Support Pool    Call in for pill count and repeat UDT with alcohol on Thursday this week

## 2022-09-23 ENCOUNTER — ALLIED HEALTH/NURSE VISIT (OUTPATIENT)
Dept: PALLIATIVE MEDICINE | Facility: OTHER | Age: 37
End: 2022-09-23
Payer: COMMERCIAL

## 2022-09-23 DIAGNOSIS — Z79.891 LONG TERM (CURRENT) USE OF OPIATE ANALGESIC: ICD-10-CM

## 2022-09-23 LAB — CREAT UR-MCNC: 240 MG/DL

## 2022-09-23 PROCEDURE — 80307 DRUG TEST PRSMV CHEM ANLYZR: CPT

## 2022-09-23 NOTE — TELEPHONE ENCOUNTER
Patient in to the clinic at this time.  Provided oxycodone in the prescription bottle: #33 tablets  Per : last dispensed on 8/26/22 for a 28 day fill  Patient is apologetic about missing phone calls and was not aware of why anyone was trying to reach him.    Patient reports oxycodone last taken today(9/23/22) at 0800  Patient denies any other controlled substances taken   Patient denies use of medical cannabis    UDT provided at this time

## 2022-09-24 LAB — CANNABINOIDS UR QL SCN: NORMAL

## 2022-09-25 LAB — ETHYL GLUCURONIDE UR QL SCN: NEGATIVE NG/ML

## 2022-09-27 LAB
OXYCODONE UR CFM-MCNC: 6160 NG/ML
OXYCODONE/CREAT UR: 2567 NG/MG {CREAT}
OXYMORPHONE UR CFM-MCNC: 1080 NG/ML
OXYMORPHONE/CREAT UR: 450 NG/MG {CREAT}

## 2022-10-01 ENCOUNTER — MYC MEDICAL ADVICE (OUTPATIENT)
Dept: PHARMACY | Facility: OTHER | Age: 37
End: 2022-10-01

## 2022-10-03 ENCOUNTER — TELEPHONE (OUTPATIENT)
Dept: ANESTHESIOLOGY | Facility: CLINIC | Age: 37
End: 2022-10-03

## 2022-10-03 DIAGNOSIS — M54.50 LUMBAR PAIN DETERMINED BY PALPATION: Primary | ICD-10-CM

## 2022-10-03 RX ORDER — OXYCODONE AND ACETAMINOPHEN 7.5; 325 MG/1; MG/1
1 TABLET ORAL EVERY 6 HOURS PRN
Qty: 112 TABLET | Refills: 0 | Status: SHIPPED | OUTPATIENT
Start: 2022-10-03 | End: 2022-11-03

## 2022-10-03 ASSESSMENT — ENCOUNTER SYMPTOMS
TREMORS: 1
NECK PAIN: 1
MYALGIAS: 1
STIFFNESS: 1
TINGLING: 1
FATIGUE: 1
ARTHRALGIAS: 1
ALTERED TEMPERATURE REGULATION: 1
BACK PAIN: 1
DYSPNEA ON EXERTION: 1
WEAKNESS: 1
MUSCLE WEAKNESS: 1
NUMBNESS: 1
WEIGHT LOSS: 1
SHORTNESS OF BREATH: 1
BRUISES/BLEEDS EASILY: 1

## 2022-10-03 ASSESSMENT — ANXIETY QUESTIONNAIRES
GAD7 TOTAL SCORE: 2
7. FEELING AFRAID AS IF SOMETHING AWFUL MIGHT HAPPEN: NOT AT ALL
1. FEELING NERVOUS, ANXIOUS, OR ON EDGE: NOT AT ALL
5. BEING SO RESTLESS THAT IT IS HARD TO SIT STILL: SEVERAL DAYS
4. TROUBLE RELAXING: SEVERAL DAYS
7. FEELING AFRAID AS IF SOMETHING AWFUL MIGHT HAPPEN: NOT AT ALL
2. NOT BEING ABLE TO STOP OR CONTROL WORRYING: NOT AT ALL
8. IF YOU CHECKED OFF ANY PROBLEMS, HOW DIFFICULT HAVE THESE MADE IT FOR YOU TO DO YOUR WORK, TAKE CARE OF THINGS AT HOME, OR GET ALONG WITH OTHER PEOPLE?: NOT DIFFICULT AT ALL
3. WORRYING TOO MUCH ABOUT DIFFERENT THINGS: NOT AT ALL
GAD7 TOTAL SCORE: 2
6. BECOMING EASILY ANNOYED OR IRRITABLE: NOT AT ALL
IF YOU CHECKED OFF ANY PROBLEMS ON THIS QUESTIONNAIRE, HOW DIFFICULT HAVE THESE PROBLEMS MADE IT FOR YOU TO DO YOUR WORK, TAKE CARE OF THINGS AT HOME, OR GET ALONG WITH OTHER PEOPLE: NOT DIFFICULT AT ALL

## 2022-10-03 ASSESSMENT — PAIN SCALES - PAIN ENJOYMENT GENERAL ACTIVITY SCALE (PEG)
AVG_PAIN_PASTWEEK: 6
INTERFERED_GENERAL_ACTIVITY: 4
PEG_TOTALSCORE: 4.67
INTERFERED_ENJOYMENT_LIFE: 4
INTERFERED_ENJOYMENT_LIFE: 4
PEG_TOTALSCORE: 4.67
AVG_PAIN_PASTWEEK: 6
INTERFERED_GENERAL_ACTIVITY: 4

## 2022-10-04 ENCOUNTER — OFFICE VISIT (OUTPATIENT)
Dept: ANESTHESIOLOGY | Facility: CLINIC | Age: 37
End: 2022-10-04
Attending: ANESTHESIOLOGY
Payer: COMMERCIAL

## 2022-10-04 VITALS — HEART RATE: 77 BPM | SYSTOLIC BLOOD PRESSURE: 113 MMHG | DIASTOLIC BLOOD PRESSURE: 74 MMHG | OXYGEN SATURATION: 96 %

## 2022-10-04 DIAGNOSIS — G89.4 CHRONIC PAIN SYNDROME: ICD-10-CM

## 2022-10-04 PROCEDURE — 99204 OFFICE O/P NEW MOD 45 MIN: CPT | Performed by: ANESTHESIOLOGY

## 2022-10-04 ASSESSMENT — PAIN SCALES - GENERAL: PAINLEVEL: SEVERE PAIN (6)

## 2022-10-04 ASSESSMENT — ENCOUNTER SYMPTOMS
NECK PAIN: 1
TINGLING: 1
BACK PAIN: 1
BRUISES/BLEEDS EASILY: 1
WEAKNESS: 1
WEIGHT LOSS: 1
MYALGIAS: 1
TREMORS: 1
SHORTNESS OF BREATH: 1

## 2022-10-04 NOTE — PATIENT INSTRUCTIONS
Medications:    Recommendations will be written in the providers note for your Primary Care provider (OR other providers in your care team) to review and make changes to your therapies based on their discretion.         Recommended Follow up:      Follow-up As Needed       To speak with a nurse, schedule/reschedule/cancel a clinic appointment, or request a medication refill call: [phone].    You can also reach us by [employer]: [email or web address]

## 2022-10-04 NOTE — PROGRESS NOTES
Pain Clinic New Patient Consult Note:    Referring Provider: Geovanny   Primary care provider: Malachi Choudhary    Anand Felix is a 37 year old y.o. old male who presents to the pain clinic with diffuse areas of body pain for consideration of spinal cord stimulation    HPI:  Patient Supplied Answers To the [facility] Pain Questionnaire  [facility] -  Patient Entered Questionnaire/Answers 10/3/2022   What number best describes your pain right now:  0 = No pain  to  10 = Worst pain imaginable 6   How would you describe the pain? burning, sharp, numbness, cutting, dull, aching, throbbing   Which of the following worsen your pain? lying down, standing, sitting, walking, exercise   Which of the following improve or reduce your pain? lying down, medication   What number best describes your average pain for the past week:  0 = No pain  to  10 = Worst pain imaginable 6   What number best describes your LOWEST pain in past 24 hours:  0 = No pain  to  10 = Worst pain imaginable 4   What number best describes your WORST pain in past 24 hours:  0 = No pain  to  10 = Worst pain imaginable 7   When is your pain worst? AM, Night, Constant   What non-medicine treatments have you already had for your pain? pain clinic, physical therapy, acupuncture, TENS (electrical stimulator), spine injections (shots), surgery, exercise     Anand is a 37-year-old male presenting to the pain clinic to discuss the option of spinal cord stimulation.  His goal is to have better pain control and possible weaning opioids.  The patient has a long history of chronic pain that started since the age of 13.  More recently in 2015 he underwent spine surgery with a complicated wake-up.    On review of prior neurology colleagues note from 2016 I find the following details:   he is a 31-year-old man who was healthy with the exception of back pain radiating from the right buttock to the foot in a sciatica-like pattern.  Because he had incomplete response to oral medications  and epidural steroids, he ended up having a lumbar diskectomy on 10/23/2015 at [facility].  He had an MRI of the lumbar spine apparently prior to surgery which showed L5-S1 mild disk degeneration with loss of disk hydration and slight loss of height, small posterior annular tear but otherwise an entirely unremarkable lumbar spine.  On 10/16, he also had a cervical spine MRI which was basically normal and a thoracic spinal MRI with minor degenerative changes and no significant cord or root compression.  Right after or a few hours after he woke up from the lumbar discectomy he noticed inability to move arms and legs, worse on the right, difficulty squeezing the hand of the examiner when he was tested by a neurologist, and also some difficulty speaking. There was no high fever, spasm or muscle rigidity.  This apparently resolved but not completely. Still now, 6 months later, he endorses diffuse tremor of his upper and lower extremities, which gets worse when he is trying to exercise.  He feels physical therapy helped to a mild degree, but when he is overexerting himself he gets dyspnea, lightheadedness or blurry vision.  He feels limited in his ability to get up from chairs, walk stairs or lift heavy objects and for that reason he not returned to work.  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  He has occasional muscle twitching and a sensation of numbness throughout his legs, although there is no tingling or burning.  He did not report any incontinence of bowel or bladder, dysphagia, diplopia, dysphonia, ptosis, head drop, difficulty lifting arms overhead.  He continues to experience diffuse muscle aches more prominent in the shoulder and hip region and right side of the back.  This is treated with p.r.n. Tylenol or ibuprofen by his primary.  He has never had myoglobinuria or early morning paralysis. He had an evaluation by Dr. Donis and Dr. Hernandez immediately after the event in October as  well as later. EMG was normal apparently. Labs that were done around the time of the initial event included acetylcholine receptor binding, blocking and modulating antibodies, DELIO antibodies, rheumatoid factor, CK, TSH, chemistries (all negative/normal). Vitamin D level was a little low at 20. He had hepatitis B and hepatitis C, Lyme serologies in 2013 that were unremarkable.     In the recent past in 2021 he was followed by Dayna Lundberg who maintained him on chronic opioids and added medical marijuana to opioids with the hopes of tapering of oxycodone.  The patient currently follows up with Dr. Anthony Small for medication refills including oxycodone with medical marijuana.    He states in the past he has tried all other modalities including physical therapy acupuncture, TENS unit, multiple injections by Dr. Welsh, multiple medication changes.  He describes that the modalities were helpful in the moment but did not have lasting pain relief.    Significant Medical History:   Past Medical History:   Diagnosis Date     Anxiety      Bulging lumbar disc      Depression      History of anesthesia complications     Was hospitalized in October 2015 for weakness     History of blood clots     PE in 2017     Lumbar herniated disc 10/23/2015     Lumbar radiculopathy 11/25/2015     Shortness of breath      Tachycardia     After PE in           Past Surgical History:  Past Surgical History:   Procedure Laterality Date     BACK SURGERY       MINIMALLY INVASIVE MICRODISCECTOMY LUMBAR SPINE Right 10/2015    L5-S1; Dr. Bettie Hernandez     VT NASAL/SINUS ENDOSCOPY,BX/RMV POLYP/DEBRID Right 5/22/2019    Procedure: RIGHT ENDOSCOPIC SINUS SURGERY;  Surgeon: Leo Wright MD;  Location: [facility];  Service: ENT     VT NASAL/SINUS ENDOSCOPY,BX/RMV POLYP/DEBRID Bilateral 11/20/2020    Procedure: Endoscpic sinus surgery, LEFT;  Surgeon: Leo Wright MD;  Location: [facility];  Service: ENT     SINUS SURGERY  " 2014    Dr. Wright          Family History:  Family History   Problem Relation Age of Onset     Depression Mother      Hyperlipidemia Mother      Other - See Comments Mother         \"Ulcers\" - Aches and pains throughout her body.     Cancer Father      No Known Problems Sister      No Known Problems Brother      No Known Problems Sister      No Known Problems Sister      No Known Problems Sister      No Known Problems Brother      Thyroid Disease Maternal Grandmother      Thyroid Disease Cousin           Social History:  Social History     Socioeconomic History     Marital status:      Spouse name: Not on file     Number of children: 0     Years of education: GED and some collese     Highest education level: Not on file   Occupational History     Not on file   Tobacco Use     Smoking status: Never Smoker     Smokeless tobacco: Never Used   Substance and Sexual Activity     Alcohol use: Yes     Comment: Alcoholic Drinks/day: Monthly or less often. 1-2 drinks at a time.     Drug use: Yes     Types: Marijuana     Sexual activity: Yes     Partners: Female   Other Topics Concern     Parent/sibling w/ CABG, MI or angioplasty before 65F 55M? Not Asked   Social History Narrative     Not on file     Social Determinants of Health     Financial Resource Strain: Not on file   Food Insecurity: Not on file   Transportation Needs: Not on file   Physical Activity: Not on file   Stress: Not on file   Social Connections: Not on file   Intimate Partner Violence: Not on file   Housing Stability: Not on file     Social History     Social History Narrative     Not on file          Allergies:  No Known Allergies    Current Medications:   Current Outpatient Medications   Medication Sig Dispense Refill     Acetaminophen (TYLENOL PO) Take 500-1,000 mg by mouth every 8 hours as needed       escitalopram (LEXAPRO) 10 MG tablet Take 1 tablet (10 mg) by mouth daily 90 tablet 3     fluticasone (FLONASE) 50 MCG/ACT nasal spray SPRAY 2 " SPRAYS INTO EACH NOSTRIL EVERY DAY       lamoTRIgine (LAMICTAL) 100 MG tablet Take 1 tablet (100 mg) by mouth 2 times daily 60 tablet 1     medical cannabis (Patient's own supply) See Admin Instructions (The purpose of this order is to document that the patient reports taking medical cannabis.  This is not a prescription, and is not used to certify that the patient has a qualifying medical condition.)       omeprazole (PRILOSEC) 20 MG DR capsule TAKE 1 CAPSULE BY MOUTH EVERY DAY       oxyCODONE-acetaminophen (PERCOCET) 7.5-325 MG per tablet Take 1 tablet by mouth every 6 hours as needed for severe pain 112 tablet 0     propranolol (INDERAL) 80 MG tablet Take 1 tablet (80 mg) by mouth 3 times daily 270 tablet 3     rivaroxaban ANTICOAGULANT (XARELTO) 20 MG TABS tablet Take 1 tablet (20 mg) by mouth daily (with dinner) 30 tablet 11     VITAMIN D, CHOLECALCIFEROL, PO Take 2,000 Units by mouth daily       zolpidem (AMBIEN) 10 MG tablet Take 0.5-1 tablets (5-10 mg) by mouth nightly as needed for sleep 30 tablet 2          Current Pain Medications:  Medications related to Pain Management (From now, onward)    None           Work History:    Current work status: Currently stays at home.  The patient states he tries to do as much as possible to prevent any muscle atrophy.  His pain is spread to his upper extremities.  He is noted to have difficulty standing up during the clinic visit and sways to his left    Psychosocial History:     History of treatment for behavioral disorder: yes    Review of Systems:  Review of Systems   Constitutional: Positive for weight loss.   HENT: Positive for tinnitus.    Respiratory: Positive for shortness of breath.    Musculoskeletal: Positive for back pain, myalgias and neck pain.   Neurological: Positive for tingling, tremors and weakness.   Endo/Heme/Allergies: Bruises/bleeds easily.   All other systems reviewed and are negative.        Physical Exam:     Vitals:    10/04/22 1030   BP:  113/74   BP Location: Right arm   Patient Position: Chair   Cuff Size: Adult Large   Pulse: 77   SpO2: 96%       General Appearance: No distress, seated comfortably  Mood: Euthymic  HE ENT: Non constricted pupils  Respiratory: Non labored breathing    Laboratory results:  Recent Labs   Lab Test 03/16/21  1210 11/18/20  1349    143   POTASSIUM 3.9 4.2   CHLORIDE 105 104   CO2 26 25   ANIONGAP 12 14    120   BUN 18 19   CR 0.91 1.12   DEVON 9.8 10.8*       CBC RESULTS:   Recent Labs   Lab Test 07/08/22  1615 03/16/21  1210   WBC  --  8.0   RBC  --  5.53   HGB 15.2 15.7   HCT  --  47.5   MCV  --  86   MCH  --  28.4   MCHC  --  33.1   RDW  --  12.6   PLT  --  280         Imaging:       ASSESSMENT AND PLAN:     Encounter Diagnosis:    Chronic pain syndrome  Anand Felix is a 37 year old y.o. old male who presents to the pain clinic with chronic pain syndrome.    I discussed with the patient that spinal cord stimulation is not recommended in his situation for the following reasons  1.  No clear neurological diagnosis.  EMG was normal in the past.  His pain is back to his upper extremities.  Spinal cord stimulators are not able to cover diffuse body areas of pain.  2.  Duration of chronic pain is over 10 years.  Spinal cord stimulators seem to have best outcomes.  Pain duration of less than 10 years.  They have shown to lose efficacy with the duration of chronic pain.  Chronic heart stimulators are not invasive procedure with breast center do not recommend spinal cord stimulator trial at this time    3.  The patient with a goal of tapering opioids.  I discussed with the patient that he has been stable on opioids for a long duration.  A recent trauma paper did not show effective reduction in opioids and patient's was stable with medications long-term.  The patient was appreciative of that open conversation and recommendations.    4.  I recommend him to continue follow-up with his chronic pain provider to reduce his  medications as much as reasonably possible with appropriate monitoring.  Also discussed with the patient to continue ongoing integrative medical approaches to manage his pain.                                                                                                Answers for HPI/ROS submitted by the patient on 10/3/2022  CAROLYN 7 TOTAL SCORE: 2  General Symptoms: Yes  Skin Symptoms: No  HENT Symptoms: Yes  EYE SYMPTOMS: No  HEART SYMPTOMS: No  LUNG SYMPTOMS: Yes  INTESTINAL SYMPTOMS: No  URINARY SYMPTOMS: No  REPRODUCTIVE SYMPTOMS: No  SKELETAL SYMPTOMS: Yes  BLOOD SYMPTOMS: Yes  NERVOUS SYSTEM SYMPTOMS: Yes  MENTAL HEALTH SYMPTOMS: No  Fatigue: Yes  Feeling hot or cold when others believe the temperature is normal: Yes  Difficulty breathing on exertion: Yes  Joint pain: Yes  Muscle weakness: Yes  Joint stiffness: Yes  Difficulty walking: Yes  Numbness: Yes

## 2022-10-04 NOTE — NURSING NOTE
Patient presents with:  Consult: New Consult Lower back , rt leg pain level 6/10      Severe Pain (6)     Pain Medications     Analgesics Other Refills Start End     Acetaminophen (TYLENOL PO)          Sig - Route: Take 500-1,000 mg by mouth every 8 hours as needed - Oral    Class: Historical    Opioid Combinations Refills Start End     oxyCODONE-acetaminophen (PERCOCET) 7.5-325 MG per tablet    0 10/3/2022     Sig - Route: Take 1 tablet by mouth every 6 hours as needed for severe pain - Oral    Class: E-Prescribe    Earliest Fill Date: 10/3/2022    No prior authorization was found for this prescription.    Found prior authorization for another prescription for the same medication: Closed - Prior Authorization not required for patient/medication          What medications are you using for pain? Oxycodone, Medical Cannabis,Tylenol, Lamical    (New patients only) Have you been seen by another pain clinic/ provider? yes    (Return Patients only) What refills are you needing today? No    Expectation Want to see if he can get the Spine Stimulator

## 2022-10-04 NOTE — LETTER
10/4/2022       RE: Anand Felix  [address]  [facility] [address]     Dear Colleague,    Thank you for referring your patient, Anand Felix, to the [facility] FOR COMPREHENSIVE PAIN MANAGEMENT [place] at [facility]. Please see a copy of my visit note below.      Pain Clinic New Patient Consult Note:    Referring Provider: Geovanny   Primary care provider: Malachi Choudhary    Anand Felix is a 37 year old y.o. old male who presents to the pain clinic with diffuse areas of body pain for consideration of spinal cord stimulation    HPI:  Patient Supplied Answers To the [facility] Pain Questionnaire  [facility] Pain -  Patient Entered Questionnaire/Answers 10/3/2022   What number best describes your pain right now:  0 = No pain  to  10 = Worst pain imaginable 6   How would you describe the pain? burning, sharp, numbness, cutting, dull, aching, throbbing   Which of the following worsen your pain? lying down, standing, sitting, walking, exercise   Which of the following improve or reduce your pain? lying down, medication   What number best describes your average pain for the past week:  0 = No pain  to  10 = Worst pain imaginable 6   What number best describes your LOWEST pain in past 24 hours:  0 = No pain  to  10 = Worst pain imaginable 4   What number best describes your WORST pain in past 24 hours:  0 = No pain  to  10 = Worst pain imaginable 7   When is your pain worst? AM, Night, Constant   What non-medicine treatments have you already had for your pain? pain clinic, physical therapy, acupuncture, TENS (electrical stimulator), spine injections (shots), surgery, exercise     Anand is a 37-year-old male presenting to the pain clinic to discuss the option of spinal cord stimulation.  His goal is to have better pain control and possible weaning opioids.  The patient has a long history of chronic pain that started since the age of 13.  More recently in 2015 he  underwent spine surgery with a complicated wake-up.    On review of prior neurology colleagues note from 2016 I find the following details:   he is a 31-year-old man who was healthy with the exception of back pain radiating from the right buttock to the foot in a sciatica-like pattern.  Because he had incomplete response to oral medications and epidural steroids, he ended up having a lumbar diskectomy on 10/23/2015 at [facility].  He had an MRI of the lumbar spine apparently prior to surgery which showed L5-S1 mild disk degeneration with loss of disk hydration and slight loss of height, small posterior annular tear but otherwise an entirely unremarkable lumbar spine.  On 10/16, he also had a cervical spine MRI which was basically normal and a thoracic spinal MRI with minor degenerative changes and no significant cord or root compression.  Right after or a few hours after he woke up from the lumbar discectomy he noticed inability to move arms and legs, worse on the right, difficulty squeezing the hand of the examiner when he was tested by a neurologist, and also some difficulty speaking. There was no high fever, spasm or muscle rigidity.  This apparently resolved but not completely. Still now, 6 months later, he endorses diffuse tremor of his upper and lower extremities, which gets worse when he is trying to exercise.  He feels physical therapy helped to a mild degree, but when he is overexerting himself he gets dyspnea, lightheadedness or blurry vision.  He feels limited in his ability to get up from chairs, walk stairs or lift heavy objects and for that reason he not returned to work.  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  He has occasional muscle twitching and a sensation of numbness throughout his legs, although there is no tingling or burning.  He did not report any incontinence of bowel or bladder, dysphagia, diplopia, dysphonia, ptosis, head drop, difficulty lifting arms  overhead.  He continues to experience diffuse muscle aches more prominent in the shoulder and hip region and right side of the back.  This is treated with p.r.n. Tylenol or ibuprofen by his primary.  He has never had myoglobinuria or early morning paralysis. He had an evaluation by Dr. Donis and Dr. Hernandez immediately after the event in October as well as later. EMG was normal apparently. Labs that were done around the time of the initial event included acetylcholine receptor binding, blocking and modulating antibodies, DELIO antibodies, rheumatoid factor, CK, TSH, chemistries (all negative/normal). Vitamin D level was a little low at 20. He had hepatitis B and hepatitis C, Lyme serologies in 2013 that were unremarkable.     In the recent past in 2021 he was followed by Dayna Lundberg who maintained him on chronic opioids and added medical marijuana to opioids with the hopes of tapering of oxycodone.  The patient currently follows up with Dr. Anthony Small for medication refills including oxycodone with medical marijuana.    He states in the past he has tried all other modalities including physical therapy acupuncture, TENS unit, multiple injections by Dr. Welsh, multiple medication changes.  He describes that the modalities were helpful in the moment but did not have lasting pain relief.    Significant Medical History:   Past Medical History:   Diagnosis Date     Anxiety      Bulging lumbar disc      Depression      History of anesthesia complications     Was hospitalized in October 2015 for weakness     History of blood clots     PE in 2017     Lumbar herniated disc 10/23/2015     Lumbar radiculopathy 11/25/2015     Shortness of breath      Tachycardia     After PE in           Past Surgical History:  Past Surgical History:   Procedure Laterality Date     BACK SURGERY       MINIMALLY INVASIVE MICRODISCECTOMY LUMBAR SPINE Right 10/2015    L5-S1; Dr. Bettie Hernandez     [place] NASAL/SINUS ENDOSCOPY,BX/RMV  "POLYP/DEBRID Right 5/22/2019    Procedure: RIGHT ENDOSCOPIC SINUS SURGERY;  Surgeon: Leo Wright MD;  Location: [facility];  Service: ENT     [place] NASAL/SINUS ENDOSCOPY,BX/RMV POLYP/DEBRID Bilateral 11/20/2020    Procedure: Endoscpic sinus surgery, LEFT;  Surgeon: Leo Wright MD;  Location: [facility];  Service: ENT     SINUS SURGERY  2014    Dr. Wright          Family History:  Family History   Problem Relation Age of Onset     Depression Mother      Hyperlipidemia Mother      Other - See Comments Mother         \"Ulcers\" - Aches and pains throughout her body.     Cancer Father      No Known Problems Sister      No Known Problems Brother      No Known Problems Sister      No Known Problems Sister      No Known Problems Sister      No Known Problems Brother      Thyroid Disease Maternal Grandmother      Thyroid Disease Cousin           Social History:  Social History     Socioeconomic History     Marital status:      Spouse name: Not on file     Number of children: 0     Years of education: GED and some collese     Highest education level: Not on file   Occupational History     Not on file   Tobacco Use     Smoking status: Never Smoker     Smokeless tobacco: Never Used   Substance and Sexual Activity     Alcohol use: Yes     Comment: Alcoholic Drinks/day: Monthly or less often. 1-2 drinks at a time.     Drug use: Yes     Types: Marijuana     Sexual activity: Yes     Partners: Female   Other Topics Concern     Parent/sibling w/ CABG, MI or angioplasty before 65F 55M? Not Asked   Social History Narrative     Not on file     Social Determinants of Health     Financial Resource Strain: Not on file   Food Insecurity: Not on file   Transportation Needs: Not on file   Physical Activity: Not on file   Stress: Not on file   Social Connections: Not on file   Intimate Partner Violence: Not on file   Housing Stability: Not on file     Social History     Social History Narrative     Not on file      "     Allergies:  No Known Allergies    Current Medications:   Current Outpatient Medications   Medication Sig Dispense Refill     Acetaminophen (TYLENOL PO) Take 500-1,000 mg by mouth every 8 hours as needed       escitalopram (LEXAPRO) 10 MG tablet Take 1 tablet (10 mg) by mouth daily 90 tablet 3     fluticasone (FLONASE) 50 MCG/ACT nasal spray SPRAY 2 SPRAYS INTO EACH NOSTRIL EVERY DAY       lamoTRIgine (LAMICTAL) 100 MG tablet Take 1 tablet (100 mg) by mouth 2 times daily 60 tablet 1     medical cannabis (Patient's own supply) See Admin Instructions (The purpose of this order is to document that the patient reports taking medical cannabis.  This is not a prescription, and is not used to certify that the patient has a qualifying medical condition.)       omeprazole (PRILOSEC) 20 MG DR capsule TAKE 1 CAPSULE BY MOUTH EVERY DAY       oxyCODONE-acetaminophen (PERCOCET) 7.5-325 MG per tablet Take 1 tablet by mouth every 6 hours as needed for severe pain 112 tablet 0     propranolol (INDERAL) 80 MG tablet Take 1 tablet (80 mg) by mouth 3 times daily 270 tablet 3     rivaroxaban ANTICOAGULANT (XARELTO) 20 MG TABS tablet Take 1 tablet (20 mg) by mouth daily (with dinner) 30 tablet 11     VITAMIN D, CHOLECALCIFEROL, PO Take 2,000 Units by mouth daily       zolpidem (AMBIEN) 10 MG tablet Take 0.5-1 tablets (5-10 mg) by mouth nightly as needed for sleep 30 tablet 2          Current Pain Medications:  Medications related to Pain Management (From now, onward)    None           Work History:    Current work status: Currently stays at home.  The patient states he tries to do as much as possible to prevent any muscle atrophy.  His pain is spread to his upper extremities.  He is noted to have difficulty standing up during the clinic visit and sways to his left    Psychosocial History:     History of treatment for behavioral disorder: yes    Review of Systems:  Review of Systems   Constitutional: Positive for weight loss.   HENT:  Positive for tinnitus.    Respiratory: Positive for shortness of breath.    Musculoskeletal: Positive for back pain, myalgias and neck pain.   Neurological: Positive for tingling, tremors and weakness.   Endo/Heme/Allergies: Bruises/bleeds easily.   All other systems reviewed and are negative.        Physical Exam:     Vitals:    10/04/22 1030   BP: 113/74   BP Location: Right arm   Patient Position: Chair   Cuff Size: Adult Large   Pulse: 77   SpO2: 96%       General Appearance: No distress, seated comfortably  Mood: Euthymic  HE ENT: Non constricted pupils  Respiratory: Non labored breathing    Laboratory results:  Recent Labs   Lab Test 03/16/21  1210 11/18/20  1349    143   POTASSIUM 3.9 4.2   CHLORIDE 105 104   CO2 26 25   ANIONGAP 12 14    120   BUN 18 19   CR 0.91 1.12   DEVON 9.8 10.8*       CBC RESULTS:   Recent Labs   Lab Test 07/08/22  1615 03/16/21  1210   WBC  --  8.0   RBC  --  5.53   HGB 15.2 15.7   HCT  --  47.5   MCV  --  86   MCH  --  28.4   MCHC  --  33.1   RDW  --  12.6   PLT  --  280         Imaging:       ASSESSMENT AND PLAN:     Encounter Diagnosis:    Chronic pain syndrome  Anand Felix is a 37 year old y.o. old male who presents to the pain clinic with chronic pain syndrome.    I discussed with the patient that spinal cord stimulation is not recommended in his situation for the following reasons  1.  No clear neurological diagnosis.  EMG was normal in the past.  His pain is back to his upper extremities.  Spinal cord stimulators are not able to cover diffuse body areas of pain.  2.  Duration of chronic pain is over 10 years.  Spinal cord stimulators seem to have best outcomes.  Pain duration of less than 10 years.  They have shown to lose efficacy with the duration of chronic pain.  Chronic heart stimulators are not invasive procedure with breast center do not recommend spinal cord stimulator trial at this time    3.  The patient with a goal of tapering opioids.  I discussed with the  patient that he has been stable on opioids for a long duration.  A recent trauma paper did not show effective reduction in opioids and patient's was stable with medications long-term.  The patient was appreciative of that open conversation and recommendations.    4.  I recommend him to continue follow-up with his chronic pain provider to reduce his medications as much as reasonably possible with appropriate monitoring.  Also discussed with the patient to continue ongoing integrative medical approaches to manage his pain.                                                                                                Answers for HPI/ROS submitted by the patient on 10/3/2022  CAROLYN 7 TOTAL SCORE: 2  General Symptoms: Yes  Skin Symptoms: No  HENT Symptoms: Yes  EYE SYMPTOMS: No  HEART SYMPTOMS: No  LUNG SYMPTOMS: Yes  INTESTINAL SYMPTOMS: No  URINARY SYMPTOMS: No  REPRODUCTIVE SYMPTOMS: No  SKELETAL SYMPTOMS: Yes  BLOOD SYMPTOMS: Yes  NERVOUS SYSTEM SYMPTOMS: Yes  MENTAL HEALTH SYMPTOMS: No  Fatigue: Yes  Feeling hot or cold when others believe the temperature is normal: Yes  Difficulty breathing on exertion: Yes  Joint pain: Yes  Muscle weakness: Yes  Joint stiffness: Yes  Difficulty walking: Yes  Numbness: Yes      Sincerely,    Belinda Cage MD

## 2022-10-11 DIAGNOSIS — M25.50 PAIN IN JOINT, MULTIPLE SITES: ICD-10-CM

## 2022-10-11 RX ORDER — LAMOTRIGINE 100 MG/1
100 TABLET ORAL 2 TIMES DAILY
Qty: 60 TABLET | Refills: 1 | Status: SHIPPED | OUTPATIENT
Start: 2022-10-11 | End: 2022-12-01

## 2022-10-11 NOTE — TELEPHONE ENCOUNTER
Received fax request from [facility] [id number] [place], [place] - [address] [facility] pharmacy requesting refill(s) for lamoTRIgine (LAMICTAL) 100 MG tablet    Last refilled on 09/08/2022    Pt last seen on 08/25/2022  Next appt scheduled for 12/01/2022    Will facilitate refill.    Tiffanie Flores MA  [facility] [place]

## 2022-12-01 ENCOUNTER — OFFICE VISIT (OUTPATIENT)
Dept: PALLIATIVE MEDICINE | Facility: OTHER | Age: 37
End: 2022-12-01
Payer: COMMERCIAL

## 2022-12-01 VITALS — DIASTOLIC BLOOD PRESSURE: 82 MMHG | HEART RATE: 78 BPM | SYSTOLIC BLOOD PRESSURE: 125 MMHG | OXYGEN SATURATION: 96 %

## 2022-12-01 DIAGNOSIS — M54.50 LUMBAR PAIN DETERMINED BY PALPATION: ICD-10-CM

## 2022-12-01 PROCEDURE — 99213 OFFICE O/P EST LOW 20 MIN: CPT | Performed by: ANESTHESIOLOGY

## 2022-12-01 PROCEDURE — G0463 HOSPITAL OUTPT CLINIC VISIT: HCPCS

## 2022-12-01 RX ORDER — RILUZOLE 50 MG/1
TABLET, FILM COATED ORAL
Qty: 60 TABLET | Refills: 3 | Status: SHIPPED | OUTPATIENT
Start: 2022-12-01 | End: 2023-04-04

## 2022-12-01 RX ORDER — OXYCODONE AND ACETAMINOPHEN 7.5; 325 MG/1; MG/1
1 TABLET ORAL EVERY 6 HOURS PRN
Qty: 112 TABLET | Refills: 0 | Status: SHIPPED | OUTPATIENT
Start: 2022-12-01 | End: 2023-01-21

## 2022-12-01 ASSESSMENT — PAIN SCALES - GENERAL: PAINLEVEL: MODERATE PAIN (4)

## 2022-12-01 NOTE — PROGRESS NOTES
[facility] - Office Visit    ASSESSMENT & PLAN     Anand was seen today for pain.    Diagnoses and all orders for this visit:    Lumbar pain determined by palpation and/or percussion at L1 (5/4/16)  -     oxyCODONE-acetaminophen (PERCOCET) 7.5-325 MG per tablet; Take 1 tablet by mouth every 6 hours as needed for severe pain (7-10)  -     riluzole (RILUTEK) 50 MG tablet; One daily for 2 weeks, may increase to 2 daily        Patient Instructions     [facility]    Clinic Number:  [phone]    Call with any questions about your care and for scheduling assistance.     Calls are returned Monday through Friday between 8 AM and 4:30 PM. We usually get back to you within 2 business days depending on the issue/request.    If we are prescribing your medications:    For opioid medication refills, call the clinic or send a Ne[employer]itra message 7 days in advance.  Please include:    Name of requested medication    Name of the pharmacy.    For non-opioid medications, call your pharmacy directly to request a refill. Please allow 3-4 days to be processed.     Per [place] State Law:    All controlled substance prescriptions must be filled within 30 days of being written.      For those controlled substances allowing refills, pickup must occur within 30 days of last fill.      We believe regular attendance is key to your success in our program!      Any time you are unable to keep your appointment we ask that you call us at least 24 hours in advance to cancel.This will allow us to offer the appointment time to another patient.     Multiple missed appointments may lead to dismissal from the clinic.       PLAN:    May stop the lamotrigine and use Riluzole to help with allover body pain.  50 mg tablet 1 daily for 2 weeks.  If still symptoms and tolerating may increase to 1 twice a day.  Call Dr. Small after 2 weeks if need to adjust the dose.    Continue with oxycodone 7.5 mg up to  4 times a day.    Continue with the medical cannabis preparations, will reenroll.    Discussed you have appoint with neurologist, and then will follow-up with your pulmonologist.    Discussed the practice of Alana brandon and resources given.    Discussed the use of earthing mats, may have influence on your blood thinners.    Follow-up with Dr. Faulkner in 3 months        -----  ANNALISA FAULKNER MD  [facility]       SUBJECTIVE      Anand Felix is a 37 year old year old male who presents to clinic today for the following:         Review of Systems   General, psych, musculoskeletal, bowels and bladder otherwise normal other than above.          OBJECTIVE   BP (!) 171/102   Pulse 106   Ht 1.829 m (6')   Wt 103.8 kg (228 lb 14.4 oz)   SpO2 97%   BMI 31.04 kg/m        Physical Exam  General:  Normal appearance, no apparent distress  Cardiovascular: Normal rate  Lungs: Pulmonary effort is normal, speaking in full sentences  MSK: normal muscle bulk and tone, ROM, equal strength in all extremities  Skin: Warm and dry. No concerning rashes or lesions.  Neurologic: No focal deficit, alert and oriented x3  Psychiatric: normal mood and affect, cooperative      Total time spent:  minutes  [facility] - Office Visit    ASSESSMENT & PLAN     Anand was seen today for pain.    Diagnoses and all orders for this visit:    Lumbar pain determined by palpation and/or percussion at L1 (5/4/16)  -     oxyCODONE-acetaminophen (PERCOCET) 7.5-325 MG per tablet; Take 1 tablet by mouth every 6 hours as needed for severe pain (7-10)  -     riluzole (RILUTEK) 50 MG tablet; One daily for 2 weeks, may increase to 2 daily        Patient Instructions     [facility] Number:  [phone]    Call with any questions about your care and for scheduling assistance.     Calls are returned Monday through Friday between 8 AM and 4:30 PM. We usually get back to you within 2  business days depending on the issue/request.    If we are prescribing your medications:    For opioid medication refills, call the clinic or send a Open[employer] message 7 days in advance.  Please include:    Name of requested medication    Name of the pharmacy.    For non-opioid medications, call your pharmacy directly to request a refill. Please allow 3-4 days to be processed.     Per [place] State Law:    All controlled substance prescriptions must be filled within 30 days of being written.      For those controlled substances allowing refills, pickup must occur within 30 days of last fill.      We believe regular attendance is key to your success in our program!      Any time you are unable to keep your appointment we ask that you call us at least 24 hours in advance to cancel.This will allow us to offer the appointment time to another patient.     Multiple missed appointments may lead to dismissal from the clinic.       PLAN:    May stop the lamotrigine and use Riluzole to help with allover body pain.  50 mg tablet 1 daily for 2 weeks.  If still symptoms and tolerating may increase to 1 twice a day.  Call Dr. Faulkner after 2 weeks if need to adjust the dose.    Continue with oxycodone 7.5 mg up to 4 times a day.    Continue with the medical cannabis preparations, will reenroll.    Discussed you have appoint with neurologist, and then will follow-up with your pulmonologist.    Discussed the practice of Alana brandon and resources given.    Discussed the use of earthing mats, may have influence on your blood thinners.    Follow-up with Dr. Faulkner in 3 months        -----  ANNALISA FAULKNER MD  [facility]       SUBJECTIVE      Anand Felix is a 37 year old year old male who presents to clinic today for the following:     Follow-up for history of lumbar laminectomy.    Since last seen was evaluated for spinal cord stimulator, thought not to be appropriate candidate given the length of time with pain, also having  pain in upper back and extremities.    Reviews today having that evaluation, felt understood the rationale may not be appropriate to continue.    Since last seen did have a urine drug test positive for alcohol for which I called the patient.  He was unable able to explain that, states he has not had alcohol for 2 or 3 years, and would then drink less than a can of beer at family gatherings.  Did not recall using cough syrup at that time.  Does not have particular GI disturbance, as there are situations where some bacteria may create ethanol, but there are particular metabolites to affect human ingestion of ethanol.  He is using bowel preparations does not have constipation.    When last seen we discussed acupuncture, found the appointments were far out did not feel he needed to pursue that.    He did decrease the lamotrigine to 100 mg 1 in the morning and one half at night.  Itching decreased.  He is not getting quite as much benefit as he noticed before from the allover pain.  We discussed could transition next to the off label use of Riluzole  He would like to do that.    Continues with medical cannabis using the high CBD oil and vaporizer with benefit is due to be renewed.    Using the oxycodone 7.5 mg often up to 4 times a day.  Finds the winter pain seems to be worse.    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  His wife works remotely at home as an .    He is scheduled to see neurologist to address concerns for muscle weakness and then will follow-up with pulmonary, on Xarelto for PEs.     reviewed.      Current Outpatient Medications:      Acetaminophen (TYLENOL PO), Take 500-1,000 mg by mouth every 8 hours as needed, Disp: , Rfl:      escitalopram (LEXAPRO) 10 MG tablet, Take 1 tablet (10 mg) by mouth daily, Disp: 90 tablet, Rfl: 3     fluticasone (FLONASE) 50 MCG/ACT nasal spray, SPRAY 2 SPRAYS INTO EACH NOSTRIL EVERY DAY, Disp: , Rfl:      medical cannabis (Patient's own  supply), See Admin Instructions (The purpose of this order is to document that the patient reports taking medical cannabis.  This is not a prescription, and is not used to certify that the patient has a qualifying medical condition.), Disp: , Rfl:      omeprazole (PRILOSEC) 20 MG DR capsule, TAKE 1 CAPSULE BY MOUTH EVERY DAY, Disp: , Rfl:      oxyCODONE-acetaminophen (PERCOCET) 7.5-325 MG per tablet, Take 1 tablet by mouth every 6 hours as needed for severe pain (7-10), Disp: 112 tablet, Rfl: 0     propranolol (INDERAL) 80 MG tablet, Take 1 tablet (80 mg) by mouth 3 times daily, Disp: 270 tablet, Rfl: 3     riluzole (RILUTEK) 50 MG tablet, One daily for 2 weeks, may increase to 2 daily, Disp: 60 tablet, Rfl: 3     rivaroxaban ANTICOAGULANT (XARELTO) 20 MG TABS tablet, Take 1 tablet (20 mg) by mouth daily (with dinner), Disp: 30 tablet, Rfl: 11     VITAMIN D, CHOLECALCIFEROL, PO, Take 2,000 Units by mouth daily, Disp: , Rfl:      zolpidem (AMBIEN) 10 MG tablet, TAKE 0.5-1 TABLETS (5-10 MG) BY MOUTH NIGHTLY AS NEEDED FOR SLEEP, Disp: 30 tablet, Rfl: 5      Review of Systems   General, psych, musculoskeletal, bowels and bladder otherwise normal other than above.          OBJECTIVE   /82   Pulse 78   SpO2 96%          Physical Exam  General: Alert, clear sensorium.  No pain behavior.  No respiratory distress   cardiovascular: Normal rate  Lungs: Pulmonary effort is normal, speaking in full sentences   No concerning rashes or lesions.  Neurologic: No focal deficit, alert and oriented x3  Psychiatric: Affect fairly full range      Assessment: Patient with a history of lumbar radiculopathy.  Also some upper extremity myalgias.  Had had benefit of lamotrigine though noticed some itching, changed to off label use of Riluzole, with a slow titration dosing.    Be reenrolled in the [place] medical cannabis program.    Encouraged to look into developing a practice of [facility] as he has limited outside activity and  handouts for resources given.    Total time more than 20 minutes.

## 2022-12-01 NOTE — PATIENT INSTRUCTIONS
[facility] Number:  [phone]  Call with any questions about your care and for scheduling assistance.   Calls are returned Monday through Friday between 8 AM and 4:30 PM. We usually get back to you within 2 business days depending on the issue/request.    If we are prescribing your medications:  For opioid medication refills, call the clinic or send a [employer] message 7 days in advance.  Please include:  Name of requested medication  Name of the pharmacy.  For non-opioid medications, call your pharmacy directly to request a refill. Please allow 3-4 days to be processed.   Per [place] State Law:  All controlled substance prescriptions must be filled within 30 days of being written.    For those controlled substances allowing refills, pickup must occur within 30 days of last fill.      We believe regular attendance is key to your success in our program!    Any time you are unable to keep your appointment we ask that you call us at least 24 hours in advance to cancel.This will allow us to offer the appointment time to another patient.   Multiple missed appointments may lead to dismissal from the clinic.       PLAN:    May stop the lamotrigine and use Riluzole to help with allover body pain.  50 mg tablet 1 daily for 2 weeks.  If still symptoms and tolerating may increase to 1 twice a day.  Call Dr. Small after 2 weeks if need to adjust the dose.    Continue with oxycodone 7.5 mg up to 4 times a day.    Continue with the medical cannabis preparations, will reenroll.    Discussed you have appoint with neurologist, and then will follow-up with your pulmonologist.    Discussed the practice of Alana brandon and resources given.    Discussed the use of earthing mats, may have influence on your blood thinners.    Follow-up with Dr. Small in 3 months

## 2022-12-01 NOTE — NURSING NOTE
Patient presents to the clinic today for a follow up with ANNALISA FAULKNER MD  regarding Pain Management.       PEG Score 6/16/2022 8/25/2022 12/1/2022   PEG Total Score 3 4.67 2.67          UDT/CSA =9/23/22      Tiffanie Flores MA  [facility] [place]

## 2022-12-13 ENCOUNTER — OFFICE VISIT (OUTPATIENT)
Dept: NEUROLOGY | Facility: CLINIC | Age: 37
End: 2022-12-13
Attending: INTERNAL MEDICINE
Payer: COMMERCIAL

## 2022-12-13 ENCOUNTER — LAB (OUTPATIENT)
Dept: LAB | Facility: HOSPITAL | Age: 37
End: 2022-12-13
Payer: COMMERCIAL

## 2022-12-13 VITALS
DIASTOLIC BLOOD PRESSURE: 79 MMHG | HEIGHT: 65 IN | BODY MASS INDEX: 30.99 KG/M2 | HEART RATE: 73 BPM | WEIGHT: 186 LBS | SYSTOLIC BLOOD PRESSURE: 117 MMHG

## 2022-12-13 DIAGNOSIS — F45.9 SOMATOFORM DISORDER: Primary | ICD-10-CM

## 2022-12-13 DIAGNOSIS — G70.9 NEUROMUSCULAR WEAKNESS (H): ICD-10-CM

## 2022-12-13 PROBLEM — M79.10 MYALGIA: Status: RESOLVED | Noted: 2018-02-13 | Resolved: 2022-12-13

## 2022-12-13 PROBLEM — G89.4 CHRONIC PAIN SYNDROME: Status: ACTIVE | Noted: 2022-12-13

## 2022-12-13 PROBLEM — I10 HYPERTENSION: Status: ACTIVE | Noted: 2022-12-13

## 2022-12-13 PROBLEM — I26.99 PULMONARY EMBOLISM (H): Status: RESOLVED | Noted: 2017-10-15 | Resolved: 2022-12-13

## 2022-12-13 PROBLEM — I26.99 BILATERAL PULMONARY EMBOLISM (H): Status: RESOLVED | Noted: 2017-10-15 | Resolved: 2022-12-13

## 2022-12-13 PROBLEM — R00.0 SINUS TACHYCARDIA: Status: RESOLVED | Noted: 2018-08-27 | Resolved: 2022-12-13

## 2022-12-13 LAB
CK SERPL-CCNC: 62 U/L (ref 39–308)
LACTATE SERPL-SCNC: 1.3 MMOL/L (ref 0.7–2)

## 2022-12-13 PROCEDURE — 83519 RIA NONANTIBODY: CPT

## 2022-12-13 PROCEDURE — 84210 ASSAY OF PYRUVATE: CPT

## 2022-12-13 PROCEDURE — 83516 IMMUNOASSAY NONANTIBODY: CPT

## 2022-12-13 PROCEDURE — 86255 FLUORESCENT ANTIBODY SCREEN: CPT

## 2022-12-13 PROCEDURE — 36415 COLL VENOUS BLD VENIPUNCTURE: CPT

## 2022-12-13 PROCEDURE — 83970 ASSAY OF PARATHORMONE: CPT

## 2022-12-13 PROCEDURE — 82550 ASSAY OF CK (CPK): CPT

## 2022-12-13 PROCEDURE — 99205 OFFICE O/P NEW HI 60 MIN: CPT | Performed by: PSYCHIATRY & NEUROLOGY

## 2022-12-13 PROCEDURE — 83605 ASSAY OF LACTIC ACID: CPT

## 2022-12-13 NOTE — PROGRESS NOTES
"NEUROLOGY CONSULTATION NOTE       [facility] [place]  [address] [place] [address]  Tel: [phone]  Fax: [phone]  [email or web address]     Anand Felix,  1985, MRN [id number]  PCP: Malachi Choudhary  Date: 2022     ASSESSMENT & PLAN     Visit Diagnosis  1. Neuromuscular weakness (H)  2. Somatoform disorder     Somatoform disorder  37 years old male with history of chronic pain syndrome, depression and anxiety, HTN, somatization disorder who was referred for evaluation of generalized weakness.  He had extensive work-up in 2016 including EMG, MRI brain, cervical, thoracic spine and lab work that was normal.  He was also evaluated at [facility] and was felt to have somatoform disorder.  He claims his symptoms have persisted and I recommended:    1.  Repeat EMG upper and lower extremity  2.  Lab work to include acetylcholine receptor antibodies, CK, parathyroid hormone, lactic acid, pyruvic acid.  3.  I suspect these test will again be normal and I would recommend evaluation by psychiatry  4.  Follow-up will be the day he gets EMG    Thank you again for this referral, please feel free to contact me if you have any questions.    Bart Redmond MD  [facility]  (Formerly, [facility]     REASON FOR CONSULTATION Neuromuscular Weakness        HISTORY OF PRESENT ILLNESS     We have been requested by Dr. To to evaluate Anand Felix who is a 37 year old  male for weakness    Patient is a 37 years old male with history of chronic pain syndrome, depression with anxiety, HTN, somatization disorder who was referred for evaluation of \"neuromuscular weakness\".  According to patient he developed headache that are mostly on the right side associated with nausea.  There is no history of any photophobia or phonophobia.  In the past he had a CT of the head that showed dystrophic calcification in the left " parietal head region and subsequently MRI scan showed a cortically based calcification likely due to old trauma or infectious process.  Additionally he had changes in the left frontal sinus extending into the anterior ethmoid air cell most consistent with frontoethmoidal mucocele.  In the past he was seen in our clinic in 2016 for tremors that would get worse with stress.  He also had right S1 radiculopathy and underwent minimally invasive microdiscectomy that was followed by these tremors.  He was also seen at [facility] who felt patient was malingering and did not have any neurological illness.  He had lab work at that time that included normal acetylcholine receptor antibodies, rheumatoid factor, TSH     PROBLEM LIST   Patient Active Problem List   Diagnosis Code     Tremor R25.1     Chronic Ethmoidal Sinusitis J32.2     Hypovitaminosis D - [place] E55.9     Weakness - without demonstrable CNS or peripheral cause - local neurologist at [facility], and neurologist at [facility], and [facility] consultant. R53.1     Somatoform disorder  F45.9     Nasal polyposis - right naris on MRI (2016) J33.9     Mucocele of frontal sinus J34.1     Moderate major depression (H) F32.9     Chronic pain syndrome G89.4     Hypertension I10         PAST MEDICAL & SURGICAL HISTORY     Past Medical History:   Patient  has a past medical history of Anxiety, Bilateral pulmonary embolism (H) (10/15/2017), Bulging lumbar disc, Depression, History of anesthesia complications, History of blood clots, Lumbar herniated disc (10/23/2015), Lumbar radiculopathy (11/25/2015), Pulmonary embolism (H) (10/15/2017), Shortness of breath, and Tachycardia.    Surgical History:  He  has a past surgical history that includes sinus surgery (2014); Minimally Invasive Microdiscectomy Lumbar Spine (Right, 10/2015); back surgery; Pr Nasal/Sinus Endoscopy,Bx/Rmv Polyp/Debrid (Right, 5/22/2019); and Pr Nasal/Sinus  Endoscopy,Bx/Rmv Polyp/Debrid (Bilateral, 11/20/2020).     SOCIAL HISTORY     Reviewed, and he  reports that he has never smoked. He has never used smokeless tobacco. He reports current alcohol use. He reports current drug use. Drug: Marijuana.     FAMILY HISTORY     Reviewed, and family history includes Cancer in his father; Depression in his mother; Hyperlipidemia in his mother; Myocardial Infarction in his cousin; Neuromuscular Disease in his cousin; No Known Problems in his brother, brother, sister, sister, sister, and sister; Other - See Comments in his mother; Thyroid Disease in his cousin and maternal grandmother.     ALLERGIES     No Known Allergies      REVIEW OF SYSTEMS     A 12 point review of system was performed and was negative except as outlined in the history of present illness.     HOME MEDICATIONS     Current Outpatient Rx   Medication Sig Dispense Refill     Acetaminophen (TYLENOL PO) Take 500-1,000 mg by mouth every 8 hours as needed       escitalopram (LEXAPRO) 10 MG tablet Take 1 tablet (10 mg) by mouth daily 90 tablet 3     fluticasone (FLONASE) 50 MCG/ACT nasal spray SPRAY 2 SPRAYS INTO EACH NOSTRIL EVERY DAY       medical cannabis (Patient's own supply) See Admin Instructions (The purpose of this order is to document that the patient reports taking medical cannabis.  This is not a prescription, and is not used to certify that the patient has a qualifying medical condition.)       omeprazole (PRILOSEC) 20 MG DR capsule TAKE 1 CAPSULE BY MOUTH EVERY DAY       oxyCODONE-acetaminophen (PERCOCET) 7.5-325 MG per tablet Take 1 tablet by mouth every 6 hours as needed for severe pain (7-10) 112 tablet 0     propranolol (INDERAL) 80 MG tablet Take 1 tablet (80 mg) by mouth 3 times daily 270 tablet 3     riluzole (RILUTEK) 50 MG tablet One daily for 2 weeks, may increase to 2 daily 60 tablet 3     rivaroxaban ANTICOAGULANT (XARELTO) 20 MG TABS tablet Take 1 tablet (20 mg) by mouth daily (with dinner)  "30 tablet 11     VITAMIN D, CHOLECALCIFEROL, PO Take 2,000 Units by mouth daily       zolpidem (AMBIEN) 10 MG tablet TAKE 0.5-1 TABLETS (5-10 MG) BY MOUTH NIGHTLY AS NEEDED FOR SLEEP 30 tablet 5         PHYSICAL EXAM     Vital signs  /79 (BP Location: Left arm, Patient Position: Sitting)   Pulse 73   Ht 1.651 m (5' 5\")   Wt 84.4 kg (186 lb)   BMI 30.95 kg/m      Weight:   186 lbs 0 oz    General physical exam reveals a young male who is alert and oriented vital signs were reviewed and are documented in electronic medical record. Neck supple, no carotid bruit thyromegaly JVD or lymphadenopathy noted. Neurologically his speech mentation and affect was normal. Funduscopic exam normal. Cranial nerves II through XII intact. Motor strength on the left 5/5 he has giveaway weakness on the right. Reflexes are 2+ toes are downgoing. Sensation intact. He has a astasia-abasia     PERTINENT DIAGNOSTIC STUDIES     Following studies were reviewed:     HEAD CT 11/24/19  1. No CT evidence of acute intracranial hemorrhage, mass or recent infarct.  2. There are nonspecific dystrophic-appearing calcifications in the left parietal region which appear to be outlining the cortex. Although no underlying mass lesion is identified, given relatively young patient age, would recommend a dedicated MRI of   the brain without and with contrast on a nonemergent basis.   3. There is complete opacification of the left frontal sinus and anterior ethmoid air cells without aggressive features.    HEAD MRI 11/26/16  1. Stable appearing signal changes in the left parietal cortex. Comparison head CT demonstrates that these are cortically based calcifications. Brain MR dated 9/9/2016 demonstrates similar signal changes which are stable suggesting indolent,   nonaggressive process. Old trauma or infectious process may have created this appearance.    2. Expansile change left frontal sinus extending into the left anterior ethmoid air cells, " somewhat expansile and most consistent with frontoethmoidal mucocele as detailed above     MRI CERVICAL, THORACIC SPINE 10/16/2015  CERVICAL SPINE MRI:  1.  Minor degenerative changes as described. No spinal canal or neural  foraminal stenosis at any level.  2.  Multiple paranasal sinus retention cysts, incompletely evaluated.    THORACIC SPINE MRI:  1.  Normal    MRI LUMBAR SPINE 10/1/2015  1.  At the L5-S1 level there is mild disc degeneration with loss of disc hydration and slight loss of disc height. There is a small posterior annular tear with an associated small to intermediate-sized central and right paracentral disc protrusion which  deforms the ventral thecal sac and does possibly affecting the right S1 nerve root. Central canal remains adequate. The L5-S1 foramen are adequate.  2.  Remaining lumbar discs relatively preserved. Mild degenerative changes in the mid and lower lumbar facet joints.  3.  No other disc herniation, central canal narrowing or neural foraminal narrowing.    EMG 12/7/2015  This was a normal electrophysiological study of the left upper and left lower extremities.  Clinical correlation is recommended.     PERTINENT LABS  Following labs were reviewed:  Allied Health/Nurse Visit on 09/23/2022   Component Date Value     Ethyl Glucuronide Urine 09/23/2022 Negative      Oxycodone ng/mL 09/23/2022 6,160 (H)      Oxycodone 09/23/2022 2,567      Oxymorphone ng/mL 09/23/2022 1,080 (H)      Oxymorphone 09/23/2022 450      Creatinine Urine for Corbin* 09/23/2022 240      Cannabinoids Urine 09/23/2022 Screen Negative         Total time spent for face to face visit, reviewing labs/imaging studies, counseling and coordination of care was: 1 Hour spent on the date of the encounter doing chart review, review of outside records, review of test results, interpretation of tests, patient visit and documentation       This note was dictated using voice recognition software.  Any grammatical or context  distortions are unintentional and inherent to the software.    Orders Placed This Encounter   Procedures     ACETYLCHOLINE RECEPTOR BINDING     STRIATED MUSCLE ANTIBODY IGG     ACETYLCHOLINE MODULATING ANTIBODY     ACETYLCHOLINE RECEPTOR BLOCKING SONIDO     CK total     Parathyroid Hormone Intact     Pyruvic acid     Lactic acid whole blood     EMG      New Prescriptions    No medications on file      Modified Medications    No medications on file

## 2022-12-13 NOTE — LETTER
"    2022         RE: Anand Felix  [address]  [place] [address]        Dear Colleague,    Thank you for referring your patient, Anand Felix, to the [facility] [place]. Please see a copy of my visit note below.    NEUROLOGY CONSULTATION NOTE       [facility] [place]  [address] [place] [address]  Tel: [phone]  Fax: [phone]  [email or web address]     Anand Felix,  1985, MRN [id number]  PCP: Malachi Choudhary  Date: 2022     ASSESSMENT & PLAN     Visit Diagnosis  1. Neuromuscular weakness (H)  2. Somatoform disorder     Somatoform disorder  37 years old male with history of chronic pain syndrome, depression and anxiety, HTN, somatization disorder who was referred for evaluation of generalized weakness.  He had extensive work-up in 2016 including EMG, MRI brain, cervical, thoracic spine and lab work that was normal.  He was also evaluated at [facility] and was felt to have somatoform disorder.  He claims his symptoms have persisted and I recommended:    1.  Repeat EMG upper and lower extremity  2.  Lab work to include acetylcholine receptor antibodies, CK, parathyroid hormone, lactic acid, pyruvic acid.  3.  I suspect these test will again be normal and I would recommend evaluation by psychiatry  4.  Follow-up will be the day he gets EMG    Thank you again for this referral, please feel free to contact me if you have any questions.    Bart Redmond MD  [facility]  (Former[facility], [facility]     REASON FOR CONSULTATION Neuromuscular Weakness        HISTORY OF PRESENT ILLNESS     We have been requested by Dr. To to evaluate Anand Felix who is a 37 year old  male for weakness    Patient is a 37 years old male with history of chronic pain syndrome, depression with anxiety, HTN, somatization disorder who was referred for evaluation of \"neuromuscular " "weakness\".  According to patient he developed headache that are mostly on the right side associated with nausea.  There is no history of any photophobia or phonophobia.  In the past he had a CT of the head that showed dystrophic calcification in the left parietal head region and subsequently MRI scan showed a cortically based calcification likely due to old trauma or infectious process.  Additionally he had changes in the left frontal sinus extending into the anterior ethmoid air cell most consistent with frontoethmoidal mucocele.  In the past he was seen in our clinic in 2016 for tremors that would get worse with stress.  He also had right S1 radiculopathy and underwent minimally invasive microdiscectomy that was followed by these tremors.  He was also seen at [facility] who felt patient was malingering and did not have any neurological illness.  He had lab work at that time that included normal acetylcholine receptor antibodies, rheumatoid factor, TSH     PROBLEM LIST   Patient Active Problem List   Diagnosis Code     Tremor R25.1     Chronic Ethmoidal Sinusitis J32.2     Hypovitaminosis D - likely [place]. E55.9     Weakness - without demonstrable CNS or peripheral cause - local neurologist at [facility], and neurologist at [facility], and [facility] [employer] consultant. R53.1     Somatoform disorder  F45.9     Nasal polyposis - right naris on MRI (2016) J33.9     Mucocele of frontal sinus J34.1     Moderate major depression (H) F32.9     Chronic pain syndrome G89.4     Hypertension I10         PAST MEDICAL & SURGICAL HISTORY     Past Medical History:   Patient  has a past medical history of Anxiety, Bilateral pulmonary embolism (H) (10/15/2017), Bulging lumbar disc, Depression, History of anesthesia complications, History of blood clots, Lumbar herniated disc (10/23/2015), Lumbar radiculopathy (11/25/2015), Pulmonary embolism (H) (10/15/2017), Shortness of breath, and " Tachycardia.    Surgical History:  He  has a past surgical history that includes sinus surgery (2014); Minimally Invasive Microdiscectomy Lumbar Spine (Right, 10/2015); back surgery; Pr Nasal/Sinus Endoscopy,Bx/Rmv Polyp/Debrid (Right, 5/22/2019); and Pr Nasal/Sinus Endoscopy,Bx/Rmv Polyp/Debrid (Bilateral, 11/20/2020).     SOCIAL HISTORY     Reviewed, and he  reports that he has never smoked. He has never used smokeless tobacco. He reports current alcohol use. He reports current drug use. Drug: Marijuana.     FAMILY HISTORY     Reviewed, and family history includes Cancer in his father; Depression in his mother; Hyperlipidemia in his mother; Myocardial Infarction in his cousin; Neuromuscular Disease in his cousin; No Known Problems in his brother, brother, sister, sister, sister, and sister; Other - See Comments in his mother; Thyroid Disease in his cousin and maternal grandmother.     ALLERGIES     No Known Allergies      REVIEW OF SYSTEMS     A 12 point review of system was performed and was negative except as outlined in the history of present illness.     HOME MEDICATIONS     Current Outpatient Rx   Medication Sig Dispense Refill     Acetaminophen (TYLENOL PO) Take 500-1,000 mg by mouth every 8 hours as needed       escitalopram (LEXAPRO) 10 MG tablet Take 1 tablet (10 mg) by mouth daily 90 tablet 3     fluticasone (FLONASE) 50 MCG/ACT nasal spray SPRAY 2 SPRAYS INTO EACH NOSTRIL EVERY DAY       medical cannabis (Patient's own supply) See Admin Instructions (The purpose of this order is to document that the patient reports taking medical cannabis.  This is not a prescription, and is not used to certify that the patient has a qualifying medical condition.)       omeprazole (PRILOSEC) 20 MG DR capsule TAKE 1 CAPSULE BY MOUTH EVERY DAY       oxyCODONE-acetaminophen (PERCOCET) 7.5-325 MG per tablet Take 1 tablet by mouth every 6 hours as needed for severe pain (7-10) 112 tablet 0     propranolol (INDERAL) 80 MG  "tablet Take 1 tablet (80 mg) by mouth 3 times daily 270 tablet 3     riluzole (RILUTEK) 50 MG tablet One daily for 2 weeks, may increase to 2 daily 60 tablet 3     rivaroxaban ANTICOAGULANT (XARELTO) 20 MG TABS tablet Take 1 tablet (20 mg) by mouth daily (with dinner) 30 tablet 11     VITAMIN D, CHOLECALCIFEROL, PO Take 2,000 Units by mouth daily       zolpidem (AMBIEN) 10 MG tablet TAKE 0.5-1 TABLETS (5-10 MG) BY MOUTH NIGHTLY AS NEEDED FOR SLEEP 30 tablet 5         PHYSICAL EXAM     Vital signs  /79 (BP Location: Left arm, Patient Position: Sitting)   Pulse 73   Ht 1.651 m (5' 5\")   Wt 84.4 kg (186 lb)   BMI 30.95 kg/m      Weight:   186 lbs 0 oz    General physical exam reveals a young male who is alert and oriented vital signs were reviewed and are documented in electronic medical record. Neck supple, no carotid bruit thyromegaly JVD or lymphadenopathy noted. Neurologically his speech mentation and affect was normal. Funduscopic exam normal. Cranial nerves II through XII intact. Motor strength on the left 5/5 he has giveaway weakness on the right. Reflexes are 2+ toes are downgoing. Sensation intact. He has a astasia-abasia     PERTINENT DIAGNOSTIC STUDIES     Following studies were reviewed:     HEAD CT 11/24/19  1. No CT evidence of acute intracranial hemorrhage, mass or recent infarct.  2. There are nonspecific dystrophic-appearing calcifications in the left parietal region which appear to be outlining the cortex. Although no underlying mass lesion is identified, given relatively young patient age, would recommend a dedicated MRI of   the brain without and with contrast on a nonemergent basis.   3. There is complete opacification of the left frontal sinus and anterior ethmoid air cells without aggressive features.    HEAD MRI 11/26/16  1. Stable appearing signal changes in the left parietal cortex. Comparison head CT demonstrates that these are cortically based calcifications. Brain MR dated " 9/9/2016 demonstrates similar signal changes which are stable suggesting indolent,   nonaggressive process. Old trauma or infectious process may have created this appearance.    2. Expansile change left frontal sinus extending into the left anterior ethmoid air cells, somewhat expansile and most consistent with frontoethmoidal mucocele as detailed above     MRI CERVICAL, THORACIC SPINE 10/16/2015  CERVICAL SPINE MRI:  1.  Minor degenerative changes as described. No spinal canal or neural  foraminal stenosis at any level.  2.  Multiple paranasal sinus retention cysts, incompletely evaluated.    THORACIC SPINE MRI:  1.  Normal    MRI LUMBAR SPINE 10/1/2015  1.  At the L5-S1 level there is mild disc degeneration with loss of disc hydration and slight loss of disc height. There is a small posterior annular tear with an associated small to intermediate-sized central and right paracentral disc protrusion which  deforms the ventral thecal sac and does possibly affecting the right S1 nerve root. Central canal remains adequate. The L5-S1 foramen are adequate.  2.  Remaining lumbar discs relatively preserved. Mild degenerative changes in the mid and lower lumbar facet joints.  3.  No other disc herniation, central canal narrowing or neural foraminal narrowing.    EMG 12/7/2015  This was a normal electrophysiological study of the left upper and left lower extremities.  Clinical correlation is recommended.     PERTINENT LABS  Following labs were reviewed:  Allied Health/Nurse Visit on 09/23/2022   Component Date Value     Ethyl Glucuronide Urine 09/23/2022 Negative      Oxycodone ng/mL 09/23/2022 6,160 (H)      Oxycodone 09/23/2022 2,567      Oxymorphone ng/mL 09/23/2022 1,080 (H)      Oxymorphone 09/23/2022 450      Creatinine Urine for Corbin* 09/23/2022 240      Cannabinoids Urine 09/23/2022 Screen Negative         Total time spent for face to face visit, reviewing labs/imaging studies, counseling and coordination of care was:  1 Hour spent on the date of the encounter doing chart review, review of outside records, review of test results, interpretation of tests, patient visit and documentation       This note was dictated using voice recognition software.  Any grammatical or context distortions are unintentional and inherent to the software.    Orders Placed This Encounter   Procedures     ACETYLCHOLINE RECEPTOR BINDING     STRIATED MUSCLE ANTIBODY IGG     ACETYLCHOLINE MODULATING ANTIBODY     ACETYLCHOLINE RECEPTOR BLOCKING SONIDO     CK total     Parathyroid Hormone Intact     Pyruvic acid     Lactic acid whole blood     EMG      New Prescriptions    No medications on file      Modified Medications    No medications on file              Again, thank you for allowing me to participate in the care of your patient.        Sincerely,        Bart Redmond MD

## 2022-12-13 NOTE — NURSING NOTE
Chief Complaint   Patient presents with     Neuromuscular Weakness     BLE/BUE weakness       Azul Cadena CMA on 12/13/2022 at 11:02 AM

## 2022-12-14 LAB — PTH-INTACT SERPL-MCNC: 52 PG/ML (ref 15–65)

## 2022-12-15 LAB
ACHR BIND AB SER-SCNC: 0 NMOL/L
ACHR BLOCK AB/ACHR TOTAL SFR SER: 0 %
STRIA MUS IGG SER QL IF: NORMAL

## 2022-12-16 LAB
ACHR MOD AB/ACHR TOTAL SFR SER: 0 %
PYRUVATE BLD-SCNC: 0.08 MMOL/L

## 2022-12-28 DIAGNOSIS — I26.99 PULMONARY EMBOLISM (H): ICD-10-CM

## 2023-01-21 ENCOUNTER — MYC REFILL (OUTPATIENT)
Dept: PALLIATIVE MEDICINE | Facility: OTHER | Age: 38
End: 2023-01-21
Payer: COMMERCIAL

## 2023-01-21 DIAGNOSIS — M54.50 LUMBAR PAIN DETERMINED BY PALPATION: ICD-10-CM

## 2023-01-22 DIAGNOSIS — J30.2 SEASONAL ALLERGIC RHINITIS, UNSPECIFIED TRIGGER: ICD-10-CM

## 2023-01-23 RX ORDER — FLUTICASONE PROPIONATE 50 MCG
SPRAY, SUSPENSION (ML) NASAL
Qty: 16 ML | Refills: 11 | OUTPATIENT
Start: 2023-01-23

## 2023-01-23 NOTE — TELEPHONE ENCOUNTER
Refills have been requested for the following medications:         oxyCODONE-acetaminophen (PERCOCET) 7.5-325 MG per tablet [ANNALISA FAULKNER]     Preferred pharmacy: [facility] [id number] IN [place], [place] - [address]

## 2023-01-24 NOTE — TELEPHONE ENCOUNTER
Received call from patient requesting refill(s) of oxyCODONE-acetaminophen (PERCOCET) 7.5-325 MG per tablet     Last dispensed from pharmacy on 12/01/22    Patient's last office/virtual visit by prescribing provider on 12/01/22  Next office/virtual appointment scheduled for 02/16/23    Last urine drug screen date 09/23/22  Current opioid agreement on file (completed within the last year) Yes Date of opioid agreement: 08/25/22    E-prescribe to     [facility] [id number] [place] [facility] - [place], [place] - [address] [employer]  [address] [facility] [address]  Phone: [phone] Fax: [phone]    Will route to nursing [place] for review and preparation of prescription(s).       Iris Bill MA  [facility] [place]

## 2023-01-25 RX ORDER — OXYCODONE AND ACETAMINOPHEN 7.5; 325 MG/1; MG/1
1 TABLET ORAL EVERY 6 HOURS PRN
Qty: 112 TABLET | Refills: 0 | Status: SHIPPED | OUTPATIENT
Start: 2023-01-25 | End: 2023-02-03

## 2023-01-25 NOTE — TELEPHONE ENCOUNTER
Pending Prescriptions:                       Disp   Refills    oxyCODONE-acetaminophen (PERCOCET) 7.5-32*112 ta*0            Sig: Take 1 tablet by mouth every 6 hours as needed           for severe pain (7-10) Dispense 1/27/23 for start           [facility]29/23    [facility]

## 2023-01-25 NOTE — TELEPHONE ENCOUNTER
My Chart message sent . Rx was E-Prepcribed to the pharmacy.     Orders Placed This Encounter   Medications     oxyCODONE-acetaminophen (PERCOCET) 7.5-325 MG per tablet     Sig: Take 1 tablet by mouth every 6 hours as needed for severe pain (7-10) Dispense 1/27/23 for start 1/29/23     Dispense:  112 tablet     Refill:  0       Patient notified of dispense and start date.

## 2023-01-25 NOTE — TELEPHONE ENCOUNTER
Script Eprescribed to pharmacy  [place] Prescription Monitoring Program checked    Will send this to MA team to notify patient.    Signed Prescriptions:                        Disp   Refills    oxyCODONE-acetaminophen (PERCOCET) 7.5-325*112 ta*0        Sig: Take 1 tablet by mouth every 6 hours as needed for           severe pain (7-10) Dispense 1/27/23 for start           1/29/23  Authorizing Provider: ASTON FORBES MD

## 2023-02-01 NOTE — PROGRESS NOTES
10/26/2018    Start time: 1:00 PM    Stop Time: 1:55 PM   Session # 16    Anand Felix is a 33 y.o. male is being seen today for    Chief Complaint   Patient presents with     Mental Health Note   . Major depression, moderate  History of Generalized Anxiety Disorder  Chronic pain syndrome    New symptoms or complaints: None    Functional Impairment:   Personal: 4  Family: 4  Work: 4  Social:4    Clinical assessment of mental status: Anand Felix presented on time.  He was open and cooperative, and dressed appropriately for this session and weather. His memory was in tact .  His  speech was appropriate.  Language was appropriate.  Concentration and focus is on task. Psychosis is not noted or reported. He reports his mood is mildly depressed and anxious.  Affect is congruent with speech.  Fund of knowledge is adequate. Insight is adequate for therapy.     Suicidal/Homicidal Ideation present: Patient reports suicidal ideation that is fleeting. He denies plan or means at this time. Discussed safety plan and patient agrees to call 911, call the crisis line and/or go to the ER if symptoms worsen or he is feeling unsafe.     Patient's impression of their current status: Patient reported he continues to be working on addressing symptoms of depression and anxiety. He reports pain symptoms have increased, but are manageable. He continues to have weakness. He reports some stress related to their recent move to his brother's house. He continues to have some difficulties with falling asleep. He had a recent medication change and is finding benefit.     Therapist impression of patients current state: Patient seems to be working on addressing depression, anxiety and chronic medical symptoms. He reports pain symptoms have increased due to increased activity, but are manageable. He continues to have weakness. He continues to work on addressing acceptance of his chronic pain and medical concerns, with some progress. Patient had a recent  sleep study and he states the outcome is good. He does continue to have sleep concerns, especially with falling asleep. He and his wife continue to adjust to living at his brother's house. He reported some related stressors due to this, but also some positive outcomes. He reported he is engaged with others more often now and he is also getting out more for short walks.He seems more accepting of this housing transition. Patient reports suicidal ideation, but denies plan or means. Discussed triggers for negative thinking. Discussed safety plan and ways to cope if symptoms should worsen or he is feeling unsafe.  He reports he is consistent with PT exercises in home practice and noticing ongoing positive improvement. Patient followed up with his pain center provider and he had a recent medication change. He states the stuttering he was noticing has decreased. Encouraged patient to continue to increase efforts to reach out to social/family support to connect socially/emotionally. He reports Tenri[employer] attendance as he is able. Continued to discuss potential benefits of Mindfulness meditation with patient. He reports regular meditation practice and grounding. Continued home practice is encouraged. He preferred to process target/NC another time as he reported pain symptoms were low today. Presented  short seeing and hearing meditations today and patient appeared to benefit.     Type of psychotherapeutic technique provided: Client centered, CBT and Mindfulness    Progress toward short term goals:Patient signed/agreed to treatment plan this date. Patient continues to work on increasing coping skills to manage chronic pain and mental health. Patient continues medical care as recommended by his medical providers. He engages in physical activity and social activity as he is able. He is engaging in meditation/grounding in daily living. He continues psychotherapy/EMDR. He is working with a county . Patient continues to  adjust a recent move.    Review of long term goals: Patient signed/agreed to treatment plan this date.     Diagnosis: Major depression, moderate  History of Generalized Anxiety Disorder  Chronic pain syndrome    Plan and Follow up: Patient to follow pain center plan of care. Patient to follow all medical recommendations. Patient to schedule every 2 weeks for psychotherapy to further address depression, anxiety and chronic pain concerns. Continue meditation and other healthy coping strategies.  Practice meditations as presented in session. Further address adjustment to role/identity due to chronic pain and health concerns. Continue EMDR using pain protocol, as desired.            Discharge Criteria/Planning: Patient will continue with follow-up until therapy can be discontinued without return of signs and symptoms.    Alize Livingston 10/26/2018       Dupixent Amount: 200 mg

## 2023-02-02 DIAGNOSIS — R51.9 NONINTRACTABLE EPISODIC HEADACHE, UNSPECIFIED HEADACHE TYPE: ICD-10-CM

## 2023-02-02 DIAGNOSIS — R00.0 SINUS TACHYCARDIA: ICD-10-CM

## 2023-02-02 RX ORDER — PROPRANOLOL HYDROCHLORIDE 80 MG/1
80 TABLET ORAL 3 TIMES DAILY
Qty: 270 TABLET | Refills: 3 | OUTPATIENT
Start: 2023-02-02

## 2023-02-03 RX ORDER — OXYCODONE AND ACETAMINOPHEN 7.5; 325 MG/1; MG/1
1 TABLET ORAL EVERY 6 HOURS PRN
Qty: 112 TABLET | Refills: 0 | Status: SHIPPED | OUTPATIENT
Start: 2023-02-03 | End: 2023-02-16

## 2023-02-03 NOTE — TELEPHONE ENCOUNTER
[facility] Call Center    Phone Message    May a detailed message be left on voicemail: yes     Reason for Call: Other: Patient called stating [facility] [id number] [place] is out of oxyCODONE-acetaminophen (PERCOCET) 7.5-325 MG per tablet and he does not know when they will be restocked.  Patient is requesting his medication be sent to the [facility] [place] instead.    Action Taken: Message routed to:  Other: [facility]    Travel Screening: Not Applicable

## 2023-02-03 NOTE — TELEPHONE ENCOUNTER
[facility] [place] to [facility]    Percocet 7.5/325 mg script reordered for [facility] as requested.    Pending Prescriptions:                       Disp   Refills    oxyCODONE-acetaminophen (PERCOCET) 7.5-32*112 ta*0            Sig: Take 1 tablet by mouth every 6 hours as needed           for severe pain (7-10) Dispense/start 2/3/23    Signed Prescriptions:                        Disp   Refills    oxyCODONE-acetaminophen (PERCOCET) 7.5-325*112 ta*0        Sig: Take 1 tablet by mouth every 6 hours as needed for           severe pain (7-10) Dispense 1/27/23 for start           1/29/23  Authorizing Provider: ASTON FORBES

## 2023-02-16 ENCOUNTER — VIRTUAL VISIT (OUTPATIENT)
Dept: PALLIATIVE MEDICINE | Facility: OTHER | Age: 38
End: 2023-02-16
Payer: COMMERCIAL

## 2023-02-16 DIAGNOSIS — M54.50 LUMBAR PAIN DETERMINED BY PALPATION: ICD-10-CM

## 2023-02-16 PROCEDURE — G0463 HOSPITAL OUTPT CLINIC VISIT: HCPCS | Mod: PN,GT | Performed by: ANESTHESIOLOGY

## 2023-02-16 PROCEDURE — 99213 OFFICE O/P EST LOW 20 MIN: CPT | Mod: VID | Performed by: ANESTHESIOLOGY

## 2023-02-16 RX ORDER — OXYCODONE AND ACETAMINOPHEN 7.5; 325 MG/1; MG/1
1 TABLET ORAL 3 TIMES DAILY PRN
Qty: 84 TABLET | Refills: 0 | Status: SHIPPED | OUTPATIENT
Start: 2023-02-16 | End: 2023-04-09

## 2023-02-16 ASSESSMENT — ANXIETY QUESTIONNAIRES
7. FEELING AFRAID AS IF SOMETHING AWFUL MIGHT HAPPEN: NOT AT ALL
6. BECOMING EASILY ANNOYED OR IRRITABLE: NOT AT ALL
GAD7 TOTAL SCORE: 3
7. FEELING AFRAID AS IF SOMETHING AWFUL MIGHT HAPPEN: NOT AT ALL
3. WORRYING TOO MUCH ABOUT DIFFERENT THINGS: NOT AT ALL
2. NOT BEING ABLE TO STOP OR CONTROL WORRYING: NOT AT ALL
4. TROUBLE RELAXING: MORE THAN HALF THE DAYS
8. IF YOU CHECKED OFF ANY PROBLEMS, HOW DIFFICULT HAVE THESE MADE IT FOR YOU TO DO YOUR WORK, TAKE CARE OF THINGS AT HOME, OR GET ALONG WITH OTHER PEOPLE?: NOT DIFFICULT AT ALL
1. FEELING NERVOUS, ANXIOUS, OR ON EDGE: NOT AT ALL
5. BEING SO RESTLESS THAT IT IS HARD TO SIT STILL: SEVERAL DAYS
IF YOU CHECKED OFF ANY PROBLEMS ON THIS QUESTIONNAIRE, HOW DIFFICULT HAVE THESE PROBLEMS MADE IT FOR YOU TO DO YOUR WORK, TAKE CARE OF THINGS AT HOME, OR GET ALONG WITH OTHER PEOPLE: NOT DIFFICULT AT ALL
GAD7 TOTAL SCORE: 3

## 2023-02-16 ASSESSMENT — PAIN SCALES - PAIN ENJOYMENT GENERAL ACTIVITY SCALE (PEG)
AVG_PAIN_PASTWEEK: 3
INTERFERED_ENJOYMENT_LIFE: 0 - DOES NOT INTERFERE
INTERFERED_GENERAL_ACTIVITY: 2
INTERFERED_ENJOYMENT_LIFE: 0
PEG_TOTALSCORE: 1.67
PEG_TOTALSCORE: 1.67
AVG_PAIN_PASTWEEK: 3
INTERFERED_GENERAL_ACTIVITY: 2

## 2023-02-16 NOTE — PATIENT INSTRUCTIONS
PLAN:    Continue the riluzole 50 mg 3 tablets daily.  Discussed monitoring for any side effects such as tingling in the mouth if it becomes more frequent.    Continue oxycodone 7.5 mg 3 times a day as needed.    You are using different preparations of medical cannabis.    You will follow-up with your neurologist next month.    Discussed the use of a grounding mat.    Follow-up with Dr. Small in the office in 3 months

## 2023-02-16 NOTE — NURSING NOTE
Anand is a 37 year old who is being evaluated via a billable video visit.      How would you like to obtain your AVS? [email or web address]  If the video visit is dropped, the invitation should be resent by: Text to cell phone: [phone]  Will anyone else be joining your video visit? No        Video-Visit Details    Type of service:  Video Visit   Video Start Time:   Video End Time:    Originating Location (pt. Location): Home    Distant Location (provider location):  On-site  Platform used for Video Visit: Jennifer        PEG Score 12/1/2022 2/16/2023 2/16/2023   PEG Total Score 2.67 2 2

## 2023-02-16 NOTE — PROGRESS NOTES
"[facility] - Office Visit    ASSESSMENT & PLAN     There are no diagnoses linked to this encounter.    There are no Patient Instructions on file for this visit.      -----  ANNALISA FAULKNER MD  [facility]       SUBJECTIVE      Anand Felix is a 37 year old year old male who seen for video visit.    Follow-up for lumbar laminectomy.    Since last seen did follow-up with his neurologist.  Comment made of an evaluation 2016 thought to have somatization disorder, followed for weakness, having negative laboratories EMG, comment made on weakness.  That evaluation most recently examination shows some giveaway weakness, to obtain laboratories evaluating weakness and the EMG.    Reviewing the record did work consistently with a psychotherapist until the end of 2001.    He reviews today he is \"okay\".  He did see neurology, told his labs were normal, will see an EMG in April.  Describes he has gotten used to some of the concerns about fatigue and weakness and just managed to live with.    Since last seen he did start the riluzole, had called after taking 50 mg twice a day increased to 3 times a day.  Thinks there may be some benefit.  He has less of the burning pain in his shoulders and neck.  He feels less stiff in the morning when he stretching doing his stretching routine.  1 side effect may be occasionally noting tingling in his mouth.    He has been able to decrease the oxycodone to 7.5 mg typically 3 times a day.    He is also using the medical cannabis tried a variety of preparations hoping that will be helpful.    We did discuss the grounding mat, he is on blood thinners, not on Coumadin, told he could try that.    Urine drug test was in August with alcohol, we discussed, follow-up in September negative for alcohol,  reviewed      Current Outpatient Medications:      Acetaminophen (TYLENOL PO), Take 500-1,000 mg by mouth every 8 hours as needed, Disp: , Rfl:      escitalopram " (LEXAPRO) 10 MG tablet, Take 1 tablet (10 mg) by mouth daily, Disp: 90 tablet, Rfl: 3     fluticasone (FLONASE) 50 MCG/ACT nasal spray, Spray 2 sprays into both nostrils daily, Disp: 16 g, Rfl: 11     medical cannabis (Patient's own supply), See Admin Instructions (The purpose of this order is to document that the patient reports taking medical cannabis.  This is not a prescription, and is not used to certify that the patient has a qualifying medical condition.), Disp: , Rfl:      omeprazole (PRILOSEC) 20 MG DR capsule, Take 1 capsule (20 mg) by mouth daily, Disp: 90 capsule, Rfl: 3     oxyCODONE-acetaminophen (PERCOCET) 7.5-325 MG per tablet, Take 1 tablet by mouth every 6 hours as needed for severe pain (7-10) Dispense/start 2/3/23, Disp: 112 tablet, Rfl: 0     propranolol (INDERAL) 80 MG tablet, Take 1 tablet (80 mg) by mouth 3 times daily, Disp: 270 tablet, Rfl: 3     riluzole (RILUTEK) 50 MG tablet, One daily for 2 weeks, may increase to 2 daily, Disp: 60 tablet, Rfl: 3     rivaroxaban ANTICOAGULANT (XARELTO) 20 MG TABS tablet, Take 1 tablet (20 mg) by mouth daily (with dinner), Disp: 30 tablet, Rfl: 11     VITAMIN D, CHOLECALCIFEROL, PO, Take 2,000 Units by mouth daily, Disp: , Rfl:      zolpidem (AMBIEN) 10 MG tablet, TAKE 0.5-1 TABLETS (5-10 MG) BY MOUTH NIGHTLY AS NEEDED FOR SLEEP, Disp: 30 tablet, Rfl: 5      Review of Systems   General, psych, musculoskeletal, bowels and bladder otherwise normal other than above.          OBJECTIVE         Physical Exam  General: Alert, clear sensorium, no pain behavior.  No respiratory distress  Cardiovascular: Normal rate  Lungs: Pulmonary effort is normal, speaking in full sentences  No concerning rashes or lesions.  Neurologic: No focal deficit, alert and oriented x3  Psychiatric: Affect congruent    Assessment: Chronic pain with a history of lumbar surgery, diffuse myalgias.  Has had some benefit with the Riluzole off label.  Using medical cannabis, has decreased his  oxycodone somewhat.    He will have follow-up with a EMG in April, and at that time may reassess to what extent there is a element of somatizations contributing to his presentation.    Total time more than 20 minutes.  Video start time 11: 40.  Video end time 11: 58.  Patient at home via [facility]

## 2023-03-07 ENCOUNTER — MYC MEDICAL ADVICE (OUTPATIENT)
Dept: PALLIATIVE MEDICINE | Facility: OTHER | Age: 38
End: 2023-03-07
Payer: COMMERCIAL

## 2023-03-07 NOTE — TELEPHONE ENCOUNTER
Refill request:  Riluzole 50 mg: last dispensed on 12/29/ tabs-90 days  Two remaining refills.  Patient notified via my chart.

## 2023-03-19 DIAGNOSIS — R00.0 SINUS TACHYCARDIA: ICD-10-CM

## 2023-03-19 DIAGNOSIS — R51.9 NONINTRACTABLE EPISODIC HEADACHE, UNSPECIFIED HEADACHE TYPE: ICD-10-CM

## 2023-03-20 RX ORDER — PROPRANOLOL HYDROCHLORIDE 80 MG/1
80 TABLET ORAL 3 TIMES DAILY
Qty: 270 TABLET | Refills: 3 | Status: SHIPPED | OUTPATIENT
Start: 2023-03-20 | End: 2024-01-23

## 2023-03-20 NOTE — TELEPHONE ENCOUNTER
"Routing refill request to provider for review/approval because:  Patient needs to be seen because it has been more than 1 year since last office visit.    Last Written Prescription Date:  4/21/22  Last Fill Quantity: 270,  # refills: 3   Last office visit provider:  4/21/22     Requested Prescriptions   Pending Prescriptions Disp Refills     propranolol (INDERAL) 80 MG tablet [Pharmacy Med Name: PROPRANOLOL 80 MG TABLET] 270 tablet 3     Sig: TAKE 1 TABLET (80 MG) BY MOUTH 3 TIMES DAILY       Beta-Blockers Protocol Failed - 3/20/2023  6:24 AM        Failed - Recent (12 mo) or future (30 days) visit within the authorizing provider's specialty     Patient has had an office visit with the authorizing provider or a provider within the authorizing providers department within the previous 12 mos or has a future within next 30 days. See \"Patient Info\" tab in inbasket, or \"Choose Columns\" in Meds & Orders section of the refill encounter.              Passed - Blood pressure under 140/90 in past 12 months     BP Readings from Last 3 Encounters:   12/13/22 117/79   12/01/22 125/82   10/04/22 113/74                 Passed - Patient is age 6 or older        Passed - Medication is active on med list             Catrachita Brooks RN 03/20/23 6:24 AM  "

## 2023-04-04 ENCOUNTER — MYC MEDICAL ADVICE (OUTPATIENT)
Dept: PALLIATIVE MEDICINE | Facility: OTHER | Age: 38
End: 2023-04-04
Payer: COMMERCIAL

## 2023-04-04 DIAGNOSIS — M54.50 LUMBAR PAIN DETERMINED BY PALPATION: ICD-10-CM

## 2023-04-04 RX ORDER — RILUZOLE 50 MG/1
TABLET, FILM COATED ORAL
Qty: 60 TABLET | Refills: 3 | Status: SHIPPED | OUTPATIENT
Start: 2023-04-04 | End: 2023-10-03

## 2023-04-04 NOTE — TELEPHONE ENCOUNTER
Drug: riluzole (RILUTEK) 50 MG tablet  Qty: 60 tablet  Last filled 12/29/23  Last seen: 2/16/23  Next appointment None

## 2023-04-15 NOTE — PROGRESS NOTES
NEUROLOGY FOLLOW UP VISIT  NOTE       [facility] NEUROLOGY [address]  [address] [place] [address]  Tel: [phone]  Fax: [phone]  [email or web address]     Anand Felix,  1985, MRN [id number]  PCP: Malachi Choudhary  Date: 2023      ASSESSMENT & PLAN     Visit Diagnosis  1. Neuromuscular weakness (H)  2. Somatoform disorder     Somatoform disorder  38-year-old male with history of chronic pain syndrome, depression with anxiety, HTN,'s somatization disorder who was evaluated for generalized weakness.  He had previous work-up in 2016 that included normal MRI brain, cervical, thoracic spine and EMG.  He was also evaluated at [facility] and they felt patient has somatoform disorder.  He was reevaluated for persistent symptoms and had lab work that included normal CK, acetylcholine receptor antibodies, lactic acid, pyruvic acid and EMG.  I have reassured him that his work-up is negative and that no further work-up is needed.  Follow-up will be on as needed basis.    Thank you again for this referral, please feel free to contact me if you have any questions.    Bart Redmond MD  [facility]  (Former[facility], [facility]     HISTORY OF PRESENT ILLNESS     Patient is a 38-year-old male with history of chronic pain syndrome, depression with anxiety, HTN, somatization disorder who was initially evaluated on 2022 for generalized weakness.  Previously he had extensive work-up in 2016 that included EMG, MRI brain, MRI cervical and thoracic spine and work-up was normal.  He was also evaluated at [facility] and was found to have somatoform disorder during his last visit he claimed his symptoms had persisted but some basic lab work was ordered that included normal lactic acid, pyruvic acid, CK, acetylcholine receptor antibodies.  EMG of upper and lower extremity was within normal limits    Briefly patient is  a male with history of chronic pain syndrome, depression and anxiety, HTN, somatization disorder who was initially evaluated in our clinic on 12/13/2022 for headache and generalized weakness.  He reports that he developed headache that are mostly on the right side associated with nausea.  There is no history of any photophobia or phonophobia.  In the past he had a CT of the head that showed dystrophic calcification in the left parietal head region and subsequently MRI scan showed a cortically based calcification likely due to old trauma or infectious process.  Additionally he had changes in the left frontal sinus extending into the anterior ethmoid air cell most consistent with frontoethmoidal mucocele.  In the past he was seen in our clinic in 2016 for tremors that would get worse with stress.  He also had right S1 radiculopathy and underwent minimally invasive microdiscectomy that was followed by these tremors.  He was also seen at [facility] who felt patient was malingering and did not have any neurological illness.  He had lab work at that time that included normal acetylcholine receptor antibodies, rheumatoid factor, TSH.  Subsequently he had acetylcholine receptor antibodies, parathyroid, lactate, pyruvate checked that was normal.  EMG was within normal limits     PROBLEM LIST   Patient Active Problem List   Diagnosis Code     Tremor R25.1     Chronic Ethmoidal Sinusitis J32.2     Hypovitaminosis D - [place] E55.9     Weakness - without demonstrable CNS or peripheral cause - local neurologist at [facility], and neurologist at [facility], and [facility] consultant. R53.1     Somatoform disorder  F45.9     Nasal polyposis - right naris on MRI (2016) J33.9     Mucocele of frontal sinus J34.1     Moderate major depression (H) F32.9     Chronic pain syndrome G89.4     Hypertension I10         PAST MEDICAL & SURGICAL HISTORY     Past Medical History:   Patient  has a past  medical history of Anxiety, Bilateral pulmonary embolism (H) (10/15/2017), Bulging lumbar disc, Depression, History of anesthesia complications, History of blood clots, Lumbar herniated disc (10/23/2015), Lumbar radiculopathy (11/25/2015), Pulmonary embolism (H) (10/15/2017), Shortness of breath, and Tachycardia.    Surgical History:  He  has a past surgical history that includes sinus surgery (2014); Minimally Invasive Microdiscectomy Lumbar Spine (Right, 10/2015); back surgery; Pr Nasal/Sinus Endoscopy,Bx/[facility] Polyp/Debrid (Right, 5/22/2019); and Pr Nasal/Sinus Endoscopy,Bx/[facility] Polyp/Debrid (Bilateral, 11/20/2020).     SOCIAL HISTORY     Reviewed, and he  reports that he has never smoked. He has never used smokeless tobacco. He reports current alcohol use. He reports current drug use. Drug: Marijuana.     FAMILY HISTORY     Reviewed, and family history includes Cancer in his father; Depression in his mother; Hyperlipidemia in his mother; Myocardial Infarction in his cousin; Neuromuscular Disease in his cousin; No Known Problems in his brother, brother, sister, sister, sister, and sister; Other - See Comments in his mother; Thyroid Disease in his cousin and maternal grandmother.     ALLERGIES     No Known Allergies      REVIEW OF SYSTEMS     A 12 point review of system was performed and was negative except as outlined in the history of present illness.     HOME MEDICATIONS     Current Outpatient Rx   Medication Sig Dispense Refill     Acetaminophen (TYLENOL PO) Take 500-1,000 mg by mouth every 8 hours as needed       escitalopram (LEXAPRO) 10 MG tablet Take 1 tablet (10 mg) by mouth daily 90 tablet 3     fluticasone (FLONASE) 50 MCG/ACT nasal spray Spray 2 sprays into both nostrils daily 16 g 11     medical cannabis (Patient's own supply) See Admin Instructions (The purpose of this order is to document that the patient reports taking medical cannabis.  This is not a prescription, and is not used to certify that the  "patient has a qualifying medical condition.)       omeprazole (PRILOSEC) 20 MG DR capsule Take 1 capsule (20 mg) by mouth daily 90 capsule 3     oxyCODONE-acetaminophen (PERCOCET) 7.5-325 MG per tablet Take 1 tablet by mouth 3 times daily as needed for severe pain Dispense/start 4/10/23 84 tablet 0     propranolol (INDERAL) 80 MG tablet TAKE 1 TABLET (80 MG) BY MOUTH 3 TIMES DAILY 270 tablet 3     riluzole (RILUTEK) 50 MG tablet One daily for 2 weeks, may increase to 2 daily 60 tablet 3     rivaroxaban ANTICOAGULANT (XARELTO) 20 MG TABS tablet Take 1 tablet (20 mg) by mouth daily (with dinner) 30 tablet 11     VITAMIN D, CHOLECALCIFEROL, PO Take 2,000 Units by mouth daily       zolpidem (AMBIEN) 10 MG tablet TAKE 0.5-1 TABLETS (5-10 MG) BY MOUTH NIGHTLY AS NEEDED FOR SLEEP 30 tablet 5         PHYSICAL EXAM     Vital signs  /82 (BP Location: Right arm, Patient Position: Sitting)   Pulse 77   Ht 1.651 m (5' 5\")   Wt 78.5 kg (173 lb)   BMI 28.79 kg/m      Weight:   173 lbs 0 oz    General physical exam reveals a young male who is alert and oriented vital signs were reviewed and are documented in electronic medical record. Neck supple, no carotid bruit thyromegaly JVD or lymphadenopathy noted. Neurologically his speech mentation and affect was normal. Funduscopic exam normal. Cranial nerves II through XII intact. Motor strength on the left 5/5 he has giveaway weakness on the right. Reflexes are 2+ toes are downgoing. Sensation intact. He has a astasia-abasia     PERTINENT DIAGNOSTIC STUDIES     Following studies were reviewed:     HEAD CT 11/24/19  1. No CT evidence of acute intracranial hemorrhage, mass or recent infarct.  2. There are nonspecific dystrophic-appearing calcifications in the left parietal region which appear to be outlining the cortex. Although no underlying mass lesion is identified, given relatively young patient age, would recommend a dedicated MRI of   the brain without and with contrast on " a nonemergent basis.   3. There is complete opacification of the left frontal sinus and anterior ethmoid air cells without aggressive features.    HEAD MRI 11/26/16  1. Stable appearing signal changes in the left parietal cortex. Comparison head CT demonstrates that these are cortically based calcifications. Brain MR dated 9/9/2016 demonstrates similar signal changes which are stable suggesting indolent,   nonaggressive process. Old trauma or infectious process may have created this appearance.    2. Expansile change left frontal sinus extending into the left anterior ethmoid air cells, somewhat expansile and most consistent with frontoethmoidal mucocele as detailed above      MRI CERVICAL, THORACIC SPINE 10/16/2015  CERVICAL SPINE MRI:  1.  Minor degenerative changes as described. No spinal canal or neural  foraminal stenosis at any level.  2.  Multiple paranasal sinus retention cysts, incompletely evaluated.     THORACIC SPINE MRI:  1.  Normal     MRI LUMBAR SPINE 10/1/2015  1.  At the L5-S1 level there is mild disc degeneration with loss of disc hydration and slight loss of disc height. There is a small posterior annular tear with an associated small to intermediate-sized central and right paracentral disc protrusion which  deforms the ventral thecal sac and does possibly affecting the right S1 nerve root. Central canal remains adequate. The L5-S1 foramen are adequate.  2.  Remaining lumbar discs relatively preserved. Mild degenerative changes in the mid and lower lumbar facet joints.  3.  No other disc herniation, central canal narrowing or neural foraminal narrowing.     EMG 4/17/2023  This is a normal nerve conduction and EMG study of right upper and lower extremities.    EMG 12/7/2015  This was a normal electrophysiological study of the left upper and left lower extremities.  Clinical correlation is recommended.     PERTINENT LABS  Following labs were reviewed:  No visits with results within 3 Month(s) from  this visit.   Latest known visit with results is:   Lab on 12/13/2022   Component Date Value     Acetylcholine Binding An* 12/13/2022 0.0      Striated Muscle Antibodi* 12/13/2022 <1:40      AcetChol Modul Betty 12/13/2022 0      AcetChol Block Betty 12/13/2022 0      CK 12/13/2022 62      Parathyroid Hormone Inta* 12/13/2022 52      Pyruvic Acid 12/13/2022 0.079      Lactic Acid 12/13/2022 1.3          Total time spent for face to face visit, reviewing labs/imaging studies, counseling and coordination of care was: 20 min spent on the date of the encounter doing chart review, review of outside records, review of test results, interpretation of tests, patient visit and documentation       This note was dictated using voice recognition software.  Any grammatical or context distortions are unintentional and inherent to the software.    No orders of the defined types were placed in this encounter.     New Prescriptions    No medications on file     Modified Medications    No medications on file

## 2023-04-17 ENCOUNTER — OFFICE VISIT (OUTPATIENT)
Dept: NEUROLOGY | Facility: CLINIC | Age: 38
End: 2023-04-17
Attending: PSYCHIATRY & NEUROLOGY
Payer: COMMERCIAL

## 2023-04-17 VITALS
WEIGHT: 173 LBS | HEART RATE: 77 BPM | DIASTOLIC BLOOD PRESSURE: 82 MMHG | SYSTOLIC BLOOD PRESSURE: 106 MMHG | BODY MASS INDEX: 28.82 KG/M2 | HEIGHT: 65 IN

## 2023-04-17 DIAGNOSIS — G70.9 NEUROMUSCULAR WEAKNESS (H): Primary | ICD-10-CM

## 2023-04-17 DIAGNOSIS — G70.9 NEUROMUSCULAR WEAKNESS (H): ICD-10-CM

## 2023-04-17 DIAGNOSIS — F45.9 SOMATOFORM DISORDER: ICD-10-CM

## 2023-04-17 PROCEDURE — 95886 MUSC TEST DONE W/N TEST COMP: CPT | Mod: RT | Performed by: PSYCHIATRY & NEUROLOGY

## 2023-04-17 PROCEDURE — 99213 OFFICE O/P EST LOW 20 MIN: CPT | Performed by: PSYCHIATRY & NEUROLOGY

## 2023-04-17 PROCEDURE — 95911 NRV CNDJ TEST 9-10 STUDIES: CPT | Performed by: PSYCHIATRY & NEUROLOGY

## 2023-04-17 NOTE — LETTER
4/17/2023         RE: Anand Felix  [address]  [place] [address]        Dear Colleague,    Thank you for referring your patient, Anand Felix, to the [facility] NEUROLOG[facility]IC [place]. Please see a copy of my visit note below.    See procedure note for EMG report      Again, thank you for allowing me to participate in the care of your patient.        Sincerely,        Bart Redmond MD

## 2023-04-17 NOTE — NURSING NOTE
Chief Complaint   Patient presents with     Neuromuscular weakness     EMG results      Azul Cadena CMA on 4/17/2023 at 11:00 AM

## 2023-04-17 NOTE — PROCEDURES
ELECTROMYOGRAPHY (EMG) REPORT       [facility] NEUROLOGY [place]  [address] [place] [address]  Tel: [phone]  Fax: [phone]  [email or web address]     Anand Felix DAVID 1985, MRN [id number]  PCP: Malachi Choudhary  Date: 2023     Principal Diagnosis: Neuromuscular weakness (H)     Height: 5 feet 5 inch  Reason for referral: Evaluate right upper/right lower. c/o weakness in both arms/legs > 2 years. h/o lumbar surgery.       Motor NCS      Nerve / Sites Lat Amp Dist Vitaliy    ms mV cm m/s   R Median - APB      Wrist 3.18 7.5 7       Elbow 6.88 7.3 24 65   R Ulnar - ADM      Wrist 2.40 11.9 7       B.Elbow 5.31 11.4 19.5 67      A.Elbow 7.08 11.4 12 68   R Peroneal - EDB      Ankle 3.85 0.3 8       Fib head 9.32 0.7 26 48      Pop fossa 11.41 0.4 11 53   R Tibial - AH      Ankle 3.85 17.9 8       Pop fossa 10.47 17.7 31 47       F  Wave      Nerve Fmin    ms   R Ulnar - ADM 23.75   R Tibial - AH 41.51       Sensory NCS      Nerve / Sites Onset Lat Pk Lat Amp.2-3 Dist Vitaliy    ms ms  V cm m/s   R Median - II (Antidr)      Wrist 2.24 2.81 90.8 13 58   R Ulnar - V (Antidr)      Wrist 1.82 2.40 44.9 11 60   R Median, Ulnar - Transcarpal comparison      Median Palm 1.35 1.88 84.0 8 59      Ulnar Palm 1.20 1.61 46.4 8 67   R Sural - Ankle (Calf)      Calf 2.76 3.39 14.4 14 51   R Superficial peroneal - Ankle      Lat leg 2.55 3.13 19.2 12 47       EMG Summary Table     Spontaneous MUAP Rcmt Note   Muscle Fib PSW Fasc IA # Amp Dur PPP Rate Type   R. Gluteus medius None None None N N N N N N N   R. Gluteus sumit None None None N N N N N N N   R. Biceps femoris (short head) None None None N N N N N N N   R. Iliopsoas None None None N N N N N N N   R. Adductor miracle None None None N N N N N N N   R. Quadriceps None None None N N N N N N N   R. Tibialis anterior Occ Occ None N Sl Mod Red Incr Incr N N N   R. Peroneus longus Occ Occ None N Sl Mod Red Incr Incr N N N   R. Gastrocnemius  (Medial head) None None None N N N N N N N   R. Gastrocnemius (Lateral head) None None None N N N N N N N   R. Tibialis posterior None None None Incr N N N N N N   R. Brachioradialis None None None N N N N N N N   R. Pronator teres None None None N N N N N N N   R. Biceps brachii None None None N N N N N N N   R. Deltoid None None None N N N N N N N   R. Triceps brachii None None None N N N N N N N   R. First dorsal interosseous None None None N N N N N N N   R. Abductor pollicis brevis None None None N N N N N N N        Summary: Nerve conduction and EMG study of right upper and lower extremities shows:  1. Normal right median, ulnar, peroneal and tibial distal motor latencies, amplitudes and conduction velocities  2. Normal right ulnar and tibial F latencies  3. Normal right median, ulnar, superficial peroneal and sural SNAP  4. Disposable, monopolar needle exam was normal    Impression:   This is a normal nerve conduction and EMG study of right upper and lower extremities.      Bart Redmond MD  [facility] [place]  (Former[facility], [facility]      This note was dictated using voice recognition software.  Any grammatical or context distortions are unintentional and inherent to the software.

## 2023-04-17 NOTE — LETTER
2023         RE: Anand Felix  [address]  [facility] [id number]        Dear Colleague,    Thank you for referring your patient, Anand Felix, to the [facility] [place]. Please see a copy of my visit note below.    NEUROLOGY FOLLOW UP VISIT  NOTE       [facility] [place]  [address] [place] [address]  Tel: [phone]  Fax: [phone]  [email or web address] ([email or web address])     Anand Felix,  1985, MRN [id number]  PCP: Malachi Choudhary  Date: 2023      ASSESSMENT & PLAN     Visit Diagnosis  1. Neuromuscular weakness (H)  2. Somatoform disorder     Somatoform disorder  38-year-old male with history of chronic pain syndrome, depression with anxiety, HTN,'s somatization disorder who was evaluated for generalized weakness.  He had previous work-up in 2016 that included normal MRI brain, cervical, thoracic spine and EMG.  He was also evaluated at [facility] and they felt patient has somatoform disorder.  He was reevaluated for persistent symptoms and had lab work that included normal CK, acetylcholine receptor antibodies, lactic acid, pyruvic acid and EMG.  I have reassured him that his work-up is negative and that no further work-up is needed.  Follow-up will be on as needed basis.    Thank you again for this referral, please feel free to contact me if you have any questions.    Bart Redmond MD  [facility]  (Formerly, [facility]     HISTORY OF PRESENT ILLNESS     Patient is a 38-year-old male with history of chronic pain syndrome, depression with anxiety, HTN, somatization disorder who was initially evaluated on 2022 for generalized weakness.  Previously he had extensive work-up in 2016 that included EMG, MRI brain, MRI cervical and thoracic spine and work-up was normal.  He was also evaluated at [facility] and was found to have somatoform disorder during  his last visit he claimed his symptoms had persisted but some basic lab work was ordered that included normal lactic acid, pyruvic acid, CK, acetylcholine receptor antibodies.  EMG of upper and lower extremity was within normal limits    Briefly patient is a male with history of chronic pain syndrome, depression and anxiety, HTN, somatization disorder who was initially evaluated in our clinic on 12/13/2022 for headache and generalized weakness.  He reports that he developed headache that are mostly on the right side associated with nausea.  There is no history of any photophobia or phonophobia.  In the past he had a CT of the head that showed dystrophic calcification in the left parietal head region and subsequently MRI scan showed a cortically based calcification likely due to old trauma or infectious process.  Additionally he had changes in the left frontal sinus extending into the anterior ethmoid air cell most consistent with frontoethmoidal mucocele.  In the past he was seen in our clinic in 2016 for tremors that would get worse with stress.  He also had right S1 radiculopathy and underwent minimally invasive microdiscectomy that was followed by these tremors.  He was also seen at [facility] who felt patient was malingering and did not have any neurological illness.  He had lab work at that time that included normal acetylcholine receptor antibodies, rheumatoid factor, TSH.  Subsequently he had acetylcholine receptor antibodies, parathyroid, lactate, pyruvate checked that was normal.  EMG was within normal limits     PROBLEM LIST   Patient Active Problem List   Diagnosis Code     Tremor R25.1     Chronic Ethmoidal Sinusitis J32.2     Hypovitaminosis D - [place] E55.9     Weakness - without demonstrable CNS or peripheral cause - local neurologist at [facility], and neurologist at [facility], and [facility] consultant. R53.1     Somatoform disorder  F45.9     Nasal  polyposis - right naris on MRI (2016) J33.9     Mucocele of frontal sinus J34.1     Moderate major depression (H) F32.9     Chronic pain syndrome G89.4     Hypertension I10         PAST MEDICAL & SURGICAL HISTORY     Past Medical History:   Patient  has a past medical history of Anxiety, Bilateral pulmonary embolism (H) (10/15/2017), Bulging lumbar disc, Depression, History of anesthesia complications, History of blood clots, Lumbar herniated disc (10/23/2015), Lumbar radiculopathy (11/25/2015), Pulmonary embolism (H) (10/15/2017), Shortness of breath, and Tachycardia.    Surgical History:  He  has a past surgical history that includes sinus surgery (2014); Minimally Invasive Microdiscectomy Lumbar Spine (Right, 10/2015); back surgery; Pr Nasal/Sinus Endoscopy,Bx/[facility] Polyp/Debrid (Right, 5/22/2019); and Pr Nasal/Sinus Endoscopy,Bx/[facility] Polyp/Debrid (Bilateral, 11/20/2020).     SOCIAL HISTORY     Reviewed, and he  reports that he has never smoked. He has never used smokeless tobacco. He reports current alcohol use. He reports current drug use. Drug: Marijuana.     FAMILY HISTORY     Reviewed, and family history includes Cancer in his father; Depression in his mother; Hyperlipidemia in his mother; Myocardial Infarction in his cousin; Neuromuscular Disease in his cousin; No Known Problems in his brother, brother, sister, sister, sister, and sister; Other - See Comments in his mother; Thyroid Disease in his cousin and maternal grandmother.     ALLERGIES     No Known Allergies      REVIEW OF SYSTEMS     A 12 point review of system was performed and was negative except as outlined in the history of present illness.     HOME MEDICATIONS     Current Outpatient Rx   Medication Sig Dispense Refill     Acetaminophen (TYLENOL PO) Take 500-1,000 mg by mouth every 8 hours as needed       escitalopram (LEXAPRO) 10 MG tablet Take 1 tablet (10 mg) by mouth daily 90 tablet 3     fluticasone (FLONASE) 50 MCG/ACT nasal spray Spray 2  "sprays into both nostrils daily 16 g 11     medical cannabis (Patient's own supply) See Admin Instructions (The purpose of this order is to document that the patient reports taking medical cannabis.  This is not a prescription, and is not used to certify that the patient has a qualifying medical condition.)       omeprazole (PRILOSEC) 20 MG DR capsule Take 1 capsule (20 mg) by mouth daily 90 capsule 3     oxyCODONE-acetaminophen (PERCOCET) 7.5-325 MG per tablet Take 1 tablet by mouth 3 times daily as needed for severe pain Dispense/start 4/10/23 84 tablet 0     propranolol (INDERAL) 80 MG tablet TAKE 1 TABLET (80 MG) BY MOUTH 3 TIMES DAILY 270 tablet 3     riluzole (RILUTEK) 50 MG tablet One daily for 2 weeks, may increase to 2 daily 60 tablet 3     rivaroxaban ANTICOAGULANT (XARELTO) 20 MG TABS tablet Take 1 tablet (20 mg) by mouth daily (with dinner) 30 tablet 11     VITAMIN D, CHOLECALCIFEROL, PO Take 2,000 Units by mouth daily       zolpidem (AMBIEN) 10 MG tablet TAKE 0.5-1 TABLETS (5-10 MG) BY MOUTH NIGHTLY AS NEEDED FOR SLEEP 30 tablet 5         PHYSICAL EXAM     Vital signs  /82 (BP Location: Right arm, Patient Position: Sitting)   Pulse 77   Ht 1.651 m (5' 5\")   Wt 78.5 kg (173 lb)   BMI 28.79 kg/m      Weight:   173 lbs 0 oz    General physical exam reveals a young male who is alert and oriented vital signs were reviewed and are documented in electronic medical record. Neck supple, no carotid bruit thyromegaly JVD or lymphadenopathy noted. Neurologically his speech mentation and affect was normal. Funduscopic exam normal. Cranial nerves II through XII intact. Motor strength on the left 5/5 he has giveaway weakness on the right. Reflexes are 2+ toes are downgoing. Sensation intact. He has a astasia-abasia     PERTINENT DIAGNOSTIC STUDIES     Following studies were reviewed:     HEAD CT 11/24/19  1. No CT evidence of acute intracranial hemorrhage, mass or recent infarct.  2. There are nonspecific " dystrophic-appearing calcifications in the left parietal region which appear to be outlining the cortex. Although no underlying mass lesion is identified, given relatively young patient age, would recommend a dedicated MRI of   the brain without and with contrast on a nonemergent basis.   3. There is complete opacification of the left frontal sinus and anterior ethmoid air cells without aggressive features.    HEAD MRI 11/26/16  1. Stable appearing signal changes in the left parietal cortex. Comparison head CT demonstrates that these are cortically based calcifications. Brain MR dated 9/9/2016 demonstrates similar signal changes which are stable suggesting indolent,   nonaggressive process. Old trauma or infectious process may have created this appearance.    2. Expansile change left frontal sinus extending into the left anterior ethmoid air cells, somewhat expansile and most consistent with frontoethmoidal mucocele as detailed above      MRI CERVICAL, THORACIC SPINE 10/16/2015  CERVICAL SPINE MRI:  1.  Minor degenerative changes as described. No spinal canal or neural  foraminal stenosis at any level.  2.  Multiple paranasal sinus retention cysts, incompletely evaluated.     THORACIC SPINE MRI:  1.  Normal     MRI LUMBAR SPINE 10/1/2015  1.  At the L5-S1 level there is mild disc degeneration with loss of disc hydration and slight loss of disc height. There is a small posterior annular tear with an associated small to intermediate-sized central and right paracentral disc protrusion which  deforms the ventral thecal sac and does possibly affecting the right S1 nerve root. Central canal remains adequate. The L5-S1 foramen are adequate.  2.  Remaining lumbar discs relatively preserved. Mild degenerative changes in the mid and lower lumbar facet joints.  3.  No other disc herniation, central canal narrowing or neural foraminal narrowing.     EMG 4/17/2023  This is a normal nerve conduction and EMG study of right upper  and lower extremities.    EMG 12/7/2015  This was a normal electrophysiological study of the left upper and left lower extremities.  Clinical correlation is recommended.     PERTINENT LABS  Following labs were reviewed:  No visits with results within 3 Month(s) from this visit.   Latest known visit with results is:   Lab on 12/13/2022   Component Date Value     Acetylcholine Binding An* 12/13/2022 0.0      Striated Muscle Antibodi* 12/13/2022 <1:40      AcetChol Modul Betty 12/13/2022 0      AcetChol Block Betty 12/13/2022 0      CK 12/13/2022 62      Parathyroid Hormone Inta* 12/13/2022 52      Pyruvic Acid 12/13/2022 0.079      Lactic Acid 12/13/2022 1.3          Total time spent for face to face visit, reviewing labs/imaging studies, counseling and coordination of care was: 20 min spent on the date of the encounter doing chart review, review of outside records, review of test results, interpretation of tests, patient visit and documentation       This note was dictated using voice recognition software.  Any grammatical or context distortions are unintentional and inherent to the software.    No orders of the defined types were placed in this encounter.     New Prescriptions    No medications on file     Modified Medications    No medications on file                     Again, thank you for allowing me to participate in the care of your patient.        Sincerely,        Bart Redmond MD

## 2023-05-25 ENCOUNTER — MYC REFILL (OUTPATIENT)
Dept: PALLIATIVE MEDICINE | Facility: OTHER | Age: 38
End: 2023-05-25
Payer: COMMERCIAL

## 2023-05-25 DIAGNOSIS — M54.50 LUMBAR PAIN DETERMINED BY PALPATION: ICD-10-CM

## 2023-05-25 RX ORDER — OXYCODONE AND ACETAMINOPHEN 7.5; 325 MG/1; MG/1
1 TABLET ORAL 2 TIMES DAILY PRN
Qty: 14 TABLET | Refills: 0 | Status: SHIPPED | OUTPATIENT
Start: 2023-05-25 | End: 2023-06-06

## 2023-05-25 NOTE — TELEPHONE ENCOUNTER
Pending Prescriptions:                       Disp   Refills    oxyCODONE-acetaminophen (PERCOCET) 7.5-32*14 tab*0            Sig: Take 1 tablet by mouth 2 times daily as needed           for severe pain Dispense/start 5/25/23    FV

## 2023-05-25 NOTE — TELEPHONE ENCOUNTER
Received call from patient requesting refill(s) of oxyCODONE-acetaminophen (PERCOCET) 7.5-325 MG per tablet      Last dispensed from pharmacy on 05/20/23 7 day supply 14 tabs    Patient's last office/virtual visit by prescribing provider on 02/16/23  Next office/virtual appointment scheduled for : none    Last urine drug screen date 09/23/22  Current opioid agreement on file (completed within the last year) Yes Date of opioid agreement: 08/25/22    E-prescribe to pharmacy-[facility] [place], [place] - [address]      Will route to nursing [place] for review and preparation of prescription(s).

## 2023-08-07 DIAGNOSIS — G47.00 INSOMNIA, UNSPECIFIED TYPE: ICD-10-CM

## 2023-08-07 RX ORDER — ZOLPIDEM TARTRATE 10 MG/1
5-10 TABLET ORAL
Qty: 30 TABLET | Refills: 5 | Status: SHIPPED | OUTPATIENT
Start: 2023-08-07 | End: 2024-02-12

## 2023-08-29 DIAGNOSIS — F33.1 MODERATE EPISODE OF RECURRENT MAJOR DEPRESSIVE DISORDER (H): ICD-10-CM

## 2023-08-29 RX ORDER — ESCITALOPRAM OXALATE 10 MG/1
10 TABLET ORAL DAILY
Qty: 90 TABLET | Refills: 3 | Status: SHIPPED | OUTPATIENT
Start: 2023-08-29 | End: 2024-09-16

## 2023-08-29 NOTE — TELEPHONE ENCOUNTER
"Routing refill request to provider for review/approval because:  Patient needs to be seen because it has been more than 1 year since last office visit.  Last PHQ9 is 7/7/22    Last Written Prescription Date:  9/19/22  Last Fill Quantity: 90,  # refills: 3   Last office visit provider:  4/21/22     Requested Prescriptions   Pending Prescriptions Disp Refills    escitalopram (LEXAPRO) 10 MG tablet [Pharmacy Med Name: ESCITALOPRAM 10 MG TABLET] 90 tablet 3     Sig: TAKE 1 TABLET (10 MG) BY MOUTH DAILY.       SSRIs Protocol Failed - 8/29/2023  8:57 AM        Failed - PHQ-9 score less than 5 in past 6 months     Please review last PHQ-9 score.           Failed - Recent (6 mo) or future (30 days) visit within the authorizing provider's specialty     Patient had office visit in the last 6 months or has a visit in the next 30 days with authorizing provider or within the authorizing provider's specialty.  See \"Patient Info\" tab in inbasket, or \"Choose Columns\" in Meds & Orders section of the refill encounter.            Passed - Medication is active on med list        Passed - Patient is age 18 or older             Mari Betancourt RN 08/29/23 3:53 PM  "

## 2023-08-29 NOTE — PROGRESS NOTES
Patient calls for refill of oxycodone.  Note staff have been trying to call him to make an appointment last seen in February so well past the 3-month window was been placed on a tapering schedule.  Prescription was for 7.5 mg 1 daily for 2 weeks.  Filled on 8/8.  Will not be renewed

## 2023-10-03 DIAGNOSIS — M54.50 LUMBAR PAIN DETERMINED BY PALPATION: ICD-10-CM

## 2023-10-03 RX ORDER — RILUZOLE 50 MG/1
TABLET, FILM COATED ORAL
Qty: 60 TABLET | Refills: 0 | Status: SHIPPED | OUTPATIENT
Start: 2023-10-03 | End: 2023-10-30

## 2023-10-03 NOTE — TELEPHONE ENCOUNTER
Received fax from pharmacy requesting refill for riluzole (RILUTEK) 50 MG tablet. Patient no-showed for last appointment scheduled 10/2/23. Has not been seen in person since 12/1/22.

## 2023-10-07 ENCOUNTER — HEALTH MAINTENANCE LETTER (OUTPATIENT)
Age: 38
End: 2023-10-07

## 2023-10-30 DIAGNOSIS — M54.50 LUMBAR PAIN DETERMINED BY PALPATION: ICD-10-CM

## 2023-10-30 RX ORDER — RILUZOLE 50 MG/1
TABLET, FILM COATED ORAL
Qty: 60 TABLET | Refills: 0 | Status: SHIPPED | OUTPATIENT
Start: 2023-10-30 | End: 2023-11-01

## 2023-10-30 NOTE — TELEPHONE ENCOUNTER
Received fax from pharmacy requesting refill(s) for     riluzole (RILUTEK) 50 MG tablet    Date last filled 10/04/2023    Last Appt Date:02/16/2023    Next Appt scheduled: 11/01/2023    Pharmacy:      [id number] [place], [place] - [address]      Will route for processing    Tiffanie Flores MA  [facility] [place]

## 2023-11-01 ENCOUNTER — OFFICE VISIT (OUTPATIENT)
Dept: PALLIATIVE MEDICINE | Facility: OTHER | Age: 38
End: 2023-11-01
Attending: ANESTHESIOLOGY
Payer: COMMERCIAL

## 2023-11-01 VITALS
HEART RATE: 86 BPM | BODY MASS INDEX: 32.28 KG/M2 | SYSTOLIC BLOOD PRESSURE: 137 MMHG | OXYGEN SATURATION: 96 % | DIASTOLIC BLOOD PRESSURE: 89 MMHG | WEIGHT: 194 LBS

## 2023-11-01 DIAGNOSIS — F45.9 SOMATOFORM DISORDER: ICD-10-CM

## 2023-11-01 DIAGNOSIS — Z79.891 USE OF OPIATES FOR THERAPEUTIC PURPOSES: Primary | ICD-10-CM

## 2023-11-01 DIAGNOSIS — M54.50 LUMBAR PAIN DETERMINED BY PALPATION: ICD-10-CM

## 2023-11-01 LAB
CANNABINOIDS UR QL SCN: NORMAL
CREAT UR-MCNC: 357 MG/DL
ETHANOL UR QL SCN: NORMAL

## 2023-11-01 PROCEDURE — 80307 DRUG TEST PRSMV CHEM ANLYZR: CPT | Performed by: ANESTHESIOLOGY

## 2023-11-01 PROCEDURE — 80360 METHYLPHENIDATE: CPT | Performed by: ANESTHESIOLOGY

## 2023-11-01 PROCEDURE — G0463 HOSPITAL OUTPT CLINIC VISIT: HCPCS | Performed by: ANESTHESIOLOGY

## 2023-11-01 PROCEDURE — 80359 METHYLENEDIOXYAMPHETAMINES: CPT | Performed by: ANESTHESIOLOGY

## 2023-11-01 PROCEDURE — 99214 OFFICE O/P EST MOD 30 MIN: CPT | Performed by: ANESTHESIOLOGY

## 2023-11-01 RX ORDER — RILUZOLE 50 MG/1
TABLET, FILM COATED ORAL
Qty: 120 TABLET | Refills: 2 | Status: SHIPPED | OUTPATIENT
Start: 2023-11-01 | End: 2023-11-28

## 2023-11-01 RX ORDER — OXYCODONE AND ACETAMINOPHEN 7.5; 325 MG/1; MG/1
1 TABLET ORAL 3 TIMES DAILY
Qty: 84 TABLET | Refills: 0 | Status: SHIPPED | OUTPATIENT
Start: 2023-11-01 | End: 2023-12-04

## 2023-11-01 ASSESSMENT — PAIN SCALES - GENERAL: PAINLEVEL: SEVERE PAIN (7)

## 2023-11-01 NOTE — PATIENT INSTRUCTIONS
[facility] Number:  [phone]  Call with any questions about your care and for scheduling assistance.   Calls are returned Monday through Friday between 8 AM and 4:30 PM. We usually get back to you within 2 business days depending on the issue/request.    If we are prescribing your medications:  For opioid medication refills, call the clinic or send a [employer] message 7 days in advance.  Please include:  Name of requested medication  Name of the pharmacy.  For non-opioid medications, call your pharmacy directly to request a refill. Please allow 3-4 days to be processed.   Per [place] State Law:  All controlled substance prescriptions must be filled within 30 days of being written.    For those controlled substances allowing refills, pickup must occur within 30 days of last fill.      We believe regular attendance is key to your success in our program!    Any time you are unable to keep your appointment we ask that you call us at least 24 hours in advance to cancel.This will allow us to offer the appointment time to another patient.   Multiple missed appointments may lead to dismissal from the clinic.     PLAN:    Resume  the oxycodone 7.5 mg 3 times a day.    You will resume your physical therapy program.    Increase the riluzole to 50 mg tablets 2 tablets twice a day.  Discussed if there is a concern for ongoing itching, may transition to Topamax.    As checkout schedule to work with Alize Livingston again in therapy.    Follow-up with Dr. Small in 3 months

## 2023-11-01 NOTE — PROGRESS NOTES
Patient presents to the clinic today for a visit with ANNALISA FAULKNER MD regarding Pain Management.          2/16/2023    10:45 AM 2/16/2023    10:47 AM 11/1/2023     3:21 PM   PEG Score   PEG Total Score 2 2 6       UDS/CSA- na    Medications- na    QUESTIONS:    Adina FONTANA [facility] Visit Facilitator

## 2023-11-01 NOTE — PROGRESS NOTES
[facility] - Office Visit    ASSESSMENT & PLAN     Anand was seen today for pain.    Diagnoses and all orders for this visit:    Use of opiates for therapeutic purposes  -     Drug Confirmation Panel Urine with Creatinine; Future  -     Ethanol urine; Future  -     Ethanol urine  -     Drug Confirmation Panel Urine with Creatinine    Lumbar pain determined by palpation and/or percussion at L1 (5/4/16)  -     oxyCODONE-acetaminophen (PERCOCET) 7.5-325 MG per tablet; Take 1 tablet by mouth 3 times daily  -     riluzole (RILUTEK) 50 MG tablet; Two capsules twice a day    Somatoform disorder   -     Adult Mental Health  Referral; Future        Patient Instructions     [facility] Number:  [phone]  Call with any questions about your care and for scheduling assistance.   Calls are returned Monday through Friday between 8 AM and 4:30 PM. We usually get back to you within 2 business days depending on the issue/request.    If we are prescribing your medications:  For opioid medication refills, call the clinic or send a [employer] message 7 days in advance.  Please include:  Name of requested medication  Name of the pharmacy.  For non-opioid medications, call your pharmacy directly to request a refill. Please allow 3-4 days to be processed.   Per [place] State Law:  All controlled substance prescriptions must be filled within 30 days of being written.    For those controlled substances allowing refills, pickup must occur within 30 days of last fill.      We believe regular attendance is key to your success in our program!    Any time you are unable to keep your appointment we ask that you call us at least 24 hours in advance to cancel.This will allow us to offer the appointment time to another patient.   Multiple missed appointments may lead to dismissal from the clinic.     PLAN:    Resume  the oxycodone 7.5 mg 3 times a day.    You will resume your  physical therapy program.    Increase the riluzole to 50 mg tablets 2 tablets twice a day.  Discussed if there is a concern for ongoing itching, may transition to Topamax.    As checkout schedule to work with Alize Livingston again in therapy.    Follow-up with Dr. Faulkner in 3 months          -----  ANNALISA FAULKNER MD  [facility]       SUBJECTIVE      Anand Felix is a 38 year old year old male who presents to clinic today for the following:     Follow-up for lumbar pain.    Called in August for refill of opioids, as he had last been seen in February at outside the 3-month window declined.    Reviewing the record was seen in neurology 4/17, reviewing history of somatoform disorder with hospitalizations.  No further evaluations warranted.    Reviews today he had not been seen since February as his wife had a kidney transplant June.  She is not doing well, had been on dialysis 4 years.    Reviews the oxycodone has been helpful for his activity level.  Since then he has not been as active.  He understands he needed to keep appointments every 3 months but was busy.    He did use the really is all 50 mg 3 daily.  He thinks that is helped his burning in his shoulder and down his right leg.  Recalls he had some itching on lamotrigine.  On the really is all he indicates occasionally some itching on his forearms.  He wonders if there are alternatives.  Discussed the medications to work on the glutamate system as a rationale for this.  May consider Topamax next.  He wonders if he could increase the really is all first.    He does continue with the medical cannabis using vaporizer oil suspension which seemed to help him relax and sleep.    Describes when he had the oxycodone was more active, doing his home physical therapy program.    I reviewed the evaluation with neurology and the concept of somatoform disorder.  Recalls he had been working with Neha in psychotherapy up till 2 years ago.  That may have  been helpful he would like to resume.    Notes he has applied for disability few times has not gotten it.    He continues using braces on both ankles since his 2015 back surgery.    We will obtain a urine drug test.  The last 1 had alcohol.  Again notes he cannot explain how that got into his sample as he does not use alcohol and does not expect it would be found here.      Current Outpatient Medications:     Acetaminophen (TYLENOL PO), Take 500-1,000 mg by mouth every 8 hours as needed, Disp: , Rfl:     escitalopram (LEXAPRO) 10 MG tablet, TAKE 1 TABLET (10 MG) BY MOUTH DAILY., Disp: 90 tablet, Rfl: 3    fluticasone (FLONASE) 50 MCG/ACT nasal spray, Spray 2 sprays into both nostrils daily, Disp: 16 g, Rfl: 11    medical cannabis (Patient's own supply), See Admin Instructions (The purpose of this order is to document that the patient reports taking medical cannabis.  This is not a prescription, and is not used to certify that the patient has a qualifying medical condition.), Disp: , Rfl:     omeprazole (PRILOSEC) 20 MG DR capsule, Take 1 capsule (20 mg) by mouth daily, Disp: 90 capsule, Rfl: 3    oxyCODONE-acetaminophen (PERCOCET) 7.5-325 MG per tablet, Take 1 tablet by mouth 3 times daily, Disp: 84 tablet, Rfl: 0    propranolol (INDERAL) 80 MG tablet, TAKE 1 TABLET (80 MG) BY MOUTH 3 TIMES DAILY, Disp: 270 tablet, Rfl: 3    riluzole (RILUTEK) 50 MG tablet, Two capsules twice a day, Disp: 120 tablet, Rfl: 2    rivaroxaban ANTICOAGULANT (XARELTO) 20 MG TABS tablet, Take 1 tablet (20 mg) by mouth daily (with dinner), Disp: 30 tablet, Rfl: 11    VITAMIN D, CHOLECALCIFEROL, PO, Take 2,000 Units by mouth daily, Disp: , Rfl:     zolpidem (AMBIEN) 10 MG tablet, TAKE 0.5-1 TABLETS (5-10 MG) BY MOUTH NIGHTLY AS NEEDED FOR SLEEP, Disp: 30 tablet, Rfl: 5      Review of Systems   General, psych, musculoskeletal, bowels and bladder otherwise normal other than above.          OBJECTIVE   /89   Pulse 86   Wt 88 kg (194 lb)    SpO2 96%   BMI 32.28 kg/m          Physical Exam  General: Alert, clear sensorium.  No respiratory distress.  No pain behavior.  Ambulates limping with his right leg.  AFOs on both feet.  Cardiovascular: Normal rate  Lungs: Pulmonary effort is normal, speaking in full sentences  MSK: Exam some tenderness over the right shoulder.  When I do strength testing for deltoids and biceps there is some giveaway and begins trembling upper extremities.  Negative Nathen's.  Patellar reflexes are brisk.  Negative Romberg  Skin: Warm and dry.  No rash noted on his forearms though he notes that can be an itching area sometimes appearing red  Neurologic: alert and oriented x3  Psychiatric: Affect constricted.    Assessment: Patient with a history of lumbar laminectomy, reports ongoing radiculopathy.    Neurology notes again reviewed indicated evaluations thought to be more consistent with malingering and given a diagnosis of somatoform disorder.    Unclear secondary gain issues if lingering, he does note has applied for disability which was denied.  Notes that have stable housing, did not have significant financial stressors though did not elaborate.  Had been involved in psychotherapy in the past.    We have used really is all as an agent for possible neuropathic component and he would like to try increasing the dose, and will watch for any concern for rash.    Reports previously was more active with the oxycodone and will had his own physical therapy program.    Plan as above.    Total time 38 minutes      Total time spent:  minutes

## 2023-11-01 NOTE — LETTER
Opioid / Opioid Plus Controlled Substance Agreement    This is an agreement between you and your provider about the safe and appropriate use of controlled substance/opioids prescribed by your care team. Controlled substances are medicines that can cause physical and mental dependence (abuse).    There are strict laws about having and using these medicines. We here at [facility] are committing to working with you in your efforts to get better. To support you in this work, we ll help you schedule regular office appointments for medicine refills. If we must cancel or change your appointment for any reason, we ll make sure you have enough medicine to last until your next appointment.     As a Provider, I will:  Listen carefully to your concerns and treat you with respect.   Recommend a treatment plan that I believe is in your best interest. This plan may involve therapies other than opioid pain medication.   Talk with you often about the possible benefits, and the risk of harm of any medicine that we prescribe for you.   Provide a plan on how to taper (discontinue or go off) using this medicine if the decision is made to stop its use.    As a Patient, I understand that opioid(s):   Are a controlled substance prescribed by my care team to help me function or work and manage my condition(s).   Are strong medicines and can cause serious side effects such as:  Drowsiness, which can seriously affect my driving ability  A lower breathing rate, enough to cause death  Harm to my thinking ability   Depression   Abuse of and addiction to this medicine  Need to be taken exactly as prescribed. Combining opioids with certain medicines or chemicals (such as illegal drugs, sedatives, sleeping pills, and benzodiazepines) can be dangerous or even fatal. If I stop opioids suddenly, I may have severe withdrawal symptoms.  Do not work for all types of pain nor for all patients. If they re not helpful, I may be asked to stop  them.    {Benzo / Stimulant (Optional):149271}    The risks, benefits and side effects of these medicine(s) were explained to me. I agree that:  I will take part in other treatments as advised by my care team. This may be psychiatry or counseling, physical therapy, behavioral therapy, group treatment or a referral to a specialist.     I will keep all my appointments. I understand that this is part of the monitoring of opioids. My care team may require an office visit for EVERY opioid/controlled substance refill. If I miss appointments or don t follow instructions, my care team may stop my medicine.    I will take my medicines as prescribed. I will not change the dose or schedule unless my care team tells me to. There will be no refills if I run out early.     I may be asked to come to the clinic and complete a urine drug test or complete a pill count at any time. If I don t give a urine sample or participate in a pill count, the care team may stop my medicine.    I will only receive prescriptions from this clinic for chronic pain. If I am treated by another provider for acute pain issues, I will tell them that I am taking opioid pain medication for chronic pain and that I have a treatment agreement with this provider. I will inform my [facility] care team within one business day if I am given a prescription for any pain medication by another healthcare provider. My [facility] care team can contact other providers and pharmacists about my use of any medicines.    It is up to me to make sure that I don t run out of my medicines on weekends or holidays. If my care team is willing to refill my opioid prescription without a visit, I must request refills only during office hours. Refills may take up to 3 business days to process. I will use one pharmacy to fill all my opioid and other controlled substance prescriptions. I will notify the clinic about any changes to my insurance or medication  availability.    I am responsible for my prescriptions. If the medicine/prescription is lost, stolen or destroyed, it will not be replaced. I also agree not to share controlled substance medicines with anyone.    I am aware I should not use any illegal or recreational drugs. I agree not to drink alcohol unless my care team says I can.       If I enroll in the [place] Medical Cannabis program, I will tell my care team prior to my next refill.     I will tell my care team right away if I become pregnant, have a new medical problem treated outside of my regular clinic, or have a change in my medications.    I understand that this medicine can affect my thinking, judgment and reaction time. Alcohol and drugs affect the brain and body, which can affect the safety of my driving. Being under the influence of alcohol or drugs can affect my decision-making, behaviors, personal safety, and the safety of others. Driving while impaired (DWI) can occur if a person is driving, operating, or in physical control of a car, motorcycle, boat, snowmobile, ATV, motorbike, off-road vehicle, or any other motor vehicle ([place] Statute 169A.20). I understand the risk if I choose to drive or operate any vehicle or machinery.    I understand that if I do not follow any of the conditions above, my prescriptions or treatment may be stopped or changed.          Opioids  What You Need to Know    What are opioids?   Opioids are pain medicines that must be prescribed by a doctor. They are also known as narcotics.     Examples are:   morphine (MS Contin, Kaleigh)  oxycodone (Oxycontin)  oxycodone and acetaminophen (Percocet)  hydrocodone and acetaminophen (Vicodin, Norco)   fentanyl patch (Duragesic)   hydromorphone (Dilaudid)   methadone  codeine (Tylenol #3)     What do opioids do well?   Opioids are best for severe short-term pain such as after a surgery or injury. They may work well for cancer pain. They may help some people with long-lasting  (chronic) pain.     What do opioids NOT do well?   Opioids never get rid of pain entirely, and they don t work well for most patients with chronic pain. Opioids don t reduce swelling, one of the causes of pain.                                    Other ways to manage chronic pain and improve function include:     Treat the health problem that may be causing pain  Anti-inflammation medicines, which reduce swelling and tenderness, such as ibuprofen (Advil, Motrin) or naproxen (Aleve)  Acetaminophen (Tylenol)  Antidepressants and anti-seizure medicines, especially for nerve pain  Topical treatments such as patches or creams  Injections or nerve blocks  Chiropractic or osteopathic treatment  Acupuncture, massage, deep breathing, meditation, visual imagery, aromatherapy  Use heat or ice at the pain site  Physical therapy   Exercise  Stop smoking  Take part in therapy       Risks and side effects     Talk to your doctor before you start or decide to keep taking opioids. Possible side effects include:    Lowering your breathing rate enough to cause death  Overdose, including death, especially if taking higher than prescribed doses  Worse depression symptoms; less pleasure in things you usually enjoy  Feeling tired or sluggish  Slower thoughts or cloudy thinking  Being more sensitive to pain over time; pain is harder to control  Trouble sleeping or restless sleep  Changes in hormone levels (for example, less testosterone)  Changes in sex drive or ability to have sex  Constipation  Unsafe driving  Itching and sweating  Dizziness  Nausea, throwing up and dry mouth    What else should I know about opioids?    Opioids may lead to dependence, tolerance, or addiction.    Dependence means that if you stop or reduce the medicine too quickly, you will have withdrawal symptoms. These include loose poop (diarrhea), jitters, flu-like symptoms, nervousness and tremors. Dependence is not the same as addiction.                      Tolerance means needing higher doses over time to get the same effect. This may increase the chance of serious side effects.    Addiction is when people improperly use a substance that harms their body, their mind or their relations with others. Use of opiates can cause a relapse of addiction if you have a history of drug or alcohol abuse.    People who have used opioids for a long time may have a lower quality of life, worse depression, higher levels of pain and more visits to doctors.    You can overdose on opioids. Take these steps to lower your risk of overdose:    Recognize the signs:  Signs of overdose include decrease or loss of consciousness (blackout), slowed breathing, trouble waking up and blue lips. If someone is worried about overdose, they should call 911.    Talk to your doctor about Narcan (naloxone).   If you are at risk for overdose, you may be given a prescription for Narcan. This medicine very quickly reverses the effects of opioids.   If you overdose, a friend or family member can give you Narcan while waiting for the ambulance. They need to know the signs of overdose and how to give Narcan.     Don't use alcohol or street drugs.   Taking them with opioids can cause death.    Do not take any of these medicines unless your doctor says it s OK. Taking these with opioids can cause death:  Benzodiazepines, such as lorazepam (Ativan), alprazolam (Xanax) or diazepam (Valium)  Muscle relaxers, such as cyclobenzaprine (Flexeril)  Sleeping pills like zolpidem (Ambien)   Other opioids      How to keep you and other people safe while taking opioids:    Never share your opioids with others.  Opioid medicines are regulated by the Drug Enforcement Agency (CADY). Selling or sharing medications is a criminal act.    2. Be sure to store opioids in a secure place, locked up if possible. Young children can easily swallow them and overdose.    3. When you are traveling with your medicines, keep them in the  original bottles. If you use a pill box, be sure you also carry a copy of your medicine list from your clinic or pharmacy.    4. Safe disposal of opioids    Most pharmacies have places to get rid of medicine, called disposal kiosks. Medicine disposal options are also available in every [facility]. Search your county and  medication disposal  to find more options. You can find more details at:  https://www.pca.[facility].[place]./living-green/managing-unwanted-medications     I agree that my provider, clinic care team, and pharmacy may work with any city, state or federal law enforcement agency that investigates the misuse, sale, or other diversion of my controlled medicine. I will allow my provider to discuss my care with, or share a copy of, this agreement with any other treating provider, pharmacy or emergency room where I receive care.    I have read this agreement and have asked questions about anything I did not understand.    _______________________________________________________  Patient Signature - Anand Felix _____________________                   Date     _______________________________________________________  Provider Signature - ANNALISA FAULKNER MD   _____________________                   Date     _______________________________________________________  Witness Signature (required if provider not present while patient signing)   _____________________                   Date

## 2023-11-09 DIAGNOSIS — J30.2 SEASONAL ALLERGIC RHINITIS, UNSPECIFIED TRIGGER: ICD-10-CM

## 2023-11-11 NOTE — PATIENT INSTRUCTIONS - HE
Today you received a BOTOX INJECTION.    Botox is an injectable drug made from a toxic bacterium called Clostridium botulinum.    When injected in small amounts it blocks certain chemical signals from your nerves, causing temporary paralysis of your muscles.  Botox treatments can be administered every 3 months.  It may take as long as 10 to 14 days for you to experience relief.  Complications and side effects of Botox treatments are rare.  The injections themselves are almost painless.  You may experience a very small sting with each injection.  The most common side effects of Botox injections are     neck pain and stiffness at the injection site.      You may develop a headache afterward.      You may also experience temporary muscle weakness in your neck and upper shoulders.    Bruising, bleeding, pain,redness, or swelling where the injection was given.    Low grade fever, cough, sore throat,runny nose, mild flu like symptoms, dizziness, drowsiness, tired feeling.    If these symptoms occur they can last 2 to 3 days.  You may treat them with over the counter medication; use as directed.  If these symptoms persist for more than 4 to 5 days call the Pain Center at [phone]   Seen in ED for cough, likely viral upper respiratory tract infection.  Please drink plenty of fluids and follow the instructions below:   1)	If you have fever greater than 100F or generalized bodyaches then please take Tylenol 650 mg every 4 hours and Motrin 600 mg every 6 hours.   2)	If you have head congestion, sinus pressure, or nasal congestion then please take Allegra 120-180 mg every 24 hours or Zyrtec 10 mg every 24 hours.    3)	If you have throat discomfort please take Cepacol or Chloraseptic spray. Check bottle to make sure alcohol free.   4)	If you have persistent dry cough please take Delsym or Robitussin.   5)	If you have difficulty bringing up mucus please take Mucinex use as directed.   6)	Make a follow-up appointment with your primary doctor.   7)	Return to ED for any new or worsening symptoms. Seen in ED for cough, likely viral upper respiratory tract infection.  Please drink plenty of fluids and follow the instructions below:   1)	If you have fever greater than 100F or generalized bodyaches then please take Tylenol 650 mg every 4 hours and Motrin 600 mg every 6 hours.   2)	If you have head congestion, sinus pressure, or nasal congestion then please take Allegra 120-180 mg every 24 hours or Zyrtec 10 mg every 24 hours.    3)	If you have throat discomfort please take Cepacol or Chloraseptic spray. Check bottle to make sure alcohol free.   4)	If you have persistent dry cough please take Delsym or Robitussin.   5)	If you have difficulty bringing up mucus please take Mucinex use as directed.   6)	Make a follow-up appointment with your primary doctor.   7)	Return to ED for any new or worsening symptoms.     Albuterol inhaler 2 inhalations every 4-6 hours as needed.   Take all of your other medications as previously prescribed.

## 2023-11-13 ENCOUNTER — MYC REFILL (OUTPATIENT)
Dept: PALLIATIVE MEDICINE | Facility: OTHER | Age: 38
End: 2023-11-13
Payer: COMMERCIAL

## 2023-11-13 DIAGNOSIS — M54.50 LUMBAR PAIN DETERMINED BY PALPATION: ICD-10-CM

## 2023-11-13 RX ORDER — RILUZOLE 50 MG/1
TABLET, FILM COATED ORAL
Qty: 120 TABLET | Refills: 2 | Status: CANCELLED | OUTPATIENT
Start: 2023-11-13

## 2023-11-28 ENCOUNTER — MYC REFILL (OUTPATIENT)
Dept: PALLIATIVE MEDICINE | Facility: OTHER | Age: 38
End: 2023-11-28
Payer: COMMERCIAL

## 2023-11-28 DIAGNOSIS — M54.50 LUMBAR PAIN DETERMINED BY PALPATION: ICD-10-CM

## 2023-11-28 RX ORDER — RILUZOLE 50 MG/1
TABLET, FILM COATED ORAL
Qty: 120 TABLET | Refills: 2 | Status: SHIPPED | OUTPATIENT
Start: 2023-11-28 | End: 2024-02-02

## 2023-11-28 NOTE — TELEPHONE ENCOUNTER
Called [facility] pharmacy. Pharmacy states they never received the 11/1/2023. Pharmacy states they do not have 120 tablets in stock. Will route request to Dr. Small to sign new script and send it to:     [facility] [address] [address]  Phone: [phone] Fax: [phone]

## 2023-12-15 ENCOUNTER — TELEPHONE (OUTPATIENT)
Dept: PALLIATIVE MEDICINE | Facility: OTHER | Age: 38
End: 2023-12-15
Payer: COMMERCIAL

## 2023-12-15 NOTE — TELEPHONE ENCOUNTER
Prior authorization requested for:   Disp Refills Start End SEVEN   riluzole (RILUTEK) 50 MG tablet 120 tablet 2 11/28/2023  No   Sig: Two capsules twice a day   Sent to pharmacy as: Riluzole 50 MG Oral Tablet (RILUTEK)   Class: E-Prescribe   Order: [id number]   E-Prescribing Status: Receipt confirmed by pharmacy (11/28/2023  3:45 PM CST)     [facility] [place], [place] - [address]

## 2023-12-15 NOTE — TELEPHONE ENCOUNTER
Prior Authorization Not Needed per Insurance Pharmacy now has paid claim.    Medication: RILUZOLE 50 MG PO TABS  Insurance Company: [employer] Non-Specialty PA's - Phone [phone] Fax [phone]  Expected CoPay: $    Pharmacy Filling the Rx: [facility] [place], [place] - [address]  Pharmacy Notified: Yes  Patient Notified: Pharmacy will notify patient.

## 2023-12-15 NOTE — TELEPHONE ENCOUNTER
PA Initiation    Medication: RILUZOLE 50 MG PO TABS  Insurance Company: [employer] Non-Specialty PA's - Phone [phone] Fax [phone]  Pharmacy Filling the Rx: [place], [place] - [address] [place] [address]  Filling Pharmacy Phone: [phone]  Filling Pharmacy Fax:    Start Date: 12/15/2023  Central Prior Authorization Team   Phone: [phone]

## 2024-01-01 NOTE — TELEPHONE ENCOUNTER
Medication being requested: Gabapentin and Percocet  Last visit date:11/4/20       Provider:JOSUE  Next visit date:1/8/21      Provider:JOSUE  Expected follow up: 8 weeks  MTM visit (Pain Center) date- No    UDT date: 1/2020  Agreement date:1/2020  - (Medication name/ last date filled or purchased/Strength and Quantity, provider)    12/07/2020 Gabapentin 100 Mg Capsule Qty: 90 for 30 days Cr Mon   11/09/2020 Oxycodon-Acetaminophen 7.5-325 Qty: 84 for 28 days Cr Mon   FYI:  11/20/2020 Hydrocodone-Acetamin 5-325 Mg Qty: 18 for 3 days Lauren Tho   Pertinent between visit information about requested medication (telephone, mychart, prior authorization, concerns, red flags, comments):   Multiple appointments with Kaleigh, 1 appointment with Yara cancelled  Script being sent to provider by nurse- dates and quantity:  Requested Prescriptions     Pending Prescriptions Disp Refills     gabapentin (NEURONTIN) 100 MG capsule 90 capsule 1     Sig: Take 100 mg by mouth 3 (three) times a day.     oxyCODONE-acetaminophen (PERCOCET/ENDOCET) 7.5-325 mg per tablet 84 tablet 0     Sig: Take 1 tablet by mouth 3 (three) times a day as needed for pain.     Date that the medication is expected to be refilled-  Percocet 12/21/20-1/18/21  Gabapentin 1/6/21-3/7/21  Pharmacy cued:  [facility]  Standing orders for withdrawal protocol implemented or requested- N/A     
No/Not applicable

## 2024-01-18 ENCOUNTER — MYC REFILL (OUTPATIENT)
Dept: PALLIATIVE MEDICINE | Facility: OTHER | Age: 39
End: 2024-01-18
Payer: COMMERCIAL

## 2024-01-18 DIAGNOSIS — M54.50 LUMBAR PAIN DETERMINED BY PALPATION: ICD-10-CM

## 2024-01-18 RX ORDER — RILUZOLE 50 MG/1
TABLET, FILM COATED ORAL
Qty: 120 TABLET | Refills: 2 | Status: CANCELLED | OUTPATIENT
Start: 2024-01-18

## 2024-01-19 NOTE — TELEPHONE ENCOUNTER
Refills have been requested for the following medications:         riluzole (RILUTEK) 50 MG tablet [ANNALISA FAULKNER]     Preferred pharmacy: [facility] [address]

## 2024-01-19 NOTE — TELEPHONE ENCOUNTER
Received request from patient requesting refill(s) for riluzole (RILUTEK) 50 MG tablet     Refills available at the pharmacy. [employer] message sent to patient.

## 2024-01-23 DIAGNOSIS — R51.9 NONINTRACTABLE EPISODIC HEADACHE, UNSPECIFIED HEADACHE TYPE: ICD-10-CM

## 2024-01-23 DIAGNOSIS — R00.0 SINUS TACHYCARDIA: ICD-10-CM

## 2024-01-23 DIAGNOSIS — J30.2 SEASONAL ALLERGIC RHINITIS, UNSPECIFIED TRIGGER: ICD-10-CM

## 2024-01-23 RX ORDER — FLUTICASONE PROPIONATE 50 MCG
2 SPRAY, SUSPENSION (ML) NASAL DAILY
Qty: 48 ML | Refills: 3 | Status: SHIPPED | OUTPATIENT
Start: 2024-01-23

## 2024-01-23 RX ORDER — PROPRANOLOL HYDROCHLORIDE 80 MG/1
80 TABLET ORAL 3 TIMES DAILY
Qty: 270 TABLET | Refills: 3 | Status: SHIPPED | OUTPATIENT
Start: 2024-01-23

## 2024-01-26 ENCOUNTER — MYC MEDICAL ADVICE (OUTPATIENT)
Dept: FAMILY MEDICINE | Facility: CLINIC | Age: 39
End: 2024-01-26
Payer: COMMERCIAL

## 2024-01-26 DIAGNOSIS — I26.99 PULMONARY EMBOLISM (H): ICD-10-CM

## 2024-01-26 DIAGNOSIS — I26.99 PULMONARY EMBOLISM, UNSPECIFIED CHRONICITY, UNSPECIFIED PULMONARY EMBOLISM TYPE, UNSPECIFIED WHETHER ACUTE COR PULMONALE PRESENT (H): Primary | ICD-10-CM

## 2024-02-02 ENCOUNTER — OFFICE VISIT (OUTPATIENT)
Dept: PALLIATIVE MEDICINE | Facility: OTHER | Age: 39
End: 2024-02-02
Payer: COMMERCIAL

## 2024-02-02 VITALS — HEART RATE: 87 BPM | SYSTOLIC BLOOD PRESSURE: 127 MMHG | OXYGEN SATURATION: 94 % | DIASTOLIC BLOOD PRESSURE: 76 MMHG

## 2024-02-02 DIAGNOSIS — M54.50 LUMBAR PAIN DETERMINED BY PALPATION: ICD-10-CM

## 2024-02-02 DIAGNOSIS — G89.4 CHRONIC PAIN SYNDROME: Primary | ICD-10-CM

## 2024-02-02 PROCEDURE — G0463 HOSPITAL OUTPT CLINIC VISIT: HCPCS | Performed by: ANESTHESIOLOGY

## 2024-02-02 PROCEDURE — 99213 OFFICE O/P EST LOW 20 MIN: CPT | Performed by: ANESTHESIOLOGY

## 2024-02-02 RX ORDER — RILUZOLE 50 MG/1
TABLET, FILM COATED ORAL
Qty: 120 TABLET | Refills: 2 | Status: SHIPPED | OUTPATIENT
Start: 2024-02-02 | End: 2024-03-22

## 2024-02-02 RX ORDER — OXYCODONE AND ACETAMINOPHEN 7.5; 325 MG/1; MG/1
1 TABLET ORAL 3 TIMES DAILY
Qty: 84 TABLET | Refills: 0 | Status: SHIPPED | OUTPATIENT
Start: 2024-02-02 | End: 2024-03-12

## 2024-02-02 ASSESSMENT — PAIN SCALES - GENERAL: PAINLEVEL: MODERATE PAIN (5)

## 2024-02-02 NOTE — PATIENT INSTRUCTIONS
[facility] Number:  [phone]  Call with any questions about your care and for scheduling assistance.   Calls are returned Monday through Friday between 8 AM and 4:30 PM. We usually get back to you within 2 business days depending on the issue/request.    If we are prescribing your medications:  For opioid medication refills, call the clinic or send a [employer] message 7 days in advance.  Please include:  Name of requested medication  Name of the pharmacy.  For non-opioid medications, call your pharmacy directly to request a refill. Please allow 3-4 days to be processed.   Per [place] State Law:  All controlled substance prescriptions must be filled within 30 days of being written.    For those controlled substances allowing refills, pickup must occur within 30 days of last fill.      We believe regular attendance is key to your success in our program!    Any time you are unable to keep your appointment we ask that you call us at least 24 hours in advance to cancel.This will allow us to offer the appointment time to another patient.   Multiple missed appointments may lead to dismissal from the clinic.     PLAN:    Checkout may schedule with Alize Livingston.    You will be renewed for the [place] medical cannabis.    Continue the riluzole 50 mg 2 tablets twice a day.    Continue the oxycodone 7.5 mg twice a day.    Follow-up with Dr. Small for med check in 3 months

## 2024-02-02 NOTE — NURSING NOTE
Patient presents to the clinic today for a follow up with ANNALISA FAULKNER MD  regarding Pain Management.           2/16/2023    10:45 AM 2/16/2023    10:47 AM 11/1/2023     3:21 PM   PEG Score   PEG Total Score 2 2 6      UDT/CSA =11/1/23    Tiffanie Flores MA  [facility]

## 2024-02-02 NOTE — PATIENT INSTRUCTIONS - HE
Plan:   Interventions-    Follow up in 4 weeks to evaluate the effectiveness of the treatment interventions ordered today. Scheduling your appointment prior to leaving assists in reduction of running out of medication prior to the next appointment.     Start Singulair 10 mg daily for a trial   Will order botox injections with Dr. Welsh for the migraine pain, if insurance will not cover the botox will substitute trigger point injections.     Continue the use of the percocet 7.5/325 g up to 3 per day     Continue the gabapentin 100 mg two times a day- three times a day    Eddie diet provided- start by avoiding gluten    As a reminder- chronic pain is generally stable, if you experience a new injury or new different pain it is expected that you present to the ED or urgent care for evaluation. DO NOT use your chronic pain medications to treat any new or different pain other then what it is intended for. We do not replace any lost or stolen prescriptions or provide early refills for overtaking your medications.      Orders placed today  Medications that were ordered today   Requested Prescriptions     Signed Prescriptions Disp Refills     gabapentin (NEURONTIN) 100 MG capsule 84 capsule 0     Sig: TAKE 1 CAPSULE BY MOUTH THREE TIMES DAILY. OKAY TO REDUCE TO 2 TIMES A DAY IF TOO DROWSY     oxyCODONE-acetaminophen (PERCOCET/ENDOCET) 7.5-325 mg per tablet 84 tablet 0     Sig: Take 1 tablet by mouth 3 (three) times a day as needed for pain.     tiZANidine (ZANAFLEX) 4 MG tablet 90 tablet 0     Sig: Take 1 tablet (4 mg total) by mouth every 6 (six) hours as needed.     montelukast (SINGULAIR) 10 mg tablet 30 tablet 0     Sig: Take 1 tablet (10 mg total) by mouth at bedtime.     No orders of the defined types were placed in this encounter.      UDT/SWAB:  Patient required a random Urine Drug Testing, due to the need to comply with [place] Policy Guidelines and CDC Guideline for the use of any controlled  substances. This is to ensure that patient is compliant with treatment, and monitor for risks such as diversion, abuse, or any other aberrant behaviors. Patient is either being considered for or taking a controlled substance. Unexpected findings will be discussed and treatment decision may be adjusted. Testing is being implemented across the board randomly w/o bias related to age, race, gender, socioeconomic status or Yazdanism affiliation.    The patient understand todays plan and has their questions answered in regards to expectations and current treatment plan.     SAFETY REMINDERS  No alcohol while taking controlled substances. Alcohol is not an illegal substance, it is unsafe to use in combination. It is a build up of substances in the body that can be extremely hazardous and may cause respirations to slow to a dangerous rate resulting in hospitalization, brain damage, or death.    Opioid medications have been associated with sharp rise in unintentional overdose and death.  Overdose is a condition characterized by the consumption in excess of a particular drug causing adverse effects. This can happen b/c you are sick, accidentally or intentionally took an extra dose, are on multiple medication that can interact. Someone took your medication and they are not use to the medication.  Symptoms of overdose include:   !breathing slow and shallow, erratic or not at all  !pinpoint pupils, hallucinations  !confusion  !muscle jerks, slack muscles   !extreme sleepiness or loss of alertness   !awake but not able to talk   !face pale or clammy, vomiting, for lighter skinned people, the skin tone turns bluish purple, for darker skinned people, it turns grayish or ashen   If in a situation where overdose is a concern engage the emergency response system (dial 911).    In one study it was noted that 80% of unintentional overdoses occurred in people who were taking a combination of opioids and benzodiazepines.    Do not sell,  loan, borrow or share your opioid medication with anyone. Deaths have occurred as a result of this practice. It is illegal and patients are being prosecuted.     Prevent unexpected access/loss of medication: Keep medication locked. Only carry what you need with you.    Dayna Lundberg, [facility]  [address]  [place] [address]  Ph: [phone]  Fax: [phone]     106

## 2024-02-02 NOTE — PROGRESS NOTES
[facility] Follow-up    Date of visit: 2/2/2024    Chief complaint:   Chief Complaint   Patient presents with    Pain     Persistent spinal pain, history of lumbar surgery.  Interval history:    Describes since last seen increasing the riluzole to 50 mg 2 tablets twice a day seems to helping with the burning pain.  No side effects.  Satisfied at this regimen.    Continues the oxycodone 7.5 mg 3 times a day feeling that helps with some benefit.  No side effects.  Last urine drug test in November.   reviewed.    He has his home exercise program he does and has a treadmill.  We reviewed the importance of maintaining a home exercise program and conditioning.    When last seen we did check in to see if Alize Livingston therapist had time available and was told she was full.  Where she is taking patients again now so he will look and then at checkout.    Reviews his wife is recovering from her kidney transplant is back at work.  She particularly likes the grounding mat and is taking 1 to work finds it relaxing.  He uses as well.    Has decreased the medical cannabis in part due to cost though continues using different products finds it is helpful would like to be renewed        Medications:  Current Outpatient Medications   Medication Sig Dispense Refill    Acetaminophen (TYLENOL PO) Take 500-1,000 mg by mouth every 8 hours as needed      escitalopram (LEXAPRO) 10 MG tablet TAKE 1 TABLET (10 MG) BY MOUTH DAILY. 90 tablet 3    fluticasone (FLONASE) 50 MCG/ACT nasal spray INSTILL 2 SPRAYS INTO BOTH NOSTRILS DAILY 48 mL 3    medical cannabis (Patient's own supply) See Admin Instructions (The purpose of this order is to document that the patient reports taking medical cannabis.  This is not a prescription, and is not used to certify that the patient has a qualifying medical condition.)      omeprazole (PRILOSEC) 20 MG DR capsule TAKE 1 CAPSULE BY MOUTH EVERY DAY 90  capsule 3    oxyCODONE-acetaminophen (PERCOCET) 7.5-325 MG per tablet Take 1 tablet by mouth 3 times daily 84 tablet 0    propranolol (INDERAL) 80 MG tablet TAKE 1 TABLET BY MOUTH THREE TIMES DAILY. 270 tablet 3    riluzole (RILUTEK) 50 MG tablet Two capsules twice a day 120 tablet 2    rivaroxaban ANTICOAGULANT (XARELTO) 20 MG TABS tablet Take 1 tablet (20 mg) by mouth daily (with dinner) 30 tablet 11    VITAMIN D, CHOLECALCIFEROL, PO Take 2,000 Units by mouth daily      zolpidem (AMBIEN) 10 MG tablet TAKE 0.5-1 TABLETS (5-10 MG) BY MOUTH NIGHTLY AS NEEDED FOR SLEEP 30 tablet 5           Physical Exam:  Blood pressure 127/76, pulse 87, SpO2 94%.    Alert, clear sensorium, no respiratory distress, no pain behavior.    Fairly normal gait, wears bilateral AFOs for the foot drop.    Affect full range            Assessment:   Persistent spinal pain.  Is following this dose of oxycodone with the off label use of riluzole helpful for the neuropathic pain.    There is a diagnosis made of some matization, he has found benefit working with Alize Livingston in the past would like to resume again.    Total time 18 minutes with moderate complexity     minutes spent on the date of encounter doing chart review, history, and exam documentation and further activities as noted above.     Anthony Small MD  [facility]

## 2024-02-12 DIAGNOSIS — G47.00 INSOMNIA, UNSPECIFIED TYPE: ICD-10-CM

## 2024-02-12 RX ORDER — ZOLPIDEM TARTRATE 10 MG/1
5-10 TABLET ORAL
Qty: 30 TABLET | Refills: 5 | Status: SHIPPED | OUTPATIENT
Start: 2024-02-12 | End: 2024-09-16

## 2024-02-14 ASSESSMENT — COLUMBIA-SUICIDE SEVERITY RATING SCALE - C-SSRS
2. HAVE YOU ACTUALLY HAD ANY THOUGHTS OF KILLING YOURSELF?: NO
1. IN THE PAST MONTH, HAVE YOU WISHED YOU WERE DEAD OR WISHED YOU COULD GO TO SLEEP AND NOT WAKE UP?: YES
6. IN YOUR LIFETIME, HAVE YOU EVER DONE ANYTHING, STARTED TO DO ANYTHING, OR PREPARED TO DO ANYTHING TO END YOUR LIFE?: NO

## 2024-02-14 ASSESSMENT — ANXIETY QUESTIONNAIRES
3. WORRYING TOO MUCH ABOUT DIFFERENT THINGS: SEVERAL DAYS
7. FEELING AFRAID AS IF SOMETHING AWFUL MIGHT HAPPEN: NOT AT ALL
GAD7 TOTAL SCORE: 4
IF YOU CHECKED OFF ANY PROBLEMS ON THIS QUESTIONNAIRE, HOW DIFFICULT HAVE THESE PROBLEMS MADE IT FOR YOU TO DO YOUR WORK, TAKE CARE OF THINGS AT HOME, OR GET ALONG WITH OTHER PEOPLE: NOT DIFFICULT AT ALL
8. IF YOU CHECKED OFF ANY PROBLEMS, HOW DIFFICULT HAVE THESE MADE IT FOR YOU TO DO YOUR WORK, TAKE CARE OF THINGS AT HOME, OR GET ALONG WITH OTHER PEOPLE?: NOT DIFFICULT AT ALL
7. FEELING AFRAID AS IF SOMETHING AWFUL MIGHT HAPPEN: NOT AT ALL
2. NOT BEING ABLE TO STOP OR CONTROL WORRYING: SEVERAL DAYS
6. BECOMING EASILY ANNOYED OR IRRITABLE: NOT AT ALL
4. TROUBLE RELAXING: SEVERAL DAYS
GAD7 TOTAL SCORE: 4
GAD7 TOTAL SCORE: 4
5. BEING SO RESTLESS THAT IT IS HARD TO SIT STILL: SEVERAL DAYS
1. FEELING NERVOUS, ANXIOUS, OR ON EDGE: NOT AT ALL

## 2024-02-14 ASSESSMENT — PATIENT HEALTH QUESTIONNAIRE - PHQ9
SUM OF ALL RESPONSES TO PHQ QUESTIONS 1-9: 8
SUM OF ALL RESPONSES TO PHQ QUESTIONS 1-9: 8
10. IF YOU CHECKED OFF ANY PROBLEMS, HOW DIFFICULT HAVE THESE PROBLEMS MADE IT FOR YOU TO DO YOUR WORK, TAKE CARE OF THINGS AT HOME, OR GET ALONG WITH OTHER PEOPLE: NOT DIFFICULT AT ALL

## 2024-02-15 ENCOUNTER — VIRTUAL VISIT (OUTPATIENT)
Dept: PALLIATIVE MEDICINE | Facility: OTHER | Age: 39
End: 2024-02-15
Payer: COMMERCIAL

## 2024-02-15 DIAGNOSIS — G89.4 CHRONIC PAIN SYNDROME: ICD-10-CM

## 2024-02-15 DIAGNOSIS — F41.1 GAD (GENERALIZED ANXIETY DISORDER): ICD-10-CM

## 2024-02-15 DIAGNOSIS — F33.2 SEVERE EPISODE OF RECURRENT MAJOR DEPRESSIVE DISORDER, WITHOUT PSYCHOTIC FEATURES (H): Primary | ICD-10-CM

## 2024-02-15 PROCEDURE — 90791 PSYCH DIAGNOSTIC EVALUATION: CPT | Mod: 95 | Performed by: COUNSELOR

## 2024-02-25 NOTE — PROGRESS NOTES
"    [facility]      PATIENT'S NAME: Anand Felix  PREFERRED NAME: Aannd  PRONOUNS: he/him     MRN: [id number]  : 1985  ADDRESS: [address] [place] [id number]  ACCT. NUMBER:  [id number]  DATE OF SERVICE: 2/15/24  START TIME: 2:05 PM  END TIME: 2:55 PM  PREFERRED PHONE: [phone]  May we leave a program related message: Yes  EMERGENCY CONTACT: is listed in chart: Miguel Felix (patient's wife)  SERVICE MODALITY:  Video Visit:      Provider verified identity through the following two step process.  Patient provided:  Patient photo and Patient is known previously to provider    Telemedicine Visit: The patient's condition can be safely assessed and treated via synchronous audio and visual telemedicine encounter.      Reason for Telemedicine Visit: Patient has requested telehealth visit, Patient unable to travel, and Patient convenience (e.g. access to timely appointments / distance to available provider)    Originating Site (Patient Location): Patient's home    Distant Site (Provider Location): [facility]    Consent:  The patient/guardian has verbally consented to: the potential risks and benefits of telemedicine (video visit) versus in person care; bill my insurance or make self-payment for services provided; and responsibility for payment of non-covered services.     Patient would like the video invitation sent by:  My Chart    Mode of Communication:  Video Conference via [employer]well    Distant Location (Provider):  Off-site    As the provider I attest to compliance with applicable laws and regulations related to telemedicine.    UNIVERSAL ADULT Mental Health DIAGNOSTIC ASSESSMENT    Identifying Information:  Patient is a 38 year old,  Hmong individual.  Patient was referred for an assessment by self and pain center provider referring provider.  Patient attended the session alone.    Chief Complaint:   The reason for seeking services at this time is: \"Anxiety\".  The " "problem(s) began almost 10 years ago, related to depression and increasing health issues .    Patient has attempted to resolve these concerns in the past through medical and mental health care .    Social/Family History:  Patient reported they grew up in [place]  .  They were raised by biological mother; other Sisters.  Parents  / .  Patient reported that their childhood was difficult at times. Patient stated he had many family responsibilities.  Patient described their current relationships with family of origin as good with some of his siblings. He states his family has \"drifted apart more\".    The patient describes their cultural background as Hmong.  Cultural influences and impact on patient's life structure, values, norms, and healthcare: Baptist.  Contextual influences on patient's health include: chronic pain and mental health concerns.    These factors will be addressed in the Preliminary Treatment plan. Patient identified their preferred language to be English. Patient reported they do not need the assistance of an  or other support involved in therapy.     Patient reported had no significant delays in developmental tasks.   Patient's highest education level was some college  .  Patient identified the following learning problems: none reported.  Modifications will not be used to assist communication in therapy.  Patient reports they are  able to understand written materials.    Patient reported the following relationship history:  Patient's current relationship status is  for many years.   Patient identified their sexual orientation as heterosexual.  Patient reported having no  child(warren). Patient identified partner; siblings as part of their support system.  Patient identified the quality of these relationships as fair.     Patient's current living/housing situation involves staying in own home/apartment.  The immediate members of family and household include Miguel " Felix, 40,Wife  and they report that housing is stable.    Patient is currently unemployed.  Patient reports their finances are obtained through spouse. Patient does identify finances as a current stressor.      Patient reported that they have not been involved with the legal system.  Patient does not report being under probation/ parole/ jurisdiction. They are not under any current court jurisdiction. .    Patient's Strengths and Limitations:  Patient identified the following strengths or resources that will help them succeed in treatment: Latter day / Lutheran, commitment to health and well being, exercise routine, family support, insight, intelligence, and motivation. Things that may interfere with the patient's success in treatment include: physical health concerns and transportation concerns.     Assessments:  The following assessments were completed by patient for this visit:  PHQ9:       3/16/2021    12:00 PM 3/19/2021    10:00 AM 4/23/2021    11:00 AM 4/21/2022     4:38 PM 7/7/2022     2:20 PM 2/14/2024     7:01 PM   PHQ-9 SCORE   PHQ-9 Total Score MyChart      8 (Mild depression)   PHQ-9 Total Score 10 12 9 6 3 8     GAD7:       3/19/2021    10:00 AM 4/21/2022     4:38 PM 10/3/2022     2:04 PM 2/16/2023    10:20 AM 2/14/2024     7:03 PM   CAROLYN-7 SCORE   Total Score   2 (minimal anxiety) 3 (minimal anxiety) 4 (minimal anxiety)   Total Score 9 2 2 3 4     CAGE-AID:       2/14/2024     7:27 PM   CAGE-AID Total Score   Total Score 0   Total Score MyChart 0 (A total score of 2 or greater is considered clinically significant)     PROMIS 10-Global Health (only subscores and total score):       2/14/2024     7:26 PM   PROMIS-10 Scores Only   Global Mental Health Score 13   Global Physical Health Score 11   PROMIS TOTAL - SUBSCORES 24     [place] Suicide Severity Rating Scale (Lifetime/Recent)      2/14/2024     7:25 PM   [place] Suicide Severity Rating (Lifetime/Recent)   1. Wish to be Dead (Past 1 Month) Y   2.  Non-Specific Active Suicidal Thoughts (Past 1 Month) N   Calculated C-SSRS Risk Score (Lifetime/Recent) Low Risk       Personal and Family Medical History:  Patient does not report a family history of mental health concerns.  Patient reports family history includes Cancer in his father; Depression in his mother; Hyperlipidemia in his mother; Myocardial Infarction in his cousin; Neuromuscular Disease in his cousin; No Known Problems in his brother, brother, sister, sister, sister, and sister; Other - See Comments in his mother; Thyroid Disease in his cousin and maternal grandmother..     Patient does report Mental Health Diagnosis and/or Treatment.  Patient reported the following previous diagnoses which include(s):  Depression and Anxiety .  Patient reported symptoms began around 10 years ago, however, he did have some counseling when he was a child following his parent's divorce.  Patient has received mental health services in the past: psychotherapy at the pain center.  Psychiatric Hospitalizations: none.  Patient denies a history of civil commitment.      Currently, patient   is receiving other mental health services.  These include  chronic pain/psychiatry at the pain center .       Patient has had a physical exam to rule out medical causes for current symptoms.  Date of last physical exam was within the past year. Client was encouraged to follow up with PCP if symptoms were to develop. The patient has a [facility] Provider, who is named Malachi Choudhary.  Patient reports the following current medical concerns: chronic pain/fatigue .  Patient reports pain concerns including chronic pain/fatigue.  Patient does want help addressing pain concerns.. He is working with Dr. Small at the pain center to address these concerns.  There are not significant appetite / nutritional concerns / weight changes.   Patient does not report a history of head injury / trauma / cognitive impairment.      Patient reports  current meds as:     Current Outpatient Medications:     Acetaminophen (TYLENOL PO), Take 500-1,000 mg by mouth every 8 hours as needed, Disp: , Rfl:     escitalopram (LEXAPRO) 10 MG tablet, TAKE 1 TABLET (10 MG) BY MOUTH DAILY., Disp: 90 tablet, Rfl: 3    fluticasone (FLONASE) 50 MCG/ACT nasal spray, INSTILL 2 SPRAYS INTO BOTH NOSTRILS DAILY, Disp: 48 mL, Rfl: 3    medical cannabis (Patient's own supply), See Admin Instructions (The purpose of this order is to document that the patient reports taking medical cannabis.  This is not a prescription, and is not used to certify that the patient has a qualifying medical condition.), Disp: , Rfl:     omeprazole (PRILOSEC) 20 MG DR capsule, TAKE 1 CAPSULE BY MOUTH EVERY DAY, Disp: 90 capsule, Rfl: 3    oxyCODONE-acetaminophen (PERCOCET) 7.5-325 MG per tablet, Take 1 tablet by mouth 3 times daily, Disp: 84 tablet, Rfl: 0    propranolol (INDERAL) 80 MG tablet, TAKE 1 TABLET BY MOUTH THREE TIMES DAILY., Disp: 270 tablet, Rfl: 3    riluzole (RILUTEK) 50 MG tablet, Two capsules twice a day, Disp: 120 tablet, Rfl: 2    rivaroxaban ANTICOAGULANT (XARELTO) 20 MG TABS tablet, Take 1 tablet (20 mg) by mouth daily (with dinner), Disp: 30 tablet, Rfl: 11    VITAMIN D, CHOLECALCIFEROL, PO, Take 2,000 Units by mouth daily, Disp: , Rfl:     zolpidem (AMBIEN) 10 MG tablet, TAKE 0.5-1 TABLETS (5-10 MG) BY MOUTH NIGHTLY AS NEEDED FOR SLEEP, Disp: 30 tablet, Rfl: 5      Medication Adherence:  Patient reports taking.  taking prescribed medications as prescribed.    Patient Allergies:  No Known Allergies    Medical History:    Past Medical History:   Diagnosis Date    Anxiety     Bilateral pulmonary embolism (H) 10/15/2017    Bulging lumbar disc     Depression     History of anesthesia complications     Was hospitalized in October 2015 for weakness    History of blood clots     PE in 2017    Lumbar herniated disc 10/23/2015    Lumbar radiculopathy 11/25/2015    Pulmonary embolism (H)  10/15/2017    Shortness of breath     Tachycardia     After PE in          Current Mental Status Exam:   Appearance:  Appropriate    Eye Contact:  Good   Psychomotor:  Normal       Gait / station:  Unsure due to video visit  Attitude / Demeanor: Cooperative  Pleasant  Speech      Rate / Production: Normal/ Responsive      Volume:  Normal  volume      Language:  intact  Mood:   Anxious  Depressed  Normal  Affect:   Appropriate    Thought Content: Clear  Rumination   Thought Process: Coherent  Logical       Associations: No loosening of associations  Insight:   Good   Judgment:  Intact   Orientation:  All  Attention/concentration: Good    Substance Use:   Patient did not report a family history of substance use concerns; see medical history section for details.  Patient has not received chemical dependency treatment in the past.  Patient has not ever been to detox.      Patient is not currently receiving any chemical dependency treatment.           Substance History of use Age of first use Date of last use     Pattern and duration of use (include amounts and frequency)   Alcohol Occasional use in past    unknown  Summer 2016 Not current   Cannabis   currently use 35 02/14/24 On Medical Cannabis program. He reports using as needed for pain symptoms.      Amphetamines   never used     N/A   Cocaine/crack    never used       REPORTS SUBSTANCE USE: N/A   Hallucinogens never used         REPORTS SUBSTANCE USE: N/A   Inhalants never used         REPORTS SUBSTANCE USE: N/A   Heroin never used         REPORTS SUBSTANCE USE: N/A   Other Opiates currently use 30 02/14/24 As prescribed for pain management.    Benzodiazepine   never used     REPORTS SUBSTANCE USE: N/A   Barbiturates never used     REPORTS SUBSTANCE USE: N/A   Over the counter meds never used     REPORTS SUBSTANCE USE: N/A   Caffeine never used     REPORTS SUBSTANCE USE: N/A   Nicotine  never used     REPORTS SUBSTANCE USE: N/A   Other substances not listed  above:  Identify:  never used     REPORTS SUBSTANCE USE: N/A     Patient reported the following problems as a result of their substance use: no problems, not applicable.    Substance Use: No symptoms    Based on the negative CAGE score and clinical interview there  are not indications of drug or alcohol abuse.    Significant Losses / Trauma / Abuse / Neglect Issues:   Patient did not  serve in the .  There are indications or report of significant loss, trauma, abuse or neglect issues related to: are indications or report of significant loss, trauma, abuse or neglect issues related to health issues.   Concerns for possible neglect are not present.     Safety Assessment:   Patient denies current homicidal ideation and behaviors.  Patient denies current self-injurious ideation and behaviors.    Patient denied risk behaviors associated with substance use.   Patient denies any high risk behaviors associated with mental health symptoms.  Patient reports the following current concerns for their personal safety: None.  Patient reports there are not firearms in the house.       There are no firearms in the home..    History of Safety Concerns:  Patient denied a history of homicidal ideation.     Patient denied a history of personal safety concerns.    Patient denied a history of assaultive behaviors.    Patient denied a history of sexual assault behaviors.     Patient denied a history of risk behaviors associated with substance use.  Patient denies any history of high risk behaviors associated with mental health symptoms.  Patient reports the following protective factors: forward or future oriented thinking; dedication to family or friends; purpose; sense of personal control or determination; other    Risk Plan:  See Recommendations for Safety and Risk Management Plan    Review of Symptoms per patient report:   Depression: Change in sleep, Lack of interest, Change in energy level, Change in appetite, Psychomotor  "slowing or agitation, Ruminations, Irritability, Feeling sad, down, or depressed, and WithdrawnHe describes passive SI that \"comes and goes\".   Lizzette:  No Symptoms  Psychosis: No Symptoms  Anxiety: Excessive worry, Physical complaints, such as headaches, stomachaches, muscle tension, Ruminations, and Irritability  Panic:  No symptoms  Post Traumatic Stress Disorder:  Experienced traumatic event related to health issues    Eating Disorder: No Symptoms  ADD / ADHD:  No symptoms  Conduct Disorder: No symptoms  Autism Spectrum Disorder: No symptoms  Obsessive Compulsive Disorder: No Symptoms    Patient reports the following compulsive behaviors and treatment history:  None .      Diagnostic Criteria:   Generalized Anxiety Disorder  A. Excessive anxiety and worry about a number of events or activities (such as work or school performance).   B. The person finds it difficult to control the worry.   - Restlessness or feeling keyed up or on edge.    - Being easily fatigued.    - Irritability.    - Sleep disturbance (difficulty falling or staying asleep, or restless unsatisfying sleep).   D. The focus of the anxiety and worry is not confined to features of an Axis I disorder.  E. The anxiety, worry, or physical symptoms cause clinically significant distress or impairment in social, occupational, or other important areas of functioning.   F. The disturbance is not due to the direct physiological effects of a substance (e.g., a drug of abuse, a medication) or a general medical condition (e.g., hyperthyroidism) and does not occur exclusively during a Mood Disorder, a Psychotic Disorder, or a Pervasive Developmental Disorder. Major Depressive Disorder  CRITERIA (A-C) REPRESENT A MAJOR DEPRESSIVE EPISODE - SELECT THESE CRITERIA  A) Recurrent episode(s) - symptoms have been present during the same 2-week period and represent a change from previous functioning 5 or more symptoms (required for diagnosis)   - Depressed mood. Note: In " children and adolescents, can be irritable mood.     - Diminished interest or pleasure in all, or almost all, activities.    - Decreased sleep.    - Psychomotor activity retardation.    - Fatigue or loss of energy.   B) The symptoms cause clinically significant distress or impairment in social, occupational, or other important areas of functioning  C) The episode is not attributable to the physiological effects of a substance or to another medical condition  D) The occurence of major depressive episode is not better explained by other thought / psychotic disorders  E) There has never been a manic episode or hypomanic episode    Functional Status:  Patient reports the following functional impairments:  chronic disease management, health maintenance, and work / vocational responsibilities.     Nonprogrammatic care:  Patient is requesting basic services to address current mental health concerns.    Clinical Summary:  1. Psychosocial, Cultural and Contextual Factors: chronic pain, mental health concerns  .  2. Principal DSM5 Diagnoses  (Sustained by DSM5 Criteria Listed Above):   296.33 (F33.2) Major Depressive Disorder, Recurrent Episode, Severe _  300.02 (F41.1) Generalized Anxiety Disorder.  3. Other Diagnoses that is relevant to services:  Chronic pain syndrome  4. Provisional Diagnosis:  None  5. Prognosis: Expect Improvement.  6. Likely consequences of symptoms if not treated: Lower quality of life, increased symptoms.  7. Client strengths include:  caring, educated, empathetic, has a previous history of therapy, insightful, intelligent, motivated, open to learning, open to suggestions / feedback, support of family, friends and providers, and work history .     Recommendations:     1. Plan for Safety and Risk Management:   Safety and Risk: Recommended that patient call 911 or go to the local ED should there be a change in any of these risk factors..          Report to child / adult protection services was NA.      2. Patient's identified No cultural concerns at this time.     3. Initial Treatment will focus on:    Depressed Mood - decrease symptoms and increase coping skills  Anxiety - decrease symptoms and increase coping skills   .Chronic pain syndrome/fatigue: Increase coping skills and decrease symptoms.      4. Resources/Service Plan:    services are not indicated.   Modifications to assist communication are not indicated.   Additional disability accommodations are not indicated.      5. Collaboration:   Collaboration / coordination of treatment will be initiated with the following  support professionals:  pain center provider and PCP as needed .      6.  Referrals:   The following referral(s) will be initiated:  Pain psychotherapy at the pain center .       A Release of Information has been obtained for the following:  none at this time .     Clinical Substantiation/medical necessity for the above recommendations:  To increase skills to manage chronic pain and mental health concerns.    7. CHALINO:    CHALINO:  None. Patient denies substance use issues in his lifetime.     8. Records:   These were reviewed at time of assessment.   Information in this assessment was obtained from the medical record and  provided by patient who is a good historian.    Patient's access to their mental health medical record will be withheld due to the following reason(s): Psychotherapy note .    9.   Interactive Complexity: No    10. Safety Plan:  Patient has no change in safety concerns. Committed to safety and agreed to follow previously developed safety plan. Will continue to review and monitor.     Provider Name/ Credentials:  ROBERT Christian, GAMAL  February 15, 2024

## 2024-03-12 ENCOUNTER — MYC REFILL (OUTPATIENT)
Dept: PALLIATIVE MEDICINE | Facility: OTHER | Age: 39
End: 2024-03-12
Payer: COMMERCIAL

## 2024-03-12 DIAGNOSIS — M54.50 LUMBAR PAIN DETERMINED BY PALPATION: ICD-10-CM

## 2024-03-12 RX ORDER — OXYCODONE AND ACETAMINOPHEN 7.5; 325 MG/1; MG/1
1 TABLET ORAL 3 TIMES DAILY
Qty: 84 TABLET | Refills: 0 | Status: SHIPPED | OUTPATIENT
Start: 2024-03-12 | End: 2024-04-11

## 2024-03-12 NOTE — TELEPHONE ENCOUNTER
Pending Prescriptions:                       Disp   Refills    oxyCODONE-acetaminophen (PERCOCET) 7.5-32*84 tab*0            Sig: Take 1 tablet by mouth 3 times daily    [id number] [place], [place] - [address]

## 2024-03-12 NOTE — TELEPHONE ENCOUNTER
Received call from patient requesting refill(s) of oxyCODONE-acetaminophen (PERCOCET) 7.5-325 MG per tablet    Last dispensed from pharmacy on 2/2/2024.    Patient's last office/virtual visit by prescribing provider on 2/2/2024.  Next office/virtual appointment scheduled for 5/2/2024.    Last urine drug screen date 11/1/2023.  Current opioid agreement on file (completed within the last year) Yes Date of opioid agreement: 11/1/2023.    E-prescribe to:    [id number] [place], [place] - [address]    Will route to [facility] for review and preparation of prescription(s).

## 2024-03-14 ENCOUNTER — VIRTUAL VISIT (OUTPATIENT)
Dept: PALLIATIVE MEDICINE | Facility: OTHER | Age: 39
End: 2024-03-14
Attending: ANESTHESIOLOGY
Payer: COMMERCIAL

## 2024-03-14 DIAGNOSIS — F33.2 SEVERE EPISODE OF RECURRENT MAJOR DEPRESSIVE DISORDER, WITHOUT PSYCHOTIC FEATURES (H): Primary | ICD-10-CM

## 2024-03-14 DIAGNOSIS — G89.4 CHRONIC PAIN SYNDROME: ICD-10-CM

## 2024-03-14 DIAGNOSIS — F41.1 GAD (GENERALIZED ANXIETY DISORDER): ICD-10-CM

## 2024-03-14 PROCEDURE — 90837 PSYTX W PT 60 MINUTES: CPT | Mod: 95 | Performed by: COUNSELOR

## 2024-03-18 NOTE — PROGRESS NOTES
[facility]- Psychotherapy  (Provider Oversight- Dr. Anthony Small)                                    Progress Note    Patient Name: Anand Felix  Date: 3/14/24         Service Type: Individual      Session Start Time: 2:06 PM  Session End Time: 2:59 PM     Session Length: 53    Session #: 1    Attendees: Client attended alone    Service Modality:  Video Visit:      Provider verified identity through the following two step process.  Patient provided:  Patient is known previously to provider    Telemedicine Visit: The patient's condition can be safely assessed and treated via synchronous audio and visual telemedicine encounter.      Reason for Telemedicine Visit: Patient has requested telehealth visit, Patient unable to travel, and Patient convenience (e.g. access to timely appointments / distance to available provider)    Originating Site (Patient Location): Patient's home    Distant Site (Provider Location): [facility]    Consent:  The patient/guardian has verbally consented to: the potential risks and benefits of telemedicine (video visit) versus in person care; bill my insurance or make self-payment for services provided; and responsibility for payment of non-covered services.     Patient would like the video invitation sent by:  My Chart    Mode of Communication:  Video Conference via [employer]    Distant Location (Provider):  Off-site    As the provider I attest to compliance with applicable laws and regulations related to telemedicine.    DATA  Extended Session (53+ minutes):   - Patient's presenting concerns require more intensive intervention than could be completed within the usual service  Interactive Complexity: No  Crisis: No          Progress Since Last Session (Related to Symptoms / Goals / Homework):   Symptoms: No change Patient reports that he continues to address mental health and chronic pain/fatigue concerns. He continues to address sleep concerns. He is  "working on structure and routine in daily living. He is working towards the goal of getting out of the house more often/travel in the future. He is working on staying active as he is able.    Homework:  This is patient's first follow up session.      Episode of Care Goals:  This is patient's first follow up session.     Current / Ongoing Stressors and Concerns: Patient reports that he is working on routine/schedule in daily living. He states \"I am trying to stay busy\". He is working on some projects that he enjoys and that help him work on fine motor skills as he is addressing weakness/tremors. Patient reports sleep is \"ok\" and that falling asleep is challenging. He states his medication is helpful with staying asleep. He reports attention and interest are slowly improving. Patient reports SI that is \"fleeting\". He states \"it doesn't linger\". Patient is working on exercise as he is able. He works with a PCA and this is helpful. Patient would like to work on a goal to be able to travel in the future.      Treatment Objective(s) Addressed in This Session:   use relaxation strategies 2 times per day to reduce the physical symptoms of anxiety  identify 2 strategies for managing pain  Increase interest, engagement, and pleasure in doing things  Feel less tired and more energy during the day   Identify negative self-talk and behaviors: challenge core beliefs, myths, and actions  Improve concentration, focus, and mindfulness in daily activities   Decrease thoughts that you'd be better off dead or of suicide / self-harm  Continue resourcing in daily living     Intervention:   CBT: Discussed thoughts and feelings related to behaviors  Motivational Interviewing: Focused on patient's interest and motivation for change  Encouraged continued resourcing and self care  Discussed long term goal to engage in future travel  Discussed working on safety plan next time, given history of SI  Discussed sleep hygiene  Worked together to " develop a treatment plan      Assessments completed prior to visit:  The following assessments were completed by patient for this visit:  None today       ASSESSMENT: Current Emotional / Mental Status (status of significant symptoms):   Risk status (Self / Other harm or suicidal ideation)   Patient denies current fears or concerns for personal safety.   Patient reports the following current or recent suicidal ideation or behaviors: He reports SI that is fleeting. He denies plan/means.   Patient denies current or recent homicidal ideation or behaviors.   Patient denies current or recent self injurious behavior or ideation.   Patient denies other safety concerns.   Patient reports there has been no change in risk factors since their last session.     Patient reports there has been no change in protective factors since their last session.     Recommended that patient call 911 or go to the local ED should there be a change in any of these risk factors.     Appearance:   Appropriate    Eye Contact:   Good    Psychomotor Behavior: Normal    Attitude:   Cooperative  Pleasant   Orientation:   All   Speech    Rate / Production: Normal/ Responsive    Volume:  Normal    Mood:    Depressed  Normal   Affect:    Appropriate    Thought Content:  Clear    Thought Form:  Coherent  Logical    Insight:    Good      Medication Review:   No changes to current psychiatric medication(s)     Medication Compliance:   Yes     Changes in Health Issues:   None reported     Chemical Use Review:   Substance Use: Chemical use reviewed, no active concerns identified      Tobacco Use: No current tobacco use.      Diagnosis:  1. Severe episode of recurrent major depressive disorder, without psychotic features (H)    2. CAROLYN (generalized anxiety disorder)    3. Chronic pain syndrome        Collateral Reports Completed:   Not Applicable    PLAN: (Patient Tasks / Therapist Tasks / Other)  Attend psychotherapy on a monthly basis at this time. Patient is  aware that he can schedule more often, if needed. Work on safety plan next session. Continue resourcing in daily living. Continue medical and pain center plan of care.         MEDHAT ROJAS, [facility]                                                         ______________________________________________________________________    Individual Treatment Plan    Patient's Name: Anand Felix  YOB: 1985    Date of Creation: 3/14/24  Date Treatment Plan Last Reviewed/Revised: 3/14/24    DSM5 Diagnoses: 296.33 (F33.2) Major Depressive Disorder, Recurrent Episode, Severe _ or 300.02 (F41.1) Generalized Anxiety Disorder Chronic pain syndrome  Psychosocial / Contextual Factors: mental health and chronic pain concerns  PROMIS (reviewed every 90 days): 2/15/24    Referral / Collaboration:  Referral to another professional/service is not indicated at this time..    Anticipated number of session for this episode of care: 9-12 sessions  Anticipation frequency of session: Monthly  Anticipated Duration of each session: 53 or more minutes  Treatment plan will be reviewed in 90 days or when goals have been changed.       MeasurableTreatment Goal(s) related to diagnosis / functional impairment(s)  Goal 1: Patient will decrease symptoms of depression and increase coping skills to manage symptoms.    I will know I've met my goal when symptoms of depression are more manageable on a regular basis.      Objective #A (Patient Action)    Patient will Increase interest, engagement, and pleasure in doing things.  Status: New - Date: 3/14/24      Intervention(s)  Therapist will teach emotional regulation skills. Mindfulness/CBT .    Objective #B  Patient will Feel less tired and more energy during the day .  Status: New - Date: 3/14/24      Intervention(s)  Therapist will teach emotional regulation skills. Mindfulness/CBT .    Objective #C  Patient will Improve concentration, focus, and mindfulness in daily activities .  Status: New -  Date: 3/14/24      Intervention(s)  Therapist will teach emotional regulation skills. Mindfulness/CBT .      Goal 2: Patient will decrease anxiety and increase coping skills to manage symptoms.    I will know I've met my goal when anxiety symptoms are more manageable on a regular basis.      Objective #A (Patient Action)    Status: New - Date: 3/14/24      Patient will use relaxation strategies 2 times per day to reduce the physical symptoms of anxiety.    Intervention(s)  Therapist will teach emotional regulation skills. Mindfulness/CBT .    Objective #B  Patient will Increase interest, engagement, and pleasure in doing things.    Status: New - Date: 3/14/24      Intervention(s)  Therapist will teach emotional regulation skills. Mindfulness/CBT .    Objective #C  Patient will Identify negative self-talk and behaviors: challenge core beliefs, myths, and actions.  Status: New - Date: 3/14/24      Intervention(s)  Therapist will teach emotional regulation skills. Mindfulness/CBT .      Goal 3: Patient will decrease chronic pain and increase coping skills to manage symptoms.     I will know I've met my goal when chronic pain and fatigue is more manageable in daily living.      Objective #A (Patient Action)    Status: New - Date: 3/14/24      Patient will identify 2 strategies for managing pain.    Intervention(s)  Therapist will teach emotional regulation skills. Mindfulness/CBT .    Objective #B  Patient will Decrease thoughts that you'd be better off dead or of suicide / self-harm.    Status: New - Date: 3/14/24      Intervention(s)  Therapist will teach emotional regulation skills. Mindfulness/ CBT. Will work on safety plan .    Objective #C  Patient will  Continue medical and pain center plan of care .  Status: New - Date: 3/14/24      Intervention(s)  Therapist will  encourage patient to follow up with medical care as scheduled and follow recommendations .      Patient has reviewed and agreed to the above  plan.      MEDHAT ROJAS [facility]  March 14, 2024

## 2024-03-22 DIAGNOSIS — M54.50 LUMBAR PAIN DETERMINED BY PALPATION: ICD-10-CM

## 2024-03-22 RX ORDER — RILUZOLE 50 MG/1
TABLET, FILM COATED ORAL
Qty: 360 TABLET | Refills: 2 | Status: SHIPPED | OUTPATIENT
Start: 2024-03-22

## 2024-03-22 NOTE — TELEPHONE ENCOUNTER
Received fax from pharmacy requesting refill(s) for  riluzole (RILUTEK) 50 MG tablet-90 day fill    Date last filled 2/16/24    Last Appt Date:2/2/24    Next Appt scheduled: 5/2/24    Pharmacy:    [id number] [place] [facility] [address]    Pending Prescriptions:                       Disp   Refills    riluzole (RILUTEK) 50 MG tablet           360 ta*             Sig: Two capsules twice a day

## 2024-04-11 ENCOUNTER — MYC REFILL (OUTPATIENT)
Dept: PALLIATIVE MEDICINE | Facility: OTHER | Age: 39
End: 2024-04-11
Payer: COMMERCIAL

## 2024-04-11 DIAGNOSIS — M54.50 LUMBAR PAIN DETERMINED BY PALPATION: ICD-10-CM

## 2024-04-11 RX ORDER — OXYCODONE AND ACETAMINOPHEN 7.5; 325 MG/1; MG/1
1 TABLET ORAL 3 TIMES DAILY
Qty: 84 TABLET | Refills: 0 | Status: SHIPPED | OUTPATIENT
Start: 2024-04-11

## 2024-04-11 NOTE — TELEPHONE ENCOUNTER
Pending Prescriptions:                       Disp   Refills    oxyCODONE-acetaminophen (PERCOCET) 7.5-32*84 tab*0            Sig: Take 1 tablet by mouth 3 times daily           Dispense/start 4/11/24    [facility]

## 2024-04-11 NOTE — TELEPHONE ENCOUNTER
Received call from patient requesting refill(s) oxyCODONE-acetaminophen (PERCOCET) 7.5-325 MG per tablet    Last dispensed from pharmacy on 03/12/2024    Patient's last office/virtual visit by prescribing provider on 02/02/2024  Next office/virtual appointment scheduled for 05/02/2024    Last urine drug screen date 11/01/2023  Current opioid agreement on file (completed within the last year) Yes Date of opioid agreement: 11/01/2023    E-prescribe to      [facility] [id number] [place] [facility] [address]       Will route to [facility] for review and preparation of prescription(s).     Tiffanie Flores MA  [facility] [place]

## 2024-04-25 ENCOUNTER — VIRTUAL VISIT (OUTPATIENT)
Dept: PALLIATIVE MEDICINE | Facility: OTHER | Age: 39
End: 2024-04-25
Payer: COMMERCIAL

## 2024-04-25 DIAGNOSIS — G89.4 CHRONIC PAIN SYNDROME: ICD-10-CM

## 2024-04-25 DIAGNOSIS — F33.2 SEVERE EPISODE OF RECURRENT MAJOR DEPRESSIVE DISORDER, WITHOUT PSYCHOTIC FEATURES (H): Primary | ICD-10-CM

## 2024-04-25 DIAGNOSIS — F41.1 GAD (GENERALIZED ANXIETY DISORDER): ICD-10-CM

## 2024-04-25 PROCEDURE — 90834 PSYTX W PT 45 MINUTES: CPT | Mod: 95 | Performed by: COUNSELOR

## 2024-04-30 NOTE — PROGRESS NOTES
[facility]- Psychotherapy  (Provider Oversight- Dr. Anthony Small)                                    Progress Note    Patient Name: Anand Felix  Date: 4/25/24         Service Type: Individual      Session Start Time: 1:13 PM  Session End Time: 2:03 PM     Session Length: 50 minutes    Session #: 2    Attendees: Client attended alone    Service Modality:  Video Visit:      Provider verified identity through the following two step process.  Patient provided:  Patient is known previously to provider    Telemedicine Visit: The patient's condition can be safely assessed and treated via synchronous audio and visual telemedicine encounter.      Reason for Telemedicine Visit: Patient has requested telehealth visit, Patient unable to travel, and Patient convenience (e.g. access to timely appointments / distance to available provider)    Originating Site (Patient Location): Patient's home    Distant Site (Provider Location): [facility]    Consent:  The patient/guardian has verbally consented to: the potential risks and benefits of telemedicine (video visit) versus in person care; bill my insurance or make self-payment for services provided; and responsibility for payment of non-covered services.     Patient would like the video invitation sent by:  Text to cell phone: [phone]    Mode of Communication:  Video Conference via [employer]    Distant Location (Provider):  Off-site    As the provider I attest to compliance with applicable laws and regulations related to telemedicine.    DATA  Extended Session (53+ minutes): No  Interactive Complexity: No  Crisis: No          Progress Since Last Session (Related to Symptoms / Goals / Homework):   Symptoms: No change Patient reports that he continues to address mental health and chronic pain/fatigue concerns. He continues to address sleep concerns. He is working on structure and routine in daily living. He is working towards the goal of  "getting out of the house more often/travel in the future. He is working on staying active as he is able.    Homework: Achieved / completed to satisfaction      Episode of Care Goals: Satisfactory progress - ACTION (Actively working towards change); Intervened by reinforcing change plan / affirming steps taken     Current / Ongoing Stressors and Concerns: Patient was having technology issues and was not able to log on for his visit on time. A link was sent and he was able to log on at that time. Patient continues to work on routine in daily living. He reports working on being more present minded, using his grounding mat and other resourcing tools.  He is working on some projects that he enjoys and that help him work on fine motor skills as he is addressing weakness/tremors. Patient reports that he continues to work on addressing challenges with sleep.  He states his medication is helpful with staying asleep. He reports attention and interest are slowly improving. Symptoms of SI are \"fleeting\" and he denies plan or means. He reports protective factors. Patient is working on exercise as he is able. Patient is working on getting out of the house more often. He works with a PCA and this is helpful. Patient discussed thoughts and feelings related to tremors that are difficult at times.      Treatment Objective(s) Addressed in This Session:   use relaxation strategies 2 times per day to reduce the physical symptoms of anxiety  identify 2 strategies for managing pain  Increase interest, engagement, and pleasure in doing things  Feel less tired and more energy during the day   Identify negative self-talk and behaviors: challenge core beliefs, myths, and actions  Improve concentration, focus, and mindfulness in daily activities   Decrease thoughts that you'd be better off dead or of suicide / self-harm  Continue resourcing in daily living     Intervention:   CBT: Discussed thoughts and feelings related to " behaviors  Motivational Interviewing: Focused on patient's interest and motivation for change  Encouraged continued resourcing and self care  Discussed ways to work on calm place exercise both at home and away from home  Discussed long term goal to engage in future travel  Discussed working on safety plan next time, given history of SI  Discussed sleep hygiene  Worked together to develop a treatment plan      Assessments completed prior to visit:  The following assessments were completed by patient for this visit:  None today       ASSESSMENT: Current Emotional / Mental Status (status of significant symptoms):   Risk status (Self / Other harm or suicidal ideation)   Patient denies current fears or concerns for personal safety.   Patient reports the following current or recent suicidal ideation or behaviors: He reports SI that is fleeting. He denies plan/means.   Patient denies current or recent homicidal ideation or behaviors.   Patient denies current or recent self injurious behavior or ideation.   Patient denies other safety concerns.   Patient reports there has been no change in risk factors since their last session.     Patient reports there has been no change in protective factors since their last session.     Recommended that patient call 911 or go to the local ED should there be a change in any of these risk factors.     Appearance:   Appropriate    Eye Contact:   Good    Psychomotor Behavior: Normal    Attitude:   Cooperative  Pleasant   Orientation:   All   Speech    Rate / Production: Normal/ Responsive    Volume:  Normal    Mood:    Depressed  Normal   Affect:    Appropriate    Thought Content:  Clear    Thought Form:  Coherent  Logical    Insight:    Good      Medication Review:   No changes to current psychiatric medication(s)     Medication Compliance:   Yes     Changes in Health Issues:   None reported     Chemical Use Review:   Substance Use: Chemical use reviewed, no active concerns identified       Tobacco Use: No current tobacco use.      Diagnosis:  1. Severe episode of recurrent major depressive disorder, without psychotic features (H)    2. CAROLYN (generalized anxiety disorder)    3. Chronic pain syndrome        Collateral Reports Completed:   Not Applicable    PLAN: (Patient Tasks / Therapist Tasks / Other)  Attend psychotherapy on a monthly basis at this time. Patient is aware that he can schedule more often, if needed. Work on safety plan next session. Continue resourcing in daily living. Continue medical and pain center plan of care.         MEDHAT ROJAS [facility]                                                         ______________________________________________________________________    Individual Treatment Plan    Patient's Name: Anand Felix  YOB: 1985    Date of Creation: 3/14/24  Date Treatment Plan Last Reviewed/Revised: 3/14/24    DSM5 Diagnoses: 296.33 (F33.2) Major Depressive Disorder, Recurrent Episode, Severe _ or 300.02 (F41.1) Generalized Anxiety Disorder Chronic pain syndrome  Psychosocial / Contextual Factors: mental health and chronic pain concerns  PROMIS (reviewed every 90 days): 2/15/24    Referral / Collaboration:  Referral to another professional/service is not indicated at this time..    Anticipated number of session for this episode of care: 9-12 sessions  Anticipation frequency of session: Monthly  Anticipated Duration of each session: 53 or more minutes  Treatment plan will be reviewed in 90 days or when goals have been changed.       MeasurableTreatment Goal(s) related to diagnosis / functional impairment(s)  Goal 1: Patient will decrease symptoms of depression and increase coping skills to manage symptoms.    I will know I've met my goal when symptoms of depression are more manageable on a regular basis.      Objective #A (Patient Action)    Patient will Increase interest, engagement, and pleasure in doing things.  Status: New - Date: 3/14/24       Intervention(s)  Therapist will teach emotional regulation skills. Mindfulness/CBT .    Objective #B  Patient will Feel less tired and more energy during the day .  Status: New - Date: 3/14/24      Intervention(s)  Therapist will teach emotional regulation skills. Mindfulness/CBT .    Objective #C  Patient will Improve concentration, focus, and mindfulness in daily activities .  Status: New - Date: 3/14/24      Intervention(s)  Therapist will teach emotional regulation skills. Mindfulness/CBT .      Goal 2: Patient will decrease anxiety and increase coping skills to manage symptoms.    I will know I've met my goal when anxiety symptoms are more manageable on a regular basis.      Objective #A (Patient Action)    Status: New - Date: 3/14/24      Patient will use relaxation strategies 2 times per day to reduce the physical symptoms of anxiety.    Intervention(s)  Therapist will teach emotional regulation skills. Mindfulness/CBT .    Objective #B  Patient will Increase interest, engagement, and pleasure in doing things.    Status: New - Date: 3/14/24      Intervention(s)  Therapist will teach emotional regulation skills. Mindfulness/CBT .    Objective #C  Patient will Identify negative self-talk and behaviors: challenge core beliefs, myths, and actions.  Status: New - Date: 3/14/24      Intervention(s)  Therapist will teach emotional regulation skills. Mindfulness/CBT .      Goal 3: Patient will decrease chronic pain and increase coping skills to manage symptoms.     I will know I've met my goal when chronic pain and fatigue is more manageable in daily living.      Objective #A (Patient Action)    Status: New - Date: 3/14/24      Patient will identify 2 strategies for managing pain.    Intervention(s)  Therapist will teach emotional regulation skills. Mindfulness/CBT .    Objective #B  Patient will Decrease thoughts that you'd be better off dead or of suicide / self-harm.    Status: New - Date: 3/14/24       Intervention(s)  Therapist will teach emotional regulation skills. Mindfulness/ CBT. Will work on safety plan .    Objective #C  Patient will  Continue medical and pain center plan of care .  Status: New - Date: 3/14/24      Intervention(s)  Therapist will  encourage patient to follow up with medical care as scheduled and follow recommendations .      Patient has reviewed and agreed to the above plan.      MEDHAT ROJAS [facility]  March 14, 2024

## 2024-05-02 NOTE — PROGRESS NOTES
Patient canceled day of appointment.  Has not yet rescheduled.  Last seen on 2/2 so will be outside 3-month window.    Did meet with his psychotherapist Neha last week.  Reviewing the  did fill his oxycodone 4/12 for 28-day supply.

## 2024-07-22 NOTE — TELEPHONE ENCOUNTER
ANTICOAGULATION  MANAGEMENT    Assessment     Today's INR result of 3.1 is Supratherapeutic (goal INR of 2.0-3.0)        Warfarin taken as previously instructed    No new diet changes affecting INR    No new medication/supplements affecting INR    Continues to tolerate warfarin with no reported s/s of bleeding or thromboembolism     Previous INR was Supratherapeutic    Plan:     Left a detailed message for Anand regarding INR result and instructed:     Warfarin Dosing Instructions:  Continue current warfarin dose 5 mg daily on Fri; and 2.5 mg daily rest of week  (0 % change)    Instructed patient to follow up no later than: 2 weeks.    Education provided: importance of consistent vitamin K intake and importance of taking warfarin as instructed    Instructed to call the [facility] for any changes, questions or concerns. (#[phone])   ?   Gus Byrne RN    Subjective/Objective:      Anand Felix, a 34 y.o. male is on warfarin.     Anand reports:     Home warfarin dose: as updated on anticoagulation calendar per template     Missed doses: No     Medication changes:  No     S/S of bleeding or thromboembolism:  No     New Injury or illness:  No     Changes in diet or alcohol consumption:  No     Upcoming surgery, procedure or cardioversion:  No    Anticoagulation Episode Summary     Current INR goal:   2.0-3.0   TTR:   63.3 % (9.4 mo)   Next INR check:   4/1/2019   INR from last check:   3.10! (3/18/2019)   Weekly max warfarin dose:      Target end date:   11/29/2018   INR check location:      Preferred lab:      Send INR reminders to:   ANTICOAGULATION POOL B (MPW,HUG,STW,RVL,OAK,RLN)    Indications    Bilateral pulmonary embolism (H) [I26.99]           Comments:            Anticoagulation Care Providers     Provider Role Specialty Phone number    Malachi Choudhary MD Referring Family Medicine [phone]        
Yes

## 2024-09-16 DIAGNOSIS — F33.1 MODERATE EPISODE OF RECURRENT MAJOR DEPRESSIVE DISORDER (H): ICD-10-CM

## 2024-09-16 DIAGNOSIS — G47.00 INSOMNIA, UNSPECIFIED TYPE: ICD-10-CM

## 2024-09-16 RX ORDER — ESCITALOPRAM OXALATE 10 MG/1
10 TABLET ORAL DAILY
Qty: 90 TABLET | Refills: 0 | Status: SHIPPED | OUTPATIENT
Start: 2024-09-16

## 2024-09-16 RX ORDER — ZOLPIDEM TARTRATE 10 MG/1
5-10 TABLET ORAL
Qty: 30 TABLET | Refills: 2 | Status: SHIPPED | OUTPATIENT
Start: 2024-09-16

## 2024-10-22 ENCOUNTER — DOCUMENTATION ONLY (OUTPATIENT)
Dept: PALLIATIVE MEDICINE | Facility: OTHER | Age: 39
End: 2024-10-22
Payer: COMMERCIAL

## 2024-10-22 NOTE — PROGRESS NOTES
"                    Discharge Summary  Multiple Sessions    Client Name: Anand Felix MRN#: [id number] YOB: 1985      Intake / Discharge Date: 2/15/24 to 10/22/24      DSM5 Diagnoses: (Sustained by DSM5 Criteria Listed Above)  Diagnoses:   1. Severe episode of recurrent major depressive disorder, without psychotic features (H)    2. CAROLYN (generalized anxiety disorder)    3. Chronic pain syndrome     Psychosocial & Contextual Factors: Chronic pain, medical and mental health concerns. History of trauma. Isolation due to health concerns.   WHODAS 2.0 (12 item) Score: N/A          Presenting Concern:  Chronic pain, mental health and medical concerns. He also struggled with isolation, due to health issues. He reported SI that was \"fleeting\".       Reason for Discharge:  Insurance: Patient was having trouble with his insurance and he reported that he did not have insurance as of 5/1/24. He was working on this and was to re-schedule when this was resolved.       Disposition at Time of Last Encounter:   Comments:   Patient was working on addressing his symptoms and working on coping skills in daily living. He was working on staying active as he was able, given his health concerns. He reported SI was fleeting, but that he felt safe and identified many protective factors.      Risk Management:   Client has had a history of suicidal ideation: he reported safety. He was to develop a safety plan in his next session, however, he stopped attending due to insurance barriers.   Recommended that patient call 911 or go to the local ED should there be a change in any of these risk factors      Referred To:  Follow up with medical and pain center providers, as well as psychotherapy, when insurance concerns are resolved.         ROBERT COON   10/22/2024    "

## 2024-11-30 ENCOUNTER — HEALTH MAINTENANCE LETTER (OUTPATIENT)
Age: 39
End: 2024-11-30

## 2024-12-14 DIAGNOSIS — J30.2 SEASONAL ALLERGIC RHINITIS, UNSPECIFIED TRIGGER: ICD-10-CM

## 2024-12-15 DIAGNOSIS — F33.1 MODERATE EPISODE OF RECURRENT MAJOR DEPRESSIVE DISORDER (H): ICD-10-CM

## 2024-12-16 RX ORDER — ESCITALOPRAM OXALATE 10 MG/1
10 TABLET ORAL DAILY
Qty: 30 TABLET | Refills: 1 | Status: SHIPPED | OUTPATIENT
Start: 2024-12-16

## 2025-01-08 DIAGNOSIS — F33.1 MODERATE EPISODE OF RECURRENT MAJOR DEPRESSIVE DISORDER (H): ICD-10-CM

## 2025-01-08 RX ORDER — ESCITALOPRAM OXALATE 10 MG/1
TABLET ORAL
Qty: 90 TABLET | Refills: 1 | OUTPATIENT
Start: 2025-01-08

## 2025-02-08 DIAGNOSIS — F33.1 MODERATE EPISODE OF RECURRENT MAJOR DEPRESSIVE DISORDER (H): ICD-10-CM

## 2025-02-10 RX ORDER — ESCITALOPRAM OXALATE 10 MG/1
TABLET ORAL
Qty: 30 TABLET | Refills: 1 | OUTPATIENT
Start: 2025-02-10

## 2025-02-20 ENCOUNTER — PATIENT OUTREACH (OUTPATIENT)
Dept: CARE COORDINATION | Facility: CLINIC | Age: 40
End: 2025-02-20
Payer: COMMERCIAL

## 2025-02-27 ENCOUNTER — OFFICE VISIT (OUTPATIENT)
Dept: FAMILY MEDICINE | Facility: CLINIC | Age: 40
End: 2025-02-27
Payer: COMMERCIAL

## 2025-02-27 VITALS
HEART RATE: 73 BPM | RESPIRATION RATE: 12 BRPM | TEMPERATURE: 99.6 F | DIASTOLIC BLOOD PRESSURE: 80 MMHG | HEIGHT: 65 IN | BODY MASS INDEX: 31.32 KG/M2 | WEIGHT: 188 LBS | SYSTOLIC BLOOD PRESSURE: 120 MMHG | OXYGEN SATURATION: 94 %

## 2025-02-27 DIAGNOSIS — I26.99 PULMONARY EMBOLISM, UNSPECIFIED CHRONICITY, UNSPECIFIED PULMONARY EMBOLISM TYPE, UNSPECIFIED WHETHER ACUTE COR PULMONALE PRESENT (H): ICD-10-CM

## 2025-02-27 DIAGNOSIS — Z13.1 SCREENING FOR DIABETES MELLITUS: ICD-10-CM

## 2025-02-27 DIAGNOSIS — E66.811 CLASS 1 OBESITY WITHOUT SERIOUS COMORBIDITY WITH BODY MASS INDEX (BMI) OF 31.0 TO 31.9 IN ADULT, UNSPECIFIED OBESITY TYPE: ICD-10-CM

## 2025-02-27 DIAGNOSIS — J30.2 SEASONAL ALLERGIC RHINITIS, UNSPECIFIED TRIGGER: ICD-10-CM

## 2025-02-27 DIAGNOSIS — R61 NIGHT SWEATS: ICD-10-CM

## 2025-02-27 DIAGNOSIS — R51.9 NONINTRACTABLE EPISODIC HEADACHE, UNSPECIFIED HEADACHE TYPE: ICD-10-CM

## 2025-02-27 DIAGNOSIS — R00.0 SINUS TACHYCARDIA: ICD-10-CM

## 2025-02-27 DIAGNOSIS — G70.9 NEUROMUSCULAR WEAKNESS (H): ICD-10-CM

## 2025-02-27 DIAGNOSIS — F33.1 MODERATE EPISODE OF RECURRENT MAJOR DEPRESSIVE DISORDER (H): ICD-10-CM

## 2025-02-27 DIAGNOSIS — E78.5 HYPERLIPIDEMIA LDL GOAL <100: ICD-10-CM

## 2025-02-27 DIAGNOSIS — Z00.00 ROUTINE PHYSICAL EXAMINATION: Primary | ICD-10-CM

## 2025-02-27 DIAGNOSIS — G47.00 INSOMNIA, UNSPECIFIED TYPE: ICD-10-CM

## 2025-02-27 DIAGNOSIS — G89.4 CHRONIC PAIN SYNDROME: ICD-10-CM

## 2025-02-27 DIAGNOSIS — Z11.4 SCREENING FOR HIV (HUMAN IMMUNODEFICIENCY VIRUS): ICD-10-CM

## 2025-02-27 LAB
ALBUMIN UR-MCNC: 30 MG/DL
ANION GAP SERPL CALCULATED.3IONS-SCNC: 11 MMOL/L (ref 7–15)
APPEARANCE UR: CLEAR
BACTERIA #/AREA URNS HPF: ABNORMAL /HPF
BILIRUB UR QL STRIP: ABNORMAL
BUN SERPL-MCNC: 16.4 MG/DL (ref 6–20)
CALCIUM SERPL-MCNC: 10.1 MG/DL (ref 8.8–10.4)
CHLORIDE SERPL-SCNC: 102 MMOL/L (ref 98–107)
CHOLEST SERPL-MCNC: 258 MG/DL
COLOR UR AUTO: YELLOW
CREAT SERPL-MCNC: 1 MG/DL (ref 0.67–1.17)
EGFRCR SERPLBLD CKD-EPI 2021: >90 ML/MIN/1.73M2
ERYTHROCYTE [DISTWIDTH] IN BLOOD BY AUTOMATED COUNT: 14 % (ref 10–15)
EST. AVERAGE GLUCOSE BLD GHB EST-MCNC: 126 MG/DL
FASTING STATUS PATIENT QL REPORTED: ABNORMAL
FASTING STATUS PATIENT QL REPORTED: NORMAL
GLUCOSE SERPL-MCNC: 94 MG/DL (ref 70–99)
GLUCOSE UR STRIP-MCNC: NEGATIVE MG/DL
HBA1C MFR BLD: 6 % (ref 0–5.6)
HCO3 SERPL-SCNC: 26 MMOL/L (ref 22–29)
HCT VFR BLD AUTO: 43.5 % (ref 40–53)
HDLC SERPL-MCNC: 45 MG/DL
HGB BLD-MCNC: 14.4 G/DL (ref 13.3–17.7)
HGB UR QL STRIP: NEGATIVE
HIV 1+2 AB+HIV1 P24 AG SERPL QL IA: NONREACTIVE
HYALINE CASTS #/AREA URNS LPF: ABNORMAL /LPF
KETONES UR STRIP-MCNC: NEGATIVE MG/DL
LDLC SERPL CALC-MCNC: 166 MG/DL
LEUKOCYTE ESTERASE UR QL STRIP: NEGATIVE
MCH RBC QN AUTO: 28 PG (ref 26.5–33)
MCHC RBC AUTO-ENTMCNC: 33.1 G/DL (ref 31.5–36.5)
MCV RBC AUTO: 85 FL (ref 78–100)
MUCOUS THREADS #/AREA URNS LPF: PRESENT /LPF
NITRATE UR QL: NEGATIVE
NONHDLC SERPL-MCNC: 213 MG/DL
PH UR STRIP: 5.5 [PH] (ref 5–8)
PLATELET # BLD AUTO: 329 10E3/UL (ref 150–450)
POTASSIUM SERPL-SCNC: 4.2 MMOL/L (ref 3.4–5.3)
RBC # BLD AUTO: 5.14 10E6/UL (ref 4.4–5.9)
RBC #/AREA URNS AUTO: ABNORMAL /HPF
SODIUM SERPL-SCNC: 139 MMOL/L (ref 135–145)
SP GR UR STRIP: >=1.03 (ref 1–1.03)
SQUAMOUS #/AREA URNS AUTO: ABNORMAL /LPF
TRIGL SERPL-MCNC: 237 MG/DL
TSH SERPL DL<=0.005 MIU/L-ACNC: 3.09 UIU/ML (ref 0.3–4.2)
UROBILINOGEN UR STRIP-ACNC: 0.2 E.U./DL
WBC # BLD AUTO: 9.7 10E3/UL (ref 4–11)
WBC #/AREA URNS AUTO: ABNORMAL /HPF

## 2025-02-27 RX ORDER — ZOLPIDEM TARTRATE 10 MG/1
5-10 TABLET ORAL
Qty: 90 TABLET | Refills: 1 | Status: SHIPPED | OUTPATIENT
Start: 2025-02-27

## 2025-02-27 RX ORDER — PROPRANOLOL HYDROCHLORIDE 80 MG/1
80 TABLET ORAL 3 TIMES DAILY
Qty: 270 TABLET | Refills: 3 | Status: SHIPPED | OUTPATIENT
Start: 2025-02-27

## 2025-02-27 RX ORDER — FLUTICASONE PROPIONATE 50 MCG
2 SPRAY, SUSPENSION (ML) NASAL DAILY
Qty: 48 ML | Refills: 3 | Status: SHIPPED | OUTPATIENT
Start: 2025-02-27

## 2025-02-27 RX ORDER — ESCITALOPRAM OXALATE 10 MG/1
10 TABLET ORAL DAILY
Qty: 90 TABLET | Refills: 3 | Status: SHIPPED | OUTPATIENT
Start: 2025-02-27

## 2025-02-27 SDOH — HEALTH STABILITY: PHYSICAL HEALTH: ON AVERAGE, HOW MANY MINUTES DO YOU ENGAGE IN EXERCISE AT THIS LEVEL?: 60 MIN

## 2025-02-27 SDOH — HEALTH STABILITY: PHYSICAL HEALTH: ON AVERAGE, HOW MANY DAYS PER WEEK DO YOU ENGAGE IN MODERATE TO STRENUOUS EXERCISE (LIKE A BRISK WALK)?: 6 DAYS

## 2025-02-27 ASSESSMENT — PATIENT HEALTH QUESTIONNAIRE - PHQ9
10. IF YOU CHECKED OFF ANY PROBLEMS, HOW DIFFICULT HAVE THESE PROBLEMS MADE IT FOR YOU TO DO YOUR WORK, TAKE CARE OF THINGS AT HOME, OR GET ALONG WITH OTHER PEOPLE: NOT DIFFICULT AT ALL
SUM OF ALL RESPONSES TO PHQ QUESTIONS 1-9: 5
SUM OF ALL RESPONSES TO PHQ QUESTIONS 1-9: 5

## 2025-02-27 ASSESSMENT — ANXIETY QUESTIONNAIRES
8. IF YOU CHECKED OFF ANY PROBLEMS, HOW DIFFICULT HAVE THESE MADE IT FOR YOU TO DO YOUR WORK, TAKE CARE OF THINGS AT HOME, OR GET ALONG WITH OTHER PEOPLE?: NOT DIFFICULT AT ALL
2. NOT BEING ABLE TO STOP OR CONTROL WORRYING: SEVERAL DAYS
IF YOU CHECKED OFF ANY PROBLEMS ON THIS QUESTIONNAIRE, HOW DIFFICULT HAVE THESE PROBLEMS MADE IT FOR YOU TO DO YOUR WORK, TAKE CARE OF THINGS AT HOME, OR GET ALONG WITH OTHER PEOPLE: NOT DIFFICULT AT ALL
GAD7 TOTAL SCORE: 3
4. TROUBLE RELAXING: NOT AT ALL
1. FEELING NERVOUS, ANXIOUS, OR ON EDGE: SEVERAL DAYS
5. BEING SO RESTLESS THAT IT IS HARD TO SIT STILL: NOT AT ALL
7. FEELING AFRAID AS IF SOMETHING AWFUL MIGHT HAPPEN: NOT AT ALL
GAD7 TOTAL SCORE: 3
3. WORRYING TOO MUCH ABOUT DIFFERENT THINGS: SEVERAL DAYS
7. FEELING AFRAID AS IF SOMETHING AWFUL MIGHT HAPPEN: NOT AT ALL
GAD7 TOTAL SCORE: 3
6. BECOMING EASILY ANNOYED OR IRRITABLE: NOT AT ALL

## 2025-02-27 ASSESSMENT — PAIN SCALES - GENERAL: PAINLEVEL_OUTOF10: MODERATE PAIN (4)

## 2025-02-27 ASSESSMENT — SOCIAL DETERMINANTS OF HEALTH (SDOH): HOW OFTEN DO YOU GET TOGETHER WITH FRIENDS OR RELATIVES?: NEVER

## 2025-02-27 NOTE — PROGRESS NOTES
Assessment/Plan:     Routine physical examination  Routine healthcare maintenance.  Preventative cares reviewed.  Annual physical exams to continue.    Pulmonary embolism, unspecified chronicity, unspecified pulmonary embolism type, unspecified whether acute cor pulmonale present (H)  Pulmonary emboli historically with chronic Xarelto anticoagulation ongoing.  CBC updated as well as renal function.  - rivaroxaban ANTICOAGULANT (XARELTO) 20 MG TABS tablet  Dispense: 90 tablet; Refill: 3  - CBC with platelets    Chronic pain syndrome  Chronic pain syndrome followed by Dr. Anthony Small historically.  Uses medical cannabis currently.  Has not been on Percocet recently.    Screening for HIV (human immunodeficiency virus)  Routine HIV screen based on age criteria.  - HIV Antigen Antibody Combo    Moderate episode of recurrent major depressive disorder (H)  Escitalopram 10 mg daily continuing with described benefit.  PHQ-9 questionnaire 5 out of 27 and CAROLYN-7 questionnaire 3 out of 21.  - escitalopram (LEXAPRO) 10 MG tablet  Dispense: 90 tablet; Refill: 3    Seasonal allergic rhinitis, unspecified trigger  Fluticasone nasal spray utilized as needed.  - fluticasone (FLONASE) 50 MCG/ACT nasal spray  Dispense: 48 mL; Refill: 3    Nonintractable episodic headache, unspecified headache type  Propranolol 80 mg 3 times daily has been very helpful in not requiring abortive therapy for migraine headache.  - propranolol (INDERAL) 80 MG tablet  Dispense: 270 tablet; Refill: 3    Sinus tachycardia  As above, benefits of propranolol for sinus tachycardia management as well.  Med monitoring completed with base metabolic panel as well as TSH.  - propranolol (INDERAL) 80 MG tablet  Dispense: 270 tablet; Refill: 3  - Basic metabolic panel    Insomnia, unspecified type  Zolpidem utilized typically 5 mg at bedtime up to 10 mg at bedtime as needed.  - zolpidem (AMBIEN) 10 MG tablet  Dispense: 90 tablet; Refill: 1    Screening for  diabetes mellitus  Diabetes screen with nonfasting A1c.  - Hemoglobin A1c    Class 1 obesity without serious comorbidity with body mass index (BMI) of 31.0 to 31.9 in adult, unspecified obesity type  Dietary and exercise modification for weight goal less than 180 pounds initially, less than 170 pounds ideally.    Hyperlipidemia LDL goal <100  Lipid cascade updated today, nonfasting status currently however.  - Lipid panel reflex to direct LDL Non-fasting    Night sweats  Discussed night sweats.  Unclear etiology.  CBC, base metabolic panel and TSH updated and will notify with results.  Attempt to obtain urinalysis today  - CBC with platelets  - TSH with free T4 reflex  - UA    The longitudinal plan of care for the diagnosis(es)/condition(s) as documented were addressed during this visit. Due to the added complexity in care, I will continue to support Anand in the subsequent management and with ongoing continuity of care.                Subjective:     Anand Felix is a 39 year old male who presents for an annual exam.  In general doing well.  History of pulmonary embolism.  Is on Xarelto anticoagulation chronically.  Tolerating.  No bleeding concerns.  Chronic pain syndrome.  Has seen Dr. Anthony Small in the past.  Has been off Percocet for a while.  Using medical cannabis.  Depression stable with escitalopram 10 mg daily.  Fluticasone nasal spray using 2 sprays per nostril daily.  Insomnia management with zolpidem 5 mg at bedtime and occasional 10 mg dosing necessary.  Hyperlipidemia.  Nonfasting status.  Has gained significant weight since his wife has received her kidney transplant and now they have dietary indiscretions as she eats things that she was not able to before.  Comprehensive review of systems as above otherwise all negative.  Night sweats.  Back feels sweaty at times predominantly.  No cough or shortness of breath.  No dysuria urgency or frequency.  Comprehensive review of systems as above otherwise all  negative.    Healthy Habits:   HPI     Immunization History   Administered Date(s) Administered    COVID-19 Monovalent 12+ (Pfizer 2022) 02/22/2022, 03/15/2022    Influenza Vaccine >6 months,quad, PF 11/08/2017, 12/07/2020    Influenza Vaccine, 6+MO IM (QUADRIVALENT W/PRESERVATIVES) 11/30/2018    Pneumococcal 23 valent 11/30/2018    TDAP (Adacel,Boostrix) 11/08/2017     Immunization status: Immunizations up-to-date.    Current Outpatient Medications   Medication Sig Dispense Refill    Acetaminophen (TYLENOL PO) Take 500-1,000 mg by mouth every 8 hours as needed      escitalopram (LEXAPRO) 10 MG tablet Take 1 tablet (10 mg) by mouth daily. Patient needs an appointment for refills. 90 tablet 3    fluticasone (FLONASE) 50 MCG/ACT nasal spray Spray 2 sprays into both nostrils daily. 48 mL 3    medical cannabis (Patient's own supply) See Admin Instructions (The purpose of this order is to document that the patient reports taking medical cannabis.  This is not a prescription, and is not used to certify that the patient has a qualifying medical condition.)      omeprazole (PRILOSEC) 20 MG DR capsule Take 1 capsule (20 mg) by mouth daily. Patient needs an appointment for refills. 90 capsule 3    propranolol (INDERAL) 80 MG tablet Take 1 tablet (80 mg) by mouth 3 times daily. 270 tablet 3    riluzole (RILUTEK) 50 MG tablet Two capsules twice a day 360 tablet 2    rivaroxaban ANTICOAGULANT (XARELTO) 20 MG TABS tablet Take 1 tablet (20 mg) by mouth daily (with dinner). 90 tablet 3    VITAMIN D, CHOLECALCIFEROL, PO Take 2,000 Units by mouth daily      zolpidem (AMBIEN) 10 MG tablet Take 0.5-1 tablets (5-10 mg) by mouth nightly as needed for sleep. 90 tablet 1    oxyCODONE-acetaminophen (PERCOCET) 7.5-325 MG per tablet Take 1 tablet by mouth 3 times daily Dispense/start 4/11/24 (Patient not taking: Reported on 2/27/2025) 84 tablet 0     Past Medical History:   Diagnosis Date    Anxiety     Bilateral pulmonary embolism (H)  "10/15/2017    Bulging lumbar disc     Depression     History of anesthesia complications     Was hospitalized in October 2015 for weakness    History of blood clots     PE in 2017    Lumbar herniated disc 10/23/2015    Lumbar radiculopathy 11/25/2015    Pulmonary embolism (H) 10/15/2017    Shortness of breath     Tachycardia     After PE in      Past Surgical History:   Procedure Laterality Date    BACK SURGERY      MINIMALLY INVASIVE MICRODISCECTOMY LUMBAR SPINE Right 10/2015    L5-S1; Dr. Bettie Hernandez    UT NASAL/SINUS ENDOSCOPY,BX/RMV POLYP/DEBRID Right 5/22/2019    Procedure: RIGHT ENDOSCOPIC SINUS SURGERY;  Surgeon: Leo Wright MD;  Location: [facility];  Service: ENT    UT NASAL/SINUS ENDOSCOPY,BX/RMV POLYP/DEBRID Bilateral 11/20/2020    Procedure: Endoscpic sinus surgery, LEFT;  Surgeon: Leo Wright MD;  Location: [facility];  Service: ENT    SINUS SURGERY  2014    Dr. Wright     Patient has no known allergies.  Family History   Problem Relation Age of Onset    Depression Mother     Hyperlipidemia Mother     Other - See Comments Mother         \"Ulcers\" - Aches and pains throughout her body.    Cancer Father     No Known Problems Sister     No Known Problems Sister     No Known Problems Sister     No Known Problems Sister     No Known Problems Brother     No Known Problems Brother     Thyroid Disease Maternal Grandmother     Thyroid Disease Cousin     Myocardial Infarction Cousin     Neuromuscular Disease Cousin      Social History     Socioeconomic History    Marital status:      Spouse name: Not on file    Number of children: 0    Years of education: GED and some collese    Highest education level: Not on file   Occupational History    Not on file   Tobacco Use    Smoking status: Never    Smokeless tobacco: Never   Substance and Sexual Activity    Alcohol use: Yes     Comment: Alcoholic Drinks/day: Monthly or less often. 1-2 drinks at a time.    Drug use: Yes     Types: " Marijuana    Sexual activity: Yes     Partners: Female   Other Topics Concern    Parent/sibling w/ CABG, MI or angioplasty before 65F 55M? Not Asked   Social History Narrative    Not on file     Social Drivers of Health     Financial Resource Strain: Low Risk  (2/27/2025)    Financial Resource Strain     Within the past 12 months, have you or your family members you live with been unable to get utilities (heat, electricity) when it was really needed?: No   Food Insecurity: Low Risk  (2/27/2025)    Food Insecurity     Within the past 12 months, did you worry that your food would run out before you got money to buy more?: No     Within the past 12 months, did the food you bought just not last and you didn t have money to get more?: No   Transportation Needs: Low Risk  (2/27/2025)    Transportation Needs     Within the past 12 months, has lack of transportation kept you from medical appointments, getting your medicines, non-medical meetings or appointments, work, or from getting things that you need?: No   Physical Activity: Sufficiently Active (2/27/2025)    Exercise Vital Sign     Days of Exercise per Week: 6 days     Minutes of Exercise per Session: 60 min   Stress: No Stress Concern Present (2/27/2025)    [place] [employer] - Occupational Stress Questionnaire     Feeling of Stress : Only a little   Social Connections: Unknown (2/27/2025)    Social Connection and Isolation Panel [NHANES]     Frequency of Communication with Friends and Family: Not on file     Frequency of Social Gatherings with Friends and Family: Never     Attends Judaism Services: Not on file     Active Member of Clubs or Organizations: Not on file     Attends Club or Organization Meetings: Not on file     Marital Status: Not on file   Interpersonal Safety: Low Risk  (2/27/2025)    Interpersonal Safety     Do you feel physically and emotionally safe where you currently live?: Yes     Within the past 12 months, have you been  "hit, slapped, kicked or otherwise physically hurt by someone?: No     Within the past 12 months, have you been humiliated or emotionally abused in other ways by your partner or ex-partner?: No   Housing Stability: High Risk (2/27/2025)    Housing Stability     Do you have housing? : No     Are you worried about losing your housing?: No       Review of Systems  Comprehensive ROS: as above, otherwise all negative.           Objective:     /80   Pulse 73   Temp 99.6  F (37.6  C)   Resp 12   Ht 1.651 m (5' 5\")   Wt 85.3 kg (188 lb)   SpO2 94%   BMI 31.28 kg/m    Body mass index is 31.28 kg/m .    Physical    General Appearance:    Alert, cooperative, no distress, appears stated age.  BMI 31.28.  Transfer slowly.  Ataxic gait.  Abdomen is for neuromuscular weakness.   Head:    Normocephalic, without obvious abnormality, atraumatic   Eyes:    PERRL, conjunctiva/corneas clear, EOM's intact, fundi     benign, both eyes        Ears:    Normal TM's and external ear canals, both ears   Nose:   Nares normal, septum midline, mucosa normal, no drainage    or sinus tenderness   Throat:   Lips, mucosa, and tongue normal; teeth and gums normal   Neck:   Supple, symmetrical, trachea midline, no adenopathy;        thyroid:  No enlargement/tenderness/nodules; no carotid    bruit or JVD   Back:     Symmetric, no curvature, ROM normal, no CVA tenderness   Lungs:     Clear to auscultation bilaterally, respirations unlabored   Chest wall:    No tenderness or deformity   Heart:    Regular rate and rhythm, S1 and S2 normal, no murmur, rub   or gallop   Abdomen:     Soft, non-tender, bowel sounds active all four quadrants,     no masses, no organomegaly.     Genitalia:    Normal male without lesion, discharge or tenderness.  No inguinal hernia noted.     Rectal:    deferred   Extremities:   Extremities normal, atraumatic, no cyanosis or edema   Pulses:   2+ and symmetric all extremities   Skin:   Skin color, texture, turgor " normal, no rashes or lesions   Lymph nodes:   Cervical, supraclavicular, and axillary nodes normal   Neurologic:   CNII-XII intact. Normal strength, sensation and reflexes       Throughout.  Evidence for neuromuscular weakness.                This note has been dictated using voice recognition software and as a result may contain minor grammatical errors and unintended word substitutions.         Answers submitted by the patient for this visit:  Patient Health Questionnaire (Submitted on 2/27/2025)  If you checked off any problems, how difficult have these problems made it for you to do your work, take care of things at home, or get along with other people?: Not difficult at all  PHQ9 TOTAL SCORE: 5  Patient Health Questionnaire (G7) (Submitted on 2/27/2025)  CAROLYN 7 TOTAL SCORE: 3

## 2025-02-27 NOTE — Clinical Note
[facility]  02/27/25  Patient: Anand Felix  YOB: 1985  Medical Record Number: [id number]                                                                                  Non-Opioid Controlled Substance Agreement    This is an agreement between you and your provider regarding safe and appropriate use of controlled substances prescribed by your care team. Controlled substances are?medicines that can cause physical and mental dependence (abuse).     There are strict laws about having and using these medicines. We here at [facility] are  committed to working with you in your efforts to get better. To support you in this work, we'll help you schedule regular office appointments for medicine refills. If we must cancel or change your appointment for any reason, we'll make sure you have enough medicine to last until your next appointment.     As a Provider, I will:     Listen carefully to your concerns while treating you with respect.     Recommend a treatment plan that I believe is in your best interest and may involve therapies other than medicine.      Talk with you often about the possible benefits and the risk of harm of any medicine that we prescribe for you.    Assess the safety of this medicine and check how well it works.      Provide a plan on how to taper (discontinue or go off) using this medicine if the decision is made to stop its use.      ::  As a Patient, I understand controlled substances:       Are prescribed by my care provider to help me function or work and manage my condition(s).?    Are strong medicines and can cause serious side effects.       Need to be taken exactly as prescribed.?Combining controlled substances with certain medicines or chemicals (such as illegal drugs, alcohol, sedatives, sleeping pills, and benzodiazepines) can be dangerous or even fatal.? If I stop taking my medicines suddenly, I may have severe withdrawal symptoms.     The risks,  benefits, and side effects of these medicine(s) were explained to me. I agree that:    1. I will take part in other treatments as advised by my care team. This may be psychiatry or counseling, physical therapy, behavioral therapy, group treatment or a referral to specialist.    2. I will keep all my appointments and understand this is part of the monitoring of controlled substances.?My care team may require an office visit for EVERY controlled substance refill. If I miss appointments or don t follow instructions, my care team may stop my medicine    3. I will take my medicines as prescribed. I will not change the dose or schedule unless my care team tells me to. There will be no refills if I run out early.      4. I may be asked to come to the clinic and complete a urine drug test or complete a pill count. If I don t give a urine sample or participate in a pill count, the care team may stop my medicine.    5. I will only receive controlled substance prescriptions from this clinic. If I am treated by another provider, I will tell them that I am taking controlled substances and that I have a treatment agreement with this provider. I will inform my [facility] care team within one business day if I am given a prescription for any controlled substance by another healthcare provider. My [facility] care team can contact other providers and pharmacists about my use of any medicines.    6. It is up to me to make sure that I don't run out of my medicines on weekends or holidays.?If my care team is willing to refill my prescription without a visit, I must request refills only during office hours. Refills may take up to 3 business days to process. I will use one pharmacy to fill all my controlled substance prescriptions. I will notify the clinic about any changes to my insurance or medicine availability.    7. I am responsible for my prescriptions. If the medicine/prescription is lost, stolen or destroyed, it will  not be replaced.?I also agree not to share controlled substance medicines with anyone.     8. I am aware I should not use any illegal or recreational drugs. I agree not to drink alcohol unless my care team says I can.     9. If I enroll in the [place] Medical Cannabis program, I will tell my care team before my next refill.    10. I will tell my care team right away if I become pregnant, have a new medical problem treated outside of my regular clinic, or have a change in my medicines.     11. I understand that this medicine can affect my thinking, judgment and reaction time.? Alcohol and drugs affect the brain and body, which can affect the safety of my driving. Being under the influence of alcohol or drugs can affect my decision-making, behaviors, personal safety and the safety of others. Driving while impaired (DWI) can occur if a person is driving, operating or in physical control of a car, motorcycle, boat, snowmobile, ATV, motorbike, off-road vehicle or any other motor vehicle ([place] Statute 169A.20). I understand the risk if I choose to drive or operate any vehicle or machinery.    I understand that if I do not follow any of the conditions above, my prescriptions or treatment may be stopped or changed.   I agree that my provider, clinic care team and pharmacy may work with any city, state or federal law enforcement agency that investigates the misuse, sale or other diversion of my controlled medicine. I will allow my provider to discuss my care with, or share a copy of, this agreement with any other treating provider, pharmacy or emergency room where I receive care.     I have read this agreement and have asked questions about anything I did not understand.    ________________________________________________________  Patient Signature - Anand Felix     ___________________                   Date     ________________________________________________________  Provider Signature - Malachi Choudhary MD        ___________________                   Date     ________________________________________________________  Witness Signature (required if provider not present while patient signing)          ___________________                   Date

## 2025-03-19 NOTE — PROGRESS NOTES
Pt scheduled for TPI for pain bilateral head, neck and right shoulder pain.   complains of pain/discomfort

## 2025-04-06 DIAGNOSIS — M54.50 LUMBAR PAIN DETERMINED BY PALPATION: ICD-10-CM

## 2025-04-07 RX ORDER — RILUZOLE 50 MG/1
100 TABLET, FILM COATED ORAL
Qty: 360 TABLET | Refills: 2 | Status: SHIPPED | OUTPATIENT
Start: 2025-04-07

## 2025-04-07 NOTE — TELEPHONE ENCOUNTER
Name from pharmacy: RILUZOLE 50 MG TABLET         Will file in chart as: riluzole (RILUTEK) 50 MG tablet    Sig: TAKE 2 TABLETS BY MOUTH TWICE A DAY    Disp: 360 tablet    Refills: 2    Start: 4/6/2025    Class: E-Prescribe    Non-formulary For: Lumbar pain determined by palpation and/or percussion at L1 (5/4/16)    Last ordered: 1 year ago (3/22/2024) by Anthony Small MD    Last refill: 3/9/2025    Rx #: [id number]       To be filled at: [id number] [place] [facility] [place] [address]

## 2025-04-07 NOTE — TELEPHONE ENCOUNTER
Received fax from pharmacy requesting refill(s) for     riluzole (RILUTEK) 50 MG tablet    Date last filled 3/9/2025    Last Appt Date:2/2/2024    Next Appt scheduled: None on File    Pharmacy:      [id number] [place] [facility] [place] [address] for processing    Roselyn Smith [facility]